# Patient Record
Sex: MALE | Race: WHITE | NOT HISPANIC OR LATINO | Employment: UNEMPLOYED | URBAN - METROPOLITAN AREA
[De-identification: names, ages, dates, MRNs, and addresses within clinical notes are randomized per-mention and may not be internally consistent; named-entity substitution may affect disease eponyms.]

---

## 2023-03-07 ENCOUNTER — DOCUMENTATION (OUTPATIENT)
Dept: PRIMARY CARE | Facility: CLINIC | Age: 53
End: 2023-03-07
Payer: COMMERCIAL

## 2023-03-09 ENCOUNTER — PATIENT OUTREACH (OUTPATIENT)
Dept: CARE COORDINATION | Facility: CLINIC | Age: 53
End: 2023-03-09
Payer: COMMERCIAL

## 2023-03-10 NOTE — PROGRESS NOTES
Discharge Facility:[facility]  Discharge Diagnosis:  Admission Date:  Discharge Date:  Engagement  Call Start Time: 1316 (3/9/2023 10:39 PM)    Medications  Medications reviewed with patient/caregiver?: Yes (3/9/2023 10:39 PM)  Is the patient having any side effects they believe may be caused by any medication additions or changes?: No (3/9/2023 10:39 PM)  Does the patient have all medications ordered at discharge?: No (3/9/2023 10:39 PM)  Nursing Interventions: Nurse provided patient education (3/9/2023 10:39 PM)  Prescription Comments: Encouraged to get prescription for albuterol to help ease breathing (3/9/2023 10:39 PM)  Is the patient taking all medications as directed (includes completed medication regime)?: No (3/9/2023 10:39 PM)  What is preventing the patient from taking all medications as directed?: Other (Comment) (3/9/2023 10:39 PM)  Nursing Interventions: Nurse provided patient education (3/9/2023 10:39 PM)    Appointments  Does the patient have a primary care provider?: Yes (3/9/2023 10:39 PM)  Nursing Interventions: Educated patient on importance of making appointment (3/9/2023 10:39 PM)  Has the patient kept scheduled appointments due by today?: Yes (3/9/2023 10:39 PM)    Patient Teaching  Does the patient have access to their discharge instructions?: Yes (3/9/2023 10:39 PM)  Nursing Interventions: Reviewed instructions with patient (3/9/2023 10:39 PM)  What is the patient's perception of their health status since discharge?: Improving (3/9/2023 10:39 PM)  Is the patient/caregiver able to teach back the hierarchy of who to call/visit for symptoms/problems? PCP, Specialist, Home Health nurse, Urgent Care, ED, 911: Yes (3/9/2023 10:39 PM)    Wrap Up  Call End Time: 1318 (3/9/2023 10:39 PM)

## 2023-03-21 ENCOUNTER — DOCUMENTATION (OUTPATIENT)
Dept: PRIMARY CARE | Facility: CLINIC | Age: 53
End: 2023-03-21
Payer: COMMERCIAL

## 2023-03-21 ENCOUNTER — PATIENT OUTREACH (OUTPATIENT)
Dept: CARE COORDINATION | Facility: CLINIC | Age: 53
End: 2023-03-21
Payer: COMMERCIAL

## 2023-03-21 RX ORDER — SACUBITRIL AND VALSARTAN 24; 26 MG/1; MG/1
1 TABLET, FILM COATED ORAL 2 TIMES DAILY
COMMUNITY
End: 2024-05-02 | Stop reason: SDUPTHER

## 2023-03-21 RX ORDER — CARVEDILOL 25 MG/1
25 TABLET ORAL
Status: ON HOLD | COMMUNITY

## 2023-03-21 NOTE — PROGRESS NOTES
[place] [place] Diagnosis:E87.70 Fluid Overload, unspecified  Admission Date:3/16/2023  Discharge Date:3/20/2023    PCP appt:3/27/2023    Nephrology- 3/23/2023  Cardiology- 4/18/2023      Patient to discontinue Labetalol, Losartan    New Meds: Entresto 24/26 one twice daily  Carvedilol 25 mg one twice daily    Kentrell states he is much improved.

## 2023-03-22 PROBLEM — Z95.1 S/P CABG X 3: Status: ACTIVE | Noted: 2023-03-22

## 2023-03-22 PROBLEM — I65.29 CAROTID STENOSIS: Status: ACTIVE | Noted: 2023-03-22

## 2023-03-22 PROBLEM — I25.10 CAD (CORONARY ARTERY DISEASE): Status: ACTIVE | Noted: 2023-03-22

## 2023-03-22 PROBLEM — N18.6 END-STAGE RENAL DISEASE ON HEMODIALYSIS (MULTI): Status: ACTIVE | Noted: 2023-03-22

## 2023-03-22 PROBLEM — J94.2 HEMOTHORAX: Status: ACTIVE | Noted: 2023-03-22

## 2023-03-22 PROBLEM — I10 HTN (HYPERTENSION): Status: ACTIVE | Noted: 2023-03-22

## 2023-03-22 PROBLEM — R07.9 CHEST PAIN: Status: ACTIVE | Noted: 2023-03-22

## 2023-03-22 PROBLEM — Z99.2 END-STAGE RENAL DISEASE ON HEMODIALYSIS (MULTI): Status: ACTIVE | Noted: 2023-03-22

## 2023-03-22 PROBLEM — E11.9 DIABETES MELLITUS (MULTI): Status: ACTIVE | Noted: 2023-03-22

## 2023-03-22 PROBLEM — M32.9 SLE (SYSTEMIC LUPUS ERYTHEMATOSUS) (MULTI): Status: ACTIVE | Noted: 2023-03-22

## 2023-03-22 PROBLEM — Z95.5 H/O HEART ARTERY STENT: Status: ACTIVE | Noted: 2023-03-22

## 2023-03-22 PROBLEM — J90 PLEURAL EFFUSION: Status: ACTIVE | Noted: 2023-03-22

## 2023-03-22 PROBLEM — R06.2 WHEEZING: Status: ACTIVE | Noted: 2023-03-22

## 2023-03-22 PROBLEM — S91.109A WOUND, OPEN, TOE: Status: ACTIVE | Noted: 2023-03-22

## 2023-03-22 RX ORDER — ATORVASTATIN CALCIUM 80 MG/1
1 TABLET, FILM COATED ORAL DAILY
COMMUNITY
Start: 2022-06-03 | End: 2024-02-08 | Stop reason: SDUPTHER

## 2023-03-22 RX ORDER — LANCETS
EACH MISCELLANEOUS
Status: ON HOLD | COMMUNITY
Start: 2022-03-25 | End: 2024-06-07 | Stop reason: ENTERED-IN-ERROR

## 2023-03-22 RX ORDER — ACETAMINOPHEN 325 MG/1
650 TABLET ORAL EVERY 6 HOURS PRN
Status: ON HOLD | COMMUNITY
Start: 2017-06-20

## 2023-03-22 RX ORDER — MULTIVITAMIN
1 TABLET ORAL DAILY
Status: ON HOLD | COMMUNITY
Start: 2022-06-16

## 2023-03-22 RX ORDER — CLOPIDOGREL BISULFATE 75 MG/1
75 TABLET ORAL DAILY
COMMUNITY
Start: 2022-06-03 | End: 2024-02-08 | Stop reason: WASHOUT

## 2023-03-22 RX ORDER — METOPROLOL TARTRATE 50 MG/1
1 TABLET ORAL 2 TIMES DAILY
COMMUNITY
Start: 2022-06-03 | End: 2023-11-27 | Stop reason: ALTCHOICE

## 2023-03-22 RX ORDER — INSULIN LISPRO 100 [IU]/ML
INJECTION, SOLUTION INTRAVENOUS; SUBCUTANEOUS
COMMUNITY
Start: 2017-05-18 | End: 2023-10-17

## 2023-03-22 RX ORDER — FAMOTIDINE 20 MG/1
1 TABLET, FILM COATED ORAL DAILY
Status: ON HOLD | COMMUNITY
Start: 2022-06-16 | End: 2024-03-28 | Stop reason: ALTCHOICE

## 2023-03-22 RX ORDER — AMLODIPINE BESYLATE 10 MG/1
10 TABLET ORAL DAILY
COMMUNITY
End: 2023-03-27 | Stop reason: SDUPTHER

## 2023-03-22 RX ORDER — ALBUTEROL SULFATE 90 UG/1
1-2 AEROSOL, METERED RESPIRATORY (INHALATION) EVERY 6 HOURS PRN
Status: ON HOLD | COMMUNITY
Start: 2022-06-09 | End: 2024-06-07 | Stop reason: ENTERED-IN-ERROR

## 2023-03-22 RX ORDER — BLOOD SUGAR DIAGNOSTIC
STRIP MISCELLANEOUS
Status: ON HOLD | COMMUNITY
Start: 2022-03-25 | End: 2024-06-07 | Stop reason: ENTERED-IN-ERROR

## 2023-03-22 RX ORDER — AMOXICILLIN 250 MG
1 CAPSULE ORAL DAILY PRN
COMMUNITY
End: 2023-11-27 | Stop reason: ALTCHOICE

## 2023-03-22 RX ORDER — INSULIN GLARGINE 100 [IU]/ML
10 INJECTION, SOLUTION SUBCUTANEOUS 2 TIMES DAILY
COMMUNITY
Start: 2017-05-18 | End: 2023-08-02 | Stop reason: SDUPTHER

## 2023-03-22 RX ORDER — SEVELAMER CARBONATE 800 MG/1
800 TABLET, FILM COATED ORAL
Status: ON HOLD | COMMUNITY
Start: 2022-03-25

## 2023-03-22 RX ORDER — ASPIRIN 81 MG/1
1 TABLET ORAL DAILY
Status: ON HOLD | COMMUNITY
Start: 2017-05-18

## 2023-03-27 ENCOUNTER — OFFICE VISIT (OUTPATIENT)
Dept: PRIMARY CARE | Facility: CLINIC | Age: 53
End: 2023-03-27
Payer: COMMERCIAL

## 2023-03-27 VITALS
DIASTOLIC BLOOD PRESSURE: 62 MMHG | BODY MASS INDEX: 19.11 KG/M2 | SYSTOLIC BLOOD PRESSURE: 152 MMHG | WEIGHT: 157 LBS | HEART RATE: 73 BPM

## 2023-03-27 DIAGNOSIS — N18.9 ANEMIA DUE TO CHRONIC KIDNEY DISEASE, UNSPECIFIED CKD STAGE: Primary | ICD-10-CM

## 2023-03-27 DIAGNOSIS — J90 PLEURAL EFFUSION: ICD-10-CM

## 2023-03-27 DIAGNOSIS — I10 PRIMARY HYPERTENSION: ICD-10-CM

## 2023-03-27 DIAGNOSIS — D63.1 ANEMIA DUE TO CHRONIC KIDNEY DISEASE, UNSPECIFIED CKD STAGE: Primary | ICD-10-CM

## 2023-03-27 DIAGNOSIS — N18.6 END-STAGE RENAL DISEASE ON HEMODIALYSIS (MULTI): ICD-10-CM

## 2023-03-27 DIAGNOSIS — Z99.2 END-STAGE RENAL DISEASE ON HEMODIALYSIS (MULTI): ICD-10-CM

## 2023-03-27 DIAGNOSIS — E11.59 TYPE 2 DIABETES MELLITUS WITH OTHER CIRCULATORY COMPLICATION, WITHOUT LONG-TERM CURRENT USE OF INSULIN (MULTI): ICD-10-CM

## 2023-03-27 PROBLEM — J94.2 HEMOTHORAX: Status: RESOLVED | Noted: 2023-03-22 | Resolved: 2023-03-27

## 2023-03-27 LAB
ERYTHROCYTE DISTRIBUTION WIDTH (RATIO) BY AUTOMATED COUNT: 13.5 % (ref 11.5–14.5)
ERYTHROCYTE MEAN CORPUSCULAR HEMOGLOBIN CONCENTRATION (G/DL) BY AUTOMATED: 31.4 G/DL (ref 32–36)
ERYTHROCYTE MEAN CORPUSCULAR VOLUME (FL) BY AUTOMATED COUNT: 100 FL (ref 80–100)
ERYTHROCYTES (10*6/UL) IN BLOOD BY AUTOMATED COUNT: 2.65 X10E12/L (ref 4.5–5.9)
ESTIMATED AVERAGE GLUCOSE FOR HBA1C: 235 MG/DL
HEMATOCRIT (%) IN BLOOD BY AUTOMATED COUNT: 26.4 % (ref 41–52)
HEMOGLOBIN (G/DL) IN BLOOD: 8.3 G/DL (ref 13.5–17.5)
HEMOGLOBIN A1C/HEMOGLOBIN TOTAL IN BLOOD: 9.8 %
LEUKOCYTES (10*3/UL) IN BLOOD BY AUTOMATED COUNT: 5.8 X10E9/L (ref 4.4–11.3)
NRBC (PER 100 WBCS) BY AUTOMATED COUNT: 0 /100 WBC (ref 0–0)
PLATELETS (10*3/UL) IN BLOOD AUTOMATED COUNT: 234 X10E9/L (ref 150–450)

## 2023-03-27 PROCEDURE — 99213 OFFICE O/P EST LOW 20 MIN: CPT | Performed by: INTERNAL MEDICINE

## 2023-03-27 PROCEDURE — 85027 COMPLETE CBC AUTOMATED: CPT

## 2023-03-27 PROCEDURE — 3066F NEPHROPATHY DOC TX: CPT | Performed by: INTERNAL MEDICINE

## 2023-03-27 PROCEDURE — 3078F DIAST BP <80 MM HG: CPT | Performed by: INTERNAL MEDICINE

## 2023-03-27 PROCEDURE — 83036 HEMOGLOBIN GLYCOSYLATED A1C: CPT

## 2023-03-27 PROCEDURE — 3077F SYST BP >= 140 MM HG: CPT | Performed by: INTERNAL MEDICINE

## 2023-03-27 PROCEDURE — 36415 COLL VENOUS BLD VENIPUNCTURE: CPT | Performed by: INTERNAL MEDICINE

## 2023-03-27 RX ORDER — AMLODIPINE BESYLATE 10 MG/1
10 TABLET ORAL DAILY
Qty: 90 TABLET | Refills: 1 | Status: SHIPPED | OUTPATIENT
Start: 2023-03-27 | End: 2023-09-17

## 2023-03-27 ASSESSMENT — PATIENT HEALTH QUESTIONNAIRE - PHQ9
2. FEELING DOWN, DEPRESSED OR HOPELESS: NOT AT ALL
SUM OF ALL RESPONSES TO PHQ9 QUESTIONS 1 AND 2: 0
1. LITTLE INTEREST OR PLEASURE IN DOING THINGS: NOT AT ALL

## 2023-03-27 NOTE — ASSESSMENT & PLAN NOTE
Likely due to missed hemodialysis session.  Advised compliance with his hemodialysis session and he has follow-up with nephrology.  There may have been a component of decreased oncotic pressure related to his anemia.  Patient received 1 unit of packed red blood cells with improved symptoms.  Continue getting IV Venofer from his nephrology  Check CBC

## 2023-03-27 NOTE — ASSESSMENT & PLAN NOTE
Blood pressure elevated today however he is a hemodialysis patient.  Will monitor clinically.  Advise follow-up with nephrology

## 2023-03-27 NOTE — PROGRESS NOTES
Subjective   Patient ID: Kentrell Carreon is a 52 y.o. male who presents for Follow-up (FOLLOW UP TO HOSPITAL STAY).    HPI   Patient is a 52-year-old male with past medical history of end-stage renal disease hypertension dyslipidemia GERD and diabetes mellitus type 2 presents as a hospital follow-up.  Patient went to the hospital due to shortness of breath on exertion.  He missed hemodialysis session.  The chest x-ray showed bilateral pulmonary edema.  Patient was also noted to be anemic.  He was given 1 unit blood transfusion and he states that his symptoms had improved substantially.  He was subsequently discharged with follow-up with his nephrologist.  He states that he is on IV iron with hemodialysis.    Patient receives hemodialysis at home it is unclear why he missed HD       Review of Systems  Constitutional: No fever or chills  Cardiovascular: no chest pain, no palpitations and no syncope.   Respiratory: no cough, no shortness of breath during exertion and no shortness of breath at rest.   Gastrointestinal: no abdominal pain, no nausea and no vomiting.  Neuro: No Headache, no dizziness    Objective   /62   Pulse 73   Wt 71.2 kg (157 lb)   BMI 19.11 kg/m²     Physical Exam  Constitutional: Alert and in no acute distress. Well developed, well nourished  Head and Face: Head and face: Normal.    Cardiovascular: Heart rate and rhythm were normal, normal S1 and S2. No peripheral edema.   Pulmonary: No respiratory distress. Clear bilateral breath sounds.  Musculoskeletal: Gait and station: Normal. Muscle strength/tone: Normal.   Skin: Normal skin color and pigmentation, normal skin turgor, and no rash.    Psychiatric: Judgment and insight: Intact. Mood and affect: Normal.        Lab Results   Component Value Date    WBC 6.3 03/20/2023    HGB 7.6 (L) 03/20/2023    HCT 24.4 (L) 03/20/2023     03/20/2023    CHOL 102 03/10/2022    TRIG 112 03/10/2022    HDL 33.4 (A) 03/10/2022    ALT 16 03/16/2023    AST  15 03/16/2023     03/20/2023    K 5.2 03/20/2023    CL 96 (L) 03/20/2023    CREATININE 7.47 (H) 03/20/2023    BUN 56 (H) 03/20/2023    CO2 27 03/20/2023    TSH 3.65 03/10/2022    INR 1.2 (H) 03/16/2023    HGBA1C 8.7 (A) 03/10/2022       Electrocardiogram 12 Lead  Normal sinus rhythm  Possible Left atrial enlargement  LVH with QRS widening and repolarization abnormality  Prolonged QT interval or tu fusion, consider myocardial disease, electrolyte imbalance, or drug effects  Abnormal ECG  When compared with ECG of 16-MAR-2023 10:16,  No significant change was found  Confirmed by Collin Bravo ([id number]) on 3/20/2023 3:01:16 PM            Assessment/Plan   Problem List Items Addressed This Visit          Respiratory    Pleural effusion     Likely due to missed hemodialysis session.  Advised compliance with his hemodialysis session and he has follow-up with nephrology.  There may have been a component of decreased oncotic pressure related to his anemia.  Patient received 1 unit of packed red blood cells with improved symptoms.  Continue getting IV Venofer from his nephrology  Check CBC            Circulatory    HTN (hypertension)     Blood pressure elevated today however he is a hemodialysis patient.  Will monitor clinically.  Advise follow-up with nephrology         Relevant Medications    amLODIPine (Norvasc) 10 mg tablet    Other Relevant Orders    Referral to Ophthalmology       Genitourinary    End-stage renal disease on hemodialysis (CMS/[facility])       Endocrine/Metabolic    Diabetes mellitus (CMS/[facility])     Check A1c  Continue insulin         Relevant Orders    Hemoglobin A1C     Other Visit Diagnoses       Anemia due to chronic kidney disease, unspecified CKD stage    -  Primary    Relevant Orders    CBC    Hemoglobin A1C

## 2023-04-19 ENCOUNTER — DOCUMENTATION (OUTPATIENT)
Dept: PRIMARY CARE | Facility: CLINIC | Age: 53
End: 2023-04-19
Payer: COMMERCIAL

## 2023-07-24 ENCOUNTER — PATIENT OUTREACH (OUTPATIENT)
Dept: PRIMARY CARE | Facility: CLINIC | Age: 53
End: 2023-07-24
Payer: COMMERCIAL

## 2023-07-24 DIAGNOSIS — L03.039 CELLULITIS OF TOE, UNSPECIFIED LATERALITY: ICD-10-CM

## 2023-07-24 RX ORDER — CEPHALEXIN 500 MG/1
500 CAPSULE ORAL 4 TIMES DAILY
COMMUNITY
End: 2023-11-27 | Stop reason: ALTCHOICE

## 2023-07-24 RX ORDER — DOXYCYCLINE HYCLATE 50 MG/1
50 CAPSULE ORAL 2 TIMES DAILY
COMMUNITY
End: 2023-11-27 | Stop reason: ALTCHOICE

## 2023-07-24 NOTE — PROGRESS NOTES
Discharge Facility:[place]  Discharge Diagnosis: Cellulitis of the great toe  Admission Date:7/21/23  Discharge Date:7/22/23     PCP Appointment Date:8/2/23  Specialist Appointment Date:   Hospital Encounter and Summary: Linked   See discharge assessment below for further details  Engagement  Call Start Time: 1020 (7/24/2023 10:46 AM)    Medications  Medications reviewed with patient/caregiver?: Yes (7/24/2023 10:46 AM)  Is the patient having any side effects they believe may be caused by any medication additions or changes?: No (7/24/2023 10:46 AM)  Does the patient have all medications ordered at discharge?: Yes (7/24/2023 10:46 AM)  Prescription Comments: see med list, patient reported having all his medications.  He started taking cephalexin 500 and doxycycline hyclate 50 (7/24/2023 10:46 AM)  Is the patient taking all medications as directed (includes completed medication regime)?: Yes (7/24/2023 10:46 AM)  Medication Comments: see med list (7/24/2023 10:46 AM)    Appointments  Does the patient have a primary care provider?: Yes (7/24/2023 10:46 AM)  Care Management Interventions: Verified appointment date/time/provider; Educated patient on importance of making appointment (7/24/2023 10:46 AM)  Has the patient kept scheduled appointments due by today?: Yes (7/24/2023 10:46 AM)    Patient Teaching  Does the patient have access to their discharge instructions?: Yes (7/24/2023 10:46 AM)  Care Management Interventions: Reviewed instructions with patient (7/24/2023 10:46 AM)  What is the patient's perception of their health status since discharge?: Improving (7/24/2023 10:46 AM)  Is the patient/caregiver able to teach back the hierarchy of who to call/visit for symptoms/problems? PCP, Specialist, Home Health nurse, Urgent Care, ED, 911: Yes (7/24/2023 10:46 AM)

## 2023-08-02 ENCOUNTER — OFFICE VISIT (OUTPATIENT)
Dept: PRIMARY CARE | Facility: CLINIC | Age: 53
End: 2023-08-02
Payer: COMMERCIAL

## 2023-08-02 VITALS
WEIGHT: 153 LBS | BODY MASS INDEX: 18.62 KG/M2 | DIASTOLIC BLOOD PRESSURE: 61 MMHG | HEART RATE: 76 BPM | SYSTOLIC BLOOD PRESSURE: 129 MMHG

## 2023-08-02 DIAGNOSIS — M79.671 RIGHT FOOT PAIN: Primary | ICD-10-CM

## 2023-08-02 DIAGNOSIS — E11.9 TYPE 2 DIABETES MELLITUS WITHOUT COMPLICATION, WITHOUT LONG-TERM CURRENT USE OF INSULIN (MULTI): ICD-10-CM

## 2023-08-02 PROCEDURE — 99214 OFFICE O/P EST MOD 30 MIN: CPT | Performed by: INTERNAL MEDICINE

## 2023-08-02 PROCEDURE — 3078F DIAST BP <80 MM HG: CPT | Performed by: INTERNAL MEDICINE

## 2023-08-02 PROCEDURE — 3074F SYST BP LT 130 MM HG: CPT | Performed by: INTERNAL MEDICINE

## 2023-08-02 PROCEDURE — 3066F NEPHROPATHY DOC TX: CPT | Performed by: INTERNAL MEDICINE

## 2023-08-02 PROCEDURE — 3052F HG A1C>EQUAL 8.0%<EQUAL 9.0%: CPT | Performed by: INTERNAL MEDICINE

## 2023-08-02 RX ORDER — INSULIN GLARGINE 100 [IU]/ML
20 INJECTION, SOLUTION SUBCUTANEOUS 2 TIMES DAILY
Qty: 10 ML | Refills: 2 | Status: SHIPPED | OUTPATIENT
Start: 2023-08-02 | End: 2023-10-17

## 2023-08-02 NOTE — ASSESSMENT & PLAN NOTE
Stressed the importance of better glycemic control  Increase Lantus to 20 units twice daily.  Stressed the importance of seeing an endocrinologist  Please your insulin sliding scale with meals  Advise low carbohydrate diet.  Check A1c next visit

## 2023-08-02 NOTE — ASSESSMENT & PLAN NOTE
Given elevated ESR and pain in the right great toe in the setting of hemodialysis uncontrolled diabetes will obtain stat MRI rule out osteoarthritis.  Referral to podiatry for evaluation and treatment  Needs better control of his diabetes  Go to the emergency room if the pain gets worse

## 2023-08-02 NOTE — PROGRESS NOTES
Subjective   Patient ID: Kentrell Carreon is a 53 y.o. male who presents for Follow-up (Follow up hospital stay).    HPI         Patient is a 52-year-old male with past medical history of end-stage renal disease hypertension dyslipidemia GERD and diabetes mellitus type 2 presents as a hospital follow-up.  Patient went to the hospital due to right great toe pain.  There was concern for osteo-.  Patient's ESR was significantly elevated.  His A1c was noted to be greater than 9.  He has numbness and tingling in the feet.  He states he is evaluated by either the orthopedist or podiatrist and stated that there was no osteo.  Patient was started on Keflex and doxycycline and subsequently discharged.  He states that the warmth and the pain in the toe has subsided.  He just completed his antibiotic course.  The area is still slightly warm but the pain has improved substantially.    Of note his A1c is uncontrolled.  He states that he is taking his Lantus 10 units twice daily.  He has not established with an endocrinologist as requested.  He states that he is inconsistent with his mealtime insulin dosing.      Review of Systems  Constitutional: No fever or chills  Cardiovascular: no chest pain, no palpitations and no syncope.   Respiratory: no cough, no shortness of breath during exertion and no shortness of breath at rest.   Gastrointestinal: no abdominal pain, no nausea and no vomiting.  Neuro: No Headache, no dizziness    Objective   /61   Pulse 76   Wt 69.4 kg (153 lb)   BMI 18.62 kg/m²     Physical Exam  Constitutional: Alert and in no acute distress. Well developed, well nourished  Head and Face: Head and face: Normal.    Cardiovascular: Heart rate and rhythm were normal, normal S1 and S2. No peripheral edema.   Pulmonary: No respiratory distress. Clear bilateral breath sounds.  Musculoskeletal: Tenderness and erythema of the right great toe, with overlying skin cracks  Skin: Normal skin color and pigmentation,  normal skin turgor, and no rash.    Psychiatric: Judgment and insight: Intact. Mood and affect: Normal.        Lab Results   Component Value Date    WBC 5.1 07/22/2023    HGB 11.9 (L) 07/22/2023    HCT 35.8 (L) 07/22/2023     07/22/2023    CHOL 102 03/10/2022    TRIG 112 03/10/2022    HDL 33.4 (A) 03/10/2022    ALT 8 (L) 07/21/2023    AST 14 07/21/2023     (L) 07/22/2023    K 5.7 (H) 07/22/2023    CL 91 (L) 07/22/2023    CREATININE 11.78 (H) 07/22/2023    BUN 67 (H) 07/22/2023    CO2 25 07/22/2023    TSH 3.65 03/10/2022    INR 1.0 07/21/2023    HGBA1C 9.0 (A) 07/21/2023       Electrocardiogram 12 Lead  Please see ED Provider Note for formal interpretation  Confirmed by Jose Angel Bustillo ([id number]) on 7/23/2023 3:02:51 AM            Assessment/Plan   Problem List Items Addressed This Visit          Endocrine/Metabolic    Diabetes mellitus (CMS/[facility])    Relevant Medications    insulin glargine (Lantus U-100 Insulin) 100 unit/mL injection    Other Relevant Orders    Referral to Endocrinology       Musculoskeletal and Injuries    Right foot pain - Primary     Given elevated ESR and pain in the right great toe in the setting of hemodialysis uncontrolled diabetes will obtain stat MRI rule out osteoarthritis.  Referral to podiatry for evaluation and treatment  Needs better control of his diabetes  Go to the emergency room if the pain gets worse         Relevant Orders    MR foot right wo IV contrast    CBC    C-reactive protein    Sedimentation Rate    Referral to Podiatry

## 2023-08-14 ENCOUNTER — PATIENT OUTREACH (OUTPATIENT)
Dept: PRIMARY CARE | Facility: CLINIC | Age: 53
End: 2023-08-14
Payer: COMMERCIAL

## 2023-08-14 DIAGNOSIS — M86.171 OTHER ACUTE OSTEOMYELITIS OF RIGHT FOOT (MULTI): ICD-10-CM

## 2023-08-14 RX ORDER — LEVOFLOXACIN 750 MG/1
TABLET ORAL
COMMUNITY
End: 2023-11-27 | Stop reason: ALTCHOICE

## 2023-08-14 NOTE — PROGRESS NOTES
Discharge Facility:[facility]  Discharge Diagnosis:Osteomyelitis of the great toe right foot   Admission Date:8/4/23  Discharge Date: 8/11/23    PCP Appointment Date:8/23/23  Specialist Appointment Date:   Hospital Encounter and Summary: Linked   See discharge assessment below for further details  Engagement  Call Start Time: 1603 (8/14/2023  4:03 PM)    Medications  Medications reviewed with patient/caregiver?: Yes (8/14/2023  4:03 PM)  Is the patient having any side effects they believe may be caused by any medication additions or changes?: No (8/14/2023  4:03 PM)  Does the patient have all medications ordered at discharge?: Yes (8/14/2023  4:03 PM)  Prescription Comments: see med list (8/14/2023  4:03 PM)  Is the patient taking all medications as directed (includes completed medication regime)?: Yes (8/14/2023  4:03 PM)  Medication Comments: see med list, patient reported taking Levofloxacin 750 (8/14/2023  4:03 PM)    Appointments  Does the patient have a primary care provider?: Yes (8/14/2023  4:03 PM)  Care Management Interventions: Verified appointment date/time/provider (8/14/2023  4:03 PM)  Has the patient kept scheduled appointments due by today?: Yes (8/14/2023  4:03 PM)  Care Management Interventions: Advised patient to keep appointment (8/14/2023  4:03 PM)    Patient Teaching  Does the patient have access to their discharge instructions?: Yes (8/14/2023  4:03 PM)  Care Management Interventions: Reviewed instructions with patient (8/14/2023  4:03 PM)  What is the patient's perception of their health status since discharge?: Improving (8/14/2023  4:03 PM)  Is the patient/caregiver able to teach back the hierarchy of who to call/visit for symptoms/problems? PCP, Specialist, Home Health nurse, Urgent Care, ED, 911: Yes (8/14/2023  4:03 PM)

## 2023-08-23 ENCOUNTER — OFFICE VISIT (OUTPATIENT)
Dept: PRIMARY CARE | Facility: CLINIC | Age: 53
End: 2023-08-23
Payer: COMMERCIAL

## 2023-08-23 VITALS — BODY MASS INDEX: 18.87 KG/M2 | WEIGHT: 155 LBS | DIASTOLIC BLOOD PRESSURE: 56 MMHG | SYSTOLIC BLOOD PRESSURE: 123 MMHG

## 2023-08-23 DIAGNOSIS — I65.29 STENOSIS OF CAROTID ARTERY, UNSPECIFIED LATERALITY: ICD-10-CM

## 2023-08-23 DIAGNOSIS — M79.671 RIGHT FOOT PAIN: ICD-10-CM

## 2023-08-23 DIAGNOSIS — E11.59 TYPE 2 DIABETES MELLITUS WITH OTHER CIRCULATORY COMPLICATION, WITH LONG-TERM CURRENT USE OF INSULIN (MULTI): ICD-10-CM

## 2023-08-23 DIAGNOSIS — I50.9 CONGESTIVE HEART FAILURE, UNSPECIFIED HF CHRONICITY, UNSPECIFIED HEART FAILURE TYPE (MULTI): ICD-10-CM

## 2023-08-23 DIAGNOSIS — I25.118 CORONARY ARTERY DISEASE WITH OTHER FORM OF ANGINA PECTORIS, UNSPECIFIED VESSEL OR LESION TYPE, UNSPECIFIED WHETHER NATIVE OR TRANSPLANTED HEART (CMS-HCC): Primary | ICD-10-CM

## 2023-08-23 DIAGNOSIS — Z99.2 END-STAGE RENAL DISEASE ON HEMODIALYSIS (MULTI): ICD-10-CM

## 2023-08-23 DIAGNOSIS — E11.9 TYPE 2 DIABETES MELLITUS WITHOUT COMPLICATION, UNSPECIFIED WHETHER LONG TERM INSULIN USE (MULTI): Primary | ICD-10-CM

## 2023-08-23 DIAGNOSIS — N18.6 END-STAGE RENAL DISEASE ON HEMODIALYSIS (MULTI): ICD-10-CM

## 2023-08-23 DIAGNOSIS — Z79.4 TYPE 2 DIABETES MELLITUS WITH OTHER CIRCULATORY COMPLICATION, WITH LONG-TERM CURRENT USE OF INSULIN (MULTI): ICD-10-CM

## 2023-08-23 PROCEDURE — 99214 OFFICE O/P EST MOD 30 MIN: CPT | Performed by: INTERNAL MEDICINE

## 2023-08-23 PROCEDURE — 3052F HG A1C>EQUAL 8.0%<EQUAL 9.0%: CPT | Performed by: INTERNAL MEDICINE

## 2023-08-23 PROCEDURE — 3078F DIAST BP <80 MM HG: CPT | Performed by: INTERNAL MEDICINE

## 2023-08-23 PROCEDURE — 3074F SYST BP LT 130 MM HG: CPT | Performed by: INTERNAL MEDICINE

## 2023-08-23 PROCEDURE — 3066F NEPHROPATHY DOC TX: CPT | Performed by: INTERNAL MEDICINE

## 2023-08-23 NOTE — ASSESSMENT & PLAN NOTE
Has an appointment to see endocrinology upcoming.  A1c not well controlled.  Advised to carbohydrate diet.  Please do not miss the appointment

## 2023-08-23 NOTE — ASSESSMENT & PLAN NOTE
Status post IV antibiotics followed by oral antibiotics.  Negative bone cultures.  We will repeat labs today.  Referral to podiatry.  Recommend Dr. Brizuela in [place].  Remove 3 sutures today.

## 2023-08-23 NOTE — PROGRESS NOTES
Subjective   Patient ID: Kentrell Carreon is a 53 y.o. male who presents for Follow-up.    HPI         Patient is a 52-year-old male with past medical history of end-stage renal disease hypertension dyslipidemia GERD and diabetes mellitus type 2 coronary artery disease presents as a hospital follow-up.  Patient went to the hospital due to right great toe pain.  There was concern for osteo-.  Patient's ESR was significantly elevated.  His A1c was noted to be greater than 9.  He has numbness and tingling in the feet.  He states he is evaluated by either the orthopedist or podiatrist and stated that there was no osteo.  Patient was started on Keflex and doxycycline and subsequently discharged.  He states that the warmth and the pain in the toe has subsided.  He presented to the office and imaging was concerning for osteo-.  He went to the hospital and started on Merrem.  Biopsy of the great hallux was done showing no organism growth.  He was discharged on 10-day course of Levaquin every other day.  He is due for suture removal.  He has 3 sutures in his foot.  He does not have podiatry follow-up.  He does have an appointment to see his endocrinologist next month      Review of Systems  Constitutional: No fever or chills  Cardiovascular: no chest pain, no palpitations and no syncope.   Respiratory: no cough, no shortness of breath during exertion and no shortness of breath at rest.   Gastrointestinal: no abdominal pain, no nausea and no vomiting.  Neuro: No Headache, no dizziness    Objective   /56   Wt 70.3 kg (155 lb)   BMI 18.87 kg/m²     Physical Exam  Constitutional: Alert and in no acute distress. Well developed, well nourished  Head and Face: Head and face: Normal.    Cardiovascular: Heart rate and rhythm were normal, normal S1 and S2. No peripheral edema.   Pulmonary: No respiratory distress. Clear bilateral breath sounds.  Musculoskeletal: Tenderness and erythema of the right great toe, with overlying skin  "cracks  Skin: Normal skin color and pigmentation, normal skin turgor, and no rash.    Psychiatric: Judgment and insight: Intact. Mood and affect: Normal.        Lab Results   Component Value Date    WBC 5.0 08/11/2023    HGB 9.2 (L) 08/11/2023    HCT 28.4 (L) 08/11/2023     08/11/2023    CHOL 102 03/10/2022    TRIG 112 03/10/2022    HDL 33.4 (A) 03/10/2022    ALT 7 (L) 08/04/2023    AST 14 08/04/2023     (L) 08/11/2023    K 4.5 08/11/2023    CL 94 (L) 08/11/2023    CREATININE 6.85 (H) 08/11/2023    BUN 37 (H) 08/11/2023    CO2 30 08/11/2023    TSH 3.65 03/10/2022    INR 1.0 07/21/2023    HGBA1C 9.0 (A) 07/21/2023       XR foot  Narrative: Interpreted By:  RADHA WOODARD MD  MRN: [id number]  Patient Name: LUNA FINCH     STUDY:  FOOT; COMPLETE, MIN 3 VIEWS;  8/8/2023 2:16 pm     INDICATION:  post op changes .     COMPARISON:  07/21/2023     ACCESSION NUMBER(S):  [id number]     ORDERING CLINICIAN:  BENY GOOD     FINDINGS:  Three views of the right foot labeled \"postop\"  obtained.     Bandage material overlying the medial forefoot and great toe limits  assessment of osseous detail. Ill-defined nondisplaced fracture  through the shaft of the 1st proximal phalanx. New ill-defined lytic  lesion in the medial base of the 1st distal phalanx. Probable small  subchondral lucencies in the distal aspect of the adjacent proximal  phalanx. Marked surrounding soft tissue swelling. Status post  amputation of the distal 2nd ray with smooth metatarsal amputation  margin. Mid and hindfoot joint spaces are preserved. Multifocal soft  tissue presumed vascular calcifications.     Impression: New lytic erosion of the base of the 1st distal phalanx consistent  with osteomyelitis. Ill-defined nondisplaced fracture of the 1st  proximal phalanx. Soft tissue swelling and bandage material in the  medial forefoot and great toe.            Assessment/Plan   Problem List Items Addressed This Visit          Cardiac and Vasculature "    CAD (coronary artery disease) - Primary    Carotid stenosis       Endocrine/Metabolic    Diabetes mellitus (CMS/[facility])     Has an appointment to see endocrinology upcoming.  A1c not well controlled.  Advised to carbohydrate diet.  Please do not miss the appointment            Genitourinary and Reproductive    End-stage renal disease on hemodialysis (CMS/[facility])     Continue following with nephrology            Musculoskeletal and Injuries    Right foot pain     Status post IV antibiotics followed by oral antibiotics.  Negative bone cultures.  We will repeat labs today.  Referral to podiatry.  Recommend Dr. Brizuela in [place].  Remove 3 sutures today.

## 2023-09-13 ENCOUNTER — PATIENT OUTREACH (OUTPATIENT)
Dept: PRIMARY CARE | Facility: CLINIC | Age: 53
End: 2023-09-13
Payer: COMMERCIAL

## 2023-09-13 NOTE — PROGRESS NOTES
Date of Service: 07/05/2023    OUTPATIENT PULMONARY FOLLOWUP    PRIMARY CARE PHYSICIAN:  Bill Key MD.    REASON FOR VISIT:    Shortness of breath.    HISTORY OF PRESENT ILLNESS:    The patient is a 74-year-old white male with history of amyloidosis, nephrotic syndrome, chronic kidney disease, malignant lymphoplasmacytic lymphoma, and known bilateral pleural effusion, status post previous right pleural catheter, who presents for further evaluation.    Since my last visit in November, due to worsening pleural effusion, as well as increasing pericardial effusion, he underwent pericardial window and decortication and drainage of a left pleural effusion.  He continues to have significant chest discomfort and tingling since his thoracoscopy.  The patient also notes then after an original improvement his sensation of shortness of breath is worsening.  The patient does endorse mild lower extremity edema and states he is going to be starting hemodialysis with the eventual goal for peritoneal dialysis.  The patient denies any cough, sputum production or hemoptysis.  The patient denied fevers, chills, night sweats, or weight changes.  The remainder of a complete review of systems performed in clinic is otherwise negative.    PAST MEDICAL HISTORY, PAST SURGICAL HISTORY, FAMILY HISTORY, SOCIAL HISTORY AND ALLERGIES:    Reviewed and unchanged.    MEDICATIONS:    Current medical regimen was reviewed per the Epic chart and includes Torsemide.    PHYSICAL EXAMINATION:    VITAL SIGNS:  Blood pressure 128/90, pulse 100, respiratory rate 16, resting room air oxygen saturation is 98%. Height 5 feet 6 inches. Weight 69.4 kilograms.   BMI 24.69.  GENERAL:  Pleasant male in no apparent distress.  HEENT:  Pupils equal.  There is no scleral icterus.  Oropharynx without thrush.  NECK:  There is no goiter.  Trachea is midline.  CARDIOVASCULAR:  There is no jugular venous distention.  Regular rate and rhythm, normal S1 and S2.  There  Call placed regarding one month post discharge follow up call.  At time of outreach call the patient feels as if their condition has improved since initial visit with PCP.  No questions or concerns at this time.   is no S3.  There is 1+ bilateral lower extremity edema.  LUNGS:  Reveal decreased breath sounds in left posterior chest about one-fifth the way up.  There is no tachypnea.  There is no accessory muscle use.  ABDOMEN:  Soft, nontender.  There is no guarding.  EXTREMITIES:  There is no evidence of arthritis or synovitis in bilateral upper extremities.  There is no clubbing.  SKIN:  No rash.  LYMPH NODE:  No cervical, posterior cervical or supraclavicular lymphadenopathy.    Chest x-ray dated 06/30/2023 was reviewed personally and compared to a series of chest x-rays from 03/24/2023, 03/15/2023, as well as 03/08/2023.  Upon my interpretation, there has been a significant reduction in his left loculated pleural effusion.    CT scan of the chest from 03/12/2023 was reviewed personally and compared to his previous chest CT from 11/11/2022.  Upon my interpretation, there is a right lower lobe infiltrate with a loculated left pleural effusion.    Surgical pathology from his pericardial window and left decortication were reviewed.  There is evidence of an amyloid deposition in the pericardial specimen. There is a chronic fibrosis in the left pleural specimen    Labs dated 06/28/2023 were reviewed.  Sodium 144, potassium 3.4, chloride 114, bicarbonate 19, BUN 71, creatinine 8.50, calcium 8.8, bilirubin 0.4, AST 24, ALT 19, alkaline phosphatase 72, protein 5.3, albumin 2.6.  White count 5.1, hemoglobin 11.2, hematocrit 33.7, platelet count of 185 with 5% eosinophils.    Cardiac echo report from 04/25/2023 was reviewed.  According to the attending cardiologist, the ejection fraction is normal at 58%.  There is no significant pericardial effusion.  Estimated PA systolic pressure was 25.  A grade 1/4 diastolic dysfunction is noted.    ASSESSMENT AND PLAN:    A 74-year-old white male with a history of amyloidosis complicated by chronic kidney disease, recurrent left pleural effusion, status post decortication, pericardial  effusion, status post pericardial window, and malignant lymphoplasmacytic lymphoma.  1.  Shortness of breath:  I discussed with Adithya that this is likely multifactorial.  His pleural effusion has very nicely responded to Dr. Agudelo' surgical intervention.  There is a trivial effusion left.  I doubt this represents consequences from his pericardial effusion as his exam is benign today and echo 04/25/2023 did not demonstrate any pericardial effusion.  Certainly, mild hypervolemia and physical deconditioning may be playing a role.  Adithya questioned whether or not he had pulmonary amyloidosis and I do not have any evidence of this.  2.  Loculated left pleural effusion:  He is status post decortication by Dr. Agudelo.  Radiographically, this has nicely responded.  3.  Stage III chronic kidney disease:  He states that Dr. Donald's plans to transition him to hemodialysis temporarily and then eventually to peritoneal dialysis.  4.  Amyloidosis:  He will continue to follow up with nephrology and cardiology.  There were amylase changes in his pericardial specimen.  5.  History of malignant lymphoplasmacytic lymphoma.  6.  Health maintenance:  The patient should receive appropriate vaccinations including Prevnar 20, and yearly influenza vaccinations.  The patient does not smoke.      Dictated By: Reji Skinner MD  Signing Provider: Reji Skinner MD    SLL/vls ([id number])   DD: 07/05/2023 12:24:13 PM TD: 07/05/2023 9:29:39 PM

## 2023-09-15 PROBLEM — R74.8 HIGH LEVEL OF CARDIAC MARKER: Status: ACTIVE | Noted: 2023-09-15

## 2023-09-15 PROBLEM — N28.9 RENAL DISORDER: Status: ACTIVE | Noted: 2023-09-15

## 2023-09-15 PROBLEM — E87.70 FLUID OVERLOAD: Status: ACTIVE | Noted: 2023-09-15

## 2023-09-15 PROBLEM — Z99.2 ESRD ON DIALYSIS (MULTI): Status: ACTIVE | Noted: 2023-09-15

## 2023-09-15 PROBLEM — I50.1 ACUTE PULMONARY EDEMA WITH HEART DISEASE (MULTI): Status: ACTIVE | Noted: 2023-09-15

## 2023-09-15 PROBLEM — M79.675 PAIN IN TOES OF BOTH FEET: Status: ACTIVE | Noted: 2018-07-17

## 2023-09-15 PROBLEM — M79.674 PAIN IN TOES OF BOTH FEET: Status: ACTIVE | Noted: 2018-07-17

## 2023-09-15 PROBLEM — N18.6 ESRD ON DIALYSIS (MULTI): Status: ACTIVE | Noted: 2023-09-15

## 2023-09-15 PROBLEM — S92.912A: Status: ACTIVE | Noted: 2018-07-17

## 2023-09-15 PROBLEM — B35.1 ONYCHOMYCOSIS: Status: ACTIVE | Noted: 2018-07-17

## 2023-09-15 PROBLEM — M86.8X7 OSTEOMYELITIS OF FOREFOOT (MULTI): Status: ACTIVE | Noted: 2023-09-15

## 2023-09-15 PROBLEM — I10 ACCELERATED HYPERTENSION: Status: ACTIVE | Noted: 2023-09-15

## 2023-09-15 PROBLEM — M86.179: Status: ACTIVE | Noted: 2023-09-15

## 2023-09-15 PROBLEM — E87.1 HYPONATREMIA: Status: ACTIVE | Noted: 2023-09-15

## 2023-09-15 RX ORDER — INSULIN GLARGINE 100 [IU]/ML
14 INJECTION, SOLUTION SUBCUTANEOUS NIGHTLY
COMMUNITY
Start: 2016-11-05 | End: 2023-10-17

## 2023-09-15 RX ORDER — HYDRALAZINE HYDROCHLORIDE 50 MG/1
50 TABLET, FILM COATED ORAL 3 TIMES DAILY
COMMUNITY
End: 2024-02-08 | Stop reason: WASHOUT

## 2023-09-15 RX ORDER — SULFAMETHOXAZOLE AND TRIMETHOPRIM 800; 160 MG/1; MG/1
1 TABLET ORAL 2 TIMES DAILY
COMMUNITY
Start: 2022-07-06 | End: 2023-11-27 | Stop reason: ALTCHOICE

## 2023-09-15 RX ORDER — AMOXICILLIN 250 MG
2 CAPSULE ORAL NIGHTLY PRN
COMMUNITY
End: 2023-11-27 | Stop reason: ALTCHOICE

## 2023-09-15 RX ORDER — NAPROXEN SODIUM 220 MG
1 TABLET ORAL 4 TIMES DAILY
Status: ON HOLD | COMMUNITY
Start: 2022-06-03 | End: 2024-06-07 | Stop reason: ENTERED-IN-ERROR

## 2023-09-15 RX ORDER — ATORVASTATIN CALCIUM 40 MG/1
40 TABLET, FILM COATED ORAL DAILY
COMMUNITY
Start: 2021-12-06 | End: 2023-11-27 | Stop reason: ALTCHOICE

## 2023-09-15 RX ORDER — TALC
3 POWDER (GRAM) TOPICAL NIGHTLY PRN
Status: ON HOLD | COMMUNITY
Start: 2023-08-11 | End: 2024-03-28 | Stop reason: ALTCHOICE

## 2023-09-15 RX ORDER — DOXYCYCLINE HYCLATE 50 MG/1
100 TABLET, FILM COATED ORAL EVERY 12 HOURS
COMMUNITY
Start: 2023-07-23 | End: 2023-11-27 | Stop reason: ALTCHOICE

## 2023-09-15 RX ORDER — IRON POLYSACCHARIDE COMPLEX 150 MG
150 CAPSULE ORAL DAILY
COMMUNITY
End: 2024-02-08 | Stop reason: WASHOUT

## 2023-09-15 RX ORDER — LABETALOL 200 MG/1
1 TABLET, FILM COATED ORAL 2 TIMES DAILY
COMMUNITY
Start: 2017-05-18 | End: 2024-02-08 | Stop reason: WASHOUT

## 2023-09-15 RX ORDER — CALCIUM CARBONATE 400(1000)
3000 TABLET,CHEWABLE ORAL
COMMUNITY
End: 2023-11-27 | Stop reason: ALTCHOICE

## 2023-09-15 RX ORDER — POLYETHYLENE GLYCOL 3350 17 G/17G
17 POWDER, FOR SOLUTION ORAL DAILY PRN
COMMUNITY
Start: 2022-03-24 | End: 2023-11-27 | Stop reason: ALTCHOICE

## 2023-09-15 RX ORDER — INSULIN LISPRO 100 [IU]/ML
INJECTION, SOLUTION INTRAVENOUS; SUBCUTANEOUS
COMMUNITY
Start: 2016-12-06 | End: 2023-10-17

## 2023-10-12 NOTE — PROGRESS NOTES
Patient is seen at the request of German Pfeiffer for my opinion regarding diabetes. My final recommendations will be communicated back to the requesting provider by way of shared medical record.    Subjective   Kentrell Carreon is a 53 y.o. male with a hx of diabetes mellitus, ESRD on HD, CHF, CAD s/p PCI (2017), s/p CABG (03/2022), HLD, osteomyeleitis, who presents for diabetes.    Dx: 22 yo   HbA1c: 8.3% (10/17/2023), 9.0% (07/202023), 9.8% (03/2023)  Current regimen: Lantus 10 units BID, Humalog ICR 1: 15g, ISF 1:50 (>180)  Past medications: oral agents at time of diagnosis  Complications: neuropathy, nephropathy, diabetic toe ulcer, amputation of 2nd right metatarsal    SMBG: did not bring meter today    Hypoglycemia:   Hypoglycemia awareness: yes, 50s, sweating, lightheadedness     Diet: 3-4 meals per day but tends to graze    Family history: brother and both parents with type 2 diabetes    ROS  General: no fever or chills  CV: no chest pain   Respiratory: no shortness of breath  MSK: no lower extremity edema  Neuro: no headache or dizziness  See HPI for Endocrine ROS    Objective    Physical Exam  There were no vitals taken for this visit.  General: not in acute distress  HEENT: JUNE GARCIA  Thyroid: no goiter  Neuro: alert and oriented x 3      Current Outpatient Medications:     Accu-Chek Guide test strips strip, Accu-Chek Guide In Vitro Strip  Quantity: 200  Refills: 0      Start : 25-Mar-2022  Active, Disp: , Rfl:     Accu-Chek Softclix Lancets misc, , Disp: , Rfl:     acetaminophen (Tylenol) 325 mg tablet, Take 2 tablets (650 mg) by mouth every 6 hours if needed., Disp: , Rfl:     amLODIPine (Norvasc) 10 mg tablet, Take 1 tablet (10 mg) by mouth once daily., Disp: 90 tablet, Rfl: 0    aspirin 81 mg EC tablet, Take 1 tablet (81 mg) by mouth once daily., Disp: , Rfl:     atorvastatin (Lipitor) 40 mg tablet, Take 1 tablet (40 mg) by mouth once daily., Disp: , Rfl:     atorvastatin (Lipitor) 80 mg tablet, Take 1  tablet (80 mg) by mouth once daily., Disp: , Rfl:     blood sugar diagnostic strip, 1 each. 3-4x daily, Disp: , Rfl:     calcium carbonate (Tums Ultra) 400 mg calcium (1,000 mg) chewable tablet, Chew 3,000 mg 3 times a day with meals., Disp: , Rfl:     carvedilol (Coreg) 25 mg tablet, Take 1 tablet (25 mg) by mouth in the morning and 1 tablet (25 mg) in the evening. Take with meals., Disp: , Rfl:     cephalexin (Keflex) 500 mg capsule, Take 1 capsule (500 mg) by mouth 4 times a day., Disp: , Rfl:     clopidogrel (Plavix) 75 mg tablet, Take 1 tablet (75 mg) by mouth once daily., Disp: , Rfl:     doxycycline (Vibra-Tabs) 50 mg tablet, Take 2 tablets (100 mg) by mouth every 12 hours., Disp: , Rfl:     doxycycline (Vibramycin) 50 mg capsule, Take 1 capsule (50 mg) by mouth 2 times a day. Take with at least 8 ounces (large glass) of water, do not lie down for 30 minutes after, Disp: , Rfl:     EPOETIN DEIRDRE INJ, Inject 8,000 Units under the skin. With every dialysis tx, Disp: , Rfl:     famotidine (Pepcid) 20 mg tablet, Take 1 tablet (20 mg) by mouth once daily., Disp: , Rfl:     hydrALAZINE (Apresoline) 50 mg tablet, Take 1 tablet (50 mg) by mouth 3 times a day. With food, Disp: , Rfl:     insulin glargine (Lantus U-100 Insulin) 100 unit/mL injection, Inject 20 Units under the skin 2 times a day. (Patient taking differently: Inject 20 Units under the skin 2 times a day. 10units in the morning and 10units nightly), Disp: 10 mL, Rfl: 2    insulin glargine (Lantus) 100 unit/mL (3 mL) pen, Inject 14 Units under the skin once daily at bedtime., Disp: , Rfl:     insulin lispro (HumaLOG U-100 Insulin) 100 unit/mL injection, Inject under the skin. Sliding scale with meals, Disp: , Rfl:     insulin lispro (HumaLOG) 100 unit/mL injection, Inject under the skin 3 times a day with meals. 1 unit(s) injectable , <150 = 0 unit, 151-200 = 2 units , 201-250 = 4 units , 251-300 = 6 units, 301-350 = 8 units, 251-400 = 10 units, Disp: ,  "Rfl:     insulin syringe-needle U-100 31G X 5/16\" 0.5 mL syringe, Inject 1 each under the skin 4 times a day. With meals and at bedtime, Disp: , Rfl:     iron polysaccharides (Nu-Iron,Niferex) 150 mg iron capsule, Take 1 capsule (150 mg) by mouth once daily. For 30 days following discharge, Disp: , Rfl:     labetalol (Normodyne) 200 mg tablet, Take 1 tablet (200 mg) by mouth twice a day., Disp: , Rfl:     levoFLOXacin (Levaquin) 750 mg tablet, Take by mouth., Disp: , Rfl:     melatonin 3 mg tablet, Take 1 tablet (3 mg) by mouth as needed at bedtime for sleep., Disp: , Rfl:     metoprolol tartrate (Lopressor) 50 mg tablet, Take 1 tablet (50 mg) by mouth in the morning and 1 tablet (50 mg) before bedtime., Disp: , Rfl:     multivitamin (Daily Multi-Vitamin) tablet, Take 1 tablet by mouth once daily., Disp: , Rfl:     polyethylene glycol (Glycolax, Miralax) 17 gram/dose powder, Take 17 g by mouth once daily as needed (constipation). May buy over the counter, Disp: , Rfl:     RANITIDINE HCL ORAL, Take 150 mg by mouth once daily., Disp: , Rfl:     sacubitriL-valsartan (Entresto) 24-26 mg tablet, Take 1 tablet by mouth in the morning and 1 tablet before bedtime., Disp: , Rfl:     sennosides-docusate sodium (Catarina-Colace) 8.6-50 mg tablet, Take 1 tablet by mouth once daily as needed., Disp: , Rfl:     sennosides-docusate sodium (Catarina-Colace) 8.6-50 mg tablet, Take 2 tablets by mouth as needed at bedtime for constipation., Disp: , Rfl:     sevelamer carbonate (Renvela) 800 mg tablet, Take 1 tablet (800 mg) by mouth in the morning and 1 tablet (800 mg) at noon and 1 tablet (800 mg) in the evening. Take with meals., Disp: , Rfl:     sulfamethoxazole-trimethoprim (Bactrim DS) 800-160 mg tablet, Take 1 tablet by mouth twice a day., Disp: , Rfl:     Ventolin HFA 90 mcg/actuation inhaler, Inhale 1-2 puffs every 6 hours if needed., Disp: , Rfl:     Assessment/Plan   Kentrell Carreon is a 53 y.o. male with a hx of diabetes who presents " for management of diabetes.    Diabetes mellitus  HbA1c: 8.3% (10/17/2023), 9.0% (07/20/2023), 9.8% (03/2023)  Current regimen: Lantus 10 units BID, Humalog ICR 1:15 g, ISF 1:50 (> 180)  Eye exam: LV 2022  Urine microalbumin: ESRD on HD (4 days per week at home)  Podiatry: needs referral  Lipids: HDL 33, LDL 46,  (03/2022) on atorvastatin 80mg    A1c: 8.3% today.  Type of diabetes is unclear at this time. Diagnosed at 23. Reports being on oral agents initially but soon transitioned to insulin. Body habitus is consistent with type 1. He reports that he has been evaluated before and was told type 2 initially but then type 1 later. Will check antibodies and c-peptide.    Recommend CGM for better understanding of his glycemic patterns. He has never used one but is interested.  He did not bring his meter with him today, so I am unable to make adjustments.  I will refer him to Dr. García for insulin adjustment in 2-3 weeks. Prescribed José Luis 3 today. Phone was not compatible with darius. Advised that he bring his reader with him to his appointment with Dr. García for downloading.     PLAN:  -Lantus 10 units BID (pens)  -Humalog ICR 1:15g, ISF 1:50 (> 180) (pens)  -check blood sugars before every meal and bedtime  -due for eye exam (scheduled 11/30)  -meter: accu-check jacqueline  -prescribed Freestyle 3   -referred to podiatry  -refer to Kiet García PharmD for insulin adjustment  -check antibodies, c-peptide, BMP, HbA1c    Follow up in 3 months  *patient aware I will be out of office*

## 2023-10-17 ENCOUNTER — OFFICE VISIT (OUTPATIENT)
Dept: ENDOCRINOLOGY | Facility: CLINIC | Age: 53
End: 2023-10-17
Payer: COMMERCIAL

## 2023-10-17 VITALS
WEIGHT: 157 LBS | HEART RATE: 78 BPM | SYSTOLIC BLOOD PRESSURE: 155 MMHG | BODY MASS INDEX: 19.12 KG/M2 | HEIGHT: 76 IN | DIASTOLIC BLOOD PRESSURE: 81 MMHG | TEMPERATURE: 98.1 F

## 2023-10-17 DIAGNOSIS — E11.59 TYPE 2 DIABETES MELLITUS WITH OTHER CIRCULATORY COMPLICATION, WITH LONG-TERM CURRENT USE OF INSULIN (MULTI): ICD-10-CM

## 2023-10-17 DIAGNOSIS — Z79.4 TYPE 2 DIABETES MELLITUS WITH OTHER CIRCULATORY COMPLICATION, WITH LONG-TERM CURRENT USE OF INSULIN (MULTI): ICD-10-CM

## 2023-10-17 LAB — POC HEMOGLOBIN A1C: 8.3 % (ref 4.2–6.5)

## 2023-10-17 PROCEDURE — 83036 HEMOGLOBIN GLYCOSYLATED A1C: CPT | Performed by: STUDENT IN AN ORGANIZED HEALTH CARE EDUCATION/TRAINING PROGRAM

## 2023-10-17 PROCEDURE — 3052F HG A1C>EQUAL 8.0%<EQUAL 9.0%: CPT | Performed by: STUDENT IN AN ORGANIZED HEALTH CARE EDUCATION/TRAINING PROGRAM

## 2023-10-17 PROCEDURE — 3066F NEPHROPATHY DOC TX: CPT | Performed by: STUDENT IN AN ORGANIZED HEALTH CARE EDUCATION/TRAINING PROGRAM

## 2023-10-17 PROCEDURE — 99205 OFFICE O/P NEW HI 60 MIN: CPT | Performed by: STUDENT IN AN ORGANIZED HEALTH CARE EDUCATION/TRAINING PROGRAM

## 2023-10-17 PROCEDURE — 3079F DIAST BP 80-89 MM HG: CPT | Performed by: STUDENT IN AN ORGANIZED HEALTH CARE EDUCATION/TRAINING PROGRAM

## 2023-10-17 PROCEDURE — 3077F SYST BP >= 140 MM HG: CPT | Performed by: STUDENT IN AN ORGANIZED HEALTH CARE EDUCATION/TRAINING PROGRAM

## 2023-10-17 RX ORDER — INSULIN LISPRO 100 [IU]/ML
INJECTION, SOLUTION INTRAVENOUS; SUBCUTANEOUS
Qty: 15 ML | Refills: 5 | Status: ON HOLD | OUTPATIENT
Start: 2023-10-17

## 2023-10-17 RX ORDER — BLOOD-GLUCOSE SENSOR
EACH MISCELLANEOUS
Qty: 2 EACH | Refills: 5 | Status: ON HOLD | OUTPATIENT
Start: 2023-10-17

## 2023-10-17 RX ORDER — PEN NEEDLE, DIABETIC 30 GX3/16"
NEEDLE, DISPOSABLE MISCELLANEOUS
Qty: 450 EACH | Refills: 1 | Status: ON HOLD | OUTPATIENT
Start: 2023-10-17

## 2023-10-17 RX ORDER — INSULIN LISPRO 100 [IU]/ML
INJECTION, SOLUTION INTRAVENOUS; SUBCUTANEOUS
Qty: 15 ML | Refills: 5 | Status: SHIPPED | OUTPATIENT
Start: 2023-10-17 | End: 2023-10-17 | Stop reason: ENTERED-IN-ERROR

## 2023-10-17 RX ORDER — INSULIN GLARGINE 100 [IU]/ML
INJECTION, SOLUTION SUBCUTANEOUS
Qty: 15 ML | Refills: 5 | Status: ON HOLD | OUTPATIENT
Start: 2023-10-17

## 2023-10-17 NOTE — PATIENT INSTRUCTIONS
Continue Lantus 10 units twice a day  Continue Humalog with your current carb ratio and correction scale  Please make sure you go to the 11/30 eye appointment  Please start using the Freestyle José Luis 3. Please bring your reader to your next appointment with the pharmacist, Dr. García.  Please have labs done

## 2023-11-03 ENCOUNTER — TELEPHONE (OUTPATIENT)
Dept: ENDOCRINOLOGY | Facility: CLINIC | Age: 53
End: 2023-11-03
Payer: COMMERCIAL

## 2023-11-03 DIAGNOSIS — Z79.4 TYPE 2 DIABETES MELLITUS WITH OTHER CIRCULATORY COMPLICATION, WITH LONG-TERM CURRENT USE OF INSULIN (MULTI): Primary | ICD-10-CM

## 2023-11-03 DIAGNOSIS — E11.59 TYPE 2 DIABETES MELLITUS WITH OTHER CIRCULATORY COMPLICATION, WITH LONG-TERM CURRENT USE OF INSULIN (MULTI): Primary | ICD-10-CM

## 2023-11-03 RX ORDER — FLASH GLUCOSE SENSOR
KIT MISCELLANEOUS
Qty: 6 EACH | Refills: 0 | Status: SHIPPED | OUTPATIENT
Start: 2023-11-03 | End: 2024-01-26 | Stop reason: SDUPTHER

## 2023-11-03 RX ORDER — FLASH GLUCOSE SCANNING READER
EACH MISCELLANEOUS
Qty: 1 EACH | Refills: 0 | Status: ON HOLD | OUTPATIENT
Start: 2023-11-03

## 2023-11-03 NOTE — TELEPHONE ENCOUNTER
[facility] pharmacist called to report that Kentrell does not have a smart phone and the pharmacy is currently not able to get the josé luis 3 reader, as it is only available to DMEs at this time. [facility] is asking that we resend the Rx for José Luis 2 - reader and sensors.

## 2023-11-07 ENCOUNTER — CLINICAL SUPPORT (OUTPATIENT)
Dept: ENDOCRINOLOGY | Facility: CLINIC | Age: 53
End: 2023-11-07
Payer: COMMERCIAL

## 2023-11-07 DIAGNOSIS — Z79.4 TYPE 2 DIABETES MELLITUS WITH OTHER CIRCULATORY COMPLICATION, WITH LONG-TERM CURRENT USE OF INSULIN (MULTI): Primary | ICD-10-CM

## 2023-11-07 DIAGNOSIS — E11.59 TYPE 2 DIABETES MELLITUS WITH OTHER CIRCULATORY COMPLICATION, WITH LONG-TERM CURRENT USE OF INSULIN (MULTI): Primary | ICD-10-CM

## 2023-11-07 PROCEDURE — 99211 OFF/OP EST MAY X REQ PHY/QHP: CPT | Performed by: PHARMACIST

## 2023-11-07 NOTE — PROGRESS NOTES
Clinical Pharmacy Team met with Kentrell Carreon regarding a consultation for diabetes management thanks to [provider]. Below is a summary of our conversation and recommendations:    Recommendations:  Decrease morning lantus to 7 units subcutaneous once daily  Continue lantus 10 units in the evning   Continue humalog ICR 15 and ISF 50  Patient should continue to use José Luis 3 CGM to monitor glucose  ________________________________________________________________________      Allergies   Allergen Reactions    Ampicillin-Sulbactam Other     Renal Failure. AIN (INSTERSTITIAL NEPHRITIS))    Siren Rash     Diabetes Pharmacotherapy:    Lantus 10 units subcutaneous twice daily  Humalog ICR 1:15g, ISF 1:50 >180    Lab Review  Lab Results   Component Value Date    BILITOT 0.4 08/04/2023    CALCIUM 9.0 08/11/2023    CO2 30 08/11/2023    CL 94 (L) 08/11/2023    CREATININE 6.85 (H) 08/11/2023    GLUCOSE 98 08/11/2023    ALKPHOS 68 08/04/2023    K 4.5 08/11/2023    PROT 8.6 (H) 08/04/2023     (L) 08/11/2023    AST 14 08/04/2023    ALT 7 (L) 08/04/2023    BUN 37 (H) 08/11/2023    ANIONGAP 12 08/11/2023    MG 2.49 (H) 07/22/2023    PHOS 5.8 (H) 08/11/2023    ALBUMIN 3.2 (L) 08/11/2023    LIPASE 132 (H) 02/04/2021    GFRMALE 9 (A) 08/11/2023     Lab Results   Component Value Date    TRIG 112 03/10/2022    CHOL 102 03/10/2022    HDL 33.4 (A) 03/10/2022     Lab Results   Component Value Date    HGBA1C 8.3 (A) 10/17/2023    HGBA1C 9.0 (A) 07/21/2023    HGBA1C 9.8 (A) 03/27/2023     The ASCVD Risk score (Hina DK, et al., 2019) failed to calculate for the following reasons:    The patient has a prior MI or stroke diagnosis      Monitoring     Started wearing José Luis 3 for the last 3-4 days  Very high: 17%  High:30%  Target range: 49%  Low: 4%  Very low: 0$  Average glucose 177  Variability: 40.1%    Assessment/Plan     The patient reports today for a diabetes consultation. Reviewed CGM report and discussed it with the patient. He  has recently started wearing his José Luis sensor and as a result CGM data is limited. However, CGM data suggests increased hypoglycemia which the patient has confirmed as well. We discussed using once daily lantus but the patient states he has noticed more stable glucose trends by giving it twice daily. Based on glycemic trends recommend the following:    Decrease morning lantus to 7 units subcutaneous once daily   Continue lantus 10 units in the evning   Continue humalog ICR 15 and ISF 50  Patient should continue to use CGM to monitor glucose    Patient understands and is agreeable to this.     PATIENT EDUCATION/GOALS    Goals  Fasting B - 130 mg/dL  Postprandial BG: less than 180 mg/dL  A1c: less than 7%    Type of encounter: [ in person ]    Provided counseling on lifestyle modifications, medications, and self-monitoring. Patient has no additional questions at this time. Pharmacy to follow up in 2-3 weeks. Please reach out with any questions. Thank you.       Kiet García, PharmD    Provider on site: Chiquis Washington CNP  Continue all meds under the continuation of care with the referring provider and clinical pharmacy team.

## 2023-11-16 ENCOUNTER — PATIENT OUTREACH (OUTPATIENT)
Dept: PRIMARY CARE | Facility: CLINIC | Age: 53
End: 2023-11-16
Payer: COMMERCIAL

## 2023-11-27 ENCOUNTER — LAB (OUTPATIENT)
Dept: LAB | Facility: LAB | Age: 53
End: 2023-11-27
Payer: COMMERCIAL

## 2023-11-27 ENCOUNTER — OFFICE VISIT (OUTPATIENT)
Dept: PRIMARY CARE | Facility: CLINIC | Age: 53
End: 2023-11-27
Payer: COMMERCIAL

## 2023-11-27 VITALS
DIASTOLIC BLOOD PRESSURE: 76 MMHG | SYSTOLIC BLOOD PRESSURE: 134 MMHG | HEART RATE: 85 BPM | WEIGHT: 164 LBS | OXYGEN SATURATION: 99 % | BODY MASS INDEX: 19.96 KG/M2

## 2023-11-27 DIAGNOSIS — I65.29 STENOSIS OF CAROTID ARTERY, UNSPECIFIED LATERALITY: ICD-10-CM

## 2023-11-27 DIAGNOSIS — Z79.4 TYPE 2 DIABETES MELLITUS WITH OTHER CIRCULATORY COMPLICATION, WITH LONG-TERM CURRENT USE OF INSULIN (MULTI): ICD-10-CM

## 2023-11-27 DIAGNOSIS — N18.6 ESRD ON DIALYSIS (MULTI): Primary | ICD-10-CM

## 2023-11-27 DIAGNOSIS — E11.59 TYPE 2 DIABETES MELLITUS WITH OTHER CIRCULATORY COMPLICATION, WITH LONG-TERM CURRENT USE OF INSULIN (MULTI): ICD-10-CM

## 2023-11-27 DIAGNOSIS — M79.671 RIGHT FOOT PAIN: ICD-10-CM

## 2023-11-27 DIAGNOSIS — I25.118 CORONARY ARTERY DISEASE WITH OTHER FORM OF ANGINA PECTORIS, UNSPECIFIED VESSEL OR LESION TYPE, UNSPECIFIED WHETHER NATIVE OR TRANSPLANTED HEART (CMS-HCC): ICD-10-CM

## 2023-11-27 DIAGNOSIS — I10 ACCELERATED HYPERTENSION: ICD-10-CM

## 2023-11-27 DIAGNOSIS — M32.19 SYSTEMIC LUPUS ERYTHEMATOSUS WITH OTHER ORGAN INVOLVEMENT, UNSPECIFIED SLE TYPE (MULTI): ICD-10-CM

## 2023-11-27 DIAGNOSIS — Z12.11 SCREEN FOR COLON CANCER: ICD-10-CM

## 2023-11-27 DIAGNOSIS — Z99.2 ESRD ON DIALYSIS (MULTI): Primary | ICD-10-CM

## 2023-11-27 PROBLEM — I50.1 ACUTE PULMONARY EDEMA WITH HEART DISEASE (MULTI): Status: RESOLVED | Noted: 2023-09-15 | Resolved: 2023-11-27

## 2023-11-27 LAB
ALBUMIN SERPL BCP-MCNC: 4.5 G/DL (ref 3.4–5)
ALP SERPL-CCNC: 91 U/L (ref 33–120)
ALT SERPL W P-5'-P-CCNC: 21 U/L (ref 10–52)
ANION GAP SERPL CALC-SCNC: 21 MMOL/L (ref 10–20)
AST SERPL W P-5'-P-CCNC: 18 U/L (ref 9–39)
BILIRUB SERPL-MCNC: 0.4 MG/DL (ref 0–1.2)
BUN SERPL-MCNC: 65 MG/DL (ref 6–23)
CALCIUM SERPL-MCNC: 10 MG/DL (ref 8.6–10.6)
CHLORIDE SERPL-SCNC: 95 MMOL/L (ref 98–107)
CO2 SERPL-SCNC: 27 MMOL/L (ref 21–32)
CREAT SERPL-MCNC: 9.48 MG/DL (ref 0.5–1.3)
CRP SERPL-MCNC: <0.1 MG/DL
ERYTHROCYTE [DISTWIDTH] IN BLOOD BY AUTOMATED COUNT: 13.9 % (ref 11.5–14.5)
ERYTHROCYTE [SEDIMENTATION RATE] IN BLOOD BY WESTERGREN METHOD: 10 MM/H (ref 0–20)
GFR SERPL CREATININE-BSD FRML MDRD: 6 ML/MIN/1.73M*2
GLUCOSE SERPL-MCNC: 173 MG/DL (ref 74–99)
HCT VFR BLD AUTO: 30.2 % (ref 41–52)
HGB BLD-MCNC: 9.7 G/DL (ref 13.5–17.5)
MCH RBC QN AUTO: 32 PG (ref 26–34)
MCHC RBC AUTO-ENTMCNC: 32.1 G/DL (ref 32–36)
MCV RBC AUTO: 100 FL (ref 80–100)
NRBC BLD-RTO: 0 /100 WBCS (ref 0–0)
PLATELET # BLD AUTO: 232 X10*3/UL (ref 150–450)
POTASSIUM SERPL-SCNC: 5 MMOL/L (ref 3.5–5.3)
PROT SERPL-MCNC: 8.2 G/DL (ref 6.4–8.2)
RBC # BLD AUTO: 3.03 X10*6/UL (ref 4.5–5.9)
SODIUM SERPL-SCNC: 138 MMOL/L (ref 136–145)
WBC # BLD AUTO: 4.8 X10*3/UL (ref 4.4–11.3)

## 2023-11-27 PROCEDURE — 80053 COMPREHEN METABOLIC PANEL: CPT

## 2023-11-27 PROCEDURE — 83519 RIA NONANTIBODY: CPT

## 2023-11-27 PROCEDURE — 3052F HG A1C>EQUAL 8.0%<EQUAL 9.0%: CPT | Performed by: INTERNAL MEDICINE

## 2023-11-27 PROCEDURE — 99214 OFFICE O/P EST MOD 30 MIN: CPT | Performed by: INTERNAL MEDICINE

## 2023-11-27 PROCEDURE — 86341 ISLET CELL ANTIBODY: CPT

## 2023-11-27 PROCEDURE — 84681 ASSAY OF C-PEPTIDE: CPT

## 2023-11-27 PROCEDURE — 86140 C-REACTIVE PROTEIN: CPT

## 2023-11-27 PROCEDURE — 85652 RBC SED RATE AUTOMATED: CPT

## 2023-11-27 PROCEDURE — 3078F DIAST BP <80 MM HG: CPT | Performed by: INTERNAL MEDICINE

## 2023-11-27 PROCEDURE — 36415 COLL VENOUS BLD VENIPUNCTURE: CPT

## 2023-11-27 PROCEDURE — 3066F NEPHROPATHY DOC TX: CPT | Performed by: INTERNAL MEDICINE

## 2023-11-27 PROCEDURE — 85027 COMPLETE CBC AUTOMATED: CPT

## 2023-11-27 PROCEDURE — 3075F SYST BP GE 130 - 139MM HG: CPT | Performed by: INTERNAL MEDICINE

## 2023-11-27 NOTE — ASSESSMENT & PLAN NOTE
Better compliant with his insulin regimen.  Advised to complete the laboratory tests ordered by the endocrinologist.  Continue with the continuous glucose monitor and advise low carbohydrate diet.  Recheck A1c per Endo.

## 2023-11-27 NOTE — PROGRESS NOTES
Subjective   Patient ID: Kentrell Carreon is a 53 y.o. male who presents for Follow-up (3 month follow up).    HPI         Patient is a 53-year-old male with past medical history of end-stage renal disease hypertension dyslipidemia GERD and diabetes mellitus type 2 coronary artery disease presents as a hospital follow-up.     Patient had a dose of concern for recent osteo.  He completed a course of antibiotics and his foot is healing nicely.  He continues to follow with podiatry.  He is no longer on antibiotic therapy.     Patient has type 1 diabetes and has established with endocrinology.  His sugars has improved to less than 9.  He is taking his insulin as prescribed.  He has laboratory tests due but has not completed them.  He states he enjoys his continuous glucose monitor and has been watching it quite diligently.       Patient has a history of coronary artery disease with low normal ejection fraction.  He has not seen a cardiology in over a year.  His blood pressure is well-controlled.  He has evidence of moderate aortic stenosis on echocardiogram in March 2023.  No shortness of breath on exertion and no recent weight gain.     End-stage renal disease on dialysis.  Follows with nephrology quite regularly.  Goes to the [facility] 3 times a week.  He takes his end-stage renal disease vitamins as prescribed.       Review of Systems  Constitutional: No fever or chills  Cardiovascular: no chest pain, no palpitations and no syncope.   Respiratory: no cough, no shortness of breath during exertion and no shortness of breath at rest.   Gastrointestinal: no abdominal pain, no nausea and no vomiting.  Neuro: No Headache, no dizziness    Objective   /76   Pulse 85   Wt 74.4 kg (164 lb)   SpO2 99%   BMI 19.96 kg/m²     Physical Exam  Constitutional: Alert and in no acute distress. Well developed, well nourished  Head and Face: Head and face: Normal.    Cardiovascular: Heart rate and rhythm were normal, normal S1 and S2. No  "peripheral edema.   Pulmonary: No respiratory distress. Clear bilateral breath sounds.  Musculoskeletal: Tenderness and erythema of the right great toe, with overlying skin cracks  Skin: Normal skin color and pigmentation, normal skin turgor, and no rash.    Psychiatric: Judgment and insight: Intact. Mood and affect: Normal.        Lab Results   Component Value Date    WBC 5.0 08/11/2023    HGB 9.2 (L) 08/11/2023    HCT 28.4 (L) 08/11/2023     08/11/2023    CHOL 102 03/10/2022    TRIG 112 03/10/2022    HDL 33.4 (A) 03/10/2022    ALT 7 (L) 08/04/2023    AST 14 08/04/2023     (L) 08/11/2023    K 4.5 08/11/2023    CL 94 (L) 08/11/2023    CREATININE 6.85 (H) 08/11/2023    BUN 37 (H) 08/11/2023    CO2 30 08/11/2023    TSH 3.65 03/10/2022    INR 1.0 07/21/2023    HGBA1C 8.3 (A) 10/17/2023       XR foot  Narrative: Interpreted By:  RADHA WOODARD MD  MRN: [id number]  Patient Name: LUNA FINCH     STUDY:  FOOT; COMPLETE, MIN 3 VIEWS;  8/8/2023 2:16 pm     INDICATION:  post op changes .     COMPARISON:  07/21/2023     ACCESSION NUMBER(S):  [id number]     ORDERING CLINICIAN:  BENY GOOD     FINDINGS:  Three views of the right foot labeled \"postop\"  obtained.     Bandage material overlying the medial forefoot and great toe limits  assessment of osseous detail. Ill-defined nondisplaced fracture  through the shaft of the 1st proximal phalanx. New ill-defined lytic  lesion in the medial base of the 1st distal phalanx. Probable small  subchondral lucencies in the distal aspect of the adjacent proximal  phalanx. Marked surrounding soft tissue swelling. Status post  amputation of the distal 2nd ray with smooth metatarsal amputation  margin. Mid and hindfoot joint spaces are preserved. Multifocal soft  tissue presumed vascular calcifications.     Impression: New lytic erosion of the base of the 1st distal phalanx consistent  with osteomyelitis. Ill-defined nondisplaced fracture of the 1st  proximal phalanx. Soft tissue " swelling and bandage material in the  medial forefoot and great toe.            Assessment/Plan   Problem List Items Addressed This Visit       CAD (coronary artery disease)     Continue statin Entresto Plavix  Advise follow-up with cardiology.  May need repeat echocardiogram in March to evaluate the aortic valve         Carotid stenosis    Diabetes mellitus (CMS/HCC)     Better compliant with his insulin regimen.  Advised to complete the laboratory tests ordered by the endocrinologist.  Continue with the continuous glucose monitor and advise low carbohydrate diet.  Recheck A1c per Endo.         SLE (systemic lupus erythematosus) (CMS/HCC)    Accelerated hypertension     Blood pressure well-controlled at this time.  No medication adjustments.  Continue with fluid removal with dialysis.  Advised low-salt diet.         ESRD on dialysis (CMS/HCC) - Primary    Relevant Orders    CBC    Follow Up In [facility] - PCP - Established     Other Visit Diagnoses       Screen for colon cancer        Relevant Orders    Cologuard® colon cancer screening

## 2023-11-27 NOTE — ASSESSMENT & PLAN NOTE
Continue statin Entresto Plavix  Advise follow-up with cardiology.  May need repeat echocardiogram in March to evaluate the aortic valve

## 2023-11-27 NOTE — ASSESSMENT & PLAN NOTE
Blood pressure well-controlled at this time.  No medication adjustments.  Continue with fluid removal with dialysis.  Advised low-salt diet.

## 2023-11-28 ENCOUNTER — CLINICAL SUPPORT (OUTPATIENT)
Dept: ENDOCRINOLOGY | Facility: CLINIC | Age: 53
End: 2023-11-28
Payer: COMMERCIAL

## 2023-11-28 DIAGNOSIS — Z79.4 TYPE 2 DIABETES MELLITUS WITH OTHER CIRCULATORY COMPLICATION, WITH LONG-TERM CURRENT USE OF INSULIN (MULTI): Primary | ICD-10-CM

## 2023-11-28 DIAGNOSIS — E11.59 TYPE 2 DIABETES MELLITUS WITH OTHER CIRCULATORY COMPLICATION, WITH LONG-TERM CURRENT USE OF INSULIN (MULTI): Primary | ICD-10-CM

## 2023-11-28 LAB — C PEPTIDE SERPL-MCNC: 11.7 NG/ML (ref 0.7–3.9)

## 2023-11-28 PROCEDURE — 99211 OFF/OP EST MAY X REQ PHY/QHP: CPT | Performed by: PHARMACIST

## 2023-11-28 PROCEDURE — 95251 CONT GLUC MNTR ANALYSIS I&R: CPT | Performed by: PHARMACIST

## 2023-11-28 NOTE — PROGRESS NOTES
Clinical Pharmacy Team met with Kentrell Carreon regarding a consultation for diabetes management thanks to a referral from Dr. Tala Bird MD Below is a summary of our conversation and recommendations:    Recommendations:  Decrease lantus to 5 units in the morning and 7 units in the evening  Continue humalog ICR 15 and ISF 50 for breakfast and lunch. Change ICR to 13 with dinner   Patient should continue to use José Luis 3 CGM to monitor glucose  ________________________________________________________________________      Allergies   Allergen Reactions    Ampicillin-Sulbactam Other     Renal Failure. AIN (INSTERSTITIAL NEPHRITIS))    Dennis Rash     Diabetes Pharmacotherapy:    Lantus 7 units in the morning and 10 units subcutaneous in the evening   Humalog ICR 1:15g, ISF 1:50 >180    Lab Review  Lab Results   Component Value Date    BILITOT 0.4 11/27/2023    CALCIUM 10.0 11/27/2023    CO2 27 11/27/2023    CL 95 (L) 11/27/2023    CREATININE 9.48 (H) 11/27/2023    GLUCOSE 173 (H) 11/27/2023    ALKPHOS 91 11/27/2023    K 5.0 11/27/2023    PROT 8.2 11/27/2023     11/27/2023    AST 18 11/27/2023    ALT 21 11/27/2023    BUN 65 (H) 11/27/2023    ANIONGAP 21 (H) 11/27/2023    MG 2.49 (H) 07/22/2023    PHOS 5.8 (H) 08/11/2023    ALBUMIN 4.5 11/27/2023    LIPASE 132 (H) 02/04/2021    GFRMALE 9 (A) 08/11/2023     Lab Results   Component Value Date    TRIG 112 03/10/2022    CHOL 102 03/10/2022    HDL 33.4 (A) 03/10/2022     Lab Results   Component Value Date    HGBA1C 8.3 (A) 10/17/2023    HGBA1C 9.0 (A) 07/21/2023    HGBA1C 9.8 (A) 03/27/2023     The ASCVD Risk score (Hina CASTILLO, et al., 2019) failed to calculate for the following reasons:    The patient has a prior MI or stroke diagnosis      Monitoring     José Luis download past 14 days   Very high: 8%  High: 24%  Target range: 66%  Low: 2%  Very low: 0%  Average glucose 160    GMI: 7.1%    Phone is not compatible with [employer]. He is currently using a .      Assessment/Plan     The patient reports today for a diabetes consultation. Reviewed CGM report and discussed it with the patient.  TIR is not at goal but close to it. CGM data suggests patterns of low and high blood sugar. 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 Based on trends recommend decreasing lantus to 5 units subcutaneous in the morning as well as 7 units in the evening to minimize hypoglycemia risk.    Hyperglycemia mainly occurs post dinner time which is his largest meals. There are days where it can become elevated up to 300 mg/dL or higher. Based on this trend recommend adjusting ICR to 13 with dinner and continue 15 with breakfast and lunch (patient feels comfortable doing this calculation).     Patient understands and is agreeable to these recommendations.     A minimum of 72 hours of CGM data was reviewed and used to make therapy decisions.     PATIENT EDUCATION/GOALS    Goals  Fasting B - 130 mg/dL  Postprandial BG: less than 180 mg/dL  A1c: less than 7%    Type of encounter: [ in person ]    Provided counseling on lifestyle modifications, medications, and self-monitoring. Patient has no additional questions at this time. Pharmacy to follow up in 2-3 weeks. Please reach out with any questions. Thank you.       Kiet García, PharmD    Provider on site: Tala Bird MD  Continue all meds under the continuation of care with the referring provider and clinical pharmacy team.

## 2023-11-28 NOTE — PROGRESS NOTES
----- Message from Osmar Don DO sent at 9/1/2022  5:57 PM CDT -----  Negative urine culture for UTI.    CGM reviewed. Agree with assessment and plan.    Tala Bird MD

## 2023-11-29 LAB
GAD65 AB SER IA-ACNC: <5 IU/ML (ref 0–5)
ISLET CELL512 AB SER IA-ACNC: <5.4 U/ML (ref 0–7.4)

## 2023-11-30 ENCOUNTER — OFFICE VISIT (OUTPATIENT)
Dept: OPHTHALMOLOGY | Facility: CLINIC | Age: 53
End: 2023-11-30
Payer: COMMERCIAL

## 2023-11-30 DIAGNOSIS — E11.9 TYPE 2 DIABETES MELLITUS WITHOUT COMPLICATION, WITH LONG-TERM CURRENT USE OF INSULIN (MULTI): Primary | ICD-10-CM

## 2023-11-30 DIAGNOSIS — H52.4 PRESBYOPIA: ICD-10-CM

## 2023-11-30 DIAGNOSIS — H31.093: ICD-10-CM

## 2023-11-30 DIAGNOSIS — H52.13 MYOPIA, BILATERAL: ICD-10-CM

## 2023-11-30 DIAGNOSIS — H25.813 COMBINED FORMS OF AGE-RELATED CATARACT OF BOTH EYES: ICD-10-CM

## 2023-11-30 DIAGNOSIS — Z79.4 TYPE 2 DIABETES MELLITUS WITHOUT COMPLICATION, WITH LONG-TERM CURRENT USE OF INSULIN (MULTI): Primary | ICD-10-CM

## 2023-11-30 DIAGNOSIS — H52.223 REGULAR ASTIGMATISM OF BOTH EYES: ICD-10-CM

## 2023-11-30 LAB — ZNT8 AB SERPL IA-ACNC: <10 U/ML (ref 0–15)

## 2023-11-30 PROCEDURE — 92004 COMPRE OPH EXAM NEW PT 1/>: CPT | Performed by: OPHTHALMOLOGY

## 2023-11-30 PROCEDURE — 92015 DETERMINE REFRACTIVE STATE: CPT | Performed by: OPHTHALMOLOGY

## 2023-11-30 ASSESSMENT — SLIT LAMP EXAM - LIDS
COMMENTS: NORMAL
COMMENTS: NORMAL

## 2023-11-30 ASSESSMENT — REFRACTION_MANIFEST
OS_AXIS: 095
OS_CYLINDER: -0.50
OD_ADD: +2.25
OS_SPHERE: -2.75
OS_ADD: +2.25
OD_AXIS: 080
OD_SPHERE: -1.75
OD_CYLINDER: -1.75

## 2023-11-30 ASSESSMENT — REFRACTION_WEARINGRX
OD_AXIS: 100
OS_SPHERE: -1.75
OS_CYLINDER: -1.00
OS_AXIS: 085
OD_CYLINDER: -1.75
OD_SPHERE: -1.25

## 2023-11-30 ASSESSMENT — TONOMETRY
OS_IOP_MMHG: 20
IOP_METHOD: GOLDMANN APPLANATION
OD_IOP_MMHG: 18

## 2023-11-30 ASSESSMENT — VISUAL ACUITY
OS_CC: 20/25
METHOD: SNELLEN - LINEAR
OD_CC: 20/25

## 2023-11-30 ASSESSMENT — EXTERNAL EXAM - LEFT EYE: OS_EXAM: NORMAL

## 2023-11-30 ASSESSMENT — EXTERNAL EXAM - RIGHT EYE: OD_EXAM: NORMAL

## 2023-11-30 ASSESSMENT — CUP TO DISC RATIO
OD_RATIO: 0.2
OS_RATIO: 0.2

## 2023-11-30 NOTE — PROGRESS NOTES
Assessment/Plan   Diagnoses and all orders for this visit:  Type 2 diabetes mellitus without complication, with long-term current use of insulin (CMS/[facility])  -no evidence of diabetic retinopathy at the present time  -pt was advised of the importance of good diabetes control and the importance of a yearly dilated diabetic exam    Combined forms of age-related cataract of both eyes  Not visually significant at the present time  continue to monitor    Peripheral retinal scars, bilateral  -status post (s/p) laser treatments  both eyes for retinal holes about 10 years ago  -pt was advised to call stat if experiencing increased floaters, flashes, curtains in front of eyes, decreased vision, blurry vision    Myopia, bilateral  Regular astigmatism of both eyes  Presbyopia    Refractive error  -give Rx for new glasses    Return for a dilated exam in  12  months or sooner if having any problems

## 2024-01-26 ENCOUNTER — TELEPHONE (OUTPATIENT)
Dept: ENDOCRINOLOGY | Facility: CLINIC | Age: 54
End: 2024-01-26
Payer: COMMERCIAL

## 2024-01-26 DIAGNOSIS — E11.59 TYPE 2 DIABETES MELLITUS WITH OTHER CIRCULATORY COMPLICATION, WITH LONG-TERM CURRENT USE OF INSULIN (MULTI): ICD-10-CM

## 2024-01-26 DIAGNOSIS — Z79.4 TYPE 2 DIABETES MELLITUS WITH OTHER CIRCULATORY COMPLICATION, WITH LONG-TERM CURRENT USE OF INSULIN (MULTI): ICD-10-CM

## 2024-01-26 RX ORDER — FLASH GLUCOSE SENSOR
KIT MISCELLANEOUS
Qty: 6 EACH | Refills: 3 | Status: SHIPPED | OUTPATIENT
Start: 2024-01-26

## 2024-01-26 NOTE — TELEPHONE ENCOUNTER
[facility] called asking for a renewal of savannah 2 sensors Rx    Last office visit:  10/17/23  Next office visit:  02/24/24

## 2024-01-29 ENCOUNTER — OFFICE VISIT (OUTPATIENT)
Dept: PODIATRY | Facility: CLINIC | Age: 54
End: 2024-01-29
Payer: COMMERCIAL

## 2024-01-29 DIAGNOSIS — E11.9 COMPREHENSIVE DIABETIC FOOT EXAMINATION, TYPE 2 DM, ENCOUNTER FOR (MULTI): ICD-10-CM

## 2024-01-29 DIAGNOSIS — E11.59 TYPE 2 DIABETES MELLITUS WITH OTHER CIRCULATORY COMPLICATION, WITH LONG-TERM CURRENT USE OF INSULIN (MULTI): Primary | ICD-10-CM

## 2024-01-29 DIAGNOSIS — Z89.421 STATUS POST AMPUTATION OF LESSER TOE OF RIGHT FOOT (MULTI): ICD-10-CM

## 2024-01-29 DIAGNOSIS — Z79.4 TYPE 2 DIABETES MELLITUS WITH OTHER CIRCULATORY COMPLICATION, WITH LONG-TERM CURRENT USE OF INSULIN (MULTI): Primary | ICD-10-CM

## 2024-01-29 PROCEDURE — 99203 OFFICE O/P NEW LOW 30 MIN: CPT | Performed by: PODIATRIST

## 2024-02-06 ENCOUNTER — APPOINTMENT (OUTPATIENT)
Dept: RADIOLOGY | Facility: HOSPITAL | Age: 54
End: 2024-02-06
Payer: COMMERCIAL

## 2024-02-06 ENCOUNTER — CLINICAL SUPPORT (OUTPATIENT)
Dept: EMERGENCY MEDICINE | Facility: HOSPITAL | Age: 54
End: 2024-02-06
Payer: COMMERCIAL

## 2024-02-06 ENCOUNTER — HOSPITAL ENCOUNTER (EMERGENCY)
Facility: HOSPITAL | Age: 54
Discharge: HOME | End: 2024-02-06
Attending: EMERGENCY MEDICINE
Payer: COMMERCIAL

## 2024-02-06 VITALS
RESPIRATION RATE: 18 BRPM | HEART RATE: 82 BPM | DIASTOLIC BLOOD PRESSURE: 74 MMHG | TEMPERATURE: 96.7 F | BODY MASS INDEX: 19.6 KG/M2 | OXYGEN SATURATION: 100 % | HEIGHT: 76 IN | WEIGHT: 160.94 LBS | SYSTOLIC BLOOD PRESSURE: 145 MMHG

## 2024-02-06 DIAGNOSIS — E87.5 HYPERKALEMIA: Primary | ICD-10-CM

## 2024-02-06 LAB
ALBUMIN SERPL BCP-MCNC: 4.8 G/DL (ref 3.4–5)
ALP SERPL-CCNC: 79 U/L (ref 33–120)
ALT SERPL W P-5'-P-CCNC: 14 U/L (ref 10–52)
ANION GAP SERPL CALC-SCNC: 18 MMOL/L (ref 10–20)
AST SERPL W P-5'-P-CCNC: 17 U/L (ref 9–39)
BASOPHILS # BLD AUTO: 0.09 X10*3/UL (ref 0–0.1)
BASOPHILS NFR BLD AUTO: 1.8 %
BILIRUB SERPL-MCNC: 0.4 MG/DL (ref 0–1.2)
BUN SERPL-MCNC: 39 MG/DL (ref 6–23)
CALCIUM SERPL-MCNC: 10.3 MG/DL (ref 8.6–10.6)
CHLORIDE SERPL-SCNC: 94 MMOL/L (ref 98–107)
CO2 SERPL-SCNC: 30 MMOL/L (ref 21–32)
CREAT SERPL-MCNC: 8.12 MG/DL (ref 0.5–1.3)
EGFRCR SERPLBLD CKD-EPI 2021: 7 ML/MIN/1.73M*2
EOSINOPHIL # BLD AUTO: 0.18 X10*3/UL (ref 0–0.7)
EOSINOPHIL NFR BLD AUTO: 3.6 %
ERYTHROCYTE [DISTWIDTH] IN BLOOD BY AUTOMATED COUNT: 14.2 % (ref 11.5–14.5)
GLUCOSE SERPL-MCNC: 213 MG/DL (ref 74–99)
HCT VFR BLD AUTO: 24 % (ref 41–52)
HGB BLD-MCNC: 8.3 G/DL (ref 13.5–17.5)
IMM GRANULOCYTES # BLD AUTO: 0.02 X10*3/UL (ref 0–0.7)
IMM GRANULOCYTES NFR BLD AUTO: 0.4 % (ref 0–0.9)
LYMPHOCYTES # BLD AUTO: 1.15 X10*3/UL (ref 1.2–4.8)
LYMPHOCYTES NFR BLD AUTO: 22.9 %
MAGNESIUM SERPL-MCNC: 2.46 MG/DL (ref 1.6–2.4)
MCH RBC QN AUTO: 33.9 PG (ref 26–34)
MCHC RBC AUTO-ENTMCNC: 34.6 G/DL (ref 32–36)
MCV RBC AUTO: 98 FL (ref 80–100)
MONOCYTES # BLD AUTO: 0.68 X10*3/UL (ref 0.1–1)
MONOCYTES NFR BLD AUTO: 13.5 %
NEUTROPHILS # BLD AUTO: 2.9 X10*3/UL (ref 1.2–7.7)
NEUTROPHILS NFR BLD AUTO: 57.8 %
NRBC BLD-RTO: 0 /100 WBCS (ref 0–0)
PLATELET # BLD AUTO: 197 X10*3/UL (ref 150–450)
POTASSIUM SERPL-SCNC: 4.4 MMOL/L (ref 3.5–5.3)
PROT SERPL-MCNC: 7.8 G/DL (ref 6.4–8.2)
RBC # BLD AUTO: 2.45 X10*6/UL (ref 4.5–5.9)
SODIUM SERPL-SCNC: 138 MMOL/L (ref 136–145)
WBC # BLD AUTO: 5 X10*3/UL (ref 4.4–11.3)

## 2024-02-06 PROCEDURE — 99284 EMERGENCY DEPT VISIT MOD MDM: CPT | Performed by: EMERGENCY MEDICINE

## 2024-02-06 PROCEDURE — 85025 COMPLETE CBC W/AUTO DIFF WBC: CPT | Performed by: EMERGENCY MEDICINE

## 2024-02-06 PROCEDURE — 80053 COMPREHEN METABOLIC PANEL: CPT | Performed by: EMERGENCY MEDICINE

## 2024-02-06 PROCEDURE — 83735 ASSAY OF MAGNESIUM: CPT | Performed by: EMERGENCY MEDICINE

## 2024-02-06 PROCEDURE — 71045 X-RAY EXAM CHEST 1 VIEW: CPT | Performed by: RADIOLOGY

## 2024-02-06 PROCEDURE — 36415 COLL VENOUS BLD VENIPUNCTURE: CPT | Performed by: EMERGENCY MEDICINE

## 2024-02-06 PROCEDURE — 99283 EMERGENCY DEPT VISIT LOW MDM: CPT | Mod: 25

## 2024-02-06 PROCEDURE — 71045 X-RAY EXAM CHEST 1 VIEW: CPT

## 2024-02-06 PROCEDURE — 93005 ELECTROCARDIOGRAM TRACING: CPT

## 2024-02-06 ASSESSMENT — COLUMBIA-SUICIDE SEVERITY RATING SCALE - C-SSRS
6. HAVE YOU EVER DONE ANYTHING, STARTED TO DO ANYTHING, OR PREPARED TO DO ANYTHING TO END YOUR LIFE?: NO
1. IN THE PAST MONTH, HAVE YOU WISHED YOU WERE DEAD OR WISHED YOU COULD GO TO SLEEP AND NOT WAKE UP?: NO
2. HAVE YOU ACTUALLY HAD ANY THOUGHTS OF KILLING YOURSELF?: NO

## 2024-02-06 ASSESSMENT — PAIN SCALES - GENERAL: PAINLEVEL_OUTOF10: 0 - NO PAIN

## 2024-02-06 ASSESSMENT — PAIN - FUNCTIONAL ASSESSMENT: PAIN_FUNCTIONAL_ASSESSMENT: 0-10

## 2024-02-06 NOTE — ED TRIAGE NOTES
Pt hx of ESRD with high potassium at outpt dialysis at home and his MD called saying his K was high (in the 7s possibly he does not remember).

## 2024-02-07 LAB
ATRIAL RATE: 86 BPM
P AXIS: 43 DEGREES
P OFFSET: 175 MS
P ONSET: 106 MS
PR INTERVAL: 218 MS
Q ONSET: 215 MS
QRS COUNT: 14 BEATS
QRS DURATION: 108 MS
QT INTERVAL: 392 MS
QTC CALCULATION(BAZETT): 469 MS
QTC FREDERICIA: 442 MS
R AXIS: -22 DEGREES
T AXIS: 151 DEGREES
T OFFSET: 411 MS
VENTRICULAR RATE: 86 BPM

## 2024-02-07 NOTE — ED PROVIDER NOTES
HPI   Chief Complaint   Patient presents with    Elevated Potassium       This is a 53-year-old gentleman with history as noted, on home dialysis 4 days a week who presents to the emergency department at request of his nephrologist.  Patient had lab drawn done 4 days ago which showed his potassium to be over 7 and was referred here.  He did dialysis yesterday and today at home without any issue.  He states for the last several months he has been feeling a bit more of malaise than normal.  He and his nephrologist are working on adjusting his dialysis to help with that.  He frequently has blood draws with elevated potassiums.  He has had dialysis twice since his elevated potassium.  His doctors also noted that he had an increasing murmur.  He does have an appointment in 2 days with the cardiologist.  Patient overall has no new symptoms.  He has no shortness of breath.  He is mostly an uric at baseline.  He is otherwise feeling well.  He did have to stop once on the walk from the parking lot but states that this is not unusual for him over the last several months.                          Manjeet Coma Scale Score: 15                     Patient History   Past Medical History:   Diagnosis Date    Personal history of other diseases of the circulatory system 02/01/2018    History of essential hypertension    Systemic lupus erythematosus, unspecified (CMS/[facility]) 02/01/2018    History of systemic lupus erythematosus (SLE)    Type 1 diabetes mellitus with other diabetic kidney complication (CMS/[facility]) 02/01/2018    Type 1 diabetes mellitus with other kidney complication     Past Surgical History:   Procedure Laterality Date    CORONARY ANGIOPLASTY WITH STENT PLACEMENT  07/13/2017    Cath Stent Placement    OTHER SURGICAL HISTORY  05/18/2017    Arteriovenous Surgery Creation Of A-V Fistula    OTHER SURGICAL HISTORY  02/01/2018    Surgery Right Foot Amputation DIP Third Toe    OTHER SURGICAL HISTORY  06/10/2022    Bronchoscopy     OTHER SURGICAL HISTORY  06/10/2022    Pulmonary decortication    OTHER SURGICAL HISTORY  06/10/2022    Thoracotomy    OTHER SURGICAL HISTORY  06/10/2022    Thoracentesis     Family History   Problem Relation Name Age of Onset    Heart failure Mother      Heart attack Father       Social History     Tobacco Use    Smoking status: Some Days     Types: Cigarettes     Passive exposure: Current    Smokeless tobacco: Never   Substance Use Topics    Alcohol use: Yes    Drug use: Never       Physical Exam   ED Triage Vitals [02/06/24 1557]   Temperature Heart Rate Respirations BP   35.9 °C (96.7 °F) 87 17 132/71      Pulse Ox Temp Source Heart Rate Source Patient Position   99 % Temporal Monitor Sitting      BP Location FiO2 (%)     Right arm --       Physical Exam  Vitals and nursing note reviewed.   Constitutional:       Appearance: Normal appearance. He is normal weight.   HENT:      Head: Normocephalic.      Mouth/Throat:      Mouth: Mucous membranes are moist.   Eyes:      Extraocular Movements: Extraocular movements intact.      Conjunctiva/sclera: Conjunctivae normal.   Cardiovascular:      Rate and Rhythm: Normal rate and regular rhythm.      Heart sounds: Murmur heard.   Pulmonary:      Effort: Pulmonary effort is normal.      Breath sounds: Normal breath sounds.   Abdominal:      General: Abdomen is flat.   Musculoskeletal:         General: Normal range of motion.      Cervical back: Normal range of motion and neck supple.      Right lower leg: No edema.      Left lower leg: No edema.   Skin:     General: Skin is warm and dry.   Neurological:      General: No focal deficit present.      Mental Status: He is alert and oriented to person, place, and time.   Psychiatric:         Mood and Affect: Mood normal.         Behavior: Behavior normal.         Thought Content: Thought content normal.         Judgment: Judgment normal.         ED Course & MDM   ED Course as of 02/06/24 1943 Tu Feb 06, 2024 1942  MAGNESIUM(!): 2.46 [YT]   1942 Comprehensive metabolic panel(!) [YT]   1942 CBC and Auto Differential(!) [YT]      ED Course User Index  [YT] Corie Delong MD         Diagnoses as of 02/06/24 1943   Hyperkalemia       Medical Decision Making  Patient is very nontoxic-appearing.  Bicarb and potassium here within normal limits.  No sign of acidosis.  Whether prior sample was hemolyzed or just prior to dialysis is unknown but is not an acute issue at this time.  Patient has excellent follow-up and understands the things for which she will need to return to the emergency department.        Procedure  Procedures     Corie Delong MD  02/06/24 1943

## 2024-02-08 ENCOUNTER — OFFICE VISIT (OUTPATIENT)
Dept: CARDIOLOGY | Facility: HOSPITAL | Age: 54
End: 2024-02-08
Payer: COMMERCIAL

## 2024-02-08 VITALS
WEIGHT: 174 LBS | HEART RATE: 76 BPM | DIASTOLIC BLOOD PRESSURE: 70 MMHG | SYSTOLIC BLOOD PRESSURE: 155 MMHG | HEIGHT: 76 IN | BODY MASS INDEX: 21.19 KG/M2 | OXYGEN SATURATION: 93 %

## 2024-02-08 DIAGNOSIS — I50.20 ACC/AHA STAGE C SYSTOLIC HEART FAILURE (MULTI): Primary | ICD-10-CM

## 2024-02-08 DIAGNOSIS — I35.0 NONRHEUMATIC AORTIC VALVE STENOSIS: ICD-10-CM

## 2024-02-08 DIAGNOSIS — I25.10 ASCVD (ARTERIOSCLEROTIC CARDIOVASCULAR DISEASE): ICD-10-CM

## 2024-02-08 DIAGNOSIS — I25.5 ISCHEMIC CARDIOMYOPATHY: ICD-10-CM

## 2024-02-08 DIAGNOSIS — Z95.1 S/P CABG X 3: ICD-10-CM

## 2024-02-08 PROCEDURE — 99214 OFFICE O/P EST MOD 30 MIN: CPT | Performed by: INTERNAL MEDICINE

## 2024-02-08 PROCEDURE — 3078F DIAST BP <80 MM HG: CPT | Performed by: INTERNAL MEDICINE

## 2024-02-08 PROCEDURE — 3077F SYST BP >= 140 MM HG: CPT | Performed by: INTERNAL MEDICINE

## 2024-02-08 RX ORDER — ATORVASTATIN CALCIUM 80 MG/1
80 TABLET, FILM COATED ORAL DAILY
Qty: 90 TABLET | Refills: 3 | Status: SHIPPED | OUTPATIENT
Start: 2024-02-08

## 2024-02-08 ASSESSMENT — PAIN SCALES - GENERAL: PAINLEVEL: 0-NO PAIN

## 2024-02-08 NOTE — PROGRESS NOTES
[facility]  Primary Care Physician: German Pfeiffer DO  Referring Provider/Cardiologist: Larry     Date of Visit: 02/08/2024  1:00 PM EST  Location of visit: [facility]     HPI:   Mr. 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       Interval Hx:   Currently denies chest pain, palpitations, orthopnea, PND. No edema noted in BLE. Patient denies headaches, dizziness or recent falls.   Patient is a home HD patient (4 days/ week) and states he has been hyperkalemic. Pt states GILBERT, and fatigue especially with activity.     Admitted March 16-20, 2023 for FVO  Admitted July 21-22, 2023 for cellulitis of R great toe  Admitted August 4-11, 2023 for osteomyelitis   ED visit 2/6/2024 for hyperkalemia         Social History     Tobacco Use    Smoking status: Some Days     Types: Cigarettes     Passive exposure: Current    Smokeless tobacco: Never   Substance Use Topics    Alcohol use: Yes    Drug use: Never       Family History   Problem Relation Name Age of Onset    Heart failure Mother      Heart attack Father           Current Outpatient Medications   Medication Sig Dispense Refill    Accu-Chek Guide test strips strip Accu-Chek Guide In Vitro Strip   Quantity: 200  Refills: 0        Start : 25-Mar-2022   Active      Accu-Chek Softclix Lancets misc       acetaminophen (Tylenol) 325 mg tablet Take 2 tablets (650 mg) by mouth every 6 hours if needed.      amLODIPine (Norvasc) 10 mg tablet Take 1 tablet (10 mg) by mouth once daily. (Patient taking differently: Take 0.5 tablets (5 mg) by mouth once daily.) 90 tablet 0    aspirin 81 mg EC tablet Take 1 tablet (81 mg) by mouth once daily.      blood sugar  "diagnostic strip 1 each. 3-4x daily      blood-glucose sensor (FreeStyle José Luis 3 Sensor) device Change sensor every 14 days 2 each 5    carvedilol (Coreg) 25 mg tablet Take 1 tablet (25 mg) by mouth 2 times a day with meals.      EPOETIN DEIRDRE INJ Inject 8,000 Units under the skin. With every dialysis tx      famotidine (Pepcid) 20 mg tablet Take 1 tablet (20 mg) by mouth once daily.      FreeStyle José Luis reader (FreeStyle José Luis 2 [place]) misc Use to check blood sugars at least every 8 hours 1 each 0    FreeStyle José Luis sensor system (FreeStyle José Luis 2 Sensor) kit Change sensor every 14 days. Check blood sugar at least every 8 hours 6 each 3    insulin glargine (Lantus) 100 unit/mL (3 mL) pen Inject 10 units twice a day 15 mL 5    insulin lispro (HumaLOG KwikPen Insulin) 100 unit/mL injection Inject before every meal using carb ratio and correction scale. MDD 50 units. 15 mL 5    insulin syringe-needle U-100 31G X 5/16\" 0.5 mL syringe Inject 1 each under the skin 4 times a day. With meals and at bedtime      iron polysaccharides (Nu-Iron,Niferex) 150 mg iron capsule Take 1 capsule (150 mg) by mouth once daily. For 30 days following discharge      melatonin 3 mg tablet Take 1 tablet (3 mg) by mouth as needed at bedtime for sleep.      multivitamin (Daily Multi-Vitamin) tablet Take 1 tablet by mouth once daily.      pen needle, diabetic (BD Ultra-Fine Agueda Pen Needle) 32 gauge x 5/32\" needle Use to inject insulin 5 times per day 450 each 1    sacubitriL-valsartan (Entresto) 24-26 mg tablet Take 1 tablet by mouth 2 times a day.      sevelamer carbonate (Renvela) 800 mg tablet Take 1 tablet (800 mg) by mouth 3 times a day with meals.      Ventolin HFA 90 mcg/actuation inhaler Inhale 1-2 puffs every 6 hours if needed.      atorvastatin (Lipitor) 80 mg tablet Take 1 tablet (80 mg) by mouth once daily.      clopidogrel (Plavix) 75 mg tablet Take 1 tablet (75 mg) by mouth once daily.      hydrALAZINE (Apresoline) 50 mg tablet " "Take 1 tablet (50 mg) by mouth 3 times a day. With food      labetalol (Normodyne) 200 mg tablet Take 1 tablet (200 mg) by mouth twice a day.      RANITIDINE HCL ORAL Take 150 mg by mouth once daily.       No current facility-administered medications for this visit.       Allergies   Allergen Reactions    Ampicillin-Sulbactam Other     Renal Failure. AIN (INSTERSTITIAL NEPHRITIS))    Strawberry Rash         Visit Vitals  /70 (BP Location: Left arm, Patient Position: Sitting)   Pulse 76   Ht 1.93 m (6' 4\")   Wt 78.9 kg (174 lb)   SpO2 93%   BMI 21.18 kg/m²   Smoking Status Some Days   BSA 2.06 m²        Physical Exam:  On exam Mr. Carreon appears his stated age, is alert and oriented x3, and in no acute distress. His sclera are anicteric and his oropharynx has moist mucous membranes. His neck is supple and without thyromegaly. The JVP is ~6 cm of water above the right atrium. His cardiac exam has regular rhythm, normal S1, S2. No S3/4. There is a III/VI ALFA at the base His lungs are clear to auscultation bilaterally and there is no dullness to percussion. His abdomen is soft, nontender with normoactive bowel sounds. There is no HJR. The extremities are warm and without edema. The skin is dry. There is no rash present. The distal pulses are 2+ in all four extremities. His mood and affect are appropriate for todays encounter.       Cardiac Labs/Diagnostics:    Lab Results   Component Value Date    CREATININE 8.12 (H) 02/06/2024    BUN 39 (H) 02/06/2024     02/06/2024    K 4.4 02/06/2024    CL 94 (L) 02/06/2024    CO2 30 02/06/2024        Recent Labs     03/10/22  0702   CHOL 102   LDLF 46   HDL 33.4*   TRIG 112       Recent Labs     03/16/23  0153 03/05/23  0652 03/07/22  0702 02/02/20  1859 07/18/18  1751   BNP 2,657* 2,168* 2,324* 1,698* 1,058*     Echo (3/6/23):  1. Left ventricular systolic function is mildly decreased with a 40-45% estimated ejection fraction.  2. Abnormal septal motion consistent with " post-operative status.  3. Spectral Doppler shows a pseudonormal pattern of left ventricular diastolic filling.  4. The left atrium is severely dilated.  5. The right atrium is severely dilated.  6. Mild to moderate mitral valve regurgitation.  7. Mild to moderate tricuspid regurgitation visualized.  8. Moderate aortic valve stenosis.  9. Moderately elevated pulmonary artery pressure, estimated RVSP 52mmHg.    Cardiac cath (3/10/22):  1. Significant left main coronary artery disease and double vessel coronary artery disease with proximal left anterior descending involvement.  2. Left Ventricular end-diastolic pressure = 28.    Echo (3/8/22):  1. The left ventricular systolic function is moderately decreased with a 35-40% estimated ejection fraction.  2. There is moderate to severe eccentric left ventricular hypertrophy.  3. Mild to moderate aortic valve stenosis.  4. There is moderate aortic valve cusp calcification.  5. The left ventricular cavity size is moderate to severely dilated.  6. There is global hypokinesis of the left ventricle with minor regional variations.    Nuclear stress (2/8/21):  1. Normal myocardial perfusion study without evidence of ischemia or prior infarction.  2. The left ventricle is severely dilated in size.  3. Globally reduced LV wall motion with an LV EF estimated at 40-46%.  4. Since 1/3/2019, ejection fraction appears improved and the LV is less dilated (205-206 ml on today's exam, vs 261 ml on 1/3/2019). There still remains no definitive signs of ischemia or prior infarct.    Echo (2/4/21):  1. The left ventricular systolic function is normal with a 55-60% estimated ejection fraction.  2. Spectral Doppler shows an impaired relaxation pattern of left ventricular diastolic filling.  3. There is moderate concentric left ventricular hypertrophy.  4. Mild aortic valve stenosis.    Echo (2/3/20):  1. The left ventricular systolic function is low normal with a 50-55% estimated ejection  fraction.  2. Poorly visualized anatomical structures due to suboptimal image quality.  3. Limited and incomplete echo by cardiology fellow.  4. Recommend complete study by echo lab.  5. LVH appears slightly worse on this exam, however maybe secondary to off axis window.    Nuclear stress (1/3/19):  1. Fixed decrease in counts involving the apex, thought to relate to myocardial thinning in the setting of left ventricular dilatation. The remainder of the left ventricle demonstrates normal perfusion without evidence of stress-induced ischemia or prior infarction.  2. Decreased left ventricular function with an ejection fraction of 34%.    ECG (12/13/18):  Sinus rhythm (HR 86), LVH with repol    Coronary angiography (6/20/17):  The coronary circulation is co-dominant.  LM: No CAD. The left main coronary artery showed very short, almost dual ostia.  LAD: Proximal 90%, mid 60% with small poststenotic aneurysmal segment inbetween, LADnwraps far around apex with only mild irregularities.  LCx: Codominant, mild irregularities.  RCA: The RCA showed codominant vessel but smaller caliber than the LCX. Mild-moderate diffuse disease with normal rPDA.  PCI:  1. EBU 3.75 was selective in the LCX, EBU 3.5 was better suited. LAD wired with some difficulty from the aorta, and a second wire placed into D1. The LAD was predilated with 3.0NC and stented with a 3.0x38mm RESOLUTE ALEXEY, postdil 3.0NC distally, 3.5NC proximally, and a 3.75NC was used prox-mid to appose the stent in the poststenotic segment.    ECG (5/18/17):  Sinus rhythm (HR 77), LVH, possible septal infarct    Echo (11/1/16 at [facility])  Normal LV function (LVEF 65%), normal RV size/function. No hemodynamically significant valvular disease.       Impression/Plan:  Mr. 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 At the current time he has functional class II-III symptoms and appears euvolemic on exam.     1) CAD s/p PCI to LAD (6/2017) s/p 3V CABG (LIMA to LAD, SVG to OM, SVG to PDA; 3/16/22)   Denies angina. Currently not on his statin (ran out).    -c/w ASA 81 mg daily, carvedilol  -resume atorvastatin 80 mg daily  -repeat lipids    2) Stage C systolic HF/ICM/HFmrEF with mild LV dysfunction (LVEF 40-45%; 3/2023)  Nephrology working on adjusting his meds.   -agree with stopping amlodipine and trying to optimize entresto; ideally needs a set HD schedule as AM doses of ARNi may need to be held on HD days to allow tolerance  -c/w carvedilol 25 mg bid, entresto 24/26 mg bid    3) HTN  -as per #2    4) Aortic stenosis  Moderate on the most recent echo.   -monitor    5) PAD (carotid stenosis)  50-69% stenosis bilaterally (3/10/22)  -c/w ASA/statin/BB      F/U: 6 months at /[facility]      ____________________________________________________________  Ten Lance DO  Section of Advanced Heart Failure and Cardiac Transplantation  Division of Cardiovascular Medicine  [facility] [place]  [facility]

## 2024-02-08 NOTE — PATIENT INSTRUCTIONS
It was a pleasure seeing you today. Please contact myself or my team with any questions.     To reach Dr. Lance' office please call [phone] (Anna).   Fax: [phone]   To schedule an appointment call [phone]     If you have any questions or need cardiac medication refills, please call the Heart Failure office at [phone], option 6. You may also contact the  Heart Failure Nursing team via email at [email or web address] (Please include your name and date of birth).       1) Stop amlodipine  2) Resume atorvastatin 80 mg once a day  3) Follow up in 6 months at /[facility]    Pharmacy:  [facility]

## 2024-02-12 NOTE — PROGRESS NOTES
History of Present Illness:   Patient states they are here for Dm exam  Admits NTB to feet  R 2nd toe amp over summer  No new pedal complaints  Here for foot exam     Past Medical History  Past Medical History:   Diagnosis Date    Personal history of other diseases of the circulatory system 02/01/2018    History of essential hypertension    Systemic lupus erythematosus, unspecified (CMS/[facility]) 02/01/2018    History of systemic lupus erythematosus (SLE)    Type 1 diabetes mellitus with other diabetic kidney complication (CMS/HCC) 02/01/2018    Type 1 diabetes mellitus with other kidney complication       Medications and Allergies have been reviewed.    Review Of Systems:  GENERAL: No weight loss, malaise or fevers.  HEENT: Negative for frequent or significant headaches,   RESPIRATORY: Negative for cough, wheezing or shortness of breath.  CARDIOVASCULAR: Negative for chest pain, leg swelling or palpitations.    Examination of Both Lower Extremities:   Objective:   Vasc: DP and PT pulses are palpable bilateral.  CFT is less than 3 seconds bilateral.  Skin temperature is warm to cool proximal to distal bilateral.      Neuro: Vibratory, light touch and proprioception are  absent.     Derm: Nails bilateral are intact.      Ortho: Muscle strength is 5/5 for all pedal groups tested.  R 2nd toe amp noted. No open wounds noted. Fissure noted to post heel. No SOI noted.     1. Type 2 diabetes mellitus with other circulatory complication, with long-term current use of insulin (CMS/HCC)        2. Comprehensive diabetic foot examination, type 2 DM, encounter for (CMS/HCC)        3. 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  Nails  b/l were debrided in thickness and length with nail cutting forceps.  Keep sugars in good control  Fu every 6 mos  Sooner if any n ew prob arise.   Patient was in agreement to this plan. All questions answered.      Laureen  STELLA Perry  [phone]  Option 2  Fax: [phone]

## 2024-02-17 NOTE — PROGRESS NOTES
FUV for diabetes. LV with 10/2023.    Subjective   Kentrell Carreon is a 53 y.o. male with a hx of type 2 diabetes mellitus, ESRD on HD, CHF, CAD s/p PCI (2017), s/p CABG (03/2022), HLD, osteomyeleitis, who presents for diabetes.    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    SMBG: José Luis 2    Hypoglycemia: no  Hypoglycemia awareness: yes, 50s, sweating, lightheadedness     Diet: 3-4 meals per day but tends to graze    Family history: brother and both parents with type 2 diabetes  Social history: technician for [employer]    Today:  -was on steroids for a few days last week (last dose around 2/15)    ROS  General: no fever or chills  CV: no chest pain   Respiratory: no shortness of breath  MSK: no lower extremity edema  Neuro: no headache or dizziness  See HPI for Endocrine ROS    Objective    Physical Exam  Blood pressure 122/66, pulse 77, temperature 36.4 °C (97.6 °F), resp. rate 18, weight 75 kg (165 lb 4.8 oz).  General: not in acute distress  HEENT: RADHA, EOMI  Thyroid: no goiter  Neuro: alert and oriented x 3      Current Outpatient Medications:     acetaminophen (Tylenol) 325 mg tablet, Take 2 tablets (650 mg) by mouth every 6 hours if needed., Disp: , Rfl:     aspirin 81 mg EC tablet, Take 1 tablet (81 mg) by mouth once daily., Disp: , Rfl:     atorvastatin (Lipitor) 80 mg tablet, Take 1 tablet (80 mg) by mouth once daily., Disp: 90 tablet, Rfl: 3    carvedilol (Coreg) 25 mg tablet, Take 1 tablet (25 mg) by mouth 2 times a day with meals., Disp: , Rfl:     EPOETIN DEIRDRE INJ, Inject 8,000 Units under the skin. With every dialysis tx, Disp: , Rfl:     famotidine (Pepcid) 20 mg tablet, Take 1 tablet (20 mg) by mouth once daily., Disp: ,  "Rfl:     FreeStyle José Luis reader (FreeStyle José Luis 2 [place]) misc, Use to check blood sugars at least every 8 hours, Disp: 1 each, Rfl: 0    FreeStyle José Luis sensor system (FreeStyle José Luis 2 Sensor) kit, Change sensor every 14 days. Check blood sugar at least every 8 hours, Disp: 6 each, Rfl: 3    insulin glargine (Lantus) 100 unit/mL (3 mL) pen, Inject 10 units twice a day, Disp: 15 mL, Rfl: 5    insulin lispro (HumaLOG KwikPen Insulin) 100 unit/mL injection, Inject before every meal using carb ratio and correction scale. MDD 50 units., Disp: 15 mL, Rfl: 5    multivitamin (Daily Multi-Vitamin) tablet, Take 1 tablet by mouth once daily., Disp: , Rfl:     pen needle, diabetic (BD Ultra-Fine Agueda Pen Needle) 32 gauge x 5/32\" needle, Use to inject insulin 5 times per day, Disp: 450 each, Rfl: 1    sacubitriL-valsartan (Entresto) 24-26 mg tablet, Take 1 tablet by mouth 2 times a day., Disp: , Rfl:     sevelamer carbonate (Renvela) 800 mg tablet, Take 1 tablet (800 mg) by mouth 3 times a day with meals., Disp: , Rfl:     Accu-Chek Guide test strips strip, Accu-Chek Guide In Vitro Strip  Quantity: 200  Refills: 0      Start : 25-Mar-2022  Active, Disp: , Rfl:     Accu-Chek Softclix Lancets misc, , Disp: , Rfl:     blood sugar diagnostic strip, 1 each. 3-4x daily, Disp: , Rfl:     blood-glucose sensor (FreeStyle José Luis 3 Sensor) device, Change sensor every 14 days, Disp: 2 each, Rfl: 5    insulin syringe-needle U-100 31G X 5/16\" 0.5 mL syringe, Inject 1 each under the skin 4 times a day. With meals and at bedtime, Disp: , Rfl:     melatonin 3 mg tablet, Take 1 tablet (3 mg) by mouth as needed at bedtime for sleep., Disp: , Rfl:     Ventolin HFA 90 mcg/actuation inhaler, Inhale 1-2 puffs every 6 hours if needed., Disp: , Rfl:       *Was on steroids for a few days during this time period    Assessment/Plan   Kentrell Carreon is a 53 y.o. male with a hx of type 2 diabetes mellitus, ESRD on HD, CHF, CAD s/p PCI (2017), s/p CABG " (03/2022), HLD, osteomyeleitis, who presents for diabetes.    Type 2 diabetes mellitus  HbA1c: 8.3% (10/17/2023), 9.0% (07/20/2023), 9.8% (03/2023)  AUDIE, islet cell, Zn-T8 Ab negative, c-peptide 11.7,  (11/2023)  Current regimen: Lantus 5-10 units BID, Humalog ICR 1: 15g, ISF 1:50 (>180)  Eye exam: no DR (LV 11/2023)  Urine microalbumin: ESRD on HD (4 days per week at home)  Podiatry: LV 01/2024  Lipids: HDL 33, LDL 46,  (03/2022) on atorvastatin 80mg    A1c: 7.0% today.  José Luis download reviewed.  Post-prandial hyperglycemia mostly after dinner. No hypoglycemia recently.  He does still take Humalog after meals.  Encouraged patient to take the Humalog before meals as much as possible.    Discussed changing Lantus to once a day. He would like to do this and reports it would be much easier to take once at bedtime.    Discussed adding Januvia for extra help with post-prandial hyperglycemia.  He is interested in trying this.    Advised him to notify me if he develops hypoglycemia after starting Januvia (may need to reduce insulin doses).    PLAN:  -start Januvia 25 mg, once a day  -change Lantus to 15 units at bedtime  -change Humalog ICR 1: 15g (breakfast and lunch), 1:13 g (dinner) ISF 1:50 (>180)  -check blood sugars before every meal and bedtime  -José Luis 2 with reader  -meter: accu-check jacqueline  -follow up with Kiet García PharmD (05/28)    Follow up in 4 months

## 2024-02-22 ENCOUNTER — OFFICE VISIT (OUTPATIENT)
Dept: ENDOCRINOLOGY | Facility: CLINIC | Age: 54
End: 2024-02-22
Payer: COMMERCIAL

## 2024-02-22 VITALS
DIASTOLIC BLOOD PRESSURE: 66 MMHG | RESPIRATION RATE: 18 BRPM | HEART RATE: 77 BPM | WEIGHT: 165.3 LBS | BODY MASS INDEX: 20.12 KG/M2 | TEMPERATURE: 97.6 F | SYSTOLIC BLOOD PRESSURE: 122 MMHG

## 2024-02-22 DIAGNOSIS — Z79.4 TYPE 2 DIABETES MELLITUS WITH OTHER CIRCULATORY COMPLICATION, WITH LONG-TERM CURRENT USE OF INSULIN (MULTI): Primary | ICD-10-CM

## 2024-02-22 DIAGNOSIS — E11.59 TYPE 2 DIABETES MELLITUS WITH OTHER CIRCULATORY COMPLICATION, WITH LONG-TERM CURRENT USE OF INSULIN (MULTI): Primary | ICD-10-CM

## 2024-02-22 PROCEDURE — 83036 HEMOGLOBIN GLYCOSYLATED A1C: CPT | Performed by: STUDENT IN AN ORGANIZED HEALTH CARE EDUCATION/TRAINING PROGRAM

## 2024-02-22 PROCEDURE — 99215 OFFICE O/P EST HI 40 MIN: CPT | Performed by: STUDENT IN AN ORGANIZED HEALTH CARE EDUCATION/TRAINING PROGRAM

## 2024-02-22 PROCEDURE — 3074F SYST BP LT 130 MM HG: CPT | Performed by: STUDENT IN AN ORGANIZED HEALTH CARE EDUCATION/TRAINING PROGRAM

## 2024-02-22 PROCEDURE — 95251 CONT GLUC MNTR ANALYSIS I&R: CPT | Performed by: STUDENT IN AN ORGANIZED HEALTH CARE EDUCATION/TRAINING PROGRAM

## 2024-02-22 PROCEDURE — 3078F DIAST BP <80 MM HG: CPT | Performed by: STUDENT IN AN ORGANIZED HEALTH CARE EDUCATION/TRAINING PROGRAM

## 2024-02-22 ASSESSMENT — PAIN SCALES - GENERAL: PAINLEVEL: 0-NO PAIN

## 2024-02-22 NOTE — PATIENT INSTRUCTIONS
Start Januvia 25 mg, 1 tab, once a day  Change Lantus 15 units at bedtime  Humalog 1:15 g of carbs for breakfast and lunch, 1:13 g of carbs for dinner  Please do your best to take the Humalog BEFORE meals

## 2024-02-27 ENCOUNTER — LAB (OUTPATIENT)
Dept: LAB | Facility: LAB | Age: 54
End: 2024-02-27
Payer: COMMERCIAL

## 2024-02-27 ENCOUNTER — OFFICE VISIT (OUTPATIENT)
Dept: PRIMARY CARE | Facility: CLINIC | Age: 54
End: 2024-02-27
Payer: COMMERCIAL

## 2024-02-27 VITALS
WEIGHT: 167 LBS | SYSTOLIC BLOOD PRESSURE: 126 MMHG | BODY MASS INDEX: 20.33 KG/M2 | HEART RATE: 76 BPM | OXYGEN SATURATION: 100 % | DIASTOLIC BLOOD PRESSURE: 62 MMHG

## 2024-02-27 DIAGNOSIS — M86.9 OSTEOMYELITIS, UNSPECIFIED SITE, UNSPECIFIED TYPE (MULTI): ICD-10-CM

## 2024-02-27 DIAGNOSIS — N18.6 END-STAGE RENAL DISEASE ON HEMODIALYSIS (MULTI): ICD-10-CM

## 2024-02-27 DIAGNOSIS — E44.0 MALNUTRITION OF MODERATE DEGREE (MULTI): ICD-10-CM

## 2024-02-27 DIAGNOSIS — I50.20 SYSTOLIC CONGESTIVE HEART FAILURE, UNSPECIFIED HF CHRONICITY (MULTI): ICD-10-CM

## 2024-02-27 DIAGNOSIS — M32.19 SYSTEMIC LUPUS ERYTHEMATOSUS WITH OTHER ORGAN INVOLVEMENT, UNSPECIFIED SLE TYPE (MULTI): ICD-10-CM

## 2024-02-27 DIAGNOSIS — K22.70 BARRETT'S ESOPHAGUS WITHOUT DYSPLASIA: ICD-10-CM

## 2024-02-27 DIAGNOSIS — I65.29 STENOSIS OF CAROTID ARTERY, UNSPECIFIED LATERALITY: ICD-10-CM

## 2024-02-27 DIAGNOSIS — M79.671 RIGHT FOOT PAIN: ICD-10-CM

## 2024-02-27 DIAGNOSIS — Z99.2 ESRD ON DIALYSIS (MULTI): ICD-10-CM

## 2024-02-27 DIAGNOSIS — E11.59 TYPE 2 DIABETES MELLITUS WITH OTHER CIRCULATORY COMPLICATION, WITH LONG-TERM CURRENT USE OF INSULIN (MULTI): ICD-10-CM

## 2024-02-27 DIAGNOSIS — Z79.4 TYPE 2 DIABETES MELLITUS WITH OTHER CIRCULATORY COMPLICATION, WITH LONG-TERM CURRENT USE OF INSULIN (MULTI): ICD-10-CM

## 2024-02-27 DIAGNOSIS — L97.519 NON-PRESSURE CHRONIC ULCER OF OTHER PART OF RIGHT FOOT WITH UNSPECIFIED SEVERITY (MULTI): ICD-10-CM

## 2024-02-27 DIAGNOSIS — N18.6 ESRD ON DIALYSIS (MULTI): ICD-10-CM

## 2024-02-27 DIAGNOSIS — I25.118 CORONARY ARTERY DISEASE WITH OTHER FORM OF ANGINA PECTORIS, UNSPECIFIED VESSEL OR LESION TYPE, UNSPECIFIED WHETHER NATIVE OR TRANSPLANTED HEART (CMS-HCC): ICD-10-CM

## 2024-02-27 DIAGNOSIS — I50.43 CHF (CONGESTIVE HEART FAILURE), NYHA CLASS I, ACUTE ON CHRONIC, COMBINED (MULTI): ICD-10-CM

## 2024-02-27 DIAGNOSIS — Z95.1 S/P CABG X 3: ICD-10-CM

## 2024-02-27 DIAGNOSIS — E87.1 HYPONATREMIA: ICD-10-CM

## 2024-02-27 DIAGNOSIS — Z99.2 END-STAGE RENAL DISEASE ON HEMODIALYSIS (MULTI): ICD-10-CM

## 2024-02-27 DIAGNOSIS — Z95.1 S/P CABG X 3: Primary | ICD-10-CM

## 2024-02-27 DIAGNOSIS — J96.01 ACUTE RESPIRATORY FAILURE WITH HYPOXIA (MULTI): ICD-10-CM

## 2024-02-27 LAB
ALBUMIN SERPL BCP-MCNC: 4.7 G/DL (ref 3.4–5)
ANION GAP SERPL CALC-SCNC: 23 MMOL/L (ref 10–20)
BUN SERPL-MCNC: 58 MG/DL (ref 6–23)
CALCIUM SERPL-MCNC: 10.2 MG/DL (ref 8.6–10.6)
CHLORIDE SERPL-SCNC: 95 MMOL/L (ref 98–107)
CO2 SERPL-SCNC: 27 MMOL/L (ref 21–32)
CREAT SERPL-MCNC: 11.86 MG/DL (ref 0.5–1.3)
EGFRCR SERPLBLD CKD-EPI 2021: 5 ML/MIN/1.73M*2
ERYTHROCYTE [DISTWIDTH] IN BLOOD BY AUTOMATED COUNT: 13.8 % (ref 11.5–14.5)
GLUCOSE SERPL-MCNC: 144 MG/DL (ref 74–99)
HCT VFR BLD AUTO: 28.6 % (ref 41–52)
HGB BLD-MCNC: 9.2 G/DL (ref 13.5–17.5)
IRON SATN MFR SERPL: 37 % (ref 25–45)
IRON SERPL-MCNC: 111 UG/DL (ref 35–150)
MCH RBC QN AUTO: 34.2 PG (ref 26–34)
MCHC RBC AUTO-ENTMCNC: 32.2 G/DL (ref 32–36)
MCV RBC AUTO: 106 FL (ref 80–100)
NRBC BLD-RTO: 0 /100 WBCS (ref 0–0)
PHOSPHATE SERPL-MCNC: 6.1 MG/DL (ref 2.5–4.9)
PLATELET # BLD AUTO: 242 X10*3/UL (ref 150–450)
POC HEMOGLOBIN A1C: 7 % (ref 4.2–6.5)
POTASSIUM SERPL-SCNC: 6.5 MMOL/L (ref 3.5–5.3)
RBC # BLD AUTO: 2.69 X10*6/UL (ref 4.5–5.9)
SODIUM SERPL-SCNC: 138 MMOL/L (ref 136–145)
TIBC SERPL-MCNC: 301 UG/DL (ref 240–445)
TSH SERPL-ACNC: 3.6 MIU/L (ref 0.44–3.98)
UIBC SERPL-MCNC: 190 UG/DL (ref 110–370)
VIT B12 SERPL-MCNC: 818 PG/ML (ref 211–911)
WBC # BLD AUTO: 6 X10*3/UL (ref 4.4–11.3)

## 2024-02-27 PROCEDURE — 82607 VITAMIN B-12: CPT

## 2024-02-27 PROCEDURE — 84443 ASSAY THYROID STIM HORMONE: CPT

## 2024-02-27 PROCEDURE — 84402 ASSAY OF FREE TESTOSTERONE: CPT

## 2024-02-27 PROCEDURE — 3074F SYST BP LT 130 MM HG: CPT | Performed by: INTERNAL MEDICINE

## 2024-02-27 PROCEDURE — 83540 ASSAY OF IRON: CPT

## 2024-02-27 PROCEDURE — 3078F DIAST BP <80 MM HG: CPT | Performed by: INTERNAL MEDICINE

## 2024-02-27 PROCEDURE — 99214 OFFICE O/P EST MOD 30 MIN: CPT | Performed by: INTERNAL MEDICINE

## 2024-02-27 PROCEDURE — 36415 COLL VENOUS BLD VENIPUNCTURE: CPT

## 2024-02-27 PROCEDURE — 85027 COMPLETE CBC AUTOMATED: CPT

## 2024-02-27 PROCEDURE — 80069 RENAL FUNCTION PANEL: CPT

## 2024-02-27 PROCEDURE — 83550 IRON BINDING TEST: CPT

## 2024-02-27 NOTE — PROGRESS NOTES
Subjective   Patient ID: Kentrell Carreon is a 53 y.o. male who presents for Follow-up.    HPI         Patient is a 53-year-old male with past medical history of end-stage renal disease hypertension dyslipidemia GERD and diabetes mellitus type 2 coronary artery disease presents for follow-up      Patient has type 1 diabetes and has established with endocrinology.  His sugars has improved to less than 8.  He is taking his insulin as prescribed.  Labs are consistent with type 2 diabetes.  He is under the care of endocrinology now and takes his medications as prescribed      Patient has a history of coronary artery disease with low ejection fraction.  He is established with cardiology.  He has end-stage renal disease as well as valvular abnormalities with ongoing fatigue but no shortness of breath.  His blood pressure is well-controlled.  He has evidence of moderate aortic stenosis on echocardiogram in March 2023.  No shortness of breath on exertion and no recent weight gain.     End-stage renal disease on dialysis.  Follows with nephrology quite regularly.  Goes to the [facility] 3 times a week.  He takes his end-stage renal disease vitamins as prescribed.       Review of Systems  Constitutional: No fever or chills  Cardiovascular: no chest pain, no palpitations and no syncope.   Respiratory: no cough, no shortness of breath during exertion and no shortness of breath at rest.   Gastrointestinal: no abdominal pain, no nausea and no vomiting.  Neuro: No Headache, no dizziness    Objective   /62   Pulse 76   Wt 75.8 kg (167 lb)   SpO2 100%   BMI 20.33 kg/m²     Physical Exam  Constitutional: Alert and in no acute distress. Well developed, well nourished  Head and Face: Head and face: Normal.    Cardiovascular: Heart rate and rhythm were normal, normal S1 and S2. No peripheral edema.   Pulmonary: No respiratory distress. Clear bilateral breath sounds.  Musculoskeletal: Tenderness and erythema of the right great toe, with  overlying skin cracks  Skin: Normal skin color and pigmentation, normal skin turgor, and no rash.    Psychiatric: Judgment and insight: Intact. Mood and affect: Normal.        Lab Results   Component Value Date    WBC 5.0 02/06/2024    HGB 8.3 (L) 02/06/2024    HCT 24.0 (L) 02/06/2024     02/06/2024    CHOL 102 03/10/2022    TRIG 112 03/10/2022    HDL 33.4 (A) 03/10/2022    ALT 14 02/06/2024    AST 17 02/06/2024     02/06/2024    K 4.4 02/06/2024    CL 94 (L) 02/06/2024    CREATININE 8.12 (H) 02/06/2024    BUN 39 (H) 02/06/2024    CO2 30 02/06/2024    TSH 3.65 03/10/2022    INR 1.0 07/21/2023    HGBA1C 8.3 (A) 10/17/2023       ECG 12 lead  Sinus rhythm with 1st degree AV block  Possible Left atrial enlargement  Incomplete left bundle branch block  Left ventricular hypertrophy with repolarization abnormality ( R in aVL , Sokolow-Márquez , Grover product , Romhilt-Romero )  Abnormal ECG  When compared with ECG of 04-AUG-2023 19:59,  Previous ECG has undetermined rhythm, needs review  See ED provider note for full interpretation and clinical correlation  Confirmed by Tab Ramsey ([id number]) on 2/7/2024 4:22:39 AM            Assessment/Plan   Problem List Items Addressed This Visit       Coronary artery disease with other form of angina pectoris, unspecified vessel or lesion type, unspecified whether native or transplanted heart (CMS/[facility])    S/P CABG x 3 - Primary    Relevant Orders    Testosterone, total and free    TSH with reflex to Free T4 if abnormal    Vitamin B12    Iron and TIBC    SLE (systemic lupus erythematosus) (CMS/[facility])    Chew's esophagus without dysplasia    Relevant Orders    Testosterone, total and free    TSH with reflex to Free T4 if abnormal    Vitamin B12    Iron and TIBC    ESRD on dialysis (CMS/[facility])    Relevant Orders    Testosterone, total and free    TSH with reflex to Free T4 if abnormal    Vitamin B12    Iron and TIBC    Hyponatremia    Relevant Orders    Testosterone, total and  free    TSH with reflex to Free T4 if abnormal    Vitamin B12    Iron and TIBC    Malnutrition of moderate degree (CMS/HCC)    Acute respiratory failure with hypoxia (CMS/HCC)     Other Visit Diagnoses       Systolic congestive heart failure, unspecified HF chronicity (CMS/HCC)        Relevant Orders    Transthoracic Echo (TTE) Complete    CHF (congestive heart failure), NYHA class I, acute on chronic, combined (CMS/HCC)        Relevant Orders    Transthoracic Echo (TTE) Complete    Non-pressure chronic ulcer of other part of right foot with unspecified severity (CMS/HCC)        Osteomyelitis, unspecified site, unspecified type (CMS/HCC)              Considering his overall fatigue being his major complaint at this time it is difficult to say what might be contributing.  Certainly the fact that he is on dialysis could be contributing to the fatigue.  Will check TSH as well as testosterone.  The drop in glycemia to a more stable level could also be contributing.  Will also check echocardiogram to rule out worsening CHF and valvular abnormalities.  Continue with dialysis and fluid management  Will call with results

## 2024-03-02 LAB
TESTOSTERONE FREE (CHAN): 63 PG/ML (ref 35–155)
TESTOSTERONE,TOTAL,LC-MS/MS: 257 NG/DL (ref 250–1100)

## 2024-03-06 ENCOUNTER — APPOINTMENT (OUTPATIENT)
Dept: CARDIOLOGY | Facility: CLINIC | Age: 54
End: 2024-03-06
Payer: COMMERCIAL

## 2024-03-06 ENCOUNTER — HOSPITAL ENCOUNTER (OUTPATIENT)
Dept: CARDIOLOGY | Facility: CLINIC | Age: 54
Discharge: HOME | End: 2024-03-06
Payer: COMMERCIAL

## 2024-03-06 DIAGNOSIS — I50.20 SYSTOLIC CONGESTIVE HEART FAILURE, UNSPECIFIED HF CHRONICITY (MULTI): ICD-10-CM

## 2024-03-06 DIAGNOSIS — I50.43 CHF (CONGESTIVE HEART FAILURE), NYHA CLASS I, ACUTE ON CHRONIC, COMBINED (MULTI): ICD-10-CM

## 2024-03-06 DIAGNOSIS — I50.22 CHRONIC SYSTOLIC (CONGESTIVE) HEART FAILURE (MULTI): ICD-10-CM

## 2024-03-06 LAB
AORTIC VALVE MEAN GRADIENT: 13.9 MMHG
AORTIC VALVE PEAK VELOCITY: 2.51 M/S
AV PEAK GRADIENT: 25.2 MMHG
AVA (PEAK VEL): 1.31 CM2
AVA (VTI): 1.27 CM2
EJECTION FRACTION APICAL 4 CHAMBER: 40.9
EJECTION FRACTION: 44 %
LEFT ATRIUM VOLUME AREA LENGTH INDEX BSA: 61.2 ML/M2
LEFT VENTRICLE INTERNAL DIMENSION DIASTOLE: 5.5 CM (ref 3.5–6)
LEFT VENTRICULAR OUTFLOW TRACT DIAMETER: 2.26 CM
MITRAL VALVE E/A RATIO: 1.78
MITRAL VALVE E/E' RATIO: 14.59
RIGHT VENTRICLE FREE WALL PEAK S': 9 CM/S
RIGHT VENTRICLE PEAK SYSTOLIC PRESSURE: 24.8 MMHG
TRICUSPID ANNULAR PLANE SYSTOLIC EXCURSION: 1.9 CM

## 2024-03-06 PROCEDURE — 93306 TTE W/DOPPLER COMPLETE: CPT | Performed by: INTERNAL MEDICINE

## 2024-03-06 PROCEDURE — 93306 TTE W/DOPPLER COMPLETE: CPT

## 2024-03-07 ENCOUNTER — APPOINTMENT (OUTPATIENT)
Dept: CARDIOLOGY | Facility: HOSPITAL | Age: 54
End: 2024-03-07
Payer: COMMERCIAL

## 2024-03-13 ENCOUNTER — APPOINTMENT (OUTPATIENT)
Dept: CARDIOLOGY | Facility: CLINIC | Age: 54
End: 2024-03-13
Payer: COMMERCIAL

## 2024-03-25 ENCOUNTER — APPOINTMENT (OUTPATIENT)
Dept: RADIOLOGY | Facility: HOSPITAL | Age: 54
DRG: 291 | End: 2024-03-25
Payer: COMMERCIAL

## 2024-03-25 ENCOUNTER — APPOINTMENT (OUTPATIENT)
Dept: CARDIOLOGY | Facility: HOSPITAL | Age: 54
DRG: 291 | End: 2024-03-25
Payer: COMMERCIAL

## 2024-03-25 ENCOUNTER — HOSPITAL ENCOUNTER (INPATIENT)
Facility: HOSPITAL | Age: 54
LOS: 2 days | Discharge: HOME | DRG: 291 | End: 2024-03-28
Attending: EMERGENCY MEDICINE | Admitting: INTERNAL MEDICINE
Payer: COMMERCIAL

## 2024-03-25 DIAGNOSIS — R07.9 CHEST PAIN, UNSPECIFIED TYPE: ICD-10-CM

## 2024-03-25 DIAGNOSIS — J96.01 ACUTE RESPIRATORY FAILURE WITH HYPOXIA (MULTI): ICD-10-CM

## 2024-03-25 DIAGNOSIS — R09.02 HYPOXIA: Primary | ICD-10-CM

## 2024-03-25 LAB
ALBUMIN SERPL BCP-MCNC: 4.5 G/DL (ref 3.4–5)
ALP SERPL-CCNC: 83 U/L (ref 33–120)
ALT SERPL W P-5'-P-CCNC: 18 U/L (ref 10–52)
ANION GAP SERPL CALC-SCNC: 25 MMOL/L (ref 10–20)
APTT PPP: 35 SECONDS (ref 27–38)
AST SERPL W P-5'-P-CCNC: 20 U/L (ref 9–39)
BASOPHILS # BLD AUTO: 0.08 X10*3/UL (ref 0–0.1)
BASOPHILS NFR BLD AUTO: 0.9 %
BILIRUB SERPL-MCNC: 0.4 MG/DL (ref 0–1.2)
BNP SERPL-MCNC: >4700 PG/ML (ref 0–99)
BUN SERPL-MCNC: 83 MG/DL (ref 6–23)
CALCIUM SERPL-MCNC: 9.2 MG/DL (ref 8.6–10.3)
CARDIAC TROPONIN I PNL SERPL HS: 391 NG/L (ref 0–20)
CARDIAC TROPONIN I PNL SERPL HS: 448 NG/L (ref 0–20)
CHLORIDE SERPL-SCNC: 96 MMOL/L (ref 98–107)
CO2 SERPL-SCNC: 24 MMOL/L (ref 21–32)
CREAT SERPL-MCNC: 12.14 MG/DL (ref 0.5–1.3)
EGFRCR SERPLBLD CKD-EPI 2021: 5 ML/MIN/1.73M*2
EOSINOPHIL # BLD AUTO: 0.08 X10*3/UL (ref 0–0.7)
EOSINOPHIL NFR BLD AUTO: 0.9 %
ERYTHROCYTE [DISTWIDTH] IN BLOOD BY AUTOMATED COUNT: 13.2 % (ref 11.5–14.5)
FLUAV RNA RESP QL NAA+PROBE: NOT DETECTED
FLUBV RNA RESP QL NAA+PROBE: NOT DETECTED
GLUCOSE SERPL-MCNC: 157 MG/DL (ref 74–99)
HCT VFR BLD AUTO: 27.2 % (ref 41–52)
HGB BLD-MCNC: 8.9 G/DL (ref 13.5–17.5)
IMM GRANULOCYTES # BLD AUTO: 0.04 X10*3/UL (ref 0–0.7)
IMM GRANULOCYTES NFR BLD AUTO: 0.4 % (ref 0–0.9)
INR PPP: 1.1 (ref 0.9–1.1)
LYMPHOCYTES # BLD AUTO: 0.7 X10*3/UL (ref 1.2–4.8)
LYMPHOCYTES NFR BLD AUTO: 7.7 %
MAGNESIUM SERPL-MCNC: 2.2 MG/DL (ref 1.6–2.4)
MCH RBC QN AUTO: 34.2 PG (ref 26–34)
MCHC RBC AUTO-ENTMCNC: 32.7 G/DL (ref 32–36)
MCV RBC AUTO: 105 FL (ref 80–100)
MONOCYTES # BLD AUTO: 1.05 X10*3/UL (ref 0.1–1)
MONOCYTES NFR BLD AUTO: 11.5 %
NEUTROPHILS # BLD AUTO: 7.16 X10*3/UL (ref 1.2–7.7)
NEUTROPHILS NFR BLD AUTO: 78.6 %
NRBC BLD-RTO: 0 /100 WBCS (ref 0–0)
PLATELET # BLD AUTO: 194 X10*3/UL (ref 150–450)
POTASSIUM SERPL-SCNC: 6 MMOL/L (ref 3.5–5.3)
PROT SERPL-MCNC: 7.8 G/DL (ref 6.4–8.2)
PROTHROMBIN TIME: 12.9 SECONDS (ref 9.8–12.8)
RBC # BLD AUTO: 2.6 X10*6/UL (ref 4.5–5.9)
SARS-COV-2 RNA RESP QL NAA+PROBE: NOT DETECTED
SODIUM SERPL-SCNC: 139 MMOL/L (ref 136–145)
WBC # BLD AUTO: 9.1 X10*3/UL (ref 4.4–11.3)

## 2024-03-25 PROCEDURE — 71045 X-RAY EXAM CHEST 1 VIEW: CPT | Mod: FOREIGN READ | Performed by: RADIOLOGY

## 2024-03-25 PROCEDURE — 74176 CT ABD & PELVIS W/O CONTRAST: CPT | Mod: FOREIGN READ | Performed by: RADIOLOGY

## 2024-03-25 PROCEDURE — 85610 PROTHROMBIN TIME: CPT | Performed by: EMERGENCY MEDICINE

## 2024-03-25 PROCEDURE — 84484 ASSAY OF TROPONIN QUANT: CPT | Performed by: EMERGENCY MEDICINE

## 2024-03-25 PROCEDURE — 84132 ASSAY OF SERUM POTASSIUM: CPT | Performed by: EMERGENCY MEDICINE

## 2024-03-25 PROCEDURE — 36415 COLL VENOUS BLD VENIPUNCTURE: CPT | Performed by: EMERGENCY MEDICINE

## 2024-03-25 PROCEDURE — 93005 ELECTROCARDIOGRAM TRACING: CPT

## 2024-03-25 PROCEDURE — 83880 ASSAY OF NATRIURETIC PEPTIDE: CPT | Performed by: EMERGENCY MEDICINE

## 2024-03-25 PROCEDURE — 87636 SARSCOV2 & INF A&B AMP PRB: CPT | Performed by: EMERGENCY MEDICINE

## 2024-03-25 PROCEDURE — 99291 CRITICAL CARE FIRST HOUR: CPT | Performed by: EMERGENCY MEDICINE

## 2024-03-25 PROCEDURE — 71045 X-RAY EXAM CHEST 1 VIEW: CPT

## 2024-03-25 PROCEDURE — 85025 COMPLETE CBC W/AUTO DIFF WBC: CPT | Performed by: EMERGENCY MEDICINE

## 2024-03-25 PROCEDURE — 74176 CT ABD & PELVIS W/O CONTRAST: CPT

## 2024-03-25 PROCEDURE — 71250 CT THORAX DX C-: CPT | Mod: FOREIGN READ | Performed by: RADIOLOGY

## 2024-03-25 PROCEDURE — 85730 THROMBOPLASTIN TIME PARTIAL: CPT | Performed by: EMERGENCY MEDICINE

## 2024-03-25 PROCEDURE — 99285 EMERGENCY DEPT VISIT HI MDM: CPT | Mod: 25

## 2024-03-25 PROCEDURE — 83735 ASSAY OF MAGNESIUM: CPT | Performed by: EMERGENCY MEDICINE

## 2024-03-25 PROCEDURE — 96374 THER/PROPH/DIAG INJ IV PUSH: CPT

## 2024-03-25 ASSESSMENT — COLUMBIA-SUICIDE SEVERITY RATING SCALE - C-SSRS
1. IN THE PAST MONTH, HAVE YOU WISHED YOU WERE DEAD OR WISHED YOU COULD GO TO SLEEP AND NOT WAKE UP?: NO
6. HAVE YOU EVER DONE ANYTHING, STARTED TO DO ANYTHING, OR PREPARED TO DO ANYTHING TO END YOUR LIFE?: NO
2. HAVE YOU ACTUALLY HAD ANY THOUGHTS OF KILLING YOURSELF?: NO

## 2024-03-25 ASSESSMENT — PAIN DESCRIPTION - LOCATION: LOCATION: CHEST

## 2024-03-25 ASSESSMENT — PAIN DESCRIPTION - PAIN TYPE: TYPE: ACUTE PAIN

## 2024-03-25 ASSESSMENT — PAIN SCALES - GENERAL: PAINLEVEL_OUTOF10: 3

## 2024-03-25 ASSESSMENT — PAIN - FUNCTIONAL ASSESSMENT: PAIN_FUNCTIONAL_ASSESSMENT: 0-10

## 2024-03-26 ENCOUNTER — APPOINTMENT (OUTPATIENT)
Dept: DIALYSIS | Facility: HOSPITAL | Age: 54
End: 2024-03-26
Payer: COMMERCIAL

## 2024-03-26 ENCOUNTER — APPOINTMENT (OUTPATIENT)
Dept: CARDIOLOGY | Facility: HOSPITAL | Age: 54
DRG: 291 | End: 2024-03-26
Payer: COMMERCIAL

## 2024-03-26 PROBLEM — R09.02 HYPOXIA: Status: ACTIVE | Noted: 2024-03-26

## 2024-03-26 LAB
ANION GAP BLDV CALCULATED.4IONS-SCNC: 19 MMOL/L (ref 10–25)
ANION GAP SERPL CALC-SCNC: 27 MMOL/L (ref 10–20)
ATRIAL RATE: 86 BPM
ATRIAL RATE: 89 BPM
ATRIAL RATE: 95 BPM
BASE EXCESS BLDV CALC-SCNC: -0.7 MMOL/L (ref -2–3)
BODY TEMPERATURE: 37 DEGREES CELSIUS
BUN SERPL-MCNC: 89 MG/DL (ref 6–23)
CA-I BLDV-SCNC: 1.08 MMOL/L (ref 1.1–1.33)
CALCIUM SERPL-MCNC: 9.6 MG/DL (ref 8.6–10.3)
CARDIAC TROPONIN I PNL SERPL HS: 608 NG/L (ref 0–20)
CHLORIDE BLDV-SCNC: 100 MMOL/L (ref 98–107)
CHLORIDE SERPL-SCNC: 96 MMOL/L (ref 98–107)
CO2 SERPL-SCNC: 21 MMOL/L (ref 21–32)
CREAT SERPL-MCNC: 13.03 MG/DL (ref 0.5–1.3)
EGFRCR SERPLBLD CKD-EPI 2021: 4 ML/MIN/1.73M*2
ERYTHROCYTE [DISTWIDTH] IN BLOOD BY AUTOMATED COUNT: 13.2 % (ref 11.5–14.5)
ERYTHROCYTE [DISTWIDTH] IN BLOOD BY AUTOMATED COUNT: 13.3 % (ref 11.5–14.5)
GLUCOSE BLD MANUAL STRIP-MCNC: 112 MG/DL (ref 74–99)
GLUCOSE BLD MANUAL STRIP-MCNC: 178 MG/DL (ref 74–99)
GLUCOSE BLD MANUAL STRIP-MCNC: 249 MG/DL (ref 74–99)
GLUCOSE BLDV-MCNC: 208 MG/DL (ref 74–99)
GLUCOSE SERPL-MCNC: 164 MG/DL (ref 74–99)
HCO3 BLDV-SCNC: 24.2 MMOL/L (ref 22–26)
HCT VFR BLD AUTO: 27.7 % (ref 41–52)
HCT VFR BLD AUTO: 28 % (ref 41–52)
HCT VFR BLD EST: 26 % (ref 41–52)
HGB BLD-MCNC: 8.8 G/DL (ref 13.5–17.5)
HGB BLD-MCNC: 8.9 G/DL (ref 13.5–17.5)
HGB BLDV-MCNC: 8.5 G/DL (ref 13.5–17.5)
INHALED O2 CONCENTRATION: 21 %
LACTATE BLDV-SCNC: 1.2 MMOL/L (ref 0.4–2)
MCH RBC QN AUTO: 32.8 PG (ref 26–34)
MCH RBC QN AUTO: 33.3 PG (ref 26–34)
MCHC RBC AUTO-ENTMCNC: 31.8 G/DL (ref 32–36)
MCHC RBC AUTO-ENTMCNC: 31.8 G/DL (ref 32–36)
MCV RBC AUTO: 103 FL (ref 80–100)
MCV RBC AUTO: 105 FL (ref 80–100)
NRBC BLD-RTO: 0 /100 WBCS (ref 0–0)
NRBC BLD-RTO: 0 /100 WBCS (ref 0–0)
OXYHGB MFR BLDV: 64.9 % (ref 45–75)
P AXIS: 54 DEGREES
P AXIS: 60 DEGREES
P AXIS: 66 DEGREES
P OFFSET: 174 MS
P OFFSET: 174 MS
P OFFSET: 175 MS
P ONSET: 102 MS
P ONSET: 107 MS
P ONSET: 114 MS
PCO2 BLDV: 40 MM HG (ref 41–51)
PH BLDV: 7.39 PH (ref 7.33–7.43)
PHOSPHATE SERPL-MCNC: 6.9 MG/DL (ref 2.5–4.9)
PLATELET # BLD AUTO: 179 X10*3/UL (ref 150–450)
PLATELET # BLD AUTO: 187 X10*3/UL (ref 150–450)
PO2 BLDV: 41 MM HG (ref 35–45)
POTASSIUM BLDV-SCNC: 5.8 MMOL/L (ref 3.5–5.3)
POTASSIUM SERPL-SCNC: 5.9 MMOL/L (ref 3.5–5.3)
PR INTERVAL: 196 MS
PR INTERVAL: 206 MS
PR INTERVAL: 212 MS
Q ONSET: 208 MS
Q ONSET: 210 MS
Q ONSET: 212 MS
QRS COUNT: 14 BEATS
QRS COUNT: 15 BEATS
QRS COUNT: 15 BEATS
QRS DURATION: 122 MS
QRS DURATION: 130 MS
QRS DURATION: 136 MS
QT INTERVAL: 396 MS
QT INTERVAL: 404 MS
QT INTERVAL: 426 MS
QTC CALCULATION(BAZETT): 491 MS
QTC CALCULATION(BAZETT): 497 MS
QTC CALCULATION(BAZETT): 509 MS
QTC FREDERICIA: 460 MS
QTC FREDERICIA: 461 MS
QTC FREDERICIA: 480 MS
R AXIS: -20 DEGREES
R AXIS: -22 DEGREES
R AXIS: 48 DEGREES
RBC # BLD AUTO: 2.64 X10*6/UL (ref 4.5–5.9)
RBC # BLD AUTO: 2.71 X10*6/UL (ref 4.5–5.9)
SAO2 % BLDV: 66 % (ref 45–75)
SODIUM BLDV-SCNC: 137 MMOL/L (ref 136–145)
SODIUM SERPL-SCNC: 138 MMOL/L (ref 136–145)
T AXIS: 121 DEGREES
T AXIS: 126 DEGREES
T AXIS: 129 DEGREES
T OFFSET: 410 MS
T OFFSET: 412 MS
T OFFSET: 421 MS
UFH PPP CHRO-ACNC: 0.1 IU/ML
UFH PPP CHRO-ACNC: 0.3 IU/ML
UFH PPP CHRO-ACNC: <0.1 IU/ML
VENTRICULAR RATE: 86 BPM
VENTRICULAR RATE: 89 BPM
VENTRICULAR RATE: 95 BPM
WBC # BLD AUTO: 7.1 X10*3/UL (ref 4.4–11.3)
WBC # BLD AUTO: 8.3 X10*3/UL (ref 4.4–11.3)

## 2024-03-26 PROCEDURE — 94660 CPAP INITIATION&MGMT: CPT

## 2024-03-26 PROCEDURE — 87040 BLOOD CULTURE FOR BACTERIA: CPT | Mod: AHULAB | Performed by: INTERNAL MEDICINE

## 2024-03-26 PROCEDURE — 2500000002 HC RX 250 W HCPCS SELF ADMINISTERED DRUGS (ALT 637 FOR MEDICARE OP, ALT 636 FOR OP/ED): Performed by: INTERNAL MEDICINE

## 2024-03-26 PROCEDURE — 84484 ASSAY OF TROPONIN QUANT: CPT | Performed by: EMERGENCY MEDICINE

## 2024-03-26 PROCEDURE — 9420000001 HC RT PATIENT EDUCATION 5 MIN

## 2024-03-26 PROCEDURE — 84132 ASSAY OF SERUM POTASSIUM: CPT | Performed by: EMERGENCY MEDICINE

## 2024-03-26 PROCEDURE — 5A09357 ASSISTANCE WITH RESPIRATORY VENTILATION, LESS THAN 24 CONSECUTIVE HOURS, CONTINUOUS POSITIVE AIRWAY PRESSURE: ICD-10-PCS | Performed by: INTERNAL MEDICINE

## 2024-03-26 PROCEDURE — 94799 UNLISTED PULMONARY SVC/PX: CPT

## 2024-03-26 PROCEDURE — 85027 COMPLETE CBC AUTOMATED: CPT | Performed by: EMERGENCY MEDICINE

## 2024-03-26 PROCEDURE — 84132 ASSAY OF SERUM POTASSIUM: CPT | Performed by: INTERNAL MEDICINE

## 2024-03-26 PROCEDURE — 5A1D70Z PERFORMANCE OF URINARY FILTRATION, INTERMITTENT, LESS THAN 6 HOURS PER DAY: ICD-10-PCS | Performed by: INTERNAL MEDICINE

## 2024-03-26 PROCEDURE — 2500000004 HC RX 250 GENERAL PHARMACY W/ HCPCS (ALT 636 FOR OP/ED): Performed by: EMERGENCY MEDICINE

## 2024-03-26 PROCEDURE — 94640 AIRWAY INHALATION TREATMENT: CPT | Mod: MUE

## 2024-03-26 PROCEDURE — 94667 MNPJ CHEST WALL 1ST: CPT

## 2024-03-26 PROCEDURE — 8010000001 HC DIALYSIS - HEMODIALYSIS PER DAY

## 2024-03-26 PROCEDURE — 36415 COLL VENOUS BLD VENIPUNCTURE: CPT | Performed by: EMERGENCY MEDICINE

## 2024-03-26 PROCEDURE — 82947 ASSAY GLUCOSE BLOOD QUANT: CPT

## 2024-03-26 PROCEDURE — 2060000001 HC INTERMEDIATE ICU ROOM DAILY

## 2024-03-26 PROCEDURE — 2500000001 HC RX 250 WO HCPCS SELF ADMINISTERED DRUGS (ALT 637 FOR MEDICARE OP): Performed by: INTERNAL MEDICINE

## 2024-03-26 PROCEDURE — 2500000002 HC RX 250 W HCPCS SELF ADMINISTERED DRUGS (ALT 637 FOR MEDICARE OP, ALT 636 FOR OP/ED)

## 2024-03-26 PROCEDURE — 85520 HEPARIN ASSAY: CPT | Performed by: EMERGENCY MEDICINE

## 2024-03-26 PROCEDURE — 2500000004 HC RX 250 GENERAL PHARMACY W/ HCPCS (ALT 636 FOR OP/ED): Performed by: INTERNAL MEDICINE

## 2024-03-26 PROCEDURE — 2500000002 HC RX 250 W HCPCS SELF ADMINISTERED DRUGS (ALT 637 FOR MEDICARE OP, ALT 636 FOR OP/ED): Mod: MUE | Performed by: INTERNAL MEDICINE

## 2024-03-26 PROCEDURE — 85027 COMPLETE CBC AUTOMATED: CPT | Performed by: INTERNAL MEDICINE

## 2024-03-26 PROCEDURE — 84100 ASSAY OF PHOSPHORUS: CPT | Performed by: INTERNAL MEDICINE

## 2024-03-26 PROCEDURE — 93005 ELECTROCARDIOGRAM TRACING: CPT

## 2024-03-26 PROCEDURE — 94660 CPAP INITIATION&MGMT: CPT | Mod: MUE

## 2024-03-26 PROCEDURE — 2500000005 HC RX 250 GENERAL PHARMACY W/O HCPCS: Performed by: INTERNAL MEDICINE

## 2024-03-26 PROCEDURE — 99222 1ST HOSP IP/OBS MODERATE 55: CPT | Performed by: INTERNAL MEDICINE

## 2024-03-26 RX ORDER — ACETAMINOPHEN 325 MG/1
650 TABLET ORAL EVERY 4 HOURS PRN
Status: DISCONTINUED | OUTPATIENT
Start: 2024-03-26 | End: 2024-03-28 | Stop reason: HOSPADM

## 2024-03-26 RX ORDER — TALC
3 POWDER (GRAM) TOPICAL NIGHTLY PRN
Status: DISCONTINUED | OUTPATIENT
Start: 2024-03-26 | End: 2024-03-28 | Stop reason: HOSPADM

## 2024-03-26 RX ORDER — CARVEDILOL 25 MG/1
25 TABLET ORAL
Status: DISCONTINUED | OUTPATIENT
Start: 2024-03-26 | End: 2024-03-28 | Stop reason: HOSPADM

## 2024-03-26 RX ORDER — HEPARIN SODIUM 5000 [USP'U]/ML
5000 INJECTION, SOLUTION INTRAVENOUS; SUBCUTANEOUS EVERY 8 HOURS SCHEDULED
Status: DISCONTINUED | OUTPATIENT
Start: 2024-03-26 | End: 2024-03-28 | Stop reason: HOSPADM

## 2024-03-26 RX ORDER — DEXTROSE 50 % IN WATER (D50W) INTRAVENOUS SYRINGE
12.5
Status: DISCONTINUED | OUTPATIENT
Start: 2024-03-26 | End: 2024-03-28 | Stop reason: HOSPADM

## 2024-03-26 RX ORDER — ACETAMINOPHEN 325 MG/1
650 TABLET ORAL EVERY 6 HOURS PRN
Status: DISCONTINUED | OUTPATIENT
Start: 2024-03-26 | End: 2024-03-26

## 2024-03-26 RX ORDER — DEXTROSE 50 % IN WATER (D50W) INTRAVENOUS SYRINGE
25
Status: DISCONTINUED | OUTPATIENT
Start: 2024-03-26 | End: 2024-03-28 | Stop reason: HOSPADM

## 2024-03-26 RX ORDER — ALBUTEROL SULFATE 0.83 MG/ML
2.5 SOLUTION RESPIRATORY (INHALATION)
Status: DISCONTINUED | OUTPATIENT
Start: 2024-03-26 | End: 2024-03-28 | Stop reason: HOSPADM

## 2024-03-26 RX ORDER — ONDANSETRON 4 MG/1
4 TABLET, FILM COATED ORAL EVERY 8 HOURS PRN
Status: DISCONTINUED | OUTPATIENT
Start: 2024-03-26 | End: 2024-03-28 | Stop reason: HOSPADM

## 2024-03-26 RX ORDER — HEPARIN SODIUM 10000 [USP'U]/100ML
0-4000 INJECTION, SOLUTION INTRAVENOUS CONTINUOUS
Status: DISCONTINUED | OUTPATIENT
Start: 2024-03-26 | End: 2024-03-26

## 2024-03-26 RX ORDER — FAMOTIDINE 20 MG/1
20 TABLET, FILM COATED ORAL DAILY
Status: DISCONTINUED | OUTPATIENT
Start: 2024-03-26 | End: 2024-03-28 | Stop reason: HOSPADM

## 2024-03-26 RX ORDER — ACETAMINOPHEN 160 MG/5ML
650 SOLUTION ORAL EVERY 4 HOURS PRN
Status: DISCONTINUED | OUTPATIENT
Start: 2024-03-26 | End: 2024-03-28 | Stop reason: HOSPADM

## 2024-03-26 RX ORDER — ATORVASTATIN CALCIUM 80 MG/1
80 TABLET, FILM COATED ORAL DAILY
Status: DISCONTINUED | OUTPATIENT
Start: 2024-03-26 | End: 2024-03-28 | Stop reason: HOSPADM

## 2024-03-26 RX ORDER — ACETAMINOPHEN 650 MG/1
650 SUPPOSITORY RECTAL EVERY 4 HOURS PRN
Status: DISCONTINUED | OUTPATIENT
Start: 2024-03-26 | End: 2024-03-28 | Stop reason: HOSPADM

## 2024-03-26 RX ORDER — ALBUTEROL SULFATE 90 UG/1
1-2 AEROSOL, METERED RESPIRATORY (INHALATION) EVERY 6 HOURS PRN
Status: DISCONTINUED | OUTPATIENT
Start: 2024-03-26 | End: 2024-03-26

## 2024-03-26 RX ORDER — HEPARIN SODIUM 5000 [USP'U]/ML
2000-4000 INJECTION, SOLUTION INTRAVENOUS; SUBCUTANEOUS EVERY 4 HOURS PRN
Status: DISCONTINUED | OUTPATIENT
Start: 2024-03-26 | End: 2024-03-26

## 2024-03-26 RX ORDER — HEPARIN SODIUM 5000 [USP'U]/ML
4000 INJECTION, SOLUTION INTRAVENOUS; SUBCUTANEOUS ONCE
Status: COMPLETED | OUTPATIENT
Start: 2024-03-26 | End: 2024-03-26

## 2024-03-26 RX ORDER — ONDANSETRON HYDROCHLORIDE 2 MG/ML
4 INJECTION, SOLUTION INTRAVENOUS EVERY 8 HOURS PRN
Status: DISCONTINUED | OUTPATIENT
Start: 2024-03-26 | End: 2024-03-28 | Stop reason: HOSPADM

## 2024-03-26 RX ORDER — TALC
3 POWDER (GRAM) TOPICAL DAILY
Status: DISCONTINUED | OUTPATIENT
Start: 2024-03-26 | End: 2024-03-28 | Stop reason: HOSPADM

## 2024-03-26 RX ORDER — ALBUTEROL SULFATE 0.83 MG/ML
2.5 SOLUTION RESPIRATORY (INHALATION) EVERY 6 HOURS PRN
Status: DISCONTINUED | OUTPATIENT
Start: 2024-03-26 | End: 2024-03-28 | Stop reason: HOSPADM

## 2024-03-26 RX ORDER — SEVELAMER CARBONATE 800 MG/1
800 TABLET, FILM COATED ORAL
Status: DISCONTINUED | OUTPATIENT
Start: 2024-03-26 | End: 2024-03-28 | Stop reason: HOSPADM

## 2024-03-26 RX ORDER — INSULIN GLARGINE 100 [IU]/ML
15 INJECTION, SOLUTION SUBCUTANEOUS NIGHTLY
Status: DISCONTINUED | OUTPATIENT
Start: 2024-03-26 | End: 2024-03-28 | Stop reason: HOSPADM

## 2024-03-26 RX ORDER — INSULIN LISPRO 100 [IU]/ML
0-5 INJECTION, SOLUTION INTRAVENOUS; SUBCUTANEOUS
Status: DISCONTINUED | OUTPATIENT
Start: 2024-03-26 | End: 2024-03-28 | Stop reason: HOSPADM

## 2024-03-26 RX ORDER — ASPIRIN 81 MG/1
81 TABLET ORAL DAILY
Status: DISCONTINUED | OUTPATIENT
Start: 2024-03-26 | End: 2024-03-28 | Stop reason: HOSPADM

## 2024-03-26 RX ADMIN — Medication: at 20:00

## 2024-03-26 RX ADMIN — INSULIN LISPRO 1 UNITS: 100 INJECTION, SOLUTION INTRAVENOUS; SUBCUTANEOUS at 12:09

## 2024-03-26 RX ADMIN — ASPIRIN 81 MG: 81 TABLET, COATED ORAL at 09:05

## 2024-03-26 RX ADMIN — HEPARIN SODIUM 900 UNITS/HR: 10000 INJECTION, SOLUTION INTRAVENOUS at 01:57

## 2024-03-26 RX ADMIN — HEPARIN SODIUM 4000 UNITS: 5000 INJECTION INTRAVENOUS; SUBCUTANEOUS at 01:57

## 2024-03-26 RX ADMIN — SITAGLIPTIN 25 MG: 25 TABLET, FILM COATED ORAL at 09:09

## 2024-03-26 RX ADMIN — INSULIN GLARGINE 15 UNITS: 100 INJECTION, SOLUTION SUBCUTANEOUS at 21:47

## 2024-03-26 RX ADMIN — HEPARIN SODIUM 5000 UNITS: 5000 INJECTION INTRAVENOUS; SUBCUTANEOUS at 21:03

## 2024-03-26 RX ADMIN — ALBUTEROL SULFATE 2.5 MG: 2.5 SOLUTION RESPIRATORY (INHALATION) at 07:58

## 2024-03-26 RX ADMIN — SEVELAMER CARBONATE 800 MG: 800 TABLET, FILM COATED ORAL at 09:04

## 2024-03-26 RX ADMIN — ALBUTEROL SULFATE 2.5 MG: 2.5 SOLUTION RESPIRATORY (INHALATION) at 13:44

## 2024-03-26 RX ADMIN — CEFTRIAXONE 2 G: 2 INJECTION, POWDER, FOR SOLUTION INTRAMUSCULAR; INTRAVENOUS at 21:04

## 2024-03-26 RX ADMIN — ATORVASTATIN CALCIUM 80 MG: 80 TABLET, FILM COATED ORAL at 09:05

## 2024-03-26 RX ADMIN — AZITHROMYCIN MONOHYDRATE 500 MG: 500 INJECTION, POWDER, LYOPHILIZED, FOR SOLUTION INTRAVENOUS at 21:47

## 2024-03-26 RX ADMIN — SACUBITRIL AND VALSARTAN 1 TABLET: 24; 26 TABLET, FILM COATED ORAL at 09:04

## 2024-03-26 RX ADMIN — CARVEDILOL 25 MG: 25 TABLET, FILM COATED ORAL at 16:47

## 2024-03-26 RX ADMIN — CARVEDILOL 25 MG: 25 TABLET, FILM COATED ORAL at 09:04

## 2024-03-26 RX ADMIN — FAMOTIDINE 20 MG: 20 TABLET, FILM COATED ORAL at 09:05

## 2024-03-26 RX ADMIN — ALBUTEROL SULFATE 2.5 MG: 2.5 SOLUTION RESPIRATORY (INHALATION) at 19:18

## 2024-03-26 RX ADMIN — SACUBITRIL AND VALSARTAN 1 TABLET: 24; 26 TABLET, FILM COATED ORAL at 21:03

## 2024-03-26 RX ADMIN — ACETAMINOPHEN 650 MG: 325 TABLET ORAL at 16:46

## 2024-03-26 SDOH — SOCIAL STABILITY: SOCIAL INSECURITY: HAS ANYONE EVER THREATENED TO HURT YOUR FAMILY OR YOUR PETS?: NO

## 2024-03-26 SDOH — SOCIAL STABILITY: SOCIAL INSECURITY: DOES ANYONE TRY TO KEEP YOU FROM HAVING/CONTACTING OTHER FRIENDS OR DOING THINGS OUTSIDE YOUR HOME?: NO

## 2024-03-26 SDOH — SOCIAL STABILITY: SOCIAL INSECURITY: DO YOU FEEL ANYONE HAS EXPLOITED OR TAKEN ADVANTAGE OF YOU FINANCIALLY OR OF YOUR PERSONAL PROPERTY?: NO

## 2024-03-26 SDOH — SOCIAL STABILITY: SOCIAL INSECURITY: ARE YOU OR HAVE YOU BEEN THREATENED OR ABUSED PHYSICALLY, EMOTIONALLY, OR SEXUALLY BY ANYONE?: NO

## 2024-03-26 SDOH — SOCIAL STABILITY: SOCIAL INSECURITY: ABUSE: ADULT

## 2024-03-26 SDOH — SOCIAL STABILITY: SOCIAL INSECURITY: ARE THERE ANY APPARENT SIGNS OF INJURIES/BEHAVIORS THAT COULD BE RELATED TO ABUSE/NEGLECT?: NO

## 2024-03-26 SDOH — SOCIAL STABILITY: SOCIAL INSECURITY: HAVE YOU HAD THOUGHTS OF HARMING ANYONE ELSE?: NO

## 2024-03-26 SDOH — SOCIAL STABILITY: SOCIAL INSECURITY: DO YOU FEEL UNSAFE GOING BACK TO THE PLACE WHERE YOU ARE LIVING?: NO

## 2024-03-26 ASSESSMENT — ENCOUNTER SYMPTOMS
AGITATION: 0
ACTIVITY CHANGE: 1
BACK PAIN: 0
ABDOMINAL DISTENTION: 0
DIFFICULTY URINATING: 1
ADENOPATHY: 0
HEADACHES: 0
CHILLS: 1
FEVER: 1
CHEST TIGHTNESS: 1
SHORTNESS OF BREATH: 1
ABDOMINAL PAIN: 0
ARTHRALGIAS: 0
EYE ITCHING: 0
EYE DISCHARGE: 0
FACIAL ASYMMETRY: 0
DYSURIA: 0
WHEEZING: 1

## 2024-03-26 ASSESSMENT — COGNITIVE AND FUNCTIONAL STATUS - GENERAL
MOBILITY SCORE: 24
DAILY ACTIVITIY SCORE: 24
PATIENT BASELINE BEDBOUND: NO
DAILY ACTIVITIY SCORE: 24
MOBILITY SCORE: 24

## 2024-03-26 ASSESSMENT — LIFESTYLE VARIABLES
HOW MANY STANDARD DRINKS CONTAINING ALCOHOL DO YOU HAVE ON A TYPICAL DAY: 1 OR 2
PRESCIPTION_ABUSE_PAST_12_MONTHS: NO
SKIP TO QUESTIONS 9-10: 1
AUDIT-C TOTAL SCORE: 1
AUDIT-C TOTAL SCORE: 1
SUBSTANCE_ABUSE_PAST_12_MONTHS: NO
HOW OFTEN DO YOU HAVE A DRINK CONTAINING ALCOHOL: MONTHLY OR LESS
HOW OFTEN DO YOU HAVE 6 OR MORE DRINKS ON ONE OCCASION: NEVER

## 2024-03-26 ASSESSMENT — ACTIVITIES OF DAILY LIVING (ADL)
JUDGMENT_ADEQUATE_SAFELY_COMPLETE_DAILY_ACTIVITIES: YES
LACK_OF_TRANSPORTATION: NO
ASSISTIVE_DEVICE: DENTURES UPPER;DENTURES LOWER
GROOMING: INDEPENDENT
DRESSING YOURSELF: INDEPENDENT
FEEDING YOURSELF: INDEPENDENT
BATHING: INDEPENDENT
ADEQUATE_TO_COMPLETE_ADL: YES
WALKS IN HOME: INDEPENDENT
HEARING - RIGHT EAR: FUNCTIONAL
LACK_OF_TRANSPORTATION: NO
HEARING - LEFT EAR: FUNCTIONAL
TOILETING: INDEPENDENT
LACK_OF_TRANSPORTATION: NO
PATIENT'S MEMORY ADEQUATE TO SAFELY COMPLETE DAILY ACTIVITIES?: YES

## 2024-03-26 ASSESSMENT — PAIN - FUNCTIONAL ASSESSMENT
PAIN_FUNCTIONAL_ASSESSMENT: NO/DENIES PAIN
PAIN_FUNCTIONAL_ASSESSMENT: 0-10
PAIN_FUNCTIONAL_ASSESSMENT: NO/DENIES PAIN
PAIN_FUNCTIONAL_ASSESSMENT: 0-10

## 2024-03-26 ASSESSMENT — PAIN SCALES - GENERAL
PAINLEVEL_OUTOF10: 3
PAINLEVEL_OUTOF10: 0 - NO PAIN

## 2024-03-26 ASSESSMENT — PATIENT HEALTH QUESTIONNAIRE - PHQ9
2. FEELING DOWN, DEPRESSED OR HOPELESS: NOT AT ALL
SUM OF ALL RESPONSES TO PHQ9 QUESTIONS 1 & 2: 0
1. LITTLE INTEREST OR PLEASURE IN DOING THINGS: NOT AT ALL

## 2024-03-26 ASSESSMENT — PAIN DESCRIPTION - DESCRIPTORS: DESCRIPTORS: ACHING

## 2024-03-26 NOTE — PROGRESS NOTES
Report from Sending RN:    Report From: Nicole  Recent Surgery of Procedure: No  Baseline Level of Consciousness (LOC): AO with confusion per baseline at this time  Oxygen Use: Yes  Type: Bi-PAP/C-PAP  Diabetic: Yes  Last BP Med Given Day of Dialysis: See EMAR  Last Pain Med Given: See EMAR  Lab Tests to be Obtained with Dialysis: No  Blood Transfusion to be Given During Dialysis: No  Available IV Access: Yes  Medications to be Administered During Dialysis: No  Continuous IV Infusion Running: Yes  Restraints on Currently or in the Last 24 Hours: No  Hand-Off Communication: Pt stable, having dialysis done at bedside

## 2024-03-26 NOTE — PROGRESS NOTES
Report to Receiving RN:    Report To: Nicole  Time Report Called: 1500  Hand-Off Communication: pt stable tolerated tx well, took off 2 L of fluid, post /71 HR 91  Complications During Treatment: No  Ultrafiltration Treatment: No  Medications Administered During Dialysis: No  Blood Products Administered During Dialysis: No  Labs Sent During Dialysis: No  Heparin Drip Rate Changes: No    Electronic Signatures:  Vianey Nguyen RN (Signed )   Authored:    (Signed )   Authored:     Last Updated: 3:06 PM by VIANEY NGUYEN

## 2024-03-26 NOTE — CARE PLAN
Problem: Pain  Goal: My pain/discomfort is manageable  Outcome: Progressing     Problem: Safety  Goal: Patient will be injury free during hospitalization  Outcome: Progressing  Goal: I will remain free of falls  Outcome: Progressing     Problem: Daily Care  Goal: Daily care needs are met  Outcome: Progressing     Problem: Psychosocial Needs  Goal: Demonstrates ability to cope with hospitalization/illness  Outcome: Progressing  Goal: Collaborate with me, my family, and caregiver to identify my specific goals  Outcome: Progressing  Flowsheets (Taken 3/26/2024 8904)  Cultural Requests During Hospitalization: none  Spiritual Requests During Hospitalization: none   The patient's goals for the shift include      The clinical goals for the shift include remain HDS and free from harm

## 2024-03-26 NOTE — CONSULTS
"Inpatient consult to cardiology  Consult performed by: Fabienne Kendall, APRN-CNP  Consult ordered by: Raegan Sibley MD  Reason for consult: Elevated trop      HF: Casi 2/8/2024  History Of Present Illness:    Mr. Carreon is a 53M with a PMHx sig for CAD s/p PCI (LAD; 6/2017), stage C systolic HF/ICM/HFmrEF, aortic stenosis, HTN, tachycardia s/p ablation, DM and discoid lupus/SLE with ESRD on home HD (M-T-TH-F via RUE AVF since 12/2016) presents to [facility] ED with shortness of breath over the last day.  Cardiology was consulted for \"positive troponin\"    According to the patient he started becoming shortness of breath on Monday along with \"chest tightness.\"  Patient claims that shortness of breath was not getting any better so he decided to come to the emergency room.  He relates the chest tightness mostly with his increased shortness of breath. Afebrile, heart rate 94, pressure 149/72, 94% currently on BiPAP, notable labs on admission BUNs/CR 89/13.03, potassium was 5.9, high sensitive troponin 448/608(previous troponins 1.77, 1.82, and 0.93),H&H 8.9/28.0, BNP> 4700 (BNP trend 2657, 2168, 2324, 1698, 1058), chest x-ray showed there are alveolar infiltrates in the right upper lung laterally and in the left mid and lower lung.  The appearance is more consistent with pneumonia than edema but correlation with clinical findings is recommended.  There may also be a tiny right pleural effusion. Chest CT . Chest CT demonstrates groundglass infiltrates throughout both lungs, though these have improved since the previous examination. There are multiple nodular densities in both upper lobes which I  suspect are inflammatory. Small bilateral pleural effusions.  No acute pathology in the abdomen or pelvis. Patient was started on his home medications as well and has heparin drip.  EKG shows sinus rhythm with LVH with no acute signs of ischemia.      Home cardiac medications include aspirin 81 daily, atorvastatin 80 " Diagnosis: Sciatica of left side (M54.32)     Next MD visit: none scheduled  Fall Risk: standard         Precautions: n/a          Medication Changes since last visit?: No    Subjective: Patient reports no leg or back pain recently. She reports only some back stiffness.      Objective:       Date: 9/13/2022  Visit #: 9/12 [facility]   (exp. 11/2022) Date: 9/6/2022  Visit #: 8/12 [facility]   (exp. 11/2022) Date: 9/6/2022  Visit #: 7/12 [facility]   (exp. 11/2022)   HEP        Therapeutic Exercise   - seated c-extension with towel 15x  - shuttle machine B knee ext/flx 6 bands 3 x 10  - shuttle machine R/L knee ext/flx 4 bands 2 x 10 ea  - standing lumbar extension over mat table 2 x 10   - standing B gastroc stretch on slant board level 4-5 2 x 30 sec holds  - standing R/L forward step up to 6 inch step with opposite LE march 2 x 10 ea  - standing squats 2 x 10  - sidelying R/L clams with Blue Tband above knees 2 x 10 ea  - supine R/L SL bridge 1 x 5 ea - PPU 1 x 5 reps   - prone R/L hip extension 3 x 10 ea  - supine bridges 3 x 10  - supine B OH reach with 7# DB with legs in table top position 1 x 10  - shuttle machine B knee ext/flx 6 bands 2 x 10  - shuttle machine R/L knee ext/flx 4 bands 1 x 10 ea  - standing lumbar extension over mat table 2 x 10  - standing B gastroc stretch on slant board level 5 2 x 30 sec holds  - standing R/L hip abduction with GTB at shins 3 x 10 ea  - standing R/L hip extension with GTB at shins 3 x 10 ea - YENNY 3 minutes  - sidelying R/L hip abduction 1.5# 2 x 10 ea  - supine bridges 3 x 10  - supine B OH reach with 7# DB with legs in table top position 1 x 10  - supine c-retraction 10x  - dead bug 2 x 10  - shuttle machine B knee ext/flx 6 bands 2 x 10  - shuttle machine R/L knee ext/flx 4 bands 1 x 10 ea  - standing lumbar extension 1 x 10  - standing B gastroc stretch on slant board level 5 2 x 30 sec holds  - standing lumbar extension SAG at wall 3 x 10  - standing R/L hip abduction mg daily, Coreg 25 mg twice daily, Entresto 24/26 twice daily.        Admitted March 16-20, 2023 for FVO  Admitted July 21-22, 2023 for cellulitis of R great toe  Admitted August 4-11, 2023 for osteomyelitis   ED visit 2/6/2024 for hyperkalemia       Past Cardiology Tests (Last 3 Years):  Echo (3/6/23):  1. Left ventricular systolic function is mildly decreased with a 40-45% estimated ejection fraction.  2. Abnormal septal motion consistent with post-operative status.  3. Spectral Doppler shows a pseudonormal pattern of left ventricular diastolic filling.  4. The left atrium is severely dilated.  5. The right atrium is severely dilated.  6. Mild to moderate mitral valve regurgitation.  7. Mild to moderate tricuspid regurgitation visualized.  8. Moderate aortic valve stenosis.  9. Moderately elevated pulmonary artery pressure, estimated RVSP 52mmHg.     Cardiac cath (3/10/22):  1. Significant left main coronary artery disease and double vessel coronary artery disease with proximal left anterior descending involvement.  2. Left Ventricular end-diastolic pressure = 28.     Echo (3/8/22):  1. The left ventricular systolic function is moderately decreased with a 35-40% estimated ejection fraction.  2. There is moderate to severe eccentric left ventricular hypertrophy.  3. Mild to moderate aortic valve stenosis.  4. There is moderate aortic valve cusp calcification.  5. The left ventricular cavity size is moderate to severely dilated.  6. There is global hypokinesis of the left ventricle with minor regional variations.     Nuclear stress (2/8/21):  1. Normal myocardial perfusion study without evidence of ischemia or prior infarction.  2. The left ventricle is severely dilated in size.  3. Globally reduced LV wall motion with an LV EF estimated at 40-46%.  4. Since 1/3/2019, ejection fraction appears improved and the LV is less dilated (205-206 ml on today's exam, vs 261 ml on 1/3/2019). There still remains no definitive  with GTB at shins 2 x 10 ea  - standing R/L hip extension with GTB at shins 2 x 10 ea   Manual Therapy        Therapeutic Activity        Modalities                  Assessment: Initiated forward step ups and single leg bridging to further progress core and hip strength.     Plan: continue PT    Charges: Ex 3   Total Timed Treatment: 43 min  Total Treatment Time: 43 min signs of ischemia or prior infarct.     Echo (2/4/21):  1. The left ventricular systolic function is normal with a 55-60% estimated ejection fraction.  2. Spectral Doppler shows an impaired relaxation pattern of left ventricular diastolic filling.  3. There is moderate concentric left ventricular hypertrophy.  4. Mild aortic valve stenosis.     Echo (2/3/20):  1. The left ventricular systolic function is low normal with a 50-55% estimated ejection fraction.  2. Poorly visualized anatomical structures due to suboptimal image quality.  3. Limited and incomplete echo by cardiology fellow.  4. Recommend complete study by echo lab.  5. LVH appears slightly worse on this exam, however maybe secondary to off axis window.     Nuclear stress (1/3/19):  1. Fixed decrease in counts involving the apex, thought to relate to myocardial thinning in the setting of left ventricular dilatation. The remainder of the left ventricle demonstrates normal perfusion without evidence of stress-induced ischemia or prior infarction.  2. Decreased left ventricular function with an ejection fraction of 34%.     ECG (12/13/18):  Sinus rhythm (HR 86), LVH with repol     Coronary angiography (6/20/17):  The coronary circulation is co-dominant.  LM: No CAD. The left main coronary artery showed very short, almost dual ostia.  LAD: Proximal 90%, mid 60% with small poststenotic aneurysmal segment inbetween, LADnwraps far around apex with only mild irregularities.  LCx: Codominant, mild irregularities.  RCA: The RCA showed codominant vessel but smaller caliber than the LCX. Mild-moderate diffuse disease with normal rPDA.  PCI:  1. EBU 3.75 was selective in the LCX, EBU 3.5 was better suited. LAD wired with some difficulty from the aorta, and a second wire placed into D1. The LAD was predilated with 3.0NC and stented with a 3.0x38mm RESOLUTE ALEXEY, postdil 3.0NC distally, 3.5NC proximally, and a 3.75NC was used prox-mid to appose the stent in the  "poststenotic segment.     ECG (5/18/17):  Sinus rhythm (HR 77), LVH, possible septal infarct     Echo (11/1/16 at [facility])  Normal LV function (LVEF 65%), normal RV size/function. No hemodynamically significant valvular disease.     Past Medical History:  He has a past medical history of Personal history of other diseases of the circulatory system (02/01/2018), Systemic lupus erythematosus, unspecified (CMS/[facility]) (02/01/2018), and Type 1 diabetes mellitus with other diabetic kidney complication (CMS/[facility]) (02/01/2018).    Past Surgical History:  He has a past surgical history that includes Other surgical history (05/18/2017); Other surgical history (02/01/2018); Other surgical history (06/10/2022); Other surgical history (06/10/2022); Other surgical history (06/10/2022); Other surgical history (06/10/2022); and Coronary angioplasty with stent (07/13/2017).      Social History:  He reports that he has been smoking cigarettes. He has been exposed to tobacco smoke. He has never used smokeless tobacco. He reports current alcohol use. He reports that he does not use drugs.    Family History:  Family History   Problem Relation Name Age of Onset   • Heart failure Mother     • Heart attack Father          Allergies:  Iodinated contrast media, Ampicillin-sulbactam, and Strawberry    ROS:  10 point review of systems including (Constitutional, Eyes, ENMT, Respiratory, Cardiac, Gastrointestinal, Neurological, Psychiatric, and Hematologic) was performed and is otherwise negative.    Objective Data:  Last Recorded Vitals:  Vitals:    03/26/24 0429 03/26/24 0737 03/26/24 0752 03/26/24 0758   BP: (!) 184/89  177/84    BP Location:   Left arm    Patient Position:   Sitting    Pulse: 92  89    Resp:       Temp: 37.1 °C (98.8 °F)  36.8 °C (98.2 °F)    TempSrc: Temporal  Oral    SpO2: 92% 94% 90% 95%   Weight: 78.3 kg (172 lb 9.6 oz)      Height: 1.93 m (6' 4\")        Medical Gas Therapy: Supplemental oxygen  O2 Delivery Method: Nasal " "cannula  Weight  Av.6 kg (168 lb 12.8 oz)  Min: 74.8 kg (165 lb)  Max: 78.3 kg (172 lb 9.6 oz)    LABS:  CMP:  Results from last 7 days   Lab Units 24   SODIUM mmol/L 139   POTASSIUM mmol/L 6.0*   CHLORIDE mmol/L 96*   CO2 mmol/L 24   ANION GAP mmol/L 25*   BUN mg/dL 83*   CREATININE mg/dL 12.14*   EGFR mL/min/1.73m*2 5*   MAGNESIUM mg/dL 2.20   ALBUMIN g/dL 4.5   ALT U/L 18   AST U/L 20   BILIRUBIN TOTAL mg/dL 0.4     CBC:  Results from last 7 days   Lab Units 24  0758 24  0114 24   WBC AUTO x10*3/uL 8.3 7.1 9.1   HEMOGLOBIN g/dL 8.9* 8.8* 8.9*   HEMATOCRIT % 28.0* 27.7* 27.2*   PLATELETS AUTO x10*3/uL 187 179 194   MCV fL 103* 105* 105*     COAG:   Results from last 7 days   Lab Units 24  210   INR  1.1     ABO: No results found for: \"ABO\"  HEME/ENDO:     CARDIAC:   Results from last 7 days   Lab Units 24  0314 24  2252 24   TROPHS ng/L 608* 448* 391*   BNP pg/mL  --   --  >4,700*             Last I/O:  No intake or output data in the 24 hours ending 24  Net IO Since Admission: No IO data has been entered for this period [24 08]      Imaging Results:  CT chest abdomen pelvis wo IV contrast    Result Date: 3/25/2024  STUDY: CT Chest, Abdomen, and Pelvis without IV Contrast; 3/25/2024 at 10:06 PM INDICATION: Chest pain. COMPARISON: CTA chest 3/16/2023. ACCESSION NUMBER(S): [id number] ORDERING CLINICIAN: ESTRELLITA PUENTE TECHNIQUE: CT of the chest, abdomen, and pelvis was performed.  Contiguous axial images were obtained at 3 mm slice thickness through the chest, abdomen, and pelvis.  Coronal and sagittal reconstructions at 3 mm slice thickness were performed.  No intravenous contrast was administered.  FINDINGS: Please note that the evaluation of vessels, lymph nodes and organs is limited without intravenous contrast. CHEST: MEDIASTINUM: The heart is normal in size without pericardial effusion.  Coronary artery " calcifications are identified.  LUNGS/PLEURA: Again seen are groundglass infiltrates throughout both lungs, though these have improved since the previous examination. There are multiple nodular densities in both upper lobes which I suspect are inflammatory. There are small bilateral pleural effusions. LYMPH NODES: Thoracic lymph nodes are not enlarged. ABDOMEN:  LIVER: No hepatomegaly.  Smooth surface contour.  Normal attenuation.  BILE DUCTS: No intrahepatic or extrahepatic biliary ductal dilatation.  GALLBLADDER: The gallbladder is unremarkable. STOMACH: No abnormalities identified.  PANCREAS: No masses or ductal dilatation.  SPLEEN: No splenomegaly or focal splenic lesion.  ADRENAL GLANDS: No thickening or nodules.  KIDNEYS AND URETERS: The kidneys are atrophic. No renal or ureteral calculi.  PELVIS:  BLADDER: No abnormalities identified.  REPRODUCTIVE ORGANS: No abnormalities identified.  BOWEL: No abnormalities identified.  VESSELS: No abnormalities identified.  Abdominal aorta is normal in caliber.  PERITONEUM/RETROPERITONEUM/LYMPH NODES: No free fluid.  No pneumoperitoneum. No lymphadenopathy.  ABDOMINAL WALL: No abnormalities identified. SOFT TISSUES: No abnormalities identified.  BONES: No acute fracture or aggressive osseous lesion.    1. Chest CT demonstrates groundglass infiltrates throughout both lungs, though these have improved since the previous examination. There are multiple nodular densities in both upper lobes which I suspect are inflammatory. 2. Small bilateral pleural effusions. 3. No acute pathology in the abdomen or pelvis. Signed by Christian River MD    XR chest 1 view    Result Date: 3/25/2024  STUDY: Chest Radiograph;  3/25/2024 at 9:19 PM INDICATION: Chest pain. COMPARISON: XR chest 2/6/2024, XR chest 3/18/2023, XR chest 3/16/2023, XR chest 6/3/2022. ACCESSION NUMBER(S): [id number] ORDERING CLINICIAN: ESTRELLITA PUENTE TECHNIQUE:  Frontal chest was obtained at 2119 hours. FINDINGS:  CARDIOMEDIASTINAL SILHOUETTE: The heart remains enlarged and again seen are sternal wire sutures LUNGS: .  On today's exam there are alveolar infiltrates in the right upper lobe laterally at the left mid and lower lung.  This is a change from the prior exam of February 6.  The pattern is more consistent with pneumonia than edema.  No definite pleural effusion is seen on the left.  There is a questionable very tiny right pleural effusion. There is no evidence of pneumothorax.  Again seen is a vascular stent in the right axillary area..  ABDOMEN: No remarkable upper abdominal findings.  BONES: No acute osseous changes.    Again seen is cardiomegaly.  There are alveolar infiltrates in the right upper lung laterally and in the left mid and lower lung.  The appearance is more consistent with pneumonia than edema but correlation with clinical findings is recommended.  There may also be a tiny right pleural effusion.. Signed by Albert Huertas MD      Inpatient Medications:  Scheduled medications   Medication Dose Route Frequency   • albuterol  2.5 mg nebulization TID   • aspirin  81 mg oral Daily   • atorvastatin  80 mg oral Daily   • carvedilol  25 mg oral BID with meals   • famotidine  20 mg oral Daily   • [Held by provider] heparin (porcine)  5,000 Units subcutaneous q8h SHANA   • melatonin  3 mg oral Daily   • oxygen   inhalation Continuous - Inhalation   • sacubitriL-valsartan  1 tablet oral BID   • sevelamer carbonate  800 mg oral TID with meals   • SITagliptin phosphate  25 mg oral Daily     PRN medications   Medication   • acetaminophen    Or   • acetaminophen    Or   • acetaminophen   • acetaminophen    Or   • acetaminophen    Or   • acetaminophen   • albuterol   • heparin   • melatonin   • ondansetron    Or   • ondansetron     Continuous Medications   Medication Dose Last Rate   • heparin  0-4,000 Units/hr 900 Units/hr (03/26/24 0157)       Outpatient Medications:  Current Outpatient Medications   Medication  "Instructions   • Accu-Chek Guide test strips strip Accu-Chek Guide In Vitro Strip   Quantity: 200  Refills: 0        Start : 25-Mar-2022   Active   • Accu-Chek Softclix Lancets misc    • acetaminophen (TYLENOL) 650 mg, oral, Every 6 hours PRN   • aspirin 81 mg EC tablet 1 tablet, oral, Daily   • atorvastatin (LIPITOR) 80 mg, oral, Daily   • blood sugar diagnostic strip 1 each, miscellaneous, 3-4x daily   • blood-glucose sensor (FreeStyle José Luis 3 Sensor) device Change sensor every 14 days   • carvedilol (COREG) 25 mg, oral, 2 times daily with meals   • EPOETIN DEIRDRE INJ 8,000 Units, subcutaneous, With every dialysis tx   • famotidine (Pepcid) 20 mg tablet 1 tablet, oral, Daily   • FreeStyle José Luis reader (FreeStyle José Luis 2 [place]) misc Use to check blood sugars at least every 8 hours   • FreeStyle José Luis sensor system (FreeStyle José Luis 2 Sensor) kit Change sensor every 14 days. Check blood sugar at least every 8 hours   • insulin glargine (Lantus) 100 unit/mL (3 mL) pen Inject 10 units twice a day   • insulin lispro (HumaLOG KwikPen Insulin) 100 unit/mL injection Inject before every meal using carb ratio and correction scale. MDD 50 units.   • insulin syringe-needle U-100 31G X 5/16\" 0.5 mL syringe 1 Syringe, subcutaneous, 4 times daily, With meals and at bedtime   • melatonin 3 mg, oral, Nightly PRN   • multivitamin (Daily Multi-Vitamin) tablet 1 tablet, oral, Daily   • pen needle, diabetic (BD Ultra-Fine Agueda Pen Needle) 32 gauge x 5/32\" needle Use to inject insulin 5 times per day   • sacubitriL-valsartan (Entresto) 24-26 mg tablet 1 tablet, oral, 2 times daily   • sevelamer carbonate (RENVELA) 800 mg, oral, 3 times daily with meals   • SITagliptin phosphate (JANUVIA) 25 mg, oral, Daily   • Ventolin HFA 90 mcg/actuation inhaler 1-2 puffs, inhalation, Every 6 hours PRN       Physical Exam:  General:  Patient is awake, alert, and oriented.  Patient is in no acute distress.  HEENT:  Pupils equal and reactive.  " "Normocephalic.  Moist mucosa.    Neck:  No thyromegaly.  Normal Jugular Venous Pressure.  Cardiovascular:  Regular rate and rhythm.  Normal S1 and S2.  Pulmonary:  Clear to auscultation bilaterally.  Abdomen:  Soft. Non-tender.   Non-distended.  Positive bowel sounds.  Lower Extremities:  2+ pedal pulses. No LE edema.  Neurologic:  Cranial nerves intact.  No focal deficit.   Skin: Skin warm and dry, normal skin turgor.   Psychiatric: Normal affect.     Assessment/Plan   HF: Casi 2/8/2024  Mr. Carreon is a 53M with a PMHx sig for CAD s/p PCI (LAD; 6/2017), stage C systolic HF/ICM/HFmrEF, aortic stenosis, HTN, tachycardia s/p ablation, DM and discoid lupus/SLE with ESRD on home HD (M-T-TH-F via RUE AVF since 12/2016) presents to [facility] ED with shortness of breath over the last day.  Cardiology was consulted for \"positive troponin\"      #Acute on chronic ICM/HFmrEF with mild LV dysfunction (LVEF 40-45%; 3/2023)   #CAD s/p PCI to LAD (6/2017) s/p 3V CABG (LIMA to LAD, SVG to OM, SVG to PDA; 3/16/22)   #HTN  #Aortic Stenosis ->Moderate on the most recent echo  #Elevated troponin 2/2 ESRD and volume overload and CAP (in line with previous trops)  #Acute hypoxia 2/2 volume overload versus CAP  -Echo (3/6/23) Left ventricular systolic function is mildly decreased with a 40-45% estimated ejection fraction.  -Admit BNP> 4700 (BNP trend 2657, 2168, 2324, 1698, 1058)  -Chest CT  more consistent with pneumonia/inflammation   -c/w home cardiac medications include aspirin 81 daily, atorvastatin 80 mg daily, Coreg 25 mg twice daily, Entresto 24/26 twice daily.   -HD for volume removal  -stop Heparin gtt    RECS:  -Elevated troponin likely in the setting of being volume up/ESRD/and rule out CAP  -Continue with home cardiology meds as tolerated  -HD for volume removal  -Stop heparin drip  -Can follow-up with outpatient cardiologist Casi after discharge  -No other further cardiology recs      Code Status:  Full Code    I spent " 45 minutes in the professional and overall care of this patient.        Fabienne Kendall, APRN-CNP

## 2024-03-26 NOTE — PROGRESS NOTES
Pharmacy Medication History Review    Kentrell Carreon is a 53 y.o. male admitted for Hypoxia. Pharmacy reviewed the patient's [id number] medications and allergies for accuracy.    The list below reflectives the updated PTA list. Please review each medication in order reconciliation for additional clarification and justification.  Prior to Admission Medications   Prescriptions Last Dose Informant     Accu-Chek Guide test strips strip 3/24/2024      Sig: Accu-Chek Guide In Vitro Strip   Quantity: 200  Refills: 0        Start : 25-Mar-2022   Active   Accu-Chek Softclix Lancets misc 3/24/2024      EPOETIN DEIRDRE INJ       Sig: Inject 8,000 Units under the skin. With every dialysis tx   FreeStyle José Luis reader (FreeStyle José Luis 2 [place]) misc 3/24/2024      Sig: Use to check blood sugars at least every 8 hours   FreeStyle José Luis sensor system (FreeStyle José Luis 2 Sensor) kit 3/24/2024      Sig: Change sensor every 14 days. Check blood sugar at least every 8 hours   SITagliptin phosphate (Januvia) 25 mg tablet 3/24/2024      Sig: Take 1 tablet (25 mg) by mouth once daily.   Ventolin HFA 90 mcg/actuation inhaler Past Month      Sig: Inhale 1-2 puffs every 6 hours if needed.   acetaminophen (Tylenol) 325 mg tablet Past Month      Sig: Take 2 tablets (650 mg) by mouth every 6 hours if needed.   aspirin 81 mg EC tablet 3/24/2024      Sig: Take 1 tablet (81 mg) by mouth once daily.   atorvastatin (Lipitor) 80 mg tablet 3/24/2024      Sig: Take 1 tablet (80 mg) by mouth once daily.   blood sugar diagnostic strip 3/24/2024      Si each. 3-4x daily   blood-glucose sensor (FreeStyle José Luis 3 Sensor) device 3/24/2024      Sig: Change sensor every 14 days   carvedilol (Coreg) 25 mg tablet 3/24/2024      Sig: Take 1 tablet (25 mg) by mouth 2 times a day with meals.             insulin glargine (Lantus) 100 unit/mL (3 mL) pen 3/24/2024      Sig: Inject 10 units twice a day   insulin lispro (HumaLOG KwikPen Insulin) 100 unit/mL  "injection 3/24/2024      Sig: Inject before every meal using carb ratio and correction scale. MDD 50 units.   insulin syringe-needle U-100 31G X 5/16\" 0.5 mL syringe 3/24/2024      Sig: Inject 1 each under the skin 4 times a day. With meals and at bedtime   melatonin 3 mg tablet       Sig: Take 1 table mg) by mouth as needed at bedtime for sleep.   multivitamin (Daily Multi-Vitamin) tablet 3/24/2024      Sig: Take 1 tablet by mouth once daily.   pen needle, diabetic (BD Ultra-Fine Agueda Pen Needle) 32 gauge x 5/32\" needle 3/24/2024      Sig: Use to inject insulin 5 times per day   sacubitriL-valsartan (Entresto) 24-26 mg tablet 3/24/2024      Sig: Take 1 tablet by mouth 2 times a day.   sevelamer carbonate (Renvela) 800 mg tablet 3/24/2024  Yes No   Sig: Take 1 tablet (800 mg) by mouth 3 times a day with meals.      Facility-Administered Medications: None      Allergies  Reviewed by Wayne Pantoja RN on 3/25/2024        Severity Reactions Comments    Iodinated Contrast Media High Anaphylaxis     Ampicillin-sulbactam Not Specified Other Renal Failure. AIN (INSTERSTITIAL NEPHRITIS))    [place] Low Rash             Below are additional concerns with the patient's PTA list.  Patient verified all medications and doses.    Tamie White    "

## 2024-03-26 NOTE — ED TRIAGE NOTES
PT PRESENTS TO THE ED FOR SHORTNESS OF BREATH. PT ENDORSES THAT HE IS A DIALYSIS PATIENT AND GOT DIALYSIS LAST NIGHT. PT STATES THAT WHEN HE WOKE UP HE NOTICED MORE INCREASED SHORTNESS OF BREATH. PT DENIES BEING AROUND SOMEONE SICK. PT ENDORSES CHEST PAIN. PT HAS HX OF A TRIPLE BYPASS AND STATES THAT HE HAS CHF. PT HAS DIABETES AND BS HAVE BEEN FINE.

## 2024-03-26 NOTE — ED PROVIDER NOTES
HPI   Chief Complaint   Patient presents with    Shortness of Breath       HPI  The patient has history of ESRD, Coronary disease, CABG, history of fluid overload who presents with shortness of breath for 1 day and hypoxia in triage.  Patient states he does have some chest heaviness noted this evening as well.  States it is minimal discomfort this time denies any radiation.  Denies any productivity of his cough.  Denies any fever.  He was nauseous without any vomiting.  Denies any diarrhea constipation.  He produces very little of any urine.  He was traveling this weekend and had to use some sort of travel bags for home hemodialysis.                  Humble Coma Scale Score: 15                     Patient History   Past Medical History:   Diagnosis Date    Personal history of other diseases of the circulatory system 02/01/2018    History of essential hypertension    Systemic lupus erythematosus, unspecified (CMS/[facility]) 02/01/2018    History of systemic lupus erythematosus (SLE)    Type 1 diabetes mellitus with other diabetic kidney complication (CMS/[facility]) 02/01/2018    Type 1 diabetes mellitus with other kidney complication     Past Surgical History:   Procedure Laterality Date    CORONARY ANGIOPLASTY WITH STENT PLACEMENT  07/13/2017    Cath Stent Placement    OTHER SURGICAL HISTORY  05/18/2017    Arteriovenous Surgery Creation Of A-V Fistula    OTHER SURGICAL HISTORY  02/01/2018    Surgery Right Foot Amputation DIP Third Toe    OTHER SURGICAL HISTORY  06/10/2022    Bronchoscopy    OTHER SURGICAL HISTORY  06/10/2022    Pulmonary decortication    OTHER SURGICAL HISTORY  06/10/2022    Thoracotomy    OTHER SURGICAL HISTORY  06/10/2022    Thoracentesis     Family History   Problem Relation Name Age of Onset    Heart failure Mother      Heart attack Father       Social History     Tobacco Use    Smoking status: Some Days     Types: Cigarettes     Passive exposure: Current    Smokeless tobacco: Never   Substance Use Topics     Alcohol use: Yes    Drug use: Never       Physical Exam   ED Triage Vitals   Temperature Heart Rate Respirations BP   03/25/24 2031 03/25/24 2036 03/25/24 2036 03/25/24 2036   37.6 °C (99.7 °F) 94 16 149/72      Pulse Ox Temp src Heart Rate Source Patient Position   03/25/24 2036 -- -- --   (!) 87 %         BP Location FiO2 (%)     -- --             Physical Exam  Vitals and nursing note reviewed.   Constitutional:       General: He is not in acute distress.     Appearance: He is well-developed.   HENT:      Head: Normocephalic and atraumatic.   Eyes:      Conjunctiva/sclera: Conjunctivae normal.   Cardiovascular:      Rate and Rhythm: Normal rate and regular rhythm.      Heart sounds: No murmur heard.  Pulmonary:      Effort: Pulmonary effort is normal. No respiratory distress.      Breath sounds: Normal breath sounds.   Abdominal:      Palpations: Abdomen is soft.      Tenderness: There is no abdominal tenderness.   Musculoskeletal:         General: No swelling.      Cervical back: Neck supple.   Skin:     General: Skin is warm and dry.      Capillary Refill: Capillary refill takes less than 2 seconds.   Neurological:      Mental Status: He is alert.   Psychiatric:         Mood and Affect: Mood normal.         ED Course & MDM   Diagnoses as of 03/25/24 2050   Hypoxia   Chest pain, unspecified type       Medical Decision Making  Patient personally had triage oxygen saturation is 87%.  Placed on 3 L here.  Will likely admit for possible fluid overload.  The patient is history of both CHF and ESRD.  Also recent travel will rule out COVID.  Has equal breath sounds currently.  Doubt that there is any pneumothorax today.  The patient is increasing troponin.  Will give heparin and admit.    EKG interpreted by myself.  Normal sinus rhythm at a rate of 95 bpm.  Left bundle branch block with signs of LVH, lateral ST depressions in V5 V 6 which are unchanged from previous EKG 1 month ago.  Normal axis.  No signs of acute  ischemia.      Procedure  Critical Care    Performed by: Leander Mckeon MD  Authorized by: Leander Mckeon MD    Critical care provider statement:     Critical care time (minutes):  65    Critical care time was exclusive of:  Separately billable procedures and treating other patients    Critical care was necessary to treat or prevent imminent or life-threatening deterioration of the following conditions:  Cardiac failure    Critical care was time spent personally by me on the following activities:  Ordering and performing treatments and interventions, ordering and review of laboratory studies, ordering and review of radiographic studies, pulse oximetry, blood draw for specimens, development of treatment plan with patient or surrogate and evaluation of patient's response to treatment    Care discussed with: admitting provider         Leander Mckeon MD  03/28/24 0629       Leander Mckeon MD  04/22/24 0246

## 2024-03-26 NOTE — H&P
History Of Present Illness  Kentrell Carreon is a 53 y.o. male presenting with increasing shortness of breath,.  He is known to have a history of end-stage renal disease, he is a hemodialysis patient, he does home dialysis 4 days a week, also noted to have history of coronary artery disease, multivessel, history of CABG, history of aortic stenosis, history of tachycardia s/p ablation, history of insulin-dependent diabetes, hypertension, hyperlipidemia, he came to the emergency department he was out of town, though he was out of town he was doing his dialysis, but when he came back to town he was getting more short of breath, he came to the emergency department, he was on BiPAP, later on he was on 6 L of oxygen, he was still feeling short of breath, he was diagnosed with fluid overload, he was to be dialyzed in the morning, also he was seeing the nephrologist, also has a troponin when higher as a result with this chest pain he was also seeing the cardiologist.  .     Past Medical History  He has a past medical history of Personal history of other diseases of the circulatory system (02/01/2018), Systemic lupus erythematosus, unspecified (CMS/[facility]) (02/01/2018), and Type 1 diabetes mellitus with other diabetic kidney complication (CMS/[facility]) (02/01/2018).    Surgical History  He has a past surgical history that includes Other surgical history (05/18/2017); Other surgical history (02/01/2018); Other surgical history (06/10/2022); Other surgical history (06/10/2022); Other surgical history (06/10/2022); Other surgical history (06/10/2022); and Coronary angioplasty with stent (07/13/2017).     Social History  He reports that he has been smoking cigarettes. He has been exposed to tobacco smoke. He has never used smokeless tobacco. He reports current alcohol use. He reports that he does not use drugs.    Family History  Family History   Problem Relation Name Age of Onset   • Heart failure Mother     • Heart attack Father         "  Allergies  Iodinated contrast media, Ampicillin-sulbactam, and [place]    Review of Systems   Constitutional:  Positive for activity change, chills and fever.   HENT:  Negative for congestion and dental problem.    Eyes:  Negative for discharge and itching.   Respiratory:  Positive for chest tightness, shortness of breath and wheezing.    Cardiovascular:  Positive for chest pain and leg swelling.   Gastrointestinal:  Negative for abdominal distention and abdominal pain.   Genitourinary:  Positive for difficulty urinating. Negative for dysuria.   Musculoskeletal:  Negative for arthralgias and back pain.   Neurological:  Negative for facial asymmetry and headaches.   Hematological:  Negative for adenopathy.   Psychiatric/Behavioral:  Negative for agitation and behavioral problems.         Physical Exam  Constitutional:       Appearance: Normal appearance.   HENT:      Head: Normocephalic and atraumatic.      Nose: Nose normal.   Cardiovascular:      Rate and Rhythm: Normal rate and regular rhythm.      Heart sounds: No murmur heard.  Pulmonary:      Effort: Respiratory distress present.      Breath sounds: Wheezing present.   Abdominal:      General: Abdomen is flat. There is no distension.      Palpations: Abdomen is soft.   Musculoskeletal:         General: Swelling present.      Cervical back: Normal range of motion and neck supple.   Skin:     Coloration: Skin is not jaundiced.   Neurological:      General: No focal deficit present.      Mental Status: He is alert and oriented to person, place, and time.        Last Recorded Vitals  Blood pressure 150/74, pulse 96, temperature 36.7 °C (98 °F), temperature source Temporal, resp. rate 20, height 1.93 m (6' 4\"), weight 78.3 kg (172 lb 9.6 oz), SpO2 96 %.    Relevant Results  Results for orders placed or performed during the hospital encounter of 03/25/24 (from the past 96 hour(s))   ECG 12 lead   Result Value Ref Range    Ventricular Rate 95 BPM    Atrial Rate " 95 BPM    UT Interval 196 ms    QRS Duration 136 ms    QT Interval 396 ms    QTC Calculation(Bazett) 497 ms    P Axis 66 degrees    R Axis 48 degrees    T Axis 121 degrees    QRS Count 15 beats    Q Onset 212 ms    P Onset 114 ms    P Offset 174 ms    T Offset 410 ms    QTC Fredericia 461 ms   Sars-CoV-2 and Influenza A/B PCR   Result Value Ref Range    Flu A Result Not Detected Not Detected    Flu B Result Not Detected Not Detected    Coronavirus 2019, PCR Not Detected Not Detected   CBC and Auto Differential   Result Value Ref Range    WBC 9.1 4.4 - 11.3 x10*3/uL    nRBC 0.0 0.0 - 0.0 /100 WBCs    RBC 2.60 (L) 4.50 - 5.90 x10*6/uL    Hemoglobin 8.9 (L) 13.5 - 17.5 g/dL    Hematocrit 27.2 (L) 41.0 - 52.0 %     (H) 80 - 100 fL    MCH 34.2 (H) 26.0 - 34.0 pg    MCHC 32.7 32.0 - 36.0 g/dL    RDW 13.2 11.5 - 14.5 %    Platelets 194 150 - 450 x10*3/uL    Neutrophils % 78.6 40.0 - 80.0 %    Immature Granulocytes %, Automated 0.4 0.0 - 0.9 %    Lymphocytes % 7.7 13.0 - 44.0 %    Monocytes % 11.5 2.0 - 10.0 %    Eosinophils % 0.9 0.0 - 6.0 %    Basophils % 0.9 0.0 - 2.0 %    Neutrophils Absolute 7.16 1.20 - 7.70 x10*3/uL    Immature Granulocytes Absolute, Automated 0.04 0.00 - 0.70 x10*3/uL    Lymphocytes Absolute 0.70 (L) 1.20 - 4.80 x10*3/uL    Monocytes Absolute 1.05 (H) 0.10 - 1.00 x10*3/uL    Eosinophils Absolute 0.08 0.00 - 0.70 x10*3/uL    Basophils Absolute 0.08 0.00 - 0.10 x10*3/uL   Comprehensive Metabolic Panel   Result Value Ref Range    Glucose 157 (H) 74 - 99 mg/dL    Sodium 139 136 - 145 mmol/L    Potassium 6.0 (H) 3.5 - 5.3 mmol/L    Chloride 96 (L) 98 - 107 mmol/L    Bicarbonate 24 21 - 32 mmol/L    Anion Gap 25 (H) 10 - 20 mmol/L    Urea Nitrogen 83 (H) 6 - 23 mg/dL    Creatinine 12.14 (H) 0.50 - 1.30 mg/dL    eGFR 5 (L) >60 mL/min/1.73m*2    Calcium 9.2 8.6 - 10.3 mg/dL    Albumin 4.5 3.4 - 5.0 g/dL    Alkaline Phosphatase 83 33 - 120 U/L    Total Protein 7.8 6.4 - 8.2 g/dL    AST 20 9 - 39 U/L     Bilirubin, Total 0.4 0.0 - 1.2 mg/dL    ALT 18 10 - 52 U/L   Magnesium   Result Value Ref Range    Magnesium 2.20 1.60 - 2.40 mg/dL   aPTT   Result Value Ref Range    aPTT 35 27 - 38 seconds   Protime-INR   Result Value Ref Range    Protime 12.9 (H) 9.8 - 12.8 seconds    INR 1.1 0.9 - 1.1   B-Type Natriuretic Peptide   Result Value Ref Range    BNP >4,700 (H) 0 - 99 pg/mL   Troponin I, High Sensitivity, Initial   Result Value Ref Range    Troponin I, High Sensitivity 391 (HH) 0 - 20 ng/L   ECG 12 lead   Result Value Ref Range    Ventricular Rate 86 BPM    Atrial Rate 86 BPM    CT Interval 212 ms    QRS Duration 130 ms    QT Interval 426 ms    QTC Calculation(Bazett) 509 ms    P Axis 60 degrees    R Axis -20 degrees    T Axis 129 degrees    QRS Count 14 beats    Q Onset 208 ms    P Onset 102 ms    P Offset 175 ms    T Offset 421 ms    QTC Fredericia 480 ms   Troponin, High Sensitivity, 1 Hour   Result Value Ref Range    Troponin I, High Sensitivity 448 (HH) 0 - 20 ng/L   CBC   Result Value Ref Range    WBC 7.1 4.4 - 11.3 x10*3/uL    nRBC 0.0 0.0 - 0.0 /100 WBCs    RBC 2.64 (L) 4.50 - 5.90 x10*6/uL    Hemoglobin 8.8 (L) 13.5 - 17.5 g/dL    Hematocrit 27.7 (L) 41.0 - 52.0 %     (H) 80 - 100 fL    MCH 33.3 26.0 - 34.0 pg    MCHC 31.8 (L) 32.0 - 36.0 g/dL    RDW 13.2 11.5 - 14.5 %    Platelets 179 150 - 450 x10*3/uL   Heparin Assay, UFH   Result Value Ref Range    Heparin Unfractionated <0.1 See Comment Below for Therapeutic Ranges IU/mL   BLOOD GAS VENOUS FULL PANEL   Result Value Ref Range    POCT pH, Venous 7.39 7.33 - 7.43 pH    POCT pCO2, Venous 40 (L) 41 - 51 mm Hg    POCT pO2, Venous 41 35 - 45 mm Hg    POCT SO2, Venous 66 45 - 75 %    POCT Oxy Hemoglobin, Venous 64.9 45.0 - 75.0 %    POCT Hematocrit Calculated, Venous 26.0 (L) 41.0 - 52.0 %    POCT Sodium, Venous 137 136 - 145 mmol/L    POCT Potassium, Venous 5.8 (H) 3.5 - 5.3 mmol/L    POCT Chloride, Venous 100 98 - 107 mmol/L    POCT Ionized Calicum,  Venous 1.08 (L) 1.10 - 1.33 mmol/L    POCT Glucose, Venous 208 (H) 74 - 99 mg/dL    POCT Lactate, Venous 1.2 0.4 - 2.0 mmol/L    POCT Base Excess, Venous -0.7 -2.0 - 3.0 mmol/L    POCT HCO3 Calculated, Venous 24.2 22.0 - 26.0 mmol/L    POCT Hemoglobin, Venous 8.5 (L) 13.5 - 17.5 g/dL    POCT Anion Gap, Venous 19.0 10.0 - 25.0 mmol/L    Patient Temperature 37.0 degrees Celsius    FiO2 21 %   ECG 12 lead   Result Value Ref Range    Ventricular Rate 89 BPM    Atrial Rate 89 BPM    DE Interval 206 ms    QRS Duration 122 ms    QT Interval 404 ms    QTC Calculation(Bazett) 491 ms    P Axis 54 degrees    R Axis -22 degrees    T Axis 126 degrees    QRS Count 15 beats    Q Onset 210 ms    P Onset 107 ms    P Offset 174 ms    T Offset 412 ms    QTC Fredericia 460 ms   Troponin I, High Sensitivity   Result Value Ref Range    Troponin I, High Sensitivity 608 (HH) 0 - 20 ng/L   Heparin Assay, UFH   Result Value Ref Range    Heparin Unfractionated 0.3 See Comment Below for Therapeutic Ranges IU/mL   CBC   Result Value Ref Range    WBC 8.3 4.4 - 11.3 x10*3/uL    nRBC 0.0 0.0 - 0.0 /100 WBCs    RBC 2.71 (L) 4.50 - 5.90 x10*6/uL    Hemoglobin 8.9 (L) 13.5 - 17.5 g/dL    Hematocrit 28.0 (L) 41.0 - 52.0 %     (H) 80 - 100 fL    MCH 32.8 26.0 - 34.0 pg    MCHC 31.8 (L) 32.0 - 36.0 g/dL    RDW 13.3 11.5 - 14.5 %    Platelets 187 150 - 450 x10*3/uL   Basic Metabolic Panel   Result Value Ref Range    Glucose 164 (H) 74 - 99 mg/dL    Sodium 138 136 - 145 mmol/L    Potassium 5.9 (H) 3.5 - 5.3 mmol/L    Chloride 96 (L) 98 - 107 mmol/L    Bicarbonate 21 21 - 32 mmol/L    Anion Gap 27 (H) 10 - 20 mmol/L    Urea Nitrogen 89 (H) 6 - 23 mg/dL    Creatinine 13.03 (H) 0.50 - 1.30 mg/dL    eGFR 4 (L) >60 mL/min/1.73m*2    Calcium 9.6 8.6 - 10.3 mg/dL   POCT GLUCOSE   Result Value Ref Range    POCT Glucose 178 (H) 74 - 99 mg/dL   Phosphorus   Result Value Ref Range    Phosphorus 6.9 (H) 2.5 - 4.9 mg/dL   Heparin Assay, UFH   Result Value Ref  Range    Heparin Unfractionated 0.1 See Comment Below for Therapeutic Ranges IU/mL   POCT GLUCOSE   Result Value Ref Range    POCT Glucose 112 (H) 74 - 99 mg/dL      ECG 12 lead    Result Date: 3/26/2024  Normal sinus rhythm Possible Left atrial enlargement Left ventricular hypertrophy with QRS widening and repolarization abnormality ( Franklyn product ) Abnormal ECG When compared with ECG of 25-MAR-2024 22:43, (unconfirmed) No significant change was found See ED provider note for full interpretation and clinical correlation Confirmed by Amebr Mendiola ([id number]) on 3/26/2024 12:54:33 PM    ECG 12 lead    Result Date: 3/26/2024  Sinus rhythm with 1st degree AV block Possible Left atrial enlargement Left ventricular hypertrophy with QRS widening and repolarization abnormality ( Struthers product ) Abnormal ECG When compared with ECG of 25-MAR-2024 20:34, (unconfirmed) Questionable change in QRS axis See ED provider note for full interpretation and clinical correlation Confirmed by Amber Mendiola ([id number]) on 3/26/2024 11:29:53 AM    ECG 12 lead    Result Date: 3/26/2024  Normal sinus rhythm Left ventricular hypertrophy with QRS widening and repolarization abnormality ( Franklyn product ) Abnormal ECG When compared with ECG of 06-FEB-2024 16:05, Incomplete left bundle branch block is no longer Present See ED provider note for full interpretation and clinical correlation Confirmed by Amber Mendiola ([id number]) on 3/26/2024 10:57:52 AM    CT chest abdomen pelvis wo IV contrast    Result Date: 3/25/2024  STUDY: CT Chest, Abdomen, and Pelvis without IV Contrast; 3/25/2024 at 10:06 PM INDICATION: Chest pain. COMPARISON: CTA chest 3/16/2023. ACCESSION NUMBER(S): [id number] ORDERING CLINICIAN: ESTRELLITA PUENTE TECHNIQUE: CT of the chest, abdomen, and pelvis was performed.  Contiguous axial images were obtained at 3 mm slice thickness through the chest, abdomen, and pelvis.  Coronal and sagittal reconstructions at 3 mm slice thickness were  performed.  No intravenous contrast was administered.  FINDINGS: Please note that the evaluation of vessels, lymph nodes and organs is limited without intravenous contrast. CHEST: MEDIASTINUM: The heart is normal in size without pericardial effusion.  Coronary artery calcifications are identified.  LUNGS/PLEURA: Again seen are groundglass infiltrates throughout both lungs, though these have improved since the previous examination. There are multiple nodular densities in both upper lobes which I suspect are inflammatory. There are small bilateral pleural effusions. LYMPH NODES: Thoracic lymph nodes are not enlarged. ABDOMEN:  LIVER: No hepatomegaly.  Smooth surface contour.  Normal attenuation.  BILE DUCTS: No intrahepatic or extrahepatic biliary ductal dilatation.  GALLBLADDER: The gallbladder is unremarkable. STOMACH: No abnormalities identified.  PANCREAS: No masses or ductal dilatation.  SPLEEN: No splenomegaly or focal splenic lesion.  ADRENAL GLANDS: No thickening or nodules.  KIDNEYS AND URETERS: The kidneys are atrophic. No renal or ureteral calculi.  PELVIS:  BLADDER: No abnormalities identified.  REPRODUCTIVE ORGANS: No abnormalities identified.  BOWEL: No abnormalities identified.  VESSELS: No abnormalities identified.  Abdominal aorta is normal in caliber.  PERITONEUM/RETROPERITONEUM/LYMPH NODES: No free fluid.  No pneumoperitoneum. No lymphadenopathy.  ABDOMINAL WALL: No abnormalities identified. SOFT TISSUES: No abnormalities identified.  BONES: No acute fracture or aggressive osseous lesion.    1. Chest CT demonstrates groundglass infiltrates throughout both lungs, though these have improved since the previous examination. There are multiple nodular densities in both upper lobes which I suspect are inflammatory. 2. Small bilateral pleural effusions. 3. No acute pathology in the abdomen or pelvis. Signed by Christian River MD    XR chest 1 view    Result Date: 3/25/2024  STUDY: Chest Radiograph;   3/25/2024 at 9:19 PM INDICATION: Chest pain. COMPARISON: XR chest 2/6/2024, XR chest 3/18/2023, XR chest 3/16/2023, XR chest 6/3/2022. ACCESSION NUMBER(S): [id number] ORDERING CLINICIAN: ESTRELLITA PUENTE TECHNIQUE:  Frontal chest was obtained at 2119 hours. FINDINGS: CARDIOMEDIASTINAL SILHOUETTE: The heart remains enlarged and again seen are sternal wire sutures LUNGS: .  On today's exam there are alveolar infiltrates in the right upper lobe laterally at the left mid and lower lung.  This is a change from the prior exam of February 6.  The pattern is more consistent with pneumonia than edema.  No definite pleural effusion is seen on the left.  There is a questionable very tiny right pleural effusion. There is no evidence of pneumothorax.  Again seen is a vascular stent in the right axillary area..  ABDOMEN: No remarkable upper abdominal findings.  BONES: No acute osseous changes.    Again seen is cardiomegaly.  There are alveolar infiltrates in the right upper lung laterally and in the left mid and lower lung.  The appearance is more consistent with pneumonia than edema but correlation with clinical findings is recommended.  There may also be a tiny right pleural effusion.. Signed by Albert Huertas MD      Medications  albuterol, 2.5 mg, nebulization, TID  aspirin, 81 mg, oral, Daily  atorvastatin, 80 mg, oral, Daily  azithromycin, 500 mg, intravenous, q24h  carvedilol, 25 mg, oral, BID with meals  cefTRIAXone, 2 g, intravenous, q24h  [START ON 3/27/2024] epoetin annita or biosimilar, 150 Units/kg, subcutaneous, Every Mon/Wed/Fri  famotidine, 20 mg, oral, Daily  heparin (porcine), 5,000 Units, subcutaneous, q8h SHANA  insulin lispro, 0-5 Units, subcutaneous, TID with meals  melatonin, 3 mg, oral, Daily  oxygen, , inhalation, Continuous - Inhalation  sacubitriL-valsartan, 1 tablet, oral, BID  sevelamer carbonate, 800 mg, oral, TID with meals  SITagliptin phosphate, 25 mg, oral, Daily       PRN medications:  acetaminophen **OR** acetaminophen **OR** acetaminophen, acetaminophen **OR** acetaminophen **OR** acetaminophen, albuterol, dextrose, dextrose, glucagon, glucagon, melatonin, ondansetron **OR** ondansetron     Assessment/Plan   53-year-old  male, who is known to have end-stage renal disease, he states due to his diabetes, on renal replacement therapy 4 days a week he home dialysis hemodialysis, came to the emergency department with increasing shortness of breath.  He had a workup done which included CAT scan of the chest abdomen pelvis and he was diagnosed as fluid overload and admitted here for further management.  He was markedly hypoxic he was on BiPAP and in the morning he was changed to 6 L of oxygen.  Acute hypoxia secondary to most likely fluid overload rule out pneumonia.  He is seeing the nephrologist for urgent dialysis will wean the oxygen down.  CAT scan of the chest cannot rule out pneumonia as a result started on antibiotics.  Diabetes continue with his home insulin sliding scale.  High troponin was trending up he was on heparin drip he was seen by the cardiologist no need for heparin drip, core measures 1 apply.  Hyperlipidemia continue with the same.  DVT prophylaxis as heparin.           I spent 75 minutes in the professional and overall care of this patient.    Raegan Sibley MD

## 2024-03-26 NOTE — CONSULTS
Inpatient consult to Renal Care  Consult performed by: Fletcher Whitley DO  Consult ordered by: Raegan Sibley MD      Reason For Consult  ESRD on HD     History Of Present Illness  Kentrell Carreon is a 53 y.o. male with a past medical history of end-stage renal disease on home hemodialysis Monday Tuesday Thursday Friday via [facility] under Dr. Epperson, coronary artery disease status post PCI, three-vessel CABG in March 2022, HFmrEF, aortic stenosis, tachycardia s/p ablation, insulin-dependent diabetes, hypertension, hyperlipidemia, history of tension pneumothorax in the setting of thoracentesis, right second toe transmetatarsal amputation, right foot osteomyelitis who presents with shortness of breath.  He became short of breath 1 day prior to presentation after returning home from [place].  He reports the associated symptoms of a nonproductive cough and mild chest discomfort.  In the ED, his potassium was 5.9, there was high-sensitivity troponin elevation, BNP greater than 4700.  Chest plain film showed alveolar infiltrates in the right upper lung and in the left mid and lower lung concerning for pneumonia.  There was groundglass infiltrates throughout the lungs on CT with multiple nodular densities in both upper lobes concerning for inflammation.  He was placed on heparin drip.  BiPAP.  Cardiology was consulted.  Nephrology is consulted for renal care.     Past Medical History:   Diagnosis Date    Personal history of other diseases of the circulatory system 02/01/2018    History of essential hypertension    Systemic lupus erythematosus, unspecified (CMS/HCC) 02/01/2018    History of systemic lupus erythematosus (SLE)    Type 1 diabetes mellitus with other diabetic kidney complication (CMS/[facility]) 02/01/2018    Type 1 diabetes mellitus with other kidney complication       Past Surgical History:   Procedure Laterality Date    CORONARY ANGIOPLASTY WITH STENT PLACEMENT  07/13/2017    Cath Stent Placement     "OTHER SURGICAL HISTORY  05/18/2017    Arteriovenous Surgery Creation Of A-V Fistula    OTHER SURGICAL HISTORY  02/01/2018    Surgery Right Foot Amputation DIP Third Toe    OTHER SURGICAL HISTORY  06/10/2022    Bronchoscopy    OTHER SURGICAL HISTORY  06/10/2022    Pulmonary decortication    OTHER SURGICAL HISTORY  06/10/2022    Thoracotomy    OTHER SURGICAL HISTORY  06/10/2022    Thoracentesis       Social History     Tobacco Use    Smoking status: Some Days     Types: Cigarettes     Passive exposure: Current    Smokeless tobacco: Never   Substance Use Topics    Alcohol use: Yes    Drug use: Never        Family History  Family History   Problem Relation Name Age of Onset    Heart failure Mother      Heart attack Father          Allergies  Iodinated contrast media, Ampicillin-sulbactam, and Elkins    Review of Systems   10 point review of systems obtained and negative unless stated in HPI  Physical Exam   Constitutional: Well developed, awake/alert/oriented  x3, no distress, alert and cooperative, on Bipap    Eyes: PERRL, EOMI, clear sclera   ENMT: mucous membranes moist   Head/Neck: Neck supple, no JVD, trachea midline   Respiratory/Thorax: Patent airways, CTAB, normal  breath sounds with good chest expansion, thorax symmetric   Cardiovascular: Regular, rate and rhythm, systolic ejection murmur, normal S 1 and S 2  Right arm aVF within normal limits   Gastrointestinal: Nondistended, soft, non-tender, no rebound tenderness or guarding, no masses palpable, no organomegaly, +BS, no bruits   Musculoskeletal: ROM intact, no joint swelling, normal  strength   Extremities: No leg edema   Neurological: alert and oriented x3  Nonfocal   no asterixis   Skin: Warm and dry, right foot dressed          I&O 24HR  No intake or output data in the 24 hours ending 03/26/24 1028    Vitals 24HR  Heart Rate:  [83-94]   Temp:  [36.8 °C (98.2 °F)-37.6 °C (99.7 °F)]   Resp:  [16-29]   BP: (149-184)/(72-89)   Height:  [193 cm (6' 4\")] "   Weight:  [74.8 kg (165 lb)-78.3 kg (172 lb 9.6 oz)]   SpO2:  [87 %-98 %]     Scheduled Medications  albuterol, 2.5 mg, nebulization, TID  aspirin, 81 mg, oral, Daily  atorvastatin, 80 mg, oral, Daily  carvedilol, 25 mg, oral, BID with meals  famotidine, 20 mg, oral, Daily  [Held by provider] heparin (porcine), 5,000 Units, subcutaneous, q8h SHANA  melatonin, 3 mg, oral, Daily  oxygen, , inhalation, Continuous - Inhalation  sacubitriL-valsartan, 1 tablet, oral, BID  sevelamer carbonate, 800 mg, oral, TID with meals  SITagliptin phosphate, 25 mg, oral, Daily      Continuous medications  heparin, 0-4,000 Units/hr, Last Rate: 900 Units/hr (03/26/24 0157)        PRN medications: acetaminophen **OR** acetaminophen **OR** acetaminophen, acetaminophen **OR** acetaminophen **OR** acetaminophen, albuterol, heparin, melatonin, ondansetron **OR** ondansetron     Relevant Results  Results from last 7 days   Lab Units 03/26/24  0758 03/26/24  0114 03/25/24  2108   WBC AUTO x10*3/uL 8.3 7.1 9.1   HEMOGLOBIN g/dL 8.9* 8.8* 8.9*   HEMATOCRIT % 28.0* 27.7* 27.2*   PLATELETS AUTO x10*3/uL 187 179 194   NEUTROS PCT AUTO %  --   --  78.6   LYMPHS PCT AUTO %  --   --  7.7   MONOS PCT AUTO %  --   --  11.5   EOS PCT AUTO %  --   --  0.9      Results from last 7 days   Lab Units 03/26/24  0758 03/25/24  2108   SODIUM mmol/L 138 139   POTASSIUM mmol/L 5.9* 6.0*   CHLORIDE mmol/L 96* 96*   CO2 mmol/L 21 24   BUN mg/dL 89* 83*   CREATININE mg/dL 13.03* 12.14*   CALCIUM mg/dL 9.6 9.2   PROTEIN TOTAL g/dL  --  7.8   BILIRUBIN TOTAL mg/dL  --  0.4   ALK PHOS U/L  --  83   ALT U/L  --  18   AST U/L  --  20   GLUCOSE mg/dL 164* 157*      CT chest abdomen pelvis wo IV contrast   Final Result   1. Chest CT demonstrates groundglass infiltrates throughout both   lungs, though these have improved since the previous examination.   There are multiple nodular densities in both upper lobes which I   suspect are inflammatory.   2. Small bilateral pleural  effusions.   3. No acute pathology in the abdomen or pelvis.   Signed by Christian River MD      XR chest 1 view   Final Result   Again seen is cardiomegaly.  There are alveolar infiltrates in the   right upper lung laterally and in the left mid and lower lung.  The   appearance is more consistent with pneumonia than edema but   correlation with clinical findings is recommended.  There may also be   a tiny right pleural effusion..   Signed by Albert Huertas MD            Assessment/Plan   Kentrell Carreon is a 53 y.o. male with a past medical history of end-stage renal disease on home hemodialysis Monday Tuesday Thursday Friday via [facility] under Dr. Epperson, coronary artery disease status post PCI, three-vessel CABG in March 2022, HFmrEF, aortic stenosis, tachycardia s/p ablation, insulin-dependent diabetes, hypertension, hyperlipidemia, history of tension pneumothorax in the setting of thoracentesis, right second toe transmetatarsal amputation, right foot osteomyelitis who presents with shortness of breath.  He became short of breath 1 day prior to presentation after returning home from [place].  He reports the associated symptoms of a nonproductive cough and mild chest discomfort.  In the ED, his potassium was 5.9, there was high-sensitivity troponin elevation, BNP greater than 4700.  Chest plain film showed alveolar infiltrates in the right upper lung and in the left mid and lower lung concerning for pneumonia.  There was groundglass infiltrates throughout the lungs on CT with multiple nodular densities in both upper lobes concerning for inflammation.  He was placed on heparin drip.  BiPAP.  Cardiology was consulted.  Nephrology is consulted for renal care.  I will place orders for dialysis today with a target UF of 2 kg as tolerated on a 2K bath.  His hemoglobin is 8.9 therefore I will order erythropoietin and I will check a phosphorus level.  Nephrology will follow along with cardiology recs and his  care.    Principal Problem:    Hypoxia      I spent 50 minutes in the professional and overall care of this patient.      Fletcher Whitley, DO

## 2024-03-26 NOTE — PROGRESS NOTES
03/26/24 0910   Discharge Planning   Living Arrangements Alone   Support Systems Family members   Assistance Needed Independent, drives   Type of Residence Private residence   Number of Stairs to Enter Residence 20  (duplex, lives on 2nd floor)   Number of Stairs Within Residence 0   Do you have animals or pets at home? No   Who is requesting discharge planning? Provider   Home or Post Acute Services None   Patient expects to be discharged to: [facility]   Does the patient need discharge transport arranged? Yes   RoundTrip coordination needed? Yes   Has discharge transport been arranged? No   Financial Resource Strain   How hard is it for you to pay for the very basics like food, housing, medical care, and heating? Not hard   Housing Stability   In the last 12 months, was there a time when you were not able to pay the mortgage or rent on time? N   In the last 12 months, how many places have you lived? 1   In the last 12 months, was there a time when you did not have a steady place to sleep or slept in a shelter (including now)? N   Transportation Needs   In the past 12 months, has lack of transportation kept you from medical appointments or from getting medications? no   In the past 12 months, has lack of transportation kept you from meetings, work, or from getting things needed for daily living? No   Patient Choice   Provider Choice list and CMS website (https://medicare.gov/care-compare#search) for post-acute Quality and Resource Measure Data were provided and reviewed with: Patient   Patient / Family choosing to utilize agency / facility established prior to hospitalization No          Met with patient at bedside and explained my role as care coordinator. Patient lives in a duplex house, on second floor. He is independent with his care at home. He drives. Patient denies use of any ambulatory devices. He is dialysis patient and he does dialysis at home 4 times per week. No oxygen in use at home. His PCP is Dr. Porter  Kentrell and he seen him one month ago. Pharmacy he uses is [facility] in [place]. He is able to afford medications and to get to his doctors appointments. Patient came in with chest pain and he is on Heparin drip. Patient also came with hypoxia and he is on continuous bi-pap. Patient denies any needs going home.

## 2024-03-27 LAB
ALBUMIN SERPL BCP-MCNC: 3.7 G/DL (ref 3.4–5)
ANION GAP SERPL CALC-SCNC: 17 MMOL/L (ref 10–20)
BUN SERPL-MCNC: 51 MG/DL (ref 6–23)
CALCIUM SERPL-MCNC: 9 MG/DL (ref 8.6–10.3)
CHLORIDE SERPL-SCNC: 95 MMOL/L (ref 98–107)
CO2 SERPL-SCNC: 30 MMOL/L (ref 21–32)
CREAT SERPL-MCNC: 9.13 MG/DL (ref 0.5–1.3)
EGFRCR SERPLBLD CKD-EPI 2021: 6 ML/MIN/1.73M*2
GLUCOSE BLD MANUAL STRIP-MCNC: 142 MG/DL (ref 74–99)
GLUCOSE BLD MANUAL STRIP-MCNC: 161 MG/DL (ref 74–99)
GLUCOSE BLD MANUAL STRIP-MCNC: 206 MG/DL (ref 74–99)
GLUCOSE BLD MANUAL STRIP-MCNC: 93 MG/DL (ref 74–99)
GLUCOSE SERPL-MCNC: 124 MG/DL (ref 74–99)
PHOSPHATE SERPL-MCNC: 6.2 MG/DL (ref 2.5–4.9)
POTASSIUM SERPL-SCNC: 4.7 MMOL/L (ref 3.5–5.3)
SODIUM SERPL-SCNC: 137 MMOL/L (ref 136–145)

## 2024-03-27 PROCEDURE — 94640 AIRWAY INHALATION TREATMENT: CPT | Mod: MUE

## 2024-03-27 PROCEDURE — 2500000001 HC RX 250 WO HCPCS SELF ADMINISTERED DRUGS (ALT 637 FOR MEDICARE OP): Performed by: INTERNAL MEDICINE

## 2024-03-27 PROCEDURE — 94660 CPAP INITIATION&MGMT: CPT

## 2024-03-27 PROCEDURE — 6350000001 HC RX 635 EPOETIN >10,000 UNITS: Mod: JZ | Performed by: INTERNAL MEDICINE

## 2024-03-27 PROCEDURE — 2500000005 HC RX 250 GENERAL PHARMACY W/O HCPCS: Performed by: INTERNAL MEDICINE

## 2024-03-27 PROCEDURE — 2500000001 HC RX 250 WO HCPCS SELF ADMINISTERED DRUGS (ALT 637 FOR MEDICARE OP): Performed by: PHARMACIST

## 2024-03-27 PROCEDURE — 2500000004 HC RX 250 GENERAL PHARMACY W/ HCPCS (ALT 636 FOR OP/ED): Performed by: INTERNAL MEDICINE

## 2024-03-27 PROCEDURE — 2500000002 HC RX 250 W HCPCS SELF ADMINISTERED DRUGS (ALT 637 FOR MEDICARE OP, ALT 636 FOR OP/ED)

## 2024-03-27 PROCEDURE — 36415 COLL VENOUS BLD VENIPUNCTURE: CPT

## 2024-03-27 PROCEDURE — 2500000002 HC RX 250 W HCPCS SELF ADMINISTERED DRUGS (ALT 637 FOR MEDICARE OP, ALT 636 FOR OP/ED): Mod: MUE | Performed by: INTERNAL MEDICINE

## 2024-03-27 PROCEDURE — 82947 ASSAY GLUCOSE BLOOD QUANT: CPT

## 2024-03-27 PROCEDURE — 2060000001 HC INTERMEDIATE ICU ROOM DAILY

## 2024-03-27 PROCEDURE — 94668 MNPJ CHEST WALL SBSQ: CPT

## 2024-03-27 PROCEDURE — 2500000002 HC RX 250 W HCPCS SELF ADMINISTERED DRUGS (ALT 637 FOR MEDICARE OP, ALT 636 FOR OP/ED): Performed by: INTERNAL MEDICINE

## 2024-03-27 PROCEDURE — 84100 ASSAY OF PHOSPHORUS: CPT

## 2024-03-27 RX ORDER — AZITHROMYCIN 500 MG/1
500 TABLET, FILM COATED ORAL EVERY 24 HOURS
Status: DISCONTINUED | OUTPATIENT
Start: 2024-03-27 | End: 2024-03-28 | Stop reason: HOSPADM

## 2024-03-27 RX ADMIN — CEFTRIAXONE 2 G: 2 INJECTION, POWDER, FOR SOLUTION INTRAMUSCULAR; INTRAVENOUS at 20:56

## 2024-03-27 RX ADMIN — EPOETIN ALFA-EPBX 10000 UNITS: 10000 INJECTION, SOLUTION INTRAVENOUS; SUBCUTANEOUS at 16:45

## 2024-03-27 RX ADMIN — HEPARIN SODIUM 5000 UNITS: 5000 INJECTION INTRAVENOUS; SUBCUTANEOUS at 21:03

## 2024-03-27 RX ADMIN — AZITHROMYCIN DIHYDRATE 500 MG: 500 TABLET ORAL at 20:56

## 2024-03-27 RX ADMIN — ALBUTEROL SULFATE 2.5 MG: 2.5 SOLUTION RESPIRATORY (INHALATION) at 13:25

## 2024-03-27 RX ADMIN — SEVELAMER CARBONATE 800 MG: 800 TABLET, FILM COATED ORAL at 12:38

## 2024-03-27 RX ADMIN — INSULIN LISPRO 2 UNITS: 100 INJECTION, SOLUTION INTRAVENOUS; SUBCUTANEOUS at 17:00

## 2024-03-27 RX ADMIN — HEPARIN SODIUM 5000 UNITS: 5000 INJECTION INTRAVENOUS; SUBCUTANEOUS at 05:51

## 2024-03-27 RX ADMIN — SACUBITRIL AND VALSARTAN 1 TABLET: 24; 26 TABLET, FILM COATED ORAL at 08:45

## 2024-03-27 RX ADMIN — SITAGLIPTIN 25 MG: 25 TABLET, FILM COATED ORAL at 08:55

## 2024-03-27 RX ADMIN — ALBUTEROL SULFATE 2.5 MG: 2.5 SOLUTION RESPIRATORY (INHALATION) at 07:31

## 2024-03-27 RX ADMIN — ASPIRIN 81 MG: 81 TABLET, COATED ORAL at 08:45

## 2024-03-27 RX ADMIN — ATORVASTATIN CALCIUM 80 MG: 80 TABLET, FILM COATED ORAL at 08:45

## 2024-03-27 RX ADMIN — Medication: at 20:00

## 2024-03-27 RX ADMIN — INSULIN GLARGINE 15 UNITS: 100 INJECTION, SOLUTION SUBCUTANEOUS at 20:56

## 2024-03-27 RX ADMIN — SEVELAMER CARBONATE 800 MG: 800 TABLET, FILM COATED ORAL at 16:45

## 2024-03-27 RX ADMIN — FAMOTIDINE 20 MG: 20 TABLET, FILM COATED ORAL at 08:45

## 2024-03-27 RX ADMIN — CARVEDILOL 25 MG: 25 TABLET, FILM COATED ORAL at 16:45

## 2024-03-27 RX ADMIN — Medication 6 L/MIN: at 07:31

## 2024-03-27 RX ADMIN — SACUBITRIL AND VALSARTAN 1 TABLET: 24; 26 TABLET, FILM COATED ORAL at 20:56

## 2024-03-27 RX ADMIN — ALBUTEROL SULFATE 2.5 MG: 2.5 SOLUTION RESPIRATORY (INHALATION) at 19:25

## 2024-03-27 RX ADMIN — CARVEDILOL 25 MG: 25 TABLET, FILM COATED ORAL at 08:45

## 2024-03-27 RX ADMIN — SEVELAMER CARBONATE 800 MG: 800 TABLET, FILM COATED ORAL at 08:45

## 2024-03-27 ASSESSMENT — PAIN SCALES - GENERAL
PAINLEVEL_OUTOF10: 0 - NO PAIN

## 2024-03-27 ASSESSMENT — PAIN - FUNCTIONAL ASSESSMENT
PAIN_FUNCTIONAL_ASSESSMENT: 0-10

## 2024-03-27 ASSESSMENT — COGNITIVE AND FUNCTIONAL STATUS - GENERAL
MOBILITY SCORE: 24
DAILY ACTIVITIY SCORE: 24

## 2024-03-27 NOTE — PROGRESS NOTES
03/27/24 1441   Discharge Planning   Patient expects to be discharged to: [facility]         Patient was dialyzed yesterday. Resp is weaning his oxygen off. When patient is cleared he will go home with no needs.

## 2024-03-27 NOTE — PROGRESS NOTES
Kentrell Carreon is a 53 y.o. male on day 1 of admission presenting with Hypoxia.      Subjective   Kentrell Carreon is a 53 y.o. male with a past medical history of end-stage renal disease on home hemodialysis Monday Tuesday Thursday Friday via [facility] under Dr. Epperson, coronary artery disease status post PCI, three-vessel CABG in March 2022, HFmrEF, aortic stenosis, tachycardia s/p ablation, insulin-dependent diabetes, hypertension, hyperlipidemia, history of tension pneumothorax in the setting of thoracentesis, right second toe transmetatarsal amputation, right foot osteomyelitis who presents with shortness of breath.  He became short of breath 1 day prior to presentation after returning home from [place].  He reports the associated symptoms of a nonproductive cough and mild chest discomfort.  In the ED, his potassium was 5.9, there was high-sensitivity troponin elevation, BNP greater than 4700.  Chest plain film showed alveolar infiltrates in the right upper lung and in the left mid and lower lung concerning for pneumonia.  There was groundglass infiltrates throughout the lungs on CT with multiple nodular densities in both upper lobes concerning for inflammation.  He was placed on heparin drip.  BiPAP.  Cardiology was consulted.  Nephrology is consulted for renal care.        Objective          Vitals 24HR  Heart Rate:  [76-96]   Temp:  [36.1 °C (97 °F)-36.7 °C (98 °F)]   Resp:  [16-30]   BP: ()/(53-74)   SpO2:  [88 %-100 %]     Intake/Output last 3 Shifts:    Intake/Output Summary (Last 24 hours) at 3/27/2024 1039  Last data filed at 3/27/2024 0855  Gross per 24 hour   Intake 1040 ml   Output --   Net 1040 ml       Physical Exam  Constitutional: Well developed, awake/alert/oriented  x3, no distress, alert and cooperative   Eyes: PERRL, EOMI, clear sclera   ENMT: mucous membranes moist   Head/Neck: Neck supple, no JVD, trachea midline   Respiratory/Thorax: Patent airways, CTAB, normal  breath sounds with  good chest expansion, thorax symmetric   Cardiovascular: Regular, rate and rhythm, systolic ejection murmur, normal S 1 and S 2  Right arm aVF within normal limits   Gastrointestinal: Nondistended, soft, non-tender, no rebound tenderness or guarding, no masses palpable, no organomegaly, +BS, no bruits   Musculoskeletal: ROM intact, no joint swelling, normal  strength   Extremities: No leg edema   Neurological: alert and oriented x3  Nonfocal   no asterixis   Skin: Warm and dry     Scheduled Medications  albuterol, 2.5 mg, nebulization, TID  aspirin, 81 mg, oral, Daily  atorvastatin, 80 mg, oral, Daily  azithromycin, 500 mg, oral, q24h  carvedilol, 25 mg, oral, BID with meals  cefTRIAXone, 2 g, intravenous, q24h  epoetin annita or biosimilar, 150 Units/kg, subcutaneous, Every Mon/Wed/Fri  famotidine, 20 mg, oral, Daily  heparin (porcine), 5,000 Units, subcutaneous, q8h SHANA  insulin glargine, 15 Units, subcutaneous, Nightly  insulin lispro, 0-5 Units, subcutaneous, TID with meals  melatonin, 3 mg, oral, Daily  oxygen, , inhalation, Continuous - Inhalation  sacubitriL-valsartan, 1 tablet, oral, BID  sevelamer carbonate, 800 mg, oral, TID with meals  SITagliptin phosphate, 25 mg, oral, Daily      Continuous medications       PRN medications: acetaminophen **OR** acetaminophen **OR** acetaminophen, acetaminophen **OR** acetaminophen **OR** acetaminophen, albuterol, dextrose, dextrose, dextrose, dextrose, glucagon, glucagon, glucagon, glucagon, melatonin, ondansetron **OR** ondansetron     Relevant Results  Results from last 7 days   Lab Units 03/26/24  0758 03/26/24  0114 03/25/24  2108   WBC AUTO x10*3/uL 8.3 7.1 9.1   HEMOGLOBIN g/dL 8.9* 8.8* 8.9*   HEMATOCRIT % 28.0* 27.7* 27.2*   PLATELETS AUTO x10*3/uL 187 179 194   NEUTROS PCT AUTO %  --   --  78.6   LYMPHS PCT AUTO %  --   --  7.7   MONOS PCT AUTO %  --   --  11.5   EOS PCT AUTO %  --   --  0.9     Results from last 7 days   Lab Units 03/27/24  0456 03/26/24  2053  03/25/24  2108   SODIUM mmol/L 137 138 139   POTASSIUM mmol/L 4.7 5.9* 6.0*   CHLORIDE mmol/L 95* 96* 96*   CO2 mmol/L 30 21 24   BUN mg/dL 51* 89* 83*   CREATININE mg/dL 9.13* 13.03* 12.14*   GLUCOSE mg/dL 124* 164* 157*   CALCIUM mg/dL 9.0 9.6 9.2       CT chest abdomen pelvis wo IV contrast   Final Result   1. Chest CT demonstrates groundglass infiltrates throughout both   lungs, though these have improved since the previous examination.   There are multiple nodular densities in both upper lobes which I   suspect are inflammatory.   2. Small bilateral pleural effusions.   3. No acute pathology in the abdomen or pelvis.   Signed by Christian River MD      XR chest 1 view   Final Result   Again seen is cardiomegaly.  There are alveolar infiltrates in the   right upper lung laterally and in the left mid and lower lung.  The   appearance is more consistent with pneumonia than edema but   correlation with clinical findings is recommended.  There may also be   a tiny right pleural effusion..   Signed by Albert Huertas MD               Assessment/Plan   This patient currently has cardiac telemetry ordered; if you would like to modify or discontinue the telemetry order, click here to go to the orders activity to modify/discontinue the order.    Kentrell Carreon is a 53 y.o. male with a past medical history of end-stage renal disease on home hemodialysis Monday Tuesday Thursday Friday via [facility] under Dr. Epperson, coronary artery disease status post PCI, three-vessel CABG in March 2022, HFmrEF, aortic stenosis, tachycardia s/p ablation, insulin-dependent diabetes, hypertension, hyperlipidemia, history of tension pneumothorax in the setting of thoracentesis, right second toe transmetatarsal amputation, right foot osteomyelitis who presents with shortness of breath.  He became short of breath 1 day prior to presentation after returning home from [place].  He reports the associated symptoms of a nonproductive cough and  mild chest discomfort.  In the ED, his potassium was 5.9, there was high-sensitivity troponin elevation, BNP greater than 4700.  Chest plain film showed alveolar infiltrates in the right upper lung and in the left mid and lower lung concerning for pneumonia.  There was groundglass infiltrates throughout the lungs on CT with multiple nodular densities in both upper lobes concerning for inflammation.  He was placed on heparin drip.  BiPAP.  Cardiology was consulted.  Nephrology is consulted for renal care.      Mr. Carreon was seen by cardiology.  Heparin drip was stopped as he was felt to have a non-MI troponin elevation.  He is without chest pain.  We dialyzed him yesterday removing 2 L.  He is receiving pneumonia antimicrobial coverage.  He is weaned from supplemental oxygen and feels well.  He is anxious for discharge.  There are no renal barriers.  We will however plan for dialysis either in-house or at his chronic unit tomorrow.      Principal Problem:    Hypoxia           I spent 35 minutes in the professional and overall care of this patient.      Fletcher Whitley, DO

## 2024-03-27 NOTE — CARE PLAN
Problem: Pain  Goal: My pain/discomfort is manageable  Outcome: Progressing     Problem: Safety  Goal: Patient will be injury free during hospitalization  Outcome: Progressing  Goal: I will remain free of falls  Outcome: Progressing     Problem: Daily Care  Goal: Daily care needs are met  Outcome: Progressing     Problem: Psychosocial Needs  Goal: Demonstrates ability to cope with hospitalization/illness  Outcome: Progressing  Goal: Collaborate with me, my family, and caregiver to identify my specific goals  Outcome: Progressing   The patient's goals for the shift include      The clinical goals for the shift include Pt will remain free of falls through end of shift    Over the shift, the patient did not make progress toward the following goals. Barriers to progression include . Recommendations to address these barriers include .

## 2024-03-27 NOTE — PROGRESS NOTES
"Kentrell Carreon is a 53 y.o. male on day 1 of admission presenting with Hypoxia.    Subjective    And examined, he is on 4 L of oxygen in the morning, he was waiting for dialysis, he feels much better,    Objective     Physical Exam  Alert oriented 3.  HEENT unremarkable.  Neck no JVD.  Heart S1-S2.  Lungs reduced air entry.  Abdomen is soft nontender.  Legs no edema.    Last Recorded Vitals  Blood pressure 117/62, pulse 74, temperature 36.3 °C (97.3 °F), temperature source Temporal, resp. rate 18, height 1.93 m (6' 4\"), weight 78.3 kg (172 lb 9.6 oz), SpO2 98 %.  Intake/Output last 3 Shifts:  I/O last 3 completed shifts:  In: 800 (10.2 mL/kg) [I.V.:800 (10.2 mL/kg)]  Out: - (0 mL/kg)   Weight: 78.3 kg     Relevant Results  Results for orders placed or performed during the hospital encounter of 03/25/24 (from the past 96 hour(s))   ECG 12 lead   Result Value Ref Range    Ventricular Rate 95 BPM    Atrial Rate 95 BPM    MO Interval 196 ms    QRS Duration 136 ms    QT Interval 396 ms    QTC Calculation(Bazett) 497 ms    P Axis 66 degrees    R Axis 48 degrees    T Axis 121 degrees    QRS Count 15 beats    Q Onset 212 ms    P Onset 114 ms    P Offset 174 ms    T Offset 410 ms    QTC Fredericia 461 ms   Sars-CoV-2 and Influenza A/B PCR   Result Value Ref Range    Flu A Result Not Detected Not Detected    Flu B Result Not Detected Not Detected    Coronavirus 2019, PCR Not Detected Not Detected   CBC and Auto Differential   Result Value Ref Range    WBC 9.1 4.4 - 11.3 x10*3/uL    nRBC 0.0 0.0 - 0.0 /100 WBCs    RBC 2.60 (L) 4.50 - 5.90 x10*6/uL    Hemoglobin 8.9 (L) 13.5 - 17.5 g/dL    Hematocrit 27.2 (L) 41.0 - 52.0 %     (H) 80 - 100 fL    MCH 34.2 (H) 26.0 - 34.0 pg    MCHC 32.7 32.0 - 36.0 g/dL    RDW 13.2 11.5 - 14.5 %    Platelets 194 150 - 450 x10*3/uL    Neutrophils % 78.6 40.0 - 80.0 %    Immature Granulocytes %, Automated 0.4 0.0 - 0.9 %    Lymphocytes % 7.7 13.0 - 44.0 %    Monocytes % 11.5 2.0 - 10.0 %    " Eosinophils % 0.9 0.0 - 6.0 %    Basophils % 0.9 0.0 - 2.0 %    Neutrophils Absolute 7.16 1.20 - 7.70 x10*3/uL    Immature Granulocytes Absolute, Automated 0.04 0.00 - 0.70 x10*3/uL    Lymphocytes Absolute 0.70 (L) 1.20 - 4.80 x10*3/uL    Monocytes Absolute 1.05 (H) 0.10 - 1.00 x10*3/uL    Eosinophils Absolute 0.08 0.00 - 0.70 x10*3/uL    Basophils Absolute 0.08 0.00 - 0.10 x10*3/uL   Comprehensive Metabolic Panel   Result Value Ref Range    Glucose 157 (H) 74 - 99 mg/dL    Sodium 139 136 - 145 mmol/L    Potassium 6.0 (H) 3.5 - 5.3 mmol/L    Chloride 96 (L) 98 - 107 mmol/L    Bicarbonate 24 21 - 32 mmol/L    Anion Gap 25 (H) 10 - 20 mmol/L    Urea Nitrogen 83 (H) 6 - 23 mg/dL    Creatinine 12.14 (H) 0.50 - 1.30 mg/dL    eGFR 5 (L) >60 mL/min/1.73m*2    Calcium 9.2 8.6 - 10.3 mg/dL    Albumin 4.5 3.4 - 5.0 g/dL    Alkaline Phosphatase 83 33 - 120 U/L    Total Protein 7.8 6.4 - 8.2 g/dL    AST 20 9 - 39 U/L    Bilirubin, Total 0.4 0.0 - 1.2 mg/dL    ALT 18 10 - 52 U/L   Magnesium   Result Value Ref Range    Magnesium 2.20 1.60 - 2.40 mg/dL   aPTT   Result Value Ref Range    aPTT 35 27 - 38 seconds   Protime-INR   Result Value Ref Range    Protime 12.9 (H) 9.8 - 12.8 seconds    INR 1.1 0.9 - 1.1   B-Type Natriuretic Peptide   Result Value Ref Range    BNP >4,700 (H) 0 - 99 pg/mL   Troponin I, High Sensitivity, Initial   Result Value Ref Range    Troponin I, High Sensitivity 391 (HH) 0 - 20 ng/L   ECG 12 lead   Result Value Ref Range    Ventricular Rate 86 BPM    Atrial Rate 86 BPM    SC Interval 212 ms    QRS Duration 130 ms    QT Interval 426 ms    QTC Calculation(Bazett) 509 ms    P Axis 60 degrees    R Axis -20 degrees    T Axis 129 degrees    QRS Count 14 beats    Q Onset 208 ms    P Onset 102 ms    P Offset 175 ms    T Offset 421 ms    QTC Fredericia 480 ms   Troponin, High Sensitivity, 1 Hour   Result Value Ref Range    Troponin I, High Sensitivity 448 (HH) 0 - 20 ng/L   CBC   Result Value Ref Range    WBC 7.1  4.4 - 11.3 x10*3/uL    nRBC 0.0 0.0 - 0.0 /100 WBCs    RBC 2.64 (L) 4.50 - 5.90 x10*6/uL    Hemoglobin 8.8 (L) 13.5 - 17.5 g/dL    Hematocrit 27.7 (L) 41.0 - 52.0 %     (H) 80 - 100 fL    MCH 33.3 26.0 - 34.0 pg    MCHC 31.8 (L) 32.0 - 36.0 g/dL    RDW 13.2 11.5 - 14.5 %    Platelets 179 150 - 450 x10*3/uL   Heparin Assay, UFH   Result Value Ref Range    Heparin Unfractionated <0.1 See Comment Below for Therapeutic Ranges IU/mL   BLOOD GAS VENOUS FULL PANEL   Result Value Ref Range    POCT pH, Venous 7.39 7.33 - 7.43 pH    POCT pCO2, Venous 40 (L) 41 - 51 mm Hg    POCT pO2, Venous 41 35 - 45 mm Hg    POCT SO2, Venous 66 45 - 75 %    POCT Oxy Hemoglobin, Venous 64.9 45.0 - 75.0 %    POCT Hematocrit Calculated, Venous 26.0 (L) 41.0 - 52.0 %    POCT Sodium, Venous 137 136 - 145 mmol/L    POCT Potassium, Venous 5.8 (H) 3.5 - 5.3 mmol/L    POCT Chloride, Venous 100 98 - 107 mmol/L    POCT Ionized Calicum, Venous 1.08 (L) 1.10 - 1.33 mmol/L    POCT Glucose, Venous 208 (H) 74 - 99 mg/dL    POCT Lactate, Venous 1.2 0.4 - 2.0 mmol/L    POCT Base Excess, Venous -0.7 -2.0 - 3.0 mmol/L    POCT HCO3 Calculated, Venous 24.2 22.0 - 26.0 mmol/L    POCT Hemoglobin, Venous 8.5 (L) 13.5 - 17.5 g/dL    POCT Anion Gap, Venous 19.0 10.0 - 25.0 mmol/L    Patient Temperature 37.0 degrees Celsius    FiO2 21 %   ECG 12 lead   Result Value Ref Range    Ventricular Rate 89 BPM    Atrial Rate 89 BPM    IA Interval 206 ms    QRS Duration 122 ms    QT Interval 404 ms    QTC Calculation(Bazett) 491 ms    P Axis 54 degrees    R Axis -22 degrees    T Axis 126 degrees    QRS Count 15 beats    Q Onset 210 ms    P Onset 107 ms    P Offset 174 ms    T Offset 412 ms    QTC Fredericia 460 ms   Troponin I, High Sensitivity   Result Value Ref Range    Troponin I, High Sensitivity 608 (HH) 0 - 20 ng/L   Heparin Assay, UFH   Result Value Ref Range    Heparin Unfractionated 0.3 See Comment Below for Therapeutic Ranges IU/mL   CBC   Result Value Ref  Range    WBC 8.3 4.4 - 11.3 x10*3/uL    nRBC 0.0 0.0 - 0.0 /100 WBCs    RBC 2.71 (L) 4.50 - 5.90 x10*6/uL    Hemoglobin 8.9 (L) 13.5 - 17.5 g/dL    Hematocrit 28.0 (L) 41.0 - 52.0 %     (H) 80 - 100 fL    MCH 32.8 26.0 - 34.0 pg    MCHC 31.8 (L) 32.0 - 36.0 g/dL    RDW 13.3 11.5 - 14.5 %    Platelets 187 150 - 450 x10*3/uL   Basic Metabolic Panel   Result Value Ref Range    Glucose 164 (H) 74 - 99 mg/dL    Sodium 138 136 - 145 mmol/L    Potassium 5.9 (H) 3.5 - 5.3 mmol/L    Chloride 96 (L) 98 - 107 mmol/L    Bicarbonate 21 21 - 32 mmol/L    Anion Gap 27 (H) 10 - 20 mmol/L    Urea Nitrogen 89 (H) 6 - 23 mg/dL    Creatinine 13.03 (H) 0.50 - 1.30 mg/dL    eGFR 4 (L) >60 mL/min/1.73m*2    Calcium 9.6 8.6 - 10.3 mg/dL   POCT GLUCOSE   Result Value Ref Range    POCT Glucose 178 (H) 74 - 99 mg/dL   Phosphorus   Result Value Ref Range    Phosphorus 6.9 (H) 2.5 - 4.9 mg/dL   Heparin Assay, UFH   Result Value Ref Range    Heparin Unfractionated 0.1 See Comment Below for Therapeutic Ranges IU/mL   POCT GLUCOSE   Result Value Ref Range    POCT Glucose 112 (H) 74 - 99 mg/dL   Blood Culture    Specimen: Peripheral Venipuncture; Blood culture   Result Value Ref Range    Blood Culture Loaded on Instrument - Culture in progress    POCT GLUCOSE   Result Value Ref Range    POCT Glucose 249 (H) 74 - 99 mg/dL   Renal function panel   Result Value Ref Range    Glucose 124 (H) 74 - 99 mg/dL    Sodium 137 136 - 145 mmol/L    Potassium 4.7 3.5 - 5.3 mmol/L    Chloride 95 (L) 98 - 107 mmol/L    Bicarbonate 30 21 - 32 mmol/L    Anion Gap 17 10 - 20 mmol/L    Urea Nitrogen 51 (H) 6 - 23 mg/dL    Creatinine 9.13 (H) 0.50 - 1.30 mg/dL    eGFR 6 (L) >60 mL/min/1.73m*2    Calcium 9.0 8.6 - 10.3 mg/dL    Phosphorus 6.2 (H) 2.5 - 4.9 mg/dL    Albumin 3.7 3.4 - 5.0 g/dL   POCT GLUCOSE   Result Value Ref Range    POCT Glucose 93 74 - 99 mg/dL   POCT GLUCOSE   Result Value Ref Range    POCT Glucose 142 (H) 74 - 99 mg/dL   POCT GLUCOSE   Result  Value Ref Range    POCT Glucose 161 (H) 74 - 99 mg/dL        Medications:  albuterol, 2.5 mg, nebulization, TID  aspirin, 81 mg, oral, Daily  atorvastatin, 80 mg, oral, Daily  azithromycin, 500 mg, oral, q24h  carvedilol, 25 mg, oral, BID with meals  cefTRIAXone, 2 g, intravenous, q24h  epoetin annita or biosimilar, 150 Units/kg, subcutaneous, Every Mon/Wed/Fri  famotidine, 20 mg, oral, Daily  heparin (porcine), 5,000 Units, subcutaneous, q8h SHANA  insulin glargine, 15 Units, subcutaneous, Nightly  insulin lispro, 0-5 Units, subcutaneous, TID with meals  melatonin, 3 mg, oral, Daily  oxygen, , inhalation, Continuous - Inhalation  sacubitriL-valsartan, 1 tablet, oral, BID  sevelamer carbonate, 800 mg, oral, TID with meals  SITagliptin phosphate, 25 mg, oral, Daily      PRN medications: acetaminophen **OR** acetaminophen **OR** acetaminophen, acetaminophen **OR** acetaminophen **OR** acetaminophen, albuterol, dextrose, dextrose, dextrose, dextrose, glucagon, glucagon, glucagon, glucagon, melatonin, ondansetron **OR** ondansetron    ECG 12 lead    Result Date: 3/26/2024  Normal sinus rhythm Possible Left atrial enlargement Left ventricular hypertrophy with QRS widening and repolarization abnormality ( [place] product ) Abnormal ECG When compared with ECG of 25-MAR-2024 22:43, (unconfirmed) No significant change was found See ED provider note for full interpretation and clinical correlation Confirmed by Amber Mendiola ([id number]) on 3/26/2024 12:54:33 PM    ECG 12 lead    Result Date: 3/26/2024  Sinus rhythm with 1st degree AV block Possible Left atrial enlargement Left ventricular hypertrophy with QRS widening and repolarization abnormality ( [place] product ) Abnormal ECG When compared with ECG of 25-MAR-2024 20:34, (unconfirmed) Questionable change in QRS axis See ED provider note for full interpretation and clinical correlation Confirmed by Amber Mendiola ([id number]) on 3/26/2024 11:29:53 AM    ECG 12 lead    Result Date:  3/26/2024  Normal sinus rhythm Left ventricular hypertrophy with QRS widening and repolarization abnormality ( [place] product ) Abnormal ECG When compared with ECG of 06-FEB-2024 16:05, Incomplete left bundle branch block is no longer Present See ED provider note for full interpretation and clinical correlation Confirmed by Amber Mendiola ([id number]) on 3/26/2024 10:57:52 AM    CT chest abdomen pelvis wo IV contrast    Result Date: 3/25/2024  STUDY: CT Chest, Abdomen, and Pelvis without IV Contrast; 3/25/2024 at 10:06 PM INDICATION: Chest pain. COMPARISON: CTA chest 3/16/2023. ACCESSION NUMBER(S): [id number] ORDERING CLINICIAN: ESTRELLITA PUENTE TECHNIQUE: CT of the chest, abdomen, and pelvis was performed.  Contiguous axial images were obtained at 3 mm slice thickness through the chest, abdomen, and pelvis.  Coronal and sagittal reconstructions at 3 mm slice thickness were performed.  No intravenous contrast was administered.  FINDINGS: Please note that the evaluation of vessels, lymph nodes and organs is limited without intravenous contrast. CHEST: MEDIASTINUM: The heart is normal in size without pericardial effusion.  Coronary artery calcifications are identified.  LUNGS/PLEURA: Again seen are groundglass infiltrates throughout both lungs, though these have improved since the previous examination. There are multiple nodular densities in both upper lobes which I suspect are inflammatory. There are small bilateral pleural effusions. LYMPH NODES: Thoracic lymph nodes are not enlarged. ABDOMEN:  LIVER: No hepatomegaly.  Smooth surface contour.  Normal attenuation.  BILE DUCTS: No intrahepatic or extrahepatic biliary ductal dilatation.  GALLBLADDER: The gallbladder is unremarkable. STOMACH: No abnormalities identified.  PANCREAS: No masses or ductal dilatation.  SPLEEN: No splenomegaly or focal splenic lesion.  ADRENAL GLANDS: No thickening or nodules.  KIDNEYS AND URETERS: The kidneys are atrophic. No renal or ureteral  calculi.  PELVIS:  BLADDER: No abnormalities identified.  REPRODUCTIVE ORGANS: No abnormalities identified.  BOWEL: No abnormalities identified.  VESSELS: No abnormalities identified.  Abdominal aorta is normal in caliber.  PERITONEUM/RETROPERITONEUM/LYMPH NODES: No free fluid.  No pneumoperitoneum. No lymphadenopathy.  ABDOMINAL WALL: No abnormalities identified. SOFT TISSUES: No abnormalities identified.  BONES: No acute fracture or aggressive osseous lesion.    1. Chest CT demonstrates groundglass infiltrates throughout both lungs, though these have improved since the previous examination. There are multiple nodular densities in both upper lobes which I suspect are inflammatory. 2. Small bilateral pleural effusions. 3. No acute pathology in the abdomen or pelvis. Signed by Christian River MD    XR chest 1 view    Result Date: 3/25/2024  STUDY: Chest Radiograph;  3/25/2024 at 9:19 PM INDICATION: Chest pain. COMPARISON: XR chest 2/6/2024, XR chest 3/18/2023, XR chest 3/16/2023, XR chest 6/3/2022. ACCESSION NUMBER(S): [id number] ORDERING CLINICIAN: ESTRELLITA PUENTE TECHNIQUE:  Frontal chest was obtained at 2119 hours. FINDINGS: CARDIOMEDIASTINAL SILHOUETTE: The heart remains enlarged and again seen are sternal wire sutures LUNGS: .  On today's exam there are alveolar infiltrates in the right upper lobe laterally at the left mid and lower lung.  This is a change from the prior exam of February 6.  The pattern is more consistent with pneumonia than edema.  No definite pleural effusion is seen on the left.  There is a questionable very tiny right pleural effusion. There is no evidence of pneumothorax.  Again seen is a vascular stent in the right axillary area..  ABDOMEN: No remarkable upper abdominal findings.  BONES: No acute osseous changes.    Again seen is cardiomegaly.  There are alveolar infiltrates in the right upper lung laterally and in the left mid and lower lung.  The appearance is more consistent with  pneumonia than edema but correlation with clinical findings is recommended.  There may also be a tiny right pleural effusion.. Signed by Albert Huertas MD      Assessment/Plan    53-year-old  male, who is known to have end-stage renal disease, he states due to his diabetes, on renal replacement therapy 4 days a week he home dialysis hemodialysis, came to the emergency department with increasing shortness of breath.  He had a workup done which included CAT scan of the chest abdomen pelvis and he was diagnosed as fluid overload and admitted here for further management.  He was markedly hypoxic he was on BiPAP and in the morning he was changed to 6 L of oxygen.  He was on 4 L  Acute hypoxia secondary to most likely fluid overload rule out pneumonia.  He is seeing the nephrologist for urgent dialysis will wean the oxygen down.  CAT scan of the chest cannot rule out pneumonia as a result started on antibiotics.  Diabetes continue with his home insulin sliding scale.  High troponin was trending up he was on heparin drip he was seen by the cardiologist no need for heparin drip, core measures wont apply .  Hyperlipidemia continue with the same.  DVT prophylaxis as heparin.     Most likely discharge plans tomorrow, when able to wean his oxygen,    I spent 35 minutes in the professional and overall care of this patient.    Raegan Sibley MD

## 2024-03-28 ENCOUNTER — APPOINTMENT (OUTPATIENT)
Dept: DIALYSIS | Facility: HOSPITAL | Age: 54
End: 2024-03-28
Payer: COMMERCIAL

## 2024-03-28 VITALS
HEART RATE: 79 BPM | WEIGHT: 168.43 LBS | OXYGEN SATURATION: 92 % | TEMPERATURE: 98.5 F | DIASTOLIC BLOOD PRESSURE: 62 MMHG | RESPIRATION RATE: 20 BRPM | SYSTOLIC BLOOD PRESSURE: 122 MMHG | HEIGHT: 76 IN | BODY MASS INDEX: 20.51 KG/M2

## 2024-03-28 LAB
ALBUMIN SERPL BCP-MCNC: 3.6 G/DL (ref 3.4–5)
ANION GAP SERPL CALC-SCNC: 21 MMOL/L (ref 10–20)
BUN SERPL-MCNC: 69 MG/DL (ref 6–23)
CALCIUM SERPL-MCNC: 8.8 MG/DL (ref 8.6–10.3)
CHLORIDE SERPL-SCNC: 95 MMOL/L (ref 98–107)
CO2 SERPL-SCNC: 26 MMOL/L (ref 21–32)
CREAT SERPL-MCNC: 11.15 MG/DL (ref 0.5–1.3)
EGFRCR SERPLBLD CKD-EPI 2021: 5 ML/MIN/1.73M*2
ERYTHROCYTE [DISTWIDTH] IN BLOOD BY AUTOMATED COUNT: 13.2 % (ref 11.5–14.5)
GLUCOSE BLD MANUAL STRIP-MCNC: 120 MG/DL (ref 74–99)
GLUCOSE BLD MANUAL STRIP-MCNC: 169 MG/DL (ref 74–99)
GLUCOSE SERPL-MCNC: 79 MG/DL (ref 74–99)
HCT VFR BLD AUTO: 27.3 % (ref 41–52)
HGB BLD-MCNC: 8.8 G/DL (ref 13.5–17.5)
MCH RBC QN AUTO: 33.1 PG (ref 26–34)
MCHC RBC AUTO-ENTMCNC: 32.2 G/DL (ref 32–36)
MCV RBC AUTO: 103 FL (ref 80–100)
NRBC BLD-RTO: 0 /100 WBCS (ref 0–0)
PHOSPHATE SERPL-MCNC: 7.4 MG/DL (ref 2.5–4.9)
PLATELET # BLD AUTO: 174 X10*3/UL (ref 150–450)
POTASSIUM SERPL-SCNC: 4.7 MMOL/L (ref 3.5–5.3)
RBC # BLD AUTO: 2.66 X10*6/UL (ref 4.5–5.9)
SODIUM SERPL-SCNC: 137 MMOL/L (ref 136–145)
WBC # BLD AUTO: 4.7 X10*3/UL (ref 4.4–11.3)

## 2024-03-28 PROCEDURE — 80069 RENAL FUNCTION PANEL: CPT

## 2024-03-28 PROCEDURE — 94640 AIRWAY INHALATION TREATMENT: CPT | Mod: MUE

## 2024-03-28 PROCEDURE — 8010000001 HC DIALYSIS - HEMODIALYSIS PER DAY

## 2024-03-28 PROCEDURE — 2500000004 HC RX 250 GENERAL PHARMACY W/ HCPCS (ALT 636 FOR OP/ED): Performed by: INTERNAL MEDICINE

## 2024-03-28 PROCEDURE — 94640 AIRWAY INHALATION TREATMENT: CPT

## 2024-03-28 PROCEDURE — 36415 COLL VENOUS BLD VENIPUNCTURE: CPT

## 2024-03-28 PROCEDURE — 99232 SBSQ HOSP IP/OBS MODERATE 35: CPT | Performed by: INTERNAL MEDICINE

## 2024-03-28 PROCEDURE — 2500000002 HC RX 250 W HCPCS SELF ADMINISTERED DRUGS (ALT 637 FOR MEDICARE OP, ALT 636 FOR OP/ED): Performed by: INTERNAL MEDICINE

## 2024-03-28 PROCEDURE — 94761 N-INVAS EAR/PLS OXIMETRY MLT: CPT

## 2024-03-28 PROCEDURE — 2500000002 HC RX 250 W HCPCS SELF ADMINISTERED DRUGS (ALT 637 FOR MEDICARE OP, ALT 636 FOR OP/ED): Mod: MUE | Performed by: INTERNAL MEDICINE

## 2024-03-28 PROCEDURE — 85027 COMPLETE CBC AUTOMATED: CPT

## 2024-03-28 PROCEDURE — 99233 SBSQ HOSP IP/OBS HIGH 50: CPT | Performed by: NURSE PRACTITIONER

## 2024-03-28 PROCEDURE — 82947 ASSAY GLUCOSE BLOOD QUANT: CPT

## 2024-03-28 PROCEDURE — 2500000002 HC RX 250 W HCPCS SELF ADMINISTERED DRUGS (ALT 637 FOR MEDICARE OP, ALT 636 FOR OP/ED)

## 2024-03-28 PROCEDURE — 94660 CPAP INITIATION&MGMT: CPT

## 2024-03-28 RX ORDER — LEVOFLOXACIN 500 MG/1
500 TABLET, FILM COATED ORAL EVERY OTHER DAY
Qty: 2 TABLET | Refills: 0 | Status: SHIPPED | OUTPATIENT
Start: 2024-03-28 | End: 2024-03-31

## 2024-03-28 RX ADMIN — SEVELAMER CARBONATE 800 MG: 800 TABLET, FILM COATED ORAL at 13:28

## 2024-03-28 RX ADMIN — ALBUTEROL SULFATE 2.5 MG: 2.5 SOLUTION RESPIRATORY (INHALATION) at 13:44

## 2024-03-28 RX ADMIN — HEPARIN SODIUM 5000 UNITS: 5000 INJECTION INTRAVENOUS; SUBCUTANEOUS at 05:30

## 2024-03-28 RX ADMIN — INSULIN LISPRO 1 UNITS: 100 INJECTION, SOLUTION INTRAVENOUS; SUBCUTANEOUS at 13:28

## 2024-03-28 RX ADMIN — ALBUTEROL SULFATE 2.5 MG: 2.5 SOLUTION RESPIRATORY (INHALATION) at 07:56

## 2024-03-28 RX ADMIN — HEPARIN SODIUM 5000 UNITS: 5000 INJECTION INTRAVENOUS; SUBCUTANEOUS at 13:28

## 2024-03-28 ASSESSMENT — COGNITIVE AND FUNCTIONAL STATUS - GENERAL
MOBILITY SCORE: 24
DAILY ACTIVITIY SCORE: 24

## 2024-03-28 ASSESSMENT — PAIN SCALES - GENERAL: PAINLEVEL_OUTOF10: 0 - NO PAIN

## 2024-03-28 NOTE — PROGRESS NOTES
"Kentrell Carreon is a 53 y.o. male on day 2 of admission presenting with Hypoxia.    Subjective   Pt seen and examined at bedside. No apparent distress. Getting dialysis at this time. On room air. No concerns. Ready to go home.     Objective     Physical Exam  Alert oriented 3.  HEENT unremarkable.  Neck no JVD.  Lungs reduced air entry. On room air.   Abdomen is soft nontender.  Legs no edema.    Last Recorded Vitals  Blood pressure 122/62, pulse 79, temperature 36.9 °C (98.5 °F), temperature source Tympanic, resp. rate 20, height 1.93 m (6' 4\"), weight 76.4 kg (168 lb 6.9 oz), SpO2 92 %.  Intake/Output last 3 Shifts:  I/O last 3 completed shifts:  In: 240 (3.1 mL/kg) [P.O.:240]  Out: - (0 mL/kg)   Weight: 76.4 kg     Relevant Results  Results for orders placed or performed during the hospital encounter of 03/25/24 (from the past 96 hour(s))   ECG 12 lead   Result Value Ref Range    Ventricular Rate 95 BPM    Atrial Rate 95 BPM    VA Interval 196 ms    QRS Duration 136 ms    QT Interval 396 ms    QTC Calculation(Bazett) 497 ms    P Axis 66 degrees    R Axis 48 degrees    T Axis 121 degrees    QRS Count 15 beats    Q Onset 212 ms    P Onset 114 ms    P Offset 174 ms    T Offset 410 ms    QTC Fredericia 461 ms   Sars-CoV-2 and Influenza A/B PCR   Result Value Ref Range    Flu A Result Not Detected Not Detected    Flu B Result Not Detected Not Detected    Coronavirus 2019, PCR Not Detected Not Detected   CBC and Auto Differential   Result Value Ref Range    WBC 9.1 4.4 - 11.3 x10*3/uL    nRBC 0.0 0.0 - 0.0 /100 WBCs    RBC 2.60 (L) 4.50 - 5.90 x10*6/uL    Hemoglobin 8.9 (L) 13.5 - 17.5 g/dL    Hematocrit 27.2 (L) 41.0 - 52.0 %     (H) 80 - 100 fL    MCH 34.2 (H) 26.0 - 34.0 pg    MCHC 32.7 32.0 - 36.0 g/dL    RDW 13.2 11.5 - 14.5 %    Platelets 194 150 - 450 x10*3/uL    Neutrophils % 78.6 40.0 - 80.0 %    Immature Granulocytes %, Automated 0.4 0.0 - 0.9 %    Lymphocytes % 7.7 13.0 - 44.0 %    Monocytes % 11.5 2.0 - " 10.0 %    Eosinophils % 0.9 0.0 - 6.0 %    Basophils % 0.9 0.0 - 2.0 %    Neutrophils Absolute 7.16 1.20 - 7.70 x10*3/uL    Immature Granulocytes Absolute, Automated 0.04 0.00 - 0.70 x10*3/uL    Lymphocytes Absolute 0.70 (L) 1.20 - 4.80 x10*3/uL    Monocytes Absolute 1.05 (H) 0.10 - 1.00 x10*3/uL    Eosinophils Absolute 0.08 0.00 - 0.70 x10*3/uL    Basophils Absolute 0.08 0.00 - 0.10 x10*3/uL   Comprehensive Metabolic Panel   Result Value Ref Range    Glucose 157 (H) 74 - 99 mg/dL    Sodium 139 136 - 145 mmol/L    Potassium 6.0 (H) 3.5 - 5.3 mmol/L    Chloride 96 (L) 98 - 107 mmol/L    Bicarbonate 24 21 - 32 mmol/L    Anion Gap 25 (H) 10 - 20 mmol/L    Urea Nitrogen 83 (H) 6 - 23 mg/dL    Creatinine 12.14 (H) 0.50 - 1.30 mg/dL    eGFR 5 (L) >60 mL/min/1.73m*2    Calcium 9.2 8.6 - 10.3 mg/dL    Albumin 4.5 3.4 - 5.0 g/dL    Alkaline Phosphatase 83 33 - 120 U/L    Total Protein 7.8 6.4 - 8.2 g/dL    AST 20 9 - 39 U/L    Bilirubin, Total 0.4 0.0 - 1.2 mg/dL    ALT 18 10 - 52 U/L   Magnesium   Result Value Ref Range    Magnesium 2.20 1.60 - 2.40 mg/dL   aPTT   Result Value Ref Range    aPTT 35 27 - 38 seconds   Protime-INR   Result Value Ref Range    Protime 12.9 (H) 9.8 - 12.8 seconds    INR 1.1 0.9 - 1.1   B-Type Natriuretic Peptide   Result Value Ref Range    BNP >4,700 (H) 0 - 99 pg/mL   Troponin I, High Sensitivity, Initial   Result Value Ref Range    Troponin I, High Sensitivity 391 (HH) 0 - 20 ng/L   ECG 12 lead   Result Value Ref Range    Ventricular Rate 86 BPM    Atrial Rate 86 BPM    ID Interval 212 ms    QRS Duration 130 ms    QT Interval 426 ms    QTC Calculation(Bazett) 509 ms    P Axis 60 degrees    R Axis -20 degrees    T Axis 129 degrees    QRS Count 14 beats    Q Onset 208 ms    P Onset 102 ms    P Offset 175 ms    T Offset 421 ms    QTC Fredericia 480 ms   Troponin, High Sensitivity, 1 Hour   Result Value Ref Range    Troponin I, High Sensitivity 448 (HH) 0 - 20 ng/L   CBC   Result Value Ref Range     WBC 7.1 4.4 - 11.3 x10*3/uL    nRBC 0.0 0.0 - 0.0 /100 WBCs    RBC 2.64 (L) 4.50 - 5.90 x10*6/uL    Hemoglobin 8.8 (L) 13.5 - 17.5 g/dL    Hematocrit 27.7 (L) 41.0 - 52.0 %     (H) 80 - 100 fL    MCH 33.3 26.0 - 34.0 pg    MCHC 31.8 (L) 32.0 - 36.0 g/dL    RDW 13.2 11.5 - 14.5 %    Platelets 179 150 - 450 x10*3/uL   Heparin Assay, UFH   Result Value Ref Range    Heparin Unfractionated <0.1 See Comment Below for Therapeutic Ranges IU/mL   BLOOD GAS VENOUS FULL PANEL   Result Value Ref Range    POCT pH, Venous 7.39 7.33 - 7.43 pH    POCT pCO2, Venous 40 (L) 41 - 51 mm Hg    POCT pO2, Venous 41 35 - 45 mm Hg    POCT SO2, Venous 66 45 - 75 %    POCT Oxy Hemoglobin, Venous 64.9 45.0 - 75.0 %    POCT Hematocrit Calculated, Venous 26.0 (L) 41.0 - 52.0 %    POCT Sodium, Venous 137 136 - 145 mmol/L    POCT Potassium, Venous 5.8 (H) 3.5 - 5.3 mmol/L    POCT Chloride, Venous 100 98 - 107 mmol/L    POCT Ionized Calicum, Venous 1.08 (L) 1.10 - 1.33 mmol/L    POCT Glucose, Venous 208 (H) 74 - 99 mg/dL    POCT Lactate, Venous 1.2 0.4 - 2.0 mmol/L    POCT Base Excess, Venous -0.7 -2.0 - 3.0 mmol/L    POCT HCO3 Calculated, Venous 24.2 22.0 - 26.0 mmol/L    POCT Hemoglobin, Venous 8.5 (L) 13.5 - 17.5 g/dL    POCT Anion Gap, Venous 19.0 10.0 - 25.0 mmol/L    Patient Temperature 37.0 degrees Celsius    FiO2 21 %   ECG 12 lead   Result Value Ref Range    Ventricular Rate 89 BPM    Atrial Rate 89 BPM    NC Interval 206 ms    QRS Duration 122 ms    QT Interval 404 ms    QTC Calculation(Bazett) 491 ms    P Axis 54 degrees    R Axis -22 degrees    T Axis 126 degrees    QRS Count 15 beats    Q Onset 210 ms    P Onset 107 ms    P Offset 174 ms    T Offset 412 ms    QTC Fredericia 460 ms   Troponin I, High Sensitivity   Result Value Ref Range    Troponin I, High Sensitivity 608 (HH) 0 - 20 ng/L   Heparin Assay, UFH   Result Value Ref Range    Heparin Unfractionated 0.3 See Comment Below for Therapeutic Ranges IU/mL   CBC   Result Value  Ref Range    WBC 8.3 4.4 - 11.3 x10*3/uL    nRBC 0.0 0.0 - 0.0 /100 WBCs    RBC 2.71 (L) 4.50 - 5.90 x10*6/uL    Hemoglobin 8.9 (L) 13.5 - 17.5 g/dL    Hematocrit 28.0 (L) 41.0 - 52.0 %     (H) 80 - 100 fL    MCH 32.8 26.0 - 34.0 pg    MCHC 31.8 (L) 32.0 - 36.0 g/dL    RDW 13.3 11.5 - 14.5 %    Platelets 187 150 - 450 x10*3/uL   Basic Metabolic Panel   Result Value Ref Range    Glucose 164 (H) 74 - 99 mg/dL    Sodium 138 136 - 145 mmol/L    Potassium 5.9 (H) 3.5 - 5.3 mmol/L    Chloride 96 (L) 98 - 107 mmol/L    Bicarbonate 21 21 - 32 mmol/L    Anion Gap 27 (H) 10 - 20 mmol/L    Urea Nitrogen 89 (H) 6 - 23 mg/dL    Creatinine 13.03 (H) 0.50 - 1.30 mg/dL    eGFR 4 (L) >60 mL/min/1.73m*2    Calcium 9.6 8.6 - 10.3 mg/dL   POCT GLUCOSE   Result Value Ref Range    POCT Glucose 178 (H) 74 - 99 mg/dL   Phosphorus   Result Value Ref Range    Phosphorus 6.9 (H) 2.5 - 4.9 mg/dL   Heparin Assay, UFH   Result Value Ref Range    Heparin Unfractionated 0.1 See Comment Below for Therapeutic Ranges IU/mL   POCT GLUCOSE   Result Value Ref Range    POCT Glucose 112 (H) 74 - 99 mg/dL   Blood Culture    Specimen: Peripheral Venipuncture; Blood culture   Result Value Ref Range    Blood Culture No growth at 1 day    POCT GLUCOSE   Result Value Ref Range    POCT Glucose 249 (H) 74 - 99 mg/dL   Renal function panel   Result Value Ref Range    Glucose 124 (H) 74 - 99 mg/dL    Sodium 137 136 - 145 mmol/L    Potassium 4.7 3.5 - 5.3 mmol/L    Chloride 95 (L) 98 - 107 mmol/L    Bicarbonate 30 21 - 32 mmol/L    Anion Gap 17 10 - 20 mmol/L    Urea Nitrogen 51 (H) 6 - 23 mg/dL    Creatinine 9.13 (H) 0.50 - 1.30 mg/dL    eGFR 6 (L) >60 mL/min/1.73m*2    Calcium 9.0 8.6 - 10.3 mg/dL    Phosphorus 6.2 (H) 2.5 - 4.9 mg/dL    Albumin 3.7 3.4 - 5.0 g/dL   POCT GLUCOSE   Result Value Ref Range    POCT Glucose 93 74 - 99 mg/dL   POCT GLUCOSE   Result Value Ref Range    POCT Glucose 142 (H) 74 - 99 mg/dL   POCT GLUCOSE   Result Value Ref Range     POCT Glucose 161 (H) 74 - 99 mg/dL   POCT GLUCOSE   Result Value Ref Range    POCT Glucose 206 (H) 74 - 99 mg/dL   CBC   Result Value Ref Range    WBC 4.7 4.4 - 11.3 x10*3/uL    nRBC 0.0 0.0 - 0.0 /100 WBCs    RBC 2.66 (L) 4.50 - 5.90 x10*6/uL    Hemoglobin 8.8 (L) 13.5 - 17.5 g/dL    Hematocrit 27.3 (L) 41.0 - 52.0 %     (H) 80 - 100 fL    MCH 33.1 26.0 - 34.0 pg    MCHC 32.2 32.0 - 36.0 g/dL    RDW 13.2 11.5 - 14.5 %    Platelets 174 150 - 450 x10*3/uL   Renal function panel   Result Value Ref Range    Glucose 79 74 - 99 mg/dL    Sodium 137 136 - 145 mmol/L    Potassium 4.7 3.5 - 5.3 mmol/L    Chloride 95 (L) 98 - 107 mmol/L    Bicarbonate 26 21 - 32 mmol/L    Anion Gap 21 (H) 10 - 20 mmol/L    Urea Nitrogen 69 (H) 6 - 23 mg/dL    Creatinine 11.15 (H) 0.50 - 1.30 mg/dL    eGFR 5 (L) >60 mL/min/1.73m*2    Calcium 8.8 8.6 - 10.3 mg/dL    Phosphorus 7.4 (H) 2.5 - 4.9 mg/dL    Albumin 3.6 3.4 - 5.0 g/dL   POCT GLUCOSE   Result Value Ref Range    POCT Glucose 120 (H) 74 - 99 mg/dL   POCT GLUCOSE   Result Value Ref Range    POCT Glucose 169 (H) 74 - 99 mg/dL        Medications:  albuterol, 2.5 mg, nebulization, TID  aspirin, 81 mg, oral, Daily  atorvastatin, 80 mg, oral, Daily  azithromycin, 500 mg, oral, q24h  carvedilol, 25 mg, oral, BID with meals  cefTRIAXone, 2 g, intravenous, q24h  epoetin annita or biosimilar, 150 Units/kg, subcutaneous, Every Mon/Wed/Fri  famotidine, 20 mg, oral, Daily  heparin (porcine), 5,000 Units, subcutaneous, q8h SHANA  insulin glargine, 15 Units, subcutaneous, Nightly  insulin lispro, 0-5 Units, subcutaneous, TID with meals  melatonin, 3 mg, oral, Daily  oxygen, , inhalation, Continuous - Inhalation  sacubitriL-valsartan, 1 tablet, oral, BID  sevelamer carbonate, 800 mg, oral, TID with meals  SITagliptin phosphate, 25 mg, oral, Daily      PRN medications: acetaminophen **OR** acetaminophen **OR** acetaminophen, acetaminophen **OR** acetaminophen **OR** acetaminophen, albuterol,  dextrose, dextrose, dextrose, dextrose, glucagon, glucagon, glucagon, glucagon, melatonin, ondansetron **OR** ondansetron    ECG 12 lead    Result Date: 3/26/2024  Normal sinus rhythm Possible Left atrial enlargement Left ventricular hypertrophy with QRS widening and repolarization abnormality ( [place] product ) Abnormal ECG When compared with ECG of 25-MAR-2024 22:43, (unconfirmed) No significant change was found See ED provider note for full interpretation and clinical correlation Confirmed by Amber Mendiola ([id number]) on 3/26/2024 12:54:33 PM    ECG 12 lead    Result Date: 3/26/2024  Sinus rhythm with 1st degree AV block Possible Left atrial enlargement Left ventricular hypertrophy with QRS widening and repolarization abnormality ( [place] product ) Abnormal ECG When compared with ECG of 25-MAR-2024 20:34, (unconfirmed) Questionable change in QRS axis See ED provider note for full interpretation and clinical correlation Confirmed by Amber Mendiola ([id number]) on 3/26/2024 11:29:53 AM    ECG 12 lead    Result Date: 3/26/2024  Normal sinus rhythm Left ventricular hypertrophy with QRS widening and repolarization abnormality ( [place] product ) Abnormal ECG When compared with ECG of 06-FEB-2024 16:05, Incomplete left bundle branch block is no longer Present See ED provider note for full interpretation and clinical correlation Confirmed by Amber Mendiola ([id number]) on 3/26/2024 10:57:52 AM    CT chest abdomen pelvis wo IV contrast    Result Date: 3/25/2024  STUDY: CT Chest, Abdomen, and Pelvis without IV Contrast; 3/25/2024 at 10:06 PM INDICATION: Chest pain. COMPARISON: CTA chest 3/16/2023. ACCESSION NUMBER(S): [id number] ORDERING CLINICIAN: ESTRELLITA PUENTE TECHNIQUE: CT of the chest, abdomen, and pelvis was performed.  Contiguous axial images were obtained at 3 mm slice thickness through the chest, abdomen, and pelvis.  Coronal and sagittal reconstructions at 3 mm slice thickness were performed.  No intravenous contrast was  administered.  FINDINGS: Please note that the evaluation of vessels, lymph nodes and organs is limited without intravenous contrast. CHEST: MEDIASTINUM: The heart is normal in size without pericardial effusion.  Coronary artery calcifications are identified.  LUNGS/PLEURA: Again seen are groundglass infiltrates throughout both lungs, though these have improved since the previous examination. There are multiple nodular densities in both upper lobes which I suspect are inflammatory. There are small bilateral pleural effusions. LYMPH NODES: Thoracic lymph nodes are not enlarged. ABDOMEN:  LIVER: No hepatomegaly.  Smooth surface contour.  Normal attenuation.  BILE DUCTS: No intrahepatic or extrahepatic biliary ductal dilatation.  GALLBLADDER: The gallbladder is unremarkable. STOMACH: No abnormalities identified.  PANCREAS: No masses or ductal dilatation.  SPLEEN: No splenomegaly or focal splenic lesion.  ADRENAL GLANDS: No thickening or nodules.  KIDNEYS AND URETERS: The kidneys are atrophic. No renal or ureteral calculi.  PELVIS:  BLADDER: No abnormalities identified.  REPRODUCTIVE ORGANS: No abnormalities identified.  BOWEL: No abnormalities identified.  VESSELS: No abnormalities identified.  Abdominal aorta is normal in caliber.  PERITONEUM/RETROPERITONEUM/LYMPH NODES: No free fluid.  No pneumoperitoneum. No lymphadenopathy.  ABDOMINAL WALL: No abnormalities identified. SOFT TISSUES: No abnormalities identified.  BONES: No acute fracture or aggressive osseous lesion.    1. Chest CT demonstrates groundglass infiltrates throughout both lungs, though these have improved since the previous examination. There are multiple nodular densities in both upper lobes which I suspect are inflammatory. 2. Small bilateral pleural effusions. 3. No acute pathology in the abdomen or pelvis. Signed by Christian River MD    XR chest 1 view    Result Date: 3/25/2024  STUDY: Chest Radiograph;  3/25/2024 at 9:19 PM INDICATION: Chest pain.  COMPARISON: XR chest 2/6/2024, XR chest 3/18/2023, XR chest 3/16/2023, XR chest 6/3/2022. ACCESSION NUMBER(S): [id number] ORDERING CLINICIAN: ESTRELLITA PUENTE TECHNIQUE:  Frontal chest was obtained at 2119 hours. FINDINGS: CARDIOMEDIASTINAL SILHOUETTE: The heart remains enlarged and again seen are sternal wire sutures LUNGS: .  On today's exam there are alveolar infiltrates in the right upper lobe laterally at the left mid and lower lung.  This is a change from the prior exam of February 6.  The pattern is more consistent with pneumonia than edema.  No definite pleural effusion is seen on the left.  There is a questionable very tiny right pleural effusion. There is no evidence of pneumothorax.  Again seen is a vascular stent in the right axillary area..  ABDOMEN: No remarkable upper abdominal findings.  BONES: No acute osseous changes.    Again seen is cardiomegaly.  There are alveolar infiltrates in the right upper lung laterally and in the left mid and lower lung.  The appearance is more consistent with pneumonia than edema but correlation with clinical findings is recommended.  There may also be a tiny right pleural effusion.. Signed by Albert Huertas MD      Assessment/Plan    53-year-old  male, who is known to have end-stage renal disease, he states due to his diabetes, on renal replacement therapy 4 days a week he home dialysis hemodialysis, came to the emergency department with increasing shortness of breath.  He had a workup done which included CAT scan of the chest abdomen pelvis and he was diagnosed as fluid overload and admitted here for further management.    He was markedly hypoxic he was on BiPAP and in the morning he was changed to 6 L of oxygen.  He was on 4 L  Acute hypoxia secondary to most likely fluid overload rule out pneumonia.  He is seeing the nephrologist for urgent dialysis will wean the oxygen down.  CAT scan of the chest cannot rule out pneumonia as a result started on  antibiotics - dc with oral levoflox 500mg q48hr   Diabetes continue with his home insulin sliding scale.  High troponin was trending up he was on heparin drip he was seen by the cardiologist no need for heparin drip, core measures wont apply .  Hyperlipidemia continue with the same.  DVT prophylaxis as heparin.    I spent 45minutes in the professional and overall care of this patient.  Discussed plan of care, labs, results with dr prieto and dr emilia welch.  ---  on room air now. Plan to dc after dialysis and walking o2 test  Amber Burns, APRN-CNP

## 2024-03-28 NOTE — CARE PLAN
The patient's goals for the shift include      The clinical goals for the shift include pt will remain hemodynamicaly stable throughout the shift    Over the shift, the patient did not make progress toward the following goals. Barriers to progression include   Problem: Safety  Goal: Patient will be injury free during hospitalization  Outcome: Progressing  Goal: I will remain free of falls  Outcome: Progressing     Problem: Pain  Goal: My pain/discomfort is manageable  Outcome: Progressing   . Recommendations to address these barriers include .

## 2024-03-28 NOTE — PRE-PROCEDURE NOTE
Report from Sending RN:    Report From: Andreia Silverman  Recent Surgery of Procedure: No  Baseline Level of Consciousness (LOC): Alert and orient times 4  Oxygen Use: Yes  Type: Nasal Cannula  Diabetic: No  Last BP Med Given Day of Dialysis:   Last Pain Med Given:   Lab Tests to be Obtained with Dialysis: No  Blood Transfusion to be Given During Dialysis: No  Available IV Access: Yes  Medications to be Administered During Dialysis: No  Continuous IV Infusion Running: Yes  Restraints on Currently or in the Last 24 Hours: No  Hand-Off Communication: Verbal

## 2024-03-28 NOTE — POST-PROCEDURE NOTE
Report to Receiving RN:    Report To: Andreia Silverman  Time Report Called: [id number]  Hand-Off Communication: Verbal  Complications During Treatment: No  Ultrafiltration Treatment: No  Medications Administered During Dialysis: No  Blood Products Administered During Dialysis: No  Labs Sent During Dialysis: No  Heparin Drip Rate Changes: No    Electronic Signatures:   Saba Martinez   Authored:    (Signed    Authored:     Last Updated: 1:29 PM by SABA MARTINEZ          R elbow/forearm pain

## 2024-03-28 NOTE — CARE PLAN
Problem: Pain  Goal: My pain/discomfort is manageable  Outcome: Progressing     Problem: Safety  Goal: Patient will be injury free during hospitalization  Outcome: Progressing  Goal: I will remain free of falls  Outcome: Progressing     Problem: Daily Care  Goal: Daily care needs are met  Outcome: Progressing     Problem: Psychosocial Needs  Goal: Demonstrates ability to cope with hospitalization/illness  Outcome: Progressing  Goal: Collaborate with me, my family, and caregiver to identify my specific goals  Outcome: Progressing     Problem: Discharge Barriers  Goal: My discharge needs are met  Outcome: Progressing     Problem: Respiratory  Goal: Clear secretions with interventions this shift  Outcome: Progressing  Goal: Minimize anxiety/maximize coping throughout shift  Outcome: Progressing  Goal: Minimal/no exertional discomfort or dyspnea this shift  Outcome: Progressing  Goal: No signs of respiratory distress (eg. Use of accessory muscles. Peds grunting)  Outcome: Progressing  Goal: Patent airway maintained this shift  Outcome: Progressing  Goal: Verbalize decreased shortness of breath this shift  Outcome: Progressing  Goal: Wean oxygen to maintain O2 saturation per order/standard this shift  Outcome: Progressing  Goal: Increase self care and/or family involvement in next 24 hours  Outcome: Progressing   The patient's goals for the shift include      The clinical goals for the shift include pt satnam remain free of falls through end of shift    Over the shift, the patient did not make progress toward the following goals. Barriers to progression include . Recommendations to address these barriers include .

## 2024-03-28 NOTE — DISCHARGE SUMMARY
ADMISSION DATE: 3/25/2024     DISCHARGE DATE:  03/28/24     Discharge Diagnosis  Acute on chronic systolic and diastolic heart failure due to fluid overload  Hypoxia  End-stage renal disease on home dialysis  Left lower lobe infiltrate.    Issues Requiring Follow-Up  Follow-up with PCP in 1 week and cardiology in 1 week.        Discharge Meds     Your medication list        START taking these medications        Instructions Last Dose Given Next Dose Due   levoFLOXacin 500 mg tablet  Commonly known as: Levaquin      Take 1 tablet (500 mg) by mouth every other day for 2 doses.              CONTINUE taking these medications        Instructions Last Dose Given Next Dose Due   blood sugar diagnostic strip           Accu-Chek Guide test strips strip  Generic drug: blood sugar diagnostic           Accu-Chek Softclix Lancets misc  Generic drug: lancets           acetaminophen 325 mg tablet  Commonly known as: Tylenol           aspirin 81 mg EC tablet           atorvastatin 80 mg tablet  Commonly known as: Lipitor      Take 1 tablet (80 mg) by mouth once daily.       carvedilol 25 mg tablet  Commonly known as: Coreg           Daily Multi-Vitamin tablet  Generic drug: multivitamin           Entresto 24-26 mg tablet  Generic drug: sacubitriL-valsartan           EPOETIN DEIRDRE INJ           FreeStyle José Luis 2 [facility] [place]  Generic drug: flash glucose scanning reader      Use to check blood sugars at least every 8 hours       FreeStyle José Luis 2 Sensor kit  Generic drug: flash glucose sensor kit      Change sensor every 14 days. Check blood sugar at least every 8 hours       FreeStyle José Luis 3 Sensor device  Generic drug: blood-glucose sensor      Change sensor every 14 days       insulin glargine 100 unit/mL (3 mL) pen  Commonly known as: Lantus      Inject 10 units twice a day       insulin lispro 100 unit/mL injection  Commonly known as: HumaLOG KwikPen Insulin      Inject before every meal using carb ratio and correction scale. MDD  "50 units.       insulin syringe-needle U-100 31G X 5/16\" 0.5 mL syringe           pen needle, diabetic 32 gauge x 5/32\" needle  Commonly known as: BD Ultra-Fine Agueda Pen Needle      Use to inject insulin 5 times per day       sevelamer carbonate 800 mg tablet  Commonly known as: Renvela           SITagliptin phosphate 25 mg tablet  Commonly known as: Januvia      Take 1 tablet (25 mg) by mouth once daily.       Ventolin HFA 90 mcg/actuation inhaler  Generic drug: albuterol                     Where to Get Your Medications        These medications were sent to [id number] - [place], [place] - [address]  [address], [place] [address]      Phone: [phone]   levoFLOXacin 500 mg tablet             Results for orders placed or performed during the hospital encounter of 03/25/24 (from the past 24 hour(s))   POCT GLUCOSE   Result Value Ref Range    POCT Glucose 206 (H) 74 - 99 mg/dL   CBC   Result Value Ref Range    WBC 4.7 4.4 - 11.3 x10*3/uL    nRBC 0.0 0.0 - 0.0 /100 WBCs    RBC 2.66 (L) 4.50 - 5.90 x10*6/uL    Hemoglobin 8.8 (L) 13.5 - 17.5 g/dL    Hematocrit 27.3 (L) 41.0 - 52.0 %     (H) 80 - 100 fL    MCH 33.1 26.0 - 34.0 pg    MCHC 32.2 32.0 - 36.0 g/dL    RDW 13.2 11.5 - 14.5 %    Platelets 174 150 - 450 x10*3/uL   Renal function panel   Result Value Ref Range    Glucose 79 74 - 99 mg/dL    Sodium 137 136 - 145 mmol/L    Potassium 4.7 3.5 - 5.3 mmol/L    Chloride 95 (L) 98 - 107 mmol/L    Bicarbonate 26 21 - 32 mmol/L    Anion Gap 21 (H) 10 - 20 mmol/L    Urea Nitrogen 69 (H) 6 - 23 mg/dL    Creatinine 11.15 (H) 0.50 - 1.30 mg/dL    eGFR 5 (L) >60 mL/min/1.73m*2    Calcium 8.8 8.6 - 10.3 mg/dL    Phosphorus 7.4 (H) 2.5 - 4.9 mg/dL    Albumin 3.6 3.4 - 5.0 g/dL   POCT GLUCOSE   Result Value Ref Range    POCT Glucose 120 (H) 74 - 99 mg/dL   POCT GLUCOSE   Result Value Ref Range    POCT Glucose 169 (H) 74 - 99 mg/dL            Hospital Course   "   ASSESSMENT:  Acute on chronic systolic and diastolic heart failure secondary to fluid overload.  Most recent echocardiogram revealed LV ejection fraction of 40% with global hypokinesia and diastolic dysfunction 2.  End-stage renal disease on hemodialysis  Hypertensive heart disease  Previous coronary artery bypass surgery.  Diabetes mellitus type 2, insulin-dependent  History of systemic lupus erythematosus      PLAN  Patient seen and examined, significant improvement since admission.  Patient currently receiving dialysis and normally he does dialysis 4 days a week at home.  His blood pressures are stable, blood sugars are improving and breathing is improved significantly now and has no chest pain.  He will possibly go home later today or tomorrow, has been seen by cardiology, and nephrology.  CT of the chest was also suggestive of possible left lower lobe infiltrate therefore patient was given IV antibiotics which will be changed to p.o. Levaquin at the time of discharge.  Reviewed all labs and imaging studies and medications and discussed the plan of treatment with patient in detail.     Total time spent in examination counseling and coordinating care for this patient was greater than 35 minutes.        SUBJECTIVE  CHIEF COMPLAINT:         Chief Complaint   Patient presents with    Shortness of Breath         INTERVAL HISTORY OF PRESENT ILLNESS: 53-year-old gentleman with a strong family history of coronary artery disease, patient has a history of coronary artery bypass surgery, hypertension, type 2 diabetes mellitus and end-stage renal disease is admitted with acute shortness of breath secondary to fluid overload and chest x-ray is consistent with bilateral pulmonary alveolar infiltrate with pulmonary edema and possible left lower lobe infiltrate.  Patient has been dialyzed for last few days with which is improved and his breathing has gotten back to his baseline.  He does require oxygen about 4 L via nasal  "cannula.  He has no peripheral edema and overall general condition is improved and he is ready for discharge home later today after his dialysis is completed.            Pertinent Physical Exam At Time of Discharge  OBJECTIVE  Visit Vitals  /62   Pulse 74   Temp 37.1 °C (98.7 °F) (Tympanic)   Resp 20   Ht 1.93 m (6' 4\")   Wt 76.4 kg (168 lb 6.9 oz)   SpO2 99%   BMI 20.50 kg/m²   Smoking Status Some Days   BSA 2.02 m²      PHYSICAL EXAM:   GENERAL:  Alert, no distress, cooperative, pleasant, not orthopneic  SKIN:  Skin color, texture, turgor normal.  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  OROPHARYNX:  Lips, mucosa, and tongue are normal.Teeth and gums, normal. Oropharynx normal.  NECK:  No jugulovenous distention, No carotid bruits, Carotid pulse normal contour,  LUNGS: Few bibasilar crackles, overall good air exchange, no pleural rub.  Good diaphragmatic excursion.  CARDIAC: Rate and rhythm is regular, normal S1 and S2; no rubs, or gallops, 2/6 systolic ejection murmur left sternal border, well-healed midline sternotomy surgical scar from previous coronary artery bypass surgery.  ABDOMEN:  Abdomen soft, non-tender, BS normal, No masses or organomegaly.  EXTREMETIES:  Extremities normal, no deformities, edema, clubbing or skin discoloration. Good capillary refill., No ulcers, dialysis access upper arm.  NEURO:  Alert, oriented X 3, Gait not examined.  Non-focal. Reflexes normal and symmetric. Sensation grossly intact., Cranial nerves II-XII intact  PULSES:  2+ radial, 2+ carotid        DATA:   Diagnostic tests reviewed for today's visit:          Results for orders placed or performed during the hospital encounter of 03/25/24 (from the past 96 hour(s))   ECG 12 lead   Result Value Ref Range     Ventricular Rate 95 BPM     Atrial Rate 95 BPM     HI Interval 196 ms     QRS Duration 136 ms     QT Interval 396 ms     QTC Calculation(Bazett) 497 ms     P Axis 66 degrees     R Axis 48 degrees     T Axis " 121 degrees     QRS Count 15 beats     Q Onset 212 ms     P Onset 114 ms     P Offset 174 ms     T Offset 410 ms     QTC Fredericia 461 ms   Sars-CoV-2 and Influenza A/B PCR   Result Value Ref Range     Flu A Result Not Detected Not Detected     Flu B Result Not Detected Not Detected     Coronavirus 2019, PCR Not Detected Not Detected   CBC and Auto Differential   Result Value Ref Range     WBC 9.1 4.4 - 11.3 x10*3/uL     nRBC 0.0 0.0 - 0.0 /100 WBCs     RBC 2.60 (L) 4.50 - 5.90 x10*6/uL     Hemoglobin 8.9 (L) 13.5 - 17.5 g/dL     Hematocrit 27.2 (L) 41.0 - 52.0 %      (H) 80 - 100 fL     MCH 34.2 (H) 26.0 - 34.0 pg     MCHC 32.7 32.0 - 36.0 g/dL     RDW 13.2 11.5 - 14.5 %     Platelets 194 150 - 450 x10*3/uL     Neutrophils % 78.6 40.0 - 80.0 %     Immature Granulocytes %, Automated 0.4 0.0 - 0.9 %     Lymphocytes % 7.7 13.0 - 44.0 %     Monocytes % 11.5 2.0 - 10.0 %     Eosinophils % 0.9 0.0 - 6.0 %     Basophils % 0.9 0.0 - 2.0 %     Neutrophils Absolute 7.16 1.20 - 7.70 x10*3/uL     Immature Granulocytes Absolute, Automated 0.04 0.00 - 0.70 x10*3/uL     Lymphocytes Absolute 0.70 (L) 1.20 - 4.80 x10*3/uL     Monocytes Absolute 1.05 (H) 0.10 - 1.00 x10*3/uL     Eosinophils Absolute 0.08 0.00 - 0.70 x10*3/uL     Basophils Absolute 0.08 0.00 - 0.10 x10*3/uL   Comprehensive Metabolic Panel   Result Value Ref Range     Glucose 157 (H) 74 - 99 mg/dL     Sodium 139 136 - 145 mmol/L     Potassium 6.0 (H) 3.5 - 5.3 mmol/L     Chloride 96 (L) 98 - 107 mmol/L     Bicarbonate 24 21 - 32 mmol/L     Anion Gap 25 (H) 10 - 20 mmol/L     Urea Nitrogen 83 (H) 6 - 23 mg/dL     Creatinine 12.14 (H) 0.50 - 1.30 mg/dL     eGFR 5 (L) >60 mL/min/1.73m*2     Calcium 9.2 8.6 - 10.3 mg/dL     Albumin 4.5 3.4 - 5.0 g/dL     Alkaline Phosphatase 83 33 - 120 U/L     Total Protein 7.8 6.4 - 8.2 g/dL     AST 20 9 - 39 U/L     Bilirubin, Total 0.4 0.0 - 1.2 mg/dL     ALT 18 10 - 52 U/L   Magnesium   Result Value Ref Range     Magnesium  2.20 1.60 - 2.40 mg/dL   aPTT   Result Value Ref Range     aPTT 35 27 - 38 seconds   Protime-INR   Result Value Ref Range     Protime 12.9 (H) 9.8 - 12.8 seconds     INR 1.1 0.9 - 1.1   B-Type Natriuretic Peptide   Result Value Ref Range     BNP >4,700 (H) 0 - 99 pg/mL   Troponin I, High Sensitivity, Initial   Result Value Ref Range     Troponin I, High Sensitivity 391 (HH) 0 - 20 ng/L   ECG 12 lead   Result Value Ref Range     Ventricular Rate 86 BPM     Atrial Rate 86 BPM     DE Interval 212 ms     QRS Duration 130 ms     QT Interval 426 ms     QTC Calculation(Bazett) 509 ms     P Axis 60 degrees     R Axis -20 degrees     T Axis 129 degrees     QRS Count 14 beats     Q Onset 208 ms     P Onset 102 ms     P Offset 175 ms     T Offset 421 ms     QTC Fredericia 480 ms   Troponin, High Sensitivity, 1 Hour   Result Value Ref Range     Troponin I, High Sensitivity 448 (HH) 0 - 20 ng/L   CBC   Result Value Ref Range     WBC 7.1 4.4 - 11.3 x10*3/uL     nRBC 0.0 0.0 - 0.0 /100 WBCs     RBC 2.64 (L) 4.50 - 5.90 x10*6/uL     Hemoglobin 8.8 (L) 13.5 - 17.5 g/dL     Hematocrit 27.7 (L) 41.0 - 52.0 %      (H) 80 - 100 fL     MCH 33.3 26.0 - 34.0 pg     MCHC 31.8 (L) 32.0 - 36.0 g/dL     RDW 13.2 11.5 - 14.5 %     Platelets 179 150 - 450 x10*3/uL   Heparin Assay, UFH   Result Value Ref Range     Heparin Unfractionated <0.1 See Comment Below for Therapeutic Ranges IU/mL   BLOOD GAS VENOUS FULL PANEL   Result Value Ref Range     POCT pH, Venous 7.39 7.33 - 7.43 pH     POCT pCO2, Venous 40 (L) 41 - 51 mm Hg     POCT pO2, Venous 41 35 - 45 mm Hg     POCT SO2, Venous 66 45 - 75 %     POCT Oxy Hemoglobin, Venous 64.9 45.0 - 75.0 %     POCT Hematocrit Calculated, Venous 26.0 (L) 41.0 - 52.0 %     POCT Sodium, Venous 137 136 - 145 mmol/L     POCT Potassium, Venous 5.8 (H) 3.5 - 5.3 mmol/L     POCT Chloride, Venous 100 98 - 107 mmol/L     POCT Ionized Calicum, Venous 1.08 (L) 1.10 - 1.33 mmol/L     POCT Glucose, Venous 208 (H) 74  - 99 mg/dL     POCT Lactate, Venous 1.2 0.4 - 2.0 mmol/L     POCT Base Excess, Venous -0.7 -2.0 - 3.0 mmol/L     POCT HCO3 Calculated, Venous 24.2 22.0 - 26.0 mmol/L     POCT Hemoglobin, Venous 8.5 (L) 13.5 - 17.5 g/dL     POCT Anion Gap, Venous 19.0 10.0 - 25.0 mmol/L     Patient Temperature 37.0 degrees Celsius     FiO2 21 %   ECG 12 lead   Result Value Ref Range     Ventricular Rate 89 BPM     Atrial Rate 89 BPM     OR Interval 206 ms     QRS Duration 122 ms     QT Interval 404 ms     QTC Calculation(Bazett) 491 ms     P Axis 54 degrees     R Axis -22 degrees     T Axis 126 degrees     QRS Count 15 beats     Q Onset 210 ms     P Onset 107 ms     P Offset 174 ms     T Offset 412 ms     QTC Fredericia 460 ms   Troponin I, High Sensitivity   Result Value Ref Range     Troponin I, High Sensitivity 608 (HH) 0 - 20 ng/L   Heparin Assay, UFH   Result Value Ref Range     Heparin Unfractionated 0.3 See Comment Below for Therapeutic Ranges IU/mL   CBC   Result Value Ref Range     WBC 8.3 4.4 - 11.3 x10*3/uL     nRBC 0.0 0.0 - 0.0 /100 WBCs     RBC 2.71 (L) 4.50 - 5.90 x10*6/uL     Hemoglobin 8.9 (L) 13.5 - 17.5 g/dL     Hematocrit 28.0 (L) 41.0 - 52.0 %      (H) 80 - 100 fL     MCH 32.8 26.0 - 34.0 pg     MCHC 31.8 (L) 32.0 - 36.0 g/dL     RDW 13.3 11.5 - 14.5 %     Platelets 187 150 - 450 x10*3/uL   Basic Metabolic Panel   Result Value Ref Range     Glucose 164 (H) 74 - 99 mg/dL     Sodium 138 136 - 145 mmol/L     Potassium 5.9 (H) 3.5 - 5.3 mmol/L     Chloride 96 (L) 98 - 107 mmol/L     Bicarbonate 21 21 - 32 mmol/L     Anion Gap 27 (H) 10 - 20 mmol/L     Urea Nitrogen 89 (H) 6 - 23 mg/dL     Creatinine 13.03 (H) 0.50 - 1.30 mg/dL     eGFR 4 (L) >60 mL/min/1.73m*2     Calcium 9.6 8.6 - 10.3 mg/dL   POCT GLUCOSE   Result Value Ref Range     POCT Glucose 178 (H) 74 - 99 mg/dL   Phosphorus   Result Value Ref Range     Phosphorus 6.9 (H) 2.5 - 4.9 mg/dL   Heparin Assay, UFH   Result Value Ref Range     Heparin  Unfractionated 0.1 See Comment Below for Therapeutic Ranges IU/mL   POCT GLUCOSE   Result Value Ref Range     POCT Glucose 112 (H) 74 - 99 mg/dL   Blood Culture     Specimen: Peripheral Venipuncture; Blood culture   Result Value Ref Range     Blood Culture No growth at 1 day     POCT GLUCOSE   Result Value Ref Range     POCT Glucose 249 (H) 74 - 99 mg/dL   Renal function panel   Result Value Ref Range     Glucose 124 (H) 74 - 99 mg/dL     Sodium 137 136 - 145 mmol/L     Potassium 4.7 3.5 - 5.3 mmol/L     Chloride 95 (L) 98 - 107 mmol/L     Bicarbonate 30 21 - 32 mmol/L     Anion Gap 17 10 - 20 mmol/L     Urea Nitrogen 51 (H) 6 - 23 mg/dL     Creatinine 9.13 (H) 0.50 - 1.30 mg/dL     eGFR 6 (L) >60 mL/min/1.73m*2     Calcium 9.0 8.6 - 10.3 mg/dL     Phosphorus 6.2 (H) 2.5 - 4.9 mg/dL     Albumin 3.7 3.4 - 5.0 g/dL   POCT GLUCOSE   Result Value Ref Range     POCT Glucose 93 74 - 99 mg/dL   POCT GLUCOSE   Result Value Ref Range     POCT Glucose 142 (H) 74 - 99 mg/dL   POCT GLUCOSE   Result Value Ref Range     POCT Glucose 161 (H) 74 - 99 mg/dL   POCT GLUCOSE   Result Value Ref Range     POCT Glucose 206 (H) 74 - 99 mg/dL   CBC   Result Value Ref Range     WBC 4.7 4.4 - 11.3 x10*3/uL     nRBC 0.0 0.0 - 0.0 /100 WBCs     RBC 2.66 (L) 4.50 - 5.90 x10*6/uL     Hemoglobin 8.8 (L) 13.5 - 17.5 g/dL     Hematocrit 27.3 (L) 41.0 - 52.0 %      (H) 80 - 100 fL     MCH 33.1 26.0 - 34.0 pg     MCHC 32.2 32.0 - 36.0 g/dL     RDW 13.2 11.5 - 14.5 %     Platelets 174 150 - 450 x10*3/uL   Renal function panel   Result Value Ref Range     Glucose 79 74 - 99 mg/dL     Sodium 137 136 - 145 mmol/L     Potassium 4.7 3.5 - 5.3 mmol/L     Chloride 95 (L) 98 - 107 mmol/L     Bicarbonate 26 21 - 32 mmol/L     Anion Gap 21 (H) 10 - 20 mmol/L     Urea Nitrogen 69 (H) 6 - 23 mg/dL     Creatinine 11.15 (H) 0.50 - 1.30 mg/dL     eGFR 5 (L) >60 mL/min/1.73m*2     Calcium 8.8 8.6 - 10.3 mg/dL     Phosphorus 7.4 (H) 2.5 - 4.9 mg/dL     Albumin  3.6 3.4 - 5.0 g/dL   POCT GLUCOSE   Result Value Ref Range     POCT Glucose 120 (H) 74 - 99 mg/dL      ECG 12 lead     Result Date: 3/26/2024  Normal sinus rhythm Possible Left atrial enlargement Left ventricular hypertrophy with QRS widening and repolarization abnormality ( [place] product ) Abnormal ECG When compared with ECG of 25-MAR-2024 22:43, (unconfirmed) No significant change was found See ED provider note for full interpretation and clinical correlation Confirmed by Amber Mendiola ([id number]) on 3/26/2024 12:54:33 PM     ECG 12 lead     Result Date: 3/26/2024  Sinus rhythm with 1st degree AV block Possible Left atrial enlargement Left ventricular hypertrophy with QRS widening and repolarization abnormality ( [place] product ) Abnormal ECG When compared with ECG of 25-MAR-2024 20:34, (unconfirmed) Questionable change in QRS axis See ED provider note for full interpretation and clinical correlation Confirmed by Amber Mendiola ([id number]) on 3/26/2024 11:29:53 AM     ECG 12 lead     Result Date: 3/26/2024  Normal sinus rhythm Left ventricular hypertrophy with QRS widening and repolarization abnormality ( [place] product ) Abnormal ECG When compared with ECG of 06-FEB-2024 16:05, Incomplete left bundle branch block is no longer Present See ED provider note for full interpretation and clinical correlation Confirmed by Amber Mendiola ([id number]) on 3/26/2024 10:57:52 AM     CT chest abdomen pelvis wo IV contrast     Result Date: 3/25/2024  STUDY: CT Chest, Abdomen, and Pelvis without IV Contrast; 3/25/2024 at 10:06 PM INDICATION: Chest pain. COMPARISON: CTA chest 3/16/2023. ACCESSION NUMBER(S): [id number] ORDERING CLINICIAN: ESTRELLITA PUENTE TECHNIQUE: CT of the chest, abdomen, and pelvis was performed.  Contiguous axial images were obtained at 3 mm slice thickness through the chest, abdomen, and pelvis.  Coronal and sagittal reconstructions at 3 mm slice thickness were performed.  No intravenous contrast was administered.   FINDINGS: Please note that the evaluation of vessels, lymph nodes and organs is limited without intravenous contrast. CHEST: MEDIASTINUM: The heart is normal in size without pericardial effusion.  Coronary artery calcifications are identified.  LUNGS/PLEURA: Again seen are groundglass infiltrates throughout both lungs, though these have improved since the previous examination. There are multiple nodular densities in both upper lobes which I suspect are inflammatory. There are small bilateral pleural effusions. LYMPH NODES: Thoracic lymph nodes are not enlarged. ABDOMEN:  LIVER: No hepatomegaly.  Smooth surface contour.  Normal attenuation.  BILE DUCTS: No intrahepatic or extrahepatic biliary ductal dilatation.  GALLBLADDER: The gallbladder is unremarkable. STOMACH: No abnormalities identified.  PANCREAS: No masses or ductal dilatation.  SPLEEN: No splenomegaly or focal splenic lesion.  ADRENAL GLANDS: No thickening or nodules.  KIDNEYS AND URETERS: The kidneys are atrophic. No renal or ureteral calculi.  PELVIS:  BLADDER: No abnormalities identified.  REPRODUCTIVE ORGANS: No abnormalities identified.  BOWEL: No abnormalities identified.  VESSELS: No abnormalities identified.  Abdominal aorta is normal in caliber.  PERITONEUM/RETROPERITONEUM/LYMPH NODES: No free fluid.  No pneumoperitoneum. No lymphadenopathy.  ABDOMINAL WALL: No abnormalities identified. SOFT TISSUES: No abnormalities identified.  BONES: No acute fracture or aggressive osseous lesion.     1. Chest CT demonstrates groundglass infiltrates throughout both lungs, though these have improved since the previous examination. There are multiple nodular densities in both upper lobes which I suspect are inflammatory. 2. Small bilateral pleural effusions. 3. No acute pathology in the abdomen or pelvis. Signed by Christian River MD     XR chest 1 view     Result Date: 3/25/2024  STUDY: Chest Radiograph;  3/25/2024 at 9:19 PM INDICATION: Chest pain. COMPARISON:  XR chest 2/6/2024, XR chest 3/18/2023, XR chest 3/16/2023, XR chest 6/3/2022. ACCESSION NUMBER(S): [id number] ORDERING CLINICIAN: ESTRELLITA PUENTE TECHNIQUE:  Frontal chest was obtained at 2119 hours. FINDINGS: CARDIOMEDIASTINAL SILHOUETTE: The heart remains enlarged and again seen are sternal wire sutures LUNGS: .  On today's exam there are alveolar infiltrates in the right upper lobe laterally at the left mid and lower lung.  This is a change from the prior exam of February 6.  The pattern is more consistent with pneumonia than edema.  No definite pleural effusion is seen on the left.  There is a questionable very tiny right pleural effusion. There is no evidence of pneumothorax.  Again seen is a vascular stent in the right axillary area..  ABDOMEN: No remarkable upper abdominal findings.  BONES: No acute osseous changes.     Again seen is cardiomegaly.  There are alveolar infiltrates in the right upper lung laterally and in the left mid and lower lung.  The appearance is more consistent with pneumonia than edema but correlation with clinical findings is recommended.  There may also be a tiny right pleural effusion.. Signed by Albert Huertas MD     Transthoracic Echo (TTE) Complete     Result Date: 3/6/2024        [facility], [address], [place] [address], Tel                                  [phone] TRANSTHORACIC ECHOCARDIOGRAM REPORT  Patient Name:      LUNA Lowe Physician:    [id number] Pk Foster DO Study Date:        3/6/2024             Ordering Provider:    [id number] MARY CARRASCO MRN/PID:           [id number]             Fellow: Accession#:        [id number]         Nurse: Date of Birth/Age: 1970 / 53 years Sonographer:          Otilia Stahl RDCS Gender:            M                    Additional Staff: Height:            193.00 cm             Admit Date:           3/6/2024 Weight:            75.80 kg             Admission Status:     Outpatient BSA / BMI:         2.05 m2 / 20.35      Encounter#:           [id number]                    kg/m2                                         Department Location:  [facility] Echo Lab Blood Pressure: 155 /70 mmHg Study Type:    TRANSTHORACIC ECHO (TTE) COMPLETE Diagnosis/ICD: Unspecified systolic (congestive) heart failure (CHF)-I50.20;                Chronic systolic (congestive) heart failure (CHF)-I50.22 Indication:    systolic CHF CPT Code:      Echo Complete w Full Doppler-81613 Patient History: Valve Disorders:   Aortic Stenosis. Pertinent History: HTN, CAD and Hyperlipidemia. CABG;ESRD;cardiac stent. Study Detail: The following Echo studies were performed: 2D, M-Mode, Doppler,               color flow, 3D and Strain. Technically challenging study due to               body habitus.  PHYSICIAN INTERPRETATION: Left Ventricle: The left ventricular systolic function is moderately decreased, with an estimated ejection fraction of 40%. There are no regional wall motion abnormalities. The left ventricular cavity size is normal. Spectral Doppler shows a pseudonormal pattern of left ventricular diastolic filling. Strain values are abnormal and are consistent with impaired LV function. Increased left ventricular trabeculation. Left Atrium: The left atrium is severely dilated. Right Ventricle: The right ventricle is normal in size. There is normal right ventricular global systolic function. Right Atrium: The right atrium is mildly dilated. The right atrium has a prominent Eustachean valve. Aortic Valve: The aortic valve is probably trileaflet. There is mild to moderate aortic valve cusp calcification. There is evidence of mild aortic valve stenosis. There is no evidence of aortic valve regurgitation. The peak instantaneous gradient of the aortic valve is 25.2 mmHg. The mean gradient of the aortic valve is 13.9 mmHg.  Mitral Valve: The mitral valve is mildly thickened. There is trace mitral valve regurgitation. Tricuspid Valve: The tricuspid valve is structurally normal. No evidence of tricuspid regurgitation. The Doppler estimated RVSP is within normal limits at 24.8 mmHg. Pulmonic Valve: The pulmonic valve is structurally normal. There is trace pulmonic valve regurgitation. Pericardium: There is a trivial to small pericardial effusion posterior to the left ventricle. Aorta: The aortic root is normal. Systemic Veins: The inferior vena cava appears to be of normal size. There is IVC inspiratory collapse greater than 50%. In comparison to the previous echocardiogram(s): Compared with study from 3/6/2023, pulmonary HTN no longer present. Aortic valve disease is more consistent with mild.  CONCLUSIONS:  1. Left ventricular systolic function is moderately decreased with a 40% estimated ejection fraction.  2. Increased left ventricular trabeculation.  3. Spectral Doppler shows a pseudonormal pattern of left ventricular diastolic filling.  4. The left atrium is severely dilated.  5. RVSP within normal limits.  6. Mild aortic valve stenosis. QUANTITATIVE DATA SUMMARY: 2D MEASUREMENTS:                           Normal Ranges: LAs:           5.47 cm    (2.7-4.0cm) IVSd:          1.01 cm    (0.6-1.1cm) LVPWd:         1.12 cm    (0.6-1.1cm) LVIDd:         5.50 cm    (3.9-5.9cm) LVIDs:         4.41 cm LV Mass Index: 113.0 g/m2 LV % FS        19.8 % LA VOLUME:                               Normal Ranges: LA Vol A4C:        126.2 ml   (22+/-6mL/m2) LA Vol A2C:        110.0 ml LA Vol BP:         125.7 ml LA Vol Index A4C:  61.5ml/m2 LA Vol Index A2C:  53.6 ml/m2 LA Vol Index BP:   61.2 ml/m2 LA Area A4C:       31.3 cm2 LA Area A2C:       27.4 cm2 LA Major Axis A4C: 6.6 cm LA Major Axis A2C: 5.8 cm M-MODE MEASUREMENTS:              Normal Ranges: LAs: 5.35 cm (2.7-4.0cm) AORTA MEASUREMENTS:                      Normal Ranges: Ao Sinus, d: 2.90  cm (2.1-3.5cm) Ao STJ, d:   2.40 cm (1.7-3.4cm) Asc Ao, d:   3.40 cm (2.1-3.4cm) LV SYSTOLIC FUNCTION BY 2D PLANIMETRY (MOD):                     Normal Ranges: EF-A4C View: 40.9 % (>=55%) EF-A2C View: 52.7 % EF-Biplane:  44.4 % LV DIASTOLIC FUNCTION:                              Normal Ranges: MV Peak E:        1.17 m/s   (0.7-1.2 m/s) MV Peak A:        0.65 m/s   (0.42-0.7 m/s) E/A Ratio:        1.78       (1.0-2.2) MV e'             0.08 m/s   (>8.0) MV lateral e'     0.11 m/s MV medial e'      0.08 m/s MV A Dur:         93.24 msec E/e' Ratio:       14.59      (<8.0) PulmV Sys Shayne:    45.79 cm/s PulmV Cohen Shayne:   79.83 cm/s PulmV S/D Shayne:    0.57 PulmV A Revs Shayne: 29.94 cm/s PulmV A Revs Dur: 89.44 msec MITRAL VALVE:                 Normal Ranges: MV DT: 152 msec (150-240msec) AORTIC VALVE:                                    Normal Ranges: AoV Vmax:                2.51 m/s  (<=1.7m/s) AoV Peak P.2 mmHg (<20mmHg) AoV Mean P.9 mmHg (1.7-11.5mmHg) LVOT Max Shayne:            0.82 m/s  (<=1.1m/s) AoV VTI:                 60.30 cm  (18-25cm) LVOT VTI:                19.00 cm LVOT Diameter:           2.26 cm   (1.8-2.4cm) AoV Area, VTI:           1.27 cm2  (2.5-5.5cm2) AoV Area,Vmax:           1.31 cm2  (2.5-4.5cm2) AoV Dimensionless Index: 0.32  RIGHT VENTRICLE: RV Basal 4.50 cm RV Mid   3.40 cm RV Major 10.4 cm TAPSE:   19.0 mm RV s'    0.09 m/s TRICUSPID VALVE/RVSP:                             Normal Ranges: Peak TR Velocity: 2.33 m/s RV Syst Pressure: 24.8 mmHg (< 30mmHg) IVC Diam:         1.87 cm PULMONIC VALVE:                         Normal Ranges: PV Accel Time: 143 msec (>120ms) PV Max Shayne:    1.1 m/s  (0.6-0.9m/s) PV Max P.9 mmHg Pulmonary Veins: PulmV A Revs Dur: 89.44 msec PulmV A Revs Shayne: 29.94 cm/s PulmV Cohen Shayne:   79.83 cm/s PulmV S/D Shayne:    0.57 PulmV Sys Shayne:    45.79 cm/s AORTA: Asc Ao Diam 3.43 cm  [id number] Pk Foster DO Electronically signed on 3/6/2024 at  3:59:05 PM  ** Final **                 Outpatient Follow-Up  Future Appointments   Date Time Provider Department Center   5/28/2024  9:00 AM Kiet García PharmD [id number] [facility]   7/23/2024  2:45 PM Tala Bird MD [id number] [facility]   7/29/2024  9:15 AM Laureen Perry DPM [id number] [facility]   8/8/2024  2:40 PM Ten Lance DO [id number] [facility]   12/5/2024  9:30 AM Lore Hull MD [id number] [facility]         Osmar Callaway MD

## 2024-03-28 NOTE — PROGRESS NOTES
03/28/24 0948   Discharge Planning   Patient expects to be discharged to: [facility]        Patient is currently on dialysis. He is still on 3L NC. Possible discharge home today with no needs.

## 2024-03-28 NOTE — DISCHARGE INSTRUCTIONS
You are being sent home with antibiotics for pneumonia.   You will have 2 tablets. Start tonight. Take one tablet by mouth every 48hrs  Please follow up with your primary care provider   Please follow up with your cardiologist

## 2024-03-28 NOTE — PROGRESS NOTES
PROGRESS NOTE - INTERNAL MEDICINE     PATIENT NAME:  Kentrell Carreon    MRN:  [id number]  SERVICE DATE:  3/28/2024   SERVICE TIME:  1:19 PM     ADMITTING PHYSICIAN:  Raegan Sibley MD    ASSESSMENT:  Acute on chronic systolic and diastolic heart failure secondary to fluid overload.  Most recent echocardiogram revealed LV ejection fraction of 40% with global hypokinesia and diastolic dysfunction 2.  End-stage renal disease on hemodialysis  Hypertensive heart disease  Previous coronary artery bypass surgery.  Diabetes mellitus type 2, insulin-dependent  History of systemic lupus erythematosus     PLAN  Patient seen and examined, significant improvement since admission.  Patient currently receiving dialysis and normally he does dialysis 4 days a week at home.  His blood pressures are stable, blood sugars are improving and breathing is improved significantly now and has no chest pain.  He will possibly go home later today or tomorrow, has been seen by cardiology, and nephrology.  CT of the chest was also suggestive of possible left lower lobe infiltrate therefore patient was given IV antibiotics which will be changed to p.o. Levaquin at the time of discharge.  Reviewed all labs and imaging studies and medications and discussed the plan of treatment with patient in detail.    Total time spent in examination counseling and coordinating care for this patient was greater than 35 minutes.      SUBJECTIVE  CHIEF COMPLAINT:     Chief Complaint   Patient presents with    Shortness of Breath        INTERVAL HISTORY OF PRESENT ILLNESS: 53-year-old gentleman with a strong family history of coronary artery disease, patient has a history of coronary artery bypass surgery, hypertension, type 2 diabetes mellitus and end-stage renal disease is admitted with acute shortness of breath secondary to fluid overload and chest x-ray is consistent with bilateral pulmonary alveolar infiltrate with pulmonary edema and possible left lower  lobe infiltrate.  Patient has been dialyzed for last few days with which is improved and his breathing has gotten back to his baseline.  He does require oxygen about 4 L via nasal cannula.  He has no peripheral edema and overall general condition is improved and he is ready for discharge home later today after his dialysis is completed.        MEDICATIONS:   Current Facility-Administered Medications:     acetaminophen (Tylenol) tablet 650 mg, 650 mg, oral, q4h PRN, 650 mg at 03/26/24 1646 **OR** acetaminophen (Tylenol) oral liquid 650 mg, 650 mg, nasogastric tube, q4h PRN **OR** acetaminophen (Tylenol) suppository 650 mg, 650 mg, rectal, q4h PRN, Raegan Sibley MD    acetaminophen (Tylenol) tablet 650 mg, 650 mg, oral, q4h PRN **OR** acetaminophen (Tylenol) oral liquid 650 mg, 650 mg, oral, q4h PRN **OR** acetaminophen (Tylenol) suppository 650 mg, 650 mg, rectal, q4h PRN, Raegan Sibley MD    albuterol 2.5 mg /3 mL (0.083 %) nebulizer solution 2.5 mg, 2.5 mg, nebulization, q6h PRN, Raegan Sibley MD    albuterol 2.5 mg /3 mL (0.083 %) nebulizer solution 2.5 mg, 2.5 mg, nebulization, TID, Raegan Sibley MD, 2.5 mg at 03/28/24 0756    aspirin EC tablet 81 mg, 81 mg, oral, Daily, Raegan Sibley MD, 81 mg at 03/27/24 0845    atorvastatin (Lipitor) tablet 80 mg, 80 mg, oral, Daily, Raegan Sibley MD, 80 mg at 03/27/24 0845    azithromycin (Zithromax) tablet 500 mg, 500 mg, oral, q24h, Shell Powell, PharmD, 500 mg at 03/27/24 2056    carvedilol (Coreg) tablet 25 mg, 25 mg, oral, BID with meals, Raegan Sibley MD, 25 mg at 03/27/24 1645    cefTRIAXone (Rocephin) in dextrose 5 % water (D5W) 50 mL IV 2 g, 2 g, intravenous, q24h, Raegan Sibley MD, Stopped at 03/27/24 2126    dextrose 50 % injection 12.5 g, 12.5 g, intravenous, q15 min PRN, Beverley Mendez APRN-CNP    dextrose 50 % injection 12.5 g, 12.5 g, intravenous, q15 min PRN,  Raegan Sibley MD    dextrose 50 % injection 25 g, 25 g, intravenous, q15 min PRN, KATHARINE Meade    dextrose 50 % injection 25 g, 25 g, intravenous, q15 min PRN, Raegan Sibley MD    epoetin annita-epbx (Retacrit) injection 10,000 Units, 150 Units/kg, subcutaneous, Every Mon/Wed/Fri, Fletcher Whitley DO, 10,000 Units at 03/27/24 1645    famotidine (Pepcid) tablet 20 mg, 20 mg, oral, Daily, Raegan Sibley MD, 20 mg at 03/27/24 0845    glucagon (Glucagen) injection 1 mg, 1 mg, intramuscular, q15 min PRN, KATHARINE Meade    glucagon (Glucagen) injection 1 mg, 1 mg, intramuscular, q15 min PRN, KATHARINE Meade    glucagon (Glucagen) injection 1 mg, 1 mg, intramuscular, q15 min PRN, Raegan Sibley MD    glucagon (Glucagen) injection 1 mg, 1 mg, intramuscular, q15 min PRN, Raegan Sibley MD    heparin (porcine) injection 5,000 Units, 5,000 Units, subcutaneous, q8h SHANA, Raegan Sibley MD, 5,000 Units at 03/28/24 0530    insulin glargine (Lantus) injection 15 Units, 15 Units, subcutaneous, Nightly, Raegan Sibley MD, 15 Units at 03/27/24 2056    insulin lispro (HumaLOG) injection 0-5 Units, 0-5 Units, subcutaneous, TID with meals, KATHARINE Meade, 2 Units at 03/27/24 1700    melatonin tablet 3 mg, 3 mg, oral, Nightly PRN, Raegan Sibley MD    melatonin tablet 3 mg, 3 mg, oral, Daily, Raegan Sibley MD    ondansetron (Zofran) tablet 4 mg, 4 mg, oral, q8h PRN **OR** ondansetron (Zofran) injection 4 mg, 4 mg, intravenous, q8h PRN, Raegan Sibley MD    oxygen (O2) therapy, , inhalation, Continuous - Inhalation, Raegan Sibley MD, 2 L/min at 03/28/24 0756    sacubitriL-valsartan (Entresto) 24-26 mg per tablet 1 tablet, 1 tablet, oral, BID, Raegan Sibley MD, 1 tablet at 03/27/24 2056    sevelamer carbonate (Renvela) tablet 800 mg, 800 mg, oral, TID with meals, Raegan  "MD Maryjo, 800 mg at 03/27/24 1645    SITagliptin phosphate (Januvia) tablet 25 mg, 25 mg, oral, Daily, Raegan Sibley MD, 25 mg at 03/27/24 0855          OBJECTIVE  Visit Vitals  /62   Pulse 74   Temp 37.1 °C (98.7 °F) (Tympanic)   Resp 20   Ht 1.93 m (6' 4\")   Wt 76.4 kg (168 lb 6.9 oz)   SpO2 99%   BMI 20.50 kg/m²   Smoking Status Some Days   BSA 2.02 m²      PHYSICAL EXAM:   GENERAL:  Alert, no distress, cooperative, pleasant, not orthopneic  SKIN:  Skin color, texture, turgor normal.  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  OROPHARYNX:  Lips, mucosa, and tongue are normal.Teeth and gums, normal. Oropharynx normal.  NECK:  No jugulovenous distention, No carotid bruits, Carotid pulse normal contour,  LUNGS: Few bibasilar crackles, overall good air exchange, no pleural rub.  Good diaphragmatic excursion.  CARDIAC: Rate and rhythm is regular, normal S1 and S2; no rubs, or gallops, 2/6 systolic ejection murmur left sternal border, well-healed midline sternotomy surgical scar from previous coronary artery bypass surgery.  ABDOMEN:  Abdomen soft, non-tender, BS normal, No masses or organomegaly.  EXTREMETIES:  Extremities normal, no deformities, edema, clubbing or skin discoloration. Good capillary refill., No ulcers, dialysis access upper arm.  NEURO:  Alert, oriented X 3, Gait not examined.  Non-focal. Reflexes normal and symmetric. Sensation grossly intact., Cranial nerves II-XII intact  PULSES:  2+ radial, 2+ carotid      DATA:   Diagnostic tests reviewed for today's visit:    Results for orders placed or performed during the hospital encounter of 03/25/24 (from the past 96 hour(s))   ECG 12 lead   Result Value Ref Range    Ventricular Rate 95 BPM    Atrial Rate 95 BPM    DC Interval 196 ms    QRS Duration 136 ms    QT Interval 396 ms    QTC Calculation(Bazett) 497 ms    P Axis 66 degrees    R Axis 48 degrees    T Axis 121 degrees    QRS Count 15 beats    Q Onset 212 ms    P Onset 114 " ms    P Offset 174 ms    T Offset 410 ms    QTC Fredericia 461 ms   Sars-CoV-2 and Influenza A/B PCR   Result Value Ref Range    Flu A Result Not Detected Not Detected    Flu B Result Not Detected Not Detected    Coronavirus 2019, PCR Not Detected Not Detected   CBC and Auto Differential   Result Value Ref Range    WBC 9.1 4.4 - 11.3 x10*3/uL    nRBC 0.0 0.0 - 0.0 /100 WBCs    RBC 2.60 (L) 4.50 - 5.90 x10*6/uL    Hemoglobin 8.9 (L) 13.5 - 17.5 g/dL    Hematocrit 27.2 (L) 41.0 - 52.0 %     (H) 80 - 100 fL    MCH 34.2 (H) 26.0 - 34.0 pg    MCHC 32.7 32.0 - 36.0 g/dL    RDW 13.2 11.5 - 14.5 %    Platelets 194 150 - 450 x10*3/uL    Neutrophils % 78.6 40.0 - 80.0 %    Immature Granulocytes %, Automated 0.4 0.0 - 0.9 %    Lymphocytes % 7.7 13.0 - 44.0 %    Monocytes % 11.5 2.0 - 10.0 %    Eosinophils % 0.9 0.0 - 6.0 %    Basophils % 0.9 0.0 - 2.0 %    Neutrophils Absolute 7.16 1.20 - 7.70 x10*3/uL    Immature Granulocytes Absolute, Automated 0.04 0.00 - 0.70 x10*3/uL    Lymphocytes Absolute 0.70 (L) 1.20 - 4.80 x10*3/uL    Monocytes Absolute 1.05 (H) 0.10 - 1.00 x10*3/uL    Eosinophils Absolute 0.08 0.00 - 0.70 x10*3/uL    Basophils Absolute 0.08 0.00 - 0.10 x10*3/uL   Comprehensive Metabolic Panel   Result Value Ref Range    Glucose 157 (H) 74 - 99 mg/dL    Sodium 139 136 - 145 mmol/L    Potassium 6.0 (H) 3.5 - 5.3 mmol/L    Chloride 96 (L) 98 - 107 mmol/L    Bicarbonate 24 21 - 32 mmol/L    Anion Gap 25 (H) 10 - 20 mmol/L    Urea Nitrogen 83 (H) 6 - 23 mg/dL    Creatinine 12.14 (H) 0.50 - 1.30 mg/dL    eGFR 5 (L) >60 mL/min/1.73m*2    Calcium 9.2 8.6 - 10.3 mg/dL    Albumin 4.5 3.4 - 5.0 g/dL    Alkaline Phosphatase 83 33 - 120 U/L    Total Protein 7.8 6.4 - 8.2 g/dL    AST 20 9 - 39 U/L    Bilirubin, Total 0.4 0.0 - 1.2 mg/dL    ALT 18 10 - 52 U/L   Magnesium   Result Value Ref Range    Magnesium 2.20 1.60 - 2.40 mg/dL   aPTT   Result Value Ref Range    aPTT 35 27 - 38 seconds   Protime-INR   Result Value Ref  Range    Protime 12.9 (H) 9.8 - 12.8 seconds    INR 1.1 0.9 - 1.1   B-Type Natriuretic Peptide   Result Value Ref Range    BNP >4,700 (H) 0 - 99 pg/mL   Troponin I, High Sensitivity, Initial   Result Value Ref Range    Troponin I, High Sensitivity 391 (HH) 0 - 20 ng/L   ECG 12 lead   Result Value Ref Range    Ventricular Rate 86 BPM    Atrial Rate 86 BPM    RI Interval 212 ms    QRS Duration 130 ms    QT Interval 426 ms    QTC Calculation(Bazett) 509 ms    P Axis 60 degrees    R Axis -20 degrees    T Axis 129 degrees    QRS Count 14 beats    Q Onset 208 ms    P Onset 102 ms    P Offset 175 ms    T Offset 421 ms    QTC Fredericia 480 ms   Troponin, High Sensitivity, 1 Hour   Result Value Ref Range    Troponin I, High Sensitivity 448 (HH) 0 - 20 ng/L   CBC   Result Value Ref Range    WBC 7.1 4.4 - 11.3 x10*3/uL    nRBC 0.0 0.0 - 0.0 /100 WBCs    RBC 2.64 (L) 4.50 - 5.90 x10*6/uL    Hemoglobin 8.8 (L) 13.5 - 17.5 g/dL    Hematocrit 27.7 (L) 41.0 - 52.0 %     (H) 80 - 100 fL    MCH 33.3 26.0 - 34.0 pg    MCHC 31.8 (L) 32.0 - 36.0 g/dL    RDW 13.2 11.5 - 14.5 %    Platelets 179 150 - 450 x10*3/uL   Heparin Assay, UFH   Result Value Ref Range    Heparin Unfractionated <0.1 See Comment Below for Therapeutic Ranges IU/mL   BLOOD GAS VENOUS FULL PANEL   Result Value Ref Range    POCT pH, Venous 7.39 7.33 - 7.43 pH    POCT pCO2, Venous 40 (L) 41 - 51 mm Hg    POCT pO2, Venous 41 35 - 45 mm Hg    POCT SO2, Venous 66 45 - 75 %    POCT Oxy Hemoglobin, Venous 64.9 45.0 - 75.0 %    POCT Hematocrit Calculated, Venous 26.0 (L) 41.0 - 52.0 %    POCT Sodium, Venous 137 136 - 145 mmol/L    POCT Potassium, Venous 5.8 (H) 3.5 - 5.3 mmol/L    POCT Chloride, Venous 100 98 - 107 mmol/L    POCT Ionized Calicum, Venous 1.08 (L) 1.10 - 1.33 mmol/L    POCT Glucose, Venous 208 (H) 74 - 99 mg/dL    POCT Lactate, Venous 1.2 0.4 - 2.0 mmol/L    POCT Base Excess, Venous -0.7 -2.0 - 3.0 mmol/L    POCT HCO3 Calculated, Venous 24.2 22.0 - 26.0  mmol/L    POCT Hemoglobin, Venous 8.5 (L) 13.5 - 17.5 g/dL    POCT Anion Gap, Venous 19.0 10.0 - 25.0 mmol/L    Patient Temperature 37.0 degrees Celsius    FiO2 21 %   ECG 12 lead   Result Value Ref Range    Ventricular Rate 89 BPM    Atrial Rate 89 BPM    SC Interval 206 ms    QRS Duration 122 ms    QT Interval 404 ms    QTC Calculation(Bazett) 491 ms    P Axis 54 degrees    R Axis -22 degrees    T Axis 126 degrees    QRS Count 15 beats    Q Onset 210 ms    P Onset 107 ms    P Offset 174 ms    T Offset 412 ms    QTC Fredericia 460 ms   Troponin I, High Sensitivity   Result Value Ref Range    Troponin I, High Sensitivity 608 (HH) 0 - 20 ng/L   Heparin Assay, UFH   Result Value Ref Range    Heparin Unfractionated 0.3 See Comment Below for Therapeutic Ranges IU/mL   CBC   Result Value Ref Range    WBC 8.3 4.4 - 11.3 x10*3/uL    nRBC 0.0 0.0 - 0.0 /100 WBCs    RBC 2.71 (L) 4.50 - 5.90 x10*6/uL    Hemoglobin 8.9 (L) 13.5 - 17.5 g/dL    Hematocrit 28.0 (L) 41.0 - 52.0 %     (H) 80 - 100 fL    MCH 32.8 26.0 - 34.0 pg    MCHC 31.8 (L) 32.0 - 36.0 g/dL    RDW 13.3 11.5 - 14.5 %    Platelets 187 150 - 450 x10*3/uL   Basic Metabolic Panel   Result Value Ref Range    Glucose 164 (H) 74 - 99 mg/dL    Sodium 138 136 - 145 mmol/L    Potassium 5.9 (H) 3.5 - 5.3 mmol/L    Chloride 96 (L) 98 - 107 mmol/L    Bicarbonate 21 21 - 32 mmol/L    Anion Gap 27 (H) 10 - 20 mmol/L    Urea Nitrogen 89 (H) 6 - 23 mg/dL    Creatinine 13.03 (H) 0.50 - 1.30 mg/dL    eGFR 4 (L) >60 mL/min/1.73m*2    Calcium 9.6 8.6 - 10.3 mg/dL   POCT GLUCOSE   Result Value Ref Range    POCT Glucose 178 (H) 74 - 99 mg/dL   Phosphorus   Result Value Ref Range    Phosphorus 6.9 (H) 2.5 - 4.9 mg/dL   Heparin Assay, UFH   Result Value Ref Range    Heparin Unfractionated 0.1 See Comment Below for Therapeutic Ranges IU/mL   POCT GLUCOSE   Result Value Ref Range    POCT Glucose 112 (H) 74 - 99 mg/dL   Blood Culture    Specimen: Peripheral Venipuncture; Blood  culture   Result Value Ref Range    Blood Culture No growth at 1 day    POCT GLUCOSE   Result Value Ref Range    POCT Glucose 249 (H) 74 - 99 mg/dL   Renal function panel   Result Value Ref Range    Glucose 124 (H) 74 - 99 mg/dL    Sodium 137 136 - 145 mmol/L    Potassium 4.7 3.5 - 5.3 mmol/L    Chloride 95 (L) 98 - 107 mmol/L    Bicarbonate 30 21 - 32 mmol/L    Anion Gap 17 10 - 20 mmol/L    Urea Nitrogen 51 (H) 6 - 23 mg/dL    Creatinine 9.13 (H) 0.50 - 1.30 mg/dL    eGFR 6 (L) >60 mL/min/1.73m*2    Calcium 9.0 8.6 - 10.3 mg/dL    Phosphorus 6.2 (H) 2.5 - 4.9 mg/dL    Albumin 3.7 3.4 - 5.0 g/dL   POCT GLUCOSE   Result Value Ref Range    POCT Glucose 93 74 - 99 mg/dL   POCT GLUCOSE   Result Value Ref Range    POCT Glucose 142 (H) 74 - 99 mg/dL   POCT GLUCOSE   Result Value Ref Range    POCT Glucose 161 (H) 74 - 99 mg/dL   POCT GLUCOSE   Result Value Ref Range    POCT Glucose 206 (H) 74 - 99 mg/dL   CBC   Result Value Ref Range    WBC 4.7 4.4 - 11.3 x10*3/uL    nRBC 0.0 0.0 - 0.0 /100 WBCs    RBC 2.66 (L) 4.50 - 5.90 x10*6/uL    Hemoglobin 8.8 (L) 13.5 - 17.5 g/dL    Hematocrit 27.3 (L) 41.0 - 52.0 %     (H) 80 - 100 fL    MCH 33.1 26.0 - 34.0 pg    MCHC 32.2 32.0 - 36.0 g/dL    RDW 13.2 11.5 - 14.5 %    Platelets 174 150 - 450 x10*3/uL   Renal function panel   Result Value Ref Range    Glucose 79 74 - 99 mg/dL    Sodium 137 136 - 145 mmol/L    Potassium 4.7 3.5 - 5.3 mmol/L    Chloride 95 (L) 98 - 107 mmol/L    Bicarbonate 26 21 - 32 mmol/L    Anion Gap 21 (H) 10 - 20 mmol/L    Urea Nitrogen 69 (H) 6 - 23 mg/dL    Creatinine 11.15 (H) 0.50 - 1.30 mg/dL    eGFR 5 (L) >60 mL/min/1.73m*2    Calcium 8.8 8.6 - 10.3 mg/dL    Phosphorus 7.4 (H) 2.5 - 4.9 mg/dL    Albumin 3.6 3.4 - 5.0 g/dL   POCT GLUCOSE   Result Value Ref Range    POCT Glucose 120 (H) 74 - 99 mg/dL      ECG 12 lead    Result Date: 3/26/2024  Normal sinus rhythm Possible Left atrial enlargement Left ventricular hypertrophy with QRS widening and  repolarization abnormality ( [place] product ) Abnormal ECG When compared with ECG of 25-MAR-2024 22:43, (unconfirmed) No significant change was found See ED provider note for full interpretation and clinical correlation Confirmed by Amber Mendiola ([id number]) on 3/26/2024 12:54:33 PM    ECG 12 lead    Result Date: 3/26/2024  Sinus rhythm with 1st degree AV block Possible Left atrial enlargement Left ventricular hypertrophy with QRS widening and repolarization abnormality ( [place] product ) Abnormal ECG When compared with ECG of 25-MAR-2024 20:34, (unconfirmed) Questionable change in QRS axis See ED provider note for full interpretation and clinical correlation Confirmed by Amber Mendiola ([id number]) on 3/26/2024 11:29:53 AM    ECG 12 lead    Result Date: 3/26/2024  Normal sinus rhythm Left ventricular hypertrophy with QRS widening and repolarization abnormality ( Franklyn product ) Abnormal ECG When compared with ECG of 06-FEB-2024 16:05, Incomplete left bundle branch block is no longer Present See ED provider note for full interpretation and clinical correlation Confirmed by Amber Mendiola ([id number]) on 3/26/2024 10:57:52 AM    CT chest abdomen pelvis wo IV contrast    Result Date: 3/25/2024  STUDY: CT Chest, Abdomen, and Pelvis without IV Contrast; 3/25/2024 at 10:06 PM INDICATION: Chest pain. COMPARISON: CTA chest 3/16/2023. ACCESSION NUMBER(S): [id number] ORDERING CLINICIAN: ESTRELLITA PUENTE TECHNIQUE: CT of the chest, abdomen, and pelvis was performed.  Contiguous axial images were obtained at 3 mm slice thickness through the chest, abdomen, and pelvis.  Coronal and sagittal reconstructions at 3 mm slice thickness were performed.  No intravenous contrast was administered.  FINDINGS: Please note that the evaluation of vessels, lymph nodes and organs is limited without intravenous contrast. CHEST: MEDIASTINUM: The heart is normal in size without pericardial effusion.  Coronary artery calcifications are identified.   LUNGS/PLEURA: Again seen are groundglass infiltrates throughout both lungs, though these have improved since the previous examination. There are multiple nodular densities in both upper lobes which I suspect are inflammatory. There are small bilateral pleural effusions. LYMPH NODES: Thoracic lymph nodes are not enlarged. ABDOMEN:  LIVER: No hepatomegaly.  Smooth surface contour.  Normal attenuation.  BILE DUCTS: No intrahepatic or extrahepatic biliary ductal dilatation.  GALLBLADDER: The gallbladder is unremarkable. STOMACH: No abnormalities identified.  PANCREAS: No masses or ductal dilatation.  SPLEEN: No splenomegaly or focal splenic lesion.  ADRENAL GLANDS: No thickening or nodules.  KIDNEYS AND URETERS: The kidneys are atrophic. No renal or ureteral calculi.  PELVIS:  BLADDER: No abnormalities identified.  REPRODUCTIVE ORGANS: No abnormalities identified.  BOWEL: No abnormalities identified.  VESSELS: No abnormalities identified.  Abdominal aorta is normal in caliber.  PERITONEUM/RETROPERITONEUM/LYMPH NODES: No free fluid.  No pneumoperitoneum. No lymphadenopathy.  ABDOMINAL WALL: No abnormalities identified. SOFT TISSUES: No abnormalities identified.  BONES: No acute fracture or aggressive osseous lesion.    1. Chest CT demonstrates groundglass infiltrates throughout both lungs, though these have improved since the previous examination. There are multiple nodular densities in both upper lobes which I suspect are inflammatory. 2. Small bilateral pleural effusions. 3. No acute pathology in the abdomen or pelvis. Signed by Christian River MD    XR chest 1 view    Result Date: 3/25/2024  STUDY: Chest Radiograph;  3/25/2024 at 9:19 PM INDICATION: Chest pain. COMPARISON: XR chest 2/6/2024, XR chest 3/18/2023, XR chest 3/16/2023, XR chest 6/3/2022. ACCESSION NUMBER(S): [id number] ORDERING CLINICIAN: ESTRELLITA PUENET TECHNIQUE:  Frontal chest was obtained at 2119 hours. FINDINGS: CARDIOMEDIASTINAL SILHOUETTE: The  heart remains enlarged and again seen are sternal wire sutures LUNGS: .  On today's exam there are alveolar infiltrates in the right upper lobe laterally at the left mid and lower lung.  This is a change from the prior exam of February 6.  The pattern is more consistent with pneumonia than edema.  No definite pleural effusion is seen on the left.  There is a questionable very tiny right pleural effusion. There is no evidence of pneumothorax.  Again seen is a vascular stent in the right axillary area..  ABDOMEN: No remarkable upper abdominal findings.  BONES: No acute osseous changes.    Again seen is cardiomegaly.  There are alveolar infiltrates in the right upper lung laterally and in the left mid and lower lung.  The appearance is more consistent with pneumonia than edema but correlation with clinical findings is recommended.  There may also be a tiny right pleural effusion.. Signed by Albert Huertas MD    Transthoracic Echo (TTE) Complete    Result Date: 3/6/2024        [facility], [facility] , [place] [address], Tel                                  [phone] TRANSTHORACIC ECHOCARDIOGRAM REPORT  Patient Name:      LUNA Lowe Physician:    [id number] Pk Foster DO Study Date:        3/6/2024             Ordering Provider:    [id number] MARY CARRASCO MRN/PID:           [id number]             Fellow: Accession#:        [id number]         Nurse: Date of Birth/Age: 1970 / 53 years Sonographer:          Otilia Stahl RDCS Gender:            M                    Additional Staff: Height:            193.00 cm            Admit Date:           3/6/2024 Weight:            75.80 kg             Admission Status:     Outpatient BSA / BMI:         2.05 m2 / 20.35      Encounter#:           [id number]                    kg/m2                                          Department Location:  [facility] Echo Lab Blood Pressure: 155 /70 mmHg Study Type:    TRANSTHORACIC ECHO (TTE) COMPLETE Diagnosis/ICD: Unspecified systolic (congestive) heart failure (CHF)-I50.20;                Chronic systolic (congestive) heart failure (CHF)-I50.22 Indication:    systolic CHF CPT Code:      Echo Complete w Full [id number] Patient History: Valve Disorders:   Aortic Stenosis. Pertinent History: HTN, CAD and Hyperlipidemia. CABG;ESRD;cardiac stent. Study Detail: The following Echo studies were performed: 2D, M-Mode, Doppler,               color flow, 3D and Strain. Technically challenging study due to               body habitus.  PHYSICIAN INTERPRETATION: Left Ventricle: The left ventricular systolic function is moderately decreased, with an estimated ejection fraction of 40%. There are no regional wall motion abnormalities. The left ventricular cavity size is normal. Spectral Doppler shows a pseudonormal pattern of left ventricular diastolic filling. Strain values are abnormal and are consistent with impaired LV function. Increased left ventricular trabeculation. Left Atrium: The left atrium is severely dilated. Right Ventricle: The right ventricle is normal in size. There is normal right ventricular global systolic function. Right Atrium: The right atrium is mildly dilated. The right atrium has a prominent Eustachean valve. Aortic Valve: The aortic valve is probably trileaflet. There is mild to moderate aortic valve cusp calcification. There is evidence of mild aortic valve stenosis. There is no evidence of aortic valve regurgitation. The peak instantaneous gradient of the aortic valve is 25.2 mmHg. The mean gradient of the aortic valve is 13.9 mmHg. Mitral Valve: The mitral valve is mildly thickened. There is trace mitral valve regurgitation. Tricuspid Valve: The tricuspid valve is structurally normal. No evidence of tricuspid regurgitation. The Doppler estimated RVSP is within normal limits at  24.8 mmHg. Pulmonic Valve: The pulmonic valve is structurally normal. There is trace pulmonic valve regurgitation. Pericardium: There is a trivial to small pericardial effusion posterior to the left ventricle. Aorta: The aortic root is normal. Systemic Veins: The inferior vena cava appears to be of normal size. There is IVC inspiratory collapse greater than 50%. In comparison to the previous echocardiogram(s): Compared with study from 3/6/2023, pulmonary HTN no longer present. Aortic valve disease is more consistent with mild.  CONCLUSIONS:  1. Left ventricular systolic function is moderately decreased with a 40% estimated ejection fraction.  2. Increased left ventricular trabeculation.  3. Spectral Doppler shows a pseudonormal pattern of left ventricular diastolic filling.  4. The left atrium is severely dilated.  5. RVSP within normal limits.  6. Mild aortic valve stenosis. QUANTITATIVE DATA SUMMARY: 2D MEASUREMENTS:                           Normal Ranges: LAs:           5.47 cm    (2.7-4.0cm) IVSd:          1.01 cm    (0.6-1.1cm) LVPWd:         1.12 cm    (0.6-1.1cm) LVIDd:         5.50 cm    (3.9-5.9cm) LVIDs:         4.41 cm LV Mass Index: 113.0 g/m2 LV % FS        19.8 % LA VOLUME:                               Normal Ranges: LA Vol A4C:        126.2 ml   (22+/-6mL/m2) LA Vol A2C:        110.0 ml LA Vol BP:         125.7 ml LA Vol Index A4C:  61.5ml/m2 LA Vol Index A2C:  53.6 ml/m2 LA Vol Index BP:   61.2 ml/m2 LA Area A4C:       31.3 cm2 LA Area A2C:       27.4 cm2 LA Major Axis A4C: 6.6 cm LA Major Axis A2C: 5.8 cm M-MODE MEASUREMENTS:              Normal Ranges: LAs: 5.35 cm (2.7-4.0cm) AORTA MEASUREMENTS:                      Normal Ranges: Ao Sinus, d: 2.90 cm (2.1-3.5cm) Ao STJ, d:   2.40 cm (1.7-3.4cm) Asc Ao, d:   3.40 cm (2.1-3.4cm) LV SYSTOLIC FUNCTION BY 2D PLANIMETRY (MOD):                     Normal Ranges: EF-A4C View: 40.9 % (>=55%) EF-A2C View: 52.7 % EF-Biplane:  44.4 % LV DIASTOLIC FUNCTION:                               Normal Ranges: MV Peak E:        1.17 m/s   (0.7-1.2 m/s) MV Peak A:        0.65 m/s   (0.42-0.7 m/s) E/A Ratio:        1.78       (1.0-2.2) MV e'             0.08 m/s   (>8.0) MV lateral e'     0.11 m/s MV medial e'      0.08 m/s MV A Dur:         93.24 msec E/e' Ratio:       14.59      (<8.0) PulmV Sys Shayne:    45.79 cm/s PulmV Cohen Shayne:   79.83 cm/s PulmV S/D Shayne:    0.57 PulmV A Revs Shayne: 29.94 cm/s PulmV A Revs Dur: 89.44 msec MITRAL VALVE:                 Normal Ranges: MV DT: 152 msec (150-240msec) AORTIC VALVE:                                    Normal Ranges: AoV Vmax:                2.51 m/s  (<=1.7m/s) AoV Peak P.2 mmHg (<20mmHg) AoV Mean P.9 mmHg (1.7-11.5mmHg) LVOT Max Shayne:            0.82 m/s  (<=1.1m/s) AoV VTI:                 60.30 cm  (18-25cm) LVOT VTI:                19.00 cm LVOT Diameter:           2.26 cm   (1.8-2.4cm) AoV Area, VTI:           1.27 cm2  (2.5-5.5cm2) AoV Area,Vmax:           1.31 cm2  (2.5-4.5cm2) AoV Dimensionless Index: 0.32  RIGHT VENTRICLE: RV Basal 4.50 cm RV Mid   3.40 cm RV Major 10.4 cm TAPSE:   19.0 mm RV s'    0.09 m/s TRICUSPID VALVE/RVSP:                             Normal Ranges: Peak TR Velocity: 2.33 m/s RV Syst Pressure: 24.8 mmHg (< 30mmHg) IVC Diam:         1.87 cm PULMONIC VALVE:                         Normal Ranges: PV Accel Time: 143 msec (>120ms) PV Max Shayne:    1.1 m/s  (0.6-0.9m/s) PV Max P.9 mmHg Pulmonary Veins: PulmV A Revs Dur: 89.44 msec PulmV A Revs Shayne: 29.94 cm/s PulmV Cohen Shayne:   79.83 cm/s PulmV S/D Shayne:    0.57 PulmV Sys Shayne:    45.79 cm/s AORTA: Asc Ao Diam 3.43 cm  [id number] Pk Foster DO Electronically signed on 3/6/2024 at 3:59:05 PM  ** Final **           SIGNATURE:  Osmar Callaway MD  DATE:  2024  TIME:  1:19 PM

## 2024-03-28 NOTE — SIGNIFICANT EVENT
Home oxygen evaluation completed.  Lowest SpO2 at rest 95% on room air.  Patient ambulated on room air with lowest SpO2 92%.  Patient does not qualify for home oxygen.

## 2024-03-28 NOTE — NURSING NOTE
Discharge instructions provided using teach back method. Pt's health related  risk factors discussed with pt. pt educated to look for any worsening sign and symptoms. Pt educated to seek medical attention if experience any medical emergency. Pt aware to follow up with outpatient clinics as scheduled. Home going meds reviewed with pt. Pt verbalized understanding of disposition and discharge instructions. All questions answered to patient's satisfaction and within nursing scope of practice. Vitals stable, IV removed.    
H/H: 10.0/30.0, Glucose: 111, Albumin: 2.9, Alk phos: 879, AST: 14

## 2024-03-28 NOTE — PROCEDURES
"Seen on dialysis.  F1 60 kidney x 2.5 hours, BFR//600 mL/minute, 3K bath, 2.5 calcium with a UF target adjusted to 1.6 kg as tolerated.  He is weaned from supplemental oxygen.  He is on antimicrobial coverage for pneumonia.  Erythropoietin and phosphorus binder ordered.  Tolerating dialysis, continue per submitted orders.  No renal barriers to discharge.    /62   Pulse 74   Temp 37.1 °C (98.7 °F) (Tympanic)   Resp 20   Ht 1.93 m (6' 4\")   Wt 76.4 kg (168 lb 6.9 oz)   SpO2 99%   BMI 20.50 kg/m²     Constitutional: Well developed, awake/alert/oriented  x3, no distress, alert and cooperative   ENMT: mucous membranes moist   Head/Neck: Neck supple, no JVD, trachea midline   Respiratory/Thorax: Patent airways, CTAB, normal  breath sounds with good chest expansion, thorax symmetric   Cardiovascular: Regular, rate and rhythm, systolic ejection murmur, normal S 1 and S 2  Right arm aVF within normal limits   Gastrointestinal: Nondistended, soft, non-tender, no rebound tenderness or guarding, no masses palpable, no organomegaly, +BS, no bruits   Musculoskeletal: ROM intact, no joint swelling, normal  strength   Extremities: No leg edema   Neurological: alert and oriented x3  Nonfocal   no asterixis     "

## 2024-03-29 ENCOUNTER — PATIENT OUTREACH (OUTPATIENT)
Dept: PRIMARY CARE | Facility: CLINIC | Age: 54
End: 2024-03-29
Payer: COMMERCIAL

## 2024-03-29 NOTE — PROGRESS NOTES
Discharge Facility:[facility]  Discharge Diagnosis:Acute on chronic systolic and diastolic heart failure due to fluid overload  Hypoxia  End-stage renal disease on home dialysis  Left lower lobe infiltrate.     Admission Date:3/26/24  Discharge Date: 3/28/24    PCP Appointment Date:4/3/24  Specialist Appointment Date: Cardiology  Hospital Encounter and Summary: Linked   See discharge assessment below for further details  Engagement  Call Start Time: 1117 (3/29/2024 11:17 AM)    Medications  Medications reviewed with patient/caregiver?: Yes (3/29/2024 11:17 AM)  Is the patient having any side effects they believe may be caused by any medication additions or changes?: No (3/29/2024 11:17 AM)  Does the patient have all medications ordered at discharge?: Yes (3/29/2024 11:17 AM)  Prescription Comments: START taking: levoFLOXacin (Levaquin) (3/29/2024 11:17 AM)  Is the patient taking all medications as directed (includes completed medication regime)?: Yes (3/29/2024 11:17 AM)  Care Management Interventions: Provided patient education (3/29/2024 11:17 AM)  Medication Comments: see med list (3/29/2024 11:17 AM)    Appointments  Does the patient have a primary care provider?: Yes (3/29/2024 11:17 AM)  Care Management Interventions: Verified appointment date/time/provider (FUV 4/3/24) (3/29/2024 11:17 AM)  Has the patient kept scheduled appointments due by today?: Yes (3/29/2024 11:17 AM)  Care Management Interventions: Educated on importance of keeping appointment (3/29/2024 11:17 AM)    Patient Teaching  Does the patient have access to their discharge instructions?: Yes (3/29/2024 11:17 AM)  Care Management Interventions: Reviewed instructions with patient (3/29/2024 11:17 AM)  What is the patient's perception of their health status since discharge?: Improving (3/29/2024 11:17 AM)  Is the patient/caregiver able to teach back the hierarchy of who to call/visit for symptoms/problems? PCP, Specialist, Home Health nurse, Urgent  [facility], ED, 911: Yes (3/29/2024 11:17 AM)    Issues Requiring Follow-Up  Follow-up with PCP in 1 week and cardiology in 1 week.     Penny Alvarez LPN

## 2024-03-31 LAB — BACTERIA BLD CULT: NORMAL

## 2024-04-02 NOTE — DOCUMENTATION CLARIFICATION NOTE
PATIENT:               LUNA FINCH  ACCT #:                  [id number]  MRN:                       [id number]  :                       1970  ADMIT DATE:       3/25/2024 8:38 PM  DISCH DATE:        3/28/2024 3:16 PM  RESPONDING PROVIDER #:        [id number]          PROVIDER RESPONSE TEXT:    Type 2 diabetes mellitus    CDI QUERY TEXT:    [facility]_Conflicting Documentation    Instruction:    Based on your assessment of the patient and the clinical information, please provide the requested documentation by clicking on the appropriate radio button and enter any additional information if prompted.    Question: Based on your medical judgment, can you please clarify which of these conditions is the most clinically supported    When answering this query, please exercise your independent professional judgment. The fact that a question is being asked, does not imply that any particular answer is desired or expected.    The patient's clinical indicators include:  Clinical Information: There is conflicting documentation in the medical record which requires clarification.    -The diagnosis of ?Type 1 diabetes mellitus? was documented on 3/25/24 by the ED Provider and in the H and P.    -The diagnosis of  ?Diabetes mellitus type 2, insulin-dependent? ?type 2 diabetes mellitus? was documented on 3/28/24 in the progress note and discharge summary.    Clinical Indicators: Glucose 3/25  157,  3/26  164,  3/27  124,  3/28  79    Treatment: Lantus 15 units 3/26, 3/27  HumaLOG per protocol  Januvia 25 mg 3/26, 3/27, 3/28    Risk Factors: n/a  Options provided:  -- Type 1 diabetes mellitus  -- Type 2 diabetes mellitus  -- Other - I will add my own diagnosis  -- Refer to Clinical Documentation Reviewer    Query created by: Lucero Patterson on 2024 12:21 PM      Electronically signed by:  LIZZ KNOTT MD 2024 4:38 PM           no complications

## 2024-04-03 ENCOUNTER — OFFICE VISIT (OUTPATIENT)
Dept: PRIMARY CARE | Facility: CLINIC | Age: 54
End: 2024-04-03
Payer: COMMERCIAL

## 2024-04-03 VITALS
BODY MASS INDEX: 19.35 KG/M2 | OXYGEN SATURATION: 98 % | DIASTOLIC BLOOD PRESSURE: 73 MMHG | SYSTOLIC BLOOD PRESSURE: 130 MMHG | WEIGHT: 159 LBS | HEART RATE: 81 BPM

## 2024-04-03 DIAGNOSIS — Z95.1 S/P CABG X 3: ICD-10-CM

## 2024-04-03 DIAGNOSIS — M32.19 SYSTEMIC LUPUS ERYTHEMATOSUS WITH OTHER ORGAN INVOLVEMENT, UNSPECIFIED SLE TYPE (MULTI): Primary | ICD-10-CM

## 2024-04-03 DIAGNOSIS — I25.118 CORONARY ARTERY DISEASE WITH OTHER FORM OF ANGINA PECTORIS, UNSPECIFIED VESSEL OR LESION TYPE, UNSPECIFIED WHETHER NATIVE OR TRANSPLANTED HEART (CMS-HCC): ICD-10-CM

## 2024-04-03 PROCEDURE — 99214 OFFICE O/P EST MOD 30 MIN: CPT | Performed by: INTERNAL MEDICINE

## 2024-04-03 PROCEDURE — 3075F SYST BP GE 130 - 139MM HG: CPT | Performed by: INTERNAL MEDICINE

## 2024-04-03 PROCEDURE — 3078F DIAST BP <80 MM HG: CPT | Performed by: INTERNAL MEDICINE

## 2024-04-03 NOTE — PROGRESS NOTES
Subjective   Patient ID: Kentrell Carreon is a 53 y.o. male who presents for Follow-up and Hospital Follow-up.    HPI         Patient is a 53-year-old male with  PMHx sig for CAD s/p PCI (LAD; 6/2017), stage C systolic HF/ICM/HFmrEF, aortic stenosis, HTN, tachycardia s/p ablation, DM and discoid lupus/SLE with ESRD on home HD (M-T-TH-F via RUE AVF since 12/2016) presents for posthospitalization follow-up.    Patient went to the hospital due to ongoing shortness of breath.  His weight was up 1 kg.  He was diuresed and given hemodialysis and subsequently discharged on Levaquin.  He is feeling significant typically better without shortness of breath.  His dry weight is down to 72 kg.  He follows with cardiology.  During the hospitalization BNP significantly elevated as well.  Likely had a component of fluid overload due to increased fluid weight as well a a component of heart failure.        Patient has type 1 diabetes and has established with endocrinology.  His sugars has improved to less than 8.  He is taking his insulin as prescribed.  Labs are consistent with type 2 diabetes.  He is under the care of endocrinology now and takes his medications as prescribed      Patient has a history of coronary artery disease with low ejection fraction.  He is established with cardiology.  He has end-stage renal disease as well as valvular abnormalities with ongoing fatigue but no shortness of breath.  His blood pressure is well-controlled.  He has evidence of moderate aortic stenosis on echocardiogram in March 2023.  No shortness of breath on exertion at this time    End-stage renal disease on dialysis.  Follows with nephrology quite regularly.  Goes to the [facility] 3 times a week.  He takes his end-stage renal disease vitamins as prescribed.       Review of Systems  Constitutional: No fever or chills  Cardiovascular: no chest pain, no palpitations and no syncope.   Respiratory: no cough, no shortness of breath during exertion and no  shortness of breath at rest.   Gastrointestinal: no abdominal pain, no nausea and no vomiting.  Neuro: No Headache, no dizziness    Objective   /73   Pulse 81   Wt 72.1 kg (159 lb)   SpO2 98%   BMI 19.35 kg/m²     Physical Exam  Constitutional: Alert and in no acute distress. Well developed, well nourished  Head and Face: Head and face: Normal.    Cardiovascular: Heart rate and rhythm were normal, normal S1 and S2. No peripheral edema.   Pulmonary: No respiratory distress. Clear bilateral breath sounds.  Musculoskeletal: Tenderness and erythema of the right great toe, with overlying skin cracks  Skin: Normal skin color and pigmentation, normal skin turgor, and no rash.    Psychiatric: Judgment and insight: Intact. Mood and affect: Normal.        Lab Results   Component Value Date    WBC 4.7 03/28/2024    HGB 8.8 (L) 03/28/2024    HCT 27.3 (L) 03/28/2024     03/28/2024    CHOL 102 03/10/2022    TRIG 112 03/10/2022    HDL 33.4 (A) 03/10/2022    ALT 18 03/25/2024    AST 20 03/25/2024     03/28/2024    K 4.7 03/28/2024    CL 95 (L) 03/28/2024    CREATININE 11.15 (H) 03/28/2024    BUN 69 (H) 03/28/2024    CO2 26 03/28/2024    TSH 3.60 02/27/2024    INR 1.1 03/25/2024    HGBA1C 7.0 (A) 02/27/2024       ECG 12 lead  Normal sinus rhythm  Possible Left atrial enlargement  Left ventricular hypertrophy with QRS widening and repolarization abnormality ( [place] product )  Abnormal ECG  When compared with ECG of 25-MAR-2024 22:43, (unconfirmed)  No significant change was found  See ED provider note for full interpretation and clinical correlation  Confirmed by Amber Mendiola ([id number]) on 3/26/2024 12:54:33 PM  ECG 12 lead  Sinus rhythm with 1st degree AV block  Possible Left atrial enlargement  Left ventricular hypertrophy with QRS widening and repolarization abnormality ( [place] product )  Abnormal ECG  When compared with ECG of 25-MAR-2024 20:34, (unconfirmed)  Questionable change in QRS axis  See ED  provider note for full interpretation and clinical correlation  Confirmed by Amber Mendiola ([id number]) on 3/26/2024 11:29:53 AM  ECG 12 lead  Normal sinus rhythm  Left ventricular hypertrophy with QRS widening and repolarization abnormality ( [place] product )  Abnormal ECG  When compared with ECG of 06-FEB-2024 16:05,  Incomplete left bundle branch block is no longer Present  See ED provider note for full interpretation and clinical correlation  Confirmed by Amber Mendiola ([id number]) on 3/26/2024 10:57:52 AM            Assessment/Plan   Problem List Items Addressed This Visit       SLE (systemic lupus erythematosus) (CMS/[facility]) - Primary    Relevant Orders    Referral to Rheumatology   Recent hospitalization due to fluid overload.  Now dry weight 72.  Compensated.  Likely a component of heart failure contributing to the shortness of breath recently.  He is on a heart failure regimen of Entresto and beta-blocker.  He is following with cardiology for titration of medications.  Currently asymptomatic and euvolemic    Continue following with endocrinology for diabetic care    Referral to rheumatology given history of SLE    Follow-up 3 months

## 2024-04-12 DIAGNOSIS — E10.40 TYPE 1 DIABETES MELLITUS WITH DIABETIC NEUROPATHY (MULTI): ICD-10-CM

## 2024-04-12 DIAGNOSIS — I11.0 HYPERTENSIVE HEART DISEASE WITH CONGESTIVE HEART FAILURE, UNSPECIFIED HEART FAILURE TYPE (MULTI): Primary | ICD-10-CM

## 2024-04-25 ENCOUNTER — PATIENT OUTREACH (OUTPATIENT)
Dept: PRIMARY CARE | Facility: CLINIC | Age: 54
End: 2024-04-25
Payer: COMMERCIAL

## 2024-05-02 DIAGNOSIS — Z95.1 S/P CABG X 3: ICD-10-CM

## 2024-05-02 DIAGNOSIS — I11.0 HYPERTENSIVE HEART DISEASE WITH CONGESTIVE HEART FAILURE, UNSPECIFIED HEART FAILURE TYPE (MULTI): Primary | ICD-10-CM

## 2024-05-02 DIAGNOSIS — I11.0 HYPERTENSIVE HEART DISEASE WITH CONGESTIVE HEART FAILURE, UNSPECIFIED HEART FAILURE TYPE (MULTI): ICD-10-CM

## 2024-05-02 DIAGNOSIS — I25.118 CORONARY ARTERY DISEASE WITH OTHER FORM OF ANGINA PECTORIS, UNSPECIFIED VESSEL OR LESION TYPE, UNSPECIFIED WHETHER NATIVE OR TRANSPLANTED HEART (CMS-HCC): ICD-10-CM

## 2024-05-02 RX ORDER — SACUBITRIL AND VALSARTAN 24; 26 MG/1; MG/1
1 TABLET, FILM COATED ORAL 2 TIMES DAILY
Qty: 180 TABLET | Refills: 2 | Status: SHIPPED | OUTPATIENT
Start: 2024-05-02 | End: 2025-01-27

## 2024-05-02 RX ORDER — SACUBITRIL AND VALSARTAN 24; 26 MG/1; MG/1
1 TABLET, FILM COATED ORAL 2 TIMES DAILY
Qty: 180 TABLET | Refills: 3 | Status: SHIPPED | OUTPATIENT
Start: 2024-05-02 | End: 2024-05-02 | Stop reason: SDUPTHER

## 2024-05-31 ENCOUNTER — HOSPITAL ENCOUNTER (INPATIENT)
Facility: HOSPITAL | Age: 54
LOS: 10 days | Discharge: HOME HEALTH | End: 2024-06-11
Attending: EMERGENCY MEDICINE | Admitting: INTERNAL MEDICINE
Payer: MEDICARE

## 2024-05-31 ENCOUNTER — APPOINTMENT (OUTPATIENT)
Dept: RADIOLOGY | Facility: HOSPITAL | Age: 54
DRG: 871 | End: 2024-05-31
Payer: MEDICARE

## 2024-05-31 ENCOUNTER — APPOINTMENT (OUTPATIENT)
Dept: CARDIOLOGY | Facility: HOSPITAL | Age: 54
DRG: 871 | End: 2024-05-31
Payer: MEDICARE

## 2024-05-31 DIAGNOSIS — I25.5 ISCHEMIC CARDIOMYOPATHY: ICD-10-CM

## 2024-05-31 DIAGNOSIS — T82.7XXA BACTEREMIA ASSOCIATED WITH INTRAVASCULAR LINE, INITIAL ENCOUNTER (CMS-HCC): ICD-10-CM

## 2024-05-31 DIAGNOSIS — I42.9 CARDIOMYOPATHY, UNSPECIFIED (MULTI): ICD-10-CM

## 2024-05-31 DIAGNOSIS — N18.6 ESRD ON DIALYSIS (MULTI): ICD-10-CM

## 2024-05-31 DIAGNOSIS — R78.81 BACTEREMIA ASSOCIATED WITH INTRAVASCULAR LINE, INITIAL ENCOUNTER (CMS-HCC): ICD-10-CM

## 2024-05-31 DIAGNOSIS — I50.20 HFREF (HEART FAILURE WITH REDUCED EJECTION FRACTION) (MULTI): ICD-10-CM

## 2024-05-31 DIAGNOSIS — R78.81 BACTEREMIA: ICD-10-CM

## 2024-05-31 DIAGNOSIS — Z99.2 ESRD ON DIALYSIS (MULTI): ICD-10-CM

## 2024-05-31 DIAGNOSIS — I50.22 CHRONIC SYSTOLIC HEART FAILURE (MULTI): Primary | ICD-10-CM

## 2024-05-31 DIAGNOSIS — I50.9 HEART FAILURE, UNSPECIFIED (MULTI): ICD-10-CM

## 2024-05-31 DIAGNOSIS — I42.8 OTHER CARDIOMYOPATHIES (MULTI): ICD-10-CM

## 2024-05-31 DIAGNOSIS — R09.02 HYPOXIA: ICD-10-CM

## 2024-05-31 LAB
ALBUMIN SERPL BCP-MCNC: 4.3 G/DL (ref 3.4–5)
ALP SERPL-CCNC: 73 U/L (ref 33–120)
ALT SERPL W P-5'-P-CCNC: 20 U/L (ref 10–52)
ANION GAP SERPL CALC-SCNC: 18 MMOL/L (ref 10–20)
AST SERPL W P-5'-P-CCNC: 30 U/L (ref 9–39)
BASOPHILS # BLD AUTO: 0.04 X10*3/UL (ref 0–0.1)
BASOPHILS NFR BLD AUTO: 0.9 %
BILIRUB SERPL-MCNC: 0.7 MG/DL (ref 0–1.2)
BNP SERPL-MCNC: >4700 PG/ML (ref 0–99)
BUN SERPL-MCNC: 41 MG/DL (ref 6–23)
CALCIUM SERPL-MCNC: 9.7 MG/DL (ref 8.6–10.3)
CARDIAC TROPONIN I PNL SERPL HS: 851 NG/L (ref 0–20)
CHLORIDE SERPL-SCNC: 88 MMOL/L (ref 98–107)
CO2 SERPL-SCNC: 29 MMOL/L (ref 21–32)
CREAT SERPL-MCNC: 8.84 MG/DL (ref 0.5–1.3)
D DIMER PPP FEU-MCNC: 2151 NG/ML FEU
EGFRCR SERPLBLD CKD-EPI 2021: 7 ML/MIN/1.73M*2
EOSINOPHIL # BLD AUTO: 0.01 X10*3/UL (ref 0–0.7)
EOSINOPHIL NFR BLD AUTO: 0.2 %
ERYTHROCYTE [DISTWIDTH] IN BLOOD BY AUTOMATED COUNT: 14.5 % (ref 11.5–14.5)
GLUCOSE SERPL-MCNC: 429 MG/DL (ref 74–99)
HCT VFR BLD AUTO: 25.5 % (ref 41–52)
HGB BLD-MCNC: 8.3 G/DL (ref 13.5–17.5)
IMM GRANULOCYTES # BLD AUTO: 0.02 X10*3/UL (ref 0–0.7)
IMM GRANULOCYTES NFR BLD AUTO: 0.4 % (ref 0–0.9)
LACTATE SERPL-SCNC: 1.9 MMOL/L (ref 0.4–2)
LYMPHOCYTES # BLD AUTO: 0.36 X10*3/UL (ref 1.2–4.8)
LYMPHOCYTES NFR BLD AUTO: 7.7 %
MCH RBC QN AUTO: 32.5 PG (ref 26–34)
MCHC RBC AUTO-ENTMCNC: 32.5 G/DL (ref 32–36)
MCV RBC AUTO: 100 FL (ref 80–100)
MONOCYTES # BLD AUTO: 0.76 X10*3/UL (ref 0.1–1)
MONOCYTES NFR BLD AUTO: 16.3 %
NEUTROPHILS # BLD AUTO: 3.47 X10*3/UL (ref 1.2–7.7)
NEUTROPHILS NFR BLD AUTO: 74.5 %
NRBC BLD-RTO: 0 /100 WBCS (ref 0–0)
PLATELET # BLD AUTO: 142 X10*3/UL (ref 150–450)
POTASSIUM SERPL-SCNC: 4.4 MMOL/L (ref 3.5–5.3)
PROT SERPL-MCNC: 7.5 G/DL (ref 6.4–8.2)
RBC # BLD AUTO: 2.55 X10*6/UL (ref 4.5–5.9)
SARS-COV-2 RNA RESP QL NAA+PROBE: NOT DETECTED
SODIUM SERPL-SCNC: 131 MMOL/L (ref 136–145)
WBC # BLD AUTO: 4.7 X10*3/UL (ref 4.4–11.3)

## 2024-05-31 PROCEDURE — 83605 ASSAY OF LACTIC ACID: CPT | Performed by: PHYSICIAN ASSISTANT

## 2024-05-31 PROCEDURE — 87040 BLOOD CULTURE FOR BACTERIA: CPT | Mod: AHULAB | Performed by: NURSE PRACTITIONER

## 2024-05-31 PROCEDURE — 80053 COMPREHEN METABOLIC PANEL: CPT | Performed by: PHYSICIAN ASSISTANT

## 2024-05-31 PROCEDURE — 85025 COMPLETE CBC W/AUTO DIFF WBC: CPT | Performed by: PHYSICIAN ASSISTANT

## 2024-05-31 PROCEDURE — 96365 THER/PROPH/DIAG IV INF INIT: CPT

## 2024-05-31 PROCEDURE — 36415 COLL VENOUS BLD VENIPUNCTURE: CPT | Performed by: NURSE PRACTITIONER

## 2024-05-31 PROCEDURE — 71046 X-RAY EXAM CHEST 2 VIEWS: CPT

## 2024-05-31 PROCEDURE — 93005 ELECTROCARDIOGRAM TRACING: CPT

## 2024-05-31 PROCEDURE — 84484 ASSAY OF TROPONIN QUANT: CPT | Performed by: PHYSICIAN ASSISTANT

## 2024-05-31 PROCEDURE — 85379 FIBRIN DEGRADATION QUANT: CPT | Performed by: NURSE PRACTITIONER

## 2024-05-31 PROCEDURE — 2500000004 HC RX 250 GENERAL PHARMACY W/ HCPCS (ALT 636 FOR OP/ED): Performed by: NURSE PRACTITIONER

## 2024-05-31 PROCEDURE — 83880 ASSAY OF NATRIURETIC PEPTIDE: CPT | Performed by: PHYSICIAN ASSISTANT

## 2024-05-31 PROCEDURE — 87635 SARS-COV-2 COVID-19 AMP PRB: CPT | Performed by: PHYSICIAN ASSISTANT

## 2024-05-31 PROCEDURE — 36415 COLL VENOUS BLD VENIPUNCTURE: CPT | Performed by: PHYSICIAN ASSISTANT

## 2024-05-31 PROCEDURE — 99285 EMERGENCY DEPT VISIT HI MDM: CPT | Mod: 25

## 2024-05-31 PROCEDURE — 71046 X-RAY EXAM CHEST 2 VIEWS: CPT | Performed by: RADIOLOGY

## 2024-05-31 RX ADMIN — AZITHROMYCIN MONOHYDRATE 500 MG: 500 INJECTION, POWDER, LYOPHILIZED, FOR SOLUTION INTRAVENOUS at 22:09

## 2024-05-31 ASSESSMENT — COLUMBIA-SUICIDE SEVERITY RATING SCALE - C-SSRS
1. IN THE PAST MONTH, HAVE YOU WISHED YOU WERE DEAD OR WISHED YOU COULD GO TO SLEEP AND NOT WAKE UP?: NO
2. HAVE YOU ACTUALLY HAD ANY THOUGHTS OF KILLING YOURSELF?: NO
6. HAVE YOU EVER DONE ANYTHING, STARTED TO DO ANYTHING, OR PREPARED TO DO ANYTHING TO END YOUR LIFE?: NO

## 2024-05-31 NOTE — Clinical Note
Single view of the saphenous vein graft to the posterior descending artery obtained using power injection.

## 2024-06-01 LAB
CARDIAC TROPONIN I PNL SERPL HS: 794 NG/L (ref 0–20)
EST. AVERAGE GLUCOSE BLD GHB EST-MCNC: 169 MG/DL
GLUCOSE BLD MANUAL STRIP-MCNC: 129 MG/DL (ref 74–99)
GLUCOSE BLD MANUAL STRIP-MCNC: 214 MG/DL (ref 74–99)
GLUCOSE BLD MANUAL STRIP-MCNC: 305 MG/DL (ref 74–99)
GLUCOSE BLD MANUAL STRIP-MCNC: 390 MG/DL (ref 74–99)
HBA1C MFR BLD: 7.5 %
HOLD SPECIMEN: NORMAL

## 2024-06-01 PROCEDURE — 82947 ASSAY GLUCOSE BLOOD QUANT: CPT

## 2024-06-01 PROCEDURE — 2500000004 HC RX 250 GENERAL PHARMACY W/ HCPCS (ALT 636 FOR OP/ED): Performed by: INTERNAL MEDICINE

## 2024-06-01 PROCEDURE — 36415 COLL VENOUS BLD VENIPUNCTURE: CPT | Performed by: INTERNAL MEDICINE

## 2024-06-01 PROCEDURE — 83036 HEMOGLOBIN GLYCOSYLATED A1C: CPT | Mod: AHULAB | Performed by: INTERNAL MEDICINE

## 2024-06-01 PROCEDURE — 2500000002 HC RX 250 W HCPCS SELF ADMINISTERED DRUGS (ALT 637 FOR MEDICARE OP, ALT 636 FOR OP/ED): Mod: MUE | Performed by: INTERNAL MEDICINE

## 2024-06-01 PROCEDURE — 99223 1ST HOSP IP/OBS HIGH 75: CPT | Performed by: INTERNAL MEDICINE

## 2024-06-01 PROCEDURE — 1100000001 HC PRIVATE ROOM DAILY

## 2024-06-01 PROCEDURE — 2500000001 HC RX 250 WO HCPCS SELF ADMINISTERED DRUGS (ALT 637 FOR MEDICARE OP): Performed by: INTERNAL MEDICINE

## 2024-06-01 RX ORDER — ASPIRIN 81 MG/1
81 TABLET ORAL DAILY
Status: DISCONTINUED | OUTPATIENT
Start: 2024-06-01 | End: 2024-06-11 | Stop reason: HOSPADM

## 2024-06-01 RX ORDER — BISMUTH SUBSALICYLATE 262 MG
1 TABLET,CHEWABLE ORAL DAILY
Status: DISCONTINUED | OUTPATIENT
Start: 2024-06-01 | End: 2024-06-01

## 2024-06-01 RX ORDER — VANCOMYCIN HYDROCHLORIDE 1 G/200ML
1000 INJECTION, SOLUTION INTRAVENOUS ONCE
Status: COMPLETED | OUTPATIENT
Start: 2024-06-01 | End: 2024-06-01

## 2024-06-01 RX ORDER — INSULIN LISPRO 100 [IU]/ML
0-10 INJECTION, SOLUTION INTRAVENOUS; SUBCUTANEOUS
Status: DISCONTINUED | OUTPATIENT
Start: 2024-06-01 | End: 2024-06-08

## 2024-06-01 RX ORDER — VANCOMYCIN HYDROCHLORIDE 1 G/20ML
INJECTION, POWDER, LYOPHILIZED, FOR SOLUTION INTRAVENOUS DAILY PRN
Status: DISCONTINUED | OUTPATIENT
Start: 2024-06-01 | End: 2024-06-11 | Stop reason: HOSPADM

## 2024-06-01 RX ORDER — DEXTROSE 50 % IN WATER (D50W) INTRAVENOUS SYRINGE
25
Status: DISCONTINUED | OUTPATIENT
Start: 2024-06-01 | End: 2024-06-11 | Stop reason: HOSPADM

## 2024-06-01 RX ORDER — ACETAMINOPHEN 325 MG/1
650 TABLET ORAL EVERY 6 HOURS PRN
Status: DISCONTINUED | OUTPATIENT
Start: 2024-06-01 | End: 2024-06-11 | Stop reason: HOSPADM

## 2024-06-01 RX ORDER — ACETAMINOPHEN 325 MG/1
975 TABLET ORAL ONCE
Status: COMPLETED | OUTPATIENT
Start: 2024-06-01 | End: 2024-06-01

## 2024-06-01 RX ORDER — POLYETHYLENE GLYCOL 3350 17 G/17G
17 POWDER, FOR SOLUTION ORAL DAILY
Status: DISCONTINUED | OUTPATIENT
Start: 2024-06-01 | End: 2024-06-11 | Stop reason: HOSPADM

## 2024-06-01 RX ORDER — CARVEDILOL 25 MG/1
25 TABLET ORAL
Status: DISCONTINUED | OUTPATIENT
Start: 2024-06-01 | End: 2024-06-11 | Stop reason: HOSPADM

## 2024-06-01 RX ORDER — DEXTROSE 50 % IN WATER (D50W) INTRAVENOUS SYRINGE
25
Status: DISCONTINUED | OUTPATIENT
Start: 2024-06-01 | End: 2024-06-01

## 2024-06-01 RX ORDER — ATORVASTATIN CALCIUM 80 MG/1
80 TABLET, FILM COATED ORAL DAILY
Status: DISCONTINUED | OUTPATIENT
Start: 2024-06-01 | End: 2024-06-11 | Stop reason: HOSPADM

## 2024-06-01 RX ORDER — SEVELAMER CARBONATE 800 MG/1
800 TABLET, FILM COATED ORAL
Status: DISCONTINUED | OUTPATIENT
Start: 2024-06-01 | End: 2024-06-11 | Stop reason: HOSPADM

## 2024-06-01 RX ORDER — INSULIN GLARGINE 100 [IU]/ML
10 INJECTION, SOLUTION SUBCUTANEOUS EVERY 12 HOURS
Status: DISCONTINUED | OUTPATIENT
Start: 2024-06-01 | End: 2024-06-11 | Stop reason: HOSPADM

## 2024-06-01 RX ORDER — DEXTROSE 50 % IN WATER (D50W) INTRAVENOUS SYRINGE
12.5
Status: DISCONTINUED | OUTPATIENT
Start: 2024-06-01 | End: 2024-06-01

## 2024-06-01 RX ORDER — MULTIVIT-MIN/IRON FUM/FOLIC AC 7.5 MG-4
1 TABLET ORAL DAILY
Status: DISCONTINUED | OUTPATIENT
Start: 2024-06-01 | End: 2024-06-11 | Stop reason: HOSPADM

## 2024-06-01 RX ADMIN — INSULIN GLARGINE 10 UNITS: 100 INJECTION, SOLUTION SUBCUTANEOUS at 10:52

## 2024-06-01 RX ADMIN — POLYETHYLENE GLYCOL 3350 17 G: 17 POWDER, FOR SOLUTION ORAL at 10:52

## 2024-06-01 RX ADMIN — ATORVASTATIN CALCIUM 80 MG: 80 TABLET, FILM COATED ORAL at 10:52

## 2024-06-01 RX ADMIN — INSULIN GLARGINE 10 UNITS: 100 INJECTION, SOLUTION SUBCUTANEOUS at 21:41

## 2024-06-01 RX ADMIN — SACUBITRIL AND VALSARTAN 1 TABLET: 24; 26 TABLET, FILM COATED ORAL at 10:51

## 2024-06-01 RX ADMIN — CEFEPIME 1 G: 1 INJECTION, POWDER, FOR SOLUTION INTRAMUSCULAR; INTRAVENOUS at 17:25

## 2024-06-01 RX ADMIN — VANCOMYCIN HYDROCHLORIDE 1000 MG: 1 INJECTION, SOLUTION INTRAVENOUS at 17:00

## 2024-06-01 RX ADMIN — MULTIPLE VITAMINS W/ MINERALS TAB 1 TABLET: TAB at 10:52

## 2024-06-01 RX ADMIN — INSULIN LISPRO 8 UNITS: 100 INJECTION, SOLUTION INTRAVENOUS; SUBCUTANEOUS at 12:50

## 2024-06-01 RX ADMIN — ACETAMINOPHEN 975 MG: 325 TABLET ORAL at 06:22

## 2024-06-01 RX ADMIN — SEVELAMER CARBONATE 800 MG: 800 TABLET, FILM COATED ORAL at 12:50

## 2024-06-01 RX ADMIN — ASPIRIN 81 MG: 81 TABLET, COATED ORAL at 10:52

## 2024-06-01 RX ADMIN — ACETAMINOPHEN 650 MG: 325 TABLET ORAL at 21:01

## 2024-06-01 RX ADMIN — SACUBITRIL AND VALSARTAN 1 TABLET: 24; 26 TABLET, FILM COATED ORAL at 21:02

## 2024-06-01 RX ADMIN — INSULIN LISPRO 10 UNITS: 100 INJECTION, SOLUTION INTRAVENOUS; SUBCUTANEOUS at 08:43

## 2024-06-01 RX ADMIN — SITAGLIPTIN 25 MG: 25 TABLET, FILM COATED ORAL at 10:52

## 2024-06-01 RX ADMIN — SEVELAMER CARBONATE 800 MG: 800 TABLET, FILM COATED ORAL at 17:25

## 2024-06-01 SDOH — SOCIAL STABILITY: SOCIAL INSECURITY: ABUSE: ADULT

## 2024-06-01 SDOH — SOCIAL STABILITY: SOCIAL INSECURITY: HAVE YOU HAD ANY THOUGHTS OF HARMING ANYONE ELSE?: NO

## 2024-06-01 SDOH — SOCIAL STABILITY: SOCIAL INSECURITY: WERE YOU ABLE TO COMPLETE ALL THE BEHAVIORAL HEALTH SCREENINGS?: YES

## 2024-06-01 SDOH — SOCIAL STABILITY: SOCIAL INSECURITY: DO YOU FEEL UNSAFE GOING BACK TO THE PLACE WHERE YOU ARE LIVING?: NO

## 2024-06-01 SDOH — SOCIAL STABILITY: SOCIAL INSECURITY: DO YOU FEEL ANYONE HAS EXPLOITED OR TAKEN ADVANTAGE OF YOU FINANCIALLY OR OF YOUR PERSONAL PROPERTY?: NO

## 2024-06-01 SDOH — SOCIAL STABILITY: SOCIAL INSECURITY: ARE YOU OR HAVE YOU BEEN THREATENED OR ABUSED PHYSICALLY, EMOTIONALLY, OR SEXUALLY BY ANYONE?: NO

## 2024-06-01 SDOH — SOCIAL STABILITY: SOCIAL INSECURITY: DOES ANYONE TRY TO KEEP YOU FROM HAVING/CONTACTING OTHER FRIENDS OR DOING THINGS OUTSIDE YOUR HOME?: NO

## 2024-06-01 SDOH — SOCIAL STABILITY: SOCIAL INSECURITY: HAVE YOU HAD THOUGHTS OF HARMING ANYONE ELSE?: YES

## 2024-06-01 SDOH — SOCIAL STABILITY: SOCIAL INSECURITY: ARE THERE ANY APPARENT SIGNS OF INJURIES/BEHAVIORS THAT COULD BE RELATED TO ABUSE/NEGLECT?: NO

## 2024-06-01 SDOH — SOCIAL STABILITY: SOCIAL INSECURITY: HAS ANYONE EVER THREATENED TO HURT YOUR FAMILY OR YOUR PETS?: NO

## 2024-06-01 ASSESSMENT — COGNITIVE AND FUNCTIONAL STATUS - GENERAL
DAILY ACTIVITIY SCORE: 24
MOBILITY SCORE: 24
DAILY ACTIVITIY SCORE: 24
PATIENT BASELINE BEDBOUND: NO
MOBILITY SCORE: 24

## 2024-06-01 ASSESSMENT — LIFESTYLE VARIABLES
SKIP TO QUESTIONS 9-10: 1
HOW OFTEN DO YOU HAVE 6 OR MORE DRINKS ON ONE OCCASION: NEVER
HOW MANY STANDARD DRINKS CONTAINING ALCOHOL DO YOU HAVE ON A TYPICAL DAY: 1 OR 2
HOW OFTEN DO YOU HAVE A DRINK CONTAINING ALCOHOL: MONTHLY OR LESS
AUDIT-C TOTAL SCORE: 1
AUDIT-C TOTAL SCORE: 1

## 2024-06-01 ASSESSMENT — ACTIVITIES OF DAILY LIVING (ADL)
PATIENT'S MEMORY ADEQUATE TO SAFELY COMPLETE DAILY ACTIVITIES?: YES
LACK_OF_TRANSPORTATION: NO
ADEQUATE_TO_COMPLETE_ADL: YES
BATHING: INDEPENDENT
FEEDING YOURSELF: INDEPENDENT
GROOMING: INDEPENDENT
BATHING: INDEPENDENT
WALKS IN HOME: INDEPENDENT
JUDGMENT_ADEQUATE_SAFELY_COMPLETE_DAILY_ACTIVITIES: YES
TOILETING: INDEPENDENT
JUDGMENT_ADEQUATE_SAFELY_COMPLETE_DAILY_ACTIVITIES: YES
FEEDING YOURSELF: INDEPENDENT
PATIENT'S MEMORY ADEQUATE TO SAFELY COMPLETE DAILY ACTIVITIES?: YES
TOILETING: INDEPENDENT
DRESSING YOURSELF: INDEPENDENT
ASSISTIVE_DEVICE: EYEGLASSES
ADEQUATE_TO_COMPLETE_ADL: YES
HEARING - RIGHT EAR: FUNCTIONAL
DRESSING YOURSELF: INDEPENDENT
GROOMING: INDEPENDENT
WALKS IN HOME: INDEPENDENT
HEARING - LEFT EAR: FUNCTIONAL

## 2024-06-01 ASSESSMENT — PAIN - FUNCTIONAL ASSESSMENT
PAIN_FUNCTIONAL_ASSESSMENT: 0-10
PAIN_FUNCTIONAL_ASSESSMENT: 0-10

## 2024-06-01 ASSESSMENT — PAIN SCALES - GENERAL
PAINLEVEL_OUTOF10: 4
PAINLEVEL_OUTOF10: 6
PAINLEVEL_OUTOF10: 0 - NO PAIN

## 2024-06-01 ASSESSMENT — PAIN DESCRIPTION - LOCATION: LOCATION: GENERALIZED

## 2024-06-01 NOTE — ED TRIAGE NOTES
TRIAGE NOTE   I saw the patient as the Clinician in Triage and performed a brief history and physical exam, established acuity, and ordered appropriate tests to develop basic plan of care. Patient will be seen by an TRE, resident and/or physician who will independently evaluate the patient. Please see subsequent provider notes for further details and disposition.     Brief HPI: In brief, Kentrell Carreon is a 53 y.o. male that presents for hypoxia.  He was 86% he had a fever at 37.7 in triage.  He took acetaminophen at 5 PM.  His heart rate was 93 bpm.  He was denying chest pain.  He was denying weakness.  In March 25 2024 he had a CT of his chest which was concerning for groundglass opacity throughout and infiltrate.  Patient does not remember receiving antibiotics but his girlfriend is bedside indicated he was started on antibiotics.  He has a history of lupus other complications include diabetes.  He has a history of congestive heart failure.  He does not have a pacemaker.  He is on dialysis and he had successful dialysis today.  He denies fluid overload but they were concerned that he might have fluid overload and he was placed on oxygen in triage.  That brought him to 92% from 86%.  He was placed on 2 L nasal cannula.  He was made a level 2.  He was denying melena or hematochezia.  He was denying hematuria..     Focused Physical exam:   Loud murmur appreciated during exam.  Rhonchorous breath sounds throughout.  No JVD no lower extremity edema.  Skin was normal in temperature and color.  Patient was able to ambulate under his own strength.  I was concerned that he may have a continuation of his pneumonia or he could have a PE.  A VTE D-dimer was ordered.  Plan/MDM:   Troponin, CBC CMP blood cultures chest x-ray EKG ordered with concern for possible PE VTE D-dimer was ordered.  Patient has a complicated medical history including end-stage renal disease with dialysis just completed today.  He has a history of heart  failure.  He is a diabetic and he has a monitor on his arm.  He was hypoxic in triage.  Please see subsequent provider note for further details and disposition

## 2024-06-01 NOTE — CONSULTS
Inpatient consult to Nephrology  Consult performed by: Fletcher Whitley DO  Consult ordered by: Osmar Callaway MD      Reason For Consult  ESRD on hemodialysis    History Of Present Illness  Kentrell Carreon is a 53 y.o. male  with a past medical history of end-stage renal disease on home hemodialysis Monday Tuesday Thursday Friday via [facility] under Dr. Epperson, coronary artery disease status post PCI, three-vessel CABG in March 2022, HFmrEF, aortic stenosis, tachycardia s/p ablation, insulin-dependent diabetes, hypertension, hyperlipidemia, history of tension pneumothorax in the setting of thoracentesis, right second toe transmetatarsal amputation, right foot osteomyelitis who presents with fever, fatigue and bodyaches.  Upon presentation, he was afebrile albeit hypoxic to 86% with a respiratory rate of 24.  There was no leukocytosis. Chest x-ray showed vascular congestion.  He has persistent troponinenemia 794/851 (previously 608 last admission).  BNP was greater than 4700.  Blood cultures were obtained and came back growing GPC's in pairs and chains.  Reportedly he was last dialyzed yesterday.  Nephrology is consulted for renal care.     Past Medical History:   Diagnosis Date    Personal history of other diseases of the circulatory system 02/01/2018    History of essential hypertension    Systemic lupus erythematosus, unspecified (Multi) 02/01/2018    History of systemic lupus erythematosus (SLE)    Type 1 diabetes mellitus with other diabetic kidney complication (Multi) 02/01/2018    Type 1 diabetes mellitus with other kidney complication       Past Surgical History:   Procedure Laterality Date    CORONARY ANGIOPLASTY WITH STENT PLACEMENT  07/13/2017    Cath Stent Placement    OTHER SURGICAL HISTORY  05/18/2017    Arteriovenous Surgery Creation Of A-V Fistula    OTHER SURGICAL HISTORY  02/01/2018    Surgery Right Foot Amputation DIP Third Toe    OTHER SURGICAL HISTORY  06/10/2022    Bronchoscopy    OTHER  "SURGICAL HISTORY  06/10/2022    Pulmonary decortication    OTHER SURGICAL HISTORY  06/10/2022    Thoracotomy    OTHER SURGICAL HISTORY  06/10/2022    Thoracentesis       Social History     Tobacco Use    Smoking status: Former     Types: Cigarettes     Passive exposure: Current    Smokeless tobacco: Never   Substance Use Topics    Alcohol use: Yes    Drug use: Never        Family History  Family History   Problem Relation Name Age of Onset    Heart failure Mother      Heart attack Father          Allergies  Iodinated contrast media, Ampicillin-sulbactam, and Angora    Review of Systems   10 point review of system obtained and negative unless stated in HPI    Physical Exam   Constitutional: Well developed, awake/alert/oriented  x3, no distress, alert and cooperative   Eyes: PERRL, EOMI, clear sclera   ENMT: mucous membranes moist   Head/Neck: Neck supple, no JVD, trachea midline   Respiratory/Thorax: Patent airways, CTAB, normal  breath sounds with good chest expansion, thorax symmetric   Cardiovascular: Regular, rate and rhythm, systolic ejection murmur, normal S 1 and S 2  Right arm aVF within normal limits   Gastrointestinal: Nondistended, soft, non-tender, no rebound tenderness or guarding, no masses palpable, no organomegaly, +BS, no bruits   Musculoskeletal: ROM intact, no joint swelling, normal  strength   Extremities: No leg edema   Neurological: alert and oriented x3  Nonfocal   no asterixis   Skin: Warm and dry, right foot dressed          I&O 24HR    Intake/Output Summary (Last 24 hours) at 6/1/2024 1426  Last data filed at 6/1/2024 1100  Gross per 24 hour   Intake 240 ml   Output --   Net 240 ml       Vitals 24HR  Heart Rate:  [61-97]   Temp:  [36.9 °C (98.4 °F)-37.7 °C (99.9 °F)]   Resp:  [10-32]   BP: (110-155)/(69-91)   Height:  [188 cm (6' 2\")-193 cm (6' 4\")]   Weight:  [68.4 kg (150 lb 12.7 oz)-72.6 kg (160 lb)]   SpO2:  [86 %-99 %]     Scheduled Medications  aspirin, 81 mg, oral, " Daily  atorvastatin, 80 mg, oral, Daily  carvedilol, 25 mg, oral, BID  insulin glargine, 10 Units, subcutaneous, q12h  insulin lispro, 0-10 Units, subcutaneous, TID  multivitamin with minerals, 1 tablet, oral, Daily  polyethylene glycol, 17 g, oral, Daily  sacubitriL-valsartan, 1 tablet, oral, BID  sevelamer carbonate, 800 mg, oral, TID  SITagliptin phosphate, 25 mg, oral, Daily      Continuous medications       PRN medications: dextrose, glucagon, glucagon     Relevant Results  Results from last 7 days   Lab Units 05/31/24  2200   WBC AUTO x10*3/uL 4.7   HEMOGLOBIN g/dL 8.3*   HEMATOCRIT % 25.5*   PLATELETS AUTO x10*3/uL 142*   NEUTROS PCT AUTO % 74.5   LYMPHS PCT AUTO % 7.7   MONOS PCT AUTO % 16.3   EOS PCT AUTO % 0.2      Results from last 7 days   Lab Units 05/31/24  2200   SODIUM mmol/L 131*   POTASSIUM mmol/L 4.4   CHLORIDE mmol/L 88*   CO2 mmol/L 29   BUN mg/dL 41*   CREATININE mg/dL 8.84*   CALCIUM mg/dL 9.7   PROTEIN TOTAL g/dL 7.5   BILIRUBIN TOTAL mg/dL 0.7   ALK PHOS U/L 73   ALT U/L 20   AST U/L 30   GLUCOSE mg/dL 429*      XR chest 2 views   Final Result   1.  Enlarged cardiac silhouette with vascular congestion. No   consolidation seen                  MACRO:   None        Signed by: Aaron Jeffers 5/31/2024 9:28 PM   Dictation workstation:   [id number]            Assessment/Plan     Kentrell Carreon is a 53 y.o. male  with a past medical history of end-stage renal disease on home hemodialysis Monday Tuesday Thursday Friday via [facility] under Dr. Epperson, coronary artery disease status post PCI, three-vessel CABG in March 2022, HFmrEF, aortic stenosis, tachycardia s/p ablation, insulin-dependent diabetes, hypertension, hyperlipidemia, history of tension pneumothorax in the setting of thoracentesis, right second toe transmetatarsal amputation, right foot osteomyelitis who presents with fever, fatigue and bodyaches.  Upon presentation, he was afebrile albeit hypoxic to 86% with a respiratory rate of  24.  There was no leukocytosis. Chest x-ray showed vascular congestion.  He has persistent troponinenemia 794/851 (previously 608 last admission).  BNP was greater than 4700.  Blood cultures were obtained and came back growing GPC's in pairs and chains.  Reportedly he was last dialyzed yesterday.  Nephrology is consulted for renal care.  Given the blood cultures he will need his antimicrobial coverage adjusted to include vancomycin.  Will alert the primary team.  Chest x-ray and clinical exam does not suggest overt heart failure despite his LVEF of 40% on his most recent 2D echo on 2/27.  His electrolytes are acceptable.  I will hold off on dialyzing him today and monitor him closely over the weekend.  Trend his RFP.  Will follow closely with his care.    Principal Problem:    Hypoxia      I spent 50 minutes in the professional and overall care of this patient.      Fletcher Whitley, DO

## 2024-06-01 NOTE — ED TRIAGE NOTES
Pt presents to ED via self for a complaint of shortness of breath, and weakness. Pt presents GCS 15, has a patent airway, and does not appear in distress. Pt was found to be 86% on RA in triage (pt denies having to wear O2). Pt placed on o2 at 2lpm via NC. Pt states concern of orthostatic hypotension. Pt seated BP was 134/80, standing was 143/68.   Pt reports HX of Lupus and dialysis.

## 2024-06-01 NOTE — H&P
History Of Present Illness  Kentrell Carreon is a 53 y.o. male presenting with General fatigue and intermittent fever and just feeling tired.  This is a 53-year-old gentleman with end-stage renal disease ,lupus nephritis, on home dialysis 4 days a week, history of hypertension, coronary artery bypass surgery and chronic systolic and diastolic heart failure, chronic anemia, diabetes mellitus type 1 insulin-dependent.  Presents with 3 to 4 days history of feeling weak tired and fatigue and intermittent fever, denied any chest pain, denies any cough or sputum production.  Had generalized body ache and patient thought that he might have had a lupus flareup.  Symptoms getting worse for past 24 hours therefore he came to emergency room.  Patient stated that at home he has noticed temperature as high as 103 °F.  Workup in the emergency room was unrevealing here however his blood cultures have come back positive with Enterococcus faecalis and gram-positive cocci.     Past Medical History  He has a past medical history of Personal history of other diseases of the circulatory system (02/01/2018), Systemic lupus erythematosus, unspecified (Multi) (02/01/2018), and Type 1 diabetes mellitus with other diabetic kidney complication (Multi) (02/01/2018).    Surgical History  He has a past surgical history that includes Other surgical history (05/18/2017); Other surgical history (02/01/2018); Other surgical history (06/10/2022); Other surgical history (06/10/2022); Other surgical history (06/10/2022); Other surgical history (06/10/2022); and Coronary angioplasty with stent (07/13/2017).     Social History  He reports that he has quit smoking. His smoking use included cigarettes. He has been exposed to tobacco smoke. He has never used smokeless tobacco. He reports current alcohol use. He reports that he does not use drugs.    Family History  Hypertension       Allergies  Iodinated contrast media, Ampicillin-sulbactam, and  [place]    Review of Systems  COMPLETE REVIEW OF SYSTEMS:    GENERAL: No weight loss, positive for general malaise and fevers., SEE HPI  HEENT: Negative for frequent or significant headaches, No changes in hearing or vision, no nose bleeds or other nasal problems  NECK: Negative for lumps, goiter, pain and significant neck swelling  RESPIRATORY: Negative for cough, wheezing or shortness of breath.  CARDIOVASCULAR: Negative for chest pain, leg swelling or palpitations.  Previous coronary artery bypass surgery and history of chronic systolic and diastolic heart failure with LV ejection fraction 40%  GI: Negative for abdominal discomfort, blood in stools or black stools or change in bowel habits  : End-stage renal disease on hemodialysis  MUSCULOSKELETAL: Negative for joint pain or swelling, back pain or muscle pain.  SKIN: Negative for lesions, rash, and itching.  PSYCH: Negative for sleep disturbance, mood disorder and recent psychosocial stressors.  HEMATOLOGY/LYMPHOLOGY: Negative for prolonged bleeding, bruising easily or swollen nodes.  ENDOCRINE:, History of type 1 insulin-dependent diabetes mellitus negative for cold or heat intolerance, polyuria, polydipsia and goiter.  NEURO: No history of headaches, syncope, paralysis, seizures or tremors  All other reviewed and negative other than HPI.      Physical Exam     Last Recorded Vitals  /69   Pulse 92   Temp 37.1 °C (98.8 °F)   Resp 18   Wt 68.4 kg (150 lb 12.7 oz)   SpO2 92%   GENERAL: Awake, alert, calm and cooperative, feels weak, no acute distress.  SKIN: turgor normal. No rashes or lesions.  HEAD/SINUSES: No significant findings.  EYES: PERRLA and EOMI  EARS: external ears normal  NOSE:  Septum midline.  OROPHARYNX: Lips, mucosa, and tongue normal. Teeth and gums normal. Oropharynx normal.  NECK: no jugulovenous distention, no carotid bruits, carotid pulse normal contour, supple  BACK: Back symmetric, Normal curvature, ROM normal, No  CVAT.  LUNGS: Lungs clear to auscultation. Good diaphragmatic excursion.  CARDIAC: Rate and rhythm is regular, normal S1 and S2; no rubs, murmurs, or gallops well-healed mid sternotomy surgical scar of previous coronary artery bypass surgery, 3/6 systolic ejection murmur left sternal border radiating to neck and apical area, tricuspid holosystolic murmur.  ABDOMEN: Abdomen soft, non-tender. BS normal. No masses or organomegaly.  EXTREMITIES: Extremities normal. No deformities, edema, clubbing or skin discoloration.  NEURO: Gait normal. Reflexes normal and symmetric. Sensation grossly intact., Cranial nerves II-XII intact  PULSES: 2+ radial, 2+ carotid  RECTAL: not done  The remainder of the physical exam is noncontributory.       Relevant Results  Scheduled medications  aspirin, 81 mg, oral, Daily  atorvastatin, 80 mg, oral, Daily  carvedilol, 25 mg, oral, BID  insulin glargine, 10 Units, subcutaneous, q12h  insulin lispro, 0-10 Units, subcutaneous, TID  multivitamin with minerals, 1 tablet, oral, Daily  polyethylene glycol, 17 g, oral, Daily  sacubitriL-valsartan, 1 tablet, oral, BID  sevelamer carbonate, 800 mg, oral, TID  SITagliptin phosphate, 25 mg, oral, Daily      Continuous medications     PRN medications  PRN medications: dextrose, glucagon, glucagon     Results for orders placed or performed during the hospital encounter of 05/31/24 (from the past 96 hour(s))   CBC and Auto Differential   Result Value Ref Range    WBC 4.7 4.4 - 11.3 x10*3/uL    nRBC 0.0 0.0 - 0.0 /100 WBCs    RBC 2.55 (L) 4.50 - 5.90 x10*6/uL    Hemoglobin 8.3 (L) 13.5 - 17.5 g/dL    Hematocrit 25.5 (L) 41.0 - 52.0 %     80 - 100 fL    MCH 32.5 26.0 - 34.0 pg    MCHC 32.5 32.0 - 36.0 g/dL    RDW 14.5 11.5 - 14.5 %    Platelets 142 (L) 150 - 450 x10*3/uL    Neutrophils % 74.5 40.0 - 80.0 %    Immature Granulocytes %, Automated 0.4 0.0 - 0.9 %    Lymphocytes % 7.7 13.0 - 44.0 %    Monocytes % 16.3 2.0 - 10.0 %    Eosinophils % 0.2 0.0  - 6.0 %    Basophils % 0.9 0.0 - 2.0 %    Neutrophils Absolute 3.47 1.20 - 7.70 x10*3/uL    Immature Granulocytes Absolute, Automated 0.02 0.00 - 0.70 x10*3/uL    Lymphocytes Absolute 0.36 (L) 1.20 - 4.80 x10*3/uL    Monocytes Absolute 0.76 0.10 - 1.00 x10*3/uL    Eosinophils Absolute 0.01 0.00 - 0.70 x10*3/uL    Basophils Absolute 0.04 0.00 - 0.10 x10*3/uL   Comprehensive Metabolic Panel   Result Value Ref Range    Glucose 429 (H) 74 - 99 mg/dL    Sodium 131 (L) 136 - 145 mmol/L    Potassium 4.4 3.5 - 5.3 mmol/L    Chloride 88 (L) 98 - 107 mmol/L    Bicarbonate 29 21 - 32 mmol/L    Anion Gap 18 10 - 20 mmol/L    Urea Nitrogen 41 (H) 6 - 23 mg/dL    Creatinine 8.84 (H) 0.50 - 1.30 mg/dL    eGFR 7 (L) >60 mL/min/1.73m*2    Calcium 9.7 8.6 - 10.3 mg/dL    Albumin 4.3 3.4 - 5.0 g/dL    Alkaline Phosphatase 73 33 - 120 U/L    Total Protein 7.5 6.4 - 8.2 g/dL    AST 30 9 - 39 U/L    Bilirubin, Total 0.7 0.0 - 1.2 mg/dL    ALT 20 10 - 52 U/L   Lactate   Result Value Ref Range    Lactate 1.9 0.4 - 2.0 mmol/L   Troponin I, High Sensitivity, Initial   Result Value Ref Range    Troponin I, High Sensitivity 851 (HH) 0 - 20 ng/L   B-type natriuretic peptide   Result Value Ref Range    BNP >4,700 (H) 0 - 99 pg/mL   Blood Culture    Specimen: Peripheral Venipuncture; Blood culture   Result Value Ref Range    Blood Culture       Identification and susceptibility testing to follow    Gram Stain Gram positive cocci, pairs and chains (AA)    Blood Culture    Specimen: Peripheral Venipuncture; Blood culture   Result Value Ref Range    Blood Culture       Identification and susceptibility testing to follow    Gram Stain Gram positive cocci, pairs and chains (AA)    Sars-CoV-2 PCR   Result Value Ref Range    Coronavirus 2019, PCR Not Detected Not Detected   D-Dimer, VTE Exclusion   Result Value Ref Range    D-Dimer, Quantitative VTE Exclusion 2,151 (H) <=500 ng/mL FEU   Troponin, High Sensitivity, 1 Hour   Result Value Ref Range     Troponin I, High Sensitivity 794 (HH) 0 - 20 ng/L   Hemoglobin A1C   Result Value Ref Range    Hemoglobin A1C 7.5 (H) see below %    Estimated Average Glucose 169 Not Established mg/dL   POCT GLUCOSE   Result Value Ref Range    POCT Glucose 390 (H) 74 - 99 mg/dL   Green Top   Result Value Ref Range    Extra Tube Hold for add-ons.    POCT GLUCOSE   Result Value Ref Range    POCT Glucose 305 (H) 74 - 99 mg/dL    XR chest 2 views    Result Date: 5/31/2024  Interpreted By:  Aaron Jeffers, STUDY: XR CHEST 2 VIEWS;  5/31/2024 9:24 pm   INDICATION: Signs/Symptoms:sob.   COMPARISON: None.   ACCESSION NUMBER(S): [id number]   ORDERING CLINICIAN: KAMALA FRIEDMAN   FINDINGS: Median sternal wires present. Some vascular stent over the right axilla.     CARDIOMEDIASTINAL SILHOUETTE: There is enlargement of the cardiac silhouette. Median sternotomy wires present.   LUNGS: There is pulmonary congestion. There is no consolidation or effusion.   ABDOMEN: No remarkable upper abdominal findings.   BONES: No acute osseous changes.       1.  Enlarged cardiac silhouette with vascular congestion. No consolidation seen       MACRO: None   Signed by: Aaron Jeffers 5/31/2024 9:28 PM Dictation workstation:   [id number]       Assessment/Plan   Principal Problem:  Generalized fatigue and fever  Enterococcus faecalis and gram-positive cocci bacteremia, ruled out CLABSI  Admitted to telemetry  Positive blood cultures for Enterococcus faecalis and gram-positive cocci  Patient is allergic to penicillin, ID started on cefepime and vancomycin  Monitor closely  TTE  Other active medical problems  End-stage renal disease on home hemodialysis, nephrology consultation requested  Patient has AV fistula right upper arm with good bruit and thrill.  Diabetes mellitus insulin-dependent, hemoglobin A1c 7.5, continue with Lantus insulin 10 units twice a day and sliding scale 4 times daily as needed  History of SLE  Hypertension stable    Chronic systolic and  diastolic heart failure  Continue sacubitril and other medications including Coreg.  Previous coronary artery bypass surgery    Reviewed all labs and medications and all imaging studies and discussed the plan of treatment with patient in detail.  Nephrology consultation requested.  Total time spent in examination counseling and coordinating care for this patient was greater than 75 minutes.               Osmar Callaway MD

## 2024-06-01 NOTE — CARE PLAN
The patient's goals for the shift include      The clinical goals for the shift include pt will remain safe thrughout the shift    Over the shift, the patient did make progress toward the following goals.

## 2024-06-01 NOTE — ED PROVIDER NOTES
HPI   Chief Complaint   Patient presents with    Shortness of Breath       53 y old male with a complaint of fatigue and aches, initially thought he may be having a lupus flare but also was concerned that he does home dialysis and could have introduced an infection. He took tylenol for low grade fever before arrival. He has felt weak but in triage was hypoxic, which he did not realize but felt greatly improved after supplemental 02, 2L NC. He has no cp or pleuritic pain. No leg swelling.                   Manjeet Coma Scale Score: 15                   Patient History   Past Medical History:   Diagnosis Date    Personal history of other diseases of the circulatory system 02/01/2018    History of essential hypertension    Systemic lupus erythematosus, unspecified (Multi) 02/01/2018    History of systemic lupus erythematosus (SLE)    Type 1 diabetes mellitus with other diabetic kidney complication (Multi) 02/01/2018    Type 1 diabetes mellitus with other kidney complication     Past Surgical History:   Procedure Laterality Date    CORONARY ANGIOPLASTY WITH STENT PLACEMENT  07/13/2017    Cath Stent Placement    OTHER SURGICAL HISTORY  05/18/2017    Arteriovenous Surgery Creation Of A-V Fistula    OTHER SURGICAL HISTORY  02/01/2018    Surgery Right Foot Amputation DIP Third Toe    OTHER SURGICAL HISTORY  06/10/2022    Bronchoscopy    OTHER SURGICAL HISTORY  06/10/2022    Pulmonary decortication    OTHER SURGICAL HISTORY  06/10/2022    Thoracotomy    OTHER SURGICAL HISTORY  06/10/2022    Thoracentesis     Family History   Problem Relation Name Age of Onset    Heart failure Mother      Heart attack Father       Social History     Tobacco Use    Smoking status: Former     Types: Cigarettes     Passive exposure: Current    Smokeless tobacco: Never   Substance Use Topics    Alcohol use: Yes    Drug use: Never       Physical Exam   ED Triage Vitals   Temperature Heart Rate Respirations BP   05/31/24 2101 05/31/24 2101 05/31/24  2200 05/31/24 2101   37.7 °C (99.9 °F) 93 (!) 24 134/80      Pulse Ox Temp Source Heart Rate Source Patient Position   05/31/24 2101 05/31/24 2101 -- 05/31/24 2101   (!) 86 % Temporal  Sitting      BP Location FiO2 (%)     05/31/24 2101 05/31/24 2245     Left arm 28 %       Physical Exam  Vitals reviewed.   Constitutional:       General: He is not in acute distress.     Appearance: Normal appearance. He is normal weight. He is not ill-appearing, toxic-appearing or diaphoretic.   HENT:      Head: Normocephalic and atraumatic.      Right Ear: External ear normal.      Left Ear: External ear normal.      Nose: Nose normal.      Mouth/Throat:      Mouth: Mucous membranes are moist.   Eyes:      Extraocular Movements: Extraocular movements intact.      Conjunctiva/sclera: Conjunctivae normal.      Pupils: Pupils are equal, round, and reactive to light.   Cardiovascular:      Rate and Rhythm: Normal rate and regular rhythm.      Heart sounds: No murmur heard.  Pulmonary:      Effort: Pulmonary effort is normal. No respiratory distress.      Breath sounds: No stridor.   Abdominal:      General: There is no distension.      Tenderness: There is no guarding or rebound.   Musculoskeletal:         General: No swelling or deformity.      Cervical back: Normal range of motion.   Skin:     General: Skin is warm.      Capillary Refill: Capillary refill takes less than 2 seconds.      Coloration: Skin is not jaundiced or pale.      Findings: No bruising or rash.   Neurological:      General: No focal deficit present.      Mental Status: He is alert and oriented to person, place, and time. Mental status is at baseline.   Psychiatric:         Mood and Affect: Mood normal.         Behavior: Behavior normal.         Thought Content: Thought content normal.         Judgment: Judgment normal.         ED Course & MDM   Diagnoses as of 06/01/24 0247   Hypoxia       Medical Decision Making  MEDICAL DECISION MAKING   Number and Complexity of  Problems  Differential Diagnosis: needs more dialysis/ fluid overload.   Do not suspect PE, ddimer likely elevated from renal disease was ordered in triage.   No chest pain, low acs suspicion, trops chronically high.   Bacteremia/Sepsis: cultures drawm, lactate normal wbc normal.  Lupus flare    MDM Data  External documents reviewed:  My EKG interpretation: 88bpm nsr qide qrs, lvh, nonspectific t wave  Discussed with: ed attending and dr prieto for admit.    Treatment and Disposition  Labs Reviewed  CBC WITH AUTO DIFFERENTIAL - Abnormal     WBC                           4.7                    nRBC                          0.0                    RBC                           2.55 (*)               Hemoglobin                    8.3 (*)                Hematocrit                    25.5 (*)               MCV                           100                    MCH                           32.5                   MCHC                          32.5                   RDW                           14.5                   Platelets                     142 (*)                Neutrophils %                 74.5                   Immature Granulocytes %, Automated   0.4                    Lymphocytes %                 7.7                    Monocytes %                   16.3                   Eosinophils %                 0.2                    Basophils %                   0.9                    Neutrophils Absolute          3.47                   Immature Granulocytes Absolute, Au*   0.02                   Lymphocytes Absolute          0.36 (*)               Monocytes Absolute            0.76                   Eosinophils Absolute          0.01                   Basophils Absolute            0.04                COMPREHENSIVE METABOLIC PANEL - Abnormal     Glucose                       429 (*)                Sodium                        131 (*)                Potassium                     4.4                    Chloride                       88 (*)                 Bicarbonate                   29                     Anion Gap                     18                     Urea Nitrogen                 41 (*)                 Creatinine                    8.84 (*)               eGFR                          7 (*)                  Calcium                       9.7                    Albumin                       4.3                    Alkaline Phosphatase          73                     Total Protein                 7.5                    AST                           30                     Bilirubin, Total              0.7                    ALT                           20                  SERIAL TROPONIN-INITIAL - Abnormal     Troponin I, High Sensitivity   851 (*)                  Narrative: Less than 99th percentile of normal range cutoff-                Female and children under 18 years old <14 ng/L; Male <21 ng/L: Negative                Repeat testing should be performed if clinically indicated.                                 Female and children under 18 years old 14-50 ng/L; Male 21-50 ng/L:                Consistent with possible cardiac damage and possible increased clinical                 risk. Serial measurements may help to assess extent of myocardial damage.                                 >50 ng/L: Consistent with cardiac damage, increased clinical risk and                myocardial infarction. Serial measurements may help assess extent of                 myocardial damage.                                  NOTE: Children less than 1 year old may have higher baseline troponin                 levels and results should be interpreted in conjunction with the overall                 clinical context.                                 NOTE: Troponin I testing is performed using a different                 testing methodology at [facility] than at other                 [facility] hospitals. Direct result comparisons should only                  be made within the same method.  B-TYPE NATRIURETIC PEPTIDE - Abnormal     BNP                           >4,700 (*)                 Narrative:    <100 pg/mL - Heart failure unlikely                100-299 pg/mL - Intermediate probability of acute heart                                failure exacerbation. Correlate with clinical                                context and patient history.                  >=300 pg/mL - Heart Failure likely. Correlate with clinical                                context and patient history.                                BNP testing is performed using different testing methodology at [facility] than at other [facility]. Direct result comparisons should only be made within the same method.                   SERIAL TROPONIN, 1 HOUR - Abnormal     Troponin I, High Sensitivity   794 (*)                  Narrative: Less than 99th percentile of normal range cutoff-                Female and children under 18 years old <14 ng/L; Male <21 ng/L: Negative                Repeat testing should be performed if clinically indicated.                                 Female and children under 18 years old 14-50 ng/L; Male 21-50 ng/L:                Consistent with possible cardiac damage and possible increased clinical                 risk. Serial measurements may help to assess extent of myocardial damage.                                 >50 ng/L: Consistent with cardiac damage, increased clinical risk and                myocardial infarction. Serial measurements may help assess extent of                 myocardial damage.                                  NOTE: Children less than 1 year old may have higher baseline troponin                 levels and results should be interpreted in conjunction with the overall                 clinical context.                                 NOTE: Troponin I testing is performed using a different                 testing methodology at  [facility] than at other                 [facility]. Direct result comparisons should only                 be made within the same method.  D-DIMER, VTE EXCLUSION - Abnormal     D-Dimer, Quantitative VTE Exclusion   2,151 (*)                 Narrative: The VTE Exclusion D-Dimer assay is reported in ng/mL Fibrinogen Equivalent Units (FEU).                                Per 's instructions for use, a value of less than 500 ng/mL (FEU) may help to exclude DVT or PE in outpatients when the assay is used with a clinical pretest probability assessment.([facility] must utilize and document eCalc 'Wells Score Deep Vein Thrombosis Risk' for DVT exclusion only. Emergency Department should utilize  Guidelines for Emergency Department Use of the VTE Exclusion D-Dimer and Clinical Pretest probability assessment model for DVT or PE exclusion.)  BLOOD CULTURE - Normal     Blood Culture                                     BLOOD CULTURE - Normal     Blood Culture                                     LACTATE - Normal     Lactate                       1.9                      Narrative: Venipuncture immediately after or during the administration of Metamizole may lead to falsely low results. Testing should be performed immediately                prior to Metamizole dosing.  SARS-COV-2 PCR - Normal     Coronavirus 2019, PCR                                  Narrative: This assay has received FDA Emergency Use Authorization (EUA) and is only authorized for the duration of time that circumstances exist to justify the authorization of the emergency use of in vitro diagnostic tests for the detection of SARS-CoV-2 virus and/or diagnosis of COVID-19 infection under section 564(b)(1) of the Act, 21 U.S.C. 360bbb-3(b)(1). This assay is an in vitro diagnostic nucleic acid amplification test for the qualitative detection of SARS-CoV-2 from nasopharyngeal specimens and has been validated for use at [facility]. Negative results do not preclude COVID-19 infections and should not be used as the sole basis for diagnosis, treatment, or other management decisions.                  TROPONIN SERIES- (INITIAL, 1 HR)       Narrative: The following orders were created for panel order Troponin Series, (0, 1 HR).                Procedure                               Abnormality         Status                                   ---------                               -----------         ------                                   Troponin I, High Sensiti...[[id number]]  Abnormal            Final result                             Troponin, High Sensitivi...[[id number]]  Abnormal            Final result                                             Please view results for these tests on the individual orders.  URINALYSIS WITH REFLEX CULTURE AND MICROSCOPIC       Narrative: The following orders were created for panel order Urinalysis with Reflex Culture and Microscopic.                Procedure                               Abnormality         Status                                   ---------                               -----------         ------                                   Urinalysis with Reflex C...[[id number]]                                                               Extra Urine Gray Tube[[id number]]                                                                                     Please view results for these tests on the individual orders.  URINALYSIS WITH REFLEX CULTURE AND MICROSCOPIC  EXTRA URINE GRAY TUBE   XR chest 2 views   Final Result    1.  Enlarged cardiac silhouette with vascular congestion. No    consolidation seen                      MACRO:    None          Signed by: Aaron Jeffers 5/31/2024 9:28 PM    Dictation workstation:   [id number]      Medications  azithromycin (Zithromax) in dextrose 5 % in water (D5W) 250 mL  mg (0 mg intravenous Stopped 5/31/24 8930)     ED Course: requireing  new 02, vascular vcongested, better on NC, will admit. No cta, medicine can persue if interested.   Was admission considered?: yes   Chronic health conditions addressed:  Critical care time: 42min             Procedure  Procedures     Oliverio White PA-C  06/01/24 8012

## 2024-06-01 NOTE — PROGRESS NOTES
Vancomycin Dosing by Pharmacy- INITIAL    Kentrell Carreon is a 53 y.o. year old male who Pharmacy has been consulted for vancomycin dosing for line infections. Based on the patient's indication and renal status this patient will be dosed based on a goal trough/random level of 15-20.     Renal function is currently stable.    Visit Vitals  /69   Pulse 61   Temp 36.9 °C (98.4 °F)   Resp 18        Lab Results   Component Value Date    CREATININE 8.84 (H) 2024    CREATININE 11.15 (H) 2024    CREATININE 9.13 (H) 2024    CREATININE 13.03 (H) 2024        Patient weight is as follows:   Vitals:    24 0754   Weight: 68.4 kg (150 lb 12.7 oz)       Cultures:  Susceptibility data for the encounter in last 14 days.  Collected Specimen Info Organism   24 Blood culture from Peripheral Venipuncture Enterococcus faecalis   24 Blood culture from Peripheral Venipuncture Enterococcus faecalis         No intake/output data recorded.  I/O during current shift:  I/O this shift:  In: 240 [P.O.:240]  Out: -     Temp (24hrs), Av.2 °C (99 °F), Min:36.9 °C (98.4 °F), Max:37.7 °C (99.9 °F)         Assessment/Plan     Patient will be given a loading dose of 1000 mg.  Will initiate vancomycin maintenance of dosing per level - will start 500 mg after next HD session    Follow-up level will be ordered on TBD once 2nd HD session determined unless clinically indicated sooner.  Will continue to monitor renal function daily while on vancomycin and order serum creatinine at least every 48 hours if not already ordered.  Follow for continued vancomycin needs, clinical response, and signs/symptoms of toxicity.       Doris Merlos, PharmD

## 2024-06-02 LAB
ALBUMIN SERPL BCP-MCNC: 3.6 G/DL (ref 3.4–5)
ALP SERPL-CCNC: 60 U/L (ref 33–120)
ALT SERPL W P-5'-P-CCNC: 17 U/L (ref 10–52)
ANION GAP SERPL CALC-SCNC: 17 MMOL/L (ref 10–20)
AST SERPL W P-5'-P-CCNC: 21 U/L (ref 9–39)
BILIRUB SERPL-MCNC: 0.5 MG/DL (ref 0–1.2)
BUN SERPL-MCNC: 60 MG/DL (ref 6–23)
CALCIUM SERPL-MCNC: 9.5 MG/DL (ref 8.6–10.3)
CHLORIDE SERPL-SCNC: 92 MMOL/L (ref 98–107)
CO2 SERPL-SCNC: 29 MMOL/L (ref 21–32)
CREAT SERPL-MCNC: 12 MG/DL (ref 0.5–1.3)
EGFRCR SERPLBLD CKD-EPI 2021: 5 ML/MIN/1.73M*2
ERYTHROCYTE [DISTWIDTH] IN BLOOD BY AUTOMATED COUNT: 14.6 % (ref 11.5–14.5)
GLUCOSE BLD MANUAL STRIP-MCNC: 116 MG/DL (ref 74–99)
GLUCOSE BLD MANUAL STRIP-MCNC: 146 MG/DL (ref 74–99)
GLUCOSE BLD MANUAL STRIP-MCNC: 153 MG/DL (ref 74–99)
GLUCOSE BLD MANUAL STRIP-MCNC: 164 MG/DL (ref 74–99)
GLUCOSE SERPL-MCNC: 106 MG/DL (ref 74–99)
HCT VFR BLD AUTO: 26.3 % (ref 41–52)
HGB BLD-MCNC: 8.5 G/DL (ref 13.5–17.5)
MCH RBC QN AUTO: 32.9 PG (ref 26–34)
MCHC RBC AUTO-ENTMCNC: 32.3 G/DL (ref 32–36)
MCV RBC AUTO: 102 FL (ref 80–100)
NRBC BLD-RTO: 0 /100 WBCS (ref 0–0)
PHOSPHATE SERPL-MCNC: 4.7 MG/DL (ref 2.5–4.9)
PLATELET # BLD AUTO: 158 X10*3/UL (ref 150–450)
POTASSIUM SERPL-SCNC: 4.3 MMOL/L (ref 3.5–5.3)
PROT SERPL-MCNC: 6.5 G/DL (ref 6.4–8.2)
RBC # BLD AUTO: 2.58 X10*6/UL (ref 4.5–5.9)
SODIUM SERPL-SCNC: 134 MMOL/L (ref 136–145)
WBC # BLD AUTO: 4.3 X10*3/UL (ref 4.4–11.3)

## 2024-06-02 PROCEDURE — 99232 SBSQ HOSP IP/OBS MODERATE 35: CPT | Performed by: INTERNAL MEDICINE

## 2024-06-02 PROCEDURE — 2500000004 HC RX 250 GENERAL PHARMACY W/ HCPCS (ALT 636 FOR OP/ED): Performed by: INTERNAL MEDICINE

## 2024-06-02 PROCEDURE — 2500000002 HC RX 250 W HCPCS SELF ADMINISTERED DRUGS (ALT 637 FOR MEDICARE OP, ALT 636 FOR OP/ED): Performed by: INTERNAL MEDICINE

## 2024-06-02 PROCEDURE — 85027 COMPLETE CBC AUTOMATED: CPT | Performed by: INTERNAL MEDICINE

## 2024-06-02 PROCEDURE — 80053 COMPREHEN METABOLIC PANEL: CPT | Performed by: INTERNAL MEDICINE

## 2024-06-02 PROCEDURE — 1100000001 HC PRIVATE ROOM DAILY

## 2024-06-02 PROCEDURE — 82947 ASSAY GLUCOSE BLOOD QUANT: CPT | Mod: 91

## 2024-06-02 PROCEDURE — 36415 COLL VENOUS BLD VENIPUNCTURE: CPT | Performed by: INTERNAL MEDICINE

## 2024-06-02 PROCEDURE — 5A1D70Z PERFORMANCE OF URINARY FILTRATION, INTERMITTENT, LESS THAN 6 HOURS PER DAY: ICD-10-PCS | Performed by: INTERNAL MEDICINE

## 2024-06-02 PROCEDURE — 2500000001 HC RX 250 WO HCPCS SELF ADMINISTERED DRUGS (ALT 637 FOR MEDICARE OP): Performed by: INTERNAL MEDICINE

## 2024-06-02 PROCEDURE — 84100 ASSAY OF PHOSPHORUS: CPT | Performed by: INTERNAL MEDICINE

## 2024-06-02 RX ADMIN — SITAGLIPTIN 25 MG: 25 TABLET, FILM COATED ORAL at 09:32

## 2024-06-02 RX ADMIN — INSULIN GLARGINE 10 UNITS: 100 INJECTION, SOLUTION SUBCUTANEOUS at 21:34

## 2024-06-02 RX ADMIN — INSULIN GLARGINE 10 UNITS: 100 INJECTION, SOLUTION SUBCUTANEOUS at 09:33

## 2024-06-02 RX ADMIN — CARVEDILOL 25 MG: 25 TABLET, FILM COATED ORAL at 09:33

## 2024-06-02 RX ADMIN — SEVELAMER CARBONATE 800 MG: 800 TABLET, FILM COATED ORAL at 17:11

## 2024-06-02 RX ADMIN — INSULIN LISPRO 2 UNITS: 100 INJECTION, SOLUTION INTRAVENOUS; SUBCUTANEOUS at 09:33

## 2024-06-02 RX ADMIN — SACUBITRIL AND VALSARTAN 1 TABLET: 24; 26 TABLET, FILM COATED ORAL at 21:36

## 2024-06-02 RX ADMIN — SEVELAMER CARBONATE 800 MG: 800 TABLET, FILM COATED ORAL at 12:49

## 2024-06-02 RX ADMIN — MULTIPLE VITAMINS W/ MINERALS TAB 1 TABLET: TAB at 09:32

## 2024-06-02 RX ADMIN — ATORVASTATIN CALCIUM 80 MG: 80 TABLET, FILM COATED ORAL at 09:33

## 2024-06-02 RX ADMIN — SACUBITRIL AND VALSARTAN 1 TABLET: 24; 26 TABLET, FILM COATED ORAL at 09:32

## 2024-06-02 RX ADMIN — ACETAMINOPHEN 650 MG: 325 TABLET ORAL at 21:36

## 2024-06-02 RX ADMIN — ASPIRIN 81 MG: 81 TABLET, COATED ORAL at 09:32

## 2024-06-02 RX ADMIN — CEFTRIAXONE 2 G: 2 INJECTION, POWDER, FOR SOLUTION INTRAMUSCULAR; INTRAVENOUS at 23:28

## 2024-06-02 RX ADMIN — CEFTRIAXONE 2 G: 2 INJECTION, POWDER, FOR SOLUTION INTRAMUSCULAR; INTRAVENOUS at 01:10

## 2024-06-02 RX ADMIN — SEVELAMER CARBONATE 800 MG: 800 TABLET, FILM COATED ORAL at 08:00

## 2024-06-02 ASSESSMENT — COGNITIVE AND FUNCTIONAL STATUS - GENERAL
DAILY ACTIVITIY SCORE: 24
MOBILITY SCORE: 24

## 2024-06-02 ASSESSMENT — PAIN SCALES - GENERAL
PAINLEVEL_OUTOF10: 0 - NO PAIN
PAINLEVEL_OUTOF10: 4

## 2024-06-02 ASSESSMENT — PAIN DESCRIPTION - LOCATION: LOCATION: LEG

## 2024-06-02 ASSESSMENT — PAIN - FUNCTIONAL ASSESSMENT: PAIN_FUNCTIONAL_ASSESSMENT: 0-10

## 2024-06-02 ASSESSMENT — PAIN DESCRIPTION - ORIENTATION: ORIENTATION: RIGHT;LEFT

## 2024-06-02 NOTE — CARE PLAN
The patient's goals for the shift include  no falls.    The clinical goals for the shift include pt will remain safe thrughout the shift    Over the shift, the patient did not make progress toward the following goals. Barriers to progression include none. Recommendations to address these barriers include none.

## 2024-06-02 NOTE — PROGRESS NOTES
"Kentrell Carreon is a 53 y.o. male on day 1 of admission presenting with Hypoxia.    Subjective   Patient seen and examined, he is coming along fine, remains afebrile for past 24 hours, denies any chest pain or shortness of breath.  His blood cultures are positive for Enterococcus faecalis and gram-positive cocci in clusters, identification pending.  Patient has been started on IV ceftriaxone and vancomycin.  Transthoracic echocardiogram pending, may need transesophageal echocardiogram, will seek ID consultation.       Objective     Physical Exam    Last Recorded Vitals  Blood pressure (!) 172/99, pulse 89, temperature 36.7 °C (98 °F), resp. rate 18, height 1.93 m (6' 4\"), weight 68.4 kg (150 lb 12.7 oz), SpO2 95%.  Intake/Output last 3 Shifts:  I/O last 3 completed shifts:  In: 240 (3.5 mL/kg) [P.O.:240]  Out: - (0 mL/kg)   Weight: 68.4 kg   GENERAL:  Alert, no distress, cooperative  SKIN:  Skin color, texture, turgor normal. No rashes or lesions.  OROPHARYNX:  Lips, mucosa, and tongue are normal.Teeth and gums, normal. Oropharynx normal.  NECK:  No jugulovenous distention, No carotid bruits, Carotid pulse normal contour, Supple  LUNGS:  Lungs clear to auscultation. Good diaphragmatic excursion.  CARDIAC: Rate and rhythm is regular with  normal S1 and S2; no rubs,  or gallops, 3/6 systolic ejection murmur left sternal border radiating to neck and apical area, well-healed open heart surgery scar of previous coronary artery bypass surgery.  ABDOMEN:  Abdomen soft, non-tender, BS normal, No masses or organomegaly  EXTREMETIES:  Extremities normal, no deformities, edema, clubbing or skin discoloration. Good capillary refill., No ulcers  Right upper arm AV fistula with good bruit and thrill.  NEURO:  Alert, oriented X 3, Gait not examined. Non-focal. Reflexes normal and symmetric. Sensation grossly intact., Cranial nerves II-XII intact  PULSES:  2+ radial, 2+ carotid  Relevant Results  Scheduled medications  aspirin, 81 mg, " oral, Daily  atorvastatin, 80 mg, oral, Daily  carvedilol, 25 mg, oral, BID  cefTRIAXone, 2 g, intravenous, q24h  insulin glargine, 10 Units, subcutaneous, q12h  insulin lispro, 0-10 Units, subcutaneous, TID  multivitamin with minerals, 1 tablet, oral, Daily  polyethylene glycol, 17 g, oral, Daily  sacubitriL-valsartan, 1 tablet, oral, BID  sevelamer carbonate, 800 mg, oral, TID  SITagliptin phosphate, 25 mg, oral, Daily      Continuous medications     PRN medications  PRN medications: acetaminophen, dextrose, glucagon, glucagon, vancomycin      XR chest 2 views    Result Date: 5/31/2024  Interpreted By:  Aaron Jeffers, STUDY: XR CHEST 2 VIEWS;  5/31/2024 9:24 pm   INDICATION: Signs/Symptoms:sob.   COMPARISON: None.   ACCESSION NUMBER(S): [id number]   ORDERING CLINICIAN: KAMALA FRIEDMAN   FINDINGS: Median sternal wires present. Some vascular stent over the right axilla.     CARDIOMEDIASTINAL SILHOUETTE: There is enlargement of the cardiac silhouette. Median sternotomy wires present.   LUNGS: There is pulmonary congestion. There is no consolidation or effusion.   ABDOMEN: No remarkable upper abdominal findings.   BONES: No acute osseous changes.       1.  Enlarged cardiac silhouette with vascular congestion. No consolidation seen       MACRO: None   Signed by: Aaron Jeffers 5/31/2024 9:28 PM Dictation workstation:   [id number]  Results for orders placed or performed during the hospital encounter of 05/31/24 (from the past 96 hour(s))   CBC and Auto Differential   Result Value Ref Range    WBC 4.7 4.4 - 11.3 x10*3/uL    nRBC 0.0 0.0 - 0.0 /100 WBCs    RBC 2.55 (L) 4.50 - 5.90 x10*6/uL    Hemoglobin 8.3 (L) 13.5 - 17.5 g/dL    Hematocrit 25.5 (L) 41.0 - 52.0 %     80 - 100 fL    MCH 32.5 26.0 - 34.0 pg    MCHC 32.5 32.0 - 36.0 g/dL    RDW 14.5 11.5 - 14.5 %    Platelets 142 (L) 150 - 450 x10*3/uL    Neutrophils % 74.5 40.0 - 80.0 %    Immature Granulocytes %, Automated 0.4 0.0 - 0.9 %    Lymphocytes % 7.7  13.0 - 44.0 %    Monocytes % 16.3 2.0 - 10.0 %    Eosinophils % 0.2 0.0 - 6.0 %    Basophils % 0.9 0.0 - 2.0 %    Neutrophils Absolute 3.47 1.20 - 7.70 x10*3/uL    Immature Granulocytes Absolute, Automated 0.02 0.00 - 0.70 x10*3/uL    Lymphocytes Absolute 0.36 (L) 1.20 - 4.80 x10*3/uL    Monocytes Absolute 0.76 0.10 - 1.00 x10*3/uL    Eosinophils Absolute 0.01 0.00 - 0.70 x10*3/uL    Basophils Absolute 0.04 0.00 - 0.10 x10*3/uL   Comprehensive Metabolic Panel   Result Value Ref Range    Glucose 429 (H) 74 - 99 mg/dL    Sodium 131 (L) 136 - 145 mmol/L    Potassium 4.4 3.5 - 5.3 mmol/L    Chloride 88 (L) 98 - 107 mmol/L    Bicarbonate 29 21 - 32 mmol/L    Anion Gap 18 10 - 20 mmol/L    Urea Nitrogen 41 (H) 6 - 23 mg/dL    Creatinine 8.84 (H) 0.50 - 1.30 mg/dL    eGFR 7 (L) >60 mL/min/1.73m*2    Calcium 9.7 8.6 - 10.3 mg/dL    Albumin 4.3 3.4 - 5.0 g/dL    Alkaline Phosphatase 73 33 - 120 U/L    Total Protein 7.5 6.4 - 8.2 g/dL    AST 30 9 - 39 U/L    Bilirubin, Total 0.7 0.0 - 1.2 mg/dL    ALT 20 10 - 52 U/L   Lactate   Result Value Ref Range    Lactate 1.9 0.4 - 2.0 mmol/L   Troponin I, High Sensitivity, Initial   Result Value Ref Range    Troponin I, High Sensitivity 851 (HH) 0 - 20 ng/L   B-type natriuretic peptide   Result Value Ref Range    BNP >4,700 (H) 0 - 99 pg/mL   Blood Culture    Specimen: Peripheral Venipuncture; Blood culture   Result Value Ref Range    Blood Culture Enterococcus faecalis (AA)     BLOOD CULTURE BOTTLE  Positive Aerobic and Anaerobic Bottle     Gram Stain Gram positive cocci, pairs and chains (AA)    Blood Culture    Specimen: Peripheral Venipuncture; Blood culture   Result Value Ref Range    Blood Culture Enterococcus faecalis (AA)     BLOOD CULTURE BOTTLE  Positive Aerobic and Anaerobic Bottle     Gram Stain Gram positive cocci, pairs and chains (AA)    Sars-CoV-2 PCR   Result Value Ref Range    Coronavirus 2019, PCR Not Detected Not Detected   D-Dimer, VTE Exclusion   Result Value Ref  Range    D-Dimer, Quantitative VTE Exclusion 2,151 (H) <=500 ng/mL FEU   Troponin, High Sensitivity, 1 Hour   Result Value Ref Range    Troponin I, High Sensitivity 794 (HH) 0 - 20 ng/L   Hemoglobin A1C   Result Value Ref Range    Hemoglobin A1C 7.5 (H) see below %    Estimated Average Glucose 169 Not Established mg/dL   POCT GLUCOSE   Result Value Ref Range    POCT Glucose 390 (H) 74 - 99 mg/dL   Green Top   Result Value Ref Range    Extra Tube Hold for add-ons.    POCT GLUCOSE   Result Value Ref Range    POCT Glucose 305 (H) 74 - 99 mg/dL   POCT GLUCOSE   Result Value Ref Range    POCT Glucose 129 (H) 74 - 99 mg/dL   POCT GLUCOSE   Result Value Ref Range    POCT Glucose 214 (H) 74 - 99 mg/dL   CBC   Result Value Ref Range    WBC 4.3 (L) 4.4 - 11.3 x10*3/uL    nRBC 0.0 0.0 - 0.0 /100 WBCs    RBC 2.58 (L) 4.50 - 5.90 x10*6/uL    Hemoglobin 8.5 (L) 13.5 - 17.5 g/dL    Hematocrit 26.3 (L) 41.0 - 52.0 %     (H) 80 - 100 fL    MCH 32.9 26.0 - 34.0 pg    MCHC 32.3 32.0 - 36.0 g/dL    RDW 14.6 (H) 11.5 - 14.5 %    Platelets 158 150 - 450 x10*3/uL   Comprehensive metabolic panel   Result Value Ref Range    Glucose 106 (H) 74 - 99 mg/dL    Sodium 134 (L) 136 - 145 mmol/L    Potassium 4.3 3.5 - 5.3 mmol/L    Chloride 92 (L) 98 - 107 mmol/L    Bicarbonate 29 21 - 32 mmol/L    Anion Gap 17 10 - 20 mmol/L    Urea Nitrogen 60 (H) 6 - 23 mg/dL    Creatinine 12.00 (H) 0.50 - 1.30 mg/dL    eGFR 5 (L) >60 mL/min/1.73m*2    Calcium 9.5 8.6 - 10.3 mg/dL    Albumin 3.6 3.4 - 5.0 g/dL    Alkaline Phosphatase 60 33 - 120 U/L    Total Protein 6.5 6.4 - 8.2 g/dL    AST 21 9 - 39 U/L    Bilirubin, Total 0.5 0.0 - 1.2 mg/dL    ALT 17 10 - 52 U/L   POCT GLUCOSE   Result Value Ref Range    POCT Glucose 153 (H) 74 - 99 mg/dL                Assessment/Plan   Principal Problem:  Generalized fatigue and fever  Enterococcus faecalis and gram-positive cocci bacteremia.  Positive blood cultures for Enterococcus faecalis and gram-positive  cocci  Patient is allergic to penicillin,  started on cefepime and vancomycin.,  IV cefepime discontinued today and changed to ceftriaxone 2 g IV every 24 hours.  Monitor closely  TTE to rule out endocarditis, consider doing MARION if necessary and will seek ID consultation.  Other active medical problems  End-stage renal disease on home hemodialysis, nephrology consultation requested  Patient has AV fistula right upper arm with good bruit and thrill.  Diabetes mellitus insulin-dependent, hemoglobin A1c 7.5, continue with Lantus insulin 10 units twice a day and sliding scale 4 times daily as needed  History of SLE  Hypertension stable     Chronic systolic and diastolic heart failure  Continue sacubitril and other medications including Coreg.  Previous coronary artery bypass surgery     Reviewed all labs and medications and all imaging studies and discussed the plan of treatment with patient in detail.  Nephrology consultation requested.  Total time spent in examination counseling and coordinating care for this patient was greater than 35 minutes.               I spent 35 minutes in the professional and overall care of this patient.      Osmar Callaway MD

## 2024-06-02 NOTE — PROGRESS NOTES
Kentrell Carreon is a 53 y.o. male on day 1 of admission presenting with Hypoxia.      Subjective   Kentrell Carreon is a 53 y.o. male  with a past medical history of end-stage renal disease on home hemodialysis Monday Tuesday Thursday Friday via [facility] under Dr. Epperson, coronary artery disease status post PCI, three-vessel CABG in March 2022, HFmrEF, aortic stenosis, tachycardia s/p ablation, insulin-dependent diabetes, hypertension, hyperlipidemia, history of tension pneumothorax in the setting of thoracentesis, right second toe transmetatarsal amputation, right foot osteomyelitis who presents with fever, fatigue and bodyaches.  Upon presentation, he was afebrile albeit hypoxic to 86% with a respiratory rate of 24.  There was no leukocytosis. Chest x-ray showed vascular congestion.  He has persistent troponinenemia 794/851 (previously 608 last admission).  BNP was greater than 4700.  Blood cultures were obtained and came back growing GPC's in pairs and chains.  Reportedly he was last dialyzed yesterday.  Nephrology is consulted for renal care.      Objective        Vitals 24HR  Heart Rate:  [74-89]   Temp:  [36.4 °C (97.6 °F)-36.9 °C (98.5 °F)]   Resp:  [17-18]   BP: (120-172)/(64-99)   SpO2:  [94 %-100 %]     Intake/Output last 3 Shifts:    Intake/Output Summary (Last 24 hours) at 6/2/2024 1233  Last data filed at 6/2/2024 1100  Gross per 24 hour   Intake 240 ml   Output --   Net 240 ml       Physical Exam  Constitutional: Well developed, awake/alert/oriented  x3, no distress, alert and cooperative   Eyes: PERRL, EOMI, clear sclera   ENMT: mucous membranes moist   Head/Neck: Neck supple, no JVD, trachea midline   Respiratory/Thorax: Patent airways, CTAB, normal  breath sounds with good chest expansion, thorax symmetric   Cardiovascular: Regular, rate and rhythm, systolic ejection murmur, normal S 1 and S 2  Right arm aVF within normal limits   Gastrointestinal: Nondistended, soft, non-tender, no rebound  tenderness or guarding, no masses palpable, no organomegaly, +BS, no bruits   Musculoskeletal: ROM intact, no joint swelling, normal  strength   Extremities: No leg edema   Neurological: alert and oriented x3  Nonfocal   no asterixis   Skin: Warm and dry, right foot dressed     Scheduled Medications    Continuous medications     PRN medications: acetaminophen, dextrose, glucagon, glucagon, vancomycin     Relevant Results  Results from last 7 days   Lab Units 06/02/24  0718 05/31/24  2200   WBC AUTO x10*3/uL 4.3* 4.7   HEMOGLOBIN g/dL 8.5* 8.3*   HEMATOCRIT % 26.3* 25.5*   PLATELETS AUTO x10*3/uL 158 142*   NEUTROS PCT AUTO %  --  74.5   LYMPHS PCT AUTO %  --  7.7   MONOS PCT AUTO %  --  16.3   EOS PCT AUTO %  --  0.2     Results from last 7 days   Lab Units 06/02/24  0718 05/31/24  2200   SODIUM mmol/L 134* 131*   POTASSIUM mmol/L 4.3 4.4   CHLORIDE mmol/L 92* 88*   CO2 mmol/L 29 29   BUN mg/dL 60* 41*   CREATININE mg/dL 12.00* 8.84*   GLUCOSE mg/dL 106* 429*   CALCIUM mg/dL 9.5 9.7       XR chest 2 views   Final Result   1.  Enlarged cardiac silhouette with vascular congestion. No   consolidation seen                  MACRO:   None        Signed by: Aaron Jeffers 5/31/2024 9:28 PM   Dictation workstation:   [id number]      Transthoracic Echo (TTE) Complete    (Results Pending)        Assessment/Plan      Kentrell Carreon is a 53 y.o. male  with a past medical history of end-stage renal disease on home hemodialysis Monday Tuesday Thursday Friday via [facility] under Dr. Epperson, coronary artery disease status post PCI, three-vessel CABG in March 2022, HFmrEF, aortic stenosis, tachycardia s/p ablation, insulin-dependent diabetes, hypertension, hyperlipidemia, history of tension pneumothorax in the setting of thoracentesis, right second toe transmetatarsal amputation, right foot osteomyelitis who presents with fever, fatigue and bodyaches.  Upon presentation, he was afebrile albeit hypoxic to 86% with a respiratory  rate of 24.  There was no leukocytosis. Chest x-ray showed vascular congestion.  He has persistent troponinenemia 794/851 (previously 608 last admission).  BNP was greater than 4700.      Blood cultures were obtained and came back growing Enterococcus faecalis. We will need ID's input as I anticipate that he will need CT and ECHO imaging. Doubt his fistula is a culprit, this does not look infected on exam. We could consider an ultrasound of the access if other potential sources are unrevealing. Antimicrobial coverage has been adjusted. Chest x-ray and clinical exam does not suggest overt heart failure despite his LVEF of 40% on his most recent 2D echo on 2/27.  His electrolytes are acceptable.  I will hold off on dialyzing him today and will plan for tomorrow. Trend his RFP. I will order erythropoietin and will obtain a phosphorus level.  Will follow closely with his care.     Principal Problem:    Hypoxia      I spent 40 minutes in the professional and overall care of this patient.      Fletcher Whitley, DO

## 2024-06-03 ENCOUNTER — APPOINTMENT (OUTPATIENT)
Dept: DIALYSIS | Facility: HOSPITAL | Age: 54
End: 2024-06-03
Payer: COMMERCIAL

## 2024-06-03 LAB
APPEARANCE UR: CLEAR
BACTERIA BLD AEROBE CULT: ABNORMAL
BACTERIA BLD AEROBE CULT: ABNORMAL
BACTERIA BLD CULT: ABNORMAL
BACTERIA BLD CULT: ABNORMAL
BILIRUB UR STRIP.AUTO-MCNC: NEGATIVE MG/DL
COLOR UR: ABNORMAL
GLUCOSE BLD MANUAL STRIP-MCNC: 121 MG/DL (ref 74–99)
GLUCOSE BLD MANUAL STRIP-MCNC: 136 MG/DL (ref 74–99)
GLUCOSE BLD MANUAL STRIP-MCNC: 159 MG/DL (ref 74–99)
GLUCOSE BLD MANUAL STRIP-MCNC: 245 MG/DL (ref 74–99)
GLUCOSE UR STRIP.AUTO-MCNC: ABNORMAL MG/DL
GRAM STN SPEC: ABNORMAL
GRAM STN SPEC: ABNORMAL
KETONES UR STRIP.AUTO-MCNC: NEGATIVE MG/DL
LEUKOCYTE ESTERASE UR QL STRIP.AUTO: NEGATIVE
NITRITE UR QL STRIP.AUTO: NEGATIVE
PH UR STRIP.AUTO: 8.5 [PH]
PROT UR STRIP.AUTO-MCNC: ABNORMAL MG/DL
RBC # UR STRIP.AUTO: NEGATIVE /UL
RBC #/AREA URNS AUTO: NORMAL /HPF
SP GR UR STRIP.AUTO: 1.01
UROBILINOGEN UR STRIP.AUTO-MCNC: NORMAL MG/DL
WBC #/AREA URNS AUTO: NORMAL /HPF

## 2024-06-03 PROCEDURE — 2500000004 HC RX 250 GENERAL PHARMACY W/ HCPCS (ALT 636 FOR OP/ED): Performed by: PHARMACIST

## 2024-06-03 PROCEDURE — 2500000002 HC RX 250 W HCPCS SELF ADMINISTERED DRUGS (ALT 637 FOR MEDICARE OP, ALT 636 FOR OP/ED): Mod: MUE | Performed by: INTERNAL MEDICINE

## 2024-06-03 PROCEDURE — 1100000001 HC PRIVATE ROOM DAILY

## 2024-06-03 PROCEDURE — 2500000001 HC RX 250 WO HCPCS SELF ADMINISTERED DRUGS (ALT 637 FOR MEDICARE OP): Performed by: INTERNAL MEDICINE

## 2024-06-03 PROCEDURE — 81001 URINALYSIS AUTO W/SCOPE: CPT | Performed by: INTERNAL MEDICINE

## 2024-06-03 PROCEDURE — 8010000001 HC DIALYSIS - HEMODIALYSIS PER DAY

## 2024-06-03 PROCEDURE — 99232 SBSQ HOSP IP/OBS MODERATE 35: CPT | Performed by: INTERNAL MEDICINE

## 2024-06-03 PROCEDURE — 6350000001 HC RX 635 EPOETIN >10,000 UNITS: Mod: JZ | Performed by: PHARMACIST

## 2024-06-03 PROCEDURE — 82947 ASSAY GLUCOSE BLOOD QUANT: CPT | Mod: 91

## 2024-06-03 PROCEDURE — 90935 HEMODIALYSIS ONE EVALUATION: CPT | Performed by: INTERNAL MEDICINE

## 2024-06-03 RX ORDER — VANCOMYCIN HYDROCHLORIDE 500 MG/100ML
500 INJECTION, SOLUTION INTRAVENOUS ONCE
Status: COMPLETED | OUTPATIENT
Start: 2024-06-03 | End: 2024-06-03

## 2024-06-03 RX ORDER — CEFTRIAXONE 2 G/1
2 INJECTION, POWDER, FOR SOLUTION INTRAMUSCULAR; INTRAVENOUS ONCE
Status: CANCELLED | OUTPATIENT
Start: 2024-06-03 | End: 2024-06-03

## 2024-06-03 RX ADMIN — INSULIN LISPRO 2 UNITS: 100 INJECTION, SOLUTION INTRAVENOUS; SUBCUTANEOUS at 16:09

## 2024-06-03 RX ADMIN — ASPIRIN 81 MG: 81 TABLET, COATED ORAL at 14:33

## 2024-06-03 RX ADMIN — ACETAMINOPHEN 650 MG: 325 TABLET ORAL at 21:17

## 2024-06-03 RX ADMIN — INSULIN GLARGINE 10 UNITS: 100 INJECTION, SOLUTION SUBCUTANEOUS at 22:21

## 2024-06-03 RX ADMIN — ATORVASTATIN CALCIUM 80 MG: 80 TABLET, FILM COATED ORAL at 14:32

## 2024-06-03 RX ADMIN — CARVEDILOL 25 MG: 25 TABLET, FILM COATED ORAL at 14:31

## 2024-06-03 RX ADMIN — SACUBITRIL AND VALSARTAN 1 TABLET: 24; 26 TABLET, FILM COATED ORAL at 14:33

## 2024-06-03 RX ADMIN — SEVELAMER CARBONATE 800 MG: 800 TABLET, FILM COATED ORAL at 14:32

## 2024-06-03 RX ADMIN — SITAGLIPTIN 25 MG: 25 TABLET, FILM COATED ORAL at 14:33

## 2024-06-03 RX ADMIN — EPOETIN ALFA-EPBX 10000 UNITS: 10000 INJECTION, SOLUTION INTRAVENOUS; SUBCUTANEOUS at 18:06

## 2024-06-03 RX ADMIN — SEVELAMER CARBONATE 800 MG: 800 TABLET, FILM COATED ORAL at 16:09

## 2024-06-03 RX ADMIN — INSULIN GLARGINE 10 UNITS: 100 INJECTION, SOLUTION SUBCUTANEOUS at 14:34

## 2024-06-03 RX ADMIN — MULTIPLE VITAMINS W/ MINERALS TAB 1 TABLET: TAB at 14:32

## 2024-06-03 RX ADMIN — ACETAMINOPHEN 650 MG: 325 TABLET ORAL at 14:40

## 2024-06-03 RX ADMIN — CARVEDILOL 25 MG: 25 TABLET, FILM COATED ORAL at 16:09

## 2024-06-03 RX ADMIN — SACUBITRIL AND VALSARTAN 1 TABLET: 24; 26 TABLET, FILM COATED ORAL at 21:17

## 2024-06-03 RX ADMIN — VANCOMYCIN HYDROCHLORIDE 500 MG: 500 INJECTION, SOLUTION INTRAVENOUS at 18:08

## 2024-06-03 ASSESSMENT — COGNITIVE AND FUNCTIONAL STATUS - GENERAL
DAILY ACTIVITIY SCORE: 24
MOBILITY SCORE: 24

## 2024-06-03 ASSESSMENT — PAIN SCALES - GENERAL
PAINLEVEL_OUTOF10: 0 - NO PAIN
PAINLEVEL_OUTOF10: 3
PAINLEVEL_OUTOF10: 0 - NO PAIN
PAINLEVEL_OUTOF10: 3
PAINLEVEL_OUTOF10: 0 - NO PAIN

## 2024-06-03 ASSESSMENT — ACTIVITIES OF DAILY LIVING (ADL): LACK_OF_TRANSPORTATION: NO

## 2024-06-03 ASSESSMENT — PAIN DESCRIPTION - LOCATION: LOCATION: BACK

## 2024-06-03 ASSESSMENT — PAIN - FUNCTIONAL ASSESSMENT
PAIN_FUNCTIONAL_ASSESSMENT: 0-10

## 2024-06-03 NOTE — PROGRESS NOTES
06/03/24 1014   Discharge Planning   Living Arrangements Alone   Support Systems Family members;Children   Type of Residence Private residence   Number of Stairs to Enter Residence 20   Patient expects to be discharged to: home   Does the patient need discharge transport arranged? No   Financial Resource Strain   How hard is it for you to pay for the very basics like food, housing, medical care, and heating? Not hard   Housing Stability   In the last 12 months, was there a time when you were not able to pay the mortgage or rent on time? N   In the last 12 months, was there a time when you did not have a steady place to sleep or slept in a shelter (including now)? N   Transportation Needs   In the past 12 months, has lack of transportation kept you from medical appointments or from getting medications? no   In the past 12 months, has lack of transportation kept you from meetings, work, or from getting things needed for daily living? No     Pt admitted with hypoxia    Plan: IV atbs.  ID following  Disposition: home  Barrier: Positive blood cultures for Enterococcus faecalis and gram-positive cocci   ADOD: 3-4 days

## 2024-06-03 NOTE — PROCEDURES
"Hemodialysis Note    Patient seen and examined while on dialysis, recent events, labs, medications reviewed.   Tolerating well, 3.5h/UF 2L  Pt is doing HHD under care od Dr Epperson  +GPC in blood, RUE AVF looks fine  /65 (BP Location: Left arm, Patient Position: Lying)   Pulse 80   Temp 36.7 °C (98.1 °F) (Temporal)   Resp 18   Ht 1.93 m (6' 4\")   Wt 68.4 kg (150 lb 12.7 oz)   SpO2 99%   BMI 18.36 kg/m²      Next planned dialysis Wednesday     Will continue to follow, overall management per primary team, and continue regular dialysis.      Olivier Delgado MD  Division of Nephrology and Hypertension    "

## 2024-06-03 NOTE — PROGRESS NOTES
PROGRESS NOTE - INTERNAL MEDICINE     PATIENT NAME:  Kentrell Carreon    MRN:  [id number]  SERVICE DATE:  6/3/2024   SERVICE TIME:  12:37 PM     ADMITTING PHYSICIAN:  Osmar Callaway MD    ASSESSMENT & PLAN:  Principal Problem:  Generalized fatigue and fever  Enterococcus faecalis and gram-positive cocci bacteremia.  Positive blood cultures for Enterococcus faecalis and gram-positive cocci  Patient is allergic to penicillin,  started  vancomycin.,  IV ceftriaxone 2 g IV every 24 hours.  Seen by infectious disease, IV ceftriaxone discontinued, will continue with vancomycin.  And repeat blood cultures have been sent.  Monitor closely  TTE to rule out endocarditis, consider doing MARION if necessary and w  Other active medical problems  End-stage renal disease on home hemodialysis, nephrology consultation requested  Patient has AV fistula right upper arm with good bruit and thrill.  Diabetes mellitus insulin-dependent, hemoglobin A1c 7.5, continue with Lantus insulin 10 units twice a day and sliding scale 4 times daily as needed  History of SLE  Hypertension stable     Chronic systolic and diastolic heart failure  Continue sacubitril and other medications including Coreg.  Previous coronary artery bypass surgery     Reviewed all labs and medications and all imaging studies and discussed the plan of treatment with patient in detail.  Nephrology consultation requested.  Total time spent in examination counseling and coordinating care for this patient was greater than 35 minutes.               SUBJECTIVE  CHIEF COMPLAINT:     Chief Complaint   Patient presents with    Shortness of Breath    Patient seen and examined, he is coming along fine, remains afebrile for past 48 hours, denies any chest pain or shortness of breath.  His blood cultures are positive for Enterococcus faecalis and gram-positive cocci in clusters, identification pending.  Patient was started on IV ceftriaxone and vancomycin.  Seen by infectious disease and IV  ceftriaxone has been discontinued, to continue with IV vancomycin.   Transthoracic echocardiogram pending,   ID consultation.       INTERVAL HISTORY OF PRESENT ILLNESS: Patient seen in dialysis, he is coming along fine has been afebrile for past 48 hours.  Breathing normally, no chest pain.  Repeat blood cultures have been sent and antibiotics continued with vancomycin and ceftriaxone discontinued on recommendation of infectious disease.  Awaiting transthoracic echocardiogram.        MEDICATIONS:   Current Facility-Administered Medications:     acetaminophen (Tylenol) tablet 650 mg, 650 mg, oral, q6h PRN, Fletcher Whitley DO, 650 mg at 06/02/24 2136    aspirin EC tablet 81 mg, 81 mg, oral, Daily, Osmar Callaway MD, 81 mg at 06/02/24 0932    atorvastatin (Lipitor) tablet 80 mg, 80 mg, oral, Daily, Osmar Callaway MD, 80 mg at 06/02/24 0933    carvedilol (Coreg) tablet 25 mg, 25 mg, oral, BID, Osmar Callaway MD, 25 mg at 06/02/24 0933    dextrose 50 % injection 25 g, 25 g, intravenous, q15 min PRN, Osmar Callaway MD    epoetin annita-epbx (Retacrit) injection 10,000 Units, 150 Units/kg, subcutaneous, Every Mon/Wed/Fri, Shell Powell, Jayne    glucagon (Glucagen) injection 1 mg, 1 mg, intramuscular, q15 min PRN, Osmar Callaway MD    glucagon (Glucagen) injection 1 mg, 1 mg, intramuscular, q15 min PRN, Osmar Callaway MD    insulin glargine (Lantus) injection 10 Units, 10 Units, subcutaneous, q12h, Osmar Callaway MD, 10 Units at 06/02/24 2134    insulin lispro (HumaLOG) injection 0-10 Units, 0-10 Units, subcutaneous, TID, Osmar Callaway MD, 2 Units at 06/02/24 0933    multivitamin with minerals 1 tablet, 1 tablet, oral, Daily, Osmar Callaway MD, 1 tablet at 06/02/24 0932    polyethylene glycol (Glycolax, Miralax) packet 17 g, 17 g, oral, Daily, Osmar Callaway MD, 17 g at 06/01/24 1052    sacubitriL-valsartan (Entresto) 24-26 mg per tablet 1 tablet, 1 tablet, oral, BID, Osmar Callaway,  "MD, 1 tablet at 06/02/24 2136    sevelamer carbonate (Renvela) tablet 800 mg, 800 mg, oral, TID, Osmar Callaway MD, 800 mg at 06/02/24 1711    SITagliptin phosphate (Januvia) tablet 25 mg, 25 mg, oral, Daily, Osmar Callaway MD, 25 mg at 06/02/24 0932    vancomycin (Vancocin) pharmacy to dose - pharmacy monitoring, , miscellaneous, Daily PRN, Osmar Callaway MD    vancomycin in dextrose 5 % (Vancocin) IVPB 500 mg, 500 mg, intravenous, Once, Shell Powell, PharmD        OBJECTIVE  Visit Vitals  /65 (BP Location: Left arm, Patient Position: Lying)   Pulse 80   Temp 36.7 °C (98.1 °F) (Temporal)   Resp 18   Ht 1.93 m (6' 4\")   Wt 68.4 kg (150 lb 12.7 oz)   SpO2 99%   BMI 18.36 kg/m²   Smoking Status Former   BSA 1.91 m²      PHYSICAL EXAM:   GENERAL:  Alert, no distress, cooperative  SKIN:  Skin color, texture, turgor normal. No rashes or lesions.  OROPHARYNX:  Lips, mucosa, and tongue are normal.Teeth and gums, normal. Oropharynx normal.  NECK:  No jugulovenous distention, No carotid bruits, Carotid pulse normal contour, Supple  LUNGS:  Lungs clear to auscultation. Good diaphragmatic excursion.  CARDIAC:  Normal S1 and S2; no rubs, murmurs, or gallops  ABDOMEN:  Abdomen soft, non-tender, BS normal, No masses or organomegaly  EXTREMETIES:  Extremities normal, no deformities, edema, clubbing or skin discoloration. Good capillary refill., No ulcers  NEURO:  Alert, oriented X 3, Gait normal. Non-focal. Reflexes normal and symmetric. Sensation grossly intact., Cranial nerves II-XII intact  PULSES:  2+ radial, 2+ carotid    DATA:   Diagnostic tests reviewed for today's visit:    Results for orders placed or performed during the hospital encounter of 05/31/24 (from the past 96 hour(s))   Electrocardiogram, 12-lead PRN ACS symptoms   Result Value Ref Range    Ventricular Rate 88 BPM    Atrial Rate 88 BPM    MT Interval 208 ms    QRS Duration 128 ms    QT Interval 418 ms    QTC Calculation(Bazett) 505 ms    " P Axis 68 degrees    R Axis 65 degrees    T Axis 217 degrees    QRS Count 14 beats    Q Onset 209 ms    P Onset 105 ms    P Offset 171 ms    T Offset 418 ms    QTC Fredericia 475 ms   CBC and Auto Differential   Result Value Ref Range    WBC 4.7 4.4 - 11.3 x10*3/uL    nRBC 0.0 0.0 - 0.0 /100 WBCs    RBC 2.55 (L) 4.50 - 5.90 x10*6/uL    Hemoglobin 8.3 (L) 13.5 - 17.5 g/dL    Hematocrit 25.5 (L) 41.0 - 52.0 %     80 - 100 fL    MCH 32.5 26.0 - 34.0 pg    MCHC 32.5 32.0 - 36.0 g/dL    RDW 14.5 11.5 - 14.5 %    Platelets 142 (L) 150 - 450 x10*3/uL    Neutrophils % 74.5 40.0 - 80.0 %    Immature Granulocytes %, Automated 0.4 0.0 - 0.9 %    Lymphocytes % 7.7 13.0 - 44.0 %    Monocytes % 16.3 2.0 - 10.0 %    Eosinophils % 0.2 0.0 - 6.0 %    Basophils % 0.9 0.0 - 2.0 %    Neutrophils Absolute 3.47 1.20 - 7.70 x10*3/uL    Immature Granulocytes Absolute, Automated 0.02 0.00 - 0.70 x10*3/uL    Lymphocytes Absolute 0.36 (L) 1.20 - 4.80 x10*3/uL    Monocytes Absolute 0.76 0.10 - 1.00 x10*3/uL    Eosinophils Absolute 0.01 0.00 - 0.70 x10*3/uL    Basophils Absolute 0.04 0.00 - 0.10 x10*3/uL   Comprehensive Metabolic Panel   Result Value Ref Range    Glucose 429 (H) 74 - 99 mg/dL    Sodium 131 (L) 136 - 145 mmol/L    Potassium 4.4 3.5 - 5.3 mmol/L    Chloride 88 (L) 98 - 107 mmol/L    Bicarbonate 29 21 - 32 mmol/L    Anion Gap 18 10 - 20 mmol/L    Urea Nitrogen 41 (H) 6 - 23 mg/dL    Creatinine 8.84 (H) 0.50 - 1.30 mg/dL    eGFR 7 (L) >60 mL/min/1.73m*2    Calcium 9.7 8.6 - 10.3 mg/dL    Albumin 4.3 3.4 - 5.0 g/dL    Alkaline Phosphatase 73 33 - 120 U/L    Total Protein 7.5 6.4 - 8.2 g/dL    AST 30 9 - 39 U/L    Bilirubin, Total 0.7 0.0 - 1.2 mg/dL    ALT 20 10 - 52 U/L   Lactate   Result Value Ref Range    Lactate 1.9 0.4 - 2.0 mmol/L   Troponin I, High Sensitivity, Initial   Result Value Ref Range    Troponin I, High Sensitivity 851 (HH) 0 - 20 ng/L   B-type natriuretic peptide   Result Value Ref Range    BNP >4,700 (H) 0  - 99 pg/mL   Blood Culture    Specimen: Peripheral Venipuncture; Blood culture   Result Value Ref Range    Blood Culture Enterococcus faecalis (AA)     BLOOD CULTURE BOTTLE  Positive Aerobic and Anaerobic Bottle     Gram Stain Gram positive cocci, pairs and chains (AA)        Susceptibility    Enterococcus faecalis - MICROSCAN     Ampicillin  Susceptible mcg/mL     Gentamicin Synergy  Susceptible mcg/mL     Penicillin  Susceptible mcg/mL     Tetracycline  Susceptible mcg/mL     Trimethoprim/Sulfamethoxazole  Resistant mcg/mL     Vancomycin  Susceptible mcg/mL   Blood Culture    Specimen: Peripheral Venipuncture; Blood culture   Result Value Ref Range    Blood Culture Enterococcus faecalis (AA)     BLOOD CULTURE BOTTLE  Positive Aerobic and Anaerobic Bottle     Gram Stain Gram positive cocci, pairs and chains (AA)    Sars-CoV-2 PCR   Result Value Ref Range    Coronavirus 2019, PCR Not Detected Not Detected   D-Dimer, VTE Exclusion   Result Value Ref Range    D-Dimer, Quantitative VTE Exclusion 2,151 (H) <=500 ng/mL FEU   Troponin, High Sensitivity, 1 Hour   Result Value Ref Range    Troponin I, High Sensitivity 794 (HH) 0 - 20 ng/L   Hemoglobin A1C   Result Value Ref Range    Hemoglobin A1C 7.5 (H) see below %    Estimated Average Glucose 169 Not Established mg/dL   POCT GLUCOSE   Result Value Ref Range    POCT Glucose 390 (H) 74 - 99 mg/dL   Green Top   Result Value Ref Range    Extra Tube Hold for add-ons.    POCT GLUCOSE   Result Value Ref Range    POCT Glucose 305 (H) 74 - 99 mg/dL   POCT GLUCOSE   Result Value Ref Range    POCT Glucose 129 (H) 74 - 99 mg/dL   POCT GLUCOSE   Result Value Ref Range    POCT Glucose 214 (H) 74 - 99 mg/dL   CBC   Result Value Ref Range    WBC 4.3 (L) 4.4 - 11.3 x10*3/uL    nRBC 0.0 0.0 - 0.0 /100 WBCs    RBC 2.58 (L) 4.50 - 5.90 x10*6/uL    Hemoglobin 8.5 (L) 13.5 - 17.5 g/dL    Hematocrit 26.3 (L) 41.0 - 52.0 %     (H) 80 - 100 fL    MCH 32.9 26.0 - 34.0 pg    MCHC 32.3 32.0  - 36.0 g/dL    RDW 14.6 (H) 11.5 - 14.5 %    Platelets 158 150 - 450 x10*3/uL   Comprehensive metabolic panel   Result Value Ref Range    Glucose 106 (H) 74 - 99 mg/dL    Sodium 134 (L) 136 - 145 mmol/L    Potassium 4.3 3.5 - 5.3 mmol/L    Chloride 92 (L) 98 - 107 mmol/L    Bicarbonate 29 21 - 32 mmol/L    Anion Gap 17 10 - 20 mmol/L    Urea Nitrogen 60 (H) 6 - 23 mg/dL    Creatinine 12.00 (H) 0.50 - 1.30 mg/dL    eGFR 5 (L) >60 mL/min/1.73m*2    Calcium 9.5 8.6 - 10.3 mg/dL    Albumin 3.6 3.4 - 5.0 g/dL    Alkaline Phosphatase 60 33 - 120 U/L    Total Protein 6.5 6.4 - 8.2 g/dL    AST 21 9 - 39 U/L    Bilirubin, Total 0.5 0.0 - 1.2 mg/dL    ALT 17 10 - 52 U/L   Phosphorus   Result Value Ref Range    Phosphorus 4.7 2.5 - 4.9 mg/dL   POCT GLUCOSE   Result Value Ref Range    POCT Glucose 153 (H) 74 - 99 mg/dL   POCT GLUCOSE   Result Value Ref Range    POCT Glucose 146 (H) 74 - 99 mg/dL   POCT GLUCOSE   Result Value Ref Range    POCT Glucose 116 (H) 74 - 99 mg/dL   POCT GLUCOSE   Result Value Ref Range    POCT Glucose 164 (H) 74 - 99 mg/dL   POCT GLUCOSE   Result Value Ref Range    POCT Glucose 136 (H) 74 - 99 mg/dL      Electrocardiogram, 12-lead PRN ACS symptoms    Result Date: 6/3/2024  Normal sinus rhythm Possible Left atrial enlargement Left ventricular hypertrophy with QRS widening ( Sokolow-Márquez , [place] product ) Marked T wave abnormality, consider inferolateral ischemia Abnormal ECG When compared with ECG of 26-MAR-2024 02:50, Questionable change in QRS axis ST now depressed in Inferior leads T wave inversion now evident in Inferior leads    XR chest 2 views    Result Date: 5/31/2024  Interpreted By:  Aaron Jeffers, STUDY: XR CHEST 2 VIEWS;  5/31/2024 9:24 pm   INDICATION: Signs/Symptoms:sob.   COMPARISON: None.   ACCESSION NUMBER(S): [id number]   ORDERING CLINICIAN: KAMALA FRIEDMAN   FINDINGS: Median sternal wires present. Some vascular stent over the right axilla.     CARDIOMEDIASTINAL SILHOUETTE: There is  enlargement of the cardiac silhouette. Median sternotomy wires present.   LUNGS: There is pulmonary congestion. There is no consolidation or effusion.   ABDOMEN: No remarkable upper abdominal findings.   BONES: No acute osseous changes.       1.  Enlarged cardiac silhouette with vascular congestion. No consolidation seen       MACRO: None   Signed by: Araon Jeffers 5/31/2024 9:28 PM Dictation workstation:   [id number]                  SIGNATURE:  Osmar Callaway MD  DATE:  Shital 3, 2024  TIME:  12:37 PM

## 2024-06-03 NOTE — NURSING NOTE
Report from Sending RN:    Report From: Tiffanie Gale  Recent Surgery of Procedure: No  Baseline Level of Consciousness (LOC): A&O X's 4  Oxygen Use: Yes  Type: 2L/ NC  Diabetic: Yes  Last BP Med Given Day of Dialysis: see EMAR  Last Pain Med Given: see EMSR  Lab Tests to be Obtained with Dialysis: No  Blood Transfusion to be Given During Dialysis: No  Available IV Access: Yes  Medications to be Administered During Dialysis: No  Continuous IV Infusion Running: No  Restraints on Currently or in the Last 24 Hours: No  Hand-Off Communication: Stable and ready for dialysis  Dialysis Catheter Dressing: will assess when present  Last Dressing Change: will assess when present

## 2024-06-03 NOTE — CONSULTS
INFECTIOUS DISEASE INPATIENT INITIAL CONSULTATION    Referred By: Osmar Callaway    Reason For Consult: Enterococcus faecalis bacteremia in a patient with end-stage renal disease and possible aortic stenosis and previous coronary artery bypass surgery     HPI:  This is a 53 y.o. male with PMH of CAD s/p CABG 2022, DM II, HTN, DLD, ESRD on HD via RUE AVF who presented with 3-4 days of intermittent fevers, weakness, fatigue.     No chest pain, cough, shortness of breath. Generalized body aches present. Temp 103 at home but here afebrile, Tmax 99.9. WBC normal. Blood cx x2 with E. Faecalis. Allergy to Unasyn - AIN. Is on IV Vanc/CTX.     Allergies:  Iodinated contrast media, Ampicillin-sulbactam, and Strawberry     Vitals (Last 24 Hours):  Heart Rate:  [75-91]   Temp:  [35.9 °C (96.6 °F)-36.8 °C (98.2 °F)]   Resp:  [16-18]   BP: ()/(55-73)   SpO2:  [94 %-100 %]      PHYSICAL EXAM:  Gen - NAD  Heart - RRR, no murmurs  Lungs - clear bilaterally, no wheezing  Abd - soft, no ttp, BS present  Ext - no LE edema  RUE - AVF looks fine, positive thrill/bruit  Skin - no rash    MEDS:    Current Facility-Administered Medications:     acetaminophen (Tylenol) tablet 650 mg, 650 mg, oral, q6h PRN, Fletcher Whitley DO, 650 mg at 06/04/24 0849    aspirin EC tablet 81 mg, 81 mg, oral, Daily, Osmar Callaway MD, 81 mg at 06/04/24 0843    atorvastatin (Lipitor) tablet 80 mg, 80 mg, oral, Daily, Osmar Callaway MD, 80 mg at 06/04/24 0843    carvedilol (Coreg) tablet 25 mg, 25 mg, oral, BID, Osmar Callaway MD, 25 mg at 06/04/24 0844    dextrose 50 % injection 25 g, 25 g, intravenous, q15 min PRN, Osmar Callaway MD    epoetin annita-epbx (Retacrit) injection 10,000 Units, 150 Units/kg, subcutaneous, Every Mon/Wed/Fri, Shell Powell, PharmD, 10,000 Units at 06/03/24 1806    glucagon (Glucagen) injection 1 mg, 1 mg, intramuscular, q15 min PRN, Osmar Callaway MD    glucagon (Glucagen) injection 1 mg, 1 mg,  intramuscular, q15 min PRN, Osmar Callaway MD    insulin glargine (Lantus) injection 10 Units, 10 Units, subcutaneous, q12h, Osmar Callaway MD, 10 Units at 06/03/24 2221    insulin lispro (HumaLOG) injection 0-10 Units, 0-10 Units, subcutaneous, TID, Osmar Callaway MD, 4 Units at 06/04/24 0841    multivitamin with minerals 1 tablet, 1 tablet, oral, Daily, Osmar Callaway MD, 1 tablet at 06/04/24 0843    polyethylene glycol (Glycolax, Miralax) packet 17 g, 17 g, oral, Daily, Osmar Callaway MD, 17 g at 06/01/24 1052    sacubitriL-valsartan (Entresto) 24-26 mg per tablet 1 tablet, 1 tablet, oral, BID, Osmar Callaway MD, 1 tablet at 06/04/24 0843    sevelamer carbonate (Renvela) tablet 800 mg, 800 mg, oral, TID, Osmar Callaway MD, 800 mg at 06/04/24 0842    SITagliptin phosphate (Januvia) tablet 25 mg, 25 mg, oral, Daily, Osmar Callaway MD, 25 mg at 06/04/24 0847    vancomycin (Vancocin) pharmacy to dose - pharmacy monitoring, , miscellaneous, Daily PRN, Osmar Callaway MD     LABS:  Lab Results   Component Value Date    WBC 5.0 06/04/2024    HGB 7.5 (L) 06/04/2024    HCT 23.3 (L) 06/04/2024     (H) 06/04/2024     06/04/2024      Results from last 72 hours   Lab Units 06/04/24  0621   SODIUM mmol/L 133*   POTASSIUM mmol/L 4.5   CHLORIDE mmol/L 93*   CO2 mmol/L 27   BUN mg/dL 41*   CREATININE mg/dL 9.30*   GLUCOSE mg/dL 235*   CALCIUM mg/dL 9.4   ANION GAP mmol/L 18   EGFR mL/min/1.73m*2 6*     Results from last 72 hours   Lab Units 06/02/24  0718   ALK PHOS U/L 60   BILIRUBIN TOTAL mg/dL 0.5   PROTEIN TOTAL g/dL 6.5   ALT U/L 17   AST U/L 21   ALBUMIN g/dL 3.6     Estimated Creatinine Clearance: 9 mL/min (A) (by C-G formula based on SCr of 9.3 mg/dL (H)).      IMAGING:  CT A/P 6/4  Impression:     Left lower lobe infiltrate consistent with pneumonia.    Mild soft tissue and mesenteric edema with septal thickening in the  lung bases and small pleural effusions consistent with  CHF/fluid  overload.    Distended gallbladder with questionable trace pericholecystic fluid.    Questionable distal esophageal wall thickening. Correlate with  possible esophagitis.    Diffuse bladder wall thickening which could be related to suboptimal  distention, cystitis and/or trabeculation.    Numerous additional findings as described above.     CXR 5/31  Impression:     1.  Enlarged cardiac silhouette with vascular congestion. No  consolidation seen     ASSESSMENT/PLAN:    Bacteremia due to E. Faecalis - unclear source, CT A/P without specific infection. I reviewed the imaging personally and think GB looks fine. LFTs are normal so no signs of biliary obstruction. Does not make much urine and no UTI symptoms.  ESRD on HD RUE AVF  Unasyn Allergy - limits abx options    Stopped IV CTX. Will keep on IV Vancomycin. Repeat blood cx x2. TTE to assess for endocarditis. We may need MARION.    Eventual plan will be IV Vancomycin with HD.    Monitoring for adverse effects of abx such as rash/itching/diarrhea.    Will follow. Thanks! D/w Dr. Nilton Vizcarra MD  [facility]  Office #[phone]

## 2024-06-04 ENCOUNTER — TELEMEDICINE (OUTPATIENT)
Dept: PHARMACY | Facility: HOSPITAL | Age: 54
End: 2024-06-04
Payer: COMMERCIAL

## 2024-06-04 ENCOUNTER — APPOINTMENT (OUTPATIENT)
Dept: RADIOLOGY | Facility: HOSPITAL | Age: 54
DRG: 871 | End: 2024-06-04
Payer: MEDICARE

## 2024-06-04 DIAGNOSIS — E10.40 TYPE 1 DIABETES MELLITUS WITH DIABETIC NEUROPATHY (MULTI): Primary | ICD-10-CM

## 2024-06-04 DIAGNOSIS — I11.0 HYPERTENSIVE HEART DISEASE WITH CONGESTIVE HEART FAILURE, UNSPECIFIED HEART FAILURE TYPE (MULTI): ICD-10-CM

## 2024-06-04 LAB
ANION GAP SERPL CALC-SCNC: 18 MMOL/L (ref 10–20)
BASOPHILS # BLD AUTO: 0.02 X10*3/UL (ref 0–0.1)
BASOPHILS NFR BLD AUTO: 0.4 %
BUN SERPL-MCNC: 41 MG/DL (ref 6–23)
CALCIUM SERPL-MCNC: 9.4 MG/DL (ref 8.6–10.3)
CHLORIDE SERPL-SCNC: 93 MMOL/L (ref 98–107)
CO2 SERPL-SCNC: 27 MMOL/L (ref 21–32)
CREAT SERPL-MCNC: 9.3 MG/DL (ref 0.5–1.3)
CRP SERPL-MCNC: 8.83 MG/DL
EGFRCR SERPLBLD CKD-EPI 2021: 6 ML/MIN/1.73M*2
EOSINOPHIL # BLD AUTO: 0.06 X10*3/UL (ref 0–0.7)
EOSINOPHIL NFR BLD AUTO: 1.2 %
ERYTHROCYTE [DISTWIDTH] IN BLOOD BY AUTOMATED COUNT: 14.6 % (ref 11.5–14.5)
ERYTHROCYTE [SEDIMENTATION RATE] IN BLOOD BY WESTERGREN METHOD: 25 MM/H (ref 0–20)
GLUCOSE BLD MANUAL STRIP-MCNC: 132 MG/DL (ref 74–99)
GLUCOSE BLD MANUAL STRIP-MCNC: 215 MG/DL (ref 74–99)
GLUCOSE BLD MANUAL STRIP-MCNC: 85 MG/DL (ref 74–99)
GLUCOSE BLD MANUAL STRIP-MCNC: 89 MG/DL (ref 74–99)
GLUCOSE SERPL-MCNC: 235 MG/DL (ref 74–99)
HCT VFR BLD AUTO: 23.3 % (ref 41–52)
HGB BLD-MCNC: 7.5 G/DL (ref 13.5–17.5)
HOLD SPECIMEN: NORMAL
IMM GRANULOCYTES # BLD AUTO: 0.02 X10*3/UL (ref 0–0.7)
IMM GRANULOCYTES NFR BLD AUTO: 0.4 % (ref 0–0.9)
LYMPHOCYTES # BLD AUTO: 0.55 X10*3/UL (ref 1.2–4.8)
LYMPHOCYTES NFR BLD AUTO: 10.9 %
MAGNESIUM SERPL-MCNC: 2.1 MG/DL (ref 1.6–2.4)
MCH RBC QN AUTO: 32.6 PG (ref 26–34)
MCHC RBC AUTO-ENTMCNC: 32.2 G/DL (ref 32–36)
MCV RBC AUTO: 101 FL (ref 80–100)
MONOCYTES # BLD AUTO: 0.94 X10*3/UL (ref 0.1–1)
MONOCYTES NFR BLD AUTO: 18.7 %
NEUTROPHILS # BLD AUTO: 3.45 X10*3/UL (ref 1.2–7.7)
NEUTROPHILS NFR BLD AUTO: 68.4 %
NRBC BLD-RTO: 0 /100 WBCS (ref 0–0)
PLATELET # BLD AUTO: 198 X10*3/UL (ref 150–450)
POTASSIUM SERPL-SCNC: 4.5 MMOL/L (ref 3.5–5.3)
RBC # BLD AUTO: 2.3 X10*6/UL (ref 4.5–5.9)
SODIUM SERPL-SCNC: 133 MMOL/L (ref 136–145)
WBC # BLD AUTO: 5 X10*3/UL (ref 4.4–11.3)

## 2024-06-04 PROCEDURE — 99233 SBSQ HOSP IP/OBS HIGH 50: CPT | Performed by: INTERNAL MEDICINE

## 2024-06-04 PROCEDURE — 36415 COLL VENOUS BLD VENIPUNCTURE: CPT | Performed by: NURSE PRACTITIONER

## 2024-06-04 PROCEDURE — 99232 SBSQ HOSP IP/OBS MODERATE 35: CPT | Performed by: INTERNAL MEDICINE

## 2024-06-04 PROCEDURE — 2500000004 HC RX 250 GENERAL PHARMACY W/ HCPCS (ALT 636 FOR OP/ED): Performed by: NURSE PRACTITIONER

## 2024-06-04 PROCEDURE — 83735 ASSAY OF MAGNESIUM: CPT | Performed by: NURSE PRACTITIONER

## 2024-06-04 PROCEDURE — 74176 CT ABD & PELVIS W/O CONTRAST: CPT

## 2024-06-04 PROCEDURE — 1100000001 HC PRIVATE ROOM DAILY

## 2024-06-04 PROCEDURE — 2500000001 HC RX 250 WO HCPCS SELF ADMINISTERED DRUGS (ALT 637 FOR MEDICARE OP): Performed by: NURSE PRACTITIONER

## 2024-06-04 PROCEDURE — 2500000001 HC RX 250 WO HCPCS SELF ADMINISTERED DRUGS (ALT 637 FOR MEDICARE OP): Performed by: INTERNAL MEDICINE

## 2024-06-04 PROCEDURE — 85025 COMPLETE CBC W/AUTO DIFF WBC: CPT | Performed by: NURSE PRACTITIONER

## 2024-06-04 PROCEDURE — 87040 BLOOD CULTURE FOR BACTERIA: CPT | Mod: 91,AHULAB | Performed by: NURSE PRACTITIONER

## 2024-06-04 PROCEDURE — 85652 RBC SED RATE AUTOMATED: CPT | Performed by: NURSE PRACTITIONER

## 2024-06-04 PROCEDURE — 82947 ASSAY GLUCOSE BLOOD QUANT: CPT | Mod: 91

## 2024-06-04 PROCEDURE — 2500000002 HC RX 250 W HCPCS SELF ADMINISTERED DRUGS (ALT 637 FOR MEDICARE OP, ALT 636 FOR OP/ED): Performed by: INTERNAL MEDICINE

## 2024-06-04 PROCEDURE — 74176 CT ABD & PELVIS W/O CONTRAST: CPT | Performed by: RADIOLOGY

## 2024-06-04 PROCEDURE — 86140 C-REACTIVE PROTEIN: CPT | Performed by: NURSE PRACTITIONER

## 2024-06-04 PROCEDURE — 80048 BASIC METABOLIC PNL TOTAL CA: CPT | Performed by: NURSE PRACTITIONER

## 2024-06-04 RX ORDER — HEPARIN SODIUM 5000 [USP'U]/ML
5000 INJECTION, SOLUTION INTRAVENOUS; SUBCUTANEOUS EVERY 8 HOURS SCHEDULED
Status: DISCONTINUED | OUTPATIENT
Start: 2024-06-04 | End: 2024-06-11 | Stop reason: HOSPADM

## 2024-06-04 RX ADMIN — INSULIN GLARGINE 10 UNITS: 100 INJECTION, SOLUTION SUBCUTANEOUS at 21:43

## 2024-06-04 RX ADMIN — SACUBITRIL AND VALSARTAN 1 TABLET: 24; 26 TABLET, FILM COATED ORAL at 08:43

## 2024-06-04 RX ADMIN — ACETAMINOPHEN 650 MG: 325 TABLET ORAL at 17:50

## 2024-06-04 RX ADMIN — SACUBITRIL AND VALSARTAN 1 TABLET: 24; 26 TABLET, FILM COATED ORAL at 21:42

## 2024-06-04 RX ADMIN — CARVEDILOL 25 MG: 25 TABLET, FILM COATED ORAL at 08:44

## 2024-06-04 RX ADMIN — HEPARIN SODIUM 5000 UNITS: 5000 INJECTION INTRAVENOUS; SUBCUTANEOUS at 15:07

## 2024-06-04 RX ADMIN — ASPIRIN 81 MG: 81 TABLET, COATED ORAL at 08:43

## 2024-06-04 RX ADMIN — SEVELAMER CARBONATE 800 MG: 800 TABLET, FILM COATED ORAL at 11:41

## 2024-06-04 RX ADMIN — SEVELAMER CARBONATE 800 MG: 800 TABLET, FILM COATED ORAL at 17:50

## 2024-06-04 RX ADMIN — ATORVASTATIN CALCIUM 80 MG: 80 TABLET, FILM COATED ORAL at 08:43

## 2024-06-04 RX ADMIN — INSULIN LISPRO 4 UNITS: 100 INJECTION, SOLUTION INTRAVENOUS; SUBCUTANEOUS at 08:41

## 2024-06-04 RX ADMIN — ACETAMINOPHEN 650 MG: 325 TABLET ORAL at 08:49

## 2024-06-04 RX ADMIN — ACETAMINOPHEN 650 MG: 325 TABLET ORAL at 03:41

## 2024-06-04 RX ADMIN — SITAGLIPTIN 25 MG: 25 TABLET, FILM COATED ORAL at 08:47

## 2024-06-04 RX ADMIN — SEVELAMER CARBONATE 800 MG: 800 TABLET, FILM COATED ORAL at 08:42

## 2024-06-04 RX ADMIN — HEPARIN SODIUM 5000 UNITS: 5000 INJECTION INTRAVENOUS; SUBCUTANEOUS at 21:42

## 2024-06-04 RX ADMIN — INSULIN GLARGINE 10 UNITS: 100 INJECTION, SOLUTION SUBCUTANEOUS at 10:57

## 2024-06-04 RX ADMIN — CARVEDILOL 25 MG: 25 TABLET, FILM COATED ORAL at 17:50

## 2024-06-04 RX ADMIN — MULTIPLE VITAMINS W/ MINERALS TAB 1 TABLET: TAB at 08:43

## 2024-06-04 ASSESSMENT — PAIN - FUNCTIONAL ASSESSMENT
PAIN_FUNCTIONAL_ASSESSMENT: 0-10

## 2024-06-04 ASSESSMENT — COGNITIVE AND FUNCTIONAL STATUS - GENERAL
MOBILITY SCORE: 24
DAILY ACTIVITIY SCORE: 24
MOBILITY SCORE: 24
DAILY ACTIVITIY SCORE: 24

## 2024-06-04 ASSESSMENT — PAIN SCALES - GENERAL
PAINLEVEL_OUTOF10: 3
PAINLEVEL_OUTOF10: 3
PAINLEVEL_OUTOF10: 0 - NO PAIN
PAINLEVEL_OUTOF10: 3

## 2024-06-04 ASSESSMENT — PAIN SCALES - PAIN ASSESSMENT IN ADVANCED DEMENTIA (PAINAD): TOTALSCORE: REPOSITIONED;DISTRACTION;FOOD

## 2024-06-04 ASSESSMENT — PAIN DESCRIPTION - LOCATION
LOCATION: ABDOMEN
LOCATION: BACK

## 2024-06-04 ASSESSMENT — PAIN DESCRIPTION - ORIENTATION: ORIENTATION: LEFT;LOWER

## 2024-06-04 NOTE — CARE PLAN
The patient's goals for the shift include  pain control    The clinical goals for the shift include Pt will remain HDS      Problem: Pain  Goal: My pain/discomfort is manageable  Outcome: Progressing     Problem: Safety  Goal: Patient will be injury free during hospitalization  Outcome: Progressing     Problem: Daily Care  Goal: Daily care needs are met  Outcome: Progressing     Problem: Psychosocial Needs  Goal: Demonstrates ability to cope with hospitalization/illness  Outcome: Progressing     Problem: Discharge Barriers  Goal: My discharge needs are met  Outcome: Progressing

## 2024-06-04 NOTE — PROGRESS NOTES
PROGRESS NOTE - INTERNAL MEDICINE     PATIENT NAME:  Kentrell Carreon    MRN:  [id number]  SERVICE DATE:  6/4/2024   SERVICE TIME:  10:19 AM     ADMITTING PHYSICIAN:  Osmar Callaway MD  ASSESSMENT & PLAN:  Principal Problem:  Generalized fatigue and fever  Enterococcus faecalis and gram-positive cocci bacteremia.  Positive blood cultures for Enterococcus faecalis and gram-positive cocci    Seen by infectious disease, continue with vancomycin.  And repeat blood cultures have been sent today  Monitor closely  TTE pending to rule out endocarditis, consider doing MARION if necessary and w  Other active medical problems  End-stage renal disease on home hemodialysis, nephrology consultation requested  Patient has AV fistula right upper arm with good bruit and thrill.  Diabetes mellitus insulin-dependent, hemoglobin A1c 7.5, continue with Lantus insulin 10 units twice a day and sliding scale 4 times daily as needed  History of SLE  Hypertension stable     Chronic systolic and diastolic heart failure  Continue sacubitril and other medications including Coreg.  Previous coronary artery bypass surgery     Reviewed all labs and medications and all imaging studies and discussed the plan of treatment with patient in detail.  Nephrology consultation requested.  Total time spent in examination counseling and coordinating care for this patient was greater than 35 minutes.                 SUBJECTIVE  CHIEF COMPLAINT:         Chief Complaint   Patient presents with    Shortness of Breath    Patient seen and examined, he is coming along fine, remains afebrile for past 48 hours, denies any chest pain or shortness of breath.  His blood cultures are positive for Enterococcus faecalis and gram-positive cocci in clusters, identification pending.  Patient to continue with IV vancomycin.   Transthoracic echocardiogram pending,   ID on board        INTERVAL HISTORY OF PRESENT ILLNESS: Patient seen in dialysis, he is coming along fine has been  afebrile for past 48 hours.  Breathing normally, no chest pain.  Repeat blood cultures have been sent and antibiotics continued with vancomycin.  Awaiting transthoracic echocardiogram.           MEDICATIONS:   Current Facility-Administered Medications:     acetaminophen (Tylenol) tablet 650 mg, 650 mg, oral, q6h PRN, Fletcher Whitley DO, 650 mg at 06/04/24 0849    aspirin EC tablet 81 mg, 81 mg, oral, Daily, Osmar Callaway MD, 81 mg at 06/04/24 0843    atorvastatin (Lipitor) tablet 80 mg, 80 mg, oral, Daily, Osmar Callaway MD, 80 mg at 06/04/24 0843    carvedilol (Coreg) tablet 25 mg, 25 mg, oral, BID, Osmar Callaway MD, 25 mg at 06/04/24 0844    dextrose 50 % injection 25 g, 25 g, intravenous, q15 min PRN, Osmar Callaway MD    epoetin annita-epbx (Retacrit) injection 10,000 Units, 150 Units/kg, subcutaneous, Every Mon/Wed/Fri, Shell Powell, PharmD, 10,000 Units at 06/03/24 1806    glucagon (Glucagen) injection 1 mg, 1 mg, intramuscular, q15 min PRN, Osmar Callaway MD    glucagon (Glucagen) injection 1 mg, 1 mg, intramuscular, q15 min PRN, Osmar Callaway MD    insulin glargine (Lantus) injection 10 Units, 10 Units, subcutaneous, q12h, Osmar Callaway MD, 10 Units at 06/03/24 2221    insulin lispro (HumaLOG) injection 0-10 Units, 0-10 Units, subcutaneous, TID, Osmar Callaway MD, 4 Units at 06/04/24 0841    multivitamin with minerals 1 tablet, 1 tablet, oral, Daily, Osmar Callaway MD, 1 tablet at 06/04/24 0843    polyethylene glycol (Glycolax, Miralax) packet 17 g, 17 g, oral, Daily, Osmar Callaway MD, 17 g at 06/01/24 1052    sacubitriL-valsartan (Entresto) 24-26 mg per tablet 1 tablet, 1 tablet, oral, BID, Osmar Callaway MD, 1 tablet at 06/04/24 0843    sevelamer carbonate (Renvela) tablet 800 mg, 800 mg, oral, TID, Osmar Callaway MD, 800 mg at 06/04/24 0842    SITagliptin phosphate (Januvia) tablet 25 mg, 25 mg, oral, Daily, Osmar Callaway MD, 25 mg at 06/04/24 0847     "vancomycin (Vancocin) pharmacy to dose - pharmacy monitoring, , miscellaneous, Daily PRN, Osmar Callaway MD        OBJECTIVE  Visit Vitals  BP 90/55 (BP Location: Left arm, Patient Position: Lying)   Pulse 75   Temp 36.8 °C (98.2 °F) (Oral)   Resp 16   Ht 1.93 m (6' 4\")   Wt 68.4 kg (150 lb 12.7 oz)   SpO2 100%   BMI 18.36 kg/m²   Smoking Status Former   BSA 1.91 m²      PHYSICAL EXAM:   GENERAL:  Alert, no distress, cooperative  SKIN:  Skin color, texture, turgor normal. No rashes or lesions.  OROPHARYNX:  Lips, mucosa, and tongue are normal.Teeth and gums, normal. Oropharynx normal.  NECK:  No jugulovenous distention, No carotid bruits, Carotid pulse normal contour, Supple  LUNGS:  Lungs clear to auscultation. Good diaphragmatic excursion.  CARDIAC: Rate and rhythm is regular, normal S1 and S2; no rubs,  or gallops, 3/6 systolic ejection murmur left sternal border radiating to neck and apical area consistent and moderate to severe aortic stenosis, mitral holosystolic murmur and tricuspid regurgitation murmur, no overt CHF.  ABDOMEN:  Abdomen soft, non-tender, BS normal, No masses or organomegaly  EXTREMETIES:  Extremities normal, no deformities, edema, clubbing or skin discoloration. Good capillary refill., No ulcers  AV fistula right upper arm with good bruit and thrill.  NEURO:  Alert, oriented X 3, Gait normal. Non-focal. Reflexes normal and symmetric. Sensation grossly intact., Cranial nerves II-XII intact  PULSES:  2+ radial, 2+ carotid        DATA:   Diagnostic tests reviewed for today's visit:    Results for orders placed or performed during the hospital encounter of 05/31/24 (from the past 96 hour(s))   Electrocardiogram, 12-lead PRN ACS symptoms   Result Value Ref Range    Ventricular Rate 88 BPM    Atrial Rate 88 BPM    AL Interval 208 ms    QRS Duration 128 ms    QT Interval 418 ms    QTC Calculation(Bazett) 505 ms    P Axis 68 degrees    R Axis 65 degrees    T Axis 217 degrees    QRS Count 14 beats "    Q Onset 209 ms    P Onset 105 ms    P Offset 171 ms    T Offset 418 ms    QTC Fredericia 475 ms   CBC and Auto Differential   Result Value Ref Range    WBC 4.7 4.4 - 11.3 x10*3/uL    nRBC 0.0 0.0 - 0.0 /100 WBCs    RBC 2.55 (L) 4.50 - 5.90 x10*6/uL    Hemoglobin 8.3 (L) 13.5 - 17.5 g/dL    Hematocrit 25.5 (L) 41.0 - 52.0 %     80 - 100 fL    MCH 32.5 26.0 - 34.0 pg    MCHC 32.5 32.0 - 36.0 g/dL    RDW 14.5 11.5 - 14.5 %    Platelets 142 (L) 150 - 450 x10*3/uL    Neutrophils % 74.5 40.0 - 80.0 %    Immature Granulocytes %, Automated 0.4 0.0 - 0.9 %    Lymphocytes % 7.7 13.0 - 44.0 %    Monocytes % 16.3 2.0 - 10.0 %    Eosinophils % 0.2 0.0 - 6.0 %    Basophils % 0.9 0.0 - 2.0 %    Neutrophils Absolute 3.47 1.20 - 7.70 x10*3/uL    Immature Granulocytes Absolute, Automated 0.02 0.00 - 0.70 x10*3/uL    Lymphocytes Absolute 0.36 (L) 1.20 - 4.80 x10*3/uL    Monocytes Absolute 0.76 0.10 - 1.00 x10*3/uL    Eosinophils Absolute 0.01 0.00 - 0.70 x10*3/uL    Basophils Absolute 0.04 0.00 - 0.10 x10*3/uL   Comprehensive Metabolic Panel   Result Value Ref Range    Glucose 429 (H) 74 - 99 mg/dL    Sodium 131 (L) 136 - 145 mmol/L    Potassium 4.4 3.5 - 5.3 mmol/L    Chloride 88 (L) 98 - 107 mmol/L    Bicarbonate 29 21 - 32 mmol/L    Anion Gap 18 10 - 20 mmol/L    Urea Nitrogen 41 (H) 6 - 23 mg/dL    Creatinine 8.84 (H) 0.50 - 1.30 mg/dL    eGFR 7 (L) >60 mL/min/1.73m*2    Calcium 9.7 8.6 - 10.3 mg/dL    Albumin 4.3 3.4 - 5.0 g/dL    Alkaline Phosphatase 73 33 - 120 U/L    Total Protein 7.5 6.4 - 8.2 g/dL    AST 30 9 - 39 U/L    Bilirubin, Total 0.7 0.0 - 1.2 mg/dL    ALT 20 10 - 52 U/L   Lactate   Result Value Ref Range    Lactate 1.9 0.4 - 2.0 mmol/L   Troponin I, High Sensitivity, Initial   Result Value Ref Range    Troponin I, High Sensitivity 851 (HH) 0 - 20 ng/L   B-type natriuretic peptide   Result Value Ref Range    BNP >4,700 (H) 0 - 99 pg/mL   Blood Culture    Specimen: Peripheral Venipuncture; Blood culture    Result Value Ref Range    Blood Culture Enterococcus faecalis (AA)     BLOOD CULTURE BOTTLE  Positive Aerobic and Anaerobic Bottle     Gram Stain Gram positive cocci, pairs and chains (AA)        Susceptibility    Enterococcus faecalis - MICROSCAN     Ampicillin  Susceptible mcg/mL     Gentamicin Synergy  Susceptible mcg/mL     Penicillin  Susceptible mcg/mL     Tetracycline  Susceptible mcg/mL     Trimethoprim/Sulfamethoxazole  Resistant mcg/mL     Vancomycin  Susceptible mcg/mL   Blood Culture    Specimen: Peripheral Venipuncture; Blood culture   Result Value Ref Range    Blood Culture Enterococcus faecalis (AA)     BLOOD CULTURE BOTTLE  Positive Aerobic and Anaerobic Bottle     Gram Stain Gram positive cocci, pairs and chains (AA)    Sars-CoV-2 PCR   Result Value Ref Range    Coronavirus 2019, PCR Not Detected Not Detected   D-Dimer, VTE Exclusion   Result Value Ref Range    D-Dimer, Quantitative VTE Exclusion 2,151 (H) <=500 ng/mL FEU   Troponin, High Sensitivity, 1 Hour   Result Value Ref Range    Troponin I, High Sensitivity 794 (HH) 0 - 20 ng/L   Hemoglobin A1C   Result Value Ref Range    Hemoglobin A1C 7.5 (H) see below %    Estimated Average Glucose 169 Not Established mg/dL   POCT GLUCOSE   Result Value Ref Range    POCT Glucose 390 (H) 74 - 99 mg/dL   Green Top   Result Value Ref Range    Extra Tube Hold for add-ons.    POCT GLUCOSE   Result Value Ref Range    POCT Glucose 305 (H) 74 - 99 mg/dL   POCT GLUCOSE   Result Value Ref Range    POCT Glucose 129 (H) 74 - 99 mg/dL   POCT GLUCOSE   Result Value Ref Range    POCT Glucose 214 (H) 74 - 99 mg/dL   CBC   Result Value Ref Range    WBC 4.3 (L) 4.4 - 11.3 x10*3/uL    nRBC 0.0 0.0 - 0.0 /100 WBCs    RBC 2.58 (L) 4.50 - 5.90 x10*6/uL    Hemoglobin 8.5 (L) 13.5 - 17.5 g/dL    Hematocrit 26.3 (L) 41.0 - 52.0 %     (H) 80 - 100 fL    MCH 32.9 26.0 - 34.0 pg    MCHC 32.3 32.0 - 36.0 g/dL    RDW 14.6 (H) 11.5 - 14.5 %    Platelets 158 150 - 450 x10*3/uL    Comprehensive metabolic panel   Result Value Ref Range    Glucose 106 (H) 74 - 99 mg/dL    Sodium 134 (L) 136 - 145 mmol/L    Potassium 4.3 3.5 - 5.3 mmol/L    Chloride 92 (L) 98 - 107 mmol/L    Bicarbonate 29 21 - 32 mmol/L    Anion Gap 17 10 - 20 mmol/L    Urea Nitrogen 60 (H) 6 - 23 mg/dL    Creatinine 12.00 (H) 0.50 - 1.30 mg/dL    eGFR 5 (L) >60 mL/min/1.73m*2    Calcium 9.5 8.6 - 10.3 mg/dL    Albumin 3.6 3.4 - 5.0 g/dL    Alkaline Phosphatase 60 33 - 120 U/L    Total Protein 6.5 6.4 - 8.2 g/dL    AST 21 9 - 39 U/L    Bilirubin, Total 0.5 0.0 - 1.2 mg/dL    ALT 17 10 - 52 U/L   Phosphorus   Result Value Ref Range    Phosphorus 4.7 2.5 - 4.9 mg/dL   POCT GLUCOSE   Result Value Ref Range    POCT Glucose 153 (H) 74 - 99 mg/dL   POCT GLUCOSE   Result Value Ref Range    POCT Glucose 146 (H) 74 - 99 mg/dL   POCT GLUCOSE   Result Value Ref Range    POCT Glucose 116 (H) 74 - 99 mg/dL   POCT GLUCOSE   Result Value Ref Range    POCT Glucose 164 (H) 74 - 99 mg/dL   POCT GLUCOSE   Result Value Ref Range    POCT Glucose 136 (H) 74 - 99 mg/dL   POCT GLUCOSE   Result Value Ref Range    POCT Glucose 121 (H) 74 - 99 mg/dL   POCT GLUCOSE   Result Value Ref Range    POCT Glucose 159 (H) 74 - 99 mg/dL   POCT GLUCOSE   Result Value Ref Range    POCT Glucose 245 (H) 74 - 99 mg/dL   Urinalysis with Reflex Microscopic   Result Value Ref Range    Color, Urine Light-Yellow Light-Yellow, Yellow, Dark-Yellow    Appearance, Urine Clear Clear    Specific Gravity, Urine 1.010 1.005 - 1.035    pH, Urine 8.5 (N) 5.0, 5.5, 6.0, 6.5, 7.0, 7.5, 8.0    Protein, Urine 70 (1+) (A) NEGATIVE, 10 (TRACE), 20 (TRACE) mg/dL    Glucose, Urine 500 (3+) (A) Normal mg/dL    Blood, Urine NEGATIVE NEGATIVE    Ketones, Urine NEGATIVE NEGATIVE mg/dL    Bilirubin, Urine NEGATIVE NEGATIVE    Urobilinogen, Urine Normal Normal mg/dL    Nitrite, Urine NEGATIVE NEGATIVE    Leukocyte Esterase, Urine NEGATIVE NEGATIVE   Microscopic Only, Urine   Result Value Ref  Range    WBC, Urine 1-5 1-5, NONE /HPF    RBC, Urine NONE NONE, 1-2, 3-5 /HPF   CBC and Auto Differential   Result Value Ref Range    WBC 5.0 4.4 - 11.3 x10*3/uL    nRBC 0.0 0.0 - 0.0 /100 WBCs    RBC 2.30 (L) 4.50 - 5.90 x10*6/uL    Hemoglobin 7.5 (L) 13.5 - 17.5 g/dL    Hematocrit 23.3 (L) 41.0 - 52.0 %     (H) 80 - 100 fL    MCH 32.6 26.0 - 34.0 pg    MCHC 32.2 32.0 - 36.0 g/dL    RDW 14.6 (H) 11.5 - 14.5 %    Platelets 198 150 - 450 x10*3/uL    Neutrophils % 68.4 40.0 - 80.0 %    Immature Granulocytes %, Automated 0.4 0.0 - 0.9 %    Lymphocytes % 10.9 13.0 - 44.0 %    Monocytes % 18.7 2.0 - 10.0 %    Eosinophils % 1.2 0.0 - 6.0 %    Basophils % 0.4 0.0 - 2.0 %    Neutrophils Absolute 3.45 1.20 - 7.70 x10*3/uL    Immature Granulocytes Absolute, Automated 0.02 0.00 - 0.70 x10*3/uL    Lymphocytes Absolute 0.55 (L) 1.20 - 4.80 x10*3/uL    Monocytes Absolute 0.94 0.10 - 1.00 x10*3/uL    Eosinophils Absolute 0.06 0.00 - 0.70 x10*3/uL    Basophils Absolute 0.02 0.00 - 0.10 x10*3/uL   Basic Metabolic Panel   Result Value Ref Range    Glucose 235 (H) 74 - 99 mg/dL    Sodium 133 (L) 136 - 145 mmol/L    Potassium 4.5 3.5 - 5.3 mmol/L    Chloride 93 (L) 98 - 107 mmol/L    Bicarbonate 27 21 - 32 mmol/L    Anion Gap 18 10 - 20 mmol/L    Urea Nitrogen 41 (H) 6 - 23 mg/dL    Creatinine 9.30 (H) 0.50 - 1.30 mg/dL    eGFR 6 (L) >60 mL/min/1.73m*2    Calcium 9.4 8.6 - 10.3 mg/dL   Magnesium   Result Value Ref Range    Magnesium 2.10 1.60 - 2.40 mg/dL   C-Reactive Protein   Result Value Ref Range    C-Reactive Protein 8.83 (H) <1.00 mg/dL   Sedimentation Rate   Result Value Ref Range    Sedimentation Rate 25 (H) 0 - 20 mm/h   SST TOP   Result Value Ref Range    Extra Tube Hold for add-ons.    POCT GLUCOSE   Result Value Ref Range    POCT Glucose 215 (H) 74 - 99 mg/dL    CT abdomen pelvis wo IV contrast    Result Date: 6/4/2024  Interpreted By:  Ketty Ramirez, STUDY: CT ABDOMEN PELVIS WO IV CONTRAST;  6/4/2024 4:08 am    INDICATION: Signs/Symptoms:bacteremia  source?.   COMPARISON: 03/25/2024   ACCESSION NUMBER(S): [id number]   ORDERING CLINICIAN: GREG CALDERA   TECHNIQUE: CT of the abdomen and pelvis was performed. Sagittal and coronal reconstructions were generated.  No intravenous contrast given for the exam.   FINDINGS: Solid organ and vessel evaluation limited without IV contrast.   ABDOMINAL ORGANS:   LIVER: No focal lesion within limits of unenhanced exam prominent vascular calcifications near the hilum.   GALL BLADDER AND BILIARY TREE: No calcified gallstone. Gallbladder is distended. Questionable trace pericholecystic fluid.   SPLEEN: Stable isodense presumed splenule adjacent to the anterior pole.   PANCREAS: No focal lesion within limits of unenhanced exam   ADRENALS: No adrenal mass   KIDNEYS AND URETERS: Atrophic with prominent vascular calcifications and small rounded hypodensities in each kidney similar to the prior exam.   BOWEL: Questionable distal esophageal wall thickening. Stomach is mildly distended with debris and air. No abnormally dilated small bowel loops. Moderate fecal residue in the proximal colon. Rectum is distended with gas and fecal debris.   PERITONEUM, RETROPERITONEUM, NODES: No significant free fluid. No free air. Slight increased density in the mesentery. No significant retroperitoneal adenopathy within limits of unenhanced exam.   VESSELS: Relative decreased density of cardiac lumen suggesting anemia. Extensive vascular calcifications. No abdominal aortic aneurysm. Lack of IV contrast precludes vascular luminal assessment.   PELVIS: Urinary bladder is partially distended with diffusely thickened wall. Vas deferens and faint possible seminal vesicle calcifications bilaterally similar to the prior exam. No gross prostate enlargement.   ABDOMINAL WALL: Focal density in the right lower quadrant abdominal wall subcutaneous fat similar to the prior exam. No sizable abdominal wall hernia. Mild soft  tissue edema.   BONES: Generalized increased osseous density consistent with metabolic bone disease similar to the prior exam. Focal smooth superior L2 endplate depression consistent with Schmorl's node with adjacent tiny vacuum disc is more conspicuous.   LOWER CHEST: Patchy/nodular left lower lobe infiltrate. Septal thickening in both lung bases. Small bilateral pleural effusions. Cardiomegaly. Surgical material along the margins of the heart again seen.       Left lower lobe infiltrate consistent with pneumonia.   Mild soft tissue and mesenteric edema with septal thickening in the lung bases and small pleural effusions consistent with CHF/fluid overload.   Distended gallbladder with questionable trace pericholecystic fluid.   Questionable distal esophageal wall thickening. Correlate with possible esophagitis.   Diffuse bladder wall thickening which could be related to suboptimal distention, cystitis and/or trabeculation.   Numerous additional findings as described above.   MACRO: None.   Signed by: Ketty Ramirez 6/4/2024 8:48 AM Dictation workstation:   [id number]    Electrocardiogram, 12-lead PRN ACS symptoms    Result Date: 6/3/2024  Normal sinus rhythm Possible Left atrial enlargement Left ventricular hypertrophy with QRS widening ( Sokolow-Márquez , [place]lyn product ) Marked T wave abnormality, consider inferolateral ischemia Abnormal ECG When compared with ECG of 26-MAR-2024 02:50, Questionable change in QRS axis ST now depressed in Inferior leads T wave inversion now evident in Inferior leads    XR chest 2 views    Result Date: 5/31/2024  Interpreted By:  Aaron Jeffers, STUDY: XR CHEST 2 VIEWS;  5/31/2024 9:24 pm   INDICATION: Signs/Symptoms:sob.   COMPARISON: None.   ACCESSION NUMBER(S): [id number]   ORDERING CLINICIAN: KAMALA FRIEDMNA   FINDINGS: Median sternal wires present. Some vascular stent over the right axilla.     CARDIOMEDIASTINAL SILHOUETTE: There is enlargement of the cardiac silhouette. Median  sternotomy wires present.   LUNGS: There is pulmonary congestion. There is no consolidation or effusion.   ABDOMEN: No remarkable upper abdominal findings.   BONES: No acute osseous changes.       1.  Enlarged cardiac silhouette with vascular congestion. No consolidation seen       MACRO: None   Signed by: Aaron Jeffers 5/31/2024 9:28 PM Dictation workstation:   [id number]                    SIGNATURE:  Osmar Callaway MD  DATE:  June 4, 2024  TIME:  10:19 AM

## 2024-06-04 NOTE — PROGRESS NOTES
"Kentrell Carreon is a 53 y.o. male who was referred to the Clinical Pharmacy Team to complete a virtual Transitions of Care encounter for discharge medication optimization. The patient was referred for their DM and HF.    Attending: Osmar Callaway MD    PCP: German Pfeiffer, DO    _______________________________________________________________________  PHARMACY ASSESSMENT    Home Pharmacy: [facility] [place]  Meds to beds? yes    No issues reported in regards to affordability, accessibility, adherence, organization, and adverse effects  _______________________________________________________________________  DIABETES ASSESSMENT    CURRENT PHARMACOTHERAPY  - Insulin glargine (Lantus): patient states down to 15 units once daily at bedtime per MD guidance   - Insulin lispro (Humalog): carb ratio/correction scale - patient states rarely using based on and BG readings per MD guidance   - Januvia 25mg once daily - patient very happy with medication    BLOOD SUGAR TESTING AT HOME  -Meter: Accu-Chek Guide - only using if CGM fails  -CGM: FreeStyle José Luis 3 - states \"had some issues with sensor in past\"    SECONDARY PREVENTION  - Statin? Atorvastatin 80mg QD  - ACE-I/ARB? Entresto 24-26mg BID  - Aspirin? 81mg QD    Lab Results   Component Value Date    HGBA1C 7.5 (H) 06/01/2024       Lab Results   Component Value Date    CHOL 102 03/10/2022     Lab Results   Component Value Date    HDL 33.4 (A) 03/10/2022     No results found for: \"LDLCALC\"  Lab Results   Component Value Date    TRIG 112 03/10/2022     No components found for: \"CHOLHDL\"    _______________________________________________________________________  CHRONIC HEART FAILURE ASSESSMENT  -The ASCVD Risk score (Hina CASTILLO et al., 2019) failed to calculate for the following reasons:    The patient has a prior MI or stroke diagnosis   -HTN present/diagnosed: yes    LVEF: 40%  eGFR: 6mL/minute/1.73 m2  Beta blocker: Carvedilol 25mg BID  ACEi/ARB/ARNI: Entresto 24-26mg BID  MRA: " Not recommended d/t eGFR  SGLT2i: Not recommended d/t eGFR  _______________________________________________________________________  PATIENT EDUCATION/GOALS  - Fasting B - 130 mg/dL  - Postprandial BG: less than 180 mg/dL  - A1c: less than 7%  - LDL Goal: < 70mg/dL  - Your blood pressure goal is less than 130/80 mmHg  - Continue to integrate exercise into your weekly routine. The recommended exercise regimen is 30-40 minutes of moderate-intensity exercise (such as jogging, biking, swimming, etc.) for 5 days a week.  - Answered all patient questions and concerns to best of my ability  _______________________________________________________________________  RECOMMENDATIONS/PLAN  Patient states that he has had some issues with his FreeStyle José Luis sensor in the past, follow up with education and support moving forward.   Continue all medications per medical team - Patient is very acknowledgeable of his medications as well as how he takes them and what they are for.  Please send prescriptions to [facility] pharmacy for assistance on insurance prior authorization and copay. Prescriptions will be delivered to the patient's bedside prior to discharge with the Meds to [facility] program.   Continuity of care will be provided by PCP and clinical pharmacy team      Miguelina Jones PharmD    Verbal consent to manage patient's drug therapy was obtained from the patient. They were informed they may decline to participate or withdraw from participation in pharmacy services at any time.

## 2024-06-04 NOTE — PROGRESS NOTES
"Kentrell Carreon is a 53 y.o. male on day 3 of admission presenting with Hypoxia.    Subjective     Feeling better today.  Notes sweats overnight and occasionally aching legs which is currently better. On 2 L, satting 100%. States \"I don't think I need it\" Reports some SOB on exertion.       Objective     Physical Exam  Vitals reviewed.   Constitutional:       General: He is not in acute distress.     Appearance: Normal appearance. He is not ill-appearing, toxic-appearing or diaphoretic.   Eyes:      General: No scleral icterus.     Conjunctiva/sclera: Conjunctivae normal.   Cardiovascular:      Rate and Rhythm: Normal rate and regular rhythm.      Pulses: Normal pulses.      Heart sounds: Murmur heard.      Systolic murmur is present.   Pulmonary:      Effort: Pulmonary effort is normal.      Breath sounds: Normal air entry. Examination of the left-lower field reveals rales. Rales present.   Abdominal:      General: Abdomen is flat. Bowel sounds are normal. There is distension (soft).      Palpations: Abdomen is soft.      Tenderness: There is no abdominal tenderness.   Musculoskeletal:         General: Normal range of motion.      Right lower leg: No edema.      Left lower leg: No edema.      Comments: SHAYY DUNCAN   Feet:      Comments: Bilateral great toes with thick yellow discoloration.  No open wounds, ulcerations, erythema or edema.      Skin:     General: Skin is warm and dry.      Capillary Refill: Capillary refill takes less than 2 seconds.   Neurological:      General: No focal deficit present.      Mental Status: He is alert and oriented to person, place, and time.      Motor: Motor function is intact.      Coordination: Coordination is intact.   Psychiatric:         Attention and Perception: Attention normal.         Mood and Affect: Mood normal.         Speech: Speech normal.         Behavior: Behavior normal. Behavior is cooperative.         Last Recorded Vitals  Blood pressure 109/57, pulse 78, temperature " "36.5 °C (97.7 °F), temperature source Oral, resp. rate 18, height 1.93 m (6' 4\"), weight 68.4 kg (150 lb 12.7 oz), SpO2 94%.  Intake/Output last 3 Shifts:  I/O last 3 completed shifts:  In: 400 (5.8 mL/kg) [I.V.:400 (5.8 mL/kg)]  Out: 2000 (29.2 mL/kg) [Other:2000]  Weight: 68.4 kg     Relevant Results              Scheduled medications  aspirin, 81 mg, oral, Daily  atorvastatin, 80 mg, oral, Daily  carvedilol, 25 mg, oral, BID  epoetin annita or biosimilar, 150 Units/kg, subcutaneous, Every Mon/Wed/Fri  insulin glargine, 10 Units, subcutaneous, q12h  insulin lispro, 0-10 Units, subcutaneous, TID  multivitamin with minerals, 1 tablet, oral, Daily  polyethylene glycol, 17 g, oral, Daily  sacubitriL-valsartan, 1 tablet, oral, BID  sevelamer carbonate, 800 mg, oral, TID  SITagliptin phosphate, 25 mg, oral, Daily      Continuous medications     PRN medications  PRN medications: acetaminophen, dextrose, glucagon, glucagon, vancomycin     Results for orders placed or performed during the hospital encounter of 05/31/24 (from the past 24 hour(s))   POCT GLUCOSE   Result Value Ref Range    POCT Glucose 159 (H) 74 - 99 mg/dL   POCT GLUCOSE   Result Value Ref Range    POCT Glucose 245 (H) 74 - 99 mg/dL   Urinalysis with Reflex Microscopic   Result Value Ref Range    Color, Urine Light-Yellow Light-Yellow, Yellow, Dark-Yellow    Appearance, Urine Clear Clear    Specific Gravity, Urine 1.010 1.005 - 1.035    pH, Urine 8.5 (N) 5.0, 5.5, 6.0, 6.5, 7.0, 7.5, 8.0    Protein, Urine 70 (1+) (A) NEGATIVE, 10 (TRACE), 20 (TRACE) mg/dL    Glucose, Urine 500 (3+) (A) Normal mg/dL    Blood, Urine NEGATIVE NEGATIVE    Ketones, Urine NEGATIVE NEGATIVE mg/dL    Bilirubin, Urine NEGATIVE NEGATIVE    Urobilinogen, Urine Normal Normal mg/dL    Nitrite, Urine NEGATIVE NEGATIVE    Leukocyte Esterase, Urine NEGATIVE NEGATIVE   Microscopic Only, Urine   Result Value Ref Range    WBC, Urine 1-5 1-5, NONE /HPF    RBC, Urine NONE NONE, 1-2, 3-5 /HPF "   CBC and Auto Differential   Result Value Ref Range    WBC 5.0 4.4 - 11.3 x10*3/uL    nRBC 0.0 0.0 - 0.0 /100 WBCs    RBC 2.30 (L) 4.50 - 5.90 x10*6/uL    Hemoglobin 7.5 (L) 13.5 - 17.5 g/dL    Hematocrit 23.3 (L) 41.0 - 52.0 %     (H) 80 - 100 fL    MCH 32.6 26.0 - 34.0 pg    MCHC 32.2 32.0 - 36.0 g/dL    RDW 14.6 (H) 11.5 - 14.5 %    Platelets 198 150 - 450 x10*3/uL    Neutrophils % 68.4 40.0 - 80.0 %    Immature Granulocytes %, Automated 0.4 0.0 - 0.9 %    Lymphocytes % 10.9 13.0 - 44.0 %    Monocytes % 18.7 2.0 - 10.0 %    Eosinophils % 1.2 0.0 - 6.0 %    Basophils % 0.4 0.0 - 2.0 %    Neutrophils Absolute 3.45 1.20 - 7.70 x10*3/uL    Immature Granulocytes Absolute, Automated 0.02 0.00 - 0.70 x10*3/uL    Lymphocytes Absolute 0.55 (L) 1.20 - 4.80 x10*3/uL    Monocytes Absolute 0.94 0.10 - 1.00 x10*3/uL    Eosinophils Absolute 0.06 0.00 - 0.70 x10*3/uL    Basophils Absolute 0.02 0.00 - 0.10 x10*3/uL   Basic Metabolic Panel   Result Value Ref Range    Glucose 235 (H) 74 - 99 mg/dL    Sodium 133 (L) 136 - 145 mmol/L    Potassium 4.5 3.5 - 5.3 mmol/L    Chloride 93 (L) 98 - 107 mmol/L    Bicarbonate 27 21 - 32 mmol/L    Anion Gap 18 10 - 20 mmol/L    Urea Nitrogen 41 (H) 6 - 23 mg/dL    Creatinine 9.30 (H) 0.50 - 1.30 mg/dL    eGFR 6 (L) >60 mL/min/1.73m*2    Calcium 9.4 8.6 - 10.3 mg/dL   Magnesium   Result Value Ref Range    Magnesium 2.10 1.60 - 2.40 mg/dL   C-Reactive Protein   Result Value Ref Range    C-Reactive Protein 8.83 (H) <1.00 mg/dL   Sedimentation Rate   Result Value Ref Range    Sedimentation Rate 25 (H) 0 - 20 mm/h   SST TOP   Result Value Ref Range    Extra Tube Hold for add-ons.    POCT GLUCOSE   Result Value Ref Range    POCT Glucose 215 (H) 74 - 99 mg/dL   POCT GLUCOSE   Result Value Ref Range    POCT Glucose 85 74 - 99 mg/dL     CT abdomen pelvis wo IV contrast    Result Date: 6/4/2024  Interpreted By:  Ketty Ramirez, STUDY: CT ABDOMEN PELVIS WO IV CONTRAST;  6/4/2024 4:08 am    INDICATION: Signs/Symptoms:bacteremia  source?.   COMPARISON: 03/25/2024   ACCESSION NUMBER(S): [id number]   ORDERING CLINICIAN: GREG CALDERA   TECHNIQUE: CT of the abdomen and pelvis was performed. Sagittal and coronal reconstructions were generated.  No intravenous contrast given for the exam.   FINDINGS: Solid organ and vessel evaluation limited without IV contrast.   ABDOMINAL ORGANS:   LIVER: No focal lesion within limits of unenhanced exam prominent vascular calcifications near the hilum.   GALL BLADDER AND BILIARY TREE: No calcified gallstone. Gallbladder is distended. Questionable trace pericholecystic fluid.   SPLEEN: Stable isodense presumed splenule adjacent to the anterior pole.   PANCREAS: No focal lesion within limits of unenhanced exam   ADRENALS: No adrenal mass   KIDNEYS AND URETERS: Atrophic with prominent vascular calcifications and small rounded hypodensities in each kidney similar to the prior exam.   BOWEL: Questionable distal esophageal wall thickening. Stomach is mildly distended with debris and air. No abnormally dilated small bowel loops. Moderate fecal residue in the proximal colon. Rectum is distended with gas and fecal debris.   PERITONEUM, RETROPERITONEUM, NODES: No significant free fluid. No free air. Slight increased density in the mesentery. No significant retroperitoneal adenopathy within limits of unenhanced exam.   VESSELS: Relative decreased density of cardiac lumen suggesting anemia. Extensive vascular calcifications. No abdominal aortic aneurysm. Lack of IV contrast precludes vascular luminal assessment.   PELVIS: Urinary bladder is partially distended with diffusely thickened wall. Vas deferens and faint possible seminal vesicle calcifications bilaterally similar to the prior exam. No gross prostate enlargement.   ABDOMINAL WALL: Focal density in the right lower quadrant abdominal wall subcutaneous fat similar to the prior exam. No sizable abdominal wall hernia. Mild soft  tissue edema.   BONES: Generalized increased osseous density consistent with metabolic bone disease similar to the prior exam. Focal smooth superior L2 endplate depression consistent with Schmorl's node with adjacent tiny vacuum disc is more conspicuous.   LOWER CHEST: Patchy/nodular left lower lobe infiltrate. Septal thickening in both lung bases. Small bilateral pleural effusions. Cardiomegaly. Surgical material along the margins of the heart again seen.       Left lower lobe infiltrate consistent with pneumonia.   Mild soft tissue and mesenteric edema with septal thickening in the lung bases and small pleural effusions consistent with CHF/fluid overload.   Distended gallbladder with questionable trace pericholecystic fluid.   Questionable distal esophageal wall thickening. Correlate with possible esophagitis.   Diffuse bladder wall thickening which could be related to suboptimal distention, cystitis and/or trabeculation.   Numerous additional findings as described above.   MACRO: None.   Signed by: Ketty Ramirez 6/4/2024 8:48 AM Dictation workstation:   [id number]         Assessment/Plan        Kentrell Carreon is a 52 yo male with PMH of ESRD on home dialysis 4 days a week, CAD s/p PIC and CABG x 3 (March 2022), SLE, HFmrEF (LVEF 40%), mild aortic stenosis, IDDM, HTN, HLD, history of right toe OM s/p transmetatarsal amputation right second toe.  He p/w fever of up to 103, bodyaches, malaise.  In ED, he was found to be hypoxic on room air at 86%, improved with 2 L NC. CXR showed vascular congestion.  He was treated with empiric antibiotics and admitted.  Blood culture became positive for gram positive cocci in clusters, enterococcus faecalis.     Bacteremia   - unclear source   - CT A/P shows a left lower lobe infiltrate, gallbladder distention, bladder wall thickening and esophageal wall thickening.  Not felt to be gallbladder source- no symptoms, LFTs normal, UA negative   - ID is following   - continue IV  vancomycin   - repeat blood cultures taken today   - echo pending to rule out endocarditis     Acute hypoxia, resolved   HFmrEF   - hypoxia is resolved today, weaned to room air   - Likely related to volume overload.  possible infiltrate on CT, low suspicion for pneumonia given lack of respiratory symptoms. Check procalcitonin   - volume management per dialysis   - continue carvedilol, entresto, januvia     ESRD   - continue dialysis per nephrology through RUE AVF     CAD   CABG  - continue aspirin, statin     SLE   - follow with rheum as outpatient     Aortic stenosis   - echo pending     DM2   - sliding scale insulin, lantus   - A1c 7.5    HTN   - stable, continue current medications with holding parameters for low BP    HLD   - continue statin     VTE/GI prophylaxis   - subcutaneous heparin   - bowel regimen in place     Discharge Planning   - plan to discharge home when medically stable       Discussed with Dr. Callaway, [facility].       I spent 50 minutes in the professional and overall care of this patient.      Yana Jaeger, APRN-CNP

## 2024-06-04 NOTE — PROGRESS NOTES
Nephrology Consult Progress Note    Admit Date: 5/31/2024    Interval history:  No complaints     CURRENT MEDICATIONS:    Current Facility-Administered Medications:     acetaminophen (Tylenol) tablet 650 mg, 650 mg, oral, q6h PRN, Fletcher Whitley DO, 650 mg at 06/04/24 0849    aspirin EC tablet 81 mg, 81 mg, oral, Daily, Osmar Callaway MD, 81 mg at 06/04/24 0843    atorvastatin (Lipitor) tablet 80 mg, 80 mg, oral, Daily, Osmar Callaway MD, 80 mg at 06/04/24 0843    carvedilol (Coreg) tablet 25 mg, 25 mg, oral, BID, Osmar Callaway MD, 25 mg at 06/04/24 0844    dextrose 50 % injection 25 g, 25 g, intravenous, q15 min PRN, Osmar Callaway MD    epoetin annita-epbx (Retacrit) injection 10,000 Units, 150 Units/kg, subcutaneous, Every Mon/Wed/Fri, Shell Powell, PharmD, 10,000 Units at 06/03/24 1806    glucagon (Glucagen) injection 1 mg, 1 mg, intramuscular, q15 min PRN, Osmar Callaway MD    glucagon (Glucagen) injection 1 mg, 1 mg, intramuscular, q15 min PRN, Osmar Callaway MD    insulin glargine (Lantus) injection 10 Units, 10 Units, subcutaneous, q12h, Osmar Callaway MD, 10 Units at 06/04/24 1057    insulin lispro (HumaLOG) injection 0-10 Units, 0-10 Units, subcutaneous, TID, Osmar Callaway MD, 4 Units at 06/04/24 0841    multivitamin with minerals 1 tablet, 1 tablet, oral, Daily, Osmar Callaway MD, 1 tablet at 06/04/24 0843    polyethylene glycol (Glycolax, Miralax) packet 17 g, 17 g, oral, Daily, Osmar Callaway MD, 17 g at 06/01/24 1052    sacubitriL-valsartan (Entresto) 24-26 mg per tablet 1 tablet, 1 tablet, oral, BID, Osmar Callaway MD, 1 tablet at 06/04/24 0843    sevelamer carbonate (Renvela) tablet 800 mg, 800 mg, oral, TID, Osmar Callaway MD, 800 mg at 06/04/24 0842    SITagliptin phosphate (Januvia) tablet 25 mg, 25 mg, oral, Daily, Osmar Callaway MD, 25 mg at 06/04/24 0847    vancomycin (Vancocin) pharmacy to dose - pharmacy monitoring, , miscellaneous, Daily PRN,  "Osmar Callaway MD       Intake/Output Summary (Last 24 hours) at 6/4/2024 1108  Last data filed at 6/4/2024 0834  Gross per 24 hour   Intake 560 ml   Output 2000 ml   Net -1440 ml       PHYSICAL EXAM:  BP 90/55 (BP Location: Left arm, Patient Position: Lying)   Pulse 75   Temp 36.8 °C (98.2 °F) (Oral)   Resp 16   Ht 1.93 m (6' 4\")   Wt 68.4 kg (150 lb 12.7 oz)   SpO2 100%   BMI 18.36 kg/m²     Intake/Output Summary (Last 24 hours) at 6/4/2024 1108  Last data filed at 6/4/2024 0834  Gross per 24 hour   Intake 560 ml   Output 2000 ml   Net -1440 ml     Gen: AAO, NAD  Neck: No JVD  Cardiac: RRR  Resp: clear BS  Abd: Soft, non tender, +BS, non distended   Ext: No edema   Access: RUE AVF  Neuro: moves 4 ext  Peripheral Pulses: Capillary refill <2secs, strong peripheral pulses.  Skin: Skin color, texture, turgor normal, no suspicious rashes or lesions.    Labs:  Results for orders placed or performed during the hospital encounter of 05/31/24 (from the past 24 hour(s))   POCT GLUCOSE   Result Value Ref Range    POCT Glucose 121 (H) 74 - 99 mg/dL   POCT GLUCOSE   Result Value Ref Range    POCT Glucose 159 (H) 74 - 99 mg/dL   POCT GLUCOSE   Result Value Ref Range    POCT Glucose 245 (H) 74 - 99 mg/dL   Urinalysis with Reflex Microscopic   Result Value Ref Range    Color, Urine Light-Yellow Light-Yellow, Yellow, Dark-Yellow    Appearance, Urine Clear Clear    Specific Gravity, Urine 1.010 1.005 - 1.035    pH, Urine 8.5 (N) 5.0, 5.5, 6.0, 6.5, 7.0, 7.5, 8.0    Protein, Urine 70 (1+) (A) NEGATIVE, 10 (TRACE), 20 (TRACE) mg/dL    Glucose, Urine 500 (3+) (A) Normal mg/dL    Blood, Urine NEGATIVE NEGATIVE    Ketones, Urine NEGATIVE NEGATIVE mg/dL    Bilirubin, Urine NEGATIVE NEGATIVE    Urobilinogen, Urine Normal Normal mg/dL    Nitrite, Urine NEGATIVE NEGATIVE    Leukocyte Esterase, Urine NEGATIVE NEGATIVE   Microscopic Only, Urine   Result Value Ref Range    WBC, Urine 1-5 1-5, NONE /HPF    RBC, Urine NONE NONE, 1-2, " 3-5 /HPF   CBC and Auto Differential   Result Value Ref Range    WBC 5.0 4.4 - 11.3 x10*3/uL    nRBC 0.0 0.0 - 0.0 /100 WBCs    RBC 2.30 (L) 4.50 - 5.90 x10*6/uL    Hemoglobin 7.5 (L) 13.5 - 17.5 g/dL    Hematocrit 23.3 (L) 41.0 - 52.0 %     (H) 80 - 100 fL    MCH 32.6 26.0 - 34.0 pg    MCHC 32.2 32.0 - 36.0 g/dL    RDW 14.6 (H) 11.5 - 14.5 %    Platelets 198 150 - 450 x10*3/uL    Neutrophils % 68.4 40.0 - 80.0 %    Immature Granulocytes %, Automated 0.4 0.0 - 0.9 %    Lymphocytes % 10.9 13.0 - 44.0 %    Monocytes % 18.7 2.0 - 10.0 %    Eosinophils % 1.2 0.0 - 6.0 %    Basophils % 0.4 0.0 - 2.0 %    Neutrophils Absolute 3.45 1.20 - 7.70 x10*3/uL    Immature Granulocytes Absolute, Automated 0.02 0.00 - 0.70 x10*3/uL    Lymphocytes Absolute 0.55 (L) 1.20 - 4.80 x10*3/uL    Monocytes Absolute 0.94 0.10 - 1.00 x10*3/uL    Eosinophils Absolute 0.06 0.00 - 0.70 x10*3/uL    Basophils Absolute 0.02 0.00 - 0.10 x10*3/uL   Basic Metabolic Panel   Result Value Ref Range    Glucose 235 (H) 74 - 99 mg/dL    Sodium 133 (L) 136 - 145 mmol/L    Potassium 4.5 3.5 - 5.3 mmol/L    Chloride 93 (L) 98 - 107 mmol/L    Bicarbonate 27 21 - 32 mmol/L    Anion Gap 18 10 - 20 mmol/L    Urea Nitrogen 41 (H) 6 - 23 mg/dL    Creatinine 9.30 (H) 0.50 - 1.30 mg/dL    eGFR 6 (L) >60 mL/min/1.73m*2    Calcium 9.4 8.6 - 10.3 mg/dL   Magnesium   Result Value Ref Range    Magnesium 2.10 1.60 - 2.40 mg/dL   C-Reactive Protein   Result Value Ref Range    C-Reactive Protein 8.83 (H) <1.00 mg/dL   Sedimentation Rate   Result Value Ref Range    Sedimentation Rate 25 (H) 0 - 20 mm/h   SST TOP   Result Value Ref Range    Extra Tube Hold for add-ons.    POCT GLUCOSE   Result Value Ref Range    POCT Glucose 215 (H) 74 - 99 mg/dL        DATA:   Diagnostic tests reviewed for today's visit:    New labs and imaging     Assessment and Plan:  Pt came with Generalized fatigue and fever, found to have Enterococcus faecalis and gram-positive cocci bacteremia.  -  ESKD on HD: euvolemic  Had HD yesterday, next tomorrow  Using RUE AVF, not looking infected   HTN: controlled  Lytes and acid base; acceptable  Hb 7.5 on epogen    Will continue to follow.     Signature: Olivier Delgado MD

## 2024-06-04 NOTE — CARE PLAN
Problem: Pain  Goal: My pain/discomfort is manageable  Outcome: Progressing     Problem: Safety  Goal: Patient will be injury free during hospitalization  Outcome: Progressing  Goal: I will remain free of falls  Outcome: Progressing     Problem: Daily Care  Goal: Daily care needs are met  Outcome: Progressing     Problem: Psychosocial Needs  Goal: Demonstrates ability to cope with hospitalization/illness  Outcome: Progressing  Goal: Collaborate with me, my family, and caregiver to identify my specific goals  Outcome: Progressing     Problem: Discharge Barriers  Goal: My discharge needs are met  Outcome: Progressing   The patient's goals for the shift include      The clinical goals for the shift include Pt will remain HDS

## 2024-06-05 ENCOUNTER — APPOINTMENT (OUTPATIENT)
Dept: CARDIOLOGY | Facility: HOSPITAL | Age: 54
End: 2024-06-05
Payer: MEDICARE

## 2024-06-05 ENCOUNTER — APPOINTMENT (OUTPATIENT)
Dept: DIALYSIS | Facility: HOSPITAL | Age: 54
End: 2024-06-05
Payer: COMMERCIAL

## 2024-06-05 LAB
ALBUMIN SERPL BCP-MCNC: 3.7 G/DL (ref 3.4–5)
ANION GAP SERPL CALC-SCNC: 21 MMOL/L (ref 10–20)
AORTIC VALVE MEAN GRADIENT: 19.7 MMHG
AORTIC VALVE PEAK VELOCITY: 3.05 M/S
AV PEAK GRADIENT: 37.2 MMHG
AVA (PEAK VEL): 1.25 CM2
AVA (VTI): 1.34 CM2
BASOPHILS # BLD AUTO: 0.01 X10*3/UL (ref 0–0.1)
BASOPHILS NFR BLD AUTO: 0.2 %
BUN SERPL-MCNC: 54 MG/DL (ref 6–23)
CALCIUM SERPL-MCNC: 9.5 MG/DL (ref 8.6–10.3)
CHLORIDE SERPL-SCNC: 92 MMOL/L (ref 98–107)
CO2 SERPL-SCNC: 26 MMOL/L (ref 21–32)
CREAT SERPL-MCNC: 11.32 MG/DL (ref 0.5–1.3)
EGFRCR SERPLBLD CKD-EPI 2021: 5 ML/MIN/1.73M*2
EJECTION FRACTION APICAL 4 CHAMBER: 29
EOSINOPHIL # BLD AUTO: 0.12 X10*3/UL (ref 0–0.7)
EOSINOPHIL NFR BLD AUTO: 2.1 %
ERYTHROCYTE [DISTWIDTH] IN BLOOD BY AUTOMATED COUNT: 14.6 % (ref 11.5–14.5)
GLOBAL LONGITUDINAL STRAIN: 6.3 %
GLUCOSE BLD MANUAL STRIP-MCNC: 128 MG/DL (ref 74–99)
GLUCOSE BLD MANUAL STRIP-MCNC: 251 MG/DL (ref 74–99)
GLUCOSE BLD MANUAL STRIP-MCNC: 61 MG/DL (ref 74–99)
GLUCOSE BLD MANUAL STRIP-MCNC: 73 MG/DL (ref 74–99)
GLUCOSE BLD MANUAL STRIP-MCNC: 83 MG/DL (ref 74–99)
GLUCOSE SERPL-MCNC: 93 MG/DL (ref 74–99)
HCT VFR BLD AUTO: 23.3 % (ref 41–52)
HGB BLD-MCNC: 7.8 G/DL (ref 13.5–17.5)
IMM GRANULOCYTES # BLD AUTO: 0.01 X10*3/UL (ref 0–0.7)
IMM GRANULOCYTES NFR BLD AUTO: 0.2 % (ref 0–0.9)
LEFT ATRIUM VOLUME AREA LENGTH INDEX BSA: 66 ML/M2
LEFT VENTRICLE INTERNAL DIMENSION DIASTOLE: 5.73 CM (ref 3.5–6)
LEFT VENTRICULAR OUTFLOW TRACT DIAMETER: 2.01 CM
LV EJECTION FRACTION BIPLANE: 39 %
LYMPHOCYTES # BLD AUTO: 0.88 X10*3/UL (ref 1.2–4.8)
LYMPHOCYTES NFR BLD AUTO: 15.7 %
MAGNESIUM SERPL-MCNC: 2.4 MG/DL (ref 1.6–2.4)
MCH RBC QN AUTO: 32.5 PG (ref 26–34)
MCHC RBC AUTO-ENTMCNC: 33.5 G/DL (ref 32–36)
MCV RBC AUTO: 97 FL (ref 80–100)
MITRAL VALVE E/E' RATIO: 8.06
MONOCYTES # BLD AUTO: 0.96 X10*3/UL (ref 0.1–1)
MONOCYTES NFR BLD AUTO: 17.2 %
NEUTROPHILS # BLD AUTO: 3.61 X10*3/UL (ref 1.2–7.7)
NEUTROPHILS NFR BLD AUTO: 64.6 %
NRBC BLD-RTO: 0 /100 WBCS (ref 0–0)
PHOSPHATE SERPL-MCNC: 4.8 MG/DL (ref 2.5–4.9)
PLATELET # BLD AUTO: 217 X10*3/UL (ref 150–450)
POTASSIUM SERPL-SCNC: 4.9 MMOL/L (ref 3.5–5.3)
PROCALCITONIN SERPL-MCNC: 5.19 NG/ML
RBC # BLD AUTO: 2.4 X10*6/UL (ref 4.5–5.9)
RIGHT VENTRICLE FREE WALL PEAK S': 9 CM/S
RIGHT VENTRICLE PEAK SYSTOLIC PRESSURE: 44.5 MMHG
SODIUM SERPL-SCNC: 134 MMOL/L (ref 136–145)
TRICUSPID ANNULAR PLANE SYSTOLIC EXCURSION: 1.2 CM
VANCOMYCIN SERPL-MCNC: 14.2 UG/ML (ref 5–20)
WBC # BLD AUTO: 5.6 X10*3/UL (ref 4.4–11.3)

## 2024-06-05 PROCEDURE — 84145 PROCALCITONIN (PCT): CPT | Mod: AHULAB | Performed by: NURSE PRACTITIONER

## 2024-06-05 PROCEDURE — 8010000001 HC DIALYSIS - HEMODIALYSIS PER DAY

## 2024-06-05 PROCEDURE — 90935 HEMODIALYSIS ONE EVALUATION: CPT | Performed by: INTERNAL MEDICINE

## 2024-06-05 PROCEDURE — 82947 ASSAY GLUCOSE BLOOD QUANT: CPT | Mod: 91

## 2024-06-05 PROCEDURE — 85025 COMPLETE CBC W/AUTO DIFF WBC: CPT | Performed by: NURSE PRACTITIONER

## 2024-06-05 PROCEDURE — 2500000004 HC RX 250 GENERAL PHARMACY W/ HCPCS (ALT 636 FOR OP/ED): Performed by: NURSE PRACTITIONER

## 2024-06-05 PROCEDURE — 1100000001 HC PRIVATE ROOM DAILY

## 2024-06-05 PROCEDURE — 2500000001 HC RX 250 WO HCPCS SELF ADMINISTERED DRUGS (ALT 637 FOR MEDICARE OP): Performed by: NURSE PRACTITIONER

## 2024-06-05 PROCEDURE — 2500000001 HC RX 250 WO HCPCS SELF ADMINISTERED DRUGS (ALT 637 FOR MEDICARE OP): Performed by: INTERNAL MEDICINE

## 2024-06-05 PROCEDURE — 6350000001 HC RX 635 EPOETIN >10,000 UNITS: Mod: JZ | Performed by: PHARMACIST

## 2024-06-05 PROCEDURE — 80202 ASSAY OF VANCOMYCIN: CPT | Performed by: PHARMACIST

## 2024-06-05 PROCEDURE — 83735 ASSAY OF MAGNESIUM: CPT | Performed by: NURSE PRACTITIONER

## 2024-06-05 PROCEDURE — 2500000004 HC RX 250 GENERAL PHARMACY W/ HCPCS (ALT 636 FOR OP/ED): Performed by: PHARMACIST

## 2024-06-05 PROCEDURE — 99232 SBSQ HOSP IP/OBS MODERATE 35: CPT | Performed by: INTERNAL MEDICINE

## 2024-06-05 PROCEDURE — 93306 TTE W/DOPPLER COMPLETE: CPT | Performed by: INTERNAL MEDICINE

## 2024-06-05 PROCEDURE — 36415 COLL VENOUS BLD VENIPUNCTURE: CPT | Performed by: NURSE PRACTITIONER

## 2024-06-05 PROCEDURE — 93306 TTE W/DOPPLER COMPLETE: CPT

## 2024-06-05 PROCEDURE — 2500000002 HC RX 250 W HCPCS SELF ADMINISTERED DRUGS (ALT 637 FOR MEDICARE OP, ALT 636 FOR OP/ED): Mod: MUE | Performed by: INTERNAL MEDICINE

## 2024-06-05 PROCEDURE — 80069 RENAL FUNCTION PANEL: CPT | Performed by: NURSE PRACTITIONER

## 2024-06-05 RX ORDER — VANCOMYCIN HYDROCHLORIDE 750 MG/150ML
750 INJECTION, SOLUTION INTRAVENOUS ONCE
Status: COMPLETED | OUTPATIENT
Start: 2024-06-05 | End: 2024-06-05

## 2024-06-05 RX ADMIN — ASPIRIN 81 MG: 81 TABLET, COATED ORAL at 12:28

## 2024-06-05 RX ADMIN — VANCOMYCIN HYDROCHLORIDE 750 MG: 750 INJECTION, SOLUTION INTRAVENOUS at 17:20

## 2024-06-05 RX ADMIN — EPOETIN ALFA-EPBX 10000 UNITS: 10000 INJECTION, SOLUTION INTRAVENOUS; SUBCUTANEOUS at 17:21

## 2024-06-05 RX ADMIN — HEPARIN SODIUM 5000 UNITS: 5000 INJECTION INTRAVENOUS; SUBCUTANEOUS at 15:04

## 2024-06-05 RX ADMIN — SACUBITRIL AND VALSARTAN 1 TABLET: 24; 26 TABLET, FILM COATED ORAL at 20:11

## 2024-06-05 RX ADMIN — HEPARIN SODIUM 5000 UNITS: 5000 INJECTION INTRAVENOUS; SUBCUTANEOUS at 07:04

## 2024-06-05 RX ADMIN — SITAGLIPTIN 25 MG: 25 TABLET, FILM COATED ORAL at 12:28

## 2024-06-05 RX ADMIN — SEVELAMER CARBONATE 800 MG: 800 TABLET, FILM COATED ORAL at 17:21

## 2024-06-05 RX ADMIN — ATORVASTATIN CALCIUM 80 MG: 80 TABLET, FILM COATED ORAL at 12:28

## 2024-06-05 RX ADMIN — CARVEDILOL 25 MG: 25 TABLET, FILM COATED ORAL at 17:20

## 2024-06-05 RX ADMIN — MULTIPLE VITAMINS W/ MINERALS TAB 1 TABLET: TAB at 12:28

## 2024-06-05 RX ADMIN — SEVELAMER CARBONATE 800 MG: 800 TABLET, FILM COATED ORAL at 12:28

## 2024-06-05 ASSESSMENT — PAIN SCALES - GENERAL
PAINLEVEL_OUTOF10: 4
PAINLEVEL_OUTOF10: 0 - NO PAIN
PAINLEVEL_OUTOF10: 0 - NO PAIN

## 2024-06-05 ASSESSMENT — COGNITIVE AND FUNCTIONAL STATUS - GENERAL
MOBILITY SCORE: 24
DAILY ACTIVITIY SCORE: 24
MOBILITY SCORE: 24
DAILY ACTIVITIY SCORE: 24

## 2024-06-05 ASSESSMENT — PAIN DESCRIPTION - DESCRIPTORS: DESCRIPTORS: ACHING

## 2024-06-05 ASSESSMENT — PAIN - FUNCTIONAL ASSESSMENT
PAIN_FUNCTIONAL_ASSESSMENT: 0-10
PAIN_FUNCTIONAL_ASSESSMENT: NO/DENIES PAIN

## 2024-06-05 NOTE — PROCEDURES
"Hemodialysis Note    Patient seen and examined while on dialysis, recent events, labs, medications reviewed.   Tolerating well, 3.5h/UF 2L  Pt is doing HHD under care od Dr Epperson  +GPC in blood, RUE AVF looks fine  /70 (BP Location: Left arm, Patient Position: Sitting)   Pulse 82   Temp 35.9 °C (96.6 °F) (Temporal)   Resp 18   Ht 1.93 m (6' 4\")   Wt 68.4 kg (150 lb 12.7 oz)   SpO2 94%   BMI 18.36 kg/m²    Hb 7.8, on epogen    Next planned dialysis friday    Will continue to follow, overall management per primary team, and continue regular dialysis.      Olivier Delgado MD  Division of Nephrology and Hypertension    "

## 2024-06-05 NOTE — PROGRESS NOTES
PROGRESS NOTE - INTERNAL MEDICINE     PATIENT NAME:  Kentrell Carreon    MRN:  [id number]  SERVICE DATE:  6/5/2024   SERVICE TIME:  10:07 AM     ADMITTING PHYSICIAN:  Osmar Callaway MD    ASSESSMENT & PLAN:  Principal Problem:  Generalized fatigue and fever  Enterococcus faecalis and gram-positive cocci bacteremia.  Positive blood cultures for Enterococcus faecalis and gram-positive cocci     Seen by infectious disease, continue with vancomycin.  And repeat blood cultures from June 4, 2024 1 out of 2 is positive for gram-positive cocci in pairs and chains.  TTE pending to rule out endocarditis, consider doing MARION if necessary .  Other active medical problems  End-stage renal disease on home hemodialysis, nephrology consultation requested  Patient has AV fistula right upper arm with good bruit and thrill.  Diabetes mellitus insulin-dependent, hemoglobin A1c 7.5, continue with Lantus insulin 10 units twice a day and sliding scale 4 times daily as needed  History of SLE  Hypertension stable     Chronic systolic and diastolic heart failure  Continue sacubitril and other medications including Coreg.  Previous coronary artery bypass surgery     Reviewed all labs and medications and all imaging studies and discussed the plan of treatment with patient in detail.  Nephrology consultation requested.  Total time spent in examination counseling and coordinating care for this patient was greater than 35 minutes.                 SUBJECTIVE  CHIEF COMPLAINT:           Chief Complaint   Patient presents with    Shortness of Breath    Patient seen and examined, he is coming along fine, remains afebrile for past 48 hours, denies any chest pain or shortness of breath.  His blood cultures are positive for Enterococcus faecalis Patient to continue with IV vancomycin.     Repeat Blood cultures from 6/4/24, 1 out of 2 is positive for gram-positive cocci.  Transthoracic echocardiogram pending,   ID on board        INTERVAL HISTORY OF PRESENT  ILLNESS: Patient seen in dialysis, he is coming along fine has been afebrile . Breathing normally, no chest pain.  antibiotics continued with vancomycin.  Awaiting transthoracic echocardiogram.  Repeat blood culture  from 6/4/24 1 out of 2 is positive for gram-positive positive cocci.            MEDICATIONS:   Current Facility-Administered Medications:     acetaminophen (Tylenol) tablet 650 mg, 650 mg, oral, q6h PRN, Fletcher Whitley DO, 650 mg at 06/04/24 1750    aspirin EC tablet 81 mg, 81 mg, oral, Daily, Osmar Callaway MD, 81 mg at 06/04/24 0843    atorvastatin (Lipitor) tablet 80 mg, 80 mg, oral, Daily, Osmar Callaway MD, 80 mg at 06/04/24 0843    carvedilol (Coreg) tablet 25 mg, 25 mg, oral, BID, SOTERO Skinner-CNP, 25 mg at 06/04/24 1750    dextrose 50 % injection 25 g, 25 g, intravenous, q15 min PRN, Osmar Callaway MD    epoetin annita-epbx (Retacrit) injection 10,000 Units, 150 Units/kg, subcutaneous, Every Mon/Wed/Fri, Shell Powell, PharmD, 10,000 Units at 06/03/24 1806    glucagon (Glucagen) injection 1 mg, 1 mg, intramuscular, q15 min PRN, Osmar Callaway MD    glucagon (Glucagen) injection 1 mg, 1 mg, intramuscular, q15 min PRN, Osmar Callaway MD    heparin (porcine) injection 5,000 Units, 5,000 Units, subcutaneous, q8h [facility], SOTERO Skinner-CNP, 5,000 Units at 06/05/24 0704    insulin glargine (Lantus) injection 10 Units, 10 Units, subcutaneous, q12h, Osmar Callaway MD, 10 Units at 06/04/24 2143    insulin lispro (HumaLOG) injection 0-10 Units, 0-10 Units, subcutaneous, TID, Osmar Callaway MD, 4 Units at 06/04/24 0841    multivitamin with minerals 1 tablet, 1 tablet, oral, Daily, Osmar Callaway MD, 1 tablet at 06/04/24 0843    polyethylene glycol (Glycolax, Miralax) packet 17 g, 17 g, oral, Daily, Osmar Callaway MD, 17 g at 06/01/24 1052    sacubitriL-valsartan (Entresto) 24-26 mg per tablet 1 tablet, 1 tablet, oral, BID, SOTERO Skinner-CNP, 1 tablet at  "06/04/24 2142    sevelamer carbonate (Renvela) tablet 800 mg, 800 mg, oral, TID, Osmar Callaway MD, 800 mg at 06/04/24 1750    SITagliptin phosphate (Januvia) tablet 25 mg, 25 mg, oral, Daily, Osmar Callaway MD, 25 mg at 06/04/24 0847    vancomycin (Vancocin) pharmacy to dose - pharmacy monitoring, , miscellaneous, Daily PRN, Osmar Callaway MD    vancomycin in dextrose 5 % (Vancocin) IVPB 750 mg, 750 mg, intravenous, Once, Shell Powell, PharmD        OBJECTIVE  Visit Vitals  /70 (BP Location: Left arm, Patient Position: Sitting)   Pulse 82   Temp 35.9 °C (96.6 °F) (Temporal)   Resp 18   Ht 1.93 m (6' 4\")   Wt 68.4 kg (150 lb 12.7 oz)   SpO2 94%   BMI 18.36 kg/m²   Smoking Status Former   BSA 1.91 m²      PHYSICAL EXAM:   GENERAL:  Alert, no distress, cooperative  SKIN:  Skin color, texture, turgor normal. No rashes or lesions.  OROPHARYNX:  Lips, mucosa, and tongue are normal.Teeth and gums, normal. Oropharynx normal.  NECK:  No jugulovenous distention, No carotid bruits, Carotid pulse normal contour, Supple  LUNGS:  Lungs clear to auscultation. Good diaphragmatic excursion.  CARDIAC: Rate and rhythm is regular, normal S1 and S2; no rubs,  or gallops, 3/6 systolic ejection murmur left sternal border radiating to neck and apical area consistent and moderate to severe aortic stenosis, mitral holosystolic murmur and tricuspid regurgitation murmur, no overt CHF.  ABDOMEN:  Abdomen soft, non-tender, BS normal, No masses or organomegaly  EXTREMETIES:  Extremities normal, no deformities, edema, clubbing or skin discoloration. Good capillary refill., No ulcers  AV fistula right upper arm with good bruit and thrill.  NEURO:  Alert, oriented X 3, Gait normal. Non-focal. Reflexes normal and symmetric. Sensation grossly intact., Cranial nerves II-XII intact  PULSES:  2+ radial, 2+ carotid        DATA:   Diagnostic tests reviewed for today's visit:    Results for orders placed or performed during the " hospital encounter of 05/31/24 (from the past 96 hour(s))   POCT GLUCOSE   Result Value Ref Range    POCT Glucose 305 (H) 74 - 99 mg/dL   POCT GLUCOSE   Result Value Ref Range    POCT Glucose 129 (H) 74 - 99 mg/dL   POCT GLUCOSE   Result Value Ref Range    POCT Glucose 214 (H) 74 - 99 mg/dL   CBC   Result Value Ref Range    WBC 4.3 (L) 4.4 - 11.3 x10*3/uL    nRBC 0.0 0.0 - 0.0 /100 WBCs    RBC 2.58 (L) 4.50 - 5.90 x10*6/uL    Hemoglobin 8.5 (L) 13.5 - 17.5 g/dL    Hematocrit 26.3 (L) 41.0 - 52.0 %     (H) 80 - 100 fL    MCH 32.9 26.0 - 34.0 pg    MCHC 32.3 32.0 - 36.0 g/dL    RDW 14.6 (H) 11.5 - 14.5 %    Platelets 158 150 - 450 x10*3/uL   Comprehensive metabolic panel   Result Value Ref Range    Glucose 106 (H) 74 - 99 mg/dL    Sodium 134 (L) 136 - 145 mmol/L    Potassium 4.3 3.5 - 5.3 mmol/L    Chloride 92 (L) 98 - 107 mmol/L    Bicarbonate 29 21 - 32 mmol/L    Anion Gap 17 10 - 20 mmol/L    Urea Nitrogen 60 (H) 6 - 23 mg/dL    Creatinine 12.00 (H) 0.50 - 1.30 mg/dL    eGFR 5 (L) >60 mL/min/1.73m*2    Calcium 9.5 8.6 - 10.3 mg/dL    Albumin 3.6 3.4 - 5.0 g/dL    Alkaline Phosphatase 60 33 - 120 U/L    Total Protein 6.5 6.4 - 8.2 g/dL    AST 21 9 - 39 U/L    Bilirubin, Total 0.5 0.0 - 1.2 mg/dL    ALT 17 10 - 52 U/L   Phosphorus   Result Value Ref Range    Phosphorus 4.7 2.5 - 4.9 mg/dL   POCT GLUCOSE   Result Value Ref Range    POCT Glucose 153 (H) 74 - 99 mg/dL   POCT GLUCOSE   Result Value Ref Range    POCT Glucose 146 (H) 74 - 99 mg/dL   POCT GLUCOSE   Result Value Ref Range    POCT Glucose 116 (H) 74 - 99 mg/dL   POCT GLUCOSE   Result Value Ref Range    POCT Glucose 164 (H) 74 - 99 mg/dL   POCT GLUCOSE   Result Value Ref Range    POCT Glucose 136 (H) 74 - 99 mg/dL   POCT GLUCOSE   Result Value Ref Range    POCT Glucose 121 (H) 74 - 99 mg/dL   POCT GLUCOSE   Result Value Ref Range    POCT Glucose 159 (H) 74 - 99 mg/dL   POCT GLUCOSE   Result Value Ref Range    POCT Glucose 245 (H) 74 - 99 mg/dL    Urinalysis with Reflex Microscopic   Result Value Ref Range    Color, Urine Light-Yellow Light-Yellow, Yellow, Dark-Yellow    Appearance, Urine Clear Clear    Specific Gravity, Urine 1.010 1.005 - 1.035    pH, Urine 8.5 (N) 5.0, 5.5, 6.0, 6.5, 7.0, 7.5, 8.0    Protein, Urine 70 (1+) (A) NEGATIVE, 10 (TRACE), 20 (TRACE) mg/dL    Glucose, Urine 500 (3+) (A) Normal mg/dL    Blood, Urine NEGATIVE NEGATIVE    Ketones, Urine NEGATIVE NEGATIVE mg/dL    Bilirubin, Urine NEGATIVE NEGATIVE    Urobilinogen, Urine Normal Normal mg/dL    Nitrite, Urine NEGATIVE NEGATIVE    Leukocyte Esterase, Urine NEGATIVE NEGATIVE   Microscopic Only, Urine   Result Value Ref Range    WBC, Urine 1-5 1-5, NONE /HPF    RBC, Urine NONE NONE, 1-2, 3-5 /HPF   CBC and Auto Differential   Result Value Ref Range    WBC 5.0 4.4 - 11.3 x10*3/uL    nRBC 0.0 0.0 - 0.0 /100 WBCs    RBC 2.30 (L) 4.50 - 5.90 x10*6/uL    Hemoglobin 7.5 (L) 13.5 - 17.5 g/dL    Hematocrit 23.3 (L) 41.0 - 52.0 %     (H) 80 - 100 fL    MCH 32.6 26.0 - 34.0 pg    MCHC 32.2 32.0 - 36.0 g/dL    RDW 14.6 (H) 11.5 - 14.5 %    Platelets 198 150 - 450 x10*3/uL    Neutrophils % 68.4 40.0 - 80.0 %    Immature Granulocytes %, Automated 0.4 0.0 - 0.9 %    Lymphocytes % 10.9 13.0 - 44.0 %    Monocytes % 18.7 2.0 - 10.0 %    Eosinophils % 1.2 0.0 - 6.0 %    Basophils % 0.4 0.0 - 2.0 %    Neutrophils Absolute 3.45 1.20 - 7.70 x10*3/uL    Immature Granulocytes Absolute, Automated 0.02 0.00 - 0.70 x10*3/uL    Lymphocytes Absolute 0.55 (L) 1.20 - 4.80 x10*3/uL    Monocytes Absolute 0.94 0.10 - 1.00 x10*3/uL    Eosinophils Absolute 0.06 0.00 - 0.70 x10*3/uL    Basophils Absolute 0.02 0.00 - 0.10 x10*3/uL   Basic Metabolic Panel   Result Value Ref Range    Glucose 235 (H) 74 - 99 mg/dL    Sodium 133 (L) 136 - 145 mmol/L    Potassium 4.5 3.5 - 5.3 mmol/L    Chloride 93 (L) 98 - 107 mmol/L    Bicarbonate 27 21 - 32 mmol/L    Anion Gap 18 10 - 20 mmol/L    Urea Nitrogen 41 (H) 6 - 23 mg/dL     Creatinine 9.30 (H) 0.50 - 1.30 mg/dL    eGFR 6 (L) >60 mL/min/1.73m*2    Calcium 9.4 8.6 - 10.3 mg/dL   Blood Culture    Specimen: Peripheral Venipuncture; Blood culture   Result Value Ref Range    Blood Culture Loaded on Instrument - Culture in progress    Blood Culture    Specimen: Peripheral Venipuncture; Blood culture   Result Value Ref Range    Blood Culture Loaded on Instrument - Culture in progress    Magnesium   Result Value Ref Range    Magnesium 2.10 1.60 - 2.40 mg/dL   C-Reactive Protein   Result Value Ref Range    C-Reactive Protein 8.83 (H) <1.00 mg/dL   Sedimentation Rate   Result Value Ref Range    Sedimentation Rate 25 (H) 0 - 20 mm/h   SST TOP   Result Value Ref Range    Extra Tube Hold for add-ons.    POCT GLUCOSE   Result Value Ref Range    POCT Glucose 215 (H) 74 - 99 mg/dL   POCT GLUCOSE   Result Value Ref Range    POCT Glucose 85 74 - 99 mg/dL   POCT GLUCOSE   Result Value Ref Range    POCT Glucose 89 74 - 99 mg/dL   POCT GLUCOSE   Result Value Ref Range    POCT Glucose 132 (H) 74 - 99 mg/dL   CBC and Auto Differential   Result Value Ref Range    WBC 5.6 4.4 - 11.3 x10*3/uL    nRBC 0.0 0.0 - 0.0 /100 WBCs    RBC 2.40 (L) 4.50 - 5.90 x10*6/uL    Hemoglobin 7.8 (L) 13.5 - 17.5 g/dL    Hematocrit 23.3 (L) 41.0 - 52.0 %    MCV 97 80 - 100 fL    MCH 32.5 26.0 - 34.0 pg    MCHC 33.5 32.0 - 36.0 g/dL    RDW 14.6 (H) 11.5 - 14.5 %    Platelets 217 150 - 450 x10*3/uL    Neutrophils % 64.6 40.0 - 80.0 %    Immature Granulocytes %, Automated 0.2 0.0 - 0.9 %    Lymphocytes % 15.7 13.0 - 44.0 %    Monocytes % 17.2 2.0 - 10.0 %    Eosinophils % 2.1 0.0 - 6.0 %    Basophils % 0.2 0.0 - 2.0 %    Neutrophils Absolute 3.61 1.20 - 7.70 x10*3/uL    Immature Granulocytes Absolute, Automated 0.01 0.00 - 0.70 x10*3/uL    Lymphocytes Absolute 0.88 (L) 1.20 - 4.80 x10*3/uL    Monocytes Absolute 0.96 0.10 - 1.00 x10*3/uL    Eosinophils Absolute 0.12 0.00 - 0.70 x10*3/uL    Basophils Absolute 0.01 0.00 - 0.10 x10*3/uL    Magnesium   Result Value Ref Range    Magnesium 2.40 1.60 - 2.40 mg/dL   Vancomycin   Result Value Ref Range    Vancomycin 14.2 5.0 - 20.0 ug/mL   Renal Function Panel   Result Value Ref Range    Glucose 93 74 - 99 mg/dL    Sodium 134 (L) 136 - 145 mmol/L    Potassium 4.9 3.5 - 5.3 mmol/L    Chloride 92 (L) 98 - 107 mmol/L    Bicarbonate 26 21 - 32 mmol/L    Anion Gap 21 (H) 10 - 20 mmol/L    Urea Nitrogen 54 (H) 6 - 23 mg/dL    Creatinine 11.32 (H) 0.50 - 1.30 mg/dL    eGFR 5 (L) >60 mL/min/1.73m*2    Calcium 9.5 8.6 - 10.3 mg/dL    Phosphorus 4.8 2.5 - 4.9 mg/dL    Albumin 3.7 3.4 - 5.0 g/dL   POCT GLUCOSE   Result Value Ref Range    POCT Glucose 83 74 - 99 mg/dL      CT abdomen pelvis wo IV contrast    Result Date: 6/4/2024  Interpreted By:  Ketty Ramirez, STUDY: CT ABDOMEN PELVIS WO IV CONTRAST;  6/4/2024 4:08 am   INDICATION: Signs/Symptoms:bacteremia  source?.   COMPARISON: 03/25/2024   ACCESSION NUMBER(S): [id number]   ORDERING CLINICIAN: GREG CALDERA   TECHNIQUE: CT of the abdomen and pelvis was performed. Sagittal and coronal reconstructions were generated.  No intravenous contrast given for the exam.   FINDINGS: Solid organ and vessel evaluation limited without IV contrast.   ABDOMINAL ORGANS:   LIVER: No focal lesion within limits of unenhanced exam prominent vascular calcifications near the hilum.   GALL BLADDER AND BILIARY TREE: No calcified gallstone. Gallbladder is distended. Questionable trace pericholecystic fluid.   SPLEEN: Stable isodense presumed splenule adjacent to the anterior pole.   PANCREAS: No focal lesion within limits of unenhanced exam   ADRENALS: No adrenal mass   KIDNEYS AND URETERS: Atrophic with prominent vascular calcifications and small rounded hypodensities in each kidney similar to the prior exam.   BOWEL: Questionable distal esophageal wall thickening. Stomach is mildly distended with debris and air. No abnormally dilated small bowel loops. Moderate fecal residue in the  proximal colon. Rectum is distended with gas and fecal debris.   PERITONEUM, RETROPERITONEUM, NODES: No significant free fluid. No free air. Slight increased density in the mesentery. No significant retroperitoneal adenopathy within limits of unenhanced exam.   VESSELS: Relative decreased density of cardiac lumen suggesting anemia. Extensive vascular calcifications. No abdominal aortic aneurysm. Lack of IV contrast precludes vascular luminal assessment.   PELVIS: Urinary bladder is partially distended with diffusely thickened wall. Vas deferens and faint possible seminal vesicle calcifications bilaterally similar to the prior exam. No gross prostate enlargement.   ABDOMINAL WALL: Focal density in the right lower quadrant abdominal wall subcutaneous fat similar to the prior exam. No sizable abdominal wall hernia. Mild soft tissue edema.   BONES: Generalized increased osseous density consistent with metabolic bone disease similar to the prior exam. Focal smooth superior L2 endplate depression consistent with Schmorl's node with adjacent tiny vacuum disc is more conspicuous.   LOWER CHEST: Patchy/nodular left lower lobe infiltrate. Septal thickening in both lung bases. Small bilateral pleural effusions. Cardiomegaly. Surgical material along the margins of the heart again seen.       Left lower lobe infiltrate consistent with pneumonia.   Mild soft tissue and mesenteric edema with septal thickening in the lung bases and small pleural effusions consistent with CHF/fluid overload.   Distended gallbladder with questionable trace pericholecystic fluid.   Questionable distal esophageal wall thickening. Correlate with possible esophagitis.   Diffuse bladder wall thickening which could be related to suboptimal distention, cystitis and/or trabeculation.   Numerous additional findings as described above.   MACRO: None.   Signed by: Ketty Ramirez 6/4/2024 8:48 AM Dictation workstation:   [id number]    Electrocardiogram, 12-lead  PRN ACS symptoms    Result Date: 6/3/2024  Normal sinus rhythm Possible Left atrial enlargement Left ventricular hypertrophy with QRS widening ( Sokolow-Márquez , [place] product ) Marked T wave abnormality, consider inferolateral ischemia Abnormal ECG When compared with ECG of 26-MAR-2024 02:50, Questionable change in QRS axis ST now depressed in Inferior leads T wave inversion now evident in Inferior leads    XR chest 2 views    Result Date: 5/31/2024  Interpreted By:  Aaron Jeffers, STUDY: XR CHEST 2 VIEWS;  5/31/2024 9:24 pm   INDICATION: Signs/Symptoms:sob.   COMPARISON: None.   ACCESSION NUMBER(S): [id number]   ORDERING CLINICIAN: KAMALA FRIEDMAN   FINDINGS: Median sternal wires present. Some vascular stent over the right axilla.     CARDIOMEDIASTINAL SILHOUETTE: There is enlargement of the cardiac silhouette. Median sternotomy wires present.   LUNGS: There is pulmonary congestion. There is no consolidation or effusion.   ABDOMEN: No remarkable upper abdominal findings.   BONES: No acute osseous changes.       1.  Enlarged cardiac silhouette with vascular congestion. No consolidation seen       MACRO: None   Signed by: Aaron Jeffers 5/31/2024 9:28 PM Dictation workstation:   [id number]          Addendum  Transthoracic echocardiogram today  revealed global hypokinesia with LV ejection fraction of 20% no evidence of valvular vegetation.            SIGNATURE:  Osmar Callaway MD  DATE:  June 5, 2024  TIME:  10:07 AM

## 2024-06-05 NOTE — PROGRESS NOTES
Vancomycin Dosing by Pharmacy- FOLLOW UP    Kentrell Carreon is a 53 y.o. year old male who Pharmacy has been consulted for vancomycin dosing for line infections. Based on the patient's indication and renal status this patient is being dosed based on a goal pre-HD level of 20-25.     Renal function is currently stable.    Most recent random level: 14.2 mcg/mL    Visit Vitals  /70 (BP Location: Left arm, Patient Position: Sitting)   Pulse 82   Temp 35.9 °C (96.6 °F) (Temporal)   Resp 18        Lab Results   Component Value Date    CREATININE 11.32 (H) 2024    CREATININE 9.30 (H) 2024    CREATININE 12.00 (H) 2024    CREATININE 8.84 (H) 2024        Patient weight is as follows:   Vitals:    24 0754   Weight: 68.4 kg (150 lb 12.7 oz)       Cultures:  Susceptibility data for the encounter in last 14 days.  Collected Specimen Info Organism Ampicillin Gentamicin Synergy Penicillin Tetracycline Trimethoprim/Sulfamethoxazole Vancomycin   24 Blood culture from Peripheral Venipuncture Enterococcus faecalis  S  S  S  S  R  S   24 Blood culture from Peripheral Venipuncture Enterococcus faecalis              I/O last 3 completed shifts:  In: 1190 (17.4 mL/kg) [P.O.:1190]  Out: 0 (0 mL/kg)   Weight: 68.4 kg   I/O during current shift:  I/O this shift:  In: 400 [I.V.:400]  Out: -     Temp (24hrs), Av.5 °C (97.7 °F), Min:35.9 °C (96.6 °F), Max:37 °C (98.6 °F)      Assessment/Plan    Will give one dose of 750mg today. The next level will be obtained TBD. May be obtained sooner if clinically indicated.   Will continue to monitor renal function daily while on vancomycin and order serum creatinine at least every 48 hours if not already ordered.  Follow for continued vancomycin needs, clinical response, and signs/symptoms of toxicity.       Shell Powell, PharmD

## 2024-06-05 NOTE — PROGRESS NOTES
Report from Sending RN:    Report From: Shama  Recent Surgery of Procedure: No  Baseline Level of Consciousness (LOC): AOx4  Oxygen Use: No  Type: NA  Diabetic: Yes  Last BP Med Given Day of Dialysis: See EMAR  Last Pain Med Given: See EMAR  Lab Tests to be Obtained with Dialysis: No  Blood Transfusion to be Given During Dialysis: No  Available IV Access: Yes  Medications to be Administered During Dialysis: No  Continuous IV Infusion Running: No  Restraints on Currently or in the Last 24 Hours: No  Hand-Off Communication: Pt stable and able to come to PACU for tx  Dialysis Catheter Dressing: NA  Last Dressing Change: NA

## 2024-06-05 NOTE — PROGRESS NOTES
06/05/24 1240   Discharge Planning   Patient expects to be discharged to: home     Pt will dc on home IV atbs. Pt will administer with home HD. Will watch for ID final recs.

## 2024-06-05 NOTE — PROGRESS NOTES
"  INFECTIOUS DISEASE DAILY PROGRESS NOTE    SUBJECTIVE:    No overnight events. Afebrile. No rash/itching/diarrhea. He has some new LLQ abd pain now and some back pain as well but he thinks back pain may just more chronic in nature. We did just have a CT A/P that did not reveal intra-abd infection.    OBJECTIVE:  VITALS (Last 24 Hours)  /70 (BP Location: Left arm, Patient Position: Sitting)   Pulse 82   Temp 35.9 °C (96.6 °F) (Temporal)   Resp 18   Ht 1.93 m (6' 4\")   Wt 68.4 kg (150 lb 12.7 oz)   SpO2 94%   BMI 18.36 kg/m²     PHYSICAL EXAM:  Gen - NAD  Abd - soft, no ttp, BS present  RUE - AVF present  Skin - no rash    ABX: IV Vanc    LABS:  Lab Results   Component Value Date    WBC 5.6 06/05/2024    HGB 7.8 (L) 06/05/2024    HCT 23.3 (L) 06/05/2024    MCV 97 06/05/2024     06/05/2024     Lab Results   Component Value Date    GLUCOSE 93 06/05/2024    CALCIUM 9.5 06/05/2024     (L) 06/05/2024    K 4.9 06/05/2024    CO2 26 06/05/2024    CL 92 (L) 06/05/2024    BUN 54 (H) 06/05/2024    CREATININE 11.32 (H) 06/05/2024     Results from last 72 hours   Lab Units 06/05/24  0638   ALBUMIN g/dL 3.7     Estimated Creatinine Clearance: 7.4 mL/min (A) (by C-G formula based on SCr of 11.32 mg/dL (H)).    ASSESSMENT/PLAN:     Bacteremia due to E. Faecalis - unclear source, CT A/P without specific infection. I reviewed the imaging personally and think GB looks fine. LFTs are normal so no signs of biliary obstruction. Does not make much urine and no UTI symptoms.  ESRD on HD RUE AVF  Unasyn Allergy - limits abx options     IV Vancomycin dosed for iHD. Repeat blood cx x2 from 6/4 are showing positive growth. Repeat blood cx in the morning again. If his abd pain or back persist/worsen will need repeat imaging potentially or maybe dedicated spine imaging.     TTE to assess for endocarditis. We may need MARION.     Monitoring for adverse effects of abx such as rash/itching/diarrhea.     Will follow. " Thanks!    Andrew Vizcarra MD  [facility]  Office #[phone]

## 2024-06-06 ENCOUNTER — ANESTHESIA EVENT (OUTPATIENT)
Dept: CARDIOLOGY | Facility: HOSPITAL | Age: 54
End: 2024-06-06
Payer: MEDICARE

## 2024-06-06 PROBLEM — I25.5 ISCHEMIC CARDIOMYOPATHY: Status: ACTIVE | Noted: 2024-05-31

## 2024-06-06 LAB
ALBUMIN SERPL BCP-MCNC: 3.8 G/DL (ref 3.4–5)
ANION GAP SERPL CALC-SCNC: 18 MMOL/L (ref 10–20)
BASOPHILS # BLD MANUAL: 0 X10*3/UL (ref 0–0.1)
BASOPHILS NFR BLD MANUAL: 0 %
BUN SERPL-MCNC: 35 MG/DL (ref 6–23)
CALCIUM SERPL-MCNC: 9.4 MG/DL (ref 8.6–10.3)
CHLORIDE SERPL-SCNC: 91 MMOL/L (ref 98–107)
CO2 SERPL-SCNC: 29 MMOL/L (ref 21–32)
CREAT SERPL-MCNC: 7.94 MG/DL (ref 0.5–1.3)
EGFRCR SERPLBLD CKD-EPI 2021: 8 ML/MIN/1.73M*2
EOSINOPHIL # BLD MANUAL: 0.06 X10*3/UL (ref 0–0.7)
EOSINOPHIL NFR BLD MANUAL: 1 %
ERYTHROCYTE [DISTWIDTH] IN BLOOD BY AUTOMATED COUNT: 14.8 % (ref 11.5–14.5)
GLUCOSE BLD MANUAL STRIP-MCNC: 225 MG/DL (ref 74–99)
GLUCOSE BLD MANUAL STRIP-MCNC: 225 MG/DL (ref 74–99)
GLUCOSE BLD MANUAL STRIP-MCNC: 237 MG/DL (ref 74–99)
GLUCOSE SERPL-MCNC: 259 MG/DL (ref 74–99)
HCT VFR BLD AUTO: 25.6 % (ref 41–52)
HGB BLD-MCNC: 8 G/DL (ref 13.5–17.5)
IMM GRANULOCYTES # BLD AUTO: 0.03 X10*3/UL (ref 0–0.7)
IMM GRANULOCYTES NFR BLD AUTO: 0.5 % (ref 0–0.9)
LYMPHOCYTES # BLD MANUAL: 1.43 X10*3/UL (ref 1.2–4.8)
LYMPHOCYTES NFR BLD MANUAL: 26 %
MAGNESIUM SERPL-MCNC: 2.2 MG/DL (ref 1.6–2.4)
MCH RBC QN AUTO: 32.7 PG (ref 26–34)
MCHC RBC AUTO-ENTMCNC: 31.3 G/DL (ref 32–36)
MCV RBC AUTO: 105 FL (ref 80–100)
MONOCYTES # BLD MANUAL: 0.72 X10*3/UL (ref 0.1–1)
MONOCYTES NFR BLD MANUAL: 13 %
NEUTS SEG # BLD MANUAL: 3.25 X10*3/UL (ref 1.2–7)
NEUTS SEG NFR BLD MANUAL: 59 %
NRBC BLD-RTO: 0 /100 WBCS (ref 0–0)
PHOSPHATE SERPL-MCNC: 5.3 MG/DL (ref 2.5–4.9)
PLATELET # BLD AUTO: 242 X10*3/UL (ref 150–450)
POTASSIUM SERPL-SCNC: 5 MMOL/L (ref 3.5–5.3)
RBC # BLD AUTO: 2.45 X10*6/UL (ref 4.5–5.9)
RBC MORPH BLD: NORMAL
SODIUM SERPL-SCNC: 133 MMOL/L (ref 136–145)
TOTAL CELLS COUNTED BLD: 100
VARIANT LYMPHS # BLD MANUAL: 0.06 X10*3/UL (ref 0–0.5)
VARIANT LYMPHS NFR BLD: 1 %
WBC # BLD AUTO: 5.5 X10*3/UL (ref 4.4–11.3)

## 2024-06-06 PROCEDURE — 99233 SBSQ HOSP IP/OBS HIGH 50: CPT | Performed by: INTERNAL MEDICINE

## 2024-06-06 PROCEDURE — 85007 BL SMEAR W/DIFF WBC COUNT: CPT | Performed by: NURSE PRACTITIONER

## 2024-06-06 PROCEDURE — 87040 BLOOD CULTURE FOR BACTERIA: CPT | Mod: AHULAB | Performed by: INTERNAL MEDICINE

## 2024-06-06 PROCEDURE — 2500000001 HC RX 250 WO HCPCS SELF ADMINISTERED DRUGS (ALT 637 FOR MEDICARE OP): Performed by: INTERNAL MEDICINE

## 2024-06-06 PROCEDURE — 80069 RENAL FUNCTION PANEL: CPT | Performed by: NURSE PRACTITIONER

## 2024-06-06 PROCEDURE — 2500000004 HC RX 250 GENERAL PHARMACY W/ HCPCS (ALT 636 FOR OP/ED): Performed by: NURSE PRACTITIONER

## 2024-06-06 PROCEDURE — 2500000001 HC RX 250 WO HCPCS SELF ADMINISTERED DRUGS (ALT 637 FOR MEDICARE OP): Performed by: NURSE PRACTITIONER

## 2024-06-06 PROCEDURE — 2500000002 HC RX 250 W HCPCS SELF ADMINISTERED DRUGS (ALT 637 FOR MEDICARE OP, ALT 636 FOR OP/ED): Mod: MUE | Performed by: INTERNAL MEDICINE

## 2024-06-06 PROCEDURE — 36415 COLL VENOUS BLD VENIPUNCTURE: CPT | Performed by: INTERNAL MEDICINE

## 2024-06-06 PROCEDURE — 99232 SBSQ HOSP IP/OBS MODERATE 35: CPT | Performed by: INTERNAL MEDICINE

## 2024-06-06 PROCEDURE — 2500000004 HC RX 250 GENERAL PHARMACY W/ HCPCS (ALT 636 FOR OP/ED): Performed by: INTERNAL MEDICINE

## 2024-06-06 PROCEDURE — 1100000001 HC PRIVATE ROOM DAILY

## 2024-06-06 PROCEDURE — 36415 COLL VENOUS BLD VENIPUNCTURE: CPT | Performed by: NURSE PRACTITIONER

## 2024-06-06 PROCEDURE — 85027 COMPLETE CBC AUTOMATED: CPT | Performed by: NURSE PRACTITIONER

## 2024-06-06 PROCEDURE — 83735 ASSAY OF MAGNESIUM: CPT | Performed by: NURSE PRACTITIONER

## 2024-06-06 PROCEDURE — 82947 ASSAY GLUCOSE BLOOD QUANT: CPT | Mod: 91

## 2024-06-06 PROCEDURE — 99223 1ST HOSP IP/OBS HIGH 75: CPT | Performed by: INTERNAL MEDICINE

## 2024-06-06 RX ORDER — DIPHENHYDRAMINE HYDROCHLORIDE 50 MG/ML
50 INJECTION INTRAMUSCULAR; INTRAVENOUS ONCE
Status: COMPLETED | OUTPATIENT
Start: 2024-06-07 | End: 2024-06-07

## 2024-06-06 RX ORDER — SODIUM CHLORIDE 9 MG/ML
50 INJECTION, SOLUTION INTRAVENOUS CONTINUOUS
Status: CANCELLED | OUTPATIENT
Start: 2024-06-06

## 2024-06-06 RX ORDER — LIDOCAINE HYDROCHLORIDE 20 MG/ML
15 SOLUTION OROPHARYNGEAL ONCE
Status: CANCELLED | OUTPATIENT
Start: 2024-06-06 | End: 2024-06-06

## 2024-06-06 RX ADMIN — POLYETHYLENE GLYCOL 3350 17 G: 17 POWDER, FOR SOLUTION ORAL at 08:51

## 2024-06-06 RX ADMIN — ACETAMINOPHEN 650 MG: 325 TABLET ORAL at 20:57

## 2024-06-06 RX ADMIN — ACETAMINOPHEN 650 MG: 325 TABLET ORAL at 00:05

## 2024-06-06 RX ADMIN — INSULIN LISPRO 4 UNITS: 100 INJECTION, SOLUTION INTRAVENOUS; SUBCUTANEOUS at 08:51

## 2024-06-06 RX ADMIN — MULTIPLE VITAMINS W/ MINERALS TAB 1 TABLET: TAB at 08:51

## 2024-06-06 RX ADMIN — ATORVASTATIN CALCIUM 80 MG: 80 TABLET, FILM COATED ORAL at 08:51

## 2024-06-06 RX ADMIN — HEPARIN SODIUM 5000 UNITS: 5000 INJECTION INTRAVENOUS; SUBCUTANEOUS at 06:03

## 2024-06-06 RX ADMIN — SITAGLIPTIN 25 MG: 25 TABLET, FILM COATED ORAL at 08:51

## 2024-06-06 RX ADMIN — HEPARIN SODIUM 5000 UNITS: 5000 INJECTION INTRAVENOUS; SUBCUTANEOUS at 00:05

## 2024-06-06 RX ADMIN — SACUBITRIL AND VALSARTAN 1 TABLET: 24; 26 TABLET, FILM COATED ORAL at 20:57

## 2024-06-06 RX ADMIN — ASPIRIN 81 MG: 81 TABLET, COATED ORAL at 08:51

## 2024-06-06 RX ADMIN — HEPARIN SODIUM 5000 UNITS: 5000 INJECTION INTRAVENOUS; SUBCUTANEOUS at 21:02

## 2024-06-06 RX ADMIN — SEVELAMER CARBONATE 800 MG: 800 TABLET, FILM COATED ORAL at 08:51

## 2024-06-06 RX ADMIN — SEVELAMER CARBONATE 800 MG: 800 TABLET, FILM COATED ORAL at 16:58

## 2024-06-06 RX ADMIN — INSULIN LISPRO 4 UNITS: 100 INJECTION, SOLUTION INTRAVENOUS; SUBCUTANEOUS at 12:39

## 2024-06-06 RX ADMIN — SEVELAMER CARBONATE 800 MG: 800 TABLET, FILM COATED ORAL at 12:39

## 2024-06-06 RX ADMIN — SACUBITRIL AND VALSARTAN 1 TABLET: 24; 26 TABLET, FILM COATED ORAL at 08:51

## 2024-06-06 RX ADMIN — INSULIN LISPRO 4 UNITS: 100 INJECTION, SOLUTION INTRAVENOUS; SUBCUTANEOUS at 16:58

## 2024-06-06 RX ADMIN — HEPARIN SODIUM 5000 UNITS: 5000 INJECTION INTRAVENOUS; SUBCUTANEOUS at 12:42

## 2024-06-06 ASSESSMENT — COGNITIVE AND FUNCTIONAL STATUS - GENERAL
DAILY ACTIVITIY SCORE: 24
MOBILITY SCORE: 24

## 2024-06-06 ASSESSMENT — PAIN SCALES - GENERAL
PAINLEVEL_OUTOF10: 0 - NO PAIN
PAINLEVEL_OUTOF10: 0 - NO PAIN
PAINLEVEL_OUTOF10: 4

## 2024-06-06 ASSESSMENT — PAIN - FUNCTIONAL ASSESSMENT: PAIN_FUNCTIONAL_ASSESSMENT: 0-10

## 2024-06-06 ASSESSMENT — PAIN DESCRIPTION - LOCATION: LOCATION: ABDOMEN

## 2024-06-06 NOTE — CARE PLAN
The patient's goals for the shift include      The clinical goals for the shift include Patient will remain free from pain this shift.      Problem: Pain  Goal: My pain/discomfort is manageable  Outcome: Progressing     Problem: Safety  Goal: Patient will be injury free during hospitalization  Outcome: Progressing  Goal: I will remain free of falls  Outcome: Progressing     Problem: Daily Care  Goal: Daily care needs are met  Outcome: Progressing     Problem: Psychosocial Needs  Goal: Demonstrates ability to cope with hospitalization/illness  Outcome: Progressing  Goal: Collaborate with me, my family, and caregiver to identify my specific goals  Outcome: Progressing     Problem: Discharge Barriers  Goal: My discharge needs are met  Outcome: Progressing

## 2024-06-06 NOTE — DOCUMENTATION CLARIFICATION NOTE
"    PATIENT:               LUNA FINCH  ACCT #:                  [id number]  MRN:                       [id number]  :                       1970  ADMIT DATE:       2024 9:45 PM  DISCH DATE:  RESPONDING PROVIDER #:        [id number]          PROVIDER RESPONSE TEXT:    Pneumonia ruled out after workup    CDI QUERY TEXT:    Clarification        Instruction:    Based on your assessment of the patient and the clinical information, please provide the requested documentation by clicking on the appropriate radio button and enter any additional information if prompted.    Question: Please further clarify the abnormal findings on the 6/3/24 CT of abd/pelvis as    When answering this query, please exercise your independent professional judgment. The fact that a question is being asked, does not imply that any particular answer is desired or expected.    The patient's clinical indicators include:  Clinical Information: Patient admitted with Enterococcus faecalis bacteremia of unknown etiology    Clinical Indicators:  -ED note-HPI and focused physical exam: \"male that presents for hypoxia.  He was 86% he had a fever at 37.7 in triage.  In 2024 he had a CT of his chest which was concerning for groundglass opacity throughout and infiltrate.  Patient does not remember receiving antibiotics but his girlfriend is bedside indicated he was started on antibiotics.  I was concerned that he may have a continuation of his pneumonia or he could have a PE.  A VTE D-dimer was ordered.\"    -24 CXR: \" Enlarged cardiac silhouette with vascular congestion. No consolidation seen\"    -6/3/24 CT of abdomen/pelvis: \"Left lower lobe infiltrate consistent with pneumonia.\"      Treatment: IV Vanco    Risk Factors: recent pneumonia and unclear if compliant with abx, hypoxia, positive blood cx., ESRD  Options provided:  -- Pneumonia ruled out after workup  -- Pneumonia ruled in for this admission  -- Other - I will add my own " diagnosis  -- Refer to Clinical Documentation Reviewer    Query created by: Xiao French on 6/5/2024 5:38 PM      Electronically signed by:  LIZZ KNOTT MD 6/6/2024 12:22 AM

## 2024-06-06 NOTE — PROGRESS NOTES
Nephrology Consult Progress Note    Admit Date: 5/31/2024    Interval history:  Occasional back pain    CURRENT MEDICATIONS:    Current Facility-Administered Medications:     acetaminophen (Tylenol) tablet 650 mg, 650 mg, oral, q6h PRN, Fletcher Whitley DO, 650 mg at 06/06/24 0005    aspirin EC tablet 81 mg, 81 mg, oral, Daily, Osmar Callaway MD, 81 mg at 06/06/24 0851    atorvastatin (Lipitor) tablet 80 mg, 80 mg, oral, Daily, Osmar Callaway MD, 80 mg at 06/06/24 0851    carvedilol (Coreg) tablet 25 mg, 25 mg, oral, BID, KATHARINE Skinner, 25 mg at 06/05/24 1720    dextrose 50 % injection 25 g, 25 g, intravenous, q15 min PRN, Osmar Callaway MD    epoetin annita-epbx (Retacrit) injection 10,000 Units, 150 Units/kg, subcutaneous, Every Mon/Wed/Fri, Shell Powell, PharmD, 10,000 Units at 06/05/24 1721    glucagon (Glucagen) injection 1 mg, 1 mg, intramuscular, q15 min PRN, Osmar Clalaway MD    glucagon (Glucagen) injection 1 mg, 1 mg, intramuscular, q15 min PRN, Osmar Callaway MD    heparin (porcine) injection 5,000 Units, 5,000 Units, subcutaneous, q8h [facility], SOTERO Skinner-CNP, 5,000 Units at 06/06/24 0603    [Held by provider] insulin glargine (Lantus) injection 10 Units, 10 Units, subcutaneous, q12h, SOTERO Oliveira-CNP, 10 Units at 06/04/24 2143    insulin lispro (HumaLOG) injection 0-10 Units, 0-10 Units, subcutaneous, TID, Osmar Callaway MD, 4 Units at 06/06/24 0851    multivitamin with minerals 1 tablet, 1 tablet, oral, Daily, Osmar Callaway MD, 1 tablet at 06/06/24 0851    polyethylene glycol (Glycolax, Miralax) packet 17 g, 17 g, oral, Daily, Osmar Callaway MD, 17 g at 06/06/24 0851    sacubitriL-valsartan (Entresto) 24-26 mg per tablet 1 tablet, 1 tablet, oral, BID, SOTERO Skinner-CNP, 1 tablet at 06/06/24 0851    sevelamer carbonate (Renvela) tablet 800 mg, 800 mg, oral, TID, Osmar Callaway MD, 800 mg at 06/06/24 0851    SITagliptin phosphate (Januvia)  "tablet 25 mg, 25 mg, oral, Daily, Osmar Callaway MD, 25 mg at 06/06/24 0851    vancomycin (Vancocin) pharmacy to dose - pharmacy monitoring, , miscellaneous, Daily PRN, Osmar Callaway MD       Intake/Output Summary (Last 24 hours) at 6/6/2024 1039  Last data filed at 6/6/2024 0915  Gross per 24 hour   Intake 1040 ml   Output 2400 ml   Net -1360 ml       PHYSICAL EXAM:  /67 (BP Location: Left arm, Patient Position: Lying)   Pulse 79   Temp 36.2 °C (97.2 °F) (Temporal)   Resp 16   Ht 1.93 m (6' 4\")   Wt 68 kg (150 lb)   SpO2 96%   BMI 18.26 kg/m²     Intake/Output Summary (Last 24 hours) at 6/6/2024 1039  Last data filed at 6/6/2024 0915  Gross per 24 hour   Intake 1040 ml   Output 2400 ml   Net -1360 ml     Gen: AAO, NAD  Neck: No JVD  Cardiac: RRR  Resp: clear BS  Abd: Soft, non tender, +BS, non distended   Ext: No edema   Access: RUE AVF  Neuro: moves 4 ext  Peripheral Pulses: Capillary refill <2secs, strong peripheral pulses.  Skin: Skin color, texture, turgor normal, no suspicious rashes or lesions.    Labs:  Results for orders placed or performed during the hospital encounter of 05/31/24 (from the past 24 hour(s))   POCT GLUCOSE   Result Value Ref Range    POCT Glucose 73 (L) 74 - 99 mg/dL   POCT GLUCOSE   Result Value Ref Range    POCT Glucose 61 (L) 74 - 99 mg/dL   Transthoracic Echo (TTE) Complete   Result Value Ref Range    AV pk polina 3.05 m/s    AV mn grad 19.7 mmHg    LVOT diam 2.01 cm    LV Biplane EF 39 %    MV avg E/e' ratio 8.06     LA vol index A/L 66.0 ml/m2    Tricuspid annular plane systolic excursion 1.2 cm    RV free wall pk S' 9.00 cm/s    LV GLS 6.3 %    LVIDd 5.73 cm    RVSP 44.5 mmHg    Aortic Valve Area by Continuity of VTI 1.34 cm2    Aortic Valve Area by Continuity of Peak Velocity 1.25 cm2    AV pk grad 37.2 mmHg    LV A4C EF 29.0    POCT GLUCOSE   Result Value Ref Range    POCT Glucose 128 (H) 74 - 99 mg/dL   POCT GLUCOSE   Result Value Ref Range    POCT Glucose 251 (H) " 74 - 99 mg/dL   CBC and Auto Differential   Result Value Ref Range    WBC 5.5 4.4 - 11.3 x10*3/uL    nRBC 0.0 0.0 - 0.0 /100 WBCs    RBC 2.45 (L) 4.50 - 5.90 x10*6/uL    Hemoglobin 8.0 (L) 13.5 - 17.5 g/dL    Hematocrit 25.6 (L) 41.0 - 52.0 %     (H) 80 - 100 fL    MCH 32.7 26.0 - 34.0 pg    MCHC 31.3 (L) 32.0 - 36.0 g/dL    RDW 14.8 (H) 11.5 - 14.5 %    Platelets 242 150 - 450 x10*3/uL    Immature Granulocytes %, Automated 0.5 0.0 - 0.9 %    Immature Granulocytes Absolute, Automated 0.03 0.00 - 0.70 x10*3/uL   Magnesium   Result Value Ref Range    Magnesium 2.20 1.60 - 2.40 mg/dL   Renal Function Panel   Result Value Ref Range    Glucose 259 (H) 74 - 99 mg/dL    Sodium 133 (L) 136 - 145 mmol/L    Potassium 5.0 3.5 - 5.3 mmol/L    Chloride 91 (L) 98 - 107 mmol/L    Bicarbonate 29 21 - 32 mmol/L    Anion Gap 18 10 - 20 mmol/L    Urea Nitrogen 35 (H) 6 - 23 mg/dL    Creatinine 7.94 (H) 0.50 - 1.30 mg/dL    eGFR 8 (L) >60 mL/min/1.73m*2    Calcium 9.4 8.6 - 10.3 mg/dL    Phosphorus 5.3 (H) 2.5 - 4.9 mg/dL    Albumin 3.8 3.4 - 5.0 g/dL   Manual Differential   Result Value Ref Range    Neutrophils %, Manual 59.0 40.0 - 80.0 %    Lymphocytes %, Manual 26.0 13.0 - 44.0 %    Monocytes %, Manual 13.0 2.0 - 10.0 %    Eosinophils %, Manual 1.0 0.0 - 6.0 %    Basophils %, Manual 0.0 0.0 - 2.0 %    Atypical Lymphocytes %, Manual 1.0 0.0 - 2.0 %    Seg Neutrophils Absolute, Manual 3.25 1.20 - 7.00 x10*3/uL    Lymphocytes Absolute, Manual 1.43 1.20 - 4.80 x10*3/uL    Monocytes Absolute, Manual 0.72 0.10 - 1.00 x10*3/uL    Eosinophils Absolute, Manual 0.06 0.00 - 0.70 x10*3/uL    Basophils Absolute, Manual 0.00 0.00 - 0.10 x10*3/uL    Atypical Lymphs Absolute, Manual 0.06 0.00 - 0.50 x10*3/uL    Total Cells Counted 100     RBC Morphology No significant RBC morphology present    POCT GLUCOSE   Result Value Ref Range    POCT Glucose 237 (H) 74 - 99 mg/dL        DATA:   Diagnostic tests reviewed for today's visit:    New labs  and imaging     Assessment and Plan:  Pt came with Generalized fatigue and fever, found to have Enterococcus faecalis and gram-positive cocci bacteremia, negative TTE for endocarditis  - ESKD on HD: euvolemic  Had HD yesterday, next tomorrow  Using RUE AVF, not looking infected   When ready to discharge will help to have vancomycin with dialysis, still with +blood cultures   HTN: controlled  Lytes and acid base; acceptable  Hb 8 on epogen    Will continue to follow.     Signature: Olivier Delgado MD

## 2024-06-06 NOTE — PROGRESS NOTES
06/06/24 6733   Discharge Planning   Patient expects to be discharged to: home     TCC messaged ID and renal. Plan is for IV atbs with HD at home. Will watch for ID final atb recs.

## 2024-06-06 NOTE — CARE PLAN
The patient's goals for the shift include      The clinical goals for the shift include Patient will remain free from pain this shift.

## 2024-06-06 NOTE — PROGRESS NOTES
"  INFECTIOUS DISEASE DAILY PROGRESS NOTE    SUBJECTIVE:    No overnight events. No new complaints. Afebrile. No rash/itching. Abd pain seems better. I discussed TTE results and need for MARION which he is OK with.    OBJECTIVE:  VITALS (Last 24 Hours)  /67 (BP Location: Left arm, Patient Position: Lying)   Pulse 79   Temp 36.2 °C (97.2 °F) (Temporal)   Resp 16   Ht 1.93 m (6' 4\")   Wt 68 kg (150 lb)   SpO2 96%   BMI 18.26 kg/m²     PHYSICAL EXAM:  Gen - NAD  Abd - soft, no ttp, BS present  RUE - AVF present  Skin - no rash    ABX: IV Vanc    LABS:  Lab Results   Component Value Date    WBC 5.5 06/06/2024    HGB 8.0 (L) 06/06/2024    HCT 25.6 (L) 06/06/2024     (H) 06/06/2024     06/06/2024     Lab Results   Component Value Date    GLUCOSE 259 (H) 06/06/2024    CALCIUM 9.4 06/06/2024     (L) 06/06/2024    K 5.0 06/06/2024    CO2 29 06/06/2024    CL 91 (L) 06/06/2024    BUN 35 (H) 06/06/2024    CREATININE 7.94 (H) 06/06/2024     Results from last 72 hours   Lab Units 06/06/24  0543   ALBUMIN g/dL 3.8     Estimated Creatinine Clearance: 10.5 mL/min (A) (by C-G formula based on SCr of 7.94 mg/dL (H)).    IMAGING:  TTE 6/5  CONCLUSIONS:   1. Left ventricular systolic function is severely decreased with a 20% estimated ejection fraction.   2. No definite valvular vegetations were visualized.   3. Spectral Doppler shows an abnormal pattern of left ventricular diastolic filling.   4. There is eccentric left ventricular hypertrophy.   5. Moderately enlarged right ventricle.   6. There is reduced right ventricular systolic function.   7. The left atrium is severely dilated.   8. Absent A-wave on MV spectral Doppler tracing, consistent with atrial fibrillation.   9. Mild to moderate mitral valve regurgitation.  10. Mildly elevated RVSP.  11. Moderate aortic valve stenosis.  12. There is moderate aortic valve cusp calcification.  13. Mild aortic valve regurgitation.  14. There is global hypokinesis " of the left ventricle with minor regional variations.    ASSESSMENT/PLAN:     Bacteremia due to E. Faecalis - unclear source, CT A/P without specific infection. I reviewed the imaging personally and think GB looks fine. LFTs are normal so no signs of biliary obstruction. Does not make much urine and no UTI symptoms.  ESRD on HD RUE AVF  Unasyn Allergy - limits abx options  Systolic Heart Failure - EF down to 20% now, hx CAD     IV Vancomycin dosed for iHD. Repeat blood cx x2 today given still positive growth from 6/4.    TTE negative for endocarditis - newly depressed EF and cardiology evaluating. I will order MARION for tomorrow and NPO after midnight.    Monitoring for adverse effects of abx such as rash/itching/diarrhea - some loose stool but no frequent/watery diarrhea.     Will follow. Thanks!    Andrew Vizcarra MD  [facility]  Office #[phone]

## 2024-06-06 NOTE — PROGRESS NOTES
PROGRESS NOTE - INTERNAL MEDICINE     PATIENT NAME:  Kentrell Carreon    MRN:  [id number]  SERVICE DATE:  6/6/2024   SERVICE TIME:  12:37 PM     ADMITTING PHYSICIAN:  Osmar Callaway MD    ASSESSMENT & PLAN:  Principal Problem:  Generalized fatigue and fever  Enterococcus faecalis and gram-positive cocci bacteremia.  Positive blood cultures for Enterococcus faecalis and gram-positive cocci     Seen by infectious disease, continue with vancomycin.  And repeat blood cultures from June 4, 2024 1 out of 2 is positive for gram-positive cocci in pairs and chains.  TTE pending to rule out endocarditis, consider doing MARION if necessary .  Other active medical problems  End-stage renal disease on home hemodialysis, nephrology consultation requested  Patient has AV fistula right upper arm with good bruit and thrill.  Diabetes mellitus insulin-dependent, hemoglobin A1c 7.5, continue with Lantus insulin 10 units twice a day and sliding scale 4 times daily as needed  History of SLE  Hypertension stable     Chronic systolic and diastolic heart failure  Echocardiogram revealed LV ejection fraction of 20%, no vegetation seen on transthoracic echocardiogram.  Patient seen by cardiology and will have a MARION tomorrow morning to rule out endocarditis as well as has been scheduled to have left heart catheterization because of significantly reduced LV ejection fraction.  Continue sacubitril and other medications including Coreg.  Previous coronary artery bypass surgery     Reviewed all labs and medications and all imaging studies and discussed the plan of treatment with patient in detail.  Nephrology consultation requested.  Total time spent in examination counseling and coordinating care for this patient was greater than 35 minutes.                 SUBJECTIVE  CHIEF COMPLAINT:           Chief Complaint   Patient presents with    Shortness of Breath    Patient seen and examined, he is coming along fine, remains afebrile for past 48 hours,  denies any chest pain or shortness of breath.  His blood cultures are positive for Enterococcus faecalis Patient to continue with IV vancomycin.      Repeat Blood cultures from 6/4/24, 1 out of 2 is positive for gram-positive cocci.  Transthoracic echocardiogram reviewed, cardiology has been consulted,   ID on board        INTERVAL HISTORY OF PRESENT ILLNESS: Patient seen , he is coming along fine has been afebrile . Breathing normally, no chest pain.  antibiotics continued with vancomycin. Transthoracic echocardiogram reveals significant decrease in LV ejection fraction to 20% and therefore seen by cardiology and patient is scheduled to have left heart catheterization, also scheduled for transesophageal echocardiogram to rule out endocarditis because of positive blood cultures.  Repeat blood culture  from 6/4/24 1 out of 2 is positive for gram-positive positive cocci.             MEDICATIONS:   Current Facility-Administered Medications:     acetaminophen (Tylenol) tablet 650 mg, 650 mg, oral, q6h PRN, Fletcher Whitley DO, 650 mg at 06/06/24 0005    aspirin EC tablet 81 mg, 81 mg, oral, Daily, Osmar Callaway MD, 81 mg at 06/06/24 0851    atorvastatin (Lipitor) tablet 80 mg, 80 mg, oral, Daily, Osmar Callaway MD, 80 mg at 06/06/24 0851    carvedilol (Coreg) tablet 25 mg, 25 mg, oral, BID, KATHARINE Skinner, 25 mg at 06/05/24 1720    dextrose 50 % injection 25 g, 25 g, intravenous, q15 min PRN, Osmar Callaway MD    epoetin annita-epbx (Retacrit) injection 10,000 Units, 150 Units/kg, subcutaneous, Every Mon/Wed/Fri, Annette GaleasD, 10,000 Units at 06/05/24 1721    glucagon (Glucagen) injection 1 mg, 1 mg, intramuscular, q15 min PRN, Osmar Callaway MD    glucagon (Glucagen) injection 1 mg, 1 mg, intramuscular, q15 min PRN, Osmar Callaway MD    heparin (porcine) injection 5,000 Units, 5,000 Units, subcutaneous, q8h [facility], KATHARINE Skinner, 5,000 Units at 06/06/24 0603    [Held by  "provider] insulin glargine (Lantus) injection 10 Units, 10 Units, subcutaneous, q12h, Jasmyn Lord, APRN-CNP, 10 Units at 06/04/24 2143    insulin lispro (HumaLOG) injection 0-10 Units, 0-10 Units, subcutaneous, TID, Osmar Callaway MD, 4 Units at 06/06/24 0851    multivitamin with minerals 1 tablet, 1 tablet, oral, Daily, Osmar Callaway MD, 1 tablet at 06/06/24 0851    polyethylene glycol (Glycolax, Miralax) packet 17 g, 17 g, oral, Daily, Osmar Callaway MD, 17 g at 06/06/24 0851    sacubitriL-valsartan (Entresto) 24-26 mg per tablet 1 tablet, 1 tablet, oral, BID, Yana Jaeger, APRN-CNP, 1 tablet at 06/06/24 0851    sevelamer carbonate (Renvela) tablet 800 mg, 800 mg, oral, TID, Osmar Callaway MD, 800 mg at 06/06/24 0851    SITagliptin phosphate (Januvia) tablet 25 mg, 25 mg, oral, Daily, Osmar Callaway MD, 25 mg at 06/06/24 0851    vancomycin (Vancocin) pharmacy to dose - pharmacy monitoring, , miscellaneous, Daily PRN, Osmar Callaway MD        OBJECTIVE  Visit Vitals  /63 (BP Location: Left arm, Patient Position: Lying)   Pulse 81   Temp 36.5 °C (97.7 °F) (Temporal)   Resp 16   Ht 1.93 m (6' 4\")   Wt 68 kg (150 lb)   SpO2 96%   BMI 18.26 kg/m²   Smoking Status Former   BSA 1.91 m²      PHYSICAL EXAM:   GENERAL:  Alert, no distress, cooperative  SKIN:  Skin color, texture, turgor normal. No rashes or lesions.  OROPHARYNX:  Lips, mucosa, and tongue are normal.Teeth and gums, normal. Oropharynx normal.  NECK:  No jugulovenous distention, No carotid bruits, Carotid pulse normal contour, Supple  LUNGS:  Lungs clear to auscultation. Good diaphragmatic excursion.  CARDIAC: Rate and rhythm is regular, normal S1 and S2; no rubs,  or gallops, 3/6 systolic ejection murmur left sternal border radiating to neck and apical area consistent and moderate to severe aortic stenosis, mitral holosystolic murmur and tricuspid regurgitation murmur, no overt CHF.  ABDOMEN:  Abdomen soft, non-tender, BS " normal, No masses or organomegaly  EXTREMETIES:  Extremities normal, no deformities, edema, clubbing or skin discoloration. Good capillary refill., No ulcers  AV fistula right upper arm with good bruit and thrill.  NEURO:  Alert, oriented X 3, Gait normal. Non-focal. Reflexes normal and symmetric. Sensation grossly intact., Cranial nerves II-XII intact  PULSES:  2+ radial, 2+ carotid             DATA:   Diagnostic tests reviewed for today's visit:    Results for orders placed or performed during the hospital encounter of 05/31/24 (from the past 96 hour(s))   POCT GLUCOSE   Result Value Ref Range    POCT Glucose 116 (H) 74 - 99 mg/dL   POCT GLUCOSE   Result Value Ref Range    POCT Glucose 164 (H) 74 - 99 mg/dL   POCT GLUCOSE   Result Value Ref Range    POCT Glucose 136 (H) 74 - 99 mg/dL   POCT GLUCOSE   Result Value Ref Range    POCT Glucose 121 (H) 74 - 99 mg/dL   POCT GLUCOSE   Result Value Ref Range    POCT Glucose 159 (H) 74 - 99 mg/dL   POCT GLUCOSE   Result Value Ref Range    POCT Glucose 245 (H) 74 - 99 mg/dL   Urinalysis with Reflex Microscopic   Result Value Ref Range    Color, Urine Light-Yellow Light-Yellow, Yellow, Dark-Yellow    Appearance, Urine Clear Clear    Specific Gravity, Urine 1.010 1.005 - 1.035    pH, Urine 8.5 (N) 5.0, 5.5, 6.0, 6.5, 7.0, 7.5, 8.0    Protein, Urine 70 (1+) (A) NEGATIVE, 10 (TRACE), 20 (TRACE) mg/dL    Glucose, Urine 500 (3+) (A) Normal mg/dL    Blood, Urine NEGATIVE NEGATIVE    Ketones, Urine NEGATIVE NEGATIVE mg/dL    Bilirubin, Urine NEGATIVE NEGATIVE    Urobilinogen, Urine Normal Normal mg/dL    Nitrite, Urine NEGATIVE NEGATIVE    Leukocyte Esterase, Urine NEGATIVE NEGATIVE   Microscopic Only, Urine   Result Value Ref Range    WBC, Urine 1-5 1-5, NONE /HPF    RBC, Urine NONE NONE, 1-2, 3-5 /HPF   CBC and Auto Differential   Result Value Ref Range    WBC 5.0 4.4 - 11.3 x10*3/uL    nRBC 0.0 0.0 - 0.0 /100 WBCs    RBC 2.30 (L) 4.50 - 5.90 x10*6/uL    Hemoglobin 7.5 (L) 13.5 -  17.5 g/dL    Hematocrit 23.3 (L) 41.0 - 52.0 %     (H) 80 - 100 fL    MCH 32.6 26.0 - 34.0 pg    MCHC 32.2 32.0 - 36.0 g/dL    RDW 14.6 (H) 11.5 - 14.5 %    Platelets 198 150 - 450 x10*3/uL    Neutrophils % 68.4 40.0 - 80.0 %    Immature Granulocytes %, Automated 0.4 0.0 - 0.9 %    Lymphocytes % 10.9 13.0 - 44.0 %    Monocytes % 18.7 2.0 - 10.0 %    Eosinophils % 1.2 0.0 - 6.0 %    Basophils % 0.4 0.0 - 2.0 %    Neutrophils Absolute 3.45 1.20 - 7.70 x10*3/uL    Immature Granulocytes Absolute, Automated 0.02 0.00 - 0.70 x10*3/uL    Lymphocytes Absolute 0.55 (L) 1.20 - 4.80 x10*3/uL    Monocytes Absolute 0.94 0.10 - 1.00 x10*3/uL    Eosinophils Absolute 0.06 0.00 - 0.70 x10*3/uL    Basophils Absolute 0.02 0.00 - 0.10 x10*3/uL   Basic Metabolic Panel   Result Value Ref Range    Glucose 235 (H) 74 - 99 mg/dL    Sodium 133 (L) 136 - 145 mmol/L    Potassium 4.5 3.5 - 5.3 mmol/L    Chloride 93 (L) 98 - 107 mmol/L    Bicarbonate 27 21 - 32 mmol/L    Anion Gap 18 10 - 20 mmol/L    Urea Nitrogen 41 (H) 6 - 23 mg/dL    Creatinine 9.30 (H) 0.50 - 1.30 mg/dL    eGFR 6 (L) >60 mL/min/1.73m*2    Calcium 9.4 8.6 - 10.3 mg/dL   Blood Culture    Specimen: Peripheral Venipuncture; Blood culture   Result Value Ref Range    Blood Culture       Identification and susceptibility testing to follow    Gram Stain Gram positive cocci, pairs and chains (AA)    Blood Culture    Specimen: Peripheral Venipuncture; Blood culture   Result Value Ref Range    Blood Culture No growth at 1 day    Magnesium   Result Value Ref Range    Magnesium 2.10 1.60 - 2.40 mg/dL   C-Reactive Protein   Result Value Ref Range    C-Reactive Protein 8.83 (H) <1.00 mg/dL   Sedimentation Rate   Result Value Ref Range    Sedimentation Rate 25 (H) 0 - 20 mm/h   SST TOP   Result Value Ref Range    Extra Tube Hold for add-ons.    POCT GLUCOSE   Result Value Ref Range    POCT Glucose 215 (H) 74 - 99 mg/dL   POCT GLUCOSE   Result Value Ref Range    POCT Glucose 85 74 -  99 mg/dL   POCT GLUCOSE   Result Value Ref Range    POCT Glucose 89 74 - 99 mg/dL   POCT GLUCOSE   Result Value Ref Range    POCT Glucose 132 (H) 74 - 99 mg/dL   CBC and Auto Differential   Result Value Ref Range    WBC 5.6 4.4 - 11.3 x10*3/uL    nRBC 0.0 0.0 - 0.0 /100 WBCs    RBC 2.40 (L) 4.50 - 5.90 x10*6/uL    Hemoglobin 7.8 (L) 13.5 - 17.5 g/dL    Hematocrit 23.3 (L) 41.0 - 52.0 %    MCV 97 80 - 100 fL    MCH 32.5 26.0 - 34.0 pg    MCHC 33.5 32.0 - 36.0 g/dL    RDW 14.6 (H) 11.5 - 14.5 %    Platelets 217 150 - 450 x10*3/uL    Neutrophils % 64.6 40.0 - 80.0 %    Immature Granulocytes %, Automated 0.2 0.0 - 0.9 %    Lymphocytes % 15.7 13.0 - 44.0 %    Monocytes % 17.2 2.0 - 10.0 %    Eosinophils % 2.1 0.0 - 6.0 %    Basophils % 0.2 0.0 - 2.0 %    Neutrophils Absolute 3.61 1.20 - 7.70 x10*3/uL    Immature Granulocytes Absolute, Automated 0.01 0.00 - 0.70 x10*3/uL    Lymphocytes Absolute 0.88 (L) 1.20 - 4.80 x10*3/uL    Monocytes Absolute 0.96 0.10 - 1.00 x10*3/uL    Eosinophils Absolute 0.12 0.00 - 0.70 x10*3/uL    Basophils Absolute 0.01 0.00 - 0.10 x10*3/uL   Magnesium   Result Value Ref Range    Magnesium 2.40 1.60 - 2.40 mg/dL   Vancomycin   Result Value Ref Range    Vancomycin 14.2 5.0 - 20.0 ug/mL   Renal Function Panel   Result Value Ref Range    Glucose 93 74 - 99 mg/dL    Sodium 134 (L) 136 - 145 mmol/L    Potassium 4.9 3.5 - 5.3 mmol/L    Chloride 92 (L) 98 - 107 mmol/L    Bicarbonate 26 21 - 32 mmol/L    Anion Gap 21 (H) 10 - 20 mmol/L    Urea Nitrogen 54 (H) 6 - 23 mg/dL    Creatinine 11.32 (H) 0.50 - 1.30 mg/dL    eGFR 5 (L) >60 mL/min/1.73m*2    Calcium 9.5 8.6 - 10.3 mg/dL    Phosphorus 4.8 2.5 - 4.9 mg/dL    Albumin 3.7 3.4 - 5.0 g/dL   Procalcitonin   Result Value Ref Range    Procalcitonin 5.19 (H) <=0.07 ng/mL   POCT GLUCOSE   Result Value Ref Range    POCT Glucose 83 74 - 99 mg/dL   POCT GLUCOSE   Result Value Ref Range    POCT Glucose 73 (L) 74 - 99 mg/dL   POCT GLUCOSE   Result Value Ref  Range    POCT Glucose 61 (L) 74 - 99 mg/dL   Transthoracic Echo (TTE) Complete   Result Value Ref Range    AV pk polina 3.05 m/s    AV mn grad 19.7 mmHg    LVOT diam 2.01 cm    LV Biplane EF 39 %    MV avg E/e' ratio 8.06     LA vol index A/L 66.0 ml/m2    Tricuspid annular plane systolic excursion 1.2 cm    RV free wall pk S' 9.00 cm/s    LV GLS 6.3 %    LVIDd 5.73 cm    RVSP 44.5 mmHg    Aortic Valve Area by Continuity of VTI 1.34 cm2    Aortic Valve Area by Continuity of Peak Velocity 1.25 cm2    AV pk grad 37.2 mmHg    LV A4C EF 29.0    POCT GLUCOSE   Result Value Ref Range    POCT Glucose 128 (H) 74 - 99 mg/dL   POCT GLUCOSE   Result Value Ref Range    POCT Glucose 251 (H) 74 - 99 mg/dL   CBC and Auto Differential   Result Value Ref Range    WBC 5.5 4.4 - 11.3 x10*3/uL    nRBC 0.0 0.0 - 0.0 /100 WBCs    RBC 2.45 (L) 4.50 - 5.90 x10*6/uL    Hemoglobin 8.0 (L) 13.5 - 17.5 g/dL    Hematocrit 25.6 (L) 41.0 - 52.0 %     (H) 80 - 100 fL    MCH 32.7 26.0 - 34.0 pg    MCHC 31.3 (L) 32.0 - 36.0 g/dL    RDW 14.8 (H) 11.5 - 14.5 %    Platelets 242 150 - 450 x10*3/uL    Immature Granulocytes %, Automated 0.5 0.0 - 0.9 %    Immature Granulocytes Absolute, Automated 0.03 0.00 - 0.70 x10*3/uL   Magnesium   Result Value Ref Range    Magnesium 2.20 1.60 - 2.40 mg/dL   Renal Function Panel   Result Value Ref Range    Glucose 259 (H) 74 - 99 mg/dL    Sodium 133 (L) 136 - 145 mmol/L    Potassium 5.0 3.5 - 5.3 mmol/L    Chloride 91 (L) 98 - 107 mmol/L    Bicarbonate 29 21 - 32 mmol/L    Anion Gap 18 10 - 20 mmol/L    Urea Nitrogen 35 (H) 6 - 23 mg/dL    Creatinine 7.94 (H) 0.50 - 1.30 mg/dL    eGFR 8 (L) >60 mL/min/1.73m*2    Calcium 9.4 8.6 - 10.3 mg/dL    Phosphorus 5.3 (H) 2.5 - 4.9 mg/dL    Albumin 3.8 3.4 - 5.0 g/dL   Manual Differential   Result Value Ref Range    Neutrophils %, Manual 59.0 40.0 - 80.0 %    Lymphocytes %, Manual 26.0 13.0 - 44.0 %    Monocytes %, Manual 13.0 2.0 - 10.0 %    Eosinophils %, Manual 1.0 0.0 -  6.0 %    Basophils %, Manual 0.0 0.0 - 2.0 %    Atypical Lymphocytes %, Manual 1.0 0.0 - 2.0 %    Seg Neutrophils Absolute, Manual 3.25 1.20 - 7.00 x10*3/uL    Lymphocytes Absolute, Manual 1.43 1.20 - 4.80 x10*3/uL    Monocytes Absolute, Manual 0.72 0.10 - 1.00 x10*3/uL    Eosinophils Absolute, Manual 0.06 0.00 - 0.70 x10*3/uL    Basophils Absolute, Manual 0.00 0.00 - 0.10 x10*3/uL    Atypical Lymphs Absolute, Manual 0.06 0.00 - 0.50 x10*3/uL    Total Cells Counted 100     RBC Morphology No significant RBC morphology present    POCT GLUCOSE   Result Value Ref Range    POCT Glucose 237 (H) 74 - 99 mg/dL   POCT GLUCOSE   Result Value Ref Range    POCT Glucose 225 (H) 74 - 99 mg/dL      Transthoracic Echo (TTE) Complete    Result Date: 6/5/2024   [facility], [address] [place] [address]              Tel [phone] and Fax [phone] TRANSTHORACIC ECHOCARDIOGRAM REPORT  Patient Name:      LUNA Lowe Physician:    [id number] Ignacio Ibrahim MD Study Date:        6/5/2024             Ordering Provider:    [id number] LIZZ KNOTT MRN/PID:           [id number]             Fellow: Accession#:        [id number]         Nurse: Date of Birth/Age: 1970 / 53 years Sonographer:          Shama Montes [facility] Gender:            M                    Additional Staff: Height:            193.00 cm            Admit Date:           5/31/2024 Weight:            68.00 kg             Admission Status:     Inpatient -                                                               Routine BSA / BMI:         1.96 m2 / 18.26      Encounter#:           [id number]                    kg/m2                                         Department Location:  [facility]                                                               Invasive Blood Pressure: 141 /67 mmHg Study Type:     TRANSTHORACIC ECHO (TTE) COMPLETE Diagnosis/ICD: Bacteremia-R78.81 Indication:    Bacteremia CPT Code:      Echo Complete w Full [id number] Patient History: Diabetes:          Yes Pertinent History: CAD, Chest Pain, HTN and CHF. ESRD, Hypoxia. Study Detail: The following Echo studies were performed: 2D, M-Mode, Doppler and               color flow.  PHYSICIAN INTERPRETATION: Left Ventricle: The left ventricular systolic function is severely decreased, with an estimated ejection fraction of 20%. There is global hypokinesis of the left ventricle with minor regional variations. The left ventricular cavity size is upper limits of normal. There is eccentric left ventricular hypertrophy. Left Ventricular Global Longitudinal Strain - 6.3 %. Spectral Doppler shows an abnormal pattern of left ventricular diastolic filling. Left Atrium: The left atrium is severely dilated. Right Ventricle: The right ventricle is moderately enlarged. There is reduced right ventricular systolic function. Right Atrium: The right atrium is normal in size. Aortic Valve: The aortic valve is trileaflet. There is moderate aortic valve cusp calcification. There is reduced aortic valve non coronary cusp excursion. There is evidence of moderate aortic valve stenosis. There is mild aortic valve regurgitation. The peak instantaneous gradient of the aortic valve is 37.2 mmHg. The mean gradient of the aortic valve is 19.7 mmHg. Mitral Valve: The mitral valve is normal in structure. The mitral valve spectral Doppler A-wave is absent, consistent with atrial fibrillation. There is mild to moderate mitral annular calcification. There is mild to moderate mitral valve regurgitation. Tricuspid Valve: The tricuspid valve is structurally normal. There is mild tricuspid regurgitation. The Doppler estimated RVSP is mildly elevated at 44.5 mmHg. Pulmonic Valve: The pulmonic valve is structurally normal. There is mild pulmonic valve regurgitation. Pericardium: There  is a trivial pericardial effusion. Aorta: The aortic root is normal. Systemic Veins: The inferior vena cava appears dilated. There is IVC inspiratory collapse greater than 50%. In comparison to the previous echocardiogram(s): Compared with study from 3/6/2024, the ejection fraction is comparatively reduced but was somewhat overestimated previously. There is a greater degree of aortic stenosis presently but this was underestimated previously.  CONCLUSIONS:  1. Left ventricular systolic function is severely decreased with a 20% estimated ejection fraction.  2. No definite valvular vegetations were visualized.  3. Spectral Doppler shows an abnormal pattern of left ventricular diastolic filling.  4. There is eccentric left ventricular hypertrophy.  5. Moderately enlarged right ventricle.  6. There is reduced right ventricular systolic function.  7. The left atrium is severely dilated.  8. Absent A-wave on MV spectral Doppler tracing, consistent with atrial fibrillation.  9. Mild to moderate mitral valve regurgitation. 10. Mildly elevated RVSP. 11. Moderate aortic valve stenosis. 12. There is moderate aortic valve cusp calcification. 13. Mild aortic valve regurgitation. 14. There is global hypokinesis of the left ventricle with minor regional variations. QUANTITATIVE DATA SUMMARY: 2D MEASUREMENTS:                           Normal Ranges: LAs:           4.98 cm    (2.7-4.0cm) IVSd:          1.08 cm    (0.6-1.1cm) LVPWd:         1.06 cm    (0.6-1.1cm) LVIDd:         5.73 cm    (3.9-5.9cm) LVIDs:         4.81 cm LV Mass Index: 127.2 g/m2 LV % FS        16.1 % LA VOLUME:                               Normal Ranges: LA Vol A4C:        115.9 ml   (22+/-6mL/m2) LA Vol A2C:        131.8 ml LA Vol BP:         129.3 ml LA Vol Index A4C:  59.2ml/m2 LA Vol Index A2C:  67.2 ml/m2 LA Vol Index BP:   66.0 ml/m2 LA Area A4C:       30.9 cm2 LA Area A2C:       31.5 cm2 LA Major Axis A4C: 7.0 cm LA Major Axis A2C: 6.4 cm LA Volume Index:    65.3 ml/m2 LA Vol A4C:        111.1 ml LA Vol A2C:        127.7 ml RA VOLUME BY A/L METHOD:                               Normal Ranges: RA Vol A4C:        85.8 ml    (8.3-19.5ml) RA Vol Index A4C:  43.8 ml/m2 RA Area A4C:       26.2 cm2 RA Major Axis A4C: 6.8 cm M-MODE MEASUREMENTS:                  Normal Ranges: Ao Root: 3.10 cm (2.0-3.7cm) LAs:     4.84 cm (2.7-4.0cm) AORTA MEASUREMENTS:                      Normal Ranges: Ao Sinus, d: 2.90 cm (2.1-3.5cm) Ao STJ, d:   2.20 cm (1.7-3.4cm) Asc Ao, d:   3.00 cm (2.1-3.4cm) LV SYSTOLIC FUNCTION BY 2D PLANIMETRY (MOD):                                          Normal Ranges: EF-A4C View:                      29.0 % (>=55%) EF-A2C View:                      43.8 % EF-Biplane:                       39.1 % Global Longitudinal Strain (GLS): 6.3 % LV DIASTOLIC FUNCTION:                        Normal Ranges: MV Peak E:    1.37 m/s (0.7-1.2 m/s) MV e'         0.17 m/s (>8.0) MV lateral e' 0.17 m/s MV medial e'  0.10 m/s E/e' Ratio:   8.06     (<8.0) MITRAL VALVE:                      Normal Ranges: MV Vmax:    1.52 m/s (<=1.3m/s) MV peak P.2 mmHg (<5mmHg) MV mean P.1 mmHg (<2mmHg) MV VTI:     23.83 cm (10-13cm) MITRAL INSUFFICIENCY:                         Normal Ranges: MR VTI:     130.99 cm MR Vmax:    486.88 cm/s MR Volume:  13.47 ml MR Flow Rt: 50.06 ml/s MR EROA:    0.10 cm2 AORTIC VALVE:                                    Normal Ranges: AoV Vmax:                3.05 m/s  (<=1.7m/s) AoV Peak P.2 mmHg (<20mmHg) AoV Mean P.7 mmHg (1.7-11.5mmHg) LVOT Max Shayne:            1.20 m/s  (<=1.1m/s) AoV VTI:                 51.47 cm  (18-25cm) LVOT VTI:                21.65 cm LVOT Diameter:           2.01 cm   (1.8-2.4cm) AoV Area, VTI:           1.34 cm2  (2.5-5.5cm2) AoV Area,Vmax:           1.25 cm2  (2.5-4.5cm2) AoV Dimensionless Index: 0.42 AORTIC INSUFFICIENCY: AI Vmax:       3.21 m/s AI Half-time:  349 msec AI Decel Time: 1203 msec  AI Decel Rate: 266.64 cm/s2  RIGHT VENTRICLE: RV Basal 4.80 cm RV Mid   3.00 cm RV Major 9.9 cm TAPSE:   12.0 mm RV s'    0.09 m/s TRICUSPID VALVE/RVSP:                             Normal Ranges: Peak TR Velocity: 3.02 m/s Est. RA Pressure: 8 mmHg RV Syst Pressure: 44.5 mmHg (< 30mmHg) IVC Diam:         2.23 cm PULMONIC VALVE:                      Normal Ranges: RVOT Vmax:  0.51 m/s (0.6-0.9m/s) PV Max Shayne: 1.2 m/s  (0.6-0.9m/s) PV Max P.6 mmHg AORTA: Asc Ao Diam 2.99 cm  [id number] Ignacio Ibrahim MD Electronically signed on 2024 at 4:50:52 PM  ** Final **     CT abdomen pelvis wo IV contrast    Result Date: 2024  Interpreted By:  Ketty Ramirez, STUDY: CT ABDOMEN PELVIS WO IV CONTRAST;  2024 4:08 am   INDICATION: Signs/Symptoms:bacteremia  source?.   COMPARISON: 2024   ACCESSION NUMBER(S): [id number]   ORDERING CLINICIAN: GREG CALDERA   TECHNIQUE: CT of the abdomen and pelvis was performed. Sagittal and coronal reconstructions were generated.  No intravenous contrast given for the exam.   FINDINGS: Solid organ and vessel evaluation limited without IV contrast.   ABDOMINAL ORGANS:   LIVER: No focal lesion within limits of unenhanced exam prominent vascular calcifications near the hilum.   GALL BLADDER AND BILIARY TREE: No calcified gallstone. Gallbladder is distended. Questionable trace pericholecystic fluid.   SPLEEN: Stable isodense presumed splenule adjacent to the anterior pole.   PANCREAS: No focal lesion within limits of unenhanced exam   ADRENALS: No adrenal mass   KIDNEYS AND URETERS: Atrophic with prominent vascular calcifications and small rounded hypodensities in each kidney similar to the prior exam.   BOWEL: Questionable distal esophageal wall thickening. Stomach is mildly distended with debris and air. No abnormally dilated small bowel loops. Moderate fecal residue in the proximal colon. Rectum is distended with gas and fecal debris.   PERITONEUM, RETROPERITONEUM, NODES: No  significant free fluid. No free air. Slight increased density in the mesentery. No significant retroperitoneal adenopathy within limits of unenhanced exam.   VESSELS: Relative decreased density of cardiac lumen suggesting anemia. Extensive vascular calcifications. No abdominal aortic aneurysm. Lack of IV contrast precludes vascular luminal assessment.   PELVIS: Urinary bladder is partially distended with diffusely thickened wall. Vas deferens and faint possible seminal vesicle calcifications bilaterally similar to the prior exam. No gross prostate enlargement.   ABDOMINAL WALL: Focal density in the right lower quadrant abdominal wall subcutaneous fat similar to the prior exam. No sizable abdominal wall hernia. Mild soft tissue edema.   BONES: Generalized increased osseous density consistent with metabolic bone disease similar to the prior exam. Focal smooth superior L2 endplate depression consistent with Schmorl's node with adjacent tiny vacuum disc is more conspicuous.   LOWER CHEST: Patchy/nodular left lower lobe infiltrate. Septal thickening in both lung bases. Small bilateral pleural effusions. Cardiomegaly. Surgical material along the margins of the heart again seen.       Left lower lobe infiltrate consistent with pneumonia.   Mild soft tissue and mesenteric edema with septal thickening in the lung bases and small pleural effusions consistent with CHF/fluid overload.   Distended gallbladder with questionable trace pericholecystic fluid.   Questionable distal esophageal wall thickening. Correlate with possible esophagitis.   Diffuse bladder wall thickening which could be related to suboptimal distention, cystitis and/or trabeculation.   Numerous additional findings as described above.   MACRO: None.   Signed by: Ketty Ramirez 6/4/2024 8:48 AM Dictation workstation:   [id number]    Electrocardiogram, 12-lead PRN ACS symptoms    Result Date: 6/3/2024  Normal sinus rhythm Possible Left atrial enlargement Left  ventricular hypertrophy with QRS widening ( Sokolow-Márquez , [place] product ) Marked T wave abnormality, consider inferolateral ischemia Abnormal ECG When compared with ECG of 26-MAR-2024 02:50, Questionable change in QRS axis ST now depressed in Inferior leads T wave inversion now evident in Inferior leads    XR chest 2 views    Result Date: 5/31/2024  Interpreted By:  Aaron Jeffers, STUDY: XR CHEST 2 VIEWS;  5/31/2024 9:24 pm   INDICATION: Signs/Symptoms:sob.   COMPARISON: None.   ACCESSION NUMBER(S): [id number]   ORDERING CLINICIAN: KAMALA FRIEDMAN   FINDINGS: Median sternal wires present. Some vascular stent over the right axilla.     CARDIOMEDIASTINAL SILHOUETTE: There is enlargement of the cardiac silhouette. Median sternotomy wires present.   LUNGS: There is pulmonary congestion. There is no consolidation or effusion.   ABDOMEN: No remarkable upper abdominal findings.   BONES: No acute osseous changes.       1.  Enlarged cardiac silhouette with vascular congestion. No consolidation seen       MACRO: None   Signed by: Aaron Jeffers 5/31/2024 9:28 PM Dictation workstation:   [id number]                  SIGNATURE:  Osmar Callaway MD  DATE:  June 6, 2024  TIME:  12:37 PM

## 2024-06-06 NOTE — CONSULTS
"Inpatient consult to Cardiology  Consult performed by: Fabienne Kendall, SOTERO-CNP  Consult ordered by: SOTERO Oliveira-CNP  Reason for consult: \"worsening cardiomyopathy and AS\".      HF: Casi 2/8/2024  History Of Present Illness:    Mr. Carreon is a 53M with a PMHx sig for CAD s/p PCI (LAD; 6/2017), CABG x 3 3/2022, stage C systolic HF/ICM/HFmrEF now HEFrEF 20% 6/2024, mod aortic stenosis, HTN, tachycardia s/p ablation, DM and discoid lupus/SLE with ESRD on home HD (M-T-TH-F via RUE AVF since 12/2016) presents to [facility] ED on May 31, 2024 with fatigue, body aches and low-grade fevers.  Patient blood cultures came back positive for Enterococcus bacillus and gram-positive cocci.  ID was consulted wanted a TTE to assess for endocarditis with possible MARION.  TTE was done yesterday which showed a decrease in EF to 20% from 40 to 45% in June of last year.  Cardiology was consulted for \"worsening cardiomyopathy and AS\".    Patient claims last week he started developing fevers, general malaise just not having any energy.  He thought he might have some kind of infection so he decided to come to the ER.  He states also might be a lupus flareup.  Prior to this patient complains of generalized increased shortness of breath and dyspnea on exertion.  She says he has not felt good over the last half a year.  He denies any kind of overt chest pain, nausea, vomiting, lightheadedness.  He takes all his medications and follows his dialysis schedule regularly.    Today patient is afebrile, heart rate 79, blood pressure 125/67, 96% on room air.  Notable labs today BUNs/CR 35/7.94, procalcitonin 5.19, H&H 8.0/25.6(little below baseline), BNP over 4700,   High sensitive troponin 794/851 (previously 608 last admission), chest x-ray shows enlarged cardiac silhouette with congestion, CT of abdomen pelvis left lower lobe infiltrate consistent with pneumonia, Mild soft tissue and mesenteric edema with septal thickening in the lung " bases and small pleural effusions consistent with CHF/fluid overload. Distended gallbladder with questionable trace pericholecystic fluid. Questionable distal esophageal wall thickening. Correlate with possible esophagitis. Diffuse bladder wall thickening which could be related to suboptima distention, cystitis and/or trabeculation.      Home cardiac medications include aspirin 81 daily, atorvastatin 80 mg daily, Coreg 25 mg twice daily, Entresto 24/26 twice daily.      Admitted March 25-28 2024 for shortness of breath and fluid overload-> dialyzed  Admitted March 16-20, 2023 for FVO  Admitted July 21-22, 2023 for cellulitis of R great toe  Admitted August 4-11, 2023 for osteomyelitis   ED visit 2/6/2024 for hyperkalemia        Past Cardiology Tests (Last 3 Years):  Echo (6/5/2024):   1. Left ventricular systolic function is severely decreased with a 20% estimated ejection fraction.   2. No definite valvular vegetations were visualized.   3. Spectral Doppler shows an abnormal pattern of left ventricular diastolic filling.   4. There is eccentric left ventricular hypertrophy.   5. Moderately enlarged right ventricle.   6. There is reduced right ventricular systolic function.   7. The left atrium is severely dilated.   8. Absent A-wave on MV spectral Doppler tracing, consistent with atrial fibrillation.   9. Mild to moderate mitral valve regurgitation.  10. Mildly elevated RVSP.  11. Moderate aortic valve stenosis.  12. There is moderate aortic valve cusp calcification.  13. Mild aortic valve regurgitation.  14. There is global hypokinesis of the left ventricle with minor regional variations.       Echo (3/6/23):  1. Left ventricular systolic function is mildly decreased with a 40-45% estimated ejection fraction.  2. Abnormal septal motion consistent with post-operative status.  3. Spectral Doppler shows a pseudonormal pattern of left ventricular diastolic filling.  4. The left atrium is severely dilated.  5. The  right atrium is severely dilated.  6. Mild to moderate mitral valve regurgitation.  7. Mild to moderate tricuspid regurgitation visualized.  8. Moderate aortic valve stenosis.  9. Moderately elevated pulmonary artery pressure, estimated RVSP 52mmHg.     Cardiac cath (3/10/22):  1. Significant left main coronary artery disease and double vessel coronary artery disease with proximal left anterior descending involvement.  2. Left Ventricular end-diastolic pressure = 28.     Echo (3/8/22):  1. The left ventricular systolic function is moderately decreased with a 35-40% estimated ejection fraction.  2. There is moderate to severe eccentric left ventricular hypertrophy.  3. Mild to moderate aortic valve stenosis.  4. There is moderate aortic valve cusp calcification.  5. The left ventricular cavity size is moderate to severely dilated.  6. There is global hypokinesis of the left ventricle with minor regional variations.     Nuclear stress (2/8/21):  1. Normal myocardial perfusion study without evidence of ischemia or prior infarction.  2. The left ventricle is severely dilated in size.  3. Globally reduced LV wall motion with an LV EF estimated at 40-46%.  4. Since 1/3/2019, ejection fraction appears improved and the LV is less dilated (205-206 ml on today's exam, vs 261 ml on 1/3/2019). There still remains no definitive signs of ischemia or prior infarct.     Echo (2/4/21):  1. The left ventricular systolic function is normal with a 55-60% estimated ejection fraction.  2. Spectral Doppler shows an impaired relaxation pattern of left ventricular diastolic filling.  3. There is moderate concentric left ventricular hypertrophy.  4. Mild aortic valve stenosis.     Echo (2/3/20):  1. The left ventricular systolic function is low normal with a 50-55% estimated ejection fraction.  2. Poorly visualized anatomical structures due to suboptimal image quality.  3. Limited and incomplete echo by cardiology fellow.  4. Recommend  complete study by echo lab.  5. LVH appears slightly worse on this exam, however maybe secondary to off axis window.     Nuclear stress (1/3/19):  1. Fixed decrease in counts involving the apex, thought to relate to myocardial thinning in the setting of left ventricular dilatation. The remainder of the left ventricle demonstrates normal perfusion without evidence of stress-induced ischemia or prior infarction.  2. Decreased left ventricular function with an ejection fraction of 34%.     ECG (12/13/18):  Sinus rhythm (HR 86), LVH with repol     Coronary angiography (6/20/17):  The coronary circulation is co-dominant.  LM: No CAD. The left main coronary artery showed very short, almost dual ostia.  LAD: Proximal 90%, mid 60% with small poststenotic aneurysmal segment inbetween, LADnwraps far around apex with only mild irregularities.  LCx: Codominant, mild irregularities.  RCA: The RCA showed codominant vessel but smaller caliber than the LCX. Mild-moderate diffuse disease with normal rPDA.  PCI:  1. EBU 3.75 was selective in the LCX, EBU 3.5 was better suited. LAD wired with some difficulty from the aorta, and a second wire placed into D1. The LAD was predilated with 3.0NC and stented with a 3.0x38mm RESOLUTE ALEXEY, postdil 3.0NC distally, 3.5NC proximally, and a 3.75NC was used prox-mid to appose the stent in the poststenotic segment.     ECG (5/18/17):  Sinus rhythm (HR 77), LVH, possible septal infarct     Echo (11/1/16 at [facility])  Normal LV function (LVEF 65%), normal RV size/function. No hemodynamically significant valvular disease.       Past Medical History:  He has a past medical history of Personal history of other diseases of the circulatory system (02/01/2018), Systemic lupus erythematosus, unspecified (Multi) (02/01/2018), and Type 1 diabetes mellitus with other diabetic kidney complication (Multi) (02/01/2018).    Past Surgical History:  He has a past surgical history that includes Other surgical history  (2017); Other surgical history (2018); Other surgical history (06/10/2022); Other surgical history (06/10/2022); Other surgical history (06/10/2022); Other surgical history (06/10/2022); and Coronary angioplasty with stent (2017).      Social History:  He reports that he has quit smoking. His smoking use included cigarettes. He has been exposed to tobacco smoke. He has never used smokeless tobacco. He reports current alcohol use. He reports that he does not use drugs.    Family History:  Family History   Problem Relation Name Age of Onset    Heart failure Mother      Heart attack Father          Allergies:  Iodinated contrast media, Ampicillin-sulbactam, and Strawberry    ROS:  10 point review of systems including (Constitutional, Eyes, ENMT, Respiratory, Cardiac, Gastrointestinal, Neurological, Psychiatric, and Hematologic) was performed and is otherwise negative.    Objective Data:  Last Recorded Vitals:  Vitals:    24 1602 24 1959 24 0000 24 0742   BP: 119/74 101/63 105/58 125/67   BP Location: Left arm Left arm Left arm Left arm   Patient Position: Lying Lying Lying Lying   Pulse: 102 71 82 79   Resp: 18 18 18 16   Temp: 36.3 °C (97.3 °F) 36.9 °C (98.4 °F) 36.6 °C (97.8 °F) 36.2 °C (97.2 °F)   TempSrc: Temporal Temporal Oral Temporal   SpO2: 92% 99% 99% 96%   Weight:       Height:         Medical Gas Therapy: None (Room air)  O2 Delivery Method: Nasal cannula  Weight  Av.7 kg (153 lb 9.6 oz)  Min: 68 kg (150 lb)  Max: 72.6 kg (160 lb)      LABS:  CMP:  Results from last 7 days   Lab Units 24  0543 24  0638 24  0621 24  0718 24  2200   SODIUM mmol/L 133* 134* 133* 134* 131*   POTASSIUM mmol/L 5.0 4.9 4.5 4.3 4.4   CHLORIDE mmol/L 91* 92* 93* 92* 88*   CO2 mmol/L 29 26 27 29 29   ANION GAP mmol/L 18 21* 18 17 18   BUN mg/dL 35* 54* 41* 60* 41*   CREATININE mg/dL 7.94* 11.32* 9.30* 12.00* 8.84*   EGFR mL/min/1.73m*2 8* 5* 6* 5* 7*  "  MAGNESIUM mg/dL 2.20 2.40 2.10  --   --    ALBUMIN g/dL 3.8 3.7  --  3.6 4.3   ALT U/L  --   --   --  17 20   AST U/L  --   --   --  21 30   BILIRUBIN TOTAL mg/dL  --   --   --  0.5 0.7     CBC:  Results from last 7 days   Lab Units 06/06/24  0543 06/05/24  0638 06/04/24  0621 06/02/24  0718 05/31/24  2200   WBC AUTO x10*3/uL 5.5 5.6 5.0 4.3* 4.7   HEMOGLOBIN g/dL 8.0* 7.8* 7.5* 8.5* 8.3*   HEMATOCRIT % 25.6* 23.3* 23.3* 26.3* 25.5*   PLATELETS AUTO x10*3/uL 242 217 198 158 142*   MCV fL 105* 97 101* 102* 100     COAG:     ABO: No results found for: \"ABO\"  HEME/ENDO:  Results from last 7 days   Lab Units 06/01/24  0805   HEMOGLOBIN A1C % 7.5*      CARDIAC:   Results from last 7 days   Lab Units 05/31/24  2357 05/31/24  2200   TROPHS ng/L 794* 851*   BNP pg/mL  --  >4,700*      Results from last 7 days   Lab Units 06/01/24  0805   HEMOGLOBIN A1C % 7.5*        Last I/O:    Intake/Output Summary (Last 24 hours) at 6/6/2024 0825  Last data filed at 6/5/2024 1150  Gross per 24 hour   Intake 800 ml   Output 2400 ml   Net -1600 ml     Net IO Since Admission: -1,130 mL [06/06/24 0825]      Imaging Results:  Electrocardiogram, 12-lead PRN ACS symptoms    Result Date: 6/3/2024  Normal sinus rhythm Possible Left atrial enlargement Left ventricular hypertrophy with QRS widening ( Sokolow-Márquez , [place] product ) Marked T wave abnormality, consider inferolateral ischemia Abnormal ECG When compared with ECG of 26-MAR-2024 02:50, Questionable change in QRS axis ST now depressed in Inferior leads T wave inversion now evident in Inferior leads    XR chest 2 views    Result Date: 5/31/2024  Interpreted By:  Aaron Jeffers, STUDY: XR CHEST 2 VIEWS;  5/31/2024 9:24 pm   INDICATION: Signs/Symptoms:sob.   COMPARISON: None.   ACCESSION NUMBER(S): [id number]   ORDERING CLINICIAN: KAMALA FRIEDMAN   FINDINGS: Median sternal wires present. Some vascular stent over the right axilla.     CARDIOMEDIASTINAL SILHOUETTE: There is enlargement of " the cardiac silhouette. Median sternotomy wires present.   LUNGS: There is pulmonary congestion. There is no consolidation or effusion.   ABDOMEN: No remarkable upper abdominal findings.   BONES: No acute osseous changes.       1.  Enlarged cardiac silhouette with vascular congestion. No consolidation seen       MACRO: None   Signed by: Aaron Jeffers 5/31/2024 9:28 PM Dictation workstation:   [id number]      Inpatient Medications:  Scheduled medications   Medication Dose Route Frequency    aspirin  81 mg oral Daily    atorvastatin  80 mg oral Daily    carvedilol  25 mg oral BID    epoetin annita or biosimilar  150 Units/kg subcutaneous Every Mon/Wed/Fri    heparin (porcine)  5,000 Units subcutaneous q8h [facility]    [Held by provider] insulin glargine  10 Units subcutaneous q12h    insulin lispro  0-10 Units subcutaneous TID    multivitamin with minerals  1 tablet oral Daily    polyethylene glycol  17 g oral Daily    sacubitriL-valsartan  1 tablet oral BID    sevelamer carbonate  800 mg oral TID    SITagliptin phosphate  25 mg oral Daily     PRN medications   Medication    acetaminophen    dextrose    glucagon    glucagon    vancomycin     Continuous Medications   Medication Dose Last Rate       Outpatient Medications:  Current Outpatient Medications   Medication Instructions    Accu-Chek Guide test strips strip Accu-Chek Guide In Vitro Strip   Quantity: 200  Refills: 0        Start : 25-Mar-2022   Active    Accu-Chek Softclix Lancets misc     acetaminophen (TYLENOL) 650 mg, oral, Every 6 hours PRN    aspirin 81 mg EC tablet 1 tablet, oral, Daily    atorvastatin (LIPITOR) 80 mg, oral, Daily    blood sugar diagnostic strip 1 each, miscellaneous, 3-4x daily    blood-glucose sensor (FreeStyle José Luis 3 Sensor) device Change sensor every 14 days    carvedilol (COREG) 25 mg, oral, 2 times daily (morning and late afternoon)    EPOETIN ANNITA INJ 8,000 Units, subcutaneous, With every dialysis tx    FreeStyle José Luis reader  "(FreeStyle José Luis 2 [place]) misc Use to check blood sugars at least every 8 hours    FreeStyle José Luis sensor system (FreeStyle José Luis 2 Sensor) kit Change sensor every 14 days. Check blood sugar at least every 8 hours    insulin glargine (Lantus) 100 unit/mL (3 mL) pen Inject 10 units twice a day    insulin lispro (HumaLOG KwikPen Insulin) 100 unit/mL injection Inject before every meal using carb ratio and correction scale. MDD 50 units.    insulin syringe-needle U-100 31G X 5/16\" 0.5 mL syringe 1 Syringe, subcutaneous, 4 times daily, With meals and at bedtime    multivitamin (Daily Multi-Vitamin) tablet 1 tablet, oral, Daily    pen needle, diabetic (BD Ultra-Fine Agueda Pen Needle) 32 gauge x 5/32\" needle Use to inject insulin 5 times per day    sacubitriL-valsartan (Entresto) 24-26 mg tablet 1 tablet, oral, 2 times daily    sevelamer carbonate (RENVELA) 800 mg, oral, 3 times daily (morning, midday, late afternoon)    SITagliptin phosphate (JANUVIA) 25 mg, oral, Daily    Ventolin HFA 90 mcg/actuation inhaler 1-2 puffs, inhalation, Every 6 hours PRN       Physical Exam:  General:  Patient is awake, alert, and oriented.  Patient is in no acute distress.  HEENT:  Pupils equal and reactive.  Normocephalic.  Moist mucosa.    Neck:  No thyromegaly.  Normal Jugular Venous Pressure.  Cardiovascular:  Regular rate and rhythm.  Normal S1 and S2.  Pulmonary:  Clear to auscultation bilaterally.  Abdomen:  Soft. Non-tender.   Non-distended.  Positive bowel sounds.  Lower Extremities:  2+ pedal pulses. No LE edema.  Neurologic:  Cranial nerves intact.  No focal deficit.   Skin: Skin warm and dry, normal skin turgor.   Psychiatric: Normal affect.     Assessment/Plan   HF: Casi 2/8/2024    Mr. Carreon is a 53M with a PMHx sig for CAD s/p PCI (LAD; 6/2017), CABG x 3 3/2022, stage C systolic HF/ICM/HFmrEF now HEFrEF 20% 6/2024, mod aortic stenosis, HTN, tachycardia s/p ablation, DM and discoid lupus/SLE with ESRD on home HD (M-T-TH-F " "via RUE AVF since 12/2016) presents to [facility] ED on May 31, 2024 with fatigue, body aches and low-grade fevers.  Patient blood cultures came back positive for Enterococcus bacillus and gram-positive cocci.  ID was consulted wanted a TTE to assess for endocarditis with possible MARION.  TTE was done yesterday which showed a decrease in EF to 20% from 40 to 45% in June of last year.  Cardiology was consulted for \"worsening cardiomyopathy and AS\".    Home cardiac medications include aspirin 81 daily, atorvastatin 80 mg daily, Coreg 25 mg twice daily, Entresto 24/26 twice daily.     #Acute on chronic ICM/HEFrEF (LVEF 40-45% 3/2024 now 20% TTE 6/2024)  -Admit BNP> 4700, Chest CT with congestion    #CAD s/p PCI to LAD (6/2017) s/p 3V CABG (LIMA to LAD, SVG to OM, SVG to PDA; 3/16/22)   #HTN  #Aortic Stenosis ->Moderate on the most recent echo  #Elevated troponin 2/2 ESRD and volume overload (in line with previous trops)  #New bacteremia 2/2 E. Faecalis with unclear source    RECS:  -NPO at midnight   -repeat LHC to r/o any new ischemia causing new cardiomyopathy   -MARION to r/o endocarditis  -c/w current meds       Code Status:  Full Code    I spent 45 minutes in the professional and overall care of this patient.        Fabienne Kendall, APRN-CNP   "

## 2024-06-06 NOTE — ANESTHESIA PREPROCEDURE EVALUATION
Patient: Kentrell Carreon    Procedure Information       Date/Time: 06/07/24 1030    Scheduled providers: Devan Mallory MD; Kevin Merchant MD    Procedure: TRANSESOPHAGEAL ECHO (MARION)    Location: [facility]; [facility]            Relevant Problems   Cardiac   (+) Accelerated hypertension   (+) Chest pain   (+) Congestive heart failure, hypertensive (Multi)   (+) Coronary artery disease with other form of angina pectoris, unspecified vessel or lesion type, unspecified whether native or transplanted heart ([facility])      Neuro   (+) Carotid stenosis      /Renal   (+) ESRD on dialysis (Multi)   (+) End-stage renal disease on hemodialysis (Multi)   (+) Hyponatremia      Endocrine   (+) DM (diabetes mellitus), type 1 with neurological complications (Multi)      Musculoskeletal   (+) SLE (systemic lupus erythematosus) (Multi)      ID   (+) Acute osteomyelitis of phalanx of foot (Multi)   (+) Onychomycosis   (+) Osteomyelitis of forefoot (Multi)       Clinical information reviewed:    Allergies  Meds                Past Medical History:   Diagnosis Date    Aortic stenosis     CAD (coronary artery disease)     CHF (congestive heart failure) (Multi)     ESRD (end stage renal disease) (Multi)     HTN (hypertension)     Systemic lupus erythematosus, unspecified (Multi)     Type 1 diabetes mellitus with other diabetic kidney complication (Multi) 02/01/2018    Type 1 diabetes mellitus with other kidney complication      Past Surgical History:   Procedure Laterality Date    AV FISTULA PLACEMENT      BUNIONECTOMY Right 08/2023    COLONOSCOPY      CORONARY ANGIOPLASTY WITH STENT PLACEMENT  06/20/2017    LAD ALEXEY    CORONARY ARTERY BYPASS GRAFT  03/26/2022    HENDERSON to LAD, saphenous vein graft to the obtuse marginal, and saphenous vein graft to PDA.    LUNG SURGERY Left 05/26/2022    Emergent VATS evacuation of hemothorax    TOE AMPUTATION       Social History     Tobacco Use    Smoking status: Former      "Types: Cigarettes     Passive exposure: Current    Smokeless tobacco: Never   Substance Use Topics    Alcohol use: Yes    Drug use: Never      Current Outpatient Medications   Medication Instructions    Accu-Chek Guide test strips strip Accu-Chek Guide In Vitro Strip   Quantity: 200  Refills: 0        Start : 25-Mar-2022   Active    Accu-Chek Softclix Lancets misc     acetaminophen (TYLENOL) 650 mg, oral, Every 6 hours PRN    aspirin 81 mg EC tablet 1 tablet, oral, Daily    atorvastatin (LIPITOR) 80 mg, oral, Daily    blood sugar diagnostic strip 1 each, miscellaneous, 3-4x daily    blood-glucose sensor (FreeStyle José Luis 3 Sensor) device Change sensor every 14 days    carvedilol (COREG) 25 mg, oral, 2 times daily (morning and late afternoon)    EPOETIN DEIRDRE INJ 8,000 Units, subcutaneous, With every dialysis tx    FreeStyle José Luis reader (FreeStyle José Luis 2 Pinnacle) misc Use to check blood sugars at least every 8 hours    FreeStyle José Luis sensor system (FreeStyle José Luis 2 Sensor) kit Change sensor every 14 days. Check blood sugar at least every 8 hours    insulin glargine (Lantus) 100 unit/mL (3 mL) pen Inject 10 units twice a day    insulin lispro (HumaLOG KwikPen Insulin) 100 unit/mL injection Inject before every meal using carb ratio and correction scale. MDD 50 units.    insulin syringe-needle U-100 31G X 5/16\" 0.5 mL syringe 1 Syringe, subcutaneous, 4 times daily, With meals and at bedtime    multivitamin (Daily Multi-Vitamin) tablet 1 tablet, oral, Daily    pen needle, diabetic (BD Ultra-Fine Agueda Pen Needle) 32 gauge x 5/32\" needle Use to inject insulin 5 times per day    sacubitriL-valsartan (Entresto) 24-26 mg tablet 1 tablet, oral, 2 times daily    sevelamer carbonate (RENVELA) 800 mg, oral, 3 times daily (morning, midday, late afternoon)    SITagliptin phosphate (JANUVIA) 25 mg, oral, Daily    Ventolin HFA 90 mcg/actuation inhaler 1-2 puffs, inhalation, Every 6 hours PRN      Allergies   Allergen Reactions    " "Iodinated Contrast Media Anaphylaxis    Ampicillin-Sulbactam Other     Renal Failure. AIN (INSTERSTITIAL NEPHRITIS))    Strawberry Rash, Other and Unknown        Chemistry    Lab Results   Component Value Date/Time     (L) 06/07/2024 0546    K 6.8 (HH) 06/07/2024 0546    CL 88 (L) 06/07/2024 0546    CO2 23 06/07/2024 0546    BUN 51 (H) 06/07/2024 0546    CREATININE 9.74 (H) 06/07/2024 0546    Lab Results   Component Value Date/Time    CALCIUM 9.3 06/07/2024 0546    ALKPHOS 60 06/02/2024 0718    AST 21 06/02/2024 0718    ALT 17 06/02/2024 0718    BILITOT 0.5 06/02/2024 0718          Lab Results   Component Value Date    HGBA1C 7.5 (H) 06/01/2024     Lab Results   Component Value Date/Time    WBC 5.5 06/07/2024 0546    HGB 7.9 (L) 06/07/2024 0546    HCT 24.8 (L) 06/07/2024 0546     06/07/2024 0546     Lab Results   Component Value Date/Time    PROTIME 12.9 (H) 03/25/2024 2108    INR 1.1 03/25/2024 2108     No results found for: \"ABORH\"  Encounter Date: 05/31/24   Electrocardiogram, 12-lead PRN ACS symptoms   Result Value    Ventricular Rate 88    Atrial Rate 88    OK Interval 208    QRS Duration 128    QT Interval 418    QTC Calculation(Bazett) 505    P Axis 68    R Axis 65    T Axis 217    QRS Count 14    Q Onset 209    P Onset 105    P Offset 171    T Offset 418    QTC Fredericia 475    Narrative    Normal sinus rhythm  Possible Left atrial enlargement  Left ventricular hypertrophy with QRS widening ( Sokolow-Márquez , [place] product )  Marked T wave abnormality, consider inferolateral ischemia  Abnormal ECG  When compared with ECG of 26-MAR-2024 02:50,  Questionable change in QRS axis  ST now depressed in Inferior leads  T wave inversion now evident in Inferior leads     No results found for this or any previous visit from the past 1095 days.   Echo 6/6/2024:  Left Ventricle: The left ventricular systolic function is severely decreased, with an estimated ejection fraction of 20%. There is global " hypokinesis of the left ventricle with minor regional variations. The left ventricular cavity size is upper limits of normal. There is eccentric left ventricular hypertrophy. Left Ventricular Global Longitudinal Strain - 6.3 %. Spectral Doppler shows an abnormal pattern of left ventricular diastolic filling.  Left Atrium: The left atrium is severely dilated.  Right Ventricle: The right ventricle is moderately enlarged. There is reduced right ventricular systolic function.  Right Atrium: The right atrium is normal in size.  Aortic Valve: The aortic valve is trileaflet. There is moderate aortic valve cusp calcification. There is reduced aortic valve non coronary cusp excursion. There is evidence of moderate aortic valve stenosis.  There is mild aortic valve regurgitation. The peak instantaneous gradient of the aortic valve is 37.2 mmHg. The mean gradient of the aortic valve is 19.7 mmHg.  Mitral Valve: The mitral valve is normal in structure. The mitral valve spectral Doppler A-wave is absent, consistent with atrial fibrillation. There is mild to moderate mitral annular calcification. There is mild to moderate mitral valve regurgitation.  Tricuspid Valve: The tricuspid valve is structurally normal. There is mild tricuspid regurgitation. The Doppler estimated RVSP is mildly elevated at 44.5 mmHg.  Pulmonic Valve: The pulmonic valve is structurally normal. There is mild pulmonic valve regurgitation.  Pericardium: There is a trivial pericardial effusion.  Aorta: The aortic root is normal.  Systemic Veins: The inferior vena cava appears dilated. There is IVC inspiratory collapse greater than 50%.  In comparison to the previous echocardiogram(s): Compared with study from 3/6/2024, the ejection fraction is comparatively reduced but was somewhat overestimated previously. There is a greater degree of aortic stenosis presently but this was underestimated previously.        CONCLUSIONS:   1. Left ventricular systolic function  "is severely decreased with a 20% estimated ejection fraction.   2. No definite valvular vegetations were visualized.   3. Spectral Doppler shows an abnormal pattern of left ventricular diastolic filling.   4. There is eccentric left ventricular hypertrophy.   5. Moderately enlarged right ventricle.   6. There is reduced right ventricular systolic function.   7. The left atrium is severely dilated.   8. Absent A-wave on MV spectral Doppler tracing, consistent with atrial fibrillation.   9. Mild to moderate mitral valve regurgitation.  10. Mildly elevated RVSP.  11. Moderate aortic valve stenosis.  12. There is moderate aortic valve cusp calcification.  13. Mild aortic valve regurgitation.  14. There is global hypokinesis of the left ventricle with minor regional variations.    Visit Vitals  /80 (BP Location: Right arm, Patient Position: Sitting)   Pulse (!) 111   Temp 36.3 °C (97.3 °F) (Temporal)   Resp 18   Ht 1.93 m (6' 4\")   Wt 68 kg (150 lb)   SpO2 95%   BMI 18.26 kg/m²   Smoking Status Former   BSA 1.91 m²     No data recorded     Physical Exam    Airway  Mallampati: III  TM distance: >3 FB  Neck ROM: full     Cardiovascular   Rhythm: regular  Rate: normal     Dental - normal exam     Pulmonary   Breath sounds clear to auscultation     Abdominal - normal exam              Anesthesia Plan    History of general anesthesia?: yes  History of complications of general anesthesia?: no    ASA 4     MAC   (With standard ASA monitoring.)  intravenous induction   Anesthetic plan and risks discussed with patient.    Plan discussed with CRNA and CAA.        "

## 2024-06-07 ENCOUNTER — HOSPITAL ENCOUNTER (OUTPATIENT)
Dept: CARDIOLOGY | Facility: HOSPITAL | Age: 54
Discharge: HOME | End: 2024-06-07
Payer: COMMERCIAL

## 2024-06-07 ENCOUNTER — ANESTHESIA (OUTPATIENT)
Dept: CARDIOLOGY | Facility: HOSPITAL | Age: 54
End: 2024-06-07
Payer: MEDICARE

## 2024-06-07 ENCOUNTER — APPOINTMENT (OUTPATIENT)
Dept: DIALYSIS | Facility: HOSPITAL | Age: 54
End: 2024-06-07
Payer: COMMERCIAL

## 2024-06-07 ENCOUNTER — APPOINTMENT (OUTPATIENT)
Dept: CARDIOLOGY | Facility: HOSPITAL | Age: 54
DRG: 871 | End: 2024-06-07
Payer: MEDICARE

## 2024-06-07 LAB
ALBUMIN SERPL BCP-MCNC: 3.9 G/DL (ref 3.4–5)
ANION GAP SERPL CALC-SCNC: 23 MMOL/L (ref 10–20)
ANION GAP SERPL CALC-SCNC: 23 MMOL/L (ref 10–20)
BASOPHILS # BLD AUTO: 0.02 X10*3/UL (ref 0–0.1)
BASOPHILS NFR BLD AUTO: 0.4 %
BUN SERPL-MCNC: 25 MG/DL (ref 6–23)
BUN SERPL-MCNC: 51 MG/DL (ref 6–23)
CALCIUM SERPL-MCNC: 9.3 MG/DL (ref 8.6–10.3)
CALCIUM SERPL-MCNC: 9.7 MG/DL (ref 8.6–10.3)
CHLORIDE SERPL-SCNC: 88 MMOL/L (ref 98–107)
CHLORIDE SERPL-SCNC: 93 MMOL/L (ref 98–107)
CO2 SERPL-SCNC: 23 MMOL/L (ref 21–32)
CO2 SERPL-SCNC: 24 MMOL/L (ref 21–32)
CREAT SERPL-MCNC: 4.88 MG/DL (ref 0.5–1.3)
CREAT SERPL-MCNC: 9.74 MG/DL (ref 0.5–1.3)
EGFRCR SERPLBLD CKD-EPI 2021: 14 ML/MIN/1.73M*2
EGFRCR SERPLBLD CKD-EPI 2021: 6 ML/MIN/1.73M*2
EOSINOPHIL # BLD AUTO: 0.01 X10*3/UL (ref 0–0.7)
EOSINOPHIL NFR BLD AUTO: 0.2 %
ERYTHROCYTE [DISTWIDTH] IN BLOOD BY AUTOMATED COUNT: 14.7 % (ref 11.5–14.5)
GLUCOSE BLD MANUAL STRIP-MCNC: 193 MG/DL (ref 74–99)
GLUCOSE BLD MANUAL STRIP-MCNC: 262 MG/DL (ref 74–99)
GLUCOSE BLD MANUAL STRIP-MCNC: 278 MG/DL (ref 74–99)
GLUCOSE BLD MANUAL STRIP-MCNC: 451 MG/DL (ref 74–99)
GLUCOSE SERPL-MCNC: 192 MG/DL (ref 74–99)
GLUCOSE SERPL-MCNC: 397 MG/DL (ref 74–99)
HCT VFR BLD AUTO: 24.8 % (ref 41–52)
HGB BLD-MCNC: 7.9 G/DL (ref 13.5–17.5)
IMM GRANULOCYTES # BLD AUTO: 0.04 X10*3/UL (ref 0–0.7)
IMM GRANULOCYTES NFR BLD AUTO: 0.7 % (ref 0–0.9)
LYMPHOCYTES # BLD AUTO: 0.36 X10*3/UL (ref 1.2–4.8)
LYMPHOCYTES NFR BLD AUTO: 6.5 %
MAGNESIUM SERPL-MCNC: 2.2 MG/DL (ref 1.6–2.4)
MCH RBC QN AUTO: 32.6 PG (ref 26–34)
MCHC RBC AUTO-ENTMCNC: 31.9 G/DL (ref 32–36)
MCV RBC AUTO: 103 FL (ref 80–100)
MONOCYTES # BLD AUTO: 0.15 X10*3/UL (ref 0.1–1)
MONOCYTES NFR BLD AUTO: 2.7 %
NEUTROPHILS # BLD AUTO: 4.95 X10*3/UL (ref 1.2–7.7)
NEUTROPHILS NFR BLD AUTO: 89.5 %
NRBC BLD-RTO: 0 /100 WBCS (ref 0–0)
PHOSPHATE SERPL-MCNC: 5.7 MG/DL (ref 2.5–4.9)
PLATELET # BLD AUTO: 260 X10*3/UL (ref 150–450)
POTASSIUM SERPL-SCNC: 4.3 MMOL/L (ref 3.5–5.3)
POTASSIUM SERPL-SCNC: 6.8 MMOL/L (ref 3.5–5.3)
RBC # BLD AUTO: 2.42 X10*6/UL (ref 4.5–5.9)
SODIUM SERPL-SCNC: 127 MMOL/L (ref 136–145)
SODIUM SERPL-SCNC: 136 MMOL/L (ref 136–145)
VANCOMYCIN SERPL-MCNC: 15.4 UG/ML (ref 5–20)
WBC # BLD AUTO: 5.5 X10*3/UL (ref 4.4–11.3)

## 2024-06-07 PROCEDURE — 80048 BASIC METABOLIC PNL TOTAL CA: CPT | Mod: CCI | Performed by: NURSE PRACTITIONER

## 2024-06-07 PROCEDURE — 2500000004 HC RX 250 GENERAL PHARMACY W/ HCPCS (ALT 636 FOR OP/ED): Performed by: NURSE ANESTHETIST, CERTIFIED REGISTERED

## 2024-06-07 PROCEDURE — B2111ZZ FLUOROSCOPY OF MULTIPLE CORONARY ARTERIES USING LOW OSMOLAR CONTRAST: ICD-10-PCS | Performed by: HOSPITALIST

## 2024-06-07 PROCEDURE — C1760 CLOSURE DEV, VASC: HCPCS | Performed by: HOSPITALIST

## 2024-06-07 PROCEDURE — 99232 SBSQ HOSP IP/OBS MODERATE 35: CPT | Performed by: INTERNAL MEDICINE

## 2024-06-07 PROCEDURE — 2500000004 HC RX 250 GENERAL PHARMACY W/ HCPCS (ALT 636 FOR OP/ED): Performed by: NURSE PRACTITIONER

## 2024-06-07 PROCEDURE — C1894 INTRO/SHEATH, NON-LASER: HCPCS | Performed by: HOSPITALIST

## 2024-06-07 PROCEDURE — 80202 ASSAY OF VANCOMYCIN: CPT | Performed by: INTERNAL MEDICINE

## 2024-06-07 PROCEDURE — 99222 1ST HOSP IP/OBS MODERATE 55: CPT | Performed by: NURSE PRACTITIONER

## 2024-06-07 PROCEDURE — 83735 ASSAY OF MAGNESIUM: CPT | Performed by: NURSE PRACTITIONER

## 2024-06-07 PROCEDURE — 4A023N7 MEASUREMENT OF CARDIAC SAMPLING AND PRESSURE, LEFT HEART, PERCUTANEOUS APPROACH: ICD-10-PCS | Performed by: HOSPITALIST

## 2024-06-07 PROCEDURE — 93005 ELECTROCARDIOGRAM TRACING: CPT

## 2024-06-07 PROCEDURE — 2500000005 HC RX 250 GENERAL PHARMACY W/O HCPCS: Performed by: HOSPITALIST

## 2024-06-07 PROCEDURE — 2780000003 HC OR 278 NO HCPCS: Performed by: HOSPITALIST

## 2024-06-07 PROCEDURE — 80069 RENAL FUNCTION PANEL: CPT | Performed by: NURSE PRACTITIONER

## 2024-06-07 PROCEDURE — 8010000001 HC DIALYSIS - HEMODIALYSIS PER DAY

## 2024-06-07 PROCEDURE — 7100000009 HC PHASE TWO TIME - INITIAL BASE CHARGE: Performed by: HOSPITALIST

## 2024-06-07 PROCEDURE — 93458 L HRT ARTERY/VENTRICLE ANGIO: CPT | Performed by: HOSPITALIST

## 2024-06-07 PROCEDURE — 2500000001 HC RX 250 WO HCPCS SELF ADMINISTERED DRUGS (ALT 637 FOR MEDICARE OP): Performed by: NURSE PRACTITIONER

## 2024-06-07 PROCEDURE — 94640 AIRWAY INHALATION TREATMENT: CPT

## 2024-06-07 PROCEDURE — 2500000005 HC RX 250 GENERAL PHARMACY W/O HCPCS: Performed by: NURSE PRACTITIONER

## 2024-06-07 PROCEDURE — 82947 ASSAY GLUCOSE BLOOD QUANT: CPT | Mod: 91

## 2024-06-07 PROCEDURE — 2500000004 HC RX 250 GENERAL PHARMACY W/ HCPCS (ALT 636 FOR OP/ED): Performed by: HOSPITALIST

## 2024-06-07 PROCEDURE — B2181ZZ FLUOROSCOPY OF LEFT INTERNAL MAMMARY BYPASS GRAFT USING LOW OSMOLAR CONTRAST: ICD-10-PCS | Performed by: HOSPITALIST

## 2024-06-07 PROCEDURE — 36415 COLL VENOUS BLD VENIPUNCTURE: CPT | Performed by: NURSE PRACTITIONER

## 2024-06-07 PROCEDURE — 93320 DOPPLER ECHO COMPLETE: CPT

## 2024-06-07 PROCEDURE — B24BZZ4 ULTRASONOGRAPHY OF HEART WITH AORTA, TRANSESOPHAGEAL: ICD-10-PCS | Performed by: INTERNAL MEDICINE

## 2024-06-07 PROCEDURE — 1100000001 HC PRIVATE ROOM DAILY

## 2024-06-07 PROCEDURE — G0269 OCCLUSIVE DEVICE IN VEIN ART: HCPCS | Mod: TC | Performed by: HOSPITALIST

## 2024-06-07 PROCEDURE — 99152 MOD SED SAME PHYS/QHP 5/>YRS: CPT | Performed by: HOSPITALIST

## 2024-06-07 PROCEDURE — 93312 ECHO TRANSESOPHAGEAL: CPT | Performed by: INTERNAL MEDICINE

## 2024-06-07 PROCEDURE — 93459 L HRT ART/GRFT ANGIO: CPT | Performed by: HOSPITALIST

## 2024-06-07 PROCEDURE — 99233 SBSQ HOSP IP/OBS HIGH 50: CPT

## 2024-06-07 PROCEDURE — 3700000001 HC GENERAL ANESTHESIA TIME - INITIAL BASE CHARGE

## 2024-06-07 PROCEDURE — 7100000010 HC PHASE TWO TIME - EACH INCREMENTAL 1 MINUTE: Performed by: HOSPITALIST

## 2024-06-07 PROCEDURE — 2500000004 HC RX 250 GENERAL PHARMACY W/ HCPCS (ALT 636 FOR OP/ED)

## 2024-06-07 PROCEDURE — 2500000002 HC RX 250 W HCPCS SELF ADMINISTERED DRUGS (ALT 637 FOR MEDICARE OP, ALT 636 FOR OP/ED): Performed by: INTERNAL MEDICINE

## 2024-06-07 PROCEDURE — 2720000007 HC OR 272 NO HCPCS: Performed by: HOSPITALIST

## 2024-06-07 PROCEDURE — B2131ZZ FLUOROSCOPY OF MULTIPLE CORONARY ARTERY BYPASS GRAFTS USING LOW OSMOLAR CONTRAST: ICD-10-PCS | Performed by: HOSPITALIST

## 2024-06-07 PROCEDURE — 6350000001 HC RX 635 EPOETIN >10,000 UNITS: Mod: JZ | Performed by: PHARMACIST

## 2024-06-07 PROCEDURE — 85025 COMPLETE CBC W/AUTO DIFF WBC: CPT | Performed by: NURSE PRACTITIONER

## 2024-06-07 PROCEDURE — 3700000002 HC GENERAL ANESTHESIA TIME - EACH INCREMENTAL 1 MINUTE

## 2024-06-07 PROCEDURE — C1769 GUIDE WIRE: HCPCS | Performed by: HOSPITALIST

## 2024-06-07 RX ORDER — FENTANYL CITRATE 50 UG/ML
INJECTION, SOLUTION INTRAMUSCULAR; INTRAVENOUS AS NEEDED
Status: DISCONTINUED | OUTPATIENT
Start: 2024-06-07 | End: 2024-06-07 | Stop reason: HOSPADM

## 2024-06-07 RX ORDER — MIDAZOLAM HYDROCHLORIDE 1 MG/ML
INJECTION INTRAMUSCULAR; INTRAVENOUS AS NEEDED
Status: DISCONTINUED | OUTPATIENT
Start: 2024-06-07 | End: 2024-06-07

## 2024-06-07 RX ORDER — SODIUM CHLORIDE 9 MG/ML
50 INJECTION, SOLUTION INTRAVENOUS CONTINUOUS
Status: DISCONTINUED | OUTPATIENT
Start: 2024-06-07 | End: 2024-06-07

## 2024-06-07 RX ORDER — SODIUM CHLORIDE 9 MG/ML
INJECTION, SOLUTION INTRAVENOUS CONTINUOUS PRN
Status: DISCONTINUED | OUTPATIENT
Start: 2024-06-07 | End: 2024-06-07

## 2024-06-07 RX ORDER — DEXTROSE 50 % IN WATER (D50W) INTRAVENOUS SYRINGE
25 ONCE
Status: DISCONTINUED | OUTPATIENT
Start: 2024-06-07 | End: 2024-06-11 | Stop reason: HOSPADM

## 2024-06-07 RX ORDER — ALBUTEROL SULFATE 0.83 MG/ML
20 SOLUTION RESPIRATORY (INHALATION) ONCE
Status: COMPLETED | OUTPATIENT
Start: 2024-06-07 | End: 2024-06-07

## 2024-06-07 RX ORDER — SODIUM CHLORIDE 9 MG/ML
INJECTION, SOLUTION INTRAVENOUS CONTINUOUS PRN
Status: COMPLETED | OUTPATIENT
Start: 2024-06-07 | End: 2024-06-07

## 2024-06-07 RX ORDER — FAMOTIDINE 10 MG/ML
20 INJECTION INTRAVENOUS ONCE
Status: COMPLETED | OUTPATIENT
Start: 2024-06-07 | End: 2024-06-07

## 2024-06-07 RX ORDER — DEXTROSE MONOHYDRATE 100 MG/ML
50 INJECTION, SOLUTION INTRAVENOUS CONTINUOUS
Status: ACTIVE | OUTPATIENT
Start: 2024-06-07 | End: 2024-06-07

## 2024-06-07 RX ORDER — VANCOMYCIN HYDROCHLORIDE 750 MG/150ML
750 INJECTION, SOLUTION INTRAVENOUS ONCE
Status: COMPLETED | OUTPATIENT
Start: 2024-06-07 | End: 2024-06-07

## 2024-06-07 RX ORDER — PROPOFOL 10 MG/ML
INJECTION, EMULSION INTRAVENOUS AS NEEDED
Status: DISCONTINUED | OUTPATIENT
Start: 2024-06-07 | End: 2024-06-07

## 2024-06-07 RX ORDER — INSULIN GLARGINE 100 [IU]/ML
10 INJECTION, SOLUTION SUBCUTANEOUS ONCE
Status: COMPLETED | OUTPATIENT
Start: 2024-06-07 | End: 2024-06-07

## 2024-06-07 RX ORDER — LIDOCAINE HYDROCHLORIDE 20 MG/ML
15 SOLUTION OROPHARYNGEAL ONCE
Status: COMPLETED | OUTPATIENT
Start: 2024-06-07 | End: 2024-06-07

## 2024-06-07 RX ORDER — MIDAZOLAM HYDROCHLORIDE 1 MG/ML
INJECTION, SOLUTION INTRAMUSCULAR; INTRAVENOUS AS NEEDED
Status: DISCONTINUED | OUTPATIENT
Start: 2024-06-07 | End: 2024-06-07 | Stop reason: HOSPADM

## 2024-06-07 RX ORDER — LIDOCAINE HYDROCHLORIDE 20 MG/ML
INJECTION, SOLUTION INFILTRATION; PERINEURAL AS NEEDED
Status: DISCONTINUED | OUTPATIENT
Start: 2024-06-07 | End: 2024-06-07 | Stop reason: HOSPADM

## 2024-06-07 RX ORDER — ALBUTEROL SULFATE 0.83 MG/ML
SOLUTION RESPIRATORY (INHALATION)
Status: DISPENSED
Start: 2024-06-07 | End: 2024-06-07

## 2024-06-07 RX ORDER — INSULIN LISPRO 100 [IU]/ML
6 INJECTION, SOLUTION INTRAVENOUS; SUBCUTANEOUS ONCE
Status: COMPLETED | OUTPATIENT
Start: 2024-06-07 | End: 2024-06-07

## 2024-06-07 RX ORDER — SODIUM BICARBONATE 1 MEQ/ML
50 SYRINGE (ML) INTRAVENOUS ONCE
Status: DISCONTINUED | OUTPATIENT
Start: 2024-06-07 | End: 2024-06-11 | Stop reason: HOSPADM

## 2024-06-07 RX ADMIN — SODIUM ZIRCONIUM CYCLOSILICATE 10 G: 10 POWDER, FOR SUSPENSION ORAL at 23:01

## 2024-06-07 RX ADMIN — FAMOTIDINE 20 MG: 10 INJECTION, SOLUTION INTRAVENOUS at 14:54

## 2024-06-07 RX ADMIN — INSULIN LISPRO 6 UNITS: 100 INJECTION, SOLUTION INTRAVENOUS; SUBCUTANEOUS at 23:01

## 2024-06-07 RX ADMIN — PROPOFOL 50 MG: 10 INJECTION, EMULSION INTRAVENOUS at 14:37

## 2024-06-07 RX ADMIN — HEPARIN SODIUM 5000 UNITS: 5000 INJECTION INTRAVENOUS; SUBCUTANEOUS at 23:02

## 2024-06-07 RX ADMIN — LIDOCAINE HYDROCHLORIDE 15 ML: 20 SOLUTION ORAL; TOPICAL at 14:17

## 2024-06-07 RX ADMIN — ALBUTEROL SULFATE 20 MG: 2.5 SOLUTION RESPIRATORY (INHALATION) at 08:47

## 2024-06-07 RX ADMIN — INSULIN HUMAN 10 UNITS: 100 INJECTION, SOLUTION PARENTERAL at 08:38

## 2024-06-07 RX ADMIN — SODIUM CHLORIDE: 900 INJECTION, SOLUTION INTRAVENOUS at 14:04

## 2024-06-07 RX ADMIN — SACUBITRIL AND VALSARTAN 1 TABLET: 24; 26 TABLET, FILM COATED ORAL at 23:02

## 2024-06-07 RX ADMIN — EPOETIN ALFA-EPBX 10000 UNITS: 10000 INJECTION, SOLUTION INTRAVENOUS; SUBCUTANEOUS at 23:01

## 2024-06-07 RX ADMIN — PROPOFOL 50 MG: 10 INJECTION, EMULSION INTRAVENOUS at 14:22

## 2024-06-07 RX ADMIN — Medication 2 L/MIN: at 17:30

## 2024-06-07 RX ADMIN — METHYLPREDNISOLONE SODIUM SUCCINATE 40 MG: 40 INJECTION, POWDER, FOR SOLUTION INTRAMUSCULAR; INTRAVENOUS at 04:17

## 2024-06-07 RX ADMIN — DIPHENHYDRAMINE HYDROCHLORIDE 50 MG: 50 INJECTION INTRAMUSCULAR; INTRAVENOUS at 14:56

## 2024-06-07 RX ADMIN — VANCOMYCIN HYDROCHLORIDE 750 MG: 750 INJECTION, SOLUTION INTRAVENOUS at 23:01

## 2024-06-07 RX ADMIN — METHYLPREDNISOLONE SODIUM SUCCINATE 40 MG: 125 INJECTION, POWDER, LYOPHILIZED, FOR SOLUTION INTRAMUSCULAR; INTRAVENOUS at 14:58

## 2024-06-07 RX ADMIN — SODIUM CHLORIDE: 900 INJECTION, SOLUTION INTRAVENOUS at 14:10

## 2024-06-07 RX ADMIN — PROPOFOL 50 MG: 10 INJECTION, EMULSION INTRAVENOUS at 14:18

## 2024-06-07 RX ADMIN — MIDAZOLAM HYDROCHLORIDE 1 MG: 1 INJECTION, SOLUTION INTRAMUSCULAR; INTRAVENOUS at 14:18

## 2024-06-07 RX ADMIN — ACETAMINOPHEN 650 MG: 325 TABLET ORAL at 04:16

## 2024-06-07 RX ADMIN — METHYLPREDNISOLONE SODIUM SUCCINATE 40 MG: 40 INJECTION, POWDER, FOR SOLUTION INTRAMUSCULAR; INTRAVENOUS at 00:32

## 2024-06-07 RX ADMIN — INSULIN GLARGINE 10 UNITS: 100 INJECTION, SOLUTION SUBCUTANEOUS at 23:01

## 2024-06-07 RX ADMIN — MIDAZOLAM HYDROCHLORIDE 1 MG: 1 INJECTION, SOLUTION INTRAMUSCULAR; INTRAVENOUS at 14:17

## 2024-06-07 RX ADMIN — BENZOCAINE, BUTAMBEN, AND TETRACAINE HYDROCHLORIDE 2 SPRAY: .028; .004; .004 AEROSOL, SPRAY TOPICAL at 14:18

## 2024-06-07 RX ADMIN — PROPOFOL 50 MG: 10 INJECTION, EMULSION INTRAVENOUS at 14:31

## 2024-06-07 ASSESSMENT — PAIN SCALES - GENERAL
PAINLEVEL_OUTOF10: 0 - NO PAIN
PAINLEVEL_OUTOF10: 3
PAINLEVEL_OUTOF10: 0 - NO PAIN

## 2024-06-07 ASSESSMENT — ENCOUNTER SYMPTOMS
HEMATOLOGIC/LYMPHATIC NEGATIVE: 1
PSYCHIATRIC NEGATIVE: 1
GASTROINTESTINAL NEGATIVE: 1
MUSCULOSKELETAL NEGATIVE: 1
CARDIOVASCULAR NEGATIVE: 1
FATIGUE: 1
SHORTNESS OF BREATH: 1
CHILLS: 1
NEUROLOGICAL NEGATIVE: 1
ENDOCRINE NEGATIVE: 1
EYES NEGATIVE: 1
FEVER: 1
ALLERGIC/IMMUNOLOGIC NEGATIVE: 1

## 2024-06-07 ASSESSMENT — COGNITIVE AND FUNCTIONAL STATUS - GENERAL
MOBILITY SCORE: 24
DAILY ACTIVITIY SCORE: 24

## 2024-06-07 ASSESSMENT — PAIN DESCRIPTION - ORIENTATION: ORIENTATION: RIGHT

## 2024-06-07 ASSESSMENT — PAIN DESCRIPTION - LOCATION: LOCATION: FOOT

## 2024-06-07 NOTE — PROGRESS NOTES
Report to Receiving RN:    Report To: Celia Longo  Time Report Called: [id number]  Hand-Off Communication: Patient was dialyzed for 3.5H, net UF took off was 2L. He is vitaally stable, AOX4 and pressure dressing applied over Right AVF with no oozing noted.  Complications During Treatment: No  Ultrafiltration Treatment: Yes  Medications Administered During Dialysis: No  Blood Products Administered During Dialysis: No  Labs Sent During Dialysis: No  Heparin Drip Rate Changes: No  Dialysis Catheter Dressing: N/A  Last Dressing Change: N/A    Electronic Signatures:   (Signed )   Authored:    (Signed)   Authored:     Last Updated: 1:35 PM by MARTIN THOMAS

## 2024-06-07 NOTE — PROGRESS NOTES
Vancomycin Dosing by Pharmacy- FOLLOW UP    Kentrell Carreon is a 53 y.o. year old male who Pharmacy has been consulted for vancomycin dosing for line infections. Based on the patient's indication and renal status this patient is being dosed based on a goal trough/random level of 15-20.     Renal function is currently stable. On HD     Most recent random level: 15.4 mcg/mL    Visit Vitals  /72 (BP Location: Left arm, Patient Position: Sitting)   Pulse 85   Temp 36.5 °C (97.7 °F) (Oral)   Resp 18        Lab Results   Component Value Date    CREATININE 9.74 (H) 2024    CREATININE 7.94 (H) 2024    CREATININE 11.32 (H) 2024    CREATININE 9.30 (H) 2024        Patient weight is as follows:   Vitals:    24 1237   Weight: 68 kg (150 lb)       Cultures:  Susceptibility data for the encounter in last 14 days.  Collected Specimen Info Organism Ampicillin Gentamicin Synergy Penicillin Tetracycline Trimethoprim/Sulfamethoxazole Vancomycin   24 Blood culture from Peripheral Venipuncture Enterococcus faecalis         24 Blood culture from Peripheral Venipuncture Enterococcus faecalis  S  S  S  S  R  S   24 Blood culture from Peripheral Venipuncture Enterococcus faecalis              I/O last 3 completed shifts:  In: 240 (3.5 mL/kg) [P.O.:240]  Out: - (0 mL/kg)   Weight: 68 kg   I/O during current shift:  No intake/output data recorded.    Temp (24hrs), Av.5 °C (97.7 °F), Min:36.2 °C (97.2 °F), Max:36.8 °C (98.2 °F)      Assessment/Plan    Will give one dose of 750mg today after dialysis    The next level will be obtained on 6/10 at 0500. May be obtained sooner if clinically indicated.   Will continue to monitor renal function daily while on vancomycin and order serum creatinine at least every 48 hours if not already ordered.  Follow for continued vancomycin needs, clinical response, and signs/symptoms of toxicity.       Myah Williamson, PharmD

## 2024-06-07 NOTE — CARE PLAN
The patient's goals for the shift include      The clinical goals for the shift include PT WILL REMAIN FREE FROM INJURY THIS SHIFT      Problem: Pain  Goal: My pain/discomfort is manageable  Outcome: Progressing     Problem: Safety  Goal: Patient will be injury free during hospitalization  Outcome: Progressing  Goal: I will remain free of falls  Outcome: Progressing     Problem: Daily Care  Goal: Daily care needs are met  Outcome: Progressing     Problem: Psychosocial Needs  Goal: Demonstrates ability to cope with hospitalization/illness  Outcome: Progressing  Goal: Collaborate with me, my family, and caregiver to identify my specific goals  Outcome: Progressing     Problem: Discharge Barriers  Goal: My discharge needs are met  Outcome: Progressing

## 2024-06-07 NOTE — PROGRESS NOTES
PROGRESS NOTE - INTERNAL MEDICINE     PATIENT NAME:  Kentrell Carreon    MRN:  [id number]  SERVICE DATE:  6/7/2024   SERVICE TIME:  12:01 PM     ADMITTING PHYSICIAN:  Osmar Callaway MD    ASSESSMENT & PLAN:  Principal Problem:  Generalized fatigue and fever  Enterococcus faecalis and gram-positive cocci bacteremia.  Positive blood cultures for Enterococcus faecalis and gram-positive cocci     Seen by infectious disease, continue with vancomycin.  And repeat blood cultures from June 4, 2024 1 out of 2 is positive for gram-positive cocci in pairs and chains.  TTE revealed no evidence of endocarditis, , MARION pending.  Patient is transitioning today because of low ejection fraction of 20% and noted to have patent grafts to LAD and patent saphenous vein graft to circumflex and RCA.  Other active medical problems  Hyperkalemia this morning with a serum potassium of 6.8 and patient was urgently dialyzed.  End-stage renal disease on home hemodialysis, nephrology consultation requested  Patient has AV fistula right upper arm with good bruit and thrill.  Diabetes mellitus insulin-dependent, hemoglobin A1c 7.5, continue with Lantus insulin 10 units twice a day and sliding scale 4 times daily as needed  History of SLE  Hypertension stable     Chronic systolic and diastolic heart failure  Echocardiogram revealed LV ejection fraction of 20%, no vegetation seen on transthoracic echocardiogram.  Patient seen by cardiology   Continue sacubitril and other medications including Coreg.  Previous coronary artery bypass surgery     Reviewed all labs and medications and all imaging studies and discussed the plan of treatment with patient in detail.  Nephrology consultation requested.  Total time spent in examination counseling and coordinating care for this patient was greater than 35 minutes.      SUBJECTIVE  CHIEF COMPLAINT:           Chief Complaint   Patient presents with    Shortness of Breath    Patient seen and examined, he is coming  along fine, remains afebrile for past 48 hours, denies any chest pain or shortness of breath.  His blood cultures are positive for Enterococcus faecalis Patient to continue with IV vancomycin.      Repeat Blood cultures from 6/4/24, 1 out of 2 is positive for gram-positive cocci.  Transthoracic echocardiogram reviewed, cardiology has been consulted,   ID on board        INTERVAL HISTORY OF PRESENT ILLNESS: Patient seen , he is coming along fine has been afebrile . Breathing normally, no chest pain.  antibiotics continued with vancomycin. Transthoracic echocardiogram reveals significant decrease in LV ejection fraction to 20% and therefore seen by cardiology and patient  had left heart catheterization,on 06/07/24,   also scheduled for transesophageal echocardiogram to rule out endocarditis because of positive blood cultures.  Repeat blood culture  from 6/4/24 1 out of 2 is positive for gram-positive positive cocci.            MEDICATIONS:   Current Facility-Administered Medications:     acetaminophen (Tylenol) tablet 650 mg, 650 mg, oral, q6h PRN, Fletcher Whitley DO, 650 mg at 06/07/24 0416    albuterol nebulizer solution  - Omnicell Override Pull, , , ,     aspirin EC tablet 81 mg, 81 mg, oral, Daily, Osmar Callaway MD, 81 mg at 06/06/24 0851    atorvastatin (Lipitor) tablet 80 mg, 80 mg, oral, Daily, Osmar Callaway MD, 80 mg at 06/06/24 0851    carvedilol (Coreg) tablet 25 mg, 25 mg, oral, BID, SOTERO Skinner-CNP, 25 mg at 06/05/24 1720    [COMPLETED] insulin regular (HumuLIN R,NovoLIN R) injection 10 Units, 10 Units, intravenous, Once, 10 Units at 06/07/24 0838 **FOLLOWED BY** dextrose 50 % injection 25 g, 25 g, intravenous, Once **FOLLOWED BY** dextrose 10 % in water (D10W) infusion, 50 mL/hr, intravenous, Continuous, Osmar Callaway MD    dextrose 50 % injection 25 g, 25 g, intravenous, q15 min PRN, Osmar Callaway MD    diphenhydrAMINE (BENADryl) injection 50 mg, 50 mg, intravenous, Once, Fabienne ANDERSON  KATHARINE Kendall    epoetin annita-epbx (Retacrit) injection 10,000 Units, 150 Units/kg, subcutaneous, Every Mon/Wed/Fri, Shell Powell, PharmD, 10,000 Units at 06/05/24 1721    glucagon (Glucagen) injection 1 mg, 1 mg, intramuscular, q15 min PRN, Osmar Callaway MD    glucagon (Glucagen) injection 1 mg, 1 mg, intramuscular, q15 min PRN, Osmar Callaway MD    heparin (porcine) injection 5,000 Units, 5,000 Units, subcutaneous, q8h SHANA, KATHARINE Skinner, 5,000 Units at 06/06/24 2102    [Held by provider] insulin glargine (Lantus) injection 10 Units, 10 Units, subcutaneous, q12h, KATHARINE Oliveira, 10 Units at 06/04/24 2143    insulin lispro (HumaLOG) injection 0-10 Units, 0-10 Units, subcutaneous, TID, Osmar Callaway MD, 4 Units at 06/06/24 1658    methylPREDNISolone sod succinate (SOLU-Medrol) 40 mg/mL injection 40 mg, 40 mg, intravenous, q4h, KATHARINE Mirza, 40 mg at 06/07/24 0417    multivitamin with minerals 1 tablet, 1 tablet, oral, Daily, Osmar Callaway MD, 1 tablet at 06/06/24 0851    polyethylene glycol (Glycolax, Miralax) packet 17 g, 17 g, oral, Daily, Osmar Callaway MD, 17 g at 06/06/24 0851    sacubitriL-valsartan (Entresto) 24-26 mg per tablet 1 tablet, 1 tablet, oral, BID, KATHARINE Skinner, 1 tablet at 06/06/24 2057    sevelamer carbonate (Renvela) tablet 800 mg, 800 mg, oral, TID, Osmar Callaway MD, 800 mg at 06/06/24 1658    SITagliptin phosphate (Januvia) tablet 25 mg, 25 mg, oral, Daily, Osmar Callaway MD, 25 mg at 06/06/24 0851    sodium bicarbonate 8.4 % (1 mEq/mL) 50 mEq, 50 mEq, intravenous, Once, Osmar Callaway MD    sodium zirconium cyclosilicate (Lokelma) packet 10 g, 10 g, oral, q8h, Osmar Callaway MD    vancomycin (Vancocin) pharmacy to dose - pharmacy monitoring, , miscellaneous, Daily PRN, Osmar Callaway MD    vancomycin in dextrose 5 % (Vancocin) IVPB 750 mg, 750 mg, intravenous, Once, Myah Williamson, AnnetteD       "  OBJECTIVE  Visit Vitals  /80 (BP Location: Right arm, Patient Position: Sitting)   Pulse 86   Temp 36.5 °C (97.7 °F) (Temporal)   Resp 18   Ht 1.93 m (6' 4\")   Wt 68 kg (150 lb)   SpO2 95%   BMI 18.26 kg/m²   Smoking Status Former   BSA 1.91 m²      PHYSICAL EXAM:   GENERAL:  Alert, no distress, cooperative  SKIN:  Skin color, texture, turgor normal. No rashes or lesions.  OROPHARYNX:  Lips, mucosa, and tongue are normal.Teeth and gums, normal. Oropharynx normal.  NECK:  No jugulovenous distention, No carotid bruits, Carotid pulse normal contour, Supple  LUNGS:  Lungs clear to auscultation. Good diaphragmatic excursion.  CARDIAC: Rate and rhythm is regular, normal S1 and S2; no rubs,  or gallops, 3/6 systolic ejection murmur left sternal border radiating to neck and apical area consistent and moderate to severe aortic stenosis, mitral holosystolic murmur and tricuspid regurgitation murmur, no overt CHF.  ABDOMEN:  Abdomen soft, non-tender, BS normal, No masses or organomegaly  EXTREMETIES:  Extremities normal, no deformities, edema, clubbing or skin discoloration. Good capillary refill., No ulcers  AV fistula right upper arm with good bruit and thrill.  NEURO:  Alert, oriented X 3, Gait normal. Non-focal. Reflexes normal and symmetric. Sensation grossly intact., Cranial nerves II-XII intact  PULSES:  2+ radial, 2+ carotid      DATA:   Diagnostic tests reviewed for today's visit:    Results for orders placed or performed during the hospital encounter of 05/31/24 (from the past 96 hour(s))   POCT GLUCOSE   Result Value Ref Range    POCT Glucose 121 (H) 74 - 99 mg/dL   POCT GLUCOSE   Result Value Ref Range    POCT Glucose 159 (H) 74 - 99 mg/dL   POCT GLUCOSE   Result Value Ref Range    POCT Glucose 245 (H) 74 - 99 mg/dL   Urinalysis with Reflex Microscopic   Result Value Ref Range    Color, Urine Light-Yellow Light-Yellow, Yellow, Dark-Yellow    Appearance, Urine Clear Clear    Specific Gravity, Urine 1.010 " 1.005 - 1.035    pH, Urine 8.5 (N) 5.0, 5.5, 6.0, 6.5, 7.0, 7.5, 8.0    Protein, Urine 70 (1+) (A) NEGATIVE, 10 (TRACE), 20 (TRACE) mg/dL    Glucose, Urine 500 (3+) (A) Normal mg/dL    Blood, Urine NEGATIVE NEGATIVE    Ketones, Urine NEGATIVE NEGATIVE mg/dL    Bilirubin, Urine NEGATIVE NEGATIVE    Urobilinogen, Urine Normal Normal mg/dL    Nitrite, Urine NEGATIVE NEGATIVE    Leukocyte Esterase, Urine NEGATIVE NEGATIVE   Microscopic Only, Urine   Result Value Ref Range    WBC, Urine 1-5 1-5, NONE /HPF    RBC, Urine NONE NONE, 1-2, 3-5 /HPF   CBC and Auto Differential   Result Value Ref Range    WBC 5.0 4.4 - 11.3 x10*3/uL    nRBC 0.0 0.0 - 0.0 /100 WBCs    RBC 2.30 (L) 4.50 - 5.90 x10*6/uL    Hemoglobin 7.5 (L) 13.5 - 17.5 g/dL    Hematocrit 23.3 (L) 41.0 - 52.0 %     (H) 80 - 100 fL    MCH 32.6 26.0 - 34.0 pg    MCHC 32.2 32.0 - 36.0 g/dL    RDW 14.6 (H) 11.5 - 14.5 %    Platelets 198 150 - 450 x10*3/uL    Neutrophils % 68.4 40.0 - 80.0 %    Immature Granulocytes %, Automated 0.4 0.0 - 0.9 %    Lymphocytes % 10.9 13.0 - 44.0 %    Monocytes % 18.7 2.0 - 10.0 %    Eosinophils % 1.2 0.0 - 6.0 %    Basophils % 0.4 0.0 - 2.0 %    Neutrophils Absolute 3.45 1.20 - 7.70 x10*3/uL    Immature Granulocytes Absolute, Automated 0.02 0.00 - 0.70 x10*3/uL    Lymphocytes Absolute 0.55 (L) 1.20 - 4.80 x10*3/uL    Monocytes Absolute 0.94 0.10 - 1.00 x10*3/uL    Eosinophils Absolute 0.06 0.00 - 0.70 x10*3/uL    Basophils Absolute 0.02 0.00 - 0.10 x10*3/uL   Basic Metabolic Panel   Result Value Ref Range    Glucose 235 (H) 74 - 99 mg/dL    Sodium 133 (L) 136 - 145 mmol/L    Potassium 4.5 3.5 - 5.3 mmol/L    Chloride 93 (L) 98 - 107 mmol/L    Bicarbonate 27 21 - 32 mmol/L    Anion Gap 18 10 - 20 mmol/L    Urea Nitrogen 41 (H) 6 - 23 mg/dL    Creatinine 9.30 (H) 0.50 - 1.30 mg/dL    eGFR 6 (L) >60 mL/min/1.73m*2    Calcium 9.4 8.6 - 10.3 mg/dL   Blood Culture    Specimen: Peripheral Venipuncture; Blood culture   Result Value Ref  Range    Blood Culture Enterococcus faecalis (AA)     BLOOD CULTURE BOTTLE  Positive Aerobic Bottle     Gram Stain Gram positive cocci, pairs and chains (AA)    Blood Culture    Specimen: Peripheral Venipuncture; Blood culture   Result Value Ref Range    Blood Culture No growth at 2 days    Magnesium   Result Value Ref Range    Magnesium 2.10 1.60 - 2.40 mg/dL   C-Reactive Protein   Result Value Ref Range    C-Reactive Protein 8.83 (H) <1.00 mg/dL   Sedimentation Rate   Result Value Ref Range    Sedimentation Rate 25 (H) 0 - 20 mm/h   SST TOP   Result Value Ref Range    Extra Tube Hold for add-ons.    POCT GLUCOSE   Result Value Ref Range    POCT Glucose 215 (H) 74 - 99 mg/dL   POCT GLUCOSE   Result Value Ref Range    POCT Glucose 85 74 - 99 mg/dL   POCT GLUCOSE   Result Value Ref Range    POCT Glucose 89 74 - 99 mg/dL   POCT GLUCOSE   Result Value Ref Range    POCT Glucose 132 (H) 74 - 99 mg/dL   CBC and Auto Differential   Result Value Ref Range    WBC 5.6 4.4 - 11.3 x10*3/uL    nRBC 0.0 0.0 - 0.0 /100 WBCs    RBC 2.40 (L) 4.50 - 5.90 x10*6/uL    Hemoglobin 7.8 (L) 13.5 - 17.5 g/dL    Hematocrit 23.3 (L) 41.0 - 52.0 %    MCV 97 80 - 100 fL    MCH 32.5 26.0 - 34.0 pg    MCHC 33.5 32.0 - 36.0 g/dL    RDW 14.6 (H) 11.5 - 14.5 %    Platelets 217 150 - 450 x10*3/uL    Neutrophils % 64.6 40.0 - 80.0 %    Immature Granulocytes %, Automated 0.2 0.0 - 0.9 %    Lymphocytes % 15.7 13.0 - 44.0 %    Monocytes % 17.2 2.0 - 10.0 %    Eosinophils % 2.1 0.0 - 6.0 %    Basophils % 0.2 0.0 - 2.0 %    Neutrophils Absolute 3.61 1.20 - 7.70 x10*3/uL    Immature Granulocytes Absolute, Automated 0.01 0.00 - 0.70 x10*3/uL    Lymphocytes Absolute 0.88 (L) 1.20 - 4.80 x10*3/uL    Monocytes Absolute 0.96 0.10 - 1.00 x10*3/uL    Eosinophils Absolute 0.12 0.00 - 0.70 x10*3/uL    Basophils Absolute 0.01 0.00 - 0.10 x10*3/uL   Magnesium   Result Value Ref Range    Magnesium 2.40 1.60 - 2.40 mg/dL   Vancomycin   Result Value Ref Range     Vancomycin 14.2 5.0 - 20.0 ug/mL   Renal Function Panel   Result Value Ref Range    Glucose 93 74 - 99 mg/dL    Sodium 134 (L) 136 - 145 mmol/L    Potassium 4.9 3.5 - 5.3 mmol/L    Chloride 92 (L) 98 - 107 mmol/L    Bicarbonate 26 21 - 32 mmol/L    Anion Gap 21 (H) 10 - 20 mmol/L    Urea Nitrogen 54 (H) 6 - 23 mg/dL    Creatinine 11.32 (H) 0.50 - 1.30 mg/dL    eGFR 5 (L) >60 mL/min/1.73m*2    Calcium 9.5 8.6 - 10.3 mg/dL    Phosphorus 4.8 2.5 - 4.9 mg/dL    Albumin 3.7 3.4 - 5.0 g/dL   Procalcitonin   Result Value Ref Range    Procalcitonin 5.19 (H) <=0.07 ng/mL   POCT GLUCOSE   Result Value Ref Range    POCT Glucose 83 74 - 99 mg/dL   POCT GLUCOSE   Result Value Ref Range    POCT Glucose 73 (L) 74 - 99 mg/dL   POCT GLUCOSE   Result Value Ref Range    POCT Glucose 61 (L) 74 - 99 mg/dL   Transthoracic Echo (TTE) Complete   Result Value Ref Range    AV pk polina 3.05 m/s    AV mn grad 19.7 mmHg    LVOT diam 2.01 cm    LV Biplane EF 39 %    MV avg E/e' ratio 8.06     LA vol index A/L 66.0 ml/m2    Tricuspid annular plane systolic excursion 1.2 cm    RV free wall pk S' 9.00 cm/s    LV GLS 6.3 %    LVIDd 5.73 cm    RVSP 44.5 mmHg    Aortic Valve Area by Continuity of VTI 1.34 cm2    Aortic Valve Area by Continuity of Peak Velocity 1.25 cm2    AV pk grad 37.2 mmHg    LV A4C EF 29.0    POCT GLUCOSE   Result Value Ref Range    POCT Glucose 128 (H) 74 - 99 mg/dL   POCT GLUCOSE   Result Value Ref Range    POCT Glucose 251 (H) 74 - 99 mg/dL   CBC and Auto Differential   Result Value Ref Range    WBC 5.5 4.4 - 11.3 x10*3/uL    nRBC 0.0 0.0 - 0.0 /100 WBCs    RBC 2.45 (L) 4.50 - 5.90 x10*6/uL    Hemoglobin 8.0 (L) 13.5 - 17.5 g/dL    Hematocrit 25.6 (L) 41.0 - 52.0 %     (H) 80 - 100 fL    MCH 32.7 26.0 - 34.0 pg    MCHC 31.3 (L) 32.0 - 36.0 g/dL    RDW 14.8 (H) 11.5 - 14.5 %    Platelets 242 150 - 450 x10*3/uL    Immature Granulocytes %, Automated 0.5 0.0 - 0.9 %    Immature Granulocytes Absolute, Automated 0.03 0.00 - 0.70  x10*3/uL   Magnesium   Result Value Ref Range    Magnesium 2.20 1.60 - 2.40 mg/dL   Renal Function Panel   Result Value Ref Range    Glucose 259 (H) 74 - 99 mg/dL    Sodium 133 (L) 136 - 145 mmol/L    Potassium 5.0 3.5 - 5.3 mmol/L    Chloride 91 (L) 98 - 107 mmol/L    Bicarbonate 29 21 - 32 mmol/L    Anion Gap 18 10 - 20 mmol/L    Urea Nitrogen 35 (H) 6 - 23 mg/dL    Creatinine 7.94 (H) 0.50 - 1.30 mg/dL    eGFR 8 (L) >60 mL/min/1.73m*2    Calcium 9.4 8.6 - 10.3 mg/dL    Phosphorus 5.3 (H) 2.5 - 4.9 mg/dL    Albumin 3.8 3.4 - 5.0 g/dL   Manual Differential   Result Value Ref Range    Neutrophils %, Manual 59.0 40.0 - 80.0 %    Lymphocytes %, Manual 26.0 13.0 - 44.0 %    Monocytes %, Manual 13.0 2.0 - 10.0 %    Eosinophils %, Manual 1.0 0.0 - 6.0 %    Basophils %, Manual 0.0 0.0 - 2.0 %    Atypical Lymphocytes %, Manual 1.0 0.0 - 2.0 %    Seg Neutrophils Absolute, Manual 3.25 1.20 - 7.00 x10*3/uL    Lymphocytes Absolute, Manual 1.43 1.20 - 4.80 x10*3/uL    Monocytes Absolute, Manual 0.72 0.10 - 1.00 x10*3/uL    Eosinophils Absolute, Manual 0.06 0.00 - 0.70 x10*3/uL    Basophils Absolute, Manual 0.00 0.00 - 0.10 x10*3/uL    Atypical Lymphs Absolute, Manual 0.06 0.00 - 0.50 x10*3/uL    Total Cells Counted 100     RBC Morphology No significant RBC morphology present    POCT GLUCOSE   Result Value Ref Range    POCT Glucose 237 (H) 74 - 99 mg/dL   Blood Culture    Specimen: Peripheral Venipuncture; Blood culture   Result Value Ref Range    Blood Culture Loaded on Instrument - Culture in progress    Blood Culture    Specimen: Peripheral Venipuncture; Blood culture   Result Value Ref Range    Blood Culture Loaded on Instrument - Culture in progress    POCT GLUCOSE   Result Value Ref Range    POCT Glucose 225 (H) 74 - 99 mg/dL   POCT GLUCOSE   Result Value Ref Range    POCT Glucose 225 (H) 74 - 99 mg/dL   CBC and Auto Differential   Result Value Ref Range    WBC 5.5 4.4 - 11.3 x10*3/uL    nRBC 0.0 0.0 - 0.0 /100 WBCs    RBC  2.42 (L) 4.50 - 5.90 x10*6/uL    Hemoglobin 7.9 (L) 13.5 - 17.5 g/dL    Hematocrit 24.8 (L) 41.0 - 52.0 %     (H) 80 - 100 fL    MCH 32.6 26.0 - 34.0 pg    MCHC 31.9 (L) 32.0 - 36.0 g/dL    RDW 14.7 (H) 11.5 - 14.5 %    Platelets 260 150 - 450 x10*3/uL    Neutrophils % 89.5 40.0 - 80.0 %    Immature Granulocytes %, Automated 0.7 0.0 - 0.9 %    Lymphocytes % 6.5 13.0 - 44.0 %    Monocytes % 2.7 2.0 - 10.0 %    Eosinophils % 0.2 0.0 - 6.0 %    Basophils % 0.4 0.0 - 2.0 %    Neutrophils Absolute 4.95 1.20 - 7.70 x10*3/uL    Immature Granulocytes Absolute, Automated 0.04 0.00 - 0.70 x10*3/uL    Lymphocytes Absolute 0.36 (L) 1.20 - 4.80 x10*3/uL    Monocytes Absolute 0.15 0.10 - 1.00 x10*3/uL    Eosinophils Absolute 0.01 0.00 - 0.70 x10*3/uL    Basophils Absolute 0.02 0.00 - 0.10 x10*3/uL   Magnesium   Result Value Ref Range    Magnesium 2.20 1.60 - 2.40 mg/dL   Renal Function Panel   Result Value Ref Range    Glucose 397 (H) 74 - 99 mg/dL    Sodium 127 (L) 136 - 145 mmol/L    Potassium 6.8 (HH) 3.5 - 5.3 mmol/L    Chloride 88 (L) 98 - 107 mmol/L    Bicarbonate 23 21 - 32 mmol/L    Anion Gap 23 (H) 10 - 20 mmol/L    Urea Nitrogen 51 (H) 6 - 23 mg/dL    Creatinine 9.74 (H) 0.50 - 1.30 mg/dL    eGFR 6 (L) >60 mL/min/1.73m*2    Calcium 9.3 8.6 - 10.3 mg/dL    Phosphorus 5.7 (H) 2.5 - 4.9 mg/dL    Albumin 3.9 3.4 - 5.0 g/dL   Vancomycin   Result Value Ref Range    Vancomycin 15.4 5.0 - 20.0 ug/mL   POCT GLUCOSE   Result Value Ref Range    POCT Glucose 262 (H) 74 - 99 mg/dL   POCT GLUCOSE   Result Value Ref Range    POCT Glucose 193 (H) 74 - 99 mg/dL                      SIGNATURE:  Osmar Callaway MD  DATE:  June 7, 2024  TIME:  12:01 PM

## 2024-06-07 NOTE — PROGRESS NOTES
Kentrell Carreon is a 53 y.o. male on day 6 of admission presenting with Hypoxia.      Subjective   Doing better with no new complaints, seen on hemodialysis today.       Objective        Vitals 24HR  Heart Rate:  []   Temp:  [36.3 °C (97.3 °F)-37.3 °C (99.1 °F)]   Resp:  [16-18]   BP: (103-143)/(55-83)   SpO2:  [91 %-98 %]     Intake/Output last 3 Shifts:    Intake/Output Summary (Last 24 hours) at 6/7/2024 1649  Last data filed at 6/7/2024 1557  Gross per 24 hour   Intake 750 ml   Output 2001 ml   Net -1251 ml       Physical Exam    GEN appearance; awake alert no acute distress  Head and ENT; normocephalic/atraumatic/supple neck/no JVD  Lungs; CTA  Heart: RRR  Abdomen; soft no tenderness  Extremities: No edema    Dialysis access :right upper extremity AV fistula                Relevant Results     Results from last 7 days   Lab Units 06/07/24  0546 06/06/24  0543 06/05/24  0638   WBC AUTO x10*3/uL 5.5 5.5 5.6   HEMOGLOBIN g/dL 7.9* 8.0* 7.8*   HEMATOCRIT % 24.8* 25.6* 23.3*   PLATELETS AUTO x10*3/uL 260 242 217      Results from last 7 days   Lab Units 06/07/24  1354 06/07/24  0546 06/06/24  0543   SODIUM mmol/L 136 127* 133*   POTASSIUM mmol/L 4.3 6.8* 5.0   CHLORIDE mmol/L 93* 88* 91*   CO2 mmol/L 24 23 29   BUN mg/dL 25* 51* 35*   CREATININE mg/dL 4.88* 9.74* 7.94*   GLUCOSE mg/dL 192* 397* 259*   CALCIUM mg/dL 9.7 9.3 9.4        Current Facility-Administered Medications:     acetaminophen (Tylenol) tablet 650 mg, 650 mg, oral, q6h PRN, Fletcher Whitley DO, 650 mg at 06/07/24 0416    albuterol nebulizer solution  - Omnicell Override Pull, , , ,     aspirin EC tablet 81 mg, 81 mg, oral, Daily, Osmar Callaway MD, 81 mg at 06/06/24 0851    atorvastatin (Lipitor) tablet 80 mg, 80 mg, oral, Daily, Osmar Callaway MD, 80 mg at 06/06/24 0851    carvedilol (Coreg) tablet 25 mg, 25 mg, oral, BID, Yana Jaeger, SOTERO-CNP, 25 mg at 06/05/24 1720    dextrose 50 % injection 25 g, 25 g, intravenous, q15 min PRN,  Osmar Callaway MD    [COMPLETED] insulin regular (HumuLIN R,NovoLIN R) injection 10 Units, 10 Units, intravenous, Once, 10 Units at 24 0838 **FOLLOWED BY** dextrose 50 % injection 25 g, 25 g, intravenous, Once **FOLLOWED BY** [] dextrose 10 % in water (D10W) infusion, 50 mL/hr, intravenous, Continuous, Osmar Callaway MD    epoetin annita-epbx (Retacrit) injection 10,000 Units, 150 Units/kg, subcutaneous, Every Mon/Wed/Fri, Shell Powell, PharmD, 10,000 Units at 24 1721    glucagon (Glucagen) injection 1 mg, 1 mg, intramuscular, q15 min PRN, Osmar Callaway MD    glucagon (Glucagen) injection 1 mg, 1 mg, intramuscular, q15 min PRN, Osmar Callaway MD    heparin (porcine) injection 5,000 Units, 5,000 Units, subcutaneous, q8h [facility], Yana Jaeger, SOTERO-CNP, 5,000 Units at 24 210    [Held by provider] insulin glargine (Lantus) injection 10 Units, 10 Units, subcutaneous, q12h, SOTERO Oliveira-CNP, 10 Units at 24 2143    insulin lispro (HumaLOG) injection 0-10 Units, 0-10 Units, subcutaneous, TID, Osmar Callaway MD, 4 Units at 24 1658    multivitamin with minerals 1 tablet, 1 tablet, oral, Daily, Osmar Callaway MD, 1 tablet at 24 0851    oxygen (O2) therapy, , inhalation, Continuous PRN - O2/gases, SOTERO Mariee-CNP    oxygen (O2) therapy, , inhalation, Continuous - 02/gases, SOTERO Mariee-CNP    polyethylene glycol (Glycolax, Miralax) packet 17 g, 17 g, oral, Daily, Osmar Callaway MD, 17 g at 24 0851    sacubitriL-valsartan (Entresto) 24-26 mg per tablet 1 tablet, 1 tablet, oral, BID, Yana Jaeger, APRN-CNP, 1 tablet at 24    sevelamer carbonate (Renvela) tablet 800 mg, 800 mg, oral, TID, Osmar Callaway MD, 800 mg at 24 165    SITagliptin phosphate (Januvia) tablet 25 mg, 25 mg, oral, Daily, Osmar Callaway MD, 25 mg at 24 0851    sodium bicarbonate 8.4 % (1 mEq/mL) 50 mEq, 50 mEq,  intravenous, Once, Osmar Callaway MD    sodium zirconium cyclosilicate (Lokelma) packet 10 g, 10 g, oral, q8h, Osmar Callaway MD    vancomycin (Vancocin) pharmacy to dose - pharmacy monitoring, , miscellaneous, Daily PRN, Osmar Callaway MD    vancomycin in dextrose 5 % (Vancocin) IVPB 750 mg, 750 mg, intravenous, Once, Annette VallejoD           Assessment/Plan   1.  ESKD on hemodialysis on Monday/Wednesday/Friday via right upper extremity AV fistula.  Hemodialysis scheduled for today  2.  Bacteremia; following with ID  3.  Hypertension; continue current meds  4.  Ischemic cardiomyopathy, will follow with cardiology  Will continue to follow patient on daily basis reviewing all the labs and other consultants input.        Principal Problem:    Hypoxia  Active Problems:    Ischemic cardiomyopathy              I spent 35 minutes in the professional and overall care of this patient.      Yanely Balbuena MD

## 2024-06-07 NOTE — PROGRESS NOTES
"Subjective Data:  Patient resting, HD in progress  Patient denies any complaints of chest pain, dyspnea, palpitations, dizziness or lightheadedness    Overnight Events:    None reported     Objective Data:  Last Recorded Vitals:  Vitals:    24 1900 24 2300 24 0847 24 0900   BP: 143/83 131/72 120/80    BP Location: Left arm Left arm Right arm    Patient Position: Sitting Sitting Sitting    Pulse: 103 85 (!) 111 86   Resp: 16 18 18    Temp: 36.8 °C (98.2 °F) 36.5 °C (97.7 °F) 36.3 °C (97.3 °F) 36.5 °C (97.7 °F)   TempSrc: Oral Oral Temporal Temporal   SpO2: 98% 94% 95% 95%   Weight:       Height:         Medical Gas Therapy: None (Room air)  O2 Delivery Method: Nasal cannula  Weight  Av.7 kg (153 lb 9.6 oz)  Min: 68 kg (150 lb)  Max: 72.6 kg (160 lb)    LABS:  CMP:  Results from last 7 days   Lab Units 24  0546 24  0543 24  0638 24  0621 24  0718 24  2200   SODIUM mmol/L 127* 133* 134* 133* 134* 131*   POTASSIUM mmol/L 6.8* 5.0 4.9 4.5 4.3 4.4   CHLORIDE mmol/L 88* 91* 92* 93* 92* 88*   CO2 mmol/L 23 29 26 27 29 29   ANION GAP mmol/L 23* 18 21* 18 17 18   BUN mg/dL 51* 35* 54* 41* 60* 41*   CREATININE mg/dL 9.74* 7.94* 11.32* 9.30* 12.00* 8.84*   EGFR mL/min/1.73m*2 6* 8* 5* 6* 5* 7*   MAGNESIUM mg/dL 2.20 2.20 2.40 2.10  --   --    ALBUMIN g/dL 3.9 3.8 3.7  --  3.6 4.3   ALT U/L  --   --   --   --  17 20   AST U/L  --   --   --   --  21 30   BILIRUBIN TOTAL mg/dL  --   --   --   --  0.5 0.7     CBC:  Results from last 7 days   Lab Units 24  0546 24  0543 24  0638 24  0621 24  0718 24  2200   WBC AUTO x10*3/uL 5.5 5.5 5.6 5.0 4.3* 4.7   HEMOGLOBIN g/dL 7.9* 8.0* 7.8* 7.5* 8.5* 8.3*   HEMATOCRIT % 24.8* 25.6* 23.3* 23.3* 26.3* 25.5*   PLATELETS AUTO x10*3/uL 260 242 217 198 158 142*   MCV fL 103* 105* 97 101* 102* 100     COAG:     ABO: No results found for: \"ABO\"  HEME/ENDO:  Results from last 7 days   Lab Units " 06/01/24  0805   HEMOGLOBIN A1C % 7.5*      CARDIAC:   Results from last 7 days   Lab Units 05/31/24  2357 05/31/24  2200   TROPHS ng/L 794* 851*   BNP pg/mL  --  >4,700*      Results from last 7 days   Lab Units 06/01/24  0805   HEMOGLOBIN A1C % 7.5*        Last I/O:    Intake/Output Summary (Last 24 hours) at 6/7/2024 1234  Last data filed at 6/7/2024 0914  Gross per 24 hour   Intake 400 ml   Output --   Net 400 ml     Net IO Since Admission: -490 mL [06/07/24 1234]            Inpatient Medications:  Scheduled medications   Medication Dose Route Frequency    albuterol        aspirin  81 mg oral Daily    atorvastatin  80 mg oral Daily    carvedilol  25 mg oral BID    dextrose  25 g intravenous Once    diphenhydrAMINE  50 mg intravenous Once    epoetin annita or biosimilar  150 Units/kg subcutaneous Every Mon/Wed/Fri    heparin (porcine)  5,000 Units subcutaneous q8h [facility]    [Held by provider] insulin glargine  10 Units subcutaneous q12h    insulin lispro  0-10 Units subcutaneous TID    methylPREDNISolone sodium succinate (PF)  40 mg intravenous q4h    multivitamin with minerals  1 tablet oral Daily    polyethylene glycol  17 g oral Daily    sacubitriL-valsartan  1 tablet oral BID    sevelamer carbonate  800 mg oral TID    SITagliptin phosphate  25 mg oral Daily    sodium bicarbonate  50 mEq intravenous Once    sodium zirconium cyclosilicate  10 g oral q8h    vancomycin  750 mg intravenous Once     PRN medications   Medication    acetaminophen    albuterol    dextrose    glucagon    glucagon    vancomycin     Continuous Medications   Medication Dose Last Rate    dextrose 10 % in water (D10W)  50 mL/hr       Echocardiogram 6/5/24:  CONCLUSIONS:   1. Left ventricular systolic function is severely decreased with a 20% estimated ejection fraction.   2. No definite valvular vegetations were visualized.   3. Spectral Doppler shows an abnormal pattern of left ventricular diastolic filling.   4. There is eccentric left  "ventricular hypertrophy.   5. Moderately enlarged right ventricle.   6. There is reduced right ventricular systolic function.   7. The left atrium is severely dilated.   8. Absent A-wave on MV spectral Doppler tracing, consistent with atrial fibrillation.   9. Mild to moderate mitral valve regurgitation.  10. Mildly elevated RVSP.  11. Moderate aortic valve stenosis.  12. There is moderate aortic valve cusp calcification.  13. Mild aortic valve regurgitation.  14. There is global hypokinesis of the left ventricle with minor regional variations.    Physical Exam:  General: Pleasant male, alert and oriented x 3, NAD  HEENT:  Pupils equal and reactive.  Normocephalic.  Moist mucosa.    Neck:  No thyromegaly.  Normal Jugular Venous Pressure.  Cardiovascular:  Regular rate and rhythm.  + cardiac murmur. Normal S1 and S2.  Pulmonary:  Clear to auscultation bilaterally.  Abdomen:  Soft. Non-tender.   Non-distended.  Positive bowel sounds.  Lower Extremities:  2+ pedal pulses. No LE edema.  Neurologic:  Cranial nerves intact.  No focal deficit.   Skin: Skin warm and dry, normal skin turgor.   Psychiatric: Normal affect.        Assessment/Plan   HF: Dr. Lance 2/8/2024    Mr. Carreon is a 53M with a PMHx sig for CAD s/p PCI (LAD; 6/2017), CABG x 3 3/2022, stage C systolic HF/ICM/HFmrEF now HEFrEF 20% 6/2024, mod aortic stenosis, HTN, tachycardia s/p ablation, DM and discoid lupus/SLE with ESRD on home HD (M-T-TH-F via RUE AVF since 12/2016) presents to [facility] ED on May 31, 2024 with fatigue, body aches and low-grade fevers.  Patient blood cultures came back positive for Enterococcus bacillus and gram-positive cocci.  ID was consulted wanted a TTE to assess for endocarditis with possible MARION.  TTE was done yesterday which showed a decrease in EF to 20% from 40 to 45% in June of last year.  Cardiology was consulted for \"worsening cardiomyopathy and AS\".     Home cardiac medications include aspirin 81 daily, atorvastatin 80 mg " daily, Coreg 25 mg twice daily, Entresto 24/26 twice daily.       Patient currently not on telemetry     Cardiomyopathy / HFrEF  EF worse than previous. ? Related to sepsis, recurrent ischemic cardiomyopathy / graft failure, other. Volume management per nephrology. On a reasonable outpatient regimen as his renal function allows.  Aortic stenosis  Moderate. For continued outpatient monitoring.  CAD s/p CABG  ? graft failure given worsened EF. Indefinite aspirin / statin.   4. Bacteremia  MARION recommended by ID.  5. Elevated troponin  Consistent with a non-MI troponin elevation / chronic non-traumatic myocardial injury. Core measures do not apply. In the setting of ESRD, prior CABG, aortic stenosis, etc.      Recommendations   N.p.o. for MARION per ID request  And Coronary angiography planned for today pending improved repeated electrolytes after hemodialysis; K 6.8  Probable Lifevest near discharge with outpatient EF reassessment and outpatient ICD consideration.    Code Status:  Full Code    I spent 45 minutes in the professional and overall care of this patient.        Tata Ndiaye, APRN-CNP

## 2024-06-07 NOTE — PROGRESS NOTES
06/07/24 1024   Discharge Planning   Patient expects to be discharged to: home     Plan: IV atbs  Disposition: home   Barrier: MARION pending, repeat blood cx pending  ADOD: 1-2 days    Pt will dc with IV atbs with home HD. Renal will set up home IV atbs.

## 2024-06-07 NOTE — POST-PROCEDURE NOTE
Physician Transition of Care Summary  Invasive Cardiovascular Lab    Procedure Date: 6/7/2024  Attending:    Danielito Horner - Primary  Resident/Fellow/Other Assistant: Surgeons and Role:  * No surgeons found with a matching role *    Indications:   Pre-op Diagnosis     * Ischemic cardiomyopathy [I25.5]    Post-procedure diagnosis:   Post-op Diagnosis     * Ischemic cardiomyopathy [I25.5]    Procedure(s):   Left Heart Cath with Coronary Angiography and LV  40964 - CO CATH PLMT L HRT & ARTS W/NJX & ANGIO IMG S&I    Procedure Findings:   -Native coronaries: Critical distal left main disease, proximal LCx , proximal LAD , and moderate RCA disease in a co-dominant system.  -Bypasses: Patent LIMA to LAD which fills back the whole LAD system including both diagonals, patent SVG to OM1 which fills back the whole codominant LCx system, and patent SVG to right PDA.    Access of the Procedure:   5 Hebrew right CFA, closed with Vascade manual pressure.    Complications:   None.    Stents/Implants:   None.    Anticoagulation/Antiplatelet Plan:   None.    Estimated Blood Loss:   1 mL    Anesthesia: Moderate Sedation Anesthesia Staff: No anesthesia staff entered.    Any Specimen(s) Removed:   No specimens collected during this procedure.    Disposition:   -Further management as per inpatient cardiology team.      Electronically signed by: Valerie Horner MD, 6/7/2024 4:13 PM

## 2024-06-07 NOTE — H&P
History Of Present Illness  Kentrell Carreon is a 53 y.o. male presenting with bacteremia, HFrEF here for MARION and LHC. PMHx significant for CAD s/p PCI (LAD; 6/2017), CABG x 3 3/2022, stage C systolic HF/ICM/HFmrEF now HEFrEF 20% 6/2024, mod aortic stenosis, HTN, tachycardia s/p ablation, DM and discoid lupus/SLE with ESRD on home HD (M-T-TH-F via RUE AVF since 12/2016). He presented to [facility] ED on May 31, 2024 with fatigue, body aches and low-grade fevers.  He was found to have bacteremia 2/2 to E. Faecalis. TTE 6/5/24 which revealed decrease in EF to 20% from 40 to 45% in June 2023, diastolic dysfunction, moderately enlarged RV, reduced RV systolic fx, severely dilated LA, mild to moderate MR, mildly elevated RVSP, moderate aortic stenosis, mild aortic regurgitation.       Hyperkalemic this AM, tx with Insulin, D5, S/p urgent dialysis this morning.      Past Medical History  He has a past medical history of Aortic stenosis, CAD (coronary artery disease), CHF (congestive heart failure) (Multi), ESRD (end stage renal disease) (Multi), HTN (hypertension), Systemic lupus erythematosus, unspecified (Multi), and Type 1 diabetes mellitus with other diabetic kidney complication (Multi).    Surgical History  He has a past surgical history that includes Coronary angioplasty with stent (06/20/2017); Bunionectomy (Right, 08/2023); AV fistula placement; Colonoscopy; Lung surgery (Left, 05/26/2022); Coronary artery bypass graft (03/26/2022); and Toe amputation.     Social History  He reports that he has quit smoking. His smoking use included cigarettes. He has been exposed to tobacco smoke. He has never used smokeless tobacco. He reports current alcohol use. He reports that he does not use drugs.    Family History  Family History   Problem Relation Name Age of Onset    Heart failure Mother      Heart attack Father          Allergies  Iodinated contrast media, Ampicillin-sulbactam, and [place]    Home Medications  No current  "facility-administered medications on file prior to encounter.     Current Outpatient Medications on File Prior to Encounter   Medication Sig Dispense Refill    acetaminophen (Tylenol) 325 mg tablet Take 2 tablets (650 mg) by mouth every 6 hours if needed.      aspirin 81 mg EC tablet Take 1 tablet (81 mg) by mouth once daily.      atorvastatin (Lipitor) 80 mg tablet Take 1 tablet (80 mg) by mouth once daily. 90 tablet 3    carvedilol (Coreg) 25 mg tablet Take 1 tablet (25 mg) by mouth 2 times daily (morning and late afternoon).      insulin glargine (Lantus) 100 unit/mL (3 mL) pen Inject 10 units twice a day 15 mL 5    multivitamin (Daily Multi-Vitamin) tablet Take 1 tablet by mouth once daily.      sacubitriL-valsartan (Entresto) 24-26 mg tablet Take 1 tablet by mouth 2 times a day. 180 tablet 2    sevelamer carbonate (Renvela) 800 mg tablet Take 1 tablet (800 mg) by mouth 3 times daily (morning, midday, late afternoon).      SITagliptin phosphate (Januvia) 25 mg tablet Take 1 tablet (25 mg) by mouth once daily. 30 tablet 5    blood-glucose sensor (FreeStyle José Luis 3 Sensor) device Change sensor every 14 days 2 each 5    EPOETIN DEIRDRE INJ Inject 8,000 Units under the skin. With every dialysis tx      FreeStyle José Luis reader (FreeStyle José Luis 2 [place]) misc Use to check blood sugars at least every 8 hours 1 each 0    FreeStyle José Luis sensor system (FreeStyle José Luis 2 Sensor) kit Change sensor every 14 days. Check blood sugar at least every 8 hours 6 each 3    insulin lispro (HumaLOG KwikPen Insulin) 100 unit/mL injection Inject before every meal using carb ratio and correction scale. MDD 50 units. 15 mL 5    pen needle, diabetic (BD Ultra-Fine Agueda Pen Needle) 32 gauge x 5/32\" needle Use to inject insulin 5 times per day 450 each 1    [DISCONTINUED] Accu-Chek Guide test strips strip Accu-Chek Guide In Vitro Strip   Quantity: 200  Refills: 0        Start : 25-Mar-2022   Active      [DISCONTINUED] Accu-Chek Softclix Lancets " "misc       [DISCONTINUED] blood sugar diagnostic strip 1 each. 3-4x daily      [DISCONTINUED] insulin syringe-needle U-100 31G X 5/16\" 0.5 mL syringe Inject 1 each under the skin 4 times a day. With meals and at bedtime      [DISCONTINUED] Ventolin HFA 90 mcg/actuation inhaler Inhale 1-2 puffs every 6 hours if needed.            Inpatient Medications:  Scheduled medications   Medication Dose Route Frequency    albuterol        aspirin  81 mg oral Daily    atorvastatin  80 mg oral Daily    carvedilol  25 mg oral BID    dextrose  25 g intravenous Once    diphenhydrAMINE  50 mg intravenous Once    epoetin annita or biosimilar  150 Units/kg subcutaneous Every Mon/Wed/Fri    famotidine  20 mg intravenous Once    heparin (porcine)  5,000 Units subcutaneous q8h [facility]    [Held by provider] insulin glargine  10 Units subcutaneous q12h    insulin lispro  0-10 Units subcutaneous TID    methylPREDNISolone sodium succinate (PF)  40 mg intravenous q4h    multivitamin with minerals  1 tablet oral Daily    polyethylene glycol  17 g oral Daily    sacubitriL-valsartan  1 tablet oral BID    sevelamer carbonate  800 mg oral TID    SITagliptin phosphate  25 mg oral Daily    sodium bicarbonate  50 mEq intravenous Once    sodium zirconium cyclosilicate  10 g oral q8h    vancomycin  750 mg intravenous Once     PRN medications   Medication    acetaminophen    albuterol    dextrose    glucagon    glucagon    oxygen    vancomycin     Continuous Medications   Medication Dose Last Rate    sodium chloride 0.9%  50 mL/hr      sodium chloride 0.9%  50 mL/hr           Review of Systems   Constitutional:  Positive for chills, fatigue and fever.   HENT: Negative.     Eyes: Negative.    Respiratory:  Positive for shortness of breath (GILBERT).    Cardiovascular: Negative.    Gastrointestinal: Negative.    Endocrine: Negative.    Genitourinary: Negative.    Musculoskeletal: Negative.    Skin: Negative.    Allergic/Immunologic: Negative.    Neurological: " "Negative.    Hematological: Negative.    Psychiatric/Behavioral: Negative.            Physical Exam    General:  Patient is awake, alert, and oriented.  Patient is in no acute distress.  HEENT:  Pupils equal and reactive.  Normocephalic.  Moist mucosa.    Neck:  No JVD.   Cardiovascular:  Regular rate and rhythm.  +Systolic Ejection Murmur.  Radial pulses 2+.   Pulmonary:  Clear to auscultation bilaterally.  Abdomen:  Soft. Non-tender.   Non-distended.  Positive bowel sounds.  Lower Extremities:  Pedal pulses 2+ No LE edema.  Neurologic:  Cranial nerves II-XII grossly intact.   No focal deficit.   Skin: Skin warm and dry, no lesions. Normal skin turgor. RUE AVF.   Psychiatric: Normal affect.     Sedation Plan    ASA 3     Mallampati class: III.         Last Recorded Vitals  Blood pressure 133/62, pulse 80, temperature 37 °C (98.6 °F), temperature source Tympanic, resp. rate 16, height 1.93 m (6' 4\"), weight 68 kg (150 lb), SpO2 96%.         Vitals from the Past 24 Hours  Heart Rate:  []   Temp:  [36.2 °C (97.2 °F)-37 °C (98.6 °F)]   Resp:  [16-18]   BP: (120-143)/(62-83)   SpO2:  [94 %-98 %]          Relevant Results    Labs    CBC:   Recent Labs     06/07/24  0546 06/06/24  0543 06/05/24  0638 06/04/24  0621 06/02/24  0718 05/31/24  2200   WBC 5.5 5.5 5.6 5.0 4.3* 4.7   HGB 7.9* 8.0* 7.8* 7.5* 8.5* 8.3*   HCT 24.8* 25.6* 23.3* 23.3* 26.3* 25.5*    242 217 198 158 142*   * 105* 97 101* 102* 100     BMP/CMP:   Recent Labs     06/07/24  0546 06/06/24  0543 06/05/24  0638 06/04/24  0621 06/02/24  0718 05/31/24  2200 03/26/24  0758 03/25/24  2108 02/27/24  0954 02/06/24  1618 11/27/23  1005 08/07/23  0633 08/04/23  1627   * 133* 134* 133* 134* 131*   < > 139   < > 138 138   < > 131*   K 6.8* 5.0 4.9 4.5 4.3 4.4   < > 6.0*   < > 4.4 5.0   < > 4.4   CL 88* 91* 92* 93* 92* 88*   < > 96*   < > 94* 95*   < > 89*   BUN 51* 35* 54* 41* 60* 41*   < > 83*   < > 39* 65*   < > 54*   CREATININE 9.74* 7.94* " "11.32* 9.30* 12.00* 8.84*   < > 12.14*   < > 8.12* 9.48*   < > 8.62*   CO2 23 29 26 27 29 29   < > 24   < > 30 27   < > 26   CALCIUM 9.3 9.4 9.5 9.4 9.5 9.7   < > 9.2   < > 10.3 10.0   < > 9.5   PROT  --   --   --   --  6.5 7.5  --  7.8  --  7.8 8.2  --  8.6*   BILITOT  --   --   --   --  0.5 0.7  --  0.4  --  0.4 0.4  --  0.4   ALKPHOS  --   --   --   --  60 73  --  83  --  79 91  --  68   ALT  --   --   --   --  17 20  --  18  --  14 21  --  7*   AST  --   --   --   --  21 30  --  20  --  17 18  --  14   GLUCOSE 397* 259* 93 235* 106* 429*   < > 157*   < > 213* 173*   < > 340*    < > = values in this interval not displayed.      Lipid Panel:   Recent Labs     03/10/22  0702   CHOL 102   HDL 33.4*   CHHDL 3.1   VLDL 22   TRIG 112     Cardiac   Results from last 7 days   Lab Units 05/31/24  2357 05/31/24  2200   TROPHS ng/L 794* 851*   BNP pg/mL  --  >4,700*      Hemoglobin A1C:   Recent Labs     06/01/24  0805 02/27/24  1404 10/17/23  1039 07/21/23  2102 03/27/23  1153 03/10/22  0702 07/18/18  1751 12/30/17  1200   HGBA1C 7.5* 7.0* 8.3* 9.0* 9.8* 8.7* 6.4 7.3     TSH/ Free T4:   Recent Labs     02/27/24  0954 03/10/22  1429 03/10/22  0702   TSH 3.60 3.65 4.79*   FREET4  --   --  1.09     Iron:   Recent Labs     05/31/24  2200 03/25/24  2108 02/27/24  0954 03/16/23  0153 03/05/23  0652 03/07/22  0702 02/02/20  1859 07/18/18  1751   [facility]  --   --  301  --   --   --   --   --    IRONSAT  --   --  37  --   --   --   --   --    BNP >4,700* >4,700*  --  2,657* 2,168* 2,324* 1,698* 1,058*     Coag:     ABO: No results found for: \"ABO\"    Past Cardiology Tests (Last 3 Years):    EKG:  Recent Labs     05/31/24  2115 03/26/24  0255 03/25/24  2245   ATRRATE 88 89 86   VENTRATE 88 89 86   PRINT 208 206 212   QRSDUR 128 122 130   QTCFRED 475 460 480   QTCCALCB 505 491 509     Encounter Date: 05/31/24   Electrocardiogram, 12-lead PRN ACS symptoms   Result Value    Ventricular Rate 88    Atrial Rate 88    OK Interval 208    QRS " Duration 128    QT Interval 418    QTC Calculation(Bazett) 505    P Axis 68    R Axis 65    T Axis 217    QRS Count 14    Q Onset 209    P Onset 105    P Offset 171    T Offset 418    QTC Fredericia 475    Narrative    Normal sinus rhythm  Possible Left atrial enlargement  Left ventricular hypertrophy with QRS widening ( Sokolow-Márquez , Franklyn product )  Marked T wave abnormality, consider inferolateral ischemia  Abnormal ECG  When compared with ECG of 26-MAR-2024 02:50,  Questionable change in QRS axis  ST now depressed in Inferior leads  T wave inversion now evident in Inferior leads     Echo:  Echocardiogram:   Transthoracic Echo (TTE) Complete 06/05/2024    Narrative  [facility], [address] [place] [address]  Tel [phone] and Fax [phone]    TRANSTHORACIC ECHOCARDIOGRAM REPORT      Patient Name:      LUNA Lowe Physician:    [id number] Ignacio Ibrahim MD  Study Date:        6/5/2024             Ordering Provider:    [id number] LIZZ KNOTT  MRN/PID:           [id number]             Fellow:  Accession#:        [id number]         Nurse:  Date of Birth/Age: 1970 / 53 years Sonographer:          Shama Montes [facility]  Gender:            M                    Additional Staff:  Height:            193.00 cm            Admit Date:           5/31/2024  Weight:            68.00 kg             Admission Status:     Inpatient -  Routine  BSA / BMI:         1.96 m2 / 18.26      Encounter#:           [id number]  kg/m2  Department Location:  [facility] Non  Invasive  Blood Pressure: 141 /67 mmHg    Study Type:    TRANSTHORACIC ECHO (TTE) COMPLETE  Diagnosis/ICD: Bacteremia-R78.81  Indication:    Bacteremia  CPT Code:      Echo Complete w Full Doppler-[id number]    Patient History:  Diabetes:          Yes  Pertinent History: CAD, Chest Pain, HTN and CHF. ESRD, Hypoxia.    Study Detail: The following Echo studies were performed: 2D, M-Mode, Doppler and  color flow.      PHYSICIAN  INTERPRETATION:  Left Ventricle: The left ventricular systolic function is severely decreased, with an estimated ejection fraction of 20%. There is global hypokinesis of the left ventricle with minor regional variations. The left ventricular cavity size is upper limits of normal. There is eccentric left ventricular hypertrophy. Left Ventricular Global Longitudinal Strain - 6.3 %. Spectral Doppler shows an abnormal pattern of left ventricular diastolic filling.  Left Atrium: The left atrium is severely dilated.  Right Ventricle: The right ventricle is moderately enlarged. There is reduced right ventricular systolic function.  Right Atrium: The right atrium is normal in size.  Aortic Valve: The aortic valve is trileaflet. There is moderate aortic valve cusp calcification. There is reduced aortic valve non coronary cusp excursion. There is evidence of moderate aortic valve stenosis.  There is mild aortic valve regurgitation. The peak instantaneous gradient of the aortic valve is 37.2 mmHg. The mean gradient of the aortic valve is 19.7 mmHg.  Mitral Valve: The mitral valve is normal in structure. The mitral valve spectral Doppler A-wave is absent, consistent with atrial fibrillation. There is mild to moderate mitral annular calcification. There is mild to moderate mitral valve regurgitation.  Tricuspid Valve: The tricuspid valve is structurally normal. There is mild tricuspid regurgitation. The Doppler estimated RVSP is mildly elevated at 44.5 mmHg.  Pulmonic Valve: The pulmonic valve is structurally normal. There is mild pulmonic valve regurgitation.  Pericardium: There is a trivial pericardial effusion.  Aorta: The aortic root is normal.  Systemic Veins: The inferior vena cava appears dilated. There is IVC inspiratory collapse greater than 50%.  In comparison to the previous echocardiogram(s): Compared with study from 3/6/2024, the ejection fraction is comparatively reduced but was somewhat overestimated previously.  There is a greater degree of aortic stenosis presently but this was underestimated previously.      CONCLUSIONS:  1. Left ventricular systolic function is severely decreased with a 20% estimated ejection fraction.  2. No definite valvular vegetations were visualized.  3. Spectral Doppler shows an abnormal pattern of left ventricular diastolic filling.  4. There is eccentric left ventricular hypertrophy.  5. Moderately enlarged right ventricle.  6. There is reduced right ventricular systolic function.  7. The left atrium is severely dilated.  8. Absent A-wave on MV spectral Doppler tracing, consistent with atrial fibrillation.  9. Mild to moderate mitral valve regurgitation.  10. Mildly elevated RVSP.  11. Moderate aortic valve stenosis.  12. There is moderate aortic valve cusp calcification.  13. Mild aortic valve regurgitation.  14. There is global hypokinesis of the left ventricle with minor regional variations.    QUANTITATIVE DATA SUMMARY:  2D MEASUREMENTS:  Normal Ranges:  LAs:           4.98 cm    (2.7-4.0cm)  IVSd:          1.08 cm    (0.6-1.1cm)  LVPWd:         1.06 cm    (0.6-1.1cm)  LVIDd:         5.73 cm    (3.9-5.9cm)  LVIDs:         4.81 cm  LV Mass Index: 127.2 g/m2  LV % FS        16.1 %    LA VOLUME:  Normal Ranges:  LA Vol A4C:        115.9 ml   (22+/-6mL/m2)  LA Vol A2C:        131.8 ml  LA Vol BP:         129.3 ml  LA Vol Index A4C:  59.2ml/m2  LA Vol Index A2C:  67.2 ml/m2  LA Vol Index BP:   66.0 ml/m2  LA Area A4C:       30.9 cm2  LA Area A2C:       31.5 cm2  LA Major Axis A4C: 7.0 cm  LA Major Axis A2C: 6.4 cm  LA Volume Index:   65.3 ml/m2  LA Vol A4C:        111.1 ml  LA Vol A2C:        127.7 ml    RA VOLUME BY A/L METHOD:  Normal Ranges:  RA Vol A4C:        85.8 ml    (8.3-19.5ml)  RA Vol Index A4C:  43.8 ml/m2  RA Area A4C:       26.2 cm2  RA Major Axis A4C: 6.8 cm    M-MODE MEASUREMENTS:  Normal Ranges:  Ao Root: 3.10 cm (2.0-3.7cm)  LAs:     4.84 cm (2.7-4.0cm)    AORTA  MEASUREMENTS:  Normal Ranges:  Ao Sinus, d: 2.90 cm (2.1-3.5cm)  Ao STJ, d:   2.20 cm (1.7-3.4cm)  Asc Ao, d:   3.00 cm (2.1-3.4cm)    LV SYSTOLIC FUNCTION BY 2D PLANIMETRY (MOD):  Normal Ranges:  EF-A4C View:                      29.0 % (>=55%)  EF-A2C View:                      43.8 %  EF-Biplane:                       39.1 %  Global Longitudinal Strain (GLS): 6.3 %    LV DIASTOLIC FUNCTION:  Normal Ranges:  MV Peak E:    1.37 m/s (0.7-1.2 m/s)  MV e'         0.17 m/s (>8.0)  MV lateral e' 0.17 m/s  MV medial e'  0.10 m/s  E/e' Ratio:   8.06     (<8.0)    MITRAL VALVE:  Normal Ranges:  MV Vmax:    1.52 m/s (<=1.3m/s)  MV peak P.2 mmHg (<5mmHg)  MV mean P.1 mmHg (<2mmHg)  MV VTI:     23.83 cm (10-13cm)    MITRAL INSUFFICIENCY:  Normal Ranges:  MR VTI:     130.99 cm  MR Vmax:    486.88 cm/s  MR Volume:  13.47 ml  MR Flow Rt: 50.06 ml/s  MR EROA:    0.10 cm2    AORTIC VALVE:  Normal Ranges:  AoV Vmax:                3.05 m/s  (<=1.7m/s)  AoV Peak P.2 mmHg (<20mmHg)  AoV Mean P.7 mmHg (1.7-11.5mmHg)  LVOT Max Shayne:            1.20 m/s  (<=1.1m/s)  AoV VTI:                 51.47 cm  (18-25cm)  LVOT VTI:                21.65 cm  LVOT Diameter:           2.01 cm   (1.8-2.4cm)  AoV Area, VTI:           1.34 cm2  (2.5-5.5cm2)  AoV Area,Vmax:           1.25 cm2  (2.5-4.5cm2)  AoV Dimensionless Index: 0.42    AORTIC INSUFFICIENCY:  AI Vmax:       3.21 m/s  AI Half-time:  349 msec  AI Decel Time: 1203 msec  AI Decel Rate: 266.64 cm/s2      RIGHT VENTRICLE:  RV Basal 4.80 cm  RV Mid   3.00 cm  RV Major 9.9 cm  TAPSE:   12.0 mm  RV s'    0.09 m/s    TRICUSPID VALVE/RVSP:  Normal Ranges:  Peak TR Velocity: 3.02 m/s  Est. RA Pressure: 8 mmHg  RV Syst Pressure: 44.5 mmHg (< 30mmHg)  IVC Diam:         2.23 cm    PULMONIC VALVE:  Normal Ranges:  RVOT Vmax:  0.51 m/s (0.6-0.9m/s)  PV Max Shayne: 1.2 m/s  (0.6-0.9m/s)  PV Max P.6 mmHg    AORTA:  Asc Ao Diam 2.99 cm      [id number] Ignacio Ibrahim  MD  Electronically signed on 6/5/2024 at 4:50:52 PM        ** Final **    Ejection Fractions:  LV EF   Date/Time Value Ref Range Status   03/06/2024 02:34 PM 44 %      Cath:  Coronary Angiography:   Adult Cath     Narrative  [facility], Cath Lab, [address] [place] [address]    Cardiovascular Catheterization Report    Patient Name:     LUNA FINCH Performing Physician:  [id number] Roel De Santiago MD  Study Date:       3/9/2022     Verifying Physician:   [id number] Roel De Santiago MD  MRN/PID:          [id number]     Cardiologist/Co-scrub:  Accession/Order#: [id number]    Fellow:                [id number] Soham Henderson MD  YOB: 1970    Fellow:  Gender:           M            Referring Physician:   SOHAM HENDERSON  Admit Date:                    Referring Physician:   [id number] James Thomas MD  Surgeon:                       Referring Physician:   xxx      Study: Left Heart Catheterization      Indications:  LUNA FINCH is a 52 year old male who presents with dyslipidemia, hypertension, renal failure and prior percutaneous coronary intervention. Acute coronary syndrome > 24 hrs, with a chest pain assessment of typical angina. Study performed as an urgent cath procedure.    Medical History:  Stress test performed: No. CTA performed: No. [facility] accessed: No. LVEF Assessed: Yes. LVEF = 35%.    Procedure Description:  After infiltration with 2% Lidocaine, the right femoral artery was cannulated with a modified Seldinger technique. Subsequently a 4 Vietnamese sheath was placed in the right femoral artery. The arterial sheath was sized up to 6 Vietnamese. Additional catheter(s) used to visualize the coronary arteries were: EBU 3.5. Multiple injections of contrast were made into the left and right coronary arteries with angiograms recorded in multiple projections. After completion of the procedure, femoral artery angiography was performed. This demonstrated a common femoral artery puncture appropriate for closure.  An Angio-Seal VIP 6F ([employer]) vascular closure device was placed per protocol.    Coronary Angiography:  The coronary circulation is right dominant.    Left Main Coronary Artery:  The left main coronary artery is a normal caliber vessel. The left main arises normally from the left coronary sinus of Valsalva and bifurcates into the LAD and circumflex coronary arteries. The ostial left main coronary artery showed 90% stenosis.    Left Anterior Descending Coronary Artery Distribution:  The left anterior descending coronary artery is a normal caliber vessel. The LAD arises normally from the left main coronary artery. The ostial left anterior descending coronary artery showed 80% stenosis. An additional lesion, located at the proximal to mid left anterior descending coronary artery, revealed 70% in-stent restenosis.    Circumflex Coronary Artery Distribution:  The circumflex coronary artery is a normal caliber vessel. The circumflex arises normally from the left main coronary artery and terminates in the AV groove. The ostial circumflex coronary artery showed 80% stenosis. An additional lesion, located at the proximal circumflex coronary artery, demonstrated 60% stenosis.    Right Coronary Artery Distribution:    The right coronary artery is a normal caliber vessel. The RCA arises normally from the right sinus of Valsalva. The mid and distal right coronary artery showed 50% stenosis.    Coronary Interventions:    Coronary Lesion Summary:  Vessel            Stenosis         Vessel Segment  Left Main       90% stenosis           ostial  LAD             80% stenosis           ostial  LAD        70% in-stent restenosis proximal to mid  Circumflex      80% stenosis           ostial  Circumflex      60% stenosis          proximal  RCA             50% stenosis       mid and distal      Hemo Personnel:  +---------------------+-------------+  Name                 Duty            +---------------------+-------------+  Roel De Santiago MD          PROC MD 1  +---------------------+-------------+  Nimisha Ferris RN      PROC CIRC 1  +---------------------+-------------+  Ana Nicholas RN    PROC CIRC 2  +---------------------+-------------+  Cynthia TrejoPROC RECORD 1  +---------------------+-------------+  Gail Hardy RN    PROC NURSE 1  +---------------------+-------------+      Sedation Time:  +------------------------+----------------------------------------+  Sedation Start/End TimesTime                                      +------------------------+----------------------------------------+  Start                   3/9/2022 15:13:46                         +------------------------+----------------------------------------+  Drugs                   Fentanyl 25 mcg IV per physician for sed  +------------------------+----------------------------------------+  End                     3/9/2022 15:49:00                         +------------------------+----------------------------------------+      Fluoroscopy Time:  +--------------------------+--------+  X-Ray Summary Fluoro Time:7.10 min  +--------------------------+--------+      +------------+--------+  Contrast:   Dose:     +------------+--------+  Optiray_300:50.00 ml  +------------+--------+      Hemodynamic Pressures:    +----+------------------+---------+-------------+-------------+------+---------+  Site    Date Time       Phase  Systolic mmHg  Diastolic    ED  Mean mmHg                          Name                    mmHg      mmHg            +----+------------------+---------+-------------+-------------+------+---------+    AO  3/9/2022 3:20:20     Rest          174           85            103                      PM                                                    +----+------------------+---------+-------------+-------------+------+---------+     AO  3/9/2022 3:24:57     Rest          125           75             98                      PM                                                    +----+------------------+---------+-------------+-------------+------+---------+    LV  3/9/2022 3:31:59     Rest          178            1    38                               PM                                                    +----+------------------+---------+-------------+-------------+------+---------+    LV  3/9/2022 3:32:08     Rest          174            0    28                               PM                                                    +----+------------------+---------+-------------+-------------+------+---------+   LVp  3/9/2022 3:32:14     Rest          175            0    27                               PM                                                    +----+------------------+---------+-------------+-------------+------+---------+   AOp  3/9/2022 3:32:21     Rest          177            0             76                      PM                                                    +----+------------------+---------+-------------+-------------+------+---------+    AO  3/9/2022 3:34:23     Rest          188           56            104                      PM                                                    +----+------------------+---------+-------------+-------------+------+---------+    AO  3/9/2022 3:35:57     Rest          154           73            107                      PM                                                    +----+------------------+---------+-------------+-------------+------+---------+        Oxygen Saturation %:  +-----------+------------+  Sample SiteHB (g/100ml)  +-----------+------------+      SYS ART         9.0  +-----------+------------+      SYS SAMINA         9.0  +-----------+------------+       PUL ART         9.0  +-----------+------------+      PUL SAMINA         9.0  +-----------+------------+      Complications:  No in-lab complications observed.    Cardiac Cath Transition of Care Summary:  Post Procedure Diagnosis: Severe left main, ostial LAD and ostial LCX disease.  Blood Loss:               Estimated blood loss during the procedure was 10 ml  mls.  Specimens Removed:        Number of specimen(s) removed: none.      Recommendations:  Agressive risk factor modification efforts.  Lipid lowering agent or Statin therapy.  Coronary artery bypass graft surgery.  Beta blocker therapy.    ____________________________________________________________________________________  CONCLUSIONS:  1. Significant left main coronary artery disease and double vessel coronary artery disease with proximal left anterior descending involvement.  2. Left Ventricular end-diastolic pressure = 28.    ____________________________________________________________________________________  CPT Codes:  Left Heart Cath (visualization of coronaries) and LV-70893; Moderate Sedation Services initial 15 minutes patient >5 years-82120; Moderate Sedation Services 1st additional 15 minutes patient >5 years-80803; Moderate Sedation Services 2nd additional 15 minutes patient >5 years-35525; Moderate Sedation Services 3rd additional 15 minutes patient >5 years-32747; IVUS, Left Main initial Vessel (PCI)-12914.LM    ICD 10 Codes:  I21.4-Non ST elevation (NSTEMI) myocardial infarction    [id number] Roel De Santiago MD  Performing Physician  Electronically signed by [id number] Roel De Santiago MD on 3/10/2022 at 2:23:12 PM      cc Report to: [id number] JULI HENDERSON    cc Report to: [id number] James Thomas MD    cc Report to: xxx              Right Heart Cath: No results found for this or any previous visit from the past 1800 days.    Stress Test:  Nuclear:  NM CARDIAC STRESS REST (MYOCARDIAL PERFUSION MIBI) 02/08/2021    Narrative  MRN: [id number]  Patient Name: JOSETTE  LUNA    STUDY:  CARDIAC STRESS/REST INJECTION; CARDIAC STRESS/REST (MYOCARDIAL  PERFUSION/MIBI); PART 2 STRESS OR REST (NO CHARGE);  2/8/2021 10:17 am    INDICATION:  chest pain.    COMPARISON:  1/3/2019    ACCESSION NUMBER(S):  [id number]; [id number]; [id number]    ORDERING CLINICIAN:  TOÑO ARCINIEGA    TECHNIQUE:  DIVISION OF NUCLEAR MEDICINE  PHARMACOLOGIC STRESS MYOCARDIAL PERFUSION SCAN, ONE DAY PROTOCOL    The patient received an intravenous dose of 10.1 mCi of Tc-99m  Myoview and resting emission tomographic (SPECT) images of the  myocardium were acquired. The patient then received an intravenous  infusion of 0.4mg regadenoson (Lexiscan)  followed by an additional  dose of 30.6 mCi of Tc-99m  Myoview. Stress phase SPECT images of the  myocardium were then acquired. These included ECG-gated images to  assess and quantify ventricular function.  A low-dose, nondiagnostic  regional CT was utilized for attenuation correction purposes.    FINDINGS:  Both stress and rest studies demonstrate grossly normal perfusion  throughout the left ventricle. Mild inferior irregularities likely  reflect diaphragmatic attenuation artifact.    The left ventricle is severely dilated in size  ml with  stress, 206 ml with rest.    Gated images demonstrate globally reduced LV wall motion with an LV  EF estimated at 40% on rest, 46% on stress.    Attenuation correction CT images demonstrate no gross anatomic  abnormalities.    Impression  1.  Normal myocardial perfusion study without evidence of ischemia or  prior infarction.  2. The left ventricle is severely dilated in size.  3. Globally reduced LV wall motion with an LV EF estimated at 40-46%.  4. Since 1/3/2019, ejection fraction appears improved and the LV is  less dilated (205-206 ml on today's exam, vs 261 ml on 1/3/2019).  There still remains no definitive signs of ischemia or prior infarct.      I personally reviewed the images/study and I agree with the  findings  as stated. This study was interpreted at [facility], [place], [place].    Metabolic Stress: No results found for this or any previous visit from the past 1800 days.    Cardiac Imaging:  Cardiac Scoring: No results found for this or any previous visit from the past 1800 days.    Cardiac MRI: No results found for this or any previous visit from the past 1800 days.         Assessment/Plan  Assessment/Plan   Principal Problem:    Hypoxia  Active Problems:    Ischemic cardiomyopathy      #Bacteremia  #HFrEF  -MARION with Dr. Mallory 6/7/24  -Hyperkalemic this AM to 6.8, tx with Insulin, D5, S/p completion of urgent dialysis this morning. Repeat potassium 4.3.   -Planned for [facility] this afternoon with Dr. Horner following MARION recovery for further evaluation of recently reduced LVEF from 40-45% to 20%.     I spent 30 minutes in the professional and overall care of this patient.      Lucy Farooq, SOTERO-CNP

## 2024-06-07 NOTE — PROGRESS NOTES
"Pharmacy Medication History Review    Kentrell Carreon is a 53 y.o. male admitted for Hypoxia. Pharmacy reviewed the patient's [id number] medications and allergies for accuracy.    The list below reflectives the updated PTA list. Please review each medication in order reconciliation for additional clarification and justification.  Prior to Admission Medications   Prescriptions Last Dose Informant   EPOETIN DEIRDRE INJ Unknown Self   Sig: Inject 8,000 Units under the skin. With every dialysis tx   FreeStyle José Luis reader (FreeStyle José Luis 2 [place]) misc Unknown Self   Sig: Use to check blood sugars at least every 8 hours   FreeStyle José Luis sensor system (FreeStyle José Luis 2 Sensor) kit Unknown Self   Sig: Change sensor every 14 days. Check blood sugar at least every 8 hours   SITagliptin phosphate (Januvia) 25 mg tablet 6/1/2024 Self   Sig: Take 1 tablet (25 mg) by mouth once daily.   acetaminophen (Tylenol) 325 mg tablet 5/31/2024 Self   Sig: Take 2 tablets (650 mg) by mouth every 6 hours if needed.   aspirin 81 mg EC tablet 6/1/2024 Self   Sig: Take 1 tablet (81 mg) by mouth once daily.   atorvastatin (Lipitor) 80 mg tablet 6/1/2024 Self   Sig: Take 1 tablet (80 mg) by mouth once daily.   blood-glucose sensor (FreeStyle José Luis 3 Sensor) device Unknown Self   Sig: Change sensor every 14 days   carvedilol (Coreg) 25 mg tablet 6/1/2024 Self   Sig: Take 1 tablet (25 mg) by mouth 2 times daily (morning and late afternoon).   insulin glargine (Lantus) 100 unit/mL (3 mL) pen 6/1/2024 Self   Sig: Inject 10 units twice a day   insulin lispro (HumaLOG KwikPen Insulin) 100 unit/mL injection Unknown Self   Sig: Inject before every meal using carb ratio and correction scale. MDD 50 units.   multivitamin (Daily Multi-Vitamin) tablet 6/1/2024 Self   Sig: Take 1 tablet by mouth once daily.   pen needle, diabetic (BD Ultra-Fine Agueda Pen Needle) 32 gauge x 5/32\" needle Unknown Self   Sig: Use to inject insulin 5 times per day "   sacubitriL-valsartan (Entresto) 24-26 mg tablet 6/1/2024 Self   Sig: Take 1 tablet by mouth 2 times a day.   sevelamer carbonate (Renvela) 800 mg tablet 5/31/2024 Self   Sig: Take 1 tablet (800 mg) by mouth 3 times daily (morning, midday, late afternoon).      Facility-Administered Medications: None         The list below reflectives the updated allergy list. Please review each documented allergy for additional clarification and justification.  Allergies  Reviewed by Ana Anderson RN on 6/4/2024        Severity Reactions Comments    Iodinated Contrast Media High Anaphylaxis     Ampicillin-sulbactam Not Specified Other Renal Failure. AIN (INSTERSTITIAL NEPHRITIS))    [place] Low Rash, Other, Unknown             Below are additional concerns with the patient's PTA list.  Spoke to patient    Valorie Vieira

## 2024-06-07 NOTE — NURSING NOTE
Patient Velma was transported to PACU #[id number] in stable condition via bed. Hand-off report given to BRIAN eKnyon.

## 2024-06-07 NOTE — PROGRESS NOTES
"  INFECTIOUS DISEASE DAILY PROGRESS NOTE    SUBJECTIVE:    No overnight events. No new complaints. Afebrile. No rash/itching/diarrhea. K is high today, will be having dialysis. MARION may be post-poned.     OBJECTIVE:  VITALS (Last 24 Hours)  /72 (BP Location: Left arm, Patient Position: Sitting)   Pulse 85   Temp 36.5 °C (97.7 °F) (Oral)   Resp 18   Ht 1.93 m (6' 4\")   Wt 68 kg (150 lb)   SpO2 94%   BMI 18.26 kg/m²     PHYSICAL EXAM:  Gen - NAD  Abd - soft, no ttp, BS present  RUE - AVF present  Skin - no rash    ABX: IV Vanc    LABS:  Lab Results   Component Value Date    WBC 5.5 06/07/2024    HGB 7.9 (L) 06/07/2024    HCT 24.8 (L) 06/07/2024     (H) 06/07/2024     06/07/2024     Lab Results   Component Value Date    GLUCOSE 397 (H) 06/07/2024    CALCIUM 9.3 06/07/2024     (L) 06/07/2024    K 6.8 (HH) 06/07/2024    CO2 23 06/07/2024    CL 88 (L) 06/07/2024    BUN 51 (H) 06/07/2024    CREATININE 9.74 (H) 06/07/2024     Results from last 72 hours   Lab Units 06/07/24  0546   ALBUMIN g/dL 3.9     Estimated Creatinine Clearance: 8.5 mL/min (A) (by C-G formula based on SCr of 9.74 mg/dL (H)).      ASSESSMENT/PLAN:     Bacteremia due to E. Faecalis - unclear source. Blood cx 6/4 still positive. Repeat x2 6/6 pending. TTE without endocarditis. MARION pending.  ESRD on HD RUE AVF  Unasyn Allergy - limits abx options  Systolic Heart Failure - EF down to 20% now, hx CAD     IV Vancomycin dosed for iHD.    Monitoring for adverse effects of abx such as rash/itching/diarrhea - some loose stool but no frequent/watery diarrhea.     Will follow peripherally over the weekend. Please call Dr. Mccabe with questions. Thanks!    Andrew Vizcarra MD  [facility]  Office #[phone]      "

## 2024-06-07 NOTE — PROGRESS NOTES
Report from Sending RN:    Report From: Celia Longo  Recent Surgery of Procedure: No  Baseline Level of Consciousness (LOC): AOx4  Oxygen Use: No  Type:   Diabetic: Yes  Last BP Med Given Day of Dialysis: see eMAR  Last Pain Med Given: see eMAR  Lab Tests to be Obtained with Dialysis: No  Blood Transfusion to be Given During Dialysis: No  Available IV Access: Yes  Medications to be Administered During Dialysis: No  Continuous IV Infusion Running: No  Restraints on Currently or in the Last 24 Hours: No  Hand-Off Communication: Patient was admitted due to generalized fatigue and fever. He is scheduled for MARION and cardiac cath after dialysis. Patient is hyperkalemic (6.8) and needs urgent dialysis, nonetheless, vitally stable.  Dialysis Catheter Dressing:   Last Dressing Change:

## 2024-06-08 ENCOUNTER — APPOINTMENT (OUTPATIENT)
Dept: CARDIOLOGY | Facility: HOSPITAL | Age: 54
End: 2024-06-08
Payer: MEDICARE

## 2024-06-08 LAB
ALBUMIN SERPL BCP-MCNC: 4 G/DL (ref 3.4–5)
ANION GAP SERPL CALC-SCNC: 22 MMOL/L (ref 10–20)
BACTERIA BLD CULT: NORMAL
BASOPHILS # BLD AUTO: 0 X10*3/UL (ref 0–0.1)
BASOPHILS NFR BLD AUTO: 0 %
BUN SERPL-MCNC: 57 MG/DL (ref 6–23)
CALCIUM SERPL-MCNC: 9.1 MG/DL (ref 8.6–10.3)
CHLORIDE SERPL-SCNC: 86 MMOL/L (ref 98–107)
CO2 SERPL-SCNC: 23 MMOL/L (ref 21–32)
CREAT SERPL-MCNC: 6.79 MG/DL (ref 0.5–1.3)
EGFRCR SERPLBLD CKD-EPI 2021: 9 ML/MIN/1.73M*2
EJECTION FRACTION APICAL 4 CHAMBER: 48.3
EOSINOPHIL # BLD AUTO: 0 X10*3/UL (ref 0–0.7)
EOSINOPHIL NFR BLD AUTO: 0 %
ERYTHROCYTE [DISTWIDTH] IN BLOOD BY AUTOMATED COUNT: 15.4 % (ref 11.5–14.5)
GLOBAL LONGITUDINAL STRAIN: 8.7 %
GLUCOSE BLD MANUAL STRIP-MCNC: 228 MG/DL (ref 74–99)
GLUCOSE BLD MANUAL STRIP-MCNC: 231 MG/DL (ref 74–99)
GLUCOSE BLD MANUAL STRIP-MCNC: 356 MG/DL (ref 74–99)
GLUCOSE BLD MANUAL STRIP-MCNC: 516 MG/DL (ref 74–99)
GLUCOSE BLD MANUAL STRIP-MCNC: >600 MG/DL (ref 74–99)
GLUCOSE SERPL-MCNC: 766 MG/DL (ref 74–99)
GLUCOSE SERPL-MCNC: 799 MG/DL (ref 74–99)
HCT VFR BLD AUTO: 25.8 % (ref 41–52)
HGB BLD-MCNC: 8 G/DL (ref 13.5–17.5)
IMM GRANULOCYTES # BLD AUTO: 0.03 X10*3/UL (ref 0–0.7)
IMM GRANULOCYTES NFR BLD AUTO: 0.5 % (ref 0–0.9)
LEFT VENTRICLE INTERNAL DIMENSION DIASTOLE: 5.75 CM (ref 3.5–6)
LV EJECTION FRACTION BIPLANE: 50 %
LYMPHOCYTES # BLD AUTO: 0.34 X10*3/UL (ref 1.2–4.8)
LYMPHOCYTES NFR BLD AUTO: 5.5 %
MAGNESIUM SERPL-MCNC: 2.3 MG/DL (ref 1.6–2.4)
MCH RBC QN AUTO: 32.9 PG (ref 26–34)
MCHC RBC AUTO-ENTMCNC: 31 G/DL (ref 32–36)
MCV RBC AUTO: 106 FL (ref 80–100)
MONOCYTES # BLD AUTO: 0.71 X10*3/UL (ref 0.1–1)
MONOCYTES NFR BLD AUTO: 11.6 %
NEUTROPHILS # BLD AUTO: 5.05 X10*3/UL (ref 1.2–7.7)
NEUTROPHILS NFR BLD AUTO: 82.4 %
NRBC BLD-RTO: 0 /100 WBCS (ref 0–0)
PHOSPHATE SERPL-MCNC: 7.2 MG/DL (ref 2.5–4.9)
PLATELET # BLD AUTO: 319 X10*3/UL (ref 150–450)
POTASSIUM SERPL-SCNC: 5.6 MMOL/L (ref 3.5–5.3)
RBC # BLD AUTO: 2.43 X10*6/UL (ref 4.5–5.9)
RIGHT VENTRICLE PEAK SYSTOLIC PRESSURE: 35.1 MMHG
SODIUM SERPL-SCNC: 125 MMOL/L (ref 136–145)
TRICUSPID ANNULAR PLANE SYSTOLIC EXCURSION: 1.3 CM
WBC # BLD AUTO: 6.1 X10*3/UL (ref 4.4–11.3)

## 2024-06-08 PROCEDURE — 2500000004 HC RX 250 GENERAL PHARMACY W/ HCPCS (ALT 636 FOR OP/ED): Performed by: NURSE PRACTITIONER

## 2024-06-08 PROCEDURE — 36415 COLL VENOUS BLD VENIPUNCTURE: CPT | Performed by: INTERNAL MEDICINE

## 2024-06-08 PROCEDURE — 93356 MYOCRD STRAIN IMG SPCKL TRCK: CPT | Performed by: STUDENT IN AN ORGANIZED HEALTH CARE EDUCATION/TRAINING PROGRAM

## 2024-06-08 PROCEDURE — 82947 ASSAY GLUCOSE BLOOD QUANT: CPT | Performed by: INTERNAL MEDICINE

## 2024-06-08 PROCEDURE — 93308 TTE F-UP OR LMTD: CPT

## 2024-06-08 PROCEDURE — 93321 DOPPLER ECHO F-UP/LMTD STD: CPT | Performed by: STUDENT IN AN ORGANIZED HEALTH CARE EDUCATION/TRAINING PROGRAM

## 2024-06-08 PROCEDURE — 36415 COLL VENOUS BLD VENIPUNCTURE: CPT | Performed by: NURSE PRACTITIONER

## 2024-06-08 PROCEDURE — 2500000002 HC RX 250 W HCPCS SELF ADMINISTERED DRUGS (ALT 637 FOR MEDICARE OP, ALT 636 FOR OP/ED): Performed by: INTERNAL MEDICINE

## 2024-06-08 PROCEDURE — 2500000001 HC RX 250 WO HCPCS SELF ADMINISTERED DRUGS (ALT 637 FOR MEDICARE OP): Performed by: NURSE PRACTITIONER

## 2024-06-08 PROCEDURE — 83735 ASSAY OF MAGNESIUM: CPT | Performed by: NURSE PRACTITIONER

## 2024-06-08 PROCEDURE — 93325 DOPPLER ECHO COLOR FLOW MAPG: CPT | Performed by: STUDENT IN AN ORGANIZED HEALTH CARE EDUCATION/TRAINING PROGRAM

## 2024-06-08 PROCEDURE — 85025 COMPLETE CBC W/AUTO DIFF WBC: CPT | Performed by: NURSE PRACTITIONER

## 2024-06-08 PROCEDURE — 1100000001 HC PRIVATE ROOM DAILY

## 2024-06-08 PROCEDURE — 99232 SBSQ HOSP IP/OBS MODERATE 35: CPT | Performed by: INTERNAL MEDICINE

## 2024-06-08 PROCEDURE — 2500000002 HC RX 250 W HCPCS SELF ADMINISTERED DRUGS (ALT 637 FOR MEDICARE OP, ALT 636 FOR OP/ED): Mod: MUE | Performed by: NURSE PRACTITIONER

## 2024-06-08 PROCEDURE — 82947 ASSAY GLUCOSE BLOOD QUANT: CPT | Mod: 91

## 2024-06-08 PROCEDURE — 99222 1ST HOSP IP/OBS MODERATE 55: CPT | Performed by: INTERNAL MEDICINE

## 2024-06-08 PROCEDURE — 93308 TTE F-UP OR LMTD: CPT | Performed by: STUDENT IN AN ORGANIZED HEALTH CARE EDUCATION/TRAINING PROGRAM

## 2024-06-08 PROCEDURE — 99233 SBSQ HOSP IP/OBS HIGH 50: CPT

## 2024-06-08 PROCEDURE — 80069 RENAL FUNCTION PANEL: CPT | Performed by: NURSE PRACTITIONER

## 2024-06-08 RX ORDER — INSULIN LISPRO 100 [IU]/ML
0-10 INJECTION, SOLUTION INTRAVENOUS; SUBCUTANEOUS
Status: DISCONTINUED | OUTPATIENT
Start: 2024-06-09 | End: 2024-06-11 | Stop reason: HOSPADM

## 2024-06-08 RX ORDER — INSULIN LISPRO 100 [IU]/ML
6 INJECTION, SOLUTION INTRAVENOUS; SUBCUTANEOUS
Status: DISCONTINUED | OUTPATIENT
Start: 2024-06-08 | End: 2024-06-09

## 2024-06-08 RX ORDER — INSULIN LISPRO 100 [IU]/ML
0-15 INJECTION, SOLUTION INTRAVENOUS; SUBCUTANEOUS EVERY 4 HOURS
Status: DISCONTINUED | OUTPATIENT
Start: 2024-06-08 | End: 2024-06-08

## 2024-06-08 RX ORDER — INSULIN GLARGINE 100 [IU]/ML
10 INJECTION, SOLUTION SUBCUTANEOUS ONCE
Status: COMPLETED | OUTPATIENT
Start: 2024-06-08 | End: 2024-06-08

## 2024-06-08 RX ORDER — INSULIN LISPRO 100 [IU]/ML
10 INJECTION, SOLUTION INTRAVENOUS; SUBCUTANEOUS ONCE
Status: DISCONTINUED | OUTPATIENT
Start: 2024-06-08 | End: 2024-06-10

## 2024-06-08 RX ORDER — INSULIN LISPRO 100 [IU]/ML
20 INJECTION, SOLUTION INTRAVENOUS; SUBCUTANEOUS ONCE
Status: COMPLETED | OUTPATIENT
Start: 2024-06-08 | End: 2024-06-08

## 2024-06-08 RX ADMIN — INSULIN LISPRO 6 UNITS: 100 INJECTION, SOLUTION INTRAVENOUS; SUBCUTANEOUS at 16:19

## 2024-06-08 RX ADMIN — INSULIN LISPRO 6 UNITS: 100 INJECTION, SOLUTION INTRAVENOUS; SUBCUTANEOUS at 12:18

## 2024-06-08 RX ADMIN — SACUBITRIL AND VALSARTAN 1 TABLET: 24; 26 TABLET, FILM COATED ORAL at 09:00

## 2024-06-08 RX ADMIN — INSULIN LISPRO 20 UNITS: 100 INJECTION, SOLUTION INTRAVENOUS; SUBCUTANEOUS at 08:33

## 2024-06-08 RX ADMIN — INSULIN HUMAN 20 UNITS: 100 INJECTION, SUSPENSION SUBCUTANEOUS at 13:16

## 2024-06-08 RX ADMIN — SEVELAMER CARBONATE 800 MG: 800 TABLET, FILM COATED ORAL at 16:14

## 2024-06-08 RX ADMIN — INSULIN LISPRO 6 UNITS: 100 INJECTION, SOLUTION INTRAVENOUS; SUBCUTANEOUS at 21:02

## 2024-06-08 RX ADMIN — INSULIN LISPRO 15 UNITS: 100 INJECTION, SOLUTION INTRAVENOUS; SUBCUTANEOUS at 16:13

## 2024-06-08 RX ADMIN — INSULIN GLARGINE 10 UNITS: 100 INJECTION, SOLUTION SUBCUTANEOUS at 08:51

## 2024-06-08 RX ADMIN — SEVELAMER CARBONATE 800 MG: 800 TABLET, FILM COATED ORAL at 08:59

## 2024-06-08 RX ADMIN — SITAGLIPTIN 25 MG: 25 TABLET, FILM COATED ORAL at 08:59

## 2024-06-08 RX ADMIN — SACUBITRIL AND VALSARTAN 1 TABLET: 24; 26 TABLET, FILM COATED ORAL at 21:00

## 2024-06-08 RX ADMIN — SODIUM ZIRCONIUM CYCLOSILICATE 10 G: 10 POWDER, FOR SUSPENSION ORAL at 15:34

## 2024-06-08 RX ADMIN — CARVEDILOL 25 MG: 25 TABLET, FILM COATED ORAL at 08:59

## 2024-06-08 RX ADMIN — MULTIPLE VITAMINS W/ MINERALS TAB 1 TABLET: TAB at 08:58

## 2024-06-08 RX ADMIN — ATORVASTATIN CALCIUM 80 MG: 80 TABLET, FILM COATED ORAL at 08:59

## 2024-06-08 RX ADMIN — SODIUM ZIRCONIUM CYCLOSILICATE 10 G: 10 POWDER, FOR SUSPENSION ORAL at 06:22

## 2024-06-08 RX ADMIN — ASPIRIN 81 MG: 81 TABLET, COATED ORAL at 08:57

## 2024-06-08 RX ADMIN — CARVEDILOL 25 MG: 25 TABLET, FILM COATED ORAL at 16:14

## 2024-06-08 RX ADMIN — HEPARIN SODIUM 5000 UNITS: 5000 INJECTION INTRAVENOUS; SUBCUTANEOUS at 21:00

## 2024-06-08 RX ADMIN — INSULIN LISPRO 15 UNITS: 100 INJECTION, SOLUTION INTRAVENOUS; SUBCUTANEOUS at 12:18

## 2024-06-08 RX ADMIN — SEVELAMER CARBONATE 800 MG: 800 TABLET, FILM COATED ORAL at 12:19

## 2024-06-08 RX ADMIN — HEPARIN SODIUM 5000 UNITS: 5000 INJECTION INTRAVENOUS; SUBCUTANEOUS at 13:16

## 2024-06-08 RX ADMIN — INSULIN GLARGINE 10 UNITS: 100 INJECTION, SOLUTION SUBCUTANEOUS at 21:01

## 2024-06-08 RX ADMIN — HEPARIN SODIUM 5000 UNITS: 5000 INJECTION INTRAVENOUS; SUBCUTANEOUS at 06:22

## 2024-06-08 ASSESSMENT — PAIN SCALES - GENERAL
PAINLEVEL_OUTOF10: 0 - NO PAIN
PAINLEVEL_OUTOF10: 0 - NO PAIN

## 2024-06-08 ASSESSMENT — PAIN SCALES - WONG BAKER
WONGBAKER_NUMERICALRESPONSE: NO HURT
WONGBAKER_NUMERICALRESPONSE: NO HURT

## 2024-06-08 NOTE — PROGRESS NOTES
Kentrell Carreon is a 53 y.o. male on day 7 of admission presenting with Hypoxia.      Subjective   Patient was seen and evaluated and examined he is doing well no further complaints       Objective        Vitals 24HR  Heart Rate:  [80-92]   Temp:  [36.1 °C (97 °F)-37.3 °C (99.1 °F)]   Resp:  [14-20]   BP: (103-149)/(55-81)   SpO2:  [91 %-100 %]     Intake/Output last 3 Shifts:    Intake/Output Summary (Last 24 hours) at 6/8/2024 1343  Last data filed at 6/7/2024 2346  Gross per 24 hour   Intake 300 ml   Output 1 ml   Net 299 ml       Physical Exam        GEN appearance; awake alert no acute distress  Head and ENT; normocephalic/atraumatic/supple neck/no JVD  Lungs; CTA  Heart: RRR  Abdomen; soft no tenderness  Extremities: No edema     Dialysis access :right upper extremity AV fistula          Relevant Results     Results from last 7 days   Lab Units 06/08/24  0623 06/07/24  0546 06/06/24  0543   WBC AUTO x10*3/uL 6.1 5.5 5.5   HEMOGLOBIN g/dL 8.0* 7.9* 8.0*   HEMATOCRIT % 25.8* 24.8* 25.6*   PLATELETS AUTO x10*3/uL 319 260 242      Results from last 7 days   Lab Units 06/08/24  1044 06/08/24  0623 06/07/24  1354 06/07/24  0546   SODIUM mmol/L  --  125* 136 127*   POTASSIUM mmol/L  --  5.6* 4.3 6.8*   CHLORIDE mmol/L  --  86* 93* 88*   CO2 mmol/L  --  23 24 23   BUN mg/dL  --  57* 25* 51*   CREATININE mg/dL  --  6.79* 4.88* 9.74*   GLUCOSE mg/dL 766* 799* 192* 397*   CALCIUM mg/dL  --  9.1 9.7 9.3        Current Facility-Administered Medications:     acetaminophen (Tylenol) tablet 650 mg, 650 mg, oral, q6h PRN, SOTERO Mariee-CNP, 650 mg at 06/07/24 0416    aspirin EC tablet 81 mg, 81 mg, oral, Daily, KATHARINE Mariee, 81 mg at 06/08/24 0857    atorvastatin (Lipitor) tablet 80 mg, 80 mg, oral, Daily, KATHARINE Mariee, 80 mg at 06/08/24 0859    carvedilol (Coreg) tablet 25 mg, 25 mg, oral, BID, KATHARINE Mariee, 25 mg at 06/08/24 0859    dextrose 50 % injection 25 g, 25  g, intravenous, q15 min PRN, KATHARINE Mariee    [COMPLETED] insulin regular (HumuLIN R,NovoLIN R) injection 10 Units, 10 Units, intravenous, Once, 10 Units at 24 0838 **FOLLOWED BY** dextrose 50 % injection 25 g, 25 g, intravenous, Once **FOLLOWED BY** [] dextrose 10 % in water (D10W) infusion, 50 mL/hr, intravenous, Continuous, Osmar Callaway MD    epoetin annita-epbx (Retacrit) injection 10,000 Units, 150 Units/kg, subcutaneous, Every Mon/Wed/Fri, KATHARINE Mariee, 10,000 Units at 24 2301    glucagon (Glucagen) injection 1 mg, 1 mg, intramuscular, q15 min PRN, KATHARINE Mariee    glucagon (Glucagen) injection 1 mg, 1 mg, intramuscular, q15 min PRN, KATHARINE Mariee    heparin (porcine) injection 5,000 Units, 5,000 Units, subcutaneous, q8h SHANA, KATHARINE Mariee, 5,000 Units at 24 1316    insulin glargine (Lantus) injection 10 Units, 10 Units, subcutaneous, q12h, Osmar Callaway MD, 10 Units at 24 2143    insulin lispro (HumaLOG) injection 0-15 Units, 0-15 Units, subcutaneous, q4h, Abiodun Reyes MD, 15 Units at 24 1218    insulin lispro (HumaLOG) injection 10 Units, 10 Units, subcutaneous, Once, Osmar Callaway MD    insulin lispro (HumaLOG) injection 6 Units, 6 Units, subcutaneous, TID, Abiodun Reyes MD, 6 Units at 24 1218    multivitamin with minerals 1 tablet, 1 tablet, oral, Daily, KATHARINE Mariee, 1 tablet at 24 0858    oxygen (O2) therapy, , inhalation, Continuous - /, KATHARINE Mariee    polyethylene glycol (Glycolax, Miralax) packet 17 g, 17 g, oral, Daily, KATHARINE Mariee, 17 g at 24 0851    sacubitriL-valsartan (Entresto) 24-26 mg per tablet 1 tablet, 1 tablet, oral, BID, KATHARINE Mariee, 1 tablet at 24 0900    sevelamer carbonate (Renvela) tablet 800 mg, 800 mg, oral, TID, Lucy RENTERIA  Pohlchuck, APRN-CNP, 800 mg at 06/08/24 1219    SITagliptin phosphate (Januvia) tablet 25 mg, 25 mg, oral, Daily, KATHARINE Mariee, 25 mg at 06/08/24 0859    sodium bicarbonate 8.4 % (1 mEq/mL) 50 mEq, 50 mEq, intravenous, Once, KATHARINE Mariee    sodium zirconium cyclosilicate (Lokelma) packet 10 g, 10 g, oral, q8h, KATHARINE Mariee, 10 g at 06/08/24 0622    vancomycin (Vancocin) pharmacy to dose - pharmacy monitoring, , miscellaneous, Daily PRN, KATHARINE Mariee           Assessment/Plan          1.  ESKD on hemodialysis on Monday/Wednesday/Friday via right upper extremity AV fistula.  Hemodialysis scheduled for today  2.  Bacteremia; following with ID  3.  Hypertension; continue current meds  4.  Ischemic cardiomyopathy, will follow with cardiology  Will continue to follow patient on daily basis reviewing all the labs and other consultants input.     Hemodialysis scheduled for Monday if patient remains in-house.          Principal Problem:    Hypoxia  Active Problems:    Ischemic cardiomyopathy              I spent 35 minutes in the professional and overall care of this patient.      Yanely Balbuena MD

## 2024-06-08 NOTE — NURSING NOTE
"Pts morning glucose 799. 10 units of lispro and 20 of lantus given per provider. Instructed to recheck in an hour. Recheck POCT BG reading HI on glucometer. Pt agitated and yelling. Pt states \"You are idiots. It;s because I didn't get my lantus last night.\" Dr Callaway notified of result and other labs. Provider ordered 10 units of of lispro again and states that he will be in in one hour to see the pt. This nurse went into the room to administer the second dose and the pt states \"I already did it myself. I gave myself 10 units.\" Pt refused insulin ordered via MAR.  "

## 2024-06-08 NOTE — CONSULTS
Inpatient consult to Endocrinology  Consult performed by: Abiodun Reyes MD  Consult ordered by: Osmar Callaway MD  Reason for consult: ESRD on dialysis, bacteremia with E faecalis, diabetes mellitus type 2 on Lantus and sliding scale. Was doing well however since this morning blood sugars have shot up to over 700.          Reason For Consult  ESRD on dialysis, bacteremia with E faecalis, diabetes mellitus type 2 on Lantus and sliding scale. Was doing well however since this morning blood sugars have shot up to over 700.     History Of Present Illness  Kentrell Carreon is a 53 y.o. male presenting with fatigue, body aches and low-grade fevers.  He was found to have bacteremia secondary to E. Faecalis. TTE was performed on 6/5/24 which revealed decrease in EF to 20% from 40 to 45% in June 2023, diastolic dysfunction, moderately enlarged RV, reduced RV systolic fx, severely dilated LA, mild to moderate MR, mildly elevated RVSP, moderate aortic stenosis, mild aortic regurgitation.       An endocrinology consult has been placed as the patient developed markedly elevated glucose values today.  It is noted that he received Solu-Medrol 40 mg IV yesterday at 14:58.  The patient was not keen on this as he states that his glucose values will get messed up for 3 days following.    Outpatient endocrinology notes have been reviewed.  His home regimen consists of:  Lantus 8-10 units subcutaneous twice daily   I:C 1:15g of carb for breakfast and lunch; 1:13g for dinner '    He is tolerating meals.       Past Medical History  He has a past medical history of Aortic stenosis, CAD (coronary artery disease), CHF (congestive heart failure) (Multi), ESRD (end stage renal disease) (Multi), HTN (hypertension), Systemic lupus erythematosus, unspecified (Multi), and Type 1 diabetes mellitus with other diabetic kidney complication (Multi).    Surgical History  He has a past surgical history that includes Coronary angioplasty with  "stent (06/20/2017); Bunionectomy (Right, 08/2023); AV fistula placement; Colonoscopy; Lung surgery (Left, 05/26/2022); Coronary artery bypass graft (03/26/2022); and Toe amputation.     Social History  He reports that he has quit smoking. His smoking use included cigarettes. He has been exposed to tobacco smoke. He has never used smokeless tobacco. He reports current alcohol use. He reports that he does not use drugs.    Family History  Family History   Problem Relation Name Age of Onset    Heart failure Mother      Heart attack Father          Allergies  Iodinated contrast media, Ampicillin-sulbactam, and [place]    Review of Systems   HENT:          Dry mouth    All other systems reviewed and are negative.       Physical Exam  Vitals and nursing note reviewed.   Constitutional:       General: He is not in acute distress.     Appearance: Normal appearance. He is normal weight.   HENT:      Head: Normocephalic and atraumatic.      Nose: Nose normal.      Mouth/Throat:      Mouth: Mucous membranes are moist.   Eyes:      Extraocular Movements: Extraocular movements intact.   Pulmonary:      Effort: Pulmonary effort is normal.   Musculoskeletal:         General: Normal range of motion.   Neurological:      Mental Status: He is alert and oriented to person, place, and time.   Psychiatric:         Mood and Affect: Mood normal.          Last Recorded Vitals  Blood pressure 127/75, pulse 87, temperature 36.6 °C (97.8 °F), temperature source Oral, resp. rate 17, height 1.93 m (6' 4\"), weight 68 kg (150 lb), SpO2 95%.    Relevant Results  Scheduled medications  aspirin, 81 mg, oral, Daily  atorvastatin, 80 mg, oral, Daily  carvedilol, 25 mg, oral, BID  dextrose, 25 g, intravenous, Once  epoetin annita or biosimilar, 150 Units/kg, subcutaneous, Every Mon/Wed/Fri  heparin (porcine), 5,000 Units, subcutaneous, q8h SHANA  [Held by provider] insulin glargine, 10 Units, subcutaneous, q12h  insulin lispro, 0-10 Units, subcutaneous, " TID  insulin lispro, 10 Units, subcutaneous, Once  multivitamin with minerals, 1 tablet, oral, Daily  oxygen, , inhalation, Continuous - 02/gases  polyethylene glycol, 17 g, oral, Daily  sacubitriL-valsartan, 1 tablet, oral, BID  sevelamer carbonate, 800 mg, oral, TID  SITagliptin phosphate, 25 mg, oral, Daily  sodium bicarbonate, 50 mEq, intravenous, Once  sodium zirconium cyclosilicate, 10 g, oral, q8h      Continuous medications     PRN medications  PRN medications: acetaminophen, dextrose, glucagon, glucagon, vancomycin    Results for orders placed or performed during the hospital encounter of 05/31/24 (from the past 96 hour(s))   POCT GLUCOSE   Result Value Ref Range    POCT Glucose 85 74 - 99 mg/dL   POCT GLUCOSE   Result Value Ref Range    POCT Glucose 89 74 - 99 mg/dL   POCT GLUCOSE   Result Value Ref Range    POCT Glucose 132 (H) 74 - 99 mg/dL   CBC and Auto Differential   Result Value Ref Range    WBC 5.6 4.4 - 11.3 x10*3/uL    nRBC 0.0 0.0 - 0.0 /100 WBCs    RBC 2.40 (L) 4.50 - 5.90 x10*6/uL    Hemoglobin 7.8 (L) 13.5 - 17.5 g/dL    Hematocrit 23.3 (L) 41.0 - 52.0 %    MCV 97 80 - 100 fL    MCH 32.5 26.0 - 34.0 pg    MCHC 33.5 32.0 - 36.0 g/dL    RDW 14.6 (H) 11.5 - 14.5 %    Platelets 217 150 - 450 x10*3/uL    Neutrophils % 64.6 40.0 - 80.0 %    Immature Granulocytes %, Automated 0.2 0.0 - 0.9 %    Lymphocytes % 15.7 13.0 - 44.0 %    Monocytes % 17.2 2.0 - 10.0 %    Eosinophils % 2.1 0.0 - 6.0 %    Basophils % 0.2 0.0 - 2.0 %    Neutrophils Absolute 3.61 1.20 - 7.70 x10*3/uL    Immature Granulocytes Absolute, Automated 0.01 0.00 - 0.70 x10*3/uL    Lymphocytes Absolute 0.88 (L) 1.20 - 4.80 x10*3/uL    Monocytes Absolute 0.96 0.10 - 1.00 x10*3/uL    Eosinophils Absolute 0.12 0.00 - 0.70 x10*3/uL    Basophils Absolute 0.01 0.00 - 0.10 x10*3/uL   Magnesium   Result Value Ref Range    Magnesium 2.40 1.60 - 2.40 mg/dL   Vancomycin   Result Value Ref Range    Vancomycin 14.2 5.0 - 20.0 ug/mL   Renal Function  Panel   Result Value Ref Range    Glucose 93 74 - 99 mg/dL    Sodium 134 (L) 136 - 145 mmol/L    Potassium 4.9 3.5 - 5.3 mmol/L    Chloride 92 (L) 98 - 107 mmol/L    Bicarbonate 26 21 - 32 mmol/L    Anion Gap 21 (H) 10 - 20 mmol/L    Urea Nitrogen 54 (H) 6 - 23 mg/dL    Creatinine 11.32 (H) 0.50 - 1.30 mg/dL    eGFR 5 (L) >60 mL/min/1.73m*2    Calcium 9.5 8.6 - 10.3 mg/dL    Phosphorus 4.8 2.5 - 4.9 mg/dL    Albumin 3.7 3.4 - 5.0 g/dL   Procalcitonin   Result Value Ref Range    Procalcitonin 5.19 (H) <=0.07 ng/mL   POCT GLUCOSE   Result Value Ref Range    POCT Glucose 83 74 - 99 mg/dL   POCT GLUCOSE   Result Value Ref Range    POCT Glucose 73 (L) 74 - 99 mg/dL   POCT GLUCOSE   Result Value Ref Range    POCT Glucose 61 (L) 74 - 99 mg/dL   Transthoracic Echo (TTE) Complete   Result Value Ref Range    AV pk polina 3.05 m/s    AV mn grad 19.7 mmHg    LVOT diam 2.01 cm    LV Biplane EF 39 %    MV avg E/e' ratio 8.06     LA vol index A/L 66.0 ml/m2    Tricuspid annular plane systolic excursion 1.2 cm    RV free wall pk S' 9.00 cm/s    LV GLS 6.3 %    LVIDd 5.73 cm    RVSP 44.5 mmHg    Aortic Valve Area by Continuity of VTI 1.34 cm2    Aortic Valve Area by Continuity of Peak Velocity 1.25 cm2    AV pk grad 37.2 mmHg    LV A4C EF 29.0    POCT GLUCOSE   Result Value Ref Range    POCT Glucose 128 (H) 74 - 99 mg/dL   POCT GLUCOSE   Result Value Ref Range    POCT Glucose 251 (H) 74 - 99 mg/dL   CBC and Auto Differential   Result Value Ref Range    WBC 5.5 4.4 - 11.3 x10*3/uL    nRBC 0.0 0.0 - 0.0 /100 WBCs    RBC 2.45 (L) 4.50 - 5.90 x10*6/uL    Hemoglobin 8.0 (L) 13.5 - 17.5 g/dL    Hematocrit 25.6 (L) 41.0 - 52.0 %     (H) 80 - 100 fL    MCH 32.7 26.0 - 34.0 pg    MCHC 31.3 (L) 32.0 - 36.0 g/dL    RDW 14.8 (H) 11.5 - 14.5 %    Platelets 242 150 - 450 x10*3/uL    Immature Granulocytes %, Automated 0.5 0.0 - 0.9 %    Immature Granulocytes Absolute, Automated 0.03 0.00 - 0.70 x10*3/uL   Magnesium   Result Value Ref Range     Magnesium 2.20 1.60 - 2.40 mg/dL   Renal Function Panel   Result Value Ref Range    Glucose 259 (H) 74 - 99 mg/dL    Sodium 133 (L) 136 - 145 mmol/L    Potassium 5.0 3.5 - 5.3 mmol/L    Chloride 91 (L) 98 - 107 mmol/L    Bicarbonate 29 21 - 32 mmol/L    Anion Gap 18 10 - 20 mmol/L    Urea Nitrogen 35 (H) 6 - 23 mg/dL    Creatinine 7.94 (H) 0.50 - 1.30 mg/dL    eGFR 8 (L) >60 mL/min/1.73m*2    Calcium 9.4 8.6 - 10.3 mg/dL    Phosphorus 5.3 (H) 2.5 - 4.9 mg/dL    Albumin 3.8 3.4 - 5.0 g/dL   Manual Differential   Result Value Ref Range    Neutrophils %, Manual 59.0 40.0 - 80.0 %    Lymphocytes %, Manual 26.0 13.0 - 44.0 %    Monocytes %, Manual 13.0 2.0 - 10.0 %    Eosinophils %, Manual 1.0 0.0 - 6.0 %    Basophils %, Manual 0.0 0.0 - 2.0 %    Atypical Lymphocytes %, Manual 1.0 0.0 - 2.0 %    Seg Neutrophils Absolute, Manual 3.25 1.20 - 7.00 x10*3/uL    Lymphocytes Absolute, Manual 1.43 1.20 - 4.80 x10*3/uL    Monocytes Absolute, Manual 0.72 0.10 - 1.00 x10*3/uL    Eosinophils Absolute, Manual 0.06 0.00 - 0.70 x10*3/uL    Basophils Absolute, Manual 0.00 0.00 - 0.10 x10*3/uL    Atypical Lymphs Absolute, Manual 0.06 0.00 - 0.50 x10*3/uL    Total Cells Counted 100     RBC Morphology No significant RBC morphology present    POCT GLUCOSE   Result Value Ref Range    POCT Glucose 237 (H) 74 - 99 mg/dL   Blood Culture    Specimen: Peripheral Venipuncture; Blood culture   Result Value Ref Range    Blood Culture No growth at 1 day    Blood Culture    Specimen: Peripheral Venipuncture; Blood culture   Result Value Ref Range    Blood Culture No growth at 1 day    POCT GLUCOSE   Result Value Ref Range    POCT Glucose 225 (H) 74 - 99 mg/dL   POCT GLUCOSE   Result Value Ref Range    POCT Glucose 225 (H) 74 - 99 mg/dL   CBC and Auto Differential   Result Value Ref Range    WBC 5.5 4.4 - 11.3 x10*3/uL    nRBC 0.0 0.0 - 0.0 /100 WBCs    RBC 2.42 (L) 4.50 - 5.90 x10*6/uL    Hemoglobin 7.9 (L) 13.5 - 17.5 g/dL    Hematocrit 24.8 (L) 41.0  - 52.0 %     (H) 80 - 100 fL    MCH 32.6 26.0 - 34.0 pg    MCHC 31.9 (L) 32.0 - 36.0 g/dL    RDW 14.7 (H) 11.5 - 14.5 %    Platelets 260 150 - 450 x10*3/uL    Neutrophils % 89.5 40.0 - 80.0 %    Immature Granulocytes %, Automated 0.7 0.0 - 0.9 %    Lymphocytes % 6.5 13.0 - 44.0 %    Monocytes % 2.7 2.0 - 10.0 %    Eosinophils % 0.2 0.0 - 6.0 %    Basophils % 0.4 0.0 - 2.0 %    Neutrophils Absolute 4.95 1.20 - 7.70 x10*3/uL    Immature Granulocytes Absolute, Automated 0.04 0.00 - 0.70 x10*3/uL    Lymphocytes Absolute 0.36 (L) 1.20 - 4.80 x10*3/uL    Monocytes Absolute 0.15 0.10 - 1.00 x10*3/uL    Eosinophils Absolute 0.01 0.00 - 0.70 x10*3/uL    Basophils Absolute 0.02 0.00 - 0.10 x10*3/uL   Magnesium   Result Value Ref Range    Magnesium 2.20 1.60 - 2.40 mg/dL   Renal Function Panel   Result Value Ref Range    Glucose 397 (H) 74 - 99 mg/dL    Sodium 127 (L) 136 - 145 mmol/L    Potassium 6.8 (HH) 3.5 - 5.3 mmol/L    Chloride 88 (L) 98 - 107 mmol/L    Bicarbonate 23 21 - 32 mmol/L    Anion Gap 23 (H) 10 - 20 mmol/L    Urea Nitrogen 51 (H) 6 - 23 mg/dL    Creatinine 9.74 (H) 0.50 - 1.30 mg/dL    eGFR 6 (L) >60 mL/min/1.73m*2    Calcium 9.3 8.6 - 10.3 mg/dL    Phosphorus 5.7 (H) 2.5 - 4.9 mg/dL    Albumin 3.9 3.4 - 5.0 g/dL   Vancomycin   Result Value Ref Range    Vancomycin 15.4 5.0 - 20.0 ug/mL   POCT GLUCOSE   Result Value Ref Range    POCT Glucose 262 (H) 74 - 99 mg/dL   POCT GLUCOSE   Result Value Ref Range    POCT Glucose 193 (H) 74 - 99 mg/dL   Basic Metabolic Panel   Result Value Ref Range    Glucose 192 (H) 74 - 99 mg/dL    Sodium 136 136 - 145 mmol/L    Potassium 4.3 3.5 - 5.3 mmol/L    Chloride 93 (L) 98 - 107 mmol/L    Bicarbonate 24 21 - 32 mmol/L    Anion Gap 23 (H) 10 - 20 mmol/L    Urea Nitrogen 25 (H) 6 - 23 mg/dL    Creatinine 4.88 (H) 0.50 - 1.30 mg/dL    eGFR 14 (L) >60 mL/min/1.73m*2    Calcium 9.7 8.6 - 10.3 mg/dL   POCT GLUCOSE   Result Value Ref Range    POCT Glucose 278 (H) 74 - 99 mg/dL    POCT GLUCOSE   Result Value Ref Range    POCT Glucose 451 (H) 74 - 99 mg/dL   CBC and Auto Differential   Result Value Ref Range    WBC 6.1 4.4 - 11.3 x10*3/uL    nRBC 0.0 0.0 - 0.0 /100 WBCs    RBC 2.43 (L) 4.50 - 5.90 x10*6/uL    Hemoglobin 8.0 (L) 13.5 - 17.5 g/dL    Hematocrit 25.8 (L) 41.0 - 52.0 %     (H) 80 - 100 fL    MCH 32.9 26.0 - 34.0 pg    MCHC 31.0 (L) 32.0 - 36.0 g/dL    RDW 15.4 (H) 11.5 - 14.5 %    Platelets 319 150 - 450 x10*3/uL    Neutrophils % 82.4 40.0 - 80.0 %    Immature Granulocytes %, Automated 0.5 0.0 - 0.9 %    Lymphocytes % 5.5 13.0 - 44.0 %    Monocytes % 11.6 2.0 - 10.0 %    Eosinophils % 0.0 0.0 - 6.0 %    Basophils % 0.0 0.0 - 2.0 %    Neutrophils Absolute 5.05 1.20 - 7.70 x10*3/uL    Immature Granulocytes Absolute, Automated 0.03 0.00 - 0.70 x10*3/uL    Lymphocytes Absolute 0.34 (L) 1.20 - 4.80 x10*3/uL    Monocytes Absolute 0.71 0.10 - 1.00 x10*3/uL    Eosinophils Absolute 0.00 0.00 - 0.70 x10*3/uL    Basophils Absolute 0.00 0.00 - 0.10 x10*3/uL   Magnesium   Result Value Ref Range    Magnesium 2.30 1.60 - 2.40 mg/dL   Renal Function Panel   Result Value Ref Range    Glucose 799 (HH) 74 - 99 mg/dL    Sodium 125 (L) 136 - 145 mmol/L    Potassium 5.6 (H) 3.5 - 5.3 mmol/L    Chloride 86 (L) 98 - 107 mmol/L    Bicarbonate 23 21 - 32 mmol/L    Anion Gap 22 (H) 10 - 20 mmol/L    Urea Nitrogen 57 (H) 6 - 23 mg/dL    Creatinine 6.79 (H) 0.50 - 1.30 mg/dL    eGFR 9 (L) >60 mL/min/1.73m*2    Calcium 9.1 8.6 - 10.3 mg/dL    Phosphorus 7.2 (H) 2.5 - 4.9 mg/dL    Albumin 4.0 3.4 - 5.0 g/dL   POCT GLUCOSE   Result Value Ref Range    POCT Glucose >600 (H) 74 - 99 mg/dL   POCT GLUCOSE   Result Value Ref Range    POCT Glucose >600 (H) 74 - 99 mg/dL      Transesophageal Echo (MARION)    Result Date: 6/7/2024   [facility], [address] [place] [address]              Tel [phone] and Fax [phone] TRANSESOPHAGEAL ECHOCARDIOGRAM REPORT  Patient Name:      LUNA FINCH         Reading Physician:    [id number] Ignacio Ibrahim MD Study Date:        6/7/2024            Ordering Provider:    [id number] STANLEY DUPONTGIOVANNY MRN/PID:           [id number]            Fellow: Accession#:        [id number]        Nurse:                Feng Whalen RN Date of Birth/Age: 1970 / 53      Sonographer:          Shama Montes RDCS                    years Gender:            M                   Additional Staff:     Melissa Ann CRNA Height:            193.04 cm           Admit Date:           5/31/2024 Weight:            68.04 kg            Admission Status:     Inpatient -                                                              Routine BSA / BMI:         1.96 m2 / 18.26     Encounter#:           [id number]                    kg/m2                                        Department Location:  [facility] Cath                                                              Lab Blood Pressure: 133 /62 mmHg Study Type:    TRANSESOPHAGEAL ECHO (MARION) Diagnosis/ICD: Bacteremia-R78.81 Indication:    R/o Endocarditis CPT Code:      MARION Complete-09170; Doppler Limited-30810; Color Doppler-70347 Patient History: Allergies:         Iodinated contrast media, Ampicillin-Sulbactam, [place]. Smoker:            Former. Diabetes:          Yes Insulin Pertinent History: CAD, CHF, HTN and Cardiomyopathy. 53 y.o. male presents for                    MARION for bacteremia. Study Detail: The following Echo studies were performed: 2D. Agitated saline               used as a contrast agent for intraseptal flow evaluation. Total               contrast used for this procedure was 18 mL via IV push. Patient's               heart rhythm is normal sinus rhythm. The patient was sedated.  PHYSICIAN INTERPRETATION: MARION Details: The MARION probe used was  5177. Technically adequate omniplane transesophageal echocardiogram performed. MARION Medication: The pharynx was anesthetized with Cetacaine spray and viscous lidocaine. The patient was sedated by Anesthesia; please refer to anesthesia flow sheet for medications used. MARION Procedure: The probe was passed without difficulty. Left Ventricle: The left ventricular systolic function is moderately decreased, with an estimated ejection fraction of 35-40%. There is global hypokinesis of the left ventricle with minor regional variations. The left ventricular cavity size was not assessed. Left ventricular diastolic filling was not assessed. Left Atrium: The left atrium is severely dilated. A bubble study using agitated saline was performed. Bubble study is negative. There is evidence of an atrial septal aneurysm. The left atrial appendage Doppler velocities are reduced and there is no thrombus visualized in the left atrial appendage. Right Ventricle: The right ventricle was not assessed. There is reduced right ventricular systolic function. Right Atrium: The right atrium was not assessed. Aortic Valve: The aortic valve is trileaflet. There is There is reduced systolic aortic valve leaflet excursion. There is mild aortic valve regurgitation. The degree of aortic stenosis was not fully assessed (HUMPHREY >2 cm^2 by planimetry). Mitral Valve: The mitral valve is normal in structure. There is mild mitral valve regurgitation. Tricuspid Valve: The tricuspid valve is structurally normal. There is mild tricuspid regurgitation. Pulmonic Valve: The pulmonic valve is structurally normal. There is no indication of pulmonic valve regurgitation. Pericardium: There is no pericardial effusion noted. Aorta: The aortic root is normal. In comparison to the previous echocardiogram(s): Compared with study from 6/5/2024, the ejection fraction appears somewhat improved presently.  CONCLUSIONS:  1. Left ventricular systolic function is moderately decreased  with a 35-40% estimated ejection fraction.  2. No definite valvular vegetations were visualized.  3. There is reduced right ventricular systolic function.  4. The left atrium is severely dilated.  5. Mild aortic valve regurgitation.  6. Atrial septal aneurysm present.  7. There is global hypokinesis of the left ventricle with minor regional variations. QUANTITATIVE DATA SUMMARY:  [id number] Ignacio Ibrahim MD Electronically signed on 6/7/2024 at 5:19:53 PM  ** Final **     Cardiac Catheterization Procedure    Result Date: 6/7/2024   [facility], Cath Lab, [address] [place] [address] Cardiovascular Catheterization Report Patient Name:     LUNA FINCH        Performing Physician:  [id number] Horner MD Study Date:       6/7/2024            Verifying Physician:   Kaity Horner MD MRN/PID:          [id number]            Cardiologist/Co-Scrub: Accession#:       [id number]        Ordering Provider:     [id number] STANLEY GLORIA Date of           1970 / 53      Cardiologist: Birth/Age:        years Gender:           M                   Fellow: Encounter#:       [id number]          Surgeon:  Study: Left Heart Cath  Indications: LUNA FINCH is a 54 year old male who presents with end stage renal disease on dialysis, prior coronary artery bypass graft surgery, prior percutaneous coronary intervention and an asymptomatic chest pain assessment. Cardiomyopathy.  Procedure Description: After infiltration with 2% Lidocaine, the right femoral artery was cannulated with a modified Seldinger technique. Subsequently a 5 Faroese sheath was placed in the right femoral artery. Selective coronary catheterization was performed using a 5 Fr catheter(s) exchanged over a guide wire to cannulate the coronary arteries. A JL 4 tip catheter  was used for left coronary injections. A JR 4 tip catheter was used for right coronary injections. Additional catheter(s) used to visualize the coronary arteries were: IM. Multiple injections of contrast were made into the left and right coronary arteries with angiograms recorded in multiple projections. After completion of the procedure, femoral artery angiography was performed. This demonstrated a common femoral artery puncture appropriate for closure. A Vascade 5F vascular closure device was placed per protocol.  Coronary Angiography: The coronary circulation is co-dominant.  Left Main Coronary Artery: The left main coronary artery is a normal caliber vessel. The left main arises normally from the left coronary sinus of Valsalva and bifurcates into the LAD and circumflex coronary arteries. The mid to distal left main coronary artery showed 95%.  Left Anterior Descending Coronary Artery Distribution: The left anterior descending coronary artery is a normal caliber vessel. The LAD arises normally from the left main coronary artery. The proximal left anterior descending coronary artery showed .  Circumflex Coronary Artery Distribution: The circumflex coronary artery is a normal caliber vessel. The circumflex arises normally from the left main coronary artery and terminates in the AV groove. The proximal circumflex coronary artery showed .  Right Coronary Artery Distribution: The right coronary artery is a normal caliber vessel. The RCA arises normally from the right sinus of Valsalva. The entire right coronary artery showed diffuse moderate disease.  Coronary Grafts:  LIMA Graft: Left internal mammary artery graft conduit, originating in situ and attached to the mid left anterior descending, is patent. Saphaneous Vein Graft(s): The saphenous vein graft, originating from the aorta and attached to the 1st obtuse marginal, is patent. The 2nd saphenous vein graft, originating from the aorta and attached to the  right posterior descending artery, is patent.  Coronary Lesion Summary: Vessel             Stenosis         Vessel Segment Left Main            95%            mid to distal LAD                                 proximal Circumflex                          proximal RCA        diffuse moderate disease     entire  Graft Stenosis Summary: Graft       Destination of Graft LIMA  mid left anterior descending SVG 1 1st obtuse marginal SVG 2 right posterior descending artery  Complications: No in-lab complications observed.  Cardiac Cath Post Procedure Notes: Post Procedure Diagnosis: See below. Blood Loss:               Estimated blood loss during the procedure was 5 mls. Specimens Removed:        Number of specimen(s) removed: none. ____________________________________________________________________________________ CONCLUSIONS:  1. Native coronaries: Critical distal left main disease, proximal LCx , proximal LAD , and moderate RCA disease in co-dominant system.  2. Bypasses: Patent LIMA to LAD which fills back the whole LAD system including both diagonals, patent SVG to OM1 which fills back the whole codominant LCx system, and patent SVG to right PDA.  3. Further management as per inpatient cardiology team. ICD 10 Codes: Other cardiomyopathies-I42.8  CPT Codes: Left Heart Cath Bypass Graft w ventriculography and coronary angio(LHC)-31277; Moderate Sedation Services 1st additional 15 minutes patient >5 years-37847; Ultrasound guidance for vascular access-49149  [id number] Valerie Horner MD Performing Physician Electronically signed by [id number] Valerie Horner MD on 6/7/2024 at 4:22:01 PM  ** Final **     Transthoracic Echo (TTE) Complete    Result Date: 6/5/2024   [facility], [address] [place] [address]              Tel [phone] and Fax [phone] TRANSTHORACIC ECHOCARDIOGRAM REPORT  Patient Name:      LUNA FINCH         Reading Physician:    [id number] Pierre                                                                [place] VIEIRA Study Date:        6/5/2024             Ordering Provider:    [id number] LIZZ KNOTT MRN/PID:           [id number]             Fellow: Accession#:        [id number]         Nurse: Date of Birth/Age: 1970 / 53 years Sonographer:          Shama Montes RDCS Gender:            M                    Additional Staff: Height:            193.00 cm            Admit Date:           5/31/2024 Weight:            68.00 kg             Admission Status:     Inpatient -                                                               Routine BSA / BMI:         1.96 m2 / 18.26      Encounter#:           [id number]                    kg/m2                                         Department Location:  [facility] Non                                                               Invasive Blood Pressure: 141 /67 mmHg Study Type:    TRANSTHORACIC ECHO (TTE) COMPLETE Diagnosis/ICD: Bacteremia-R78.81 Indication:    Bacteremia CPT Code:      Echo Complete w Full Doppler-[id number] Patient History: Diabetes:          Yes Pertinent History: CAD, Chest Pain, HTN and CHF. ESRD, Hypoxia. Study Detail: The following Echo studies were performed: 2D, M-Mode, Doppler and               color flow.  PHYSICIAN INTERPRETATION: Left Ventricle: The left ventricular systolic function is severely decreased, with an estimated ejection fraction of 20%. There is global hypokinesis of the left ventricle with minor regional variations. The left ventricular cavity size is upper limits of normal. There is eccentric left ventricular hypertrophy. Left Ventricular Global Longitudinal Strain - 6.3 %. Spectral Doppler shows an abnormal pattern of left ventricular diastolic filling. Left Atrium: The left atrium is severely dilated. Right Ventricle: The right ventricle is moderately enlarged. There is reduced right ventricular systolic function. Right Atrium: The right atrium is  normal in size. Aortic Valve: The aortic valve is trileaflet. There is moderate aortic valve cusp calcification. There is reduced aortic valve non coronary cusp excursion. There is evidence of moderate aortic valve stenosis. There is mild aortic valve regurgitation. The peak instantaneous gradient of the aortic valve is 37.2 mmHg. The mean gradient of the aortic valve is 19.7 mmHg. Mitral Valve: The mitral valve is normal in structure. The mitral valve spectral Doppler A-wave is absent, consistent with atrial fibrillation. There is mild to moderate mitral annular calcification. There is mild to moderate mitral valve regurgitation. Tricuspid Valve: The tricuspid valve is structurally normal. There is mild tricuspid regurgitation. The Doppler estimated RVSP is mildly elevated at 44.5 mmHg. Pulmonic Valve: The pulmonic valve is structurally normal. There is mild pulmonic valve regurgitation. Pericardium: There is a trivial pericardial effusion. Aorta: The aortic root is normal. Systemic Veins: The inferior vena cava appears dilated. There is IVC inspiratory collapse greater than 50%. In comparison to the previous echocardiogram(s): Compared with study from 3/6/2024, the ejection fraction is comparatively reduced but was somewhat overestimated previously. There is a greater degree of aortic stenosis presently but this was underestimated previously.  CONCLUSIONS:  1. Left ventricular systolic function is severely decreased with a 20% estimated ejection fraction.  2. No definite valvular vegetations were visualized.  3. Spectral Doppler shows an abnormal pattern of left ventricular diastolic filling.  4. There is eccentric left ventricular hypertrophy.  5. Moderately enlarged right ventricle.  6. There is reduced right ventricular systolic function.  7. The left atrium is severely dilated.  8. Absent A-wave on MV spectral Doppler tracing, consistent with atrial fibrillation.  9. Mild to moderate mitral valve  regurgitation. 10. Mildly elevated RVSP. 11. Moderate aortic valve stenosis. 12. There is moderate aortic valve cusp calcification. 13. Mild aortic valve regurgitation. 14. There is global hypokinesis of the left ventricle with minor regional variations. QUANTITATIVE DATA SUMMARY: 2D MEASUREMENTS:                           Normal Ranges: LAs:           4.98 cm    (2.7-4.0cm) IVSd:          1.08 cm    (0.6-1.1cm) LVPWd:         1.06 cm    (0.6-1.1cm) LVIDd:         5.73 cm    (3.9-5.9cm) LVIDs:         4.81 cm LV Mass Index: 127.2 g/m2 LV % FS        16.1 % LA VOLUME:                               Normal Ranges: LA Vol A4C:        115.9 ml   (22+/-6mL/m2) LA Vol A2C:        131.8 ml LA Vol BP:         129.3 ml LA Vol Index A4C:  59.2ml/m2 LA Vol Index A2C:  67.2 ml/m2 LA Vol Index BP:   66.0 ml/m2 LA Area A4C:       30.9 cm2 LA Area A2C:       31.5 cm2 LA Major Axis A4C: 7.0 cm LA Major Axis A2C: 6.4 cm LA Volume Index:   65.3 ml/m2 LA Vol A4C:        111.1 ml LA Vol A2C:        127.7 ml RA VOLUME BY A/L METHOD:                               Normal Ranges: RA Vol A4C:        85.8 ml    (8.3-19.5ml) RA Vol Index A4C:  43.8 ml/m2 RA Area A4C:       26.2 cm2 RA Major Axis A4C: 6.8 cm M-MODE MEASUREMENTS:                  Normal Ranges: Ao Root: 3.10 cm (2.0-3.7cm) LAs:     4.84 cm (2.7-4.0cm) AORTA MEASUREMENTS:                      Normal Ranges: Ao Sinus, d: 2.90 cm (2.1-3.5cm) Ao STJ, d:   2.20 cm (1.7-3.4cm) Asc Ao, d:   3.00 cm (2.1-3.4cm) LV SYSTOLIC FUNCTION BY 2D PLANIMETRY (MOD):                                          Normal Ranges: EF-A4C View:                      29.0 % (>=55%) EF-A2C View:                      43.8 % EF-Biplane:                       39.1 % Global Longitudinal Strain (GLS): 6.3 % LV DIASTOLIC FUNCTION:                        Normal Ranges: MV Peak E:    1.37 m/s (0.7-1.2 m/s) MV e'         0.17 m/s (>8.0) MV lateral e' 0.17 m/s MV medial e'  0.10 m/s E/e' Ratio:   8.06     (<8.0) MITRAL  VALVE:                      Normal Ranges: MV Vmax:    1.52 m/s (<=1.3m/s) MV peak P.2 mmHg (<5mmHg) MV mean P.1 mmHg (<2mmHg) MV VTI:     23.83 cm (10-13cm) MITRAL INSUFFICIENCY:                         Normal Ranges: MR VTI:     130.99 cm MR Vmax:    486.88 cm/s MR Volume:  13.47 ml MR Flow Rt: 50.06 ml/s MR EROA:    0.10 cm2 AORTIC VALVE:                                    Normal Ranges: AoV Vmax:                3.05 m/s  (<=1.7m/s) AoV Peak P.2 mmHg (<20mmHg) AoV Mean P.7 mmHg (1.7-11.5mmHg) LVOT Max Shayne:            1.20 m/s  (<=1.1m/s) AoV VTI:                 51.47 cm  (18-25cm) LVOT VTI:                21.65 cm LVOT Diameter:           2.01 cm   (1.8-2.4cm) AoV Area, VTI:           1.34 cm2  (2.5-5.5cm2) AoV Area,Vmax:           1.25 cm2  (2.5-4.5cm2) AoV Dimensionless Index: 0.42 AORTIC INSUFFICIENCY: AI Vmax:       3.21 m/s AI Half-time:  349 msec AI Decel Time: 1203 msec AI Decel Rate: 266.64 cm/s2  RIGHT VENTRICLE: RV Basal 4.80 cm RV Mid   3.00 cm RV Major 9.9 cm TAPSE:   12.0 mm RV s'    0.09 m/s TRICUSPID VALVE/RVSP:                             Normal Ranges: Peak TR Velocity: 3.02 m/s Est. RA Pressure: 8 mmHg RV Syst Pressure: 44.5 mmHg (< 30mmHg) IVC Diam:         2.23 cm PULMONIC VALVE:                      Normal Ranges: RVOT Vmax:  0.51 m/s (0.6-0.9m/s) PV Max Shayne: 1.2 m/s  (0.6-0.9m/s) PV Max P.6 mmHg AORTA: Asc Ao Diam 2.99 cm  [id number] Ignacio Ibrahim MD Electronically signed on 2024 at 4:50:52 PM  ** Final **     CT abdomen pelvis wo IV contrast    Result Date: 2024  Interpreted By:  Ketty Ramirez, STUDY: CT ABDOMEN PELVIS WO IV CONTRAST;  2024 4:08 am   INDICATION: Signs/Symptoms:bacteremia  source?.   COMPARISON: 2024   ACCESSION NUMBER(S): [id number]   ORDERING CLINICIAN: GREG CALDERA   TECHNIQUE: CT of the abdomen and pelvis was performed. Sagittal and coronal reconstructions were generated.  No intravenous contrast given for  the exam.   FINDINGS: Solid organ and vessel evaluation limited without IV contrast.   ABDOMINAL ORGANS:   LIVER: No focal lesion within limits of unenhanced exam prominent vascular calcifications near the hilum.   GALL BLADDER AND BILIARY TREE: No calcified gallstone. Gallbladder is distended. Questionable trace pericholecystic fluid.   SPLEEN: Stable isodense presumed splenule adjacent to the anterior pole.   PANCREAS: No focal lesion within limits of unenhanced exam   ADRENALS: No adrenal mass   KIDNEYS AND URETERS: Atrophic with prominent vascular calcifications and small rounded hypodensities in each kidney similar to the prior exam.   BOWEL: Questionable distal esophageal wall thickening. Stomach is mildly distended with debris and air. No abnormally dilated small bowel loops. Moderate fecal residue in the proximal colon. Rectum is distended with gas and fecal debris.   PERITONEUM, RETROPERITONEUM, NODES: No significant free fluid. No free air. Slight increased density in the mesentery. No significant retroperitoneal adenopathy within limits of unenhanced exam.   VESSELS: Relative decreased density of cardiac lumen suggesting anemia. Extensive vascular calcifications. No abdominal aortic aneurysm. Lack of IV contrast precludes vascular luminal assessment.   PELVIS: Urinary bladder is partially distended with diffusely thickened wall. Vas deferens and faint possible seminal vesicle calcifications bilaterally similar to the prior exam. No gross prostate enlargement.   ABDOMINAL WALL: Focal density in the right lower quadrant abdominal wall subcutaneous fat similar to the prior exam. No sizable abdominal wall hernia. Mild soft tissue edema.   BONES: Generalized increased osseous density consistent with metabolic bone disease similar to the prior exam. Focal smooth superior L2 endplate depression consistent with Schmorl's node with adjacent tiny vacuum disc is more conspicuous.   LOWER CHEST: Patchy/nodular left  lower lobe infiltrate. Septal thickening in both lung bases. Small bilateral pleural effusions. Cardiomegaly. Surgical material along the margins of the heart again seen.       Left lower lobe infiltrate consistent with pneumonia.   Mild soft tissue and mesenteric edema with septal thickening in the lung bases and small pleural effusions consistent with CHF/fluid overload.   Distended gallbladder with questionable trace pericholecystic fluid.   Questionable distal esophageal wall thickening. Correlate with possible esophagitis.   Diffuse bladder wall thickening which could be related to suboptimal distention, cystitis and/or trabeculation.   Numerous additional findings as described above.   MACRO: None.   Signed by: Ketty Ramirez 6/4/2024 8:48 AM Dictation workstation:   [id number]    Electrocardiogram, 12-lead PRN ACS symptoms    Result Date: 6/3/2024  Normal sinus rhythm Possible Left atrial enlargement Left ventricular hypertrophy with QRS widening ( Sokolow-Márquez , [place] product ) Marked T wave abnormality, consider inferolateral ischemia Abnormal ECG When compared with ECG of 26-MAR-2024 02:50, Questionable change in QRS axis ST now depressed in Inferior leads T wave inversion now evident in Inferior leads    XR chest 2 views    Result Date: 5/31/2024  Interpreted By:  Aaron Jeffers, STUDY: XR CHEST 2 VIEWS;  5/31/2024 9:24 pm   INDICATION: Signs/Symptoms:sob.   COMPARISON: None.   ACCESSION NUMBER(S): [id number]   ORDERING CLINICIAN: KAMALA FRIEDMAN   FINDINGS: Median sternal wires present. Some vascular stent over the right axilla.     CARDIOMEDIASTINAL SILHOUETTE: There is enlargement of the cardiac silhouette. Median sternotomy wires present.   LUNGS: There is pulmonary congestion. There is no consolidation or effusion.   ABDOMEN: No remarkable upper abdominal findings.   BONES: No acute osseous changes.       1.  Enlarged cardiac silhouette with vascular congestion. No consolidation seen       MACRO:  None   Signed by: Aaron Jeffers 5/31/2024 9:28 PM Dictation workstation:   [id number]       Assessment/Plan   IMPRESSION  Type II DM  Long term use of insulin   Hyperglycemia secondary to glucocorticoid administration yesterday  A1C of 7.5% as of June 2024    Recommendations:  To continue Lantus 10 untis subcutaneous bedtime   For NPH 20 units subcutaneous x 1   To commence Humalog 6 units TID AC   To commence insulin correction scale every 4 hours in 4 units incremetns until glucose values fall below 200mg/dL   Hyperglycemia due to 40mg of Solumedtrol yesterday   Diabetic diet as tolerated   Accu-Cheks ACHS    Hypoglycemic protocol   Will continue to follow    Thank you for the courtesy of this consult     Abiodun Reyes MD

## 2024-06-08 NOTE — PROGRESS NOTES
PROGRESS NOTE - INTERNAL MEDICINE     PATIENT NAME:  Kentrell Carreon    MRN:  [id number]  SERVICE DATE:  6/8/2024   SERVICE TIME:  10:56 AM     ADMITTING PHYSICIAN:  Osmar Callaway MD    ASSESSMENT & PLAN:  Principal Problem:  1.Generalized fatigue and fever  Enterococcus faecalis and gram-positive cocci bacteremia.  Positive blood cultures for Enterococcus faecalis and gram-positive cocci     Seen by infectious disease, continue with vancomycin.  And repeat blood cultures from June 4, 2024 1 out of 2 is positive for gram-positive cocci in pairs and chains.  TTE revealed no evidence of endocarditis, , MARION was also completed which revealed no evidence of valvular vegetation, LV ejection fraction is about 35 to 40% and moderately dilated left atrium and right atrium.  Patient had left heart cath on 6/7/24,has patent grafts to LAD and patent saphenous vein graft to circumflex and RCA.  Other active medical problems  Hyperkalemia noted other day has resolved after dialysis.  End-stage renal disease on home hemodialysis, nephrology on board.  Patient has AV fistula right upper arm with good bruit and thrill.  Diabetes mellitus insulin-dependent, hemoglobin A1c 7.5, continue with Lantus insulin 10 units twice a day and sliding scale 4 times daily as needed  However blood sugars were elevated this morning greater than 700 could have been due to IV steroids which patient called prior to cardiac catheterization, endocrinology is consulted and insulin doses are being adjusted.  History of SLE  Hypertension stable     Chronic systolic and diastolic heart failure  Echocardiogram revealed LV ejection fraction of 35 to 40%, no vegetation seen on transthoracic echocardiogram and MARION. Patient seen by cardiology   Continue sacubitril and other medications including Coreg.  Previous coronary artery bypass surgery     Reviewed all labs and medications and all imaging studies and discussed the plan of treatment with patient in detail.   Nephrology consultation requested.  Total time spent in examination counseling and coordinating care for this patient was greater than 35 minutes.      SUBJECTIVE  CHIEF COMPLAINT:           Chief Complaint   Patient presents with    Shortness of Breath    Patient seen and examined, he is coming along fine, remains afebrile , denies any chest pain or shortness of breath.  His blood cultures are positive for Enterococcus faecalis Patient to continue with IV vancomycin.      Repeat Blood cultures from 6/4/24, 1 out of 2 is positive for gram-positive cocci.  Repeat blood cultures from 6/6/2024 and negative to date.        INTERVAL HISTORY OF PRESENT ILLNESS: Patient seen , he is coming along fine has been afebrile . Breathing normally, no chest pain.  antibiotics continued with vancomycin.   Overall patient general condition is stable, he is not short of breath and has no chest pain.  Left heart catheterization done on June 7,2024 revealed patent graft to LAD and patent saphenous graft to the RCA and circumflex.  Transesophageal echocardiogram done on June 7, 2024 revealed reduced LV ejection fraction of 35 to 40%, no valvular vegetations, moderately enlarged right atrium and severely enlarged left atrium.  And global hypokinesia.    MEDICATIONS:   Current Facility-Administered Medications:     acetaminophen (Tylenol) tablet 650 mg, 650 mg, oral, q6h PRN, KATHARINE Mariee, 650 mg at 06/07/24 0416    aspirin EC tablet 81 mg, 81 mg, oral, Daily, KATHARINE Mariee, 81 mg at 06/08/24 0857    atorvastatin (Lipitor) tablet 80 mg, 80 mg, oral, Daily, KATHARINE Mariee, 80 mg at 06/08/24 0859    carvedilol (Coreg) tablet 25 mg, 25 mg, oral, BID, KATHARINE Mariee, 25 mg at 06/08/24 0859    dextrose 50 % injection 25 g, 25 g, intravenous, q15 min PRN, KATHARINE Mariee    [COMPLETED] insulin regular (HumuLIN R,NovoLIN R) injection 10 Units, 10 Units, intravenous,  Once, 10 Units at 24 0838 **FOLLOWED BY** dextrose 50 % injection 25 g, 25 g, intravenous, Once **FOLLOWED BY** [] dextrose 10 % in water (D10W) infusion, 50 mL/hr, intravenous, Continuous, Osmar Callaway MD    epoetin annita-epbx (Retacrit) injection 10,000 Units, 150 Units/kg, subcutaneous, Every Mon/Wed/Fri, KATHARINE Mariee, 10,000 Units at 24 2301    glucagon (Glucagen) injection 1 mg, 1 mg, intramuscular, q15 min PRN, KATHARINE Mariee    glucagon (Glucagen) injection 1 mg, 1 mg, intramuscular, q15 min PRN, KATHARINE Mariee    heparin (porcine) injection 5,000 Units, 5,000 Units, subcutaneous, q8h SHANA, KATHARINE Mariee, 5,000 Units at 24 0622    insulin glargine (Lantus) injection 10 Units, 10 Units, subcutaneous, q12h, Osmar Callaway MD, 10 Units at 24 2143    insulin lispro (HumaLOG) injection 0-10 Units, 0-10 Units, subcutaneous, TID, KATHARINE Mariee, 4 Units at 24 1658    insulin lispro (HumaLOG) injection 10 Units, 10 Units, subcutaneous, Once, Osmar Callaway MD    insulin lispro (HumaLOG) injection 6 Units, 6 Units, subcutaneous, TID, Abiodun Reyes MD    multivitamin with minerals 1 tablet, 1 tablet, oral, Daily, KATHARINE Mariee, 1 tablet at 24 0858    oxygen (O2) therapy, , inhalation, Continuous - , KATHARINE Mariee    polyethylene glycol (Glycolax, Miralax) packet 17 g, 17 g, oral, Daily, KATHARINE Mariee, 17 g at 24 0851    sacubitriL-valsartan (Entresto) 24-26 mg per tablet 1 tablet, 1 tablet, oral, BID, KATHARINE Mariee, 1 tablet at 24 0900    sevelamer carbonate (Renvela) tablet 800 mg, 800 mg, oral, TID, KATHARINE aMriee, 800 mg at 24 0859    SITagliptin phosphate (Januvia) tablet 25 mg, 25 mg, oral, Daily, KATHARINE Mariee, 25 mg at 24 0859    sodium  "bicarbonate 8.4 % (1 mEq/mL) 50 mEq, 50 mEq, intravenous, Once, KATHARINE Mariee    sodium zirconium cyclosilicate (Lokelma) packet 10 g, 10 g, oral, q8h, KATHARINE Mariee, 10 g at 06/08/24 0622    vancomycin (Vancocin) pharmacy to dose - pharmacy monitoring, , miscellaneous, Daily PRN, KATHARINE Mariee        OBJECTIVE  Visit Vitals  /75 (BP Location: Right arm, Patient Position: Lying)   Pulse 87   Temp 36.6 °C (97.8 °F) (Oral)   Resp 17   Ht 1.93 m (6' 4\")   Wt 68 kg (150 lb)   SpO2 95%   BMI 18.26 kg/m²   Smoking Status Former   BSA 1.91 m²      PHYSICAL EXAM:   GENERAL:  Alert, no distress, cooperative  SKIN:  Skin color, texture, turgor normal. No rashes or lesions.  OROPHARYNX:  Lips, mucosa, and tongue are normal.Teeth and gums, normal. Oropharynx normal.  NECK:  No jugulovenous distention, No carotid bruits, Carotid pulse normal contour, Supple  LUNGS:  Lungs clear to auscultation. Good diaphragmatic excursion.  CARDIAC: Rate rhythm is regular, normal S1 and S2; no rub or gallops, 3/6 systolic ejection murmur left sternal border and mitral and tricuspid holosystolic murmur.  ABDOMEN:  Abdomen soft, non-tender, BS normal, No masses or organomegaly  EXTREMETIES:  Extremities normal, no deformities, edema, clubbing or skin discoloration. Good capillary refill.,  Right upper arm AV fistula with good bruit and thrill.  NEURO:  Alert, oriented X 3, Gait normal. Non-focal. Reflexes normal and symmetric. Sensation grossly intact., Cranial nerves II-XII intact  PULSES:  2+ radial, 2+ carotid        DATA:   Diagnostic tests reviewed for today's visit:    Results for orders placed or performed during the hospital encounter of 05/31/24 (from the past 96 hour(s))   POCT GLUCOSE   Result Value Ref Range    POCT Glucose 85 74 - 99 mg/dL   POCT GLUCOSE   Result Value Ref Range    POCT Glucose 89 74 - 99 mg/dL   POCT GLUCOSE   Result Value Ref Range    POCT Glucose 132 (H) 74 - " 99 mg/dL   CBC and Auto Differential   Result Value Ref Range    WBC 5.6 4.4 - 11.3 x10*3/uL    nRBC 0.0 0.0 - 0.0 /100 WBCs    RBC 2.40 (L) 4.50 - 5.90 x10*6/uL    Hemoglobin 7.8 (L) 13.5 - 17.5 g/dL    Hematocrit 23.3 (L) 41.0 - 52.0 %    MCV 97 80 - 100 fL    MCH 32.5 26.0 - 34.0 pg    MCHC 33.5 32.0 - 36.0 g/dL    RDW 14.6 (H) 11.5 - 14.5 %    Platelets 217 150 - 450 x10*3/uL    Neutrophils % 64.6 40.0 - 80.0 %    Immature Granulocytes %, Automated 0.2 0.0 - 0.9 %    Lymphocytes % 15.7 13.0 - 44.0 %    Monocytes % 17.2 2.0 - 10.0 %    Eosinophils % 2.1 0.0 - 6.0 %    Basophils % 0.2 0.0 - 2.0 %    Neutrophils Absolute 3.61 1.20 - 7.70 x10*3/uL    Immature Granulocytes Absolute, Automated 0.01 0.00 - 0.70 x10*3/uL    Lymphocytes Absolute 0.88 (L) 1.20 - 4.80 x10*3/uL    Monocytes Absolute 0.96 0.10 - 1.00 x10*3/uL    Eosinophils Absolute 0.12 0.00 - 0.70 x10*3/uL    Basophils Absolute 0.01 0.00 - 0.10 x10*3/uL   Magnesium   Result Value Ref Range    Magnesium 2.40 1.60 - 2.40 mg/dL   Vancomycin   Result Value Ref Range    Vancomycin 14.2 5.0 - 20.0 ug/mL   Renal Function Panel   Result Value Ref Range    Glucose 93 74 - 99 mg/dL    Sodium 134 (L) 136 - 145 mmol/L    Potassium 4.9 3.5 - 5.3 mmol/L    Chloride 92 (L) 98 - 107 mmol/L    Bicarbonate 26 21 - 32 mmol/L    Anion Gap 21 (H) 10 - 20 mmol/L    Urea Nitrogen 54 (H) 6 - 23 mg/dL    Creatinine 11.32 (H) 0.50 - 1.30 mg/dL    eGFR 5 (L) >60 mL/min/1.73m*2    Calcium 9.5 8.6 - 10.3 mg/dL    Phosphorus 4.8 2.5 - 4.9 mg/dL    Albumin 3.7 3.4 - 5.0 g/dL   Procalcitonin   Result Value Ref Range    Procalcitonin 5.19 (H) <=0.07 ng/mL   POCT GLUCOSE   Result Value Ref Range    POCT Glucose 83 74 - 99 mg/dL   POCT GLUCOSE   Result Value Ref Range    POCT Glucose 73 (L) 74 - 99 mg/dL   POCT GLUCOSE   Result Value Ref Range    POCT Glucose 61 (L) 74 - 99 mg/dL   Transthoracic Echo (TTE) Complete   Result Value Ref Range    AV pk polina 3.05 m/s    AV mn grad 19.7 mmHg     LVOT diam 2.01 cm    LV Biplane EF 39 %    MV avg E/e' ratio 8.06     LA vol index A/L 66.0 ml/m2    Tricuspid annular plane systolic excursion 1.2 cm    RV free wall pk S' 9.00 cm/s    LV GLS 6.3 %    LVIDd 5.73 cm    RVSP 44.5 mmHg    Aortic Valve Area by Continuity of VTI 1.34 cm2    Aortic Valve Area by Continuity of Peak Velocity 1.25 cm2    AV pk grad 37.2 mmHg    LV A4C EF 29.0    POCT GLUCOSE   Result Value Ref Range    POCT Glucose 128 (H) 74 - 99 mg/dL   POCT GLUCOSE   Result Value Ref Range    POCT Glucose 251 (H) 74 - 99 mg/dL   CBC and Auto Differential   Result Value Ref Range    WBC 5.5 4.4 - 11.3 x10*3/uL    nRBC 0.0 0.0 - 0.0 /100 WBCs    RBC 2.45 (L) 4.50 - 5.90 x10*6/uL    Hemoglobin 8.0 (L) 13.5 - 17.5 g/dL    Hematocrit 25.6 (L) 41.0 - 52.0 %     (H) 80 - 100 fL    MCH 32.7 26.0 - 34.0 pg    MCHC 31.3 (L) 32.0 - 36.0 g/dL    RDW 14.8 (H) 11.5 - 14.5 %    Platelets 242 150 - 450 x10*3/uL    Immature Granulocytes %, Automated 0.5 0.0 - 0.9 %    Immature Granulocytes Absolute, Automated 0.03 0.00 - 0.70 x10*3/uL   Magnesium   Result Value Ref Range    Magnesium 2.20 1.60 - 2.40 mg/dL   Renal Function Panel   Result Value Ref Range    Glucose 259 (H) 74 - 99 mg/dL    Sodium 133 (L) 136 - 145 mmol/L    Potassium 5.0 3.5 - 5.3 mmol/L    Chloride 91 (L) 98 - 107 mmol/L    Bicarbonate 29 21 - 32 mmol/L    Anion Gap 18 10 - 20 mmol/L    Urea Nitrogen 35 (H) 6 - 23 mg/dL    Creatinine 7.94 (H) 0.50 - 1.30 mg/dL    eGFR 8 (L) >60 mL/min/1.73m*2    Calcium 9.4 8.6 - 10.3 mg/dL    Phosphorus 5.3 (H) 2.5 - 4.9 mg/dL    Albumin 3.8 3.4 - 5.0 g/dL   Manual Differential   Result Value Ref Range    Neutrophils %, Manual 59.0 40.0 - 80.0 %    Lymphocytes %, Manual 26.0 13.0 - 44.0 %    Monocytes %, Manual 13.0 2.0 - 10.0 %    Eosinophils %, Manual 1.0 0.0 - 6.0 %    Basophils %, Manual 0.0 0.0 - 2.0 %    Atypical Lymphocytes %, Manual 1.0 0.0 - 2.0 %    Seg Neutrophils Absolute, Manual 3.25 1.20 - 7.00  x10*3/uL    Lymphocytes Absolute, Manual 1.43 1.20 - 4.80 x10*3/uL    Monocytes Absolute, Manual 0.72 0.10 - 1.00 x10*3/uL    Eosinophils Absolute, Manual 0.06 0.00 - 0.70 x10*3/uL    Basophils Absolute, Manual 0.00 0.00 - 0.10 x10*3/uL    Atypical Lymphs Absolute, Manual 0.06 0.00 - 0.50 x10*3/uL    Total Cells Counted 100     RBC Morphology No significant RBC morphology present    POCT GLUCOSE   Result Value Ref Range    POCT Glucose 237 (H) 74 - 99 mg/dL   Blood Culture    Specimen: Peripheral Venipuncture; Blood culture   Result Value Ref Range    Blood Culture No growth at 1 day    Blood Culture    Specimen: Peripheral Venipuncture; Blood culture   Result Value Ref Range    Blood Culture No growth at 1 day    POCT GLUCOSE   Result Value Ref Range    POCT Glucose 225 (H) 74 - 99 mg/dL   POCT GLUCOSE   Result Value Ref Range    POCT Glucose 225 (H) 74 - 99 mg/dL   CBC and Auto Differential   Result Value Ref Range    WBC 5.5 4.4 - 11.3 x10*3/uL    nRBC 0.0 0.0 - 0.0 /100 WBCs    RBC 2.42 (L) 4.50 - 5.90 x10*6/uL    Hemoglobin 7.9 (L) 13.5 - 17.5 g/dL    Hematocrit 24.8 (L) 41.0 - 52.0 %     (H) 80 - 100 fL    MCH 32.6 26.0 - 34.0 pg    MCHC 31.9 (L) 32.0 - 36.0 g/dL    RDW 14.7 (H) 11.5 - 14.5 %    Platelets 260 150 - 450 x10*3/uL    Neutrophils % 89.5 40.0 - 80.0 %    Immature Granulocytes %, Automated 0.7 0.0 - 0.9 %    Lymphocytes % 6.5 13.0 - 44.0 %    Monocytes % 2.7 2.0 - 10.0 %    Eosinophils % 0.2 0.0 - 6.0 %    Basophils % 0.4 0.0 - 2.0 %    Neutrophils Absolute 4.95 1.20 - 7.70 x10*3/uL    Immature Granulocytes Absolute, Automated 0.04 0.00 - 0.70 x10*3/uL    Lymphocytes Absolute 0.36 (L) 1.20 - 4.80 x10*3/uL    Monocytes Absolute 0.15 0.10 - 1.00 x10*3/uL    Eosinophils Absolute 0.01 0.00 - 0.70 x10*3/uL    Basophils Absolute 0.02 0.00 - 0.10 x10*3/uL   Magnesium   Result Value Ref Range    Magnesium 2.20 1.60 - 2.40 mg/dL   Renal Function Panel   Result Value Ref Range    Glucose 397 (H) 74 -  99 mg/dL    Sodium 127 (L) 136 - 145 mmol/L    Potassium 6.8 (HH) 3.5 - 5.3 mmol/L    Chloride 88 (L) 98 - 107 mmol/L    Bicarbonate 23 21 - 32 mmol/L    Anion Gap 23 (H) 10 - 20 mmol/L    Urea Nitrogen 51 (H) 6 - 23 mg/dL    Creatinine 9.74 (H) 0.50 - 1.30 mg/dL    eGFR 6 (L) >60 mL/min/1.73m*2    Calcium 9.3 8.6 - 10.3 mg/dL    Phosphorus 5.7 (H) 2.5 - 4.9 mg/dL    Albumin 3.9 3.4 - 5.0 g/dL   Vancomycin   Result Value Ref Range    Vancomycin 15.4 5.0 - 20.0 ug/mL   POCT GLUCOSE   Result Value Ref Range    POCT Glucose 262 (H) 74 - 99 mg/dL   POCT GLUCOSE   Result Value Ref Range    POCT Glucose 193 (H) 74 - 99 mg/dL   Basic Metabolic Panel   Result Value Ref Range    Glucose 192 (H) 74 - 99 mg/dL    Sodium 136 136 - 145 mmol/L    Potassium 4.3 3.5 - 5.3 mmol/L    Chloride 93 (L) 98 - 107 mmol/L    Bicarbonate 24 21 - 32 mmol/L    Anion Gap 23 (H) 10 - 20 mmol/L    Urea Nitrogen 25 (H) 6 - 23 mg/dL    Creatinine 4.88 (H) 0.50 - 1.30 mg/dL    eGFR 14 (L) >60 mL/min/1.73m*2    Calcium 9.7 8.6 - 10.3 mg/dL   POCT GLUCOSE   Result Value Ref Range    POCT Glucose 278 (H) 74 - 99 mg/dL   POCT GLUCOSE   Result Value Ref Range    POCT Glucose 451 (H) 74 - 99 mg/dL   CBC and Auto Differential   Result Value Ref Range    WBC 6.1 4.4 - 11.3 x10*3/uL    nRBC 0.0 0.0 - 0.0 /100 WBCs    RBC 2.43 (L) 4.50 - 5.90 x10*6/uL    Hemoglobin 8.0 (L) 13.5 - 17.5 g/dL    Hematocrit 25.8 (L) 41.0 - 52.0 %     (H) 80 - 100 fL    MCH 32.9 26.0 - 34.0 pg    MCHC 31.0 (L) 32.0 - 36.0 g/dL    RDW 15.4 (H) 11.5 - 14.5 %    Platelets 319 150 - 450 x10*3/uL    Neutrophils % 82.4 40.0 - 80.0 %    Immature Granulocytes %, Automated 0.5 0.0 - 0.9 %    Lymphocytes % 5.5 13.0 - 44.0 %    Monocytes % 11.6 2.0 - 10.0 %    Eosinophils % 0.0 0.0 - 6.0 %    Basophils % 0.0 0.0 - 2.0 %    Neutrophils Absolute 5.05 1.20 - 7.70 x10*3/uL    Immature Granulocytes Absolute, Automated 0.03 0.00 - 0.70 x10*3/uL    Lymphocytes Absolute 0.34 (L) 1.20 - 4.80  x10*3/uL    Monocytes Absolute 0.71 0.10 - 1.00 x10*3/uL    Eosinophils Absolute 0.00 0.00 - 0.70 x10*3/uL    Basophils Absolute 0.00 0.00 - 0.10 x10*3/uL   Magnesium   Result Value Ref Range    Magnesium 2.30 1.60 - 2.40 mg/dL   Renal Function Panel   Result Value Ref Range    Glucose 799 (HH) 74 - 99 mg/dL    Sodium 125 (L) 136 - 145 mmol/L    Potassium 5.6 (H) 3.5 - 5.3 mmol/L    Chloride 86 (L) 98 - 107 mmol/L    Bicarbonate 23 21 - 32 mmol/L    Anion Gap 22 (H) 10 - 20 mmol/L    Urea Nitrogen 57 (H) 6 - 23 mg/dL    Creatinine 6.79 (H) 0.50 - 1.30 mg/dL    eGFR 9 (L) >60 mL/min/1.73m*2    Calcium 9.1 8.6 - 10.3 mg/dL    Phosphorus 7.2 (H) 2.5 - 4.9 mg/dL    Albumin 4.0 3.4 - 5.0 g/dL   POCT GLUCOSE   Result Value Ref Range    POCT Glucose >600 (H) 74 - 99 mg/dL   POCT GLUCOSE   Result Value Ref Range    POCT Glucose >600 (H) 74 - 99 mg/dL      Transesophageal Echo (MARION)    Result Date: 6/7/2024   [facility], [address] [place] [address]              Tel [phone] and Fax [phone] TRANSESOPHAGEAL ECHOCARDIOGRAM REPORT  Patient Name:      LUNA Lowe Physician:    [id number] Ignacio Ibrahim MD Study Date:        6/7/2024            Ordering Provider:    [id number] STANLEY GLORIA MRN/PID:           [id number]            Fellow: Accession#:        [id number]        Nurse:                Feng Whalen RN Date of Birth/Age: 1970 / 53      Sonographer:          Shama Montes RDCS                    years Gender:            M                   Additional Staff:     Melissa Ann CRNA Height:            193.04 cm           Admit Date:           5/31/2024 Weight:            68.04 kg            Admission Status:     Inpatient -                                                              Routine  BSA / BMI:         1.96 m2 / 18.26     Encounter#:           [id number]                    kg/m2                                        Department Location:  [facility] Cath                                                              Lab Blood Pressure: 133 /62 mmHg Study Type:    TRANSESOPHAGEAL ECHO (MARION) Diagnosis/ICD: Bacteremia-R78.81 Indication:    R/o Endocarditis CPT Code:      MARION Complete-02844; Doppler Limited-24826; Color Doppler-02896 Patient History: Allergies:         Iodinated contrast media, Ampicillin-Sulbactam, Garner. Smoker:            Former. Diabetes:          Yes Insulin Pertinent History: CAD, CHF, HTN and Cardiomyopathy. 53 y.o. male presents for                    MARION for bacteremia. Study Detail: The following Echo studies were performed: 2D. Agitated saline               used as a contrast agent for intraseptal flow evaluation. Total               contrast used for this procedure was 18 mL via IV push. Patient's               heart rhythm is normal sinus rhythm. The patient was sedated.  PHYSICIAN INTERPRETATION: MARION Details: The MARION probe used was 5177. Technically adequate omniplane transesophageal echocardiogram performed. MARION Medication: The pharynx was anesthetized with Cetacaine spray and viscous lidocaine. The patient was sedated by Anesthesia; please refer to anesthesia flow sheet for medications used. MARION Procedure: The probe was passed without difficulty. Left Ventricle: The left ventricular systolic function is moderately decreased, with an estimated ejection fraction of 35-40%. There is global hypokinesis of the left ventricle with minor regional variations. The left ventricular cavity size was not assessed. Left ventricular diastolic filling was not assessed. Left Atrium: The left atrium is severely dilated. A bubble study using agitated saline was performed. Bubble study is negative. There is evidence of an atrial septal aneurysm. The left atrial appendage Doppler  velocities are reduced and there is no thrombus visualized in the left atrial appendage. Right Ventricle: The right ventricle was not assessed. There is reduced right ventricular systolic function. Right Atrium: The right atrium was not assessed. Aortic Valve: The aortic valve is trileaflet. There is There is reduced systolic aortic valve leaflet excursion. There is mild aortic valve regurgitation. The degree of aortic stenosis was not fully assessed (HUMPHREY >2 cm^2 by planimetry). Mitral Valve: The mitral valve is normal in structure. There is mild mitral valve regurgitation. Tricuspid Valve: The tricuspid valve is structurally normal. There is mild tricuspid regurgitation. Pulmonic Valve: The pulmonic valve is structurally normal. There is no indication of pulmonic valve regurgitation. Pericardium: There is no pericardial effusion noted. Aorta: The aortic root is normal. In comparison to the previous echocardiogram(s): Compared with study from 6/5/2024, the ejection fraction appears somewhat improved presently.  CONCLUSIONS:  1. Left ventricular systolic function is moderately decreased with a 35-40% estimated ejection fraction.  2. No definite valvular vegetations were visualized.  3. There is reduced right ventricular systolic function.  4. The left atrium is severely dilated.  5. Mild aortic valve regurgitation.  6. Atrial septal aneurysm present.  7. There is global hypokinesis of the left ventricle with minor regional variations. QUANTITATIVE DATA SUMMARY:  [id number] Ignacio Ibrahim MD Electronically signed on 6/7/2024 at 5:19:53 PM  ** Final **     Cardiac Catheterization Procedure    Result Date: 6/7/2024   [facility], Cath Lab, [address] [place] [address] Cardiovascular Catheterization Report Patient Name:     LUNA FINCH        Performing Physician:  [id number] Valerie Horner MD Study Date:       6/7/2024            Verifying Physician:    [id number] Valerie Horner MD MRN/PID:          [id number]            Cardiologist/Co-Scrub: Accession#:       [id number]        Ordering Provider:     [id number] STANLEY GLORIA Date of           1970 / 53      Cardiologist: Birth/Age:        years Gender:           M                   Fellow: Encounter#:       [id number]          Surgeon:  Study: Left Heart Cath  Indications: LUNA FINCH is a 54 year old male who presents with end stage renal disease on dialysis, prior coronary artery bypass graft surgery, prior percutaneous coronary intervention and an asymptomatic chest pain assessment. Cardiomyopathy.  Procedure Description: After infiltration with 2% Lidocaine, the right femoral artery was cannulated with a modified Seldinger technique. Subsequently a 5 Thai sheath was placed in the right femoral artery. Selective coronary catheterization was performed using a 5 Fr catheter(s) exchanged over a guide wire to cannulate the coronary arteries. A JL 4 tip catheter was used for left coronary injections. A JR 4 tip catheter was used for right coronary injections. Additional catheter(s) used to visualize the coronary arteries were: IM. Multiple injections of contrast were made into the left and right coronary arteries with angiograms recorded in multiple projections. After completion of the procedure, femoral artery angiography was performed. This demonstrated a common femoral artery puncture appropriate for closure. A Vascade 5F vascular closure device was placed per protocol.  Coronary Angiography: The coronary circulation is co-dominant.  Left Main Coronary Artery: The left main coronary artery is a normal caliber vessel. The left main arises normally from the left coronary sinus of Valsalva and bifurcates into the LAD and circumflex coronary arteries. The mid to distal left main coronary artery  showed 95%.  Left Anterior Descending Coronary Artery Distribution: The left anterior descending coronary artery is a normal caliber vessel. The LAD arises normally from the left main coronary artery. The proximal left anterior descending coronary artery showed .  Circumflex Coronary Artery Distribution: The circumflex coronary artery is a normal caliber vessel. The circumflex arises normally from the left main coronary artery and terminates in the AV groove. The proximal circumflex coronary artery showed .  Right Coronary Artery Distribution: The right coronary artery is a normal caliber vessel. The RCA arises normally from the right sinus of Valsalva. The entire right coronary artery showed diffuse moderate disease.  Coronary Grafts:  LIMA Graft: Left internal mammary artery graft conduit, originating in situ and attached to the mid left anterior descending, is patent. Saphaneous Vein Graft(s): The saphenous vein graft, originating from the aorta and attached to the 1st obtuse marginal, is patent. The 2nd saphenous vein graft, originating from the aorta and attached to the right posterior descending artery, is patent.  Coronary Lesion Summary: Vessel             Stenosis         Vessel Segment Left Main            95%            mid to distal LAD                                 proximal Circumflex                          proximal RCA        diffuse moderate disease     entire  Graft Stenosis Summary: Graft       Destination of Graft LIMA  mid left anterior descending SVG 1 1st obtuse marginal SVG 2 right posterior descending artery  Complications: No in-lab complications observed.  Cardiac Cath Post Procedure Notes: Post Procedure Diagnosis: See below. Blood Loss:               Estimated blood loss during the procedure was 5 mls. Specimens Removed:        Number of specimen(s) removed: none. ____________________________________________________________________________________ CONCLUSIONS:  1. Native  coronaries: Critical distal left main disease, proximal LCx , proximal LAD , and moderate RCA disease in co-dominant system.  2. Bypasses: Patent LIMA to LAD which fills back the whole LAD system including both diagonals, patent SVG to OM1 which fills back the whole codominant LCx system, and patent SVG to right PDA.  3. Further management as per inpatient cardiology team. ICD 10 Codes: Other cardiomyopathies-I42.8  CPT Codes: Left Heart Cath Bypass Graft w ventriculography and coronary angio(LHC)-25830; Moderate Sedation Services 1st additional 15 minutes patient >5 years-10193; Ultrasound guidance for vascular access-21187  [id number] Valerie Horner MD Performing Physician Electronically signed by [id number] Valerie Horner MD on 6/7/2024 at 4:22:01 PM  ** Final **     Transthoracic Echo (TTE) Complete    Result Date: 6/5/2024   [facility], [address] [place] [address]              Tel [phone] and Fax [phone] TRANSTHORACIC ECHOCARDIOGRAM REPORT  Patient Name:      LUNA Lowe Physician:    [id number] Ignacio Ibrahim MD Study Date:        6/5/2024             Ordering Provider:    [id number] LIZZ KNOTT MRN/PID:           [id number]             Fellow: Accession#:        [id number]         Nurse: Date of Birth/Age: 1970 / 53 years Sonographer:          Shama Montes RDCS Gender:            M                    Additional Staff: Height:            193.00 cm            Admit Date:           5/31/2024 Weight:            68.00 kg             Admission Status:     Inpatient -                                                               Routine BSA / BMI:         1.96 m2 / 18.26      Encounter#:           [id number]                    kg/m2                                         Department Location:  [facility]                                                                Invasive Blood Pressure: 141 /67 mmHg Study Type:    TRANSTHORACIC ECHO (TTE) COMPLETE Diagnosis/ICD: Bacteremia-R78.81 Indication:    Bacteremia CPT Code:      Echo Complete w Full Doppler-20801 Patient History: Diabetes:          Yes Pertinent History: CAD, Chest Pain, HTN and CHF. ESRD, Hypoxia. Study Detail: The following Echo studies were performed: 2D, M-Mode, Doppler and               color flow.  PHYSICIAN INTERPRETATION: Left Ventricle: The left ventricular systolic function is severely decreased, with an estimated ejection fraction of 20%. There is global hypokinesis of the left ventricle with minor regional variations. The left ventricular cavity size is upper limits of normal. There is eccentric left ventricular hypertrophy. Left Ventricular Global Longitudinal Strain - 6.3 %. Spectral Doppler shows an abnormal pattern of left ventricular diastolic filling. Left Atrium: The left atrium is severely dilated. Right Ventricle: The right ventricle is moderately enlarged. There is reduced right ventricular systolic function. Right Atrium: The right atrium is normal in size. Aortic Valve: The aortic valve is trileaflet. There is moderate aortic valve cusp calcification. There is reduced aortic valve non coronary cusp excursion. There is evidence of moderate aortic valve stenosis. There is mild aortic valve regurgitation. The peak instantaneous gradient of the aortic valve is 37.2 mmHg. The mean gradient of the aortic valve is 19.7 mmHg. Mitral Valve: The mitral valve is normal in structure. The mitral valve spectral Doppler A-wave is absent, consistent with atrial fibrillation. There is mild to moderate mitral annular calcification. There is mild to moderate mitral valve regurgitation. Tricuspid Valve: The tricuspid valve is structurally normal. There is mild tricuspid regurgitation. The Doppler estimated RVSP is mildly elevated at 44.5 mmHg. Pulmonic Valve: The pulmonic valve is structurally  normal. There is mild pulmonic valve regurgitation. Pericardium: There is a trivial pericardial effusion. Aorta: The aortic root is normal. Systemic Veins: The inferior vena cava appears dilated. There is IVC inspiratory collapse greater than 50%. In comparison to the previous echocardiogram(s): Compared with study from 3/6/2024, the ejection fraction is comparatively reduced but was somewhat overestimated previously. There is a greater degree of aortic stenosis presently but this was underestimated previously.  CONCLUSIONS:  1. Left ventricular systolic function is severely decreased with a 20% estimated ejection fraction.  2. No definite valvular vegetations were visualized.  3. Spectral Doppler shows an abnormal pattern of left ventricular diastolic filling.  4. There is eccentric left ventricular hypertrophy.  5. Moderately enlarged right ventricle.  6. There is reduced right ventricular systolic function.  7. The left atrium is severely dilated.  8. Absent A-wave on MV spectral Doppler tracing, consistent with atrial fibrillation.  9. Mild to moderate mitral valve regurgitation. 10. Mildly elevated RVSP. 11. Moderate aortic valve stenosis. 12. There is moderate aortic valve cusp calcification. 13. Mild aortic valve regurgitation. 14. There is global hypokinesis of the left ventricle with minor regional variations. QUANTITATIVE DATA SUMMARY: 2D MEASUREMENTS:                           Normal Ranges: LAs:           4.98 cm    (2.7-4.0cm) IVSd:          1.08 cm    (0.6-1.1cm) LVPWd:         1.06 cm    (0.6-1.1cm) LVIDd:         5.73 cm    (3.9-5.9cm) LVIDs:         4.81 cm LV Mass Index: 127.2 g/m2 LV % FS        16.1 % LA VOLUME:                               Normal Ranges: LA Vol A4C:        115.9 ml   (22+/-6mL/m2) LA Vol A2C:        131.8 ml LA Vol BP:         129.3 ml LA Vol Index A4C:  59.2ml/m2 LA Vol Index A2C:  67.2 ml/m2 LA Vol Index BP:   66.0 ml/m2 LA Area A4C:       30.9 cm2 LA Area A2C:       31.5 cm2  LA Major Axis A4C: 7.0 cm LA Major Axis A2C: 6.4 cm LA Volume Index:   65.3 ml/m2 LA Vol A4C:        111.1 ml LA Vol A2C:        127.7 ml RA VOLUME BY A/L METHOD:                               Normal Ranges: RA Vol A4C:        85.8 ml    (8.3-19.5ml) RA Vol Index A4C:  43.8 ml/m2 RA Area A4C:       26.2 cm2 RA Major Axis A4C: 6.8 cm M-MODE MEASUREMENTS:                  Normal Ranges: Ao Root: 3.10 cm (2.0-3.7cm) LAs:     4.84 cm (2.7-4.0cm) AORTA MEASUREMENTS:                      Normal Ranges: Ao Sinus, d: 2.90 cm (2.1-3.5cm) Ao STJ, d:   2.20 cm (1.7-3.4cm) Asc Ao, d:   3.00 cm (2.1-3.4cm) LV SYSTOLIC FUNCTION BY 2D PLANIMETRY (MOD):                                          Normal Ranges: EF-A4C View:                      29.0 % (>=55%) EF-A2C View:                      43.8 % EF-Biplane:                       39.1 % Global Longitudinal Strain (GLS): 6.3 % LV DIASTOLIC FUNCTION:                        Normal Ranges: MV Peak E:    1.37 m/s (0.7-1.2 m/s) MV e'         0.17 m/s (>8.0) MV lateral e' 0.17 m/s MV medial e'  0.10 m/s E/e' Ratio:   8.06     (<8.0) MITRAL VALVE:                      Normal Ranges: MV Vmax:    1.52 m/s (<=1.3m/s) MV peak P.2 mmHg (<5mmHg) MV mean P.1 mmHg (<2mmHg) MV VTI:     23.83 cm (10-13cm) MITRAL INSUFFICIENCY:                         Normal Ranges: MR VTI:     130.99 cm MR Vmax:    486.88 cm/s MR Volume:  13.47 ml MR Flow Rt: 50.06 ml/s MR EROA:    0.10 cm2 AORTIC VALVE:                                    Normal Ranges: AoV Vmax:                3.05 m/s  (<=1.7m/s) AoV Peak P.2 mmHg (<20mmHg) AoV Mean P.7 mmHg (1.7-11.5mmHg) LVOT Max Shayne:            1.20 m/s  (<=1.1m/s) AoV VTI:                 51.47 cm  (18-25cm) LVOT VTI:                21.65 cm LVOT Diameter:           2.01 cm   (1.8-2.4cm) AoV Area, VTI:           1.34 cm2  (2.5-5.5cm2) AoV Area,Vmax:           1.25 cm2  (2.5-4.5cm2) AoV Dimensionless Index: 0.42 AORTIC INSUFFICIENCY: AI  Vmax:       3.21 m/s AI Half-time:  349 msec AI Decel Time: 1203 msec AI Decel Rate: 266.64 cm/s2  RIGHT VENTRICLE: RV Basal 4.80 cm RV Mid   3.00 cm RV Major 9.9 cm TAPSE:   12.0 mm RV s'    0.09 m/s TRICUSPID VALVE/RVSP:                             Normal Ranges: Peak TR Velocity: 3.02 m/s Est. RA Pressure: 8 mmHg RV Syst Pressure: 44.5 mmHg (< 30mmHg) IVC Diam:         2.23 cm PULMONIC VALVE:                      Normal Ranges: RVOT Vmax:  0.51 m/s (0.6-0.9m/s) PV Max Shayne: 1.2 m/s  (0.6-0.9m/s) PV Max P.6 mmHg AORTA: Asc Ao Diam 2.99 cm  [id number] Ignacio Ibrahim MD Electronically signed on 2024 at 4:50:52 PM  ** Final **     CT abdomen pelvis wo IV contrast    Result Date: 2024  Interpreted By:  Ketty Ramirez, STUDY: CT ABDOMEN PELVIS WO IV CONTRAST;  2024 4:08 am   INDICATION: Signs/Symptoms:bacteremia  source?.   COMPARISON: 2024   ACCESSION NUMBER(S): [id number]   ORDERING CLINICIAN: GREG CALDERA   TECHNIQUE: CT of the abdomen and pelvis was performed. Sagittal and coronal reconstructions were generated.  No intravenous contrast given for the exam.   FINDINGS: Solid organ and vessel evaluation limited without IV contrast.   ABDOMINAL ORGANS:   LIVER: No focal lesion within limits of unenhanced exam prominent vascular calcifications near the hilum.   GALL BLADDER AND BILIARY TREE: No calcified gallstone. Gallbladder is distended. Questionable trace pericholecystic fluid.   SPLEEN: Stable isodense presumed splenule adjacent to the anterior pole.   PANCREAS: No focal lesion within limits of unenhanced exam   ADRENALS: No adrenal mass   KIDNEYS AND URETERS: Atrophic with prominent vascular calcifications and small rounded hypodensities in each kidney similar to the prior exam.   BOWEL: Questionable distal esophageal wall thickening. Stomach is mildly distended with debris and air. No abnormally dilated small bowel loops. Moderate fecal residue in the proximal colon. Rectum is distended with  gas and fecal debris.   PERITONEUM, RETROPERITONEUM, NODES: No significant free fluid. No free air. Slight increased density in the mesentery. No significant retroperitoneal adenopathy within limits of unenhanced exam.   VESSELS: Relative decreased density of cardiac lumen suggesting anemia. Extensive vascular calcifications. No abdominal aortic aneurysm. Lack of IV contrast precludes vascular luminal assessment.   PELVIS: Urinary bladder is partially distended with diffusely thickened wall. Vas deferens and faint possible seminal vesicle calcifications bilaterally similar to the prior exam. No gross prostate enlargement.   ABDOMINAL WALL: Focal density in the right lower quadrant abdominal wall subcutaneous fat similar to the prior exam. No sizable abdominal wall hernia. Mild soft tissue edema.   BONES: Generalized increased osseous density consistent with metabolic bone disease similar to the prior exam. Focal smooth superior L2 endplate depression consistent with Schmorl's node with adjacent tiny vacuum disc is more conspicuous.   LOWER CHEST: Patchy/nodular left lower lobe infiltrate. Septal thickening in both lung bases. Small bilateral pleural effusions. Cardiomegaly. Surgical material along the margins of the heart again seen.       Left lower lobe infiltrate consistent with pneumonia.   Mild soft tissue and mesenteric edema with septal thickening in the lung bases and small pleural effusions consistent with CHF/fluid overload.   Distended gallbladder with questionable trace pericholecystic fluid.   Questionable distal esophageal wall thickening. Correlate with possible esophagitis.   Diffuse bladder wall thickening which could be related to suboptimal distention, cystitis and/or trabeculation.   Numerous additional findings as described above.   MACRO: None.   Signed by: Ketty Ramirez 6/4/2024 8:48 AM Dictation workstation:   [id number]    Electrocardiogram, 12-lead PRN ACS symptoms    Result Date:  6/3/2024  Normal sinus rhythm Possible Left atrial enlargement Left ventricular hypertrophy with QRS widening ( Sokolow-Márquez , [place] product ) Marked T wave abnormality, consider inferolateral ischemia Abnormal ECG When compared with ECG of 26-MAR-2024 02:50, Questionable change in QRS axis ST now depressed in Inferior leads T wave inversion now evident in Inferior leads    XR chest 2 views    Result Date: 5/31/2024  Interpreted By:  Aaron Jeffers, STUDY: XR CHEST 2 VIEWS;  5/31/2024 9:24 pm   INDICATION: Signs/Symptoms:sob.   COMPARISON: None.   ACCESSION NUMBER(S): [id number]   ORDERING CLINICIAN: KAMALA FRIEDMAN   FINDINGS: Median sternal wires present. Some vascular stent over the right axilla.     CARDIOMEDIASTINAL SILHOUETTE: There is enlargement of the cardiac silhouette. Median sternotomy wires present.   LUNGS: There is pulmonary congestion. There is no consolidation or effusion.   ABDOMEN: No remarkable upper abdominal findings.   BONES: No acute osseous changes.       1.  Enlarged cardiac silhouette with vascular congestion. No consolidation seen       MACRO: None   Signed by: Aaron Jeffers 5/31/2024 9:28 PM Dictation workstation:   [id number]                  SIGNATURE:  Osmar Callaway MD  DATE:  June 8, 2024  TIME:  10:54 AM

## 2024-06-08 NOTE — PROGRESS NOTES
"Subjective Data:  Patient resting comfortably in bed, no complaints of chest pain or dyspnea  Patient reports that he declines wearing telemetry monitoring    Overnight Events:    None reported     Objective Data:  Last Recorded Vitals:  Vitals:    24 1237 24 0400 24 0803 24 1148   BP:  126/72 127/75 142/79   BP Location:  Left arm Right arm Right arm   Patient Position:  Lying Lying Lying   Pulse:  92 87 91   Resp:  18 17 17   Temp:  36.4 °C (97.5 °F) 36.6 °C (97.8 °F) 36.8 °C (98.2 °F)   TempSrc:  Temporal Oral Temporal   SpO2:  98% 95% 96%   Weight: 68 kg (150 lb)      Height: 1.93 m (6' 4\")        Medical Gas Therapy: None (Room air)  O2 Delivery Method: Nasal cannula (3 lpm)  Weight  Av.7 kg (153 lb 9.6 oz)  Min: 68 kg (150 lb)  Max: 72.6 kg (160 lb)    LABS:  CMP:  Results from last 7 days   Lab Units 24  0623 24  1354 24  0546 24  0543 24  0638 24  0621 24  0718   SODIUM mmol/L 125* 136 127* 133* 134* 133* 134*   POTASSIUM mmol/L 5.6* 4.3 6.8* 5.0 4.9 4.5 4.3   CHLORIDE mmol/L 86* 93* 88* 91* 92* 93* 92*   CO2 mmol/L 23 24 23 29 26 27 29   ANION GAP mmol/L 22* 23* 23* 18 21* 18 17   BUN mg/dL 57* 25* 51* 35* 54* 41* 60*   CREATININE mg/dL 6.79* 4.88* 9.74* 7.94* 11.32* 9.30* 12.00*   EGFR mL/min/1.73m*2 9* 14* 6* 8* 5* 6* 5*   MAGNESIUM mg/dL 2.30  --  2.20 2.20 2.40 2.10  --    ALBUMIN g/dL 4.0  --  3.9 3.8 3.7  --  3.6   ALT U/L  --   --   --   --   --   --  17   AST U/L  --   --   --   --   --   --  21   BILIRUBIN TOTAL mg/dL  --   --   --   --   --   --  0.5     CBC:  Results from last 7 days   Lab Units 24  0623 24  0546 24  0543 24  0638 24  0621 24  0718   WBC AUTO x10*3/uL 6.1 5.5 5.5 5.6 5.0 4.3*   HEMOGLOBIN g/dL 8.0* 7.9* 8.0* 7.8* 7.5* 8.5*   HEMATOCRIT % 25.8* 24.8* 25.6* 23.3* 23.3* 26.3*   PLATELETS AUTO x10*3/uL 319 260 242 217 198 158   MCV fL 106* 103* 105* 97 101* 102*     COAG:   " "  ABO: No results found for: \"ABO\"  HEME/ENDO:     CARDIAC:       No lab exists for component: \"CK\", \"CKMBP\"            Inpatient Medications:  Scheduled medications   Medication Dose Route Frequency    aspirin  81 mg oral Daily    atorvastatin  80 mg oral Daily    carvedilol  25 mg oral BID    dextrose  25 g intravenous Once    epoetin annita or biosimilar  150 Units/kg subcutaneous Every Mon/Wed/Fri    heparin (porcine)  5,000 Units subcutaneous q8h SHANA    insulin glargine  10 Units subcutaneous q12h    insulin lispro  0-15 Units subcutaneous q4h    insulin lispro  10 Units subcutaneous Once    insulin lispro  6 Units subcutaneous TID    multivitamin with minerals  1 tablet oral Daily    oxygen   inhalation Continuous - 02/gases    polyethylene glycol  17 g oral Daily    sacubitriL-valsartan  1 tablet oral BID    sevelamer carbonate  800 mg oral TID    SITagliptin phosphate  25 mg oral Daily    sodium bicarbonate  50 mEq intravenous Once    sodium zirconium cyclosilicate  10 g oral q8h     PRN medications   Medication    acetaminophen    dextrose    glucagon    glucagon    vancomycin     Continuous Medications   Medication Dose Last Rate     Echocardiogram 6/8/2024:  CONCLUSIONS:   1. Left ventricular systolic function is mildly to moderately decreased with a 35-40% estimated ejection fraction.   2. Left Ventricular Global Longitudinal Strain - 8.7 % with more pronounced abnormalities involving the basal and mid segments with preservation of the apex more consistent with a possible infiltrative cardiomyopathy.   3. There is global hypokinesis of the left ventricle with minor regional variations.   4. Spectral Doppler shows an impaired relaxation pattern of left ventricular diastolic filling.   5. The left atrium is moderately dilated.   6. The right atrium is mild to moderately dilated.   7. Moderate tricuspid regurgitation visualized.   8. Compared with study from 6/5/2024, there is an improvement in calculated " "LVEF.    Physical Exam:  General: Pleasant male, alert and oriented x 3, NAD  HEENT:  Pupils equal and reactive.  Normocephalic.  Moist mucosa.    Neck:  No thyromegaly.  Normal Jugular Venous Pressure.  Cardiovascular:  Regular rate and rhythm.  + cardiac murmur. Normal S1 and S2.  Pulmonary:  Clear to auscultation bilaterally.  Abdomen:  Soft. Non-tender.   Non-distended.  Positive bowel sounds.  Lower Extremities:  2+ pedal pulses. No LE edema.  Neurologic:  Cranial nerves intact.  No focal deficit.   Skin: Skin warm and dry, normal skin turgor.   Psychiatric: Normal affect.    Assessment  HF: Outpatient cardiologist Dr. Lance 2/8/2024  Mr. Carreon is a 53M with a PMHx sig for CAD s/p PCI (LAD; 6/2017), CABG x 3 3/2022, stage C systolic HF/ICM/HFmrEF now HEFrEF 20% 6/2024, mod aortic stenosis, HTN, tachycardia s/p ablation, DM and discoid lupus/SLE with ESRD on home HD (M-T-TH-F via RUE AVF since 12/2016) presents to [facility] ED on May 31, 2024 with fatigue, body aches and low-grade fevers.  Patient blood cultures came back positive for Enterococcus bacillus and gram-positive cocci.  ID was consulted wanted a TTE to assess for endocarditis with possible MARION.  TTE was done yesterday which showed a decrease in EF to 20% from 40 to 45% in June of last year.  Cardiology was consulted for \"worsening cardiomyopathy and AS\".     Home cardiac medications include aspirin 81 daily, atorvastatin 80 mg daily, Coreg 25 mg twice daily, Entresto 24/26 twice daily.     ICM/ HFrEF  EF improved on repeat TTE 6/8/24> EF 35-40% was 20% on TTE 6/5/24  Grafts patent by coronary angiography 6/7/24.   Volume management per nephrology. On a reasonable outpatient regimen as his renal function allows.  Aortic stenosis  Moderate. For continued outpatient monitoring.  CAD s/p CABG  Grafts remain patent. Indefinite aspirin / statin.   4. Bacteremia  No endocarditis by MARION 6/7/24  5. Elevated troponin  Consistent with a non-MI troponin " elevation / chronic non-traumatic myocardial injury. Core measures do not apply. In the setting of ESRD, prior CABG, aortic stenosis, etc.      Recommendations   D/t Improved EF 35-40% by repeat Echo today 6/8/24, will hold on LifeVest initiation this admission as it is not indicated, patient can be reevaluated by his outpatient advance heart failure cardiologist Dr. Lance post discharge at follow up.   Continue CV medications as ordered  No cardiac barriers to discharge  Cardiology will sign off  Please call with questions           Code Status:  Full Code    I spent 50 minutes in the professional and overall care of this patient.        Tata Ndiaye, APRN-CNP

## 2024-06-09 LAB
ALBUMIN SERPL BCP-MCNC: 3.3 G/DL (ref 3.4–5)
ANION GAP SERPL CALC-SCNC: 21 MMOL/L (ref 10–20)
ATRIAL RATE: 88 BPM
BACTERIA BLD AEROBE CULT: ABNORMAL
BACTERIA BLD CULT: ABNORMAL
BASOPHILS # BLD AUTO: 0.03 X10*3/UL (ref 0–0.1)
BASOPHILS NFR BLD AUTO: 0.4 %
BUN SERPL-MCNC: 82 MG/DL (ref 6–23)
CALCIUM SERPL-MCNC: 8.6 MG/DL (ref 8.6–10.3)
CHLORIDE SERPL-SCNC: 90 MMOL/L (ref 98–107)
CO2 SERPL-SCNC: 25 MMOL/L (ref 21–32)
CREAT SERPL-MCNC: 8.88 MG/DL (ref 0.5–1.3)
EGFRCR SERPLBLD CKD-EPI 2021: 7 ML/MIN/1.73M*2
EOSINOPHIL # BLD AUTO: 0.06 X10*3/UL (ref 0–0.7)
EOSINOPHIL NFR BLD AUTO: 0.8 %
ERYTHROCYTE [DISTWIDTH] IN BLOOD BY AUTOMATED COUNT: 15.4 % (ref 11.5–14.5)
GLUCOSE BLD MANUAL STRIP-MCNC: 106 MG/DL (ref 74–99)
GLUCOSE BLD MANUAL STRIP-MCNC: 138 MG/DL (ref 74–99)
GLUCOSE BLD MANUAL STRIP-MCNC: 164 MG/DL (ref 74–99)
GLUCOSE BLD MANUAL STRIP-MCNC: 172 MG/DL (ref 74–99)
GLUCOSE BLD MANUAL STRIP-MCNC: 233 MG/DL (ref 74–99)
GLUCOSE BLD MANUAL STRIP-MCNC: 90 MG/DL (ref 74–99)
GLUCOSE SERPL-MCNC: 159 MG/DL (ref 74–99)
GRAM STN SPEC: ABNORMAL
HCT VFR BLD AUTO: 26.7 % (ref 41–52)
HGB BLD-MCNC: 8.4 G/DL (ref 13.5–17.5)
IMM GRANULOCYTES # BLD AUTO: 0.07 X10*3/UL (ref 0–0.7)
IMM GRANULOCYTES NFR BLD AUTO: 1 % (ref 0–0.9)
LYMPHOCYTES # BLD AUTO: 1.22 X10*3/UL (ref 1.2–4.8)
LYMPHOCYTES NFR BLD AUTO: 16.6 %
MAGNESIUM SERPL-MCNC: 2.2 MG/DL (ref 1.6–2.4)
MCH RBC QN AUTO: 31.9 PG (ref 26–34)
MCHC RBC AUTO-ENTMCNC: 31.5 G/DL (ref 32–36)
MCV RBC AUTO: 102 FL (ref 80–100)
MONOCYTES # BLD AUTO: 0.91 X10*3/UL (ref 0.1–1)
MONOCYTES NFR BLD AUTO: 12.4 %
NEUTROPHILS # BLD AUTO: 5.07 X10*3/UL (ref 1.2–7.7)
NEUTROPHILS NFR BLD AUTO: 68.8 %
NRBC BLD-RTO: 0.3 /100 WBCS (ref 0–0)
P AXIS: 68 DEGREES
P OFFSET: 171 MS
P ONSET: 105 MS
PHOSPHATE SERPL-MCNC: 6.2 MG/DL (ref 2.5–4.9)
PLATELET # BLD AUTO: 326 X10*3/UL (ref 150–450)
POTASSIUM SERPL-SCNC: 4.6 MMOL/L (ref 3.5–5.3)
PR INTERVAL: 208 MS
Q ONSET: 209 MS
QRS COUNT: 14 BEATS
QRS DURATION: 128 MS
QT INTERVAL: 418 MS
QTC CALCULATION(BAZETT): 505 MS
QTC FREDERICIA: 475 MS
R AXIS: 65 DEGREES
RBC # BLD AUTO: 2.63 X10*6/UL (ref 4.5–5.9)
SODIUM SERPL-SCNC: 131 MMOL/L (ref 136–145)
T AXIS: 217 DEGREES
T OFFSET: 418 MS
VENTRICULAR RATE: 88 BPM
WBC # BLD AUTO: 7.4 X10*3/UL (ref 4.4–11.3)

## 2024-06-09 PROCEDURE — 1100000001 HC PRIVATE ROOM DAILY

## 2024-06-09 PROCEDURE — 99233 SBSQ HOSP IP/OBS HIGH 50: CPT | Performed by: INTERNAL MEDICINE

## 2024-06-09 PROCEDURE — 2500000002 HC RX 250 W HCPCS SELF ADMINISTERED DRUGS (ALT 637 FOR MEDICARE OP, ALT 636 FOR OP/ED): Performed by: INTERNAL MEDICINE

## 2024-06-09 PROCEDURE — 99232 SBSQ HOSP IP/OBS MODERATE 35: CPT | Performed by: INTERNAL MEDICINE

## 2024-06-09 PROCEDURE — 2500000002 HC RX 250 W HCPCS SELF ADMINISTERED DRUGS (ALT 637 FOR MEDICARE OP, ALT 636 FOR OP/ED): Mod: MUE | Performed by: NURSE PRACTITIONER

## 2024-06-09 PROCEDURE — 2500000001 HC RX 250 WO HCPCS SELF ADMINISTERED DRUGS (ALT 637 FOR MEDICARE OP): Performed by: NURSE PRACTITIONER

## 2024-06-09 PROCEDURE — 82947 ASSAY GLUCOSE BLOOD QUANT: CPT | Mod: 91

## 2024-06-09 PROCEDURE — 36415 COLL VENOUS BLD VENIPUNCTURE: CPT | Performed by: NURSE PRACTITIONER

## 2024-06-09 PROCEDURE — 85025 COMPLETE CBC W/AUTO DIFF WBC: CPT | Performed by: NURSE PRACTITIONER

## 2024-06-09 PROCEDURE — 80069 RENAL FUNCTION PANEL: CPT | Performed by: NURSE PRACTITIONER

## 2024-06-09 PROCEDURE — 83735 ASSAY OF MAGNESIUM: CPT | Performed by: NURSE PRACTITIONER

## 2024-06-09 PROCEDURE — 2500000004 HC RX 250 GENERAL PHARMACY W/ HCPCS (ALT 636 FOR OP/ED): Performed by: NURSE PRACTITIONER

## 2024-06-09 RX ADMIN — INSULIN LISPRO 2 UNITS: 100 INJECTION, SOLUTION INTRAVENOUS; SUBCUTANEOUS at 08:57

## 2024-06-09 RX ADMIN — HEPARIN SODIUM 5000 UNITS: 5000 INJECTION INTRAVENOUS; SUBCUTANEOUS at 21:40

## 2024-06-09 RX ADMIN — INSULIN GLARGINE 10 UNITS: 100 INJECTION, SOLUTION SUBCUTANEOUS at 21:38

## 2024-06-09 RX ADMIN — SODIUM ZIRCONIUM CYCLOSILICATE 10 G: 10 POWDER, FOR SUSPENSION ORAL at 00:20

## 2024-06-09 RX ADMIN — SITAGLIPTIN 25 MG: 25 TABLET, FILM COATED ORAL at 08:57

## 2024-06-09 RX ADMIN — ATORVASTATIN CALCIUM 80 MG: 80 TABLET, FILM COATED ORAL at 08:57

## 2024-06-09 RX ADMIN — SEVELAMER CARBONATE 800 MG: 800 TABLET, FILM COATED ORAL at 08:57

## 2024-06-09 RX ADMIN — HEPARIN SODIUM 5000 UNITS: 5000 INJECTION INTRAVENOUS; SUBCUTANEOUS at 14:44

## 2024-06-09 RX ADMIN — SEVELAMER CARBONATE 800 MG: 800 TABLET, FILM COATED ORAL at 12:28

## 2024-06-09 RX ADMIN — SACUBITRIL AND VALSARTAN 1 TABLET: 24; 26 TABLET, FILM COATED ORAL at 08:57

## 2024-06-09 RX ADMIN — INSULIN LISPRO 6 UNITS: 100 INJECTION, SOLUTION INTRAVENOUS; SUBCUTANEOUS at 08:58

## 2024-06-09 RX ADMIN — ASPIRIN 81 MG: 81 TABLET, COATED ORAL at 08:57

## 2024-06-09 RX ADMIN — HEPARIN SODIUM 5000 UNITS: 5000 INJECTION INTRAVENOUS; SUBCUTANEOUS at 06:19

## 2024-06-09 RX ADMIN — POLYETHYLENE GLYCOL 3350 17 G: 17 POWDER, FOR SOLUTION ORAL at 08:57

## 2024-06-09 RX ADMIN — MULTIPLE VITAMINS W/ MINERALS TAB 1 TABLET: TAB at 08:57

## 2024-06-09 RX ADMIN — SEVELAMER CARBONATE 800 MG: 800 TABLET, FILM COATED ORAL at 17:58

## 2024-06-09 RX ADMIN — INSULIN LISPRO 2 UNITS: 100 INJECTION, SOLUTION INTRAVENOUS; SUBCUTANEOUS at 17:56

## 2024-06-09 RX ADMIN — SACUBITRIL AND VALSARTAN 1 TABLET: 24; 26 TABLET, FILM COATED ORAL at 21:40

## 2024-06-09 RX ADMIN — INSULIN GLARGINE 10 UNITS: 100 INJECTION, SOLUTION SUBCUTANEOUS at 10:14

## 2024-06-09 ASSESSMENT — COGNITIVE AND FUNCTIONAL STATUS - GENERAL
MOBILITY SCORE: 24
DAILY ACTIVITIY SCORE: 24

## 2024-06-09 ASSESSMENT — PAIN - FUNCTIONAL ASSESSMENT: PAIN_FUNCTIONAL_ASSESSMENT: 0-10

## 2024-06-09 ASSESSMENT — PAIN SCALES - GENERAL
PAINLEVEL_OUTOF10: 0 - NO PAIN
PAINLEVEL_OUTOF10: 0 - NO PAIN

## 2024-06-09 NOTE — CARE PLAN
Problem: Pain  Goal: My pain/discomfort is manageable  Outcome: Progressing     Problem: Safety  Goal: Patient will be injury free during hospitalization  Outcome: Progressing  Goal: I will remain free of falls  Outcome: Progressing     Problem: Safety  Goal: I will remain free of falls  Outcome: Progressing

## 2024-06-09 NOTE — PROGRESS NOTES
"Kentrell Carreon is a 53 y.o. male on day 8 of admission presenting with Hypoxia.    Subjective   Patient admits to feeling better.  He ate eggs and red this morning for breakfast and thus thinks the six units of prandial insulin would have been too excessive.     I have reviewed histories, allergies and medications have been reviewed and there are no changes       Objective     Physical Exam  Vitals and nursing note reviewed.   Constitutional:       General: He is not in acute distress.     Appearance: Normal appearance. He is normal weight.   HENT:      Head: Normocephalic and atraumatic.      Nose: Nose normal.      Mouth/Throat:      Mouth: Mucous membranes are moist.   Eyes:      Extraocular Movements: Extraocular movements intact.   Cardiovascular:      Comments: AV fistula to right arm   Pulmonary:      Effort: Pulmonary effort is normal.   Musculoskeletal:         General: Normal range of motion.   Neurological:      Mental Status: He is alert and oriented to person, place, and time.   Psychiatric:         Mood and Affect: Mood normal.         Last Recorded Vitals  Blood pressure 101/63, pulse 68, temperature 36.5 °C (97.7 °F), temperature source Oral, resp. rate 17, height 1.93 m (6' 4\"), weight 68 kg (150 lb), SpO2 94%.  Intake/Output last 3 Shifts:  I/O last 3 completed shifts:  In: 390 (5.7 mL/kg) [P.O.:240; IV Piggyback:150]  Out: - (0 mL/kg)   Weight: 68 kg     Relevant Results  Results from last 7 days   Lab Units 06/09/24  0801 06/09/24  0704 06/09/24  0457 06/09/24  0035 06/08/24  2059 06/08/24  1907 06/08/24  1144 06/08/24  1044 06/08/24  0807 06/08/24  0623 06/07/24  1754 06/07/24  1354 06/07/24  1020 06/07/24  0546   POCT GLUCOSE mg/dL 172*  --  138* 90 228* 231*   < >  --    < >  --    < >  --    < >  --    GLUCOSE mg/dL  --  159*  --   --   --   --   --  766*  --  799*  --  192*  --  397*    < > = values in this interval not displayed.     Scheduled medications  aspirin, 81 mg, oral, " Daily  atorvastatin, 80 mg, oral, Daily  carvedilol, 25 mg, oral, BID  dextrose, 25 g, intravenous, Once  epoetin annita or biosimilar, 150 Units/kg, subcutaneous, Every Mon/Wed/Fri  heparin (porcine), 5,000 Units, subcutaneous, q8h SHANA  insulin glargine, 10 Units, subcutaneous, q12h  insulin lispro, 0-10 Units, subcutaneous, TID  insulin lispro, 10 Units, subcutaneous, Once  insulin lispro, 6 Units, subcutaneous, TID  multivitamin with minerals, 1 tablet, oral, Daily  oxygen, , inhalation, Continuous - 02/gases  polyethylene glycol, 17 g, oral, Daily  sacubitriL-valsartan, 1 tablet, oral, BID  sevelamer carbonate, 800 mg, oral, TID  SITagliptin phosphate, 25 mg, oral, Daily  sodium bicarbonate, 50 mEq, intravenous, Once      Continuous medications     PRN medications  PRN medications: acetaminophen, dextrose, glucagon, glucagon, vancomycin    Results for orders placed or performed during the hospital encounter of 05/31/24 (from the past 24 hour(s))   POCT GLUCOSE   Result Value Ref Range    POCT Glucose >600 (H) 74 - 99 mg/dL   Glucose, random   Result Value Ref Range    Glucose 766 (HH) 74 - 99 mg/dL   POCT GLUCOSE   Result Value Ref Range    POCT Glucose >600 (H) 74 - 99 mg/dL   POCT GLUCOSE   Result Value Ref Range    POCT Glucose 516 (H) 74 - 99 mg/dL   POCT GLUCOSE   Result Value Ref Range    POCT Glucose 356 (H) 74 - 99 mg/dL   Transthoracic Echo (TTE) Limited   Result Value Ref Range    LV Biplane EF 50 %    Tricuspid annular plane systolic excursion 1.3 cm    LV GLS 8.7 %    LVIDd 5.75 cm    RVSP 35.1 mmHg    LV A4C EF 48.3    POCT GLUCOSE   Result Value Ref Range    POCT Glucose 231 (H) 74 - 99 mg/dL   POCT GLUCOSE   Result Value Ref Range    POCT Glucose 228 (H) 74 - 99 mg/dL   POCT GLUCOSE   Result Value Ref Range    POCT Glucose 90 74 - 99 mg/dL   POCT GLUCOSE   Result Value Ref Range    POCT Glucose 138 (H) 74 - 99 mg/dL   CBC and Auto Differential   Result Value Ref Range    WBC 7.4 4.4 - 11.3 x10*3/uL     nRBC 0.3 (H) 0.0 - 0.0 /100 WBCs    RBC 2.63 (L) 4.50 - 5.90 x10*6/uL    Hemoglobin 8.4 (L) 13.5 - 17.5 g/dL    Hematocrit 26.7 (L) 41.0 - 52.0 %     (H) 80 - 100 fL    MCH 31.9 26.0 - 34.0 pg    MCHC 31.5 (L) 32.0 - 36.0 g/dL    RDW 15.4 (H) 11.5 - 14.5 %    Platelets 326 150 - 450 x10*3/uL    Neutrophils % 68.8 40.0 - 80.0 %    Immature Granulocytes %, Automated 1.0 (H) 0.0 - 0.9 %    Lymphocytes % 16.6 13.0 - 44.0 %    Monocytes % 12.4 2.0 - 10.0 %    Eosinophils % 0.8 0.0 - 6.0 %    Basophils % 0.4 0.0 - 2.0 %    Neutrophils Absolute 5.07 1.20 - 7.70 x10*3/uL    Immature Granulocytes Absolute, Automated 0.07 0.00 - 0.70 x10*3/uL    Lymphocytes Absolute 1.22 1.20 - 4.80 x10*3/uL    Monocytes Absolute 0.91 0.10 - 1.00 x10*3/uL    Eosinophils Absolute 0.06 0.00 - 0.70 x10*3/uL    Basophils Absolute 0.03 0.00 - 0.10 x10*3/uL   Magnesium   Result Value Ref Range    Magnesium 2.20 1.60 - 2.40 mg/dL   Renal Function Panel   Result Value Ref Range    Glucose 159 (H) 74 - 99 mg/dL    Sodium 131 (L) 136 - 145 mmol/L    Potassium 4.6 3.5 - 5.3 mmol/L    Chloride 90 (L) 98 - 107 mmol/L    Bicarbonate 25 21 - 32 mmol/L    Anion Gap 21 (H) 10 - 20 mmol/L    Urea Nitrogen 82 (H) 6 - 23 mg/dL    Creatinine 8.88 (H) 0.50 - 1.30 mg/dL    eGFR 7 (L) >60 mL/min/1.73m*2    Calcium 8.6 8.6 - 10.3 mg/dL    Phosphorus 6.2 (H) 2.5 - 4.9 mg/dL    Albumin 3.3 (L) 3.4 - 5.0 g/dL   POCT GLUCOSE   Result Value Ref Range    POCT Glucose 172 (H) 74 - 99 mg/dL      Transthoracic Echo (TTE) Limited    Result Date: 6/8/2024   [facility], [address] [place] [address]              Tel [phone] and Fax [phone] TRANSTHORACIC ECHOCARDIOGRAM REPORT  Patient Name:      LUNA Lowe Physician:    [id number] Jarett Walsh MD Study Date:        6/8/2024             Ordering Provider:    [id number] MADDIE POND                                                                KAILEE MRN/PID:           [id number]             Fellow: Accession#:        [id number]         Nurse: Date of Birth/Age: 1970 / 53 years Sonographer:          Marlee Mejía RDCS Gender:            M                    Additional Staff: Height:            193.00 cm            Admit Date:           6/8/2024 Weight:            68.00 kg             Admission Status:     Inpatient -                                                               Routine BSA / BMI:         1.96 m2 / 18.26      Encounter#:           [id number]                    kg/m2                                         Department Location:  [facility] Blood Pressure: 142 /79 mmHg Study Type:    TRANSTHORACIC ECHO (TTE) LIMITED Diagnosis/ICD: Cardiomyopathy, unspecified-I42.9; Heart failure,                unspecified-I50.9 Indication:    Cardiomyopathy, heart failure CPT Code:      Echo Limited-98391; Doppler Limited-14692; Color Doppler-06173;                Myocardial Strain Imaging-20035 Patient History: Diabetes:          Yes Pertinent History: CAD, Cardiomyopathy and HTN. s/p CABG x3. Study Detail: The following Echo studies were performed: 2D, M-Mode, Doppler,               color flow and Strain.  PHYSICIAN INTERPRETATION: Left Ventricle: The left ventricular systolic function is mildly to moderately decreased, with an estimated ejection fraction of 35-40%. There is global hypokinesis of the left ventricle with minor regional variations. The left ventricular cavity size is normal. Left Ventricular Global Longitudinal Strain - 8.7 %. Spectral Doppler shows an impaired relaxation pattern of left ventricular diastolic filling. Left Atrium: The left atrium is moderately dilated. Right Ventricle: The right ventricle is normal in size. There is normal right ventricular global systolic function. Right Atrium: The right atrium is mild to moderately dilated. Aortic Valve: The aortic valve appears structurally normal. There is no  evidence of aortic valve regurgitation. Mitral Valve: The mitral valve is mildly thickened. There is mild mitral valve regurgitation. Tricuspid Valve: The tricuspid valve is structurally normal. There is moderate tricuspid regurgitation. Pulmonic Valve: The pulmonic valve is structurally normal. There is mild to moderate pulmonic valve regurgitation. Pericardium: There is a trivial to small pericardial effusion. Aorta: The aortic root is normal. Pulmonary Artery: The tricuspid regurgitant velocity is 2.83 m/s, and with an estimated right atrial pressure of 3 mmHg, the estimated pulmonary artery pressure is mildly elevated with the RVSP at 35.1 mmHg. Systemic Veins: The inferior vena cava appears to be of normal size. There is IVC inspiratory collapse greater than 50%. In comparison to the previous echocardiogram(s): Compared with study from 6/5/2024, there is an improvement in calculated LVEF.  Onco-Cardiology Measurements: Current Measurements Global Longitudinal Strain (GLS) -8.7 GLS Tracking Quality:  CONCLUSIONS:  1. Left ventricular systolic function is mildly to moderately decreased with a 35-40% estimated ejection fraction.  2. Left Ventricular Global Longitudinal Strain - 8.7 % with more pronounced abnormalities involving the basal and mid segments with preservation of the apex more consistent with a possible infiltrative cardiomyopathy.  3. There is global hypokinesis of the left ventricle with minor regional variations.  4. Spectral Doppler shows an impaired relaxation pattern of left ventricular diastolic filling.  5. The left atrium is moderately dilated.  6. The right atrium is mild to moderately dilated.  7. Moderate tricuspid regurgitation visualized.  8. Compared with study from 6/5/2024, there is an improvement in calculated LVEF. QUANTITATIVE DATA SUMMARY: 2D MEASUREMENTS:                           Normal Ranges: IVSd:          1.00 cm    (0.6-1.1cm) LVPWd:         1.10 cm    (0.6-1.1cm) LVIDd:          5.75 cm    (3.9-5.9cm) LVIDs:         4.50 cm LV Mass Index: 124.8 g/m2 LV % FS        21.8 % LV SYSTOLIC FUNCTION BY 2D PLANIMETRY (MOD):                                         Normal Ranges: EF-A4C View:                      34 %  (>=55%) EF-A2C View:                      38 % EF-Biplane:                       36 % Global Longitudinal Strain (GLS): 8.7 % LV DIASTOLIC FUNCTION:                               Normal Ranges: MV lateral e'     0.11 m/s MV medial e'      0.03 m/s PulmV Sys Shayne:    26.28 cm/s PulmV Cohen Shayne:   29.43 cm/s PulmV S/D Shayne:    0.89 PulmV A Revs Shayne: 29.33 cm/s PulmV A Revs Dur: 119.89 msec  RIGHT VENTRICLE: TAPSE: 13.0 mm TRICUSPID VALVE/RVSP:                             Normal Ranges: Peak TR Velocity: 2.83 m/s RV Syst Pressure: 35.1 mmHg (< 30mmHg) IVC Diam:         1.68 cm PULMONIC VALVE:                         Normal Ranges: PV Accel Time: 80 msec  (>120ms) PV Max Shayne:    1.3 m/s  (0.6-0.9m/s) PV Max P.1 mmHg Pulmonary Veins: PulmV A Revs Dur: 119.89 msec PulmV A Revs Shayne: 29.33 cm/s PulmV Cohen Shayne:   29.43 cm/s PulmV S/D Shayne:    0.89 PulmV Sys Shayne:    26.28 cm/s  [id number] Jarett Walsh MD Electronically signed on 2024 at 4:42:36 PM  ** Final **     Transesophageal Echo (MARION)    Result Date: 2024   [facility], [address] [place] [address]              Tel [phone] and Fax [phone] TRANSESOPHAGEAL ECHOCARDIOGRAM REPORT  Patient Name:      LUNA Lowe Physician:    [id number] Ignacio Ibrahim MD Study Date:        2024            Ordering Provider:    [id number] STANLEY GLORIA MRN/PID:           [id number]            Fellow: Accession#:        [id number]        Nurse:                Feng Whalen RN Date of Birth/Age: 1970       Sonographer:          Shama Montes RDCS                    years Gender:            M                   Additional Staff:     Melissa Ann CRNA Height:            193.04 cm           Admit Date:           5/31/2024 Weight:            68.04 kg            Admission Status:     Inpatient -                                                              Routine BSA / BMI:         1.96 m2 / 18.26     Encounter#:           [id number]                    kg/m2                                        Department Location:  [facility] Cath                                                              Lab Blood Pressure: 133 /62 mmHg Study Type:    TRANSESOPHAGEAL ECHO (MARION) Diagnosis/ICD: Bacteremia-R78.81 Indication:    R/o Endocarditis CPT Code:      MARION Complete-67866; Doppler Limited-26456; Color Doppler-21275 Patient History: Allergies:         Iodinated contrast media, Ampicillin-Sulbactam, [place]. Smoker:            Former. Diabetes:          Yes Insulin Pertinent History: CAD, CHF, HTN and Cardiomyopathy. 53 y.o. male presents for                    MARION for bacteremia. Study Detail: The following Echo studies were performed: 2D. Agitated saline               used as a contrast agent for intraseptal flow evaluation. Total               contrast used for this procedure was 18 mL via IV push. Patient's               heart rhythm is normal sinus rhythm. The patient was sedated.  PHYSICIAN INTERPRETATION: MARION Details: The MARION probe used was 5177. Technically adequate omniplane transesophageal echocardiogram performed. MARION Medication: The pharynx was anesthetized with Cetacaine spray and viscous lidocaine. The patient was sedated by Anesthesia; please refer to anesthesia flow sheet for medications used. MARION Procedure: The probe was passed without difficulty. Left Ventricle: The left ventricular systolic function is moderately decreased, with an estimated ejection fraction of 35-40%. There is global hypokinesis of the left  ventricle with minor regional variations. The left ventricular cavity size was not assessed. Left ventricular diastolic filling was not assessed. Left Atrium: The left atrium is severely dilated. A bubble study using agitated saline was performed. Bubble study is negative. There is evidence of an atrial septal aneurysm. The left atrial appendage Doppler velocities are reduced and there is no thrombus visualized in the left atrial appendage. Right Ventricle: The right ventricle was not assessed. There is reduced right ventricular systolic function. Right Atrium: The right atrium was not assessed. Aortic Valve: The aortic valve is trileaflet. There is There is reduced systolic aortic valve leaflet excursion. There is mild aortic valve regurgitation. The degree of aortic stenosis was not fully assessed (HUMPHREY >2 cm^2 by planimetry). Mitral Valve: The mitral valve is normal in structure. There is mild mitral valve regurgitation. Tricuspid Valve: The tricuspid valve is structurally normal. There is mild tricuspid regurgitation. Pulmonic Valve: The pulmonic valve is structurally normal. There is no indication of pulmonic valve regurgitation. Pericardium: There is no pericardial effusion noted. Aorta: The aortic root is normal. In comparison to the previous echocardiogram(s): Compared with study from 6/5/2024, the ejection fraction appears somewhat improved presently.  CONCLUSIONS:  1. Left ventricular systolic function is moderately decreased with a 35-40% estimated ejection fraction.  2. No definite valvular vegetations were visualized.  3. There is reduced right ventricular systolic function.  4. The left atrium is severely dilated.  5. Mild aortic valve regurgitation.  6. Atrial septal aneurysm present.  7. There is global hypokinesis of the left ventricle with minor regional variations. QUANTITATIVE DATA SUMMARY:  [id number] Ignacio Ibrahim MD Electronically signed on 6/7/2024 at 5:19:53 PM  ** Final **     Cardiac  Catheterization Procedure    Result Date: 6/7/2024   [facility], Cath Lab, [address] [place] [address] Cardiovascular Catheterization Report Patient Name:     LUNA FINCH        Performing Physician:  [id number] Horner MD Study Date:       6/7/2024            Verifying Physician:   Kaity Horner MD MRN/PID:          [id number]            Cardiologist/Co-Scrub: Accession#:       [id number]        Ordering Provider:     [id number] STANLEY GLORIA Date of           1970 / 53      Cardiologist: Birth/Age:        years Gender:           M                   Fellow: Encounter#:       [id number]          Surgeon:  Study: Left Heart Cath  Indications: LUNA FINCH is a 54 year old male who presents with end stage renal disease on dialysis, prior coronary artery bypass graft surgery, prior percutaneous coronary intervention and an asymptomatic chest pain assessment. Cardiomyopathy.  Procedure Description: After infiltration with 2% Lidocaine, the right femoral artery was cannulated with a modified Seldinger technique. Subsequently a 5 Latvian sheath was placed in the right femoral artery. Selective coronary catheterization was performed using a 5 Fr catheter(s) exchanged over a guide wire to cannulate the coronary arteries. A JL 4 tip catheter was used for left coronary injections. A JR 4 tip catheter was used for right coronary injections. Additional catheter(s) used to visualize the coronary arteries were: IM. Multiple injections of contrast were made into the left and right coronary arteries with angiograms recorded in multiple projections. After completion of the procedure, femoral artery angiography was performed. This demonstrated a common femoral artery puncture appropriate for closure. A Vascade 5F vascular  closure device was placed per protocol.  Coronary Angiography: The coronary circulation is co-dominant.  Left Main Coronary Artery: The left main coronary artery is a normal caliber vessel. The left main arises normally from the left coronary sinus of Valsalva and bifurcates into the LAD and circumflex coronary arteries. The mid to distal left main coronary artery showed 95%.  Left Anterior Descending Coronary Artery Distribution: The left anterior descending coronary artery is a normal caliber vessel. The LAD arises normally from the left main coronary artery. The proximal left anterior descending coronary artery showed .  Circumflex Coronary Artery Distribution: The circumflex coronary artery is a normal caliber vessel. The circumflex arises normally from the left main coronary artery and terminates in the AV groove. The proximal circumflex coronary artery showed .  Right Coronary Artery Distribution: The right coronary artery is a normal caliber vessel. The RCA arises normally from the right sinus of Valsalva. The entire right coronary artery showed diffuse moderate disease.  Coronary Grafts:  LIMA Graft: Left internal mammary artery graft conduit, originating in situ and attached to the mid left anterior descending, is patent. Saphaneous Vein Graft(s): The saphenous vein graft, originating from the aorta and attached to the 1st obtuse marginal, is patent. The 2nd saphenous vein graft, originating from the aorta and attached to the right posterior descending artery, is patent.  Coronary Lesion Summary: Vessel             Stenosis         Vessel Segment Left Main            95%            mid to distal LAD                                 proximal Circumflex                          proximal RCA        diffuse moderate disease     entire  Graft Stenosis Summary: Graft       Destination of Graft LIMA  mid left anterior descending SVG 1 1st obtuse marginal SVG 2 right posterior descending artery   Complications: No in-lab complications observed.  Cardiac Cath Post Procedure Notes: Post Procedure Diagnosis: See below. Blood Loss:               Estimated blood loss during the procedure was 5 mls. Specimens Removed:        Number of specimen(s) removed: none. ____________________________________________________________________________________ CONCLUSIONS:  1. Native coronaries: Critical distal left main disease, proximal LCx , proximal LAD , and moderate RCA disease in co-dominant system.  2. Bypasses: Patent LIMA to LAD which fills back the whole LAD system including both diagonals, patent SVG to OM1 which fills back the whole codominant LCx system, and patent SVG to right PDA.  3. Further management as per inpatient cardiology team. ICD 10 Codes: Other cardiomyopathies-I42.8  CPT Codes: Left Heart Cath Bypass Graft w ventriculography and coronary angio(LHC)-55210; Moderate Sedation Services 1st additional 15 minutes patient >5 years-96347; Ultrasound guidance for vascular access-87690  [id number] Valerie Horner MD Performing Physician Electronically signed by [id number] Valerie Horner MD on 6/7/2024 at 4:22:01 PM  ** Final **     Transthoracic Echo (TTE) Complete    Result Date: 6/5/2024   [facility], [address] [place] [address]              Tel [phone] and Fax [phone] TRANSTHORACIC ECHOCARDIOGRAM REPORT  Patient Name:      LUNA Lowe Physician:    [id number] Ignacio Ibrahim MD Study Date:        6/5/2024             Ordering Provider:    [id number] LIZZ KNOTT MRN/PID:           [id number]             Fellow: Accession#:        [id number]         Nurse: Date of Birth/Age: 1970 / 53 years Sonographer:          Shama Montes RDCS Gender:            M                    Additional Staff: Height:            193.00 cm            Admit Date:            5/31/2024 Weight:            68.00 kg             Admission Status:     Inpatient -                                                               Routine BSA / BMI:         1.96 m2 / 18.26      Encounter#:           [id number]                    kg/m2                                         Department Location:  [facility] Non                                                               Invasive Blood Pressure: 141 /67 mmHg Study Type:    TRANSTHORACIC ECHO (TTE) COMPLETE Diagnosis/ICD: Bacteremia-R78.81 Indication:    Bacteremia CPT Code:      Echo Complete w Full Doppler-41774 Patient History: Diabetes:          Yes Pertinent History: CAD, Chest Pain, HTN and CHF. ESRD, Hypoxia. Study Detail: The following Echo studies were performed: 2D, M-Mode, Doppler and               color flow.  PHYSICIAN INTERPRETATION: Left Ventricle: The left ventricular systolic function is severely decreased, with an estimated ejection fraction of 20%. There is global hypokinesis of the left ventricle with minor regional variations. The left ventricular cavity size is upper limits of normal. There is eccentric left ventricular hypertrophy. Left Ventricular Global Longitudinal Strain - 6.3 %. Spectral Doppler shows an abnormal pattern of left ventricular diastolic filling. Left Atrium: The left atrium is severely dilated. Right Ventricle: The right ventricle is moderately enlarged. There is reduced right ventricular systolic function. Right Atrium: The right atrium is normal in size. Aortic Valve: The aortic valve is trileaflet. There is moderate aortic valve cusp calcification. There is reduced aortic valve non coronary cusp excursion. There is evidence of moderate aortic valve stenosis. There is mild aortic valve regurgitation. The peak instantaneous gradient of the aortic valve is 37.2 mmHg. The mean gradient of the aortic valve is 19.7 mmHg. Mitral Valve: The mitral valve is normal in structure. The mitral valve spectral Doppler  A-wave is absent, consistent with atrial fibrillation. There is mild to moderate mitral annular calcification. There is mild to moderate mitral valve regurgitation. Tricuspid Valve: The tricuspid valve is structurally normal. There is mild tricuspid regurgitation. The Doppler estimated RVSP is mildly elevated at 44.5 mmHg. Pulmonic Valve: The pulmonic valve is structurally normal. There is mild pulmonic valve regurgitation. Pericardium: There is a trivial pericardial effusion. Aorta: The aortic root is normal. Systemic Veins: The inferior vena cava appears dilated. There is IVC inspiratory collapse greater than 50%. In comparison to the previous echocardiogram(s): Compared with study from 3/6/2024, the ejection fraction is comparatively reduced but was somewhat overestimated previously. There is a greater degree of aortic stenosis presently but this was underestimated previously.  CONCLUSIONS:  1. Left ventricular systolic function is severely decreased with a 20% estimated ejection fraction.  2. No definite valvular vegetations were visualized.  3. Spectral Doppler shows an abnormal pattern of left ventricular diastolic filling.  4. There is eccentric left ventricular hypertrophy.  5. Moderately enlarged right ventricle.  6. There is reduced right ventricular systolic function.  7. The left atrium is severely dilated.  8. Absent A-wave on MV spectral Doppler tracing, consistent with atrial fibrillation.  9. Mild to moderate mitral valve regurgitation. 10. Mildly elevated RVSP. 11. Moderate aortic valve stenosis. 12. There is moderate aortic valve cusp calcification. 13. Mild aortic valve regurgitation. 14. There is global hypokinesis of the left ventricle with minor regional variations. QUANTITATIVE DATA SUMMARY: 2D MEASUREMENTS:                           Normal Ranges: LAs:           4.98 cm    (2.7-4.0cm) IVSd:          1.08 cm    (0.6-1.1cm) LVPWd:         1.06 cm    (0.6-1.1cm) LVIDd:         5.73 cm     (3.9-5.9cm) LVIDs:         4.81 cm LV Mass Index: 127.2 g/m2 LV % FS        16.1 % LA VOLUME:                               Normal Ranges: LA Vol A4C:        115.9 ml   (22+/-6mL/m2) LA Vol A2C:        131.8 ml LA Vol BP:         129.3 ml LA Vol Index A4C:  59.2ml/m2 LA Vol Index A2C:  67.2 ml/m2 LA Vol Index BP:   66.0 ml/m2 LA Area A4C:       30.9 cm2 LA Area A2C:       31.5 cm2 LA Major Axis A4C: 7.0 cm LA Major Axis A2C: 6.4 cm LA Volume Index:   65.3 ml/m2 LA Vol A4C:        111.1 ml LA Vol A2C:        127.7 ml RA VOLUME BY A/L METHOD:                               Normal Ranges: RA Vol A4C:        85.8 ml    (8.3-19.5ml) RA Vol Index A4C:  43.8 ml/m2 RA Area A4C:       26.2 cm2 RA Major Axis A4C: 6.8 cm M-MODE MEASUREMENTS:                  Normal Ranges: Ao Root: 3.10 cm (2.0-3.7cm) LAs:     4.84 cm (2.7-4.0cm) AORTA MEASUREMENTS:                      Normal Ranges: Ao Sinus, d: 2.90 cm (2.1-3.5cm) Ao STJ, d:   2.20 cm (1.7-3.4cm) Asc Ao, d:   3.00 cm (2.1-3.4cm) LV SYSTOLIC FUNCTION BY 2D PLANIMETRY (MOD):                                          Normal Ranges: EF-A4C View:                      29.0 % (>=55%) EF-A2C View:                      43.8 % EF-Biplane:                       39.1 % Global Longitudinal Strain (GLS): 6.3 % LV DIASTOLIC FUNCTION:                        Normal Ranges: MV Peak E:    1.37 m/s (0.7-1.2 m/s) MV e'         0.17 m/s (>8.0) MV lateral e' 0.17 m/s MV medial e'  0.10 m/s E/e' Ratio:   8.06     (<8.0) MITRAL VALVE:                      Normal Ranges: MV Vmax:    1.52 m/s (<=1.3m/s) MV peak P.2 mmHg (<5mmHg) MV mean P.1 mmHg (<2mmHg) MV VTI:     23.83 cm (10-13cm) MITRAL INSUFFICIENCY:                         Normal Ranges: MR VTI:     130.99 cm MR Vmax:    486.88 cm/s MR Volume:  13.47 ml MR Flow Rt: 50.06 ml/s MR EROA:    0.10 cm2 AORTIC VALVE:                                    Normal Ranges: AoV Vmax:                3.05 m/s  (<=1.7m/s) AoV Peak P.2  mmHg (<20mmHg) AoV Mean P.7 mmHg (1.7-11.5mmHg) LVOT Max Shayne:            1.20 m/s  (<=1.1m/s) AoV VTI:                 51.47 cm  (18-25cm) LVOT VTI:                21.65 cm LVOT Diameter:           2.01 cm   (1.8-2.4cm) AoV Area, VTI:           1.34 cm2  (2.5-5.5cm2) AoV Area,Vmax:           1.25 cm2  (2.5-4.5cm2) AoV Dimensionless Index: 0.42 AORTIC INSUFFICIENCY: AI Vmax:       3.21 m/s AI Half-time:  349 msec AI Decel Time: 1203 msec AI Decel Rate: 266.64 cm/s2  RIGHT VENTRICLE: RV Basal 4.80 cm RV Mid   3.00 cm RV Major 9.9 cm TAPSE:   12.0 mm RV s'    0.09 m/s TRICUSPID VALVE/RVSP:                             Normal Ranges: Peak TR Velocity: 3.02 m/s Est. RA Pressure: 8 mmHg RV Syst Pressure: 44.5 mmHg (< 30mmHg) IVC Diam:         2.23 cm PULMONIC VALVE:                      Normal Ranges: RVOT Vmax:  0.51 m/s (0.6-0.9m/s) PV Max Shayne: 1.2 m/s  (0.6-0.9m/s) PV Max P.6 mmHg AORTA: Asc Ao Diam 2.99 cm  [id number] Ignacio Ibrahim MD Electronically signed on 2024 at 4:50:52 PM  ** Final **     CT abdomen pelvis wo IV contrast    Result Date: 2024  Interpreted By:  Ketty Ramirez, STUDY: CT ABDOMEN PELVIS WO IV CONTRAST;  2024 4:08 am   INDICATION: Signs/Symptoms:bacteremia  source?.   COMPARISON: 2024   ACCESSION NUMBER(S): [id number]   ORDERING CLINICIAN: GREG CALDERA   TECHNIQUE: CT of the abdomen and pelvis was performed. Sagittal and coronal reconstructions were generated.  No intravenous contrast given for the exam.   FINDINGS: Solid organ and vessel evaluation limited without IV contrast.   ABDOMINAL ORGANS:   LIVER: No focal lesion within limits of unenhanced exam prominent vascular calcifications near the hilum.   GALL BLADDER AND BILIARY TREE: No calcified gallstone. Gallbladder is distended. Questionable trace pericholecystic fluid.   SPLEEN: Stable isodense presumed splenule adjacent to the anterior pole.   PANCREAS: No focal lesion within limits of unenhanced exam    ADRENALS: No adrenal mass   KIDNEYS AND URETERS: Atrophic with prominent vascular calcifications and small rounded hypodensities in each kidney similar to the prior exam.   BOWEL: Questionable distal esophageal wall thickening. Stomach is mildly distended with debris and air. No abnormally dilated small bowel loops. Moderate fecal residue in the proximal colon. Rectum is distended with gas and fecal debris.   PERITONEUM, RETROPERITONEUM, NODES: No significant free fluid. No free air. Slight increased density in the mesentery. No significant retroperitoneal adenopathy within limits of unenhanced exam.   VESSELS: Relative decreased density of cardiac lumen suggesting anemia. Extensive vascular calcifications. No abdominal aortic aneurysm. Lack of IV contrast precludes vascular luminal assessment.   PELVIS: Urinary bladder is partially distended with diffusely thickened wall. Vas deferens and faint possible seminal vesicle calcifications bilaterally similar to the prior exam. No gross prostate enlargement.   ABDOMINAL WALL: Focal density in the right lower quadrant abdominal wall subcutaneous fat similar to the prior exam. No sizable abdominal wall hernia. Mild soft tissue edema.   BONES: Generalized increased osseous density consistent with metabolic bone disease similar to the prior exam. Focal smooth superior L2 endplate depression consistent with Schmorl's node with adjacent tiny vacuum disc is more conspicuous.   LOWER CHEST: Patchy/nodular left lower lobe infiltrate. Septal thickening in both lung bases. Small bilateral pleural effusions. Cardiomegaly. Surgical material along the margins of the heart again seen.       Left lower lobe infiltrate consistent with pneumonia.   Mild soft tissue and mesenteric edema with septal thickening in the lung bases and small pleural effusions consistent with CHF/fluid overload.   Distended gallbladder with questionable trace pericholecystic fluid.   Questionable distal  esophageal wall thickening. Correlate with possible esophagitis.   Diffuse bladder wall thickening which could be related to suboptimal distention, cystitis and/or trabeculation.   Numerous additional findings as described above.   MACRO: None.   Signed by: Ketty Ramirez 6/4/2024 8:48 AM Dictation workstation:   [id number]    Electrocardiogram, 12-lead PRN ACS symptoms    Result Date: 6/3/2024  Normal sinus rhythm Possible Left atrial enlargement Left ventricular hypertrophy with QRS widening ( Sokolow-Márquez , Franklyn product ) Marked T wave abnormality, consider inferolateral ischemia Abnormal ECG When compared with ECG of 26-MAR-2024 02:50, Questionable change in QRS axis ST now depressed in Inferior leads T wave inversion now evident in Inferior leads    XR chest 2 views    Result Date: 5/31/2024  Interpreted By:  Aaron Jeffers, STUDY: XR CHEST 2 VIEWS;  5/31/2024 9:24 pm   INDICATION: Signs/Symptoms:sob.   COMPARISON: None.   ACCESSION NUMBER(S): [id number]   ORDERING CLINICIAN: KAMALA FRIEDMAN   FINDINGS: Median sternal wires present. Some vascular stent over the right axilla.     CARDIOMEDIASTINAL SILHOUETTE: There is enlargement of the cardiac silhouette. Median sternotomy wires present.   LUNGS: There is pulmonary congestion. There is no consolidation or effusion.   ABDOMEN: No remarkable upper abdominal findings.   BONES: No acute osseous changes.       1.  Enlarged cardiac silhouette with vascular congestion. No consolidation seen       MACRO: None   Signed by: Aaron Jeffers 5/31/2024 9:28 PM Dictation workstation:   [id number]            Assessment/Plan   Principal Problem:    Hypoxia  Active Problems:    Ischemic cardiomyopathy    IMPRESSION  Type II DM  Long term use of insulin   Hyperglycemia secondary to glucocorticoid administration yesterday  A1C of 7.5% as of June 2024     Recommendations:  To continue Lantus 10 untis subcutaneous bedtime   To discontinue Humalog 6 units TID AC   To continue  insulin correction scale TID AC   Diabetic diet as tolerated   Accu-Cheks ACHS    Hypoglycemic protocol   Will continue to follow    Dr. Burton will assume the service tomorrow      Abiodun Reyes MD

## 2024-06-09 NOTE — ANESTHESIA POSTPROCEDURE EVALUATION
Patient: Kentrell Carreon    Procedure Summary       Date: 06/07/24 Room / Location: [facility] [place]; [facility] [place]    Anesthesia Start: 1410 Anesthesia Stop: 1440    Procedure: TRANSESOPHAGEAL ECHO (MARION) Diagnosis:       Bacteremia      (R/o endocarditis)    Scheduled Providers: Kevin Merchant MD; Ignacio Ibrahim MD Responsible Provider: Kevin Merchant MD    Anesthesia Type: MAC ASA Status: 4            Anesthesia Type: MAC    Vitals Value Taken Time   /68 06/07/24 1816   Temp 36.6 °C (97.9 °F) 06/07/24 1800   Pulse 87 06/07/24 1822   Resp 14 06/07/24 1815   SpO2 100 % 06/07/24 1815   Vitals shown include unfiled device data.    Anesthesia Post Evaluation    Patient location during evaluation: PACU  Patient participation: complete - patient participated  Level of consciousness: awake and alert  Pain management: adequate  Airway patency: patent  Cardiovascular status: acceptable and hemodynamically stable  Respiratory status: acceptable, spontaneous ventilation and nonlabored ventilation  Hydration status: acceptable  Postoperative Nausea and Vomiting: none        There were no known notable events for this encounter.

## 2024-06-09 NOTE — PROGRESS NOTES
Kentrell Carreon is a 53 y.o. male on day 8 of admission presenting with Hypoxia.      Subjective   Patient awake and alert with no acute problems or complaints  Will schedule for hemodialysis tomorrow on Monday.       Objective        Vitals 24HR  Heart Rate:  [68-83]   Temp:  [36.5 °C (97.7 °F)-36.8 °C (98.2 °F)]   Resp:  [16-18]   BP: ()/(46-63)   SpO2:  [93 %-96 %]     Intake/Output last 3 Shifts:    Intake/Output Summary (Last 24 hours) at 6/9/2024 1358  Last data filed at 6/8/2024 2100  Gross per 24 hour   Intake 240 ml   Output --   Net 240 ml       Physical Exam      GEN appearance; awake alert no acute distress  Head and ENT; normocephalic/atraumatic/supple neck/no JVD  Lungs; CTA  Heart: RRR  Abdomen; soft no tenderness  Extremities: No edema     Dialysis access :right upper extremity AV fistula           Relevant Results     Results from last 7 days   Lab Units 06/09/24  0704 06/08/24  0623 06/07/24  0546   WBC AUTO x10*3/uL 7.4 6.1 5.5   HEMOGLOBIN g/dL 8.4* 8.0* 7.9*   HEMATOCRIT % 26.7* 25.8* 24.8*   PLATELETS AUTO x10*3/uL 326 319 260      Results from last 7 days   Lab Units 06/09/24  0704 06/08/24  1044 06/08/24  0623 06/07/24  1354   SODIUM mmol/L 131*  --  125* 136   POTASSIUM mmol/L 4.6  --  5.6* 4.3   CHLORIDE mmol/L 90*  --  86* 93*   CO2 mmol/L 25  --  23 24   BUN mg/dL 82*  --  57* 25*   CREATININE mg/dL 8.88*  --  6.79* 4.88*   GLUCOSE mg/dL 159*   < > 799* 192*   CALCIUM mg/dL 8.6  --  9.1 9.7    < > = values in this interval not displayed.        Current Facility-Administered Medications:     acetaminophen (Tylenol) tablet 650 mg, 650 mg, oral, q6h PRN, Lucy Farooq, APRN-CNP, 650 mg at 06/07/24 0416    aspirin EC tablet 81 mg, 81 mg, oral, Daily, KATHARINE Mariee, 81 mg at 06/09/24 0857    atorvastatin (Lipitor) tablet 80 mg, 80 mg, oral, Daily, KATHARINE Mariee, 80 mg at 06/09/24 0857    carvedilol (Coreg) tablet 25 mg, 25 mg, oral, BID, Lucy RENTERIA  Pohlchuck, APRN-CNP, 25 mg at 24 1614    dextrose 50 % injection 25 g, 25 g, intravenous, q15 min PRN, KATHARINE Mariee    [COMPLETED] insulin regular (HumuLIN R,NovoLIN R) injection 10 Units, 10 Units, intravenous, Once, 10 Units at 24 0838 **FOLLOWED BY** dextrose 50 % injection 25 g, 25 g, intravenous, Once **FOLLOWED BY** [] dextrose 10 % in water (D10W) infusion, 50 mL/hr, intravenous, Continuous, Osmar Callaway MD    epoetin annita-epbx (Retacrit) injection 10,000 Units, 150 Units/kg, subcutaneous, Every Mon/Wed/Fri, KATHARINE Mariee, 10,000 Units at 24 2301    glucagon (Glucagen) injection 1 mg, 1 mg, intramuscular, q15 min PRN, KATHARINE Mariee    glucagon (Glucagen) injection 1 mg, 1 mg, intramuscular, q15 min PRN, KATHARINE Mariee    heparin (porcine) injection 5,000 Units, 5,000 Units, subcutaneous, q8h SHANA, KATHARINE Mariee, 5,000 Units at 24 0619    insulin glargine (Lantus) injection 10 Units, 10 Units, subcutaneous, q12h, Osmar Callaway MD, 10 Units at 24 1014    insulin lispro (HumaLOG) injection 0-10 Units, 0-10 Units, subcutaneous, TID, Abiodun Reyes MD, 2 Units at 24 0857    insulin lispro (HumaLOG) injection 10 Units, 10 Units, subcutaneous, Once, Osmar Callaway MD    multivitamin with minerals 1 tablet, 1 tablet, oral, Daily, KATHARINE Mariee, 1 tablet at 24 0857    oxygen (O2) therapy, , inhalation, Continuous - /, KATHARINE Mariee    polyethylene glycol (Glycolax, Miralax) packet 17 g, 17 g, oral, Daily, KATHARINE Mariee, 17 g at 24 0857    sacubitriL-valsartan (Entresto) 24-26 mg per tablet 1 tablet, 1 tablet, oral, BID, KATHARINE Mariee, 1 tablet at 24 0857    sevelamer carbonate (Renvela) tablet 800 mg, 800 mg, oral, TID, KATHARINE Mariee, 800 mg at 24 1228     SITagliptin phosphate (Januvia) tablet 25 mg, 25 mg, oral, Daily, KATHARINE Mariee, 25 mg at 06/09/24 0857    sodium bicarbonate 8.4 % (1 mEq/mL) 50 mEq, 50 mEq, intravenous, Once, KATHARINE Mariee    vancomycin (Vancocin) pharmacy to dose - pharmacy monitoring, , miscellaneous, Daily PRN, KATHARINE Mariee           Assessment/Plan        1.  ESKD on hemodialysis on Monday/Wednesday/Friday via right upper extremity AV fistula.  No need for dialysis today on Sunday.  Scheduled for dialysis tomorrow on Monday.  2.  Bacteremia; following with ID  3.  Hypertension; continue current meds  4.  Ischemic cardiomyopathy, will follow with cardiology  Will continue to follow patient on daily basis reviewing all the labs and other consultants input.     Hemodialysis scheduled for Monday if patient remains in-house.                   I spent 35 minutes in the professional and overall care of this patient.      Yanely Balbuena MD

## 2024-06-09 NOTE — PROGRESS NOTES
"Kentrell Carreon is a 53 y.o. male on day 8 of admission presenting with Hypoxia.    Subjective   Feeling good, not short of breath, no chest pain and no leg swelling and would like to go home.  This 53-year-old gentleman with previous coronary artery bypass surgery, hypertension, end-stage renal disease on hemodialysis was admitted with fatigue and intermittent fever and was noted to have positive blood culture for Enterococcus faecalis.  Subsequently a transesophageal echocardiogram revealed no vegetations and a left heart catheterization was done which revealed patent HENDERSON to LAD and patent saphenous vein graft to RCA and circumflex.  Patient is being treated with IV vancomycin for Enterococcus faecalis bacteremia.  Infectious disease in the holding patient is coming along fine.  Patient's blood sugars are rather severely elevated other day with blood sugar of greater than 700 which could have been due to IV steroids which she reports prior to left heart catheterization, insulin doses have been adjusted and blood sugars are improving now.  Endocrine consult was also obtained.       Objective     Physical Exam    Last Recorded Vitals  Blood pressure 119/64, pulse 90, temperature 36.5 °C (97.7 °F), temperature source Oral, resp. rate 18, height 1.93 m (6' 4\"), weight 68 kg (150 lb), SpO2 97%.  Intake/Output last 3 Shifts:  I/O last 3 completed shifts:  In: 240 (3.5 mL/kg) [P.O.:240]  Out: - (0 mL/kg)   Weight: 68 kg   GENERAL:  Alert, no distress, cooperative  SKIN:  Skin color, texture, turgor normal. No rashes or lesions.  OROPHARYNX:  Lips, mucosa, and tongue are normal.Teeth and gums, normal. Oropharynx normal.  NECK:  No jugulovenous distention, No carotid bruits, Carotid pulse normal contour, Supple  LUNGS:  Lungs clear to auscultation. Good diaphragmatic excursion.  CARDIAC: Rate rhythm is regular, normal S1 and S2; no rubs,  or gallops, 3/6 systolic ejection murmur left sternal border and mitral and tricuspid " holosystolic murmurs, no peripheral edema.  ABDOMEN:  Abdomen soft, non-tender, BS normal, No masses or organomegaly  EXTREMETIES:  Extremities normal, no deformities, edema, clubbing or skin discoloration. Good capillary refill., No ulcers  Right upper arm AV fistula with good bruit and thrill.  NEURO:  Alert, oriented X 3, Gait normal. Non-focal. Reflexes normal and symmetric. Sensation grossly intact., Cranial nerves II-XII intact  PULSES:  2+ radial, 2+ carotid    Relevant Results  Scheduled medications  aspirin, 81 mg, oral, Daily  atorvastatin, 80 mg, oral, Daily  carvedilol, 25 mg, oral, BID  dextrose, 25 g, intravenous, Once  epoetin annita or biosimilar, 150 Units/kg, subcutaneous, Every Mon/Wed/Fri  heparin (porcine), 5,000 Units, subcutaneous, q8h SHANA  insulin glargine, 10 Units, subcutaneous, q12h  insulin lispro, 0-10 Units, subcutaneous, TID  insulin lispro, 10 Units, subcutaneous, Once  multivitamin with minerals, 1 tablet, oral, Daily  oxygen, , inhalation, Continuous - 02/gases  polyethylene glycol, 17 g, oral, Daily  sacubitriL-valsartan, 1 tablet, oral, BID  sevelamer carbonate, 800 mg, oral, TID  SITagliptin phosphate, 25 mg, oral, Daily  sodium bicarbonate, 50 mEq, intravenous, Once      Continuous medications     PRN medications  PRN medications: acetaminophen, dextrose, glucagon, glucagon, vancomycin     Results for orders placed or performed during the hospital encounter of 05/31/24 (from the past 96 hour(s))   CBC and Auto Differential   Result Value Ref Range    WBC 5.5 4.4 - 11.3 x10*3/uL    nRBC 0.0 0.0 - 0.0 /100 WBCs    RBC 2.45 (L) 4.50 - 5.90 x10*6/uL    Hemoglobin 8.0 (L) 13.5 - 17.5 g/dL    Hematocrit 25.6 (L) 41.0 - 52.0 %     (H) 80 - 100 fL    MCH 32.7 26.0 - 34.0 pg    MCHC 31.3 (L) 32.0 - 36.0 g/dL    RDW 14.8 (H) 11.5 - 14.5 %    Platelets 242 150 - 450 x10*3/uL    Immature Granulocytes %, Automated 0.5 0.0 - 0.9 %    Immature Granulocytes Absolute, Automated 0.03 0.00 -  0.70 x10*3/uL   Magnesium   Result Value Ref Range    Magnesium 2.20 1.60 - 2.40 mg/dL   Renal Function Panel   Result Value Ref Range    Glucose 259 (H) 74 - 99 mg/dL    Sodium 133 (L) 136 - 145 mmol/L    Potassium 5.0 3.5 - 5.3 mmol/L    Chloride 91 (L) 98 - 107 mmol/L    Bicarbonate 29 21 - 32 mmol/L    Anion Gap 18 10 - 20 mmol/L    Urea Nitrogen 35 (H) 6 - 23 mg/dL    Creatinine 7.94 (H) 0.50 - 1.30 mg/dL    eGFR 8 (L) >60 mL/min/1.73m*2    Calcium 9.4 8.6 - 10.3 mg/dL    Phosphorus 5.3 (H) 2.5 - 4.9 mg/dL    Albumin 3.8 3.4 - 5.0 g/dL   Manual Differential   Result Value Ref Range    Neutrophils %, Manual 59.0 40.0 - 80.0 %    Lymphocytes %, Manual 26.0 13.0 - 44.0 %    Monocytes %, Manual 13.0 2.0 - 10.0 %    Eosinophils %, Manual 1.0 0.0 - 6.0 %    Basophils %, Manual 0.0 0.0 - 2.0 %    Atypical Lymphocytes %, Manual 1.0 0.0 - 2.0 %    Seg Neutrophils Absolute, Manual 3.25 1.20 - 7.00 x10*3/uL    Lymphocytes Absolute, Manual 1.43 1.20 - 4.80 x10*3/uL    Monocytes Absolute, Manual 0.72 0.10 - 1.00 x10*3/uL    Eosinophils Absolute, Manual 0.06 0.00 - 0.70 x10*3/uL    Basophils Absolute, Manual 0.00 0.00 - 0.10 x10*3/uL    Atypical Lymphs Absolute, Manual 0.06 0.00 - 0.50 x10*3/uL    Total Cells Counted 100     RBC Morphology No significant RBC morphology present    POCT GLUCOSE   Result Value Ref Range    POCT Glucose 237 (H) 74 - 99 mg/dL   Blood Culture    Specimen: Peripheral Venipuncture; Blood culture   Result Value Ref Range    Blood Culture No growth at 3 days    Blood Culture    Specimen: Peripheral Venipuncture; Blood culture   Result Value Ref Range    Blood Culture No growth at 3 days    POCT GLUCOSE   Result Value Ref Range    POCT Glucose 225 (H) 74 - 99 mg/dL   POCT GLUCOSE   Result Value Ref Range    POCT Glucose 225 (H) 74 - 99 mg/dL   CBC and Auto Differential   Result Value Ref Range    WBC 5.5 4.4 - 11.3 x10*3/uL    nRBC 0.0 0.0 - 0.0 /100 WBCs    RBC 2.42 (L) 4.50 - 5.90 x10*6/uL     Hemoglobin 7.9 (L) 13.5 - 17.5 g/dL    Hematocrit 24.8 (L) 41.0 - 52.0 %     (H) 80 - 100 fL    MCH 32.6 26.0 - 34.0 pg    MCHC 31.9 (L) 32.0 - 36.0 g/dL    RDW 14.7 (H) 11.5 - 14.5 %    Platelets 260 150 - 450 x10*3/uL    Neutrophils % 89.5 40.0 - 80.0 %    Immature Granulocytes %, Automated 0.7 0.0 - 0.9 %    Lymphocytes % 6.5 13.0 - 44.0 %    Monocytes % 2.7 2.0 - 10.0 %    Eosinophils % 0.2 0.0 - 6.0 %    Basophils % 0.4 0.0 - 2.0 %    Neutrophils Absolute 4.95 1.20 - 7.70 x10*3/uL    Immature Granulocytes Absolute, Automated 0.04 0.00 - 0.70 x10*3/uL    Lymphocytes Absolute 0.36 (L) 1.20 - 4.80 x10*3/uL    Monocytes Absolute 0.15 0.10 - 1.00 x10*3/uL    Eosinophils Absolute 0.01 0.00 - 0.70 x10*3/uL    Basophils Absolute 0.02 0.00 - 0.10 x10*3/uL   Magnesium   Result Value Ref Range    Magnesium 2.20 1.60 - 2.40 mg/dL   Renal Function Panel   Result Value Ref Range    Glucose 397 (H) 74 - 99 mg/dL    Sodium 127 (L) 136 - 145 mmol/L    Potassium 6.8 (HH) 3.5 - 5.3 mmol/L    Chloride 88 (L) 98 - 107 mmol/L    Bicarbonate 23 21 - 32 mmol/L    Anion Gap 23 (H) 10 - 20 mmol/L    Urea Nitrogen 51 (H) 6 - 23 mg/dL    Creatinine 9.74 (H) 0.50 - 1.30 mg/dL    eGFR 6 (L) >60 mL/min/1.73m*2    Calcium 9.3 8.6 - 10.3 mg/dL    Phosphorus 5.7 (H) 2.5 - 4.9 mg/dL    Albumin 3.9 3.4 - 5.0 g/dL   Vancomycin   Result Value Ref Range    Vancomycin 15.4 5.0 - 20.0 ug/mL   POCT GLUCOSE   Result Value Ref Range    POCT Glucose 262 (H) 74 - 99 mg/dL   POCT GLUCOSE   Result Value Ref Range    POCT Glucose 193 (H) 74 - 99 mg/dL   Basic Metabolic Panel   Result Value Ref Range    Glucose 192 (H) 74 - 99 mg/dL    Sodium 136 136 - 145 mmol/L    Potassium 4.3 3.5 - 5.3 mmol/L    Chloride 93 (L) 98 - 107 mmol/L    Bicarbonate 24 21 - 32 mmol/L    Anion Gap 23 (H) 10 - 20 mmol/L    Urea Nitrogen 25 (H) 6 - 23 mg/dL    Creatinine 4.88 (H) 0.50 - 1.30 mg/dL    eGFR 14 (L) >60 mL/min/1.73m*2    Calcium 9.7 8.6 - 10.3 mg/dL   POCT GLUCOSE    Result Value Ref Range    POCT Glucose 278 (H) 74 - 99 mg/dL   POCT GLUCOSE   Result Value Ref Range    POCT Glucose 451 (H) 74 - 99 mg/dL   CBC and Auto Differential   Result Value Ref Range    WBC 6.1 4.4 - 11.3 x10*3/uL    nRBC 0.0 0.0 - 0.0 /100 WBCs    RBC 2.43 (L) 4.50 - 5.90 x10*6/uL    Hemoglobin 8.0 (L) 13.5 - 17.5 g/dL    Hematocrit 25.8 (L) 41.0 - 52.0 %     (H) 80 - 100 fL    MCH 32.9 26.0 - 34.0 pg    MCHC 31.0 (L) 32.0 - 36.0 g/dL    RDW 15.4 (H) 11.5 - 14.5 %    Platelets 319 150 - 450 x10*3/uL    Neutrophils % 82.4 40.0 - 80.0 %    Immature Granulocytes %, Automated 0.5 0.0 - 0.9 %    Lymphocytes % 5.5 13.0 - 44.0 %    Monocytes % 11.6 2.0 - 10.0 %    Eosinophils % 0.0 0.0 - 6.0 %    Basophils % 0.0 0.0 - 2.0 %    Neutrophils Absolute 5.05 1.20 - 7.70 x10*3/uL    Immature Granulocytes Absolute, Automated 0.03 0.00 - 0.70 x10*3/uL    Lymphocytes Absolute 0.34 (L) 1.20 - 4.80 x10*3/uL    Monocytes Absolute 0.71 0.10 - 1.00 x10*3/uL    Eosinophils Absolute 0.00 0.00 - 0.70 x10*3/uL    Basophils Absolute 0.00 0.00 - 0.10 x10*3/uL   Magnesium   Result Value Ref Range    Magnesium 2.30 1.60 - 2.40 mg/dL   Renal Function Panel   Result Value Ref Range    Glucose 799 (HH) 74 - 99 mg/dL    Sodium 125 (L) 136 - 145 mmol/L    Potassium 5.6 (H) 3.5 - 5.3 mmol/L    Chloride 86 (L) 98 - 107 mmol/L    Bicarbonate 23 21 - 32 mmol/L    Anion Gap 22 (H) 10 - 20 mmol/L    Urea Nitrogen 57 (H) 6 - 23 mg/dL    Creatinine 6.79 (H) 0.50 - 1.30 mg/dL    eGFR 9 (L) >60 mL/min/1.73m*2    Calcium 9.1 8.6 - 10.3 mg/dL    Phosphorus 7.2 (H) 2.5 - 4.9 mg/dL    Albumin 4.0 3.4 - 5.0 g/dL   POCT GLUCOSE   Result Value Ref Range    POCT Glucose >600 (H) 74 - 99 mg/dL   POCT GLUCOSE   Result Value Ref Range    POCT Glucose >600 (H) 74 - 99 mg/dL   Glucose, random   Result Value Ref Range    Glucose 766 (HH) 74 - 99 mg/dL   POCT GLUCOSE   Result Value Ref Range    POCT Glucose >600 (H) 74 - 99 mg/dL   POCT GLUCOSE   Result  Value Ref Range    POCT Glucose 516 (H) 74 - 99 mg/dL   POCT GLUCOSE   Result Value Ref Range    POCT Glucose 356 (H) 74 - 99 mg/dL   Transthoracic Echo (TTE) Limited   Result Value Ref Range    LV Biplane EF 50 %    Tricuspid annular plane systolic excursion 1.3 cm    LV GLS 8.7 %    LVIDd 5.75 cm    RVSP 35.1 mmHg    LV A4C EF 48.3    POCT GLUCOSE   Result Value Ref Range    POCT Glucose 231 (H) 74 - 99 mg/dL   POCT GLUCOSE   Result Value Ref Range    POCT Glucose 228 (H) 74 - 99 mg/dL   POCT GLUCOSE   Result Value Ref Range    POCT Glucose 90 74 - 99 mg/dL   POCT GLUCOSE   Result Value Ref Range    POCT Glucose 138 (H) 74 - 99 mg/dL   CBC and Auto Differential   Result Value Ref Range    WBC 7.4 4.4 - 11.3 x10*3/uL    nRBC 0.3 (H) 0.0 - 0.0 /100 WBCs    RBC 2.63 (L) 4.50 - 5.90 x10*6/uL    Hemoglobin 8.4 (L) 13.5 - 17.5 g/dL    Hematocrit 26.7 (L) 41.0 - 52.0 %     (H) 80 - 100 fL    MCH 31.9 26.0 - 34.0 pg    MCHC 31.5 (L) 32.0 - 36.0 g/dL    RDW 15.4 (H) 11.5 - 14.5 %    Platelets 326 150 - 450 x10*3/uL    Neutrophils % 68.8 40.0 - 80.0 %    Immature Granulocytes %, Automated 1.0 (H) 0.0 - 0.9 %    Lymphocytes % 16.6 13.0 - 44.0 %    Monocytes % 12.4 2.0 - 10.0 %    Eosinophils % 0.8 0.0 - 6.0 %    Basophils % 0.4 0.0 - 2.0 %    Neutrophils Absolute 5.07 1.20 - 7.70 x10*3/uL    Immature Granulocytes Absolute, Automated 0.07 0.00 - 0.70 x10*3/uL    Lymphocytes Absolute 1.22 1.20 - 4.80 x10*3/uL    Monocytes Absolute 0.91 0.10 - 1.00 x10*3/uL    Eosinophils Absolute 0.06 0.00 - 0.70 x10*3/uL    Basophils Absolute 0.03 0.00 - 0.10 x10*3/uL   Magnesium   Result Value Ref Range    Magnesium 2.20 1.60 - 2.40 mg/dL   Renal Function Panel   Result Value Ref Range    Glucose 159 (H) 74 - 99 mg/dL    Sodium 131 (L) 136 - 145 mmol/L    Potassium 4.6 3.5 - 5.3 mmol/L    Chloride 90 (L) 98 - 107 mmol/L    Bicarbonate 25 21 - 32 mmol/L    Anion Gap 21 (H) 10 - 20 mmol/L    Urea Nitrogen 82 (H) 6 - 23 mg/dL     Creatinine 8.88 (H) 0.50 - 1.30 mg/dL    eGFR 7 (L) >60 mL/min/1.73m*2    Calcium 8.6 8.6 - 10.3 mg/dL    Phosphorus 6.2 (H) 2.5 - 4.9 mg/dL    Albumin 3.3 (L) 3.4 - 5.0 g/dL   POCT GLUCOSE   Result Value Ref Range    POCT Glucose 172 (H) 74 - 99 mg/dL   POCT GLUCOSE   Result Value Ref Range    POCT Glucose 106 (H) 74 - 99 mg/dL   POCT GLUCOSE   Result Value Ref Range    POCT Glucose 164 (H) 74 - 99 mg/dL   Electrocardiogram, 12-lead PRN ACS symptoms    Result Date: 6/9/2024  Normal sinus rhythm Possible Left atrial enlargement Left ventricular hypertrophy with QRS widening ( Sokolow-Márquez , [place] product ) Marked T wave abnormality, consider inferolateral ischemia Abnormal ECG When compared with ECG of 26-MAR-2024 02:50, Questionable change in QRS axis ST now depressed in Inferior leads T wave inversion now evident in Inferior leads See ED provider note for full interpretation and clinical correlation Confirmed by Ana Garcia ([id number]) on 6/9/2024 10:08:03 AM    Transthoracic Echo (TTE) Limited    Result Date: 6/8/2024   [facility], [address] [place] [address]              Tel [phone] and Fax [phone] TRANSTHORACIC ECHOCARDIOGRAM REPORT  Patient Name:      LUNA Lowe Physician:    [id number] Jarett Walsh MD Study Date:        6/8/2024             Ordering Provider:    [id number] MADDIE DUGAN MRN/PID:           [id number]             Fellow: Accession#:        [id number]         Nurse: Date of Birth/Age: 1970 / 53 years Sonographer:          Marlee Mejía RDCS Gender:            M                    Additional Staff: Height:            193.00 cm            Admit Date:           6/8/2024 Weight:            68.00 kg             Admission Status:     Inpatient -                                                               Routine BSA / BMI:          1.96 m2 / 18.26      Encounter#:           [id number]                    kg/m2                                         Department Location:  [facility] Blood Pressure: 142 /79 mmHg Study Type:    TRANSTHORACIC ECHO (TTE) LIMITED Diagnosis/ICD: Cardiomyopathy, unspecified-I42.9; Heart failure,                unspecified-I50.9 Indication:    Cardiomyopathy, heart failure CPT Code:      Echo Limited-61498; Doppler Limited-18084; Color Doppler-26298;                Myocardial Strain Imaging-48345 Patient History: Diabetes:          Yes Pertinent History: CAD, Cardiomyopathy and HTN. s/p CABG x3. Study Detail: The following Echo studies were performed: 2D, M-Mode, Doppler,               color flow and Strain.  PHYSICIAN INTERPRETATION: Left Ventricle: The left ventricular systolic function is mildly to moderately decreased, with an estimated ejection fraction of 35-40%. There is global hypokinesis of the left ventricle with minor regional variations. The left ventricular cavity size is normal. Left Ventricular Global Longitudinal Strain - 8.7 %. Spectral Doppler shows an impaired relaxation pattern of left ventricular diastolic filling. Left Atrium: The left atrium is moderately dilated. Right Ventricle: The right ventricle is normal in size. There is normal right ventricular global systolic function. Right Atrium: The right atrium is mild to moderately dilated. Aortic Valve: The aortic valve appears structurally normal. There is no evidence of aortic valve regurgitation. Mitral Valve: The mitral valve is mildly thickened. There is mild mitral valve regurgitation. Tricuspid Valve: The tricuspid valve is structurally normal. There is moderate tricuspid regurgitation. Pulmonic Valve: The pulmonic valve is structurally normal. There is mild to moderate pulmonic valve regurgitation. Pericardium: There is a trivial to small pericardial effusion. Aorta: The aortic root is normal. Pulmonary Artery: The tricuspid  regurgitant velocity is 2.83 m/s, and with an estimated right atrial pressure of 3 mmHg, the estimated pulmonary artery pressure is mildly elevated with the RVSP at 35.1 mmHg. Systemic Veins: The inferior vena cava appears to be of normal size. There is IVC inspiratory collapse greater than 50%. In comparison to the previous echocardiogram(s): Compared with study from 6/5/2024, there is an improvement in calculated LVEF.  Onco-Cardiology Measurements: Current Measurements Global Longitudinal Strain (GLS) -8.7 GLS Tracking Quality:  CONCLUSIONS:  1. Left ventricular systolic function is mildly to moderately decreased with a 35-40% estimated ejection fraction.  2. Left Ventricular Global Longitudinal Strain - 8.7 % with more pronounced abnormalities involving the basal and mid segments with preservation of the apex more consistent with a possible infiltrative cardiomyopathy.  3. There is global hypokinesis of the left ventricle with minor regional variations.  4. Spectral Doppler shows an impaired relaxation pattern of left ventricular diastolic filling.  5. The left atrium is moderately dilated.  6. The right atrium is mild to moderately dilated.  7. Moderate tricuspid regurgitation visualized.  8. Compared with study from 6/5/2024, there is an improvement in calculated LVEF. QUANTITATIVE DATA SUMMARY: 2D MEASUREMENTS:                           Normal Ranges: IVSd:          1.00 cm    (0.6-1.1cm) LVPWd:         1.10 cm    (0.6-1.1cm) LVIDd:         5.75 cm    (3.9-5.9cm) LVIDs:         4.50 cm LV Mass Index: 124.8 g/m2 LV % FS        21.8 % LV SYSTOLIC FUNCTION BY 2D PLANIMETRY (MOD):                                         Normal Ranges: EF-A4C View:                      34 %  (>=55%) EF-A2C View:                      38 % EF-Biplane:                       36 % Global Longitudinal Strain (GLS): 8.7 % LV DIASTOLIC FUNCTION:                               Normal Ranges: MV lateral e'     0.11 m/s MV medial e'      0.03  m/s PulmV Sys Shayne:    26.28 cm/s PulmV Cohen Shayne:   29.43 cm/s PulmV S/D Shayne:    0.89 PulmV A Revs Shayne: 29.33 cm/s PulmV A Revs Dur: 119.89 msec  RIGHT VENTRICLE: TAPSE: 13.0 mm TRICUSPID VALVE/RVSP:                             Normal Ranges: Peak TR Velocity: 2.83 m/s RV Syst Pressure: 35.1 mmHg (< 30mmHg) IVC Diam:         1.68 cm PULMONIC VALVE:                         Normal Ranges: PV Accel Time: 80 msec  (>120ms) PV Max Shayne:    1.3 m/s  (0.6-0.9m/s) PV Max P.1 mmHg Pulmonary Veins: PulmV A Revs Dur: 119.89 msec PulmV A Revs Shayne: 29.33 cm/s PulmV Cohen Shayne:   29.43 cm/s PulmV S/D Shayne:    0.89 PulmV Sys Shayne:    26.28 cm/s  [id number] Jarett Walsh MD Electronically signed on 2024 at 4:42:36 PM  ** Final **     Transesophageal Echo (MARION)    Result Date: 2024   [facility], [address] [place] [address]              Tel [phone] and Fax [phone] TRANSESOPHAGEAL ECHOCARDIOGRAM REPORT  Patient Name:      LUNA Lowe Physician:    [id number] Ignacio Ibrahim MD Study Date:        2024            Ordering Provider:    [id number] STANLEY GLORIA MRN/PID:           [id number]            Fellow: Accession#:        [id number]        Nurse:                Feng Whalen RN Date of Birth/Age: 1970      Sonographer:          Shama Montes RDCS                    years Gender:            M                   Additional Staff:     Melissa Ann CRNA Height:            193.04 cm           Admit Date:           2024 Weight:            68.04 kg            Admission Status:     Inpatient -                                                              Routine BSA / BMI:         1.96 m2 / 18.26     Encounter#:           [id number]                    kg/m2                                         Department Location:  [facility] Cath                                                              Lab Blood Pressure: 133 /62 mmHg Study Type:    TRANSESOPHAGEAL ECHO (MARION) Diagnosis/ICD: Bacteremia-R78.81 Indication:    R/o Endocarditis CPT Code:      MARION Complete-84221; Doppler Limited-34672; Color Doppler-47580 Patient History: Allergies:         Iodinated contrast media, Ampicillin-Sulbactam, [place]. Smoker:            Former. Diabetes:          Yes Insulin Pertinent History: CAD, CHF, HTN and Cardiomyopathy. 53 y.o. male presents for                    MARION for bacteremia. Study Detail: The following Echo studies were performed: 2D. Agitated saline               used as a contrast agent for intraseptal flow evaluation. Total               contrast used for this procedure was 18 mL via IV push. Patient's               heart rhythm is normal sinus rhythm. The patient was sedated.  PHYSICIAN INTERPRETATION: MARION Details: The MARION probe used was 5177. Technically adequate omniplane transesophageal echocardiogram performed. MARION Medication: The pharynx was anesthetized with Cetacaine spray and viscous lidocaine. The patient was sedated by Anesthesia; please refer to anesthesia flow sheet for medications used. MARION Procedure: The probe was passed without difficulty. Left Ventricle: The left ventricular systolic function is moderately decreased, with an estimated ejection fraction of 35-40%. There is global hypokinesis of the left ventricle with minor regional variations. The left ventricular cavity size was not assessed. Left ventricular diastolic filling was not assessed. Left Atrium: The left atrium is severely dilated. A bubble study using agitated saline was performed. Bubble study is negative. There is evidence of an atrial septal aneurysm. The left atrial appendage Doppler velocities are reduced and there is no thrombus visualized in the left atrial appendage. Right Ventricle: The right ventricle was not  assessed. There is reduced right ventricular systolic function. Right Atrium: The right atrium was not assessed. Aortic Valve: The aortic valve is trileaflet. There is There is reduced systolic aortic valve leaflet excursion. There is mild aortic valve regurgitation. The degree of aortic stenosis was not fully assessed (HUMPHREY >2 cm^2 by planimetry). Mitral Valve: The mitral valve is normal in structure. There is mild mitral valve regurgitation. Tricuspid Valve: The tricuspid valve is structurally normal. There is mild tricuspid regurgitation. Pulmonic Valve: The pulmonic valve is structurally normal. There is no indication of pulmonic valve regurgitation. Pericardium: There is no pericardial effusion noted. Aorta: The aortic root is normal. In comparison to the previous echocardiogram(s): Compared with study from 6/5/2024, the ejection fraction appears somewhat improved presently.  CONCLUSIONS:  1. Left ventricular systolic function is moderately decreased with a 35-40% estimated ejection fraction.  2. No definite valvular vegetations were visualized.  3. There is reduced right ventricular systolic function.  4. The left atrium is severely dilated.  5. Mild aortic valve regurgitation.  6. Atrial septal aneurysm present.  7. There is global hypokinesis of the left ventricle with minor regional variations. QUANTITATIVE DATA SUMMARY:  [id number] Ignacio Ibrahim MD Electronically signed on 6/7/2024 at 5:19:53 PM  ** Final **     Cardiac Catheterization Procedure    Result Date: 6/7/2024   [facility], Cath Lab, [address] [place] [address] Cardiovascular Catheterization Report Patient Name:     LUNA FINCH        Performing Physician:  [id number] Horner MD Study Date:       6/7/2024            Verifying Physician:   Kaity Horner MD MRN/PID:          [id number]             Cardiologist/Co-Scrub: Accession#:       [id number]        Ordering Provider:     [id number] STANLEY ANDERSON                                                              JUANI Date of           1970 / 53      Cardiologist: Birth/Age:        years Gender:           M                   Fellow: Encounter#:       [id number]          Surgeon:  Study: Left Heart Cath  Indications: LUNA FINCH is a 54 year old male who presents with end stage renal disease on dialysis, prior coronary artery bypass graft surgery, prior percutaneous coronary intervention and an asymptomatic chest pain assessment. Cardiomyopathy.  Procedure Description: After infiltration with 2% Lidocaine, the right femoral artery was cannulated with a modified Seldinger technique. Subsequently a 5 Portuguese sheath was placed in the right femoral artery. Selective coronary catheterization was performed using a 5 Fr catheter(s) exchanged over a guide wire to cannulate the coronary arteries. A JL 4 tip catheter was used for left coronary injections. A JR 4 tip catheter was used for right coronary injections. Additional catheter(s) used to visualize the coronary arteries were: IM. Multiple injections of contrast were made into the left and right coronary arteries with angiograms recorded in multiple projections. After completion of the procedure, femoral artery angiography was performed. This demonstrated a common femoral artery puncture appropriate for closure. A Vascade 5F vascular closure device was placed per protocol.  Coronary Angiography: The coronary circulation is co-dominant.  Left Main Coronary Artery: The left main coronary artery is a normal caliber vessel. The left main arises normally from the left coronary sinus of Valsalva and bifurcates into the LAD and circumflex coronary arteries. The mid to distal left main coronary artery showed 95%.  Left Anterior Descending Coronary Artery Distribution: The left anterior descending coronary artery is a normal  caliber vessel. The LAD arises normally from the left main coronary artery. The proximal left anterior descending coronary artery showed .  Circumflex Coronary Artery Distribution: The circumflex coronary artery is a normal caliber vessel. The circumflex arises normally from the left main coronary artery and terminates in the AV groove. The proximal circumflex coronary artery showed .  Right Coronary Artery Distribution: The right coronary artery is a normal caliber vessel. The RCA arises normally from the right sinus of Valsalva. The entire right coronary artery showed diffuse moderate disease.  Coronary Grafts:  LIMA Graft: Left internal mammary artery graft conduit, originating in situ and attached to the mid left anterior descending, is patent. Saphaneous Vein Graft(s): The saphenous vein graft, originating from the aorta and attached to the 1st obtuse marginal, is patent. The 2nd saphenous vein graft, originating from the aorta and attached to the right posterior descending artery, is patent.  Coronary Lesion Summary: Vessel             Stenosis         Vessel Segment Left Main            95%            mid to distal LAD                                 proximal Circumflex                          proximal RCA        diffuse moderate disease     entire  Graft Stenosis Summary: Graft       Destination of Graft LIMA  mid left anterior descending SVG 1 1st obtuse marginal SVG 2 right posterior descending artery  Complications: No in-lab complications observed.  Cardiac Cath Post Procedure Notes: Post Procedure Diagnosis: See below. Blood Loss:               Estimated blood loss during the procedure was 5 mls. Specimens Removed:        Number of specimen(s) removed: none. ____________________________________________________________________________________ CONCLUSIONS:  1. Native coronaries: Critical distal left main disease, proximal LCx , proximal LAD , and moderate RCA disease in co-dominant  system.  2. Bypasses: Patent LIMA to LAD which fills back the whole LAD system including both diagonals, patent SVG to OM1 which fills back the whole codominant LCx system, and patent SVG to right PDA.  3. Further management as per inpatient cardiology team. ICD 10 Codes: Other cardiomyopathies-I42.8  CPT Codes: Left Heart Cath Bypass Graft w ventriculography and coronary angio(LHC)-13665; Moderate Sedation Services 1st additional 15 minutes patient >5 years-80892; Ultrasound guidance for vascular access-00943  [id number] Valerie Horner MD Performing Physician Electronically signed by [id number] Valerie Horner MD on 6/7/2024 at 4:22:01 PM  ** Final **     Transthoracic Echo (TTE) Complete    Result Date: 6/5/2024   [facility], [address] [place] [address]              Tel [phone] and Fax [phone] TRANSTHORACIC ECHOCARDIOGRAM REPORT  Patient Name:      LUNA Lowe Physician:    [id number] Ignacio Ibrahim MD Study Date:        6/5/2024             Ordering Provider:    [id number] LIZZ KNOTT MRN/PID:           [id number]             Fellow: Accession#:        [id number]         Nurse: Date of Birth/Age: 1970 / 53 years Sonographer:          Shama Montes RDCS Gender:            M                    Additional Staff: Height:            193.00 cm            Admit Date:           5/31/2024 Weight:            68.00 kg             Admission Status:     Inpatient -                                                               Routine BSA / BMI:         1.96 m2 / 18.26      Encounter#:           [id number]                    kg/m2                                         Department Location:  [facility] Non                                                               Invasive Blood Pressure: 141 /67 mmHg Study Type:    TRANSTHORACIC ECHO (TTE) COMPLETE Diagnosis/ICD:  Bacteremia-R78.81 Indication:    Bacteremia CPT Code:      Echo Complete w Full Doppler-08375 Patient History: Diabetes:          Yes Pertinent History: CAD, Chest Pain, HTN and CHF. ESRD, Hypoxia. Study Detail: The following Echo studies were performed: 2D, M-Mode, Doppler and               color flow.  PHYSICIAN INTERPRETATION: Left Ventricle: The left ventricular systolic function is severely decreased, with an estimated ejection fraction of 20%. There is global hypokinesis of the left ventricle with minor regional variations. The left ventricular cavity size is upper limits of normal. There is eccentric left ventricular hypertrophy. Left Ventricular Global Longitudinal Strain - 6.3 %. Spectral Doppler shows an abnormal pattern of left ventricular diastolic filling. Left Atrium: The left atrium is severely dilated. Right Ventricle: The right ventricle is moderately enlarged. There is reduced right ventricular systolic function. Right Atrium: The right atrium is normal in size. Aortic Valve: The aortic valve is trileaflet. There is moderate aortic valve cusp calcification. There is reduced aortic valve non coronary cusp excursion. There is evidence of moderate aortic valve stenosis. There is mild aortic valve regurgitation. The peak instantaneous gradient of the aortic valve is 37.2 mmHg. The mean gradient of the aortic valve is 19.7 mmHg. Mitral Valve: The mitral valve is normal in structure. The mitral valve spectral Doppler A-wave is absent, consistent with atrial fibrillation. There is mild to moderate mitral annular calcification. There is mild to moderate mitral valve regurgitation. Tricuspid Valve: The tricuspid valve is structurally normal. There is mild tricuspid regurgitation. The Doppler estimated RVSP is mildly elevated at 44.5 mmHg. Pulmonic Valve: The pulmonic valve is structurally normal. There is mild pulmonic valve regurgitation. Pericardium: There is a trivial pericardial effusion. Aorta: The  aortic root is normal. Systemic Veins: The inferior vena cava appears dilated. There is IVC inspiratory collapse greater than 50%. In comparison to the previous echocardiogram(s): Compared with study from 3/6/2024, the ejection fraction is comparatively reduced but was somewhat overestimated previously. There is a greater degree of aortic stenosis presently but this was underestimated previously.  CONCLUSIONS:  1. Left ventricular systolic function is severely decreased with a 20% estimated ejection fraction.  2. No definite valvular vegetations were visualized.  3. Spectral Doppler shows an abnormal pattern of left ventricular diastolic filling.  4. There is eccentric left ventricular hypertrophy.  5. Moderately enlarged right ventricle.  6. There is reduced right ventricular systolic function.  7. The left atrium is severely dilated.  8. Absent A-wave on MV spectral Doppler tracing, consistent with atrial fibrillation.  9. Mild to moderate mitral valve regurgitation. 10. Mildly elevated RVSP. 11. Moderate aortic valve stenosis. 12. There is moderate aortic valve cusp calcification. 13. Mild aortic valve regurgitation. 14. There is global hypokinesis of the left ventricle with minor regional variations. QUANTITATIVE DATA SUMMARY: 2D MEASUREMENTS:                           Normal Ranges: LAs:           4.98 cm    (2.7-4.0cm) IVSd:          1.08 cm    (0.6-1.1cm) LVPWd:         1.06 cm    (0.6-1.1cm) LVIDd:         5.73 cm    (3.9-5.9cm) LVIDs:         4.81 cm LV Mass Index: 127.2 g/m2 LV % FS        16.1 % LA VOLUME:                               Normal Ranges: LA Vol A4C:        115.9 ml   (22+/-6mL/m2) LA Vol A2C:        131.8 ml LA Vol BP:         129.3 ml LA Vol Index A4C:  59.2ml/m2 LA Vol Index A2C:  67.2 ml/m2 LA Vol Index BP:   66.0 ml/m2 LA Area A4C:       30.9 cm2 LA Area A2C:       31.5 cm2 LA Major Axis A4C: 7.0 cm LA Major Axis A2C: 6.4 cm LA Volume Index:   65.3 ml/m2 LA Vol A4C:        111.1 ml LA Vol  A2C:        127.7 ml RA VOLUME BY A/L METHOD:                               Normal Ranges: RA Vol A4C:        85.8 ml    (8.3-19.5ml) RA Vol Index A4C:  43.8 ml/m2 RA Area A4C:       26.2 cm2 RA Major Axis A4C: 6.8 cm M-MODE MEASUREMENTS:                  Normal Ranges: Ao Root: 3.10 cm (2.0-3.7cm) LAs:     4.84 cm (2.7-4.0cm) AORTA MEASUREMENTS:                      Normal Ranges: Ao Sinus, d: 2.90 cm (2.1-3.5cm) Ao STJ, d:   2.20 cm (1.7-3.4cm) Asc Ao, d:   3.00 cm (2.1-3.4cm) LV SYSTOLIC FUNCTION BY 2D PLANIMETRY (MOD):                                          Normal Ranges: EF-A4C View:                      29.0 % (>=55%) EF-A2C View:                      43.8 % EF-Biplane:                       39.1 % Global Longitudinal Strain (GLS): 6.3 % LV DIASTOLIC FUNCTION:                        Normal Ranges: MV Peak E:    1.37 m/s (0.7-1.2 m/s) MV e'         0.17 m/s (>8.0) MV lateral e' 0.17 m/s MV medial e'  0.10 m/s E/e' Ratio:   8.06     (<8.0) MITRAL VALVE:                      Normal Ranges: MV Vmax:    1.52 m/s (<=1.3m/s) MV peak P.2 mmHg (<5mmHg) MV mean P.1 mmHg (<2mmHg) MV VTI:     23.83 cm (10-13cm) MITRAL INSUFFICIENCY:                         Normal Ranges: MR VTI:     130.99 cm MR Vmax:    486.88 cm/s MR Volume:  13.47 ml MR Flow Rt: 50.06 ml/s MR EROA:    0.10 cm2 AORTIC VALVE:                                    Normal Ranges: AoV Vmax:                3.05 m/s  (<=1.7m/s) AoV Peak P.2 mmHg (<20mmHg) AoV Mean P.7 mmHg (1.7-11.5mmHg) LVOT Max Shayne:            1.20 m/s  (<=1.1m/s) AoV VTI:                 51.47 cm  (18-25cm) LVOT VTI:                21.65 cm LVOT Diameter:           2.01 cm   (1.8-2.4cm) AoV Area, VTI:           1.34 cm2  (2.5-5.5cm2) AoV Area,Vmax:           1.25 cm2  (2.5-4.5cm2) AoV Dimensionless Index: 0.42 AORTIC INSUFFICIENCY: AI Vmax:       3.21 m/s AI Half-time:  349 msec AI Decel Time: 1203 msec AI Decel Rate: 266.64 cm/s2  RIGHT VENTRICLE:  RV Basal 4.80 cm RV Mid   3.00 cm RV Major 9.9 cm TAPSE:   12.0 mm RV s'    0.09 m/s TRICUSPID VALVE/RVSP:                             Normal Ranges: Peak TR Velocity: 3.02 m/s Est. RA Pressure: 8 mmHg RV Syst Pressure: 44.5 mmHg (< 30mmHg) IVC Diam:         2.23 cm PULMONIC VALVE:                      Normal Ranges: RVOT Vmax:  0.51 m/s (0.6-0.9m/s) PV Max Shayne: 1.2 m/s  (0.6-0.9m/s) PV Max P.6 mmHg AORTA: Asc Ao Diam 2.99 cm  [id number] Ignacio Ibrahim MD Electronically signed on 2024 at 4:50:52 PM  ** Final **     CT abdomen pelvis wo IV contrast    Result Date: 2024  Interpreted By:  Ketty Ramirez, STUDY: CT ABDOMEN PELVIS WO IV CONTRAST;  2024 4:08 am   INDICATION: Signs/Symptoms:bacteremia  source?.   COMPARISON: 2024   ACCESSION NUMBER(S): [id number]   ORDERING CLINICIAN: GREG CALDERA   TECHNIQUE: CT of the abdomen and pelvis was performed. Sagittal and coronal reconstructions were generated.  No intravenous contrast given for the exam.   FINDINGS: Solid organ and vessel evaluation limited without IV contrast.   ABDOMINAL ORGANS:   LIVER: No focal lesion within limits of unenhanced exam prominent vascular calcifications near the hilum.   GALL BLADDER AND BILIARY TREE: No calcified gallstone. Gallbladder is distended. Questionable trace pericholecystic fluid.   SPLEEN: Stable isodense presumed splenule adjacent to the anterior pole.   PANCREAS: No focal lesion within limits of unenhanced exam   ADRENALS: No adrenal mass   KIDNEYS AND URETERS: Atrophic with prominent vascular calcifications and small rounded hypodensities in each kidney similar to the prior exam.   BOWEL: Questionable distal esophageal wall thickening. Stomach is mildly distended with debris and air. No abnormally dilated small bowel loops. Moderate fecal residue in the proximal colon. Rectum is distended with gas and fecal debris.   PERITONEUM, RETROPERITONEUM, NODES: No significant free fluid. No free air. Slight increased  density in the mesentery. No significant retroperitoneal adenopathy within limits of unenhanced exam.   VESSELS: Relative decreased density of cardiac lumen suggesting anemia. Extensive vascular calcifications. No abdominal aortic aneurysm. Lack of IV contrast precludes vascular luminal assessment.   PELVIS: Urinary bladder is partially distended with diffusely thickened wall. Vas deferens and faint possible seminal vesicle calcifications bilaterally similar to the prior exam. No gross prostate enlargement.   ABDOMINAL WALL: Focal density in the right lower quadrant abdominal wall subcutaneous fat similar to the prior exam. No sizable abdominal wall hernia. Mild soft tissue edema.   BONES: Generalized increased osseous density consistent with metabolic bone disease similar to the prior exam. Focal smooth superior L2 endplate depression consistent with Schmorl's node with adjacent tiny vacuum disc is more conspicuous.   LOWER CHEST: Patchy/nodular left lower lobe infiltrate. Septal thickening in both lung bases. Small bilateral pleural effusions. Cardiomegaly. Surgical material along the margins of the heart again seen.       Left lower lobe infiltrate consistent with pneumonia.   Mild soft tissue and mesenteric edema with septal thickening in the lung bases and small pleural effusions consistent with CHF/fluid overload.   Distended gallbladder with questionable trace pericholecystic fluid.   Questionable distal esophageal wall thickening. Correlate with possible esophagitis.   Diffuse bladder wall thickening which could be related to suboptimal distention, cystitis and/or trabeculation.   Numerous additional findings as described above.   MACRO: None.   Signed by: Ketty Ramirez 6/4/2024 8:48 AM Dictation workstation:   [id number]    XR chest 2 views    Result Date: 5/31/2024  Interpreted By:  Aaron Jeffers, STUDY: XR CHEST 2 VIEWS;  5/31/2024 9:24 pm   INDICATION: Signs/Symptoms:sob.   COMPARISON: None.    ACCESSION NUMBER(S): [id number]   ORDERING CLINICIAN: KAMALA FRIEDMAN   FINDINGS: Median sternal wires present. Some vascular stent over the right axilla.     CARDIOMEDIASTINAL SILHOUETTE: There is enlargement of the cardiac silhouette. Median sternotomy wires present.   LUNGS: There is pulmonary congestion. There is no consolidation or effusion.   ABDOMEN: No remarkable upper abdominal findings.   BONES: No acute osseous changes.       1.  Enlarged cardiac silhouette with vascular congestion. No consolidation seen       MACRO: None   Signed by: Aaron Jeffers 5/31/2024 9:28 PM Dictation workstation:   [id number] is done                          Assessment/Plan   Principal Problem:    Hypoxia  Active Problems:  Enterococcus faecalis bacteremia, no evidence of valvular vegetations by transesophageal echocardiogram in June 7, 2024.    Ischemic cardiomyopathy, LV ejection fraction 35 to 40%  Previous coronary artery bypass surgery, status post left heart catheterization in June 7, 2024 which revealed patent HENDERSON to LAD and patent saphenous graft to circumflex and RCA.    End-stage renal disease on home hemodialysis  Diabetes mellitus type 2 insulin-dependent, was rather uncontrolled for couple of days has been resumed on his Lantus insulin and sliding scale and blood sugars are improving, hemoglobin A1c 8.0.    Reviewed all labs and imaging studies including transesophageal echocardiogram and left heart catheterization.  Patient has been on IV vancomycin since admission nondialysis days for Enterococcus faecalis and infectious disease on board.  Anticipate discharge home soon on IV antibiotics for total 6 weeks.    Total time spent in examination counseling coordinating care for this patient was greater than 35 minutes.       I spent 35 minutes in the professional and overall care of this patient.      Osmar Callaway MD

## 2024-06-10 ENCOUNTER — APPOINTMENT (OUTPATIENT)
Dept: DIALYSIS | Facility: HOSPITAL | Age: 54
End: 2024-06-10
Payer: COMMERCIAL

## 2024-06-10 LAB
ALBUMIN SERPL BCP-MCNC: 3.4 G/DL (ref 3.4–5)
ANION GAP SERPL CALC-SCNC: 22 MMOL/L (ref 10–20)
BACTERIA BLD CULT: NORMAL
BACTERIA BLD CULT: NORMAL
BASOPHILS # BLD AUTO: 0.04 X10*3/UL (ref 0–0.1)
BASOPHILS NFR BLD AUTO: 0.5 %
BUN SERPL-MCNC: 99 MG/DL (ref 6–23)
CALCIUM SERPL-MCNC: 8.5 MG/DL (ref 8.6–10.3)
CHLORIDE SERPL-SCNC: 90 MMOL/L (ref 98–107)
CO2 SERPL-SCNC: 24 MMOL/L (ref 21–32)
CREAT SERPL-MCNC: 10.53 MG/DL (ref 0.5–1.3)
EGFRCR SERPLBLD CKD-EPI 2021: 5 ML/MIN/1.73M*2
EOSINOPHIL # BLD AUTO: 0.11 X10*3/UL (ref 0–0.7)
EOSINOPHIL NFR BLD AUTO: 1.4 %
ERYTHROCYTE [DISTWIDTH] IN BLOOD BY AUTOMATED COUNT: 15.7 % (ref 11.5–14.5)
GLUCOSE BLD MANUAL STRIP-MCNC: 150 MG/DL (ref 74–99)
GLUCOSE BLD MANUAL STRIP-MCNC: 172 MG/DL (ref 74–99)
GLUCOSE BLD MANUAL STRIP-MCNC: 195 MG/DL (ref 74–99)
GLUCOSE BLD MANUAL STRIP-MCNC: 214 MG/DL (ref 74–99)
GLUCOSE SERPL-MCNC: 218 MG/DL (ref 74–99)
HCT VFR BLD AUTO: 28.6 % (ref 41–52)
HGB BLD-MCNC: 9 G/DL (ref 13.5–17.5)
IMM GRANULOCYTES # BLD AUTO: 0.07 X10*3/UL (ref 0–0.7)
IMM GRANULOCYTES NFR BLD AUTO: 0.9 % (ref 0–0.9)
LYMPHOCYTES # BLD AUTO: 1.28 X10*3/UL (ref 1.2–4.8)
LYMPHOCYTES NFR BLD AUTO: 16.1 %
MAGNESIUM SERPL-MCNC: 2.4 MG/DL (ref 1.6–2.4)
MCH RBC QN AUTO: 31.8 PG (ref 26–34)
MCHC RBC AUTO-ENTMCNC: 31.5 G/DL (ref 32–36)
MCV RBC AUTO: 101 FL (ref 80–100)
MONOCYTES # BLD AUTO: 1.01 X10*3/UL (ref 0.1–1)
MONOCYTES NFR BLD AUTO: 12.7 %
NEUTROPHILS # BLD AUTO: 5.46 X10*3/UL (ref 1.2–7.7)
NEUTROPHILS NFR BLD AUTO: 68.4 %
NRBC BLD-RTO: 0.3 /100 WBCS (ref 0–0)
PHOSPHATE SERPL-MCNC: 6.6 MG/DL (ref 2.5–4.9)
PLATELET # BLD AUTO: 344 X10*3/UL (ref 150–450)
POTASSIUM SERPL-SCNC: 4.8 MMOL/L (ref 3.5–5.3)
RBC # BLD AUTO: 2.83 X10*6/UL (ref 4.5–5.9)
SODIUM SERPL-SCNC: 131 MMOL/L (ref 136–145)
VANCOMYCIN SERPL-MCNC: 17.9 UG/ML (ref 5–20)
WBC # BLD AUTO: 8 X10*3/UL (ref 4.4–11.3)

## 2024-06-10 PROCEDURE — 2500000002 HC RX 250 W HCPCS SELF ADMINISTERED DRUGS (ALT 637 FOR MEDICARE OP, ALT 636 FOR OP/ED): Performed by: INTERNAL MEDICINE

## 2024-06-10 PROCEDURE — 83735 ASSAY OF MAGNESIUM: CPT | Performed by: NURSE PRACTITIONER

## 2024-06-10 PROCEDURE — 2500000004 HC RX 250 GENERAL PHARMACY W/ HCPCS (ALT 636 FOR OP/ED): Performed by: NURSE PRACTITIONER

## 2024-06-10 PROCEDURE — 99232 SBSQ HOSP IP/OBS MODERATE 35: CPT | Performed by: INTERNAL MEDICINE

## 2024-06-10 PROCEDURE — 85025 COMPLETE CBC W/AUTO DIFF WBC: CPT | Performed by: NURSE PRACTITIONER

## 2024-06-10 PROCEDURE — 6350000001 HC RX 635 EPOETIN >10,000 UNITS: Mod: JZ | Performed by: NURSE PRACTITIONER

## 2024-06-10 PROCEDURE — 80069 RENAL FUNCTION PANEL: CPT | Performed by: NURSE PRACTITIONER

## 2024-06-10 PROCEDURE — 80202 ASSAY OF VANCOMYCIN: CPT | Performed by: NURSE PRACTITIONER

## 2024-06-10 PROCEDURE — 2500000001 HC RX 250 WO HCPCS SELF ADMINISTERED DRUGS (ALT 637 FOR MEDICARE OP): Performed by: NURSE PRACTITIONER

## 2024-06-10 PROCEDURE — 8010000001 HC DIALYSIS - HEMODIALYSIS PER DAY

## 2024-06-10 PROCEDURE — 1100000001 HC PRIVATE ROOM DAILY

## 2024-06-10 PROCEDURE — 82947 ASSAY GLUCOSE BLOOD QUANT: CPT | Mod: 91

## 2024-06-10 PROCEDURE — 2500000002 HC RX 250 W HCPCS SELF ADMINISTERED DRUGS (ALT 637 FOR MEDICARE OP, ALT 636 FOR OP/ED): Mod: MUE | Performed by: NURSE PRACTITIONER

## 2024-06-10 PROCEDURE — 36415 COLL VENOUS BLD VENIPUNCTURE: CPT | Performed by: NURSE PRACTITIONER

## 2024-06-10 PROCEDURE — 2500000004 HC RX 250 GENERAL PHARMACY W/ HCPCS (ALT 636 FOR OP/ED): Performed by: PHARMACIST

## 2024-06-10 RX ORDER — INSULIN LISPRO 100 [IU]/ML
4 INJECTION, SOLUTION INTRAVENOUS; SUBCUTANEOUS
Status: DISCONTINUED | OUTPATIENT
Start: 2024-06-10 | End: 2024-06-11 | Stop reason: HOSPADM

## 2024-06-10 RX ORDER — VANCOMYCIN HYDROCHLORIDE 750 MG/150ML
750 INJECTION, SOLUTION INTRAVENOUS ONCE
Status: COMPLETED | OUTPATIENT
Start: 2024-06-10 | End: 2024-06-10

## 2024-06-10 RX ADMIN — INSULIN LISPRO 4 UNITS: 100 INJECTION, SOLUTION INTRAVENOUS; SUBCUTANEOUS at 13:13

## 2024-06-10 RX ADMIN — INSULIN GLARGINE 10 UNITS: 100 INJECTION, SOLUTION SUBCUTANEOUS at 22:12

## 2024-06-10 RX ADMIN — ATORVASTATIN CALCIUM 80 MG: 80 TABLET, FILM COATED ORAL at 12:57

## 2024-06-10 RX ADMIN — SEVELAMER CARBONATE 800 MG: 800 TABLET, FILM COATED ORAL at 18:19

## 2024-06-10 RX ADMIN — HEPARIN SODIUM 5000 UNITS: 5000 INJECTION INTRAVENOUS; SUBCUTANEOUS at 22:08

## 2024-06-10 RX ADMIN — VANCOMYCIN HYDROCHLORIDE 750 MG: 750 INJECTION, SOLUTION INTRAVENOUS at 15:37

## 2024-06-10 RX ADMIN — ASPIRIN 81 MG: 81 TABLET, COATED ORAL at 12:57

## 2024-06-10 RX ADMIN — CARVEDILOL 25 MG: 25 TABLET, FILM COATED ORAL at 18:19

## 2024-06-10 RX ADMIN — INSULIN GLARGINE 10 UNITS: 100 INJECTION, SOLUTION SUBCUTANEOUS at 13:03

## 2024-06-10 RX ADMIN — MULTIPLE VITAMINS W/ MINERALS TAB 1 TABLET: TAB at 12:56

## 2024-06-10 RX ADMIN — SACUBITRIL AND VALSARTAN 1 TABLET: 24; 26 TABLET, FILM COATED ORAL at 22:09

## 2024-06-10 RX ADMIN — EPOETIN ALFA-EPBX 10000 UNITS: 10000 INJECTION, SOLUTION INTRAVENOUS; SUBCUTANEOUS at 18:19

## 2024-06-10 RX ADMIN — SITAGLIPTIN 25 MG: 25 TABLET, FILM COATED ORAL at 12:57

## 2024-06-10 RX ADMIN — HEPARIN SODIUM 5000 UNITS: 5000 INJECTION INTRAVENOUS; SUBCUTANEOUS at 14:58

## 2024-06-10 RX ADMIN — INSULIN LISPRO 2 UNITS: 100 INJECTION, SOLUTION INTRAVENOUS; SUBCUTANEOUS at 13:16

## 2024-06-10 RX ADMIN — SACUBITRIL AND VALSARTAN 1 TABLET: 24; 26 TABLET, FILM COATED ORAL at 12:56

## 2024-06-10 RX ADMIN — CARVEDILOL 25 MG: 25 TABLET, FILM COATED ORAL at 12:56

## 2024-06-10 RX ADMIN — INSULIN LISPRO 4 UNITS: 100 INJECTION, SOLUTION INTRAVENOUS; SUBCUTANEOUS at 18:19

## 2024-06-10 RX ADMIN — SEVELAMER CARBONATE 800 MG: 800 TABLET, FILM COATED ORAL at 12:57

## 2024-06-10 RX ADMIN — HEPARIN SODIUM 5000 UNITS: 5000 INJECTION INTRAVENOUS; SUBCUTANEOUS at 06:41

## 2024-06-10 ASSESSMENT — PAIN SCALES - WONG BAKER: WONGBAKER_NUMERICALRESPONSE: NO HURT

## 2024-06-10 ASSESSMENT — COGNITIVE AND FUNCTIONAL STATUS - GENERAL
MOBILITY SCORE: 24
DAILY ACTIVITIY SCORE: 24

## 2024-06-10 ASSESSMENT — PAIN SCALES - GENERAL
PAINLEVEL_OUTOF10: 0 - NO PAIN
PAINLEVEL_OUTOF10: 0 - NO PAIN

## 2024-06-10 ASSESSMENT — PAIN - FUNCTIONAL ASSESSMENT: PAIN_FUNCTIONAL_ASSESSMENT: NO/DENIES PAIN

## 2024-06-10 NOTE — PROGRESS NOTES
Vancomycin Dosing by Pharmacy- FOLLOW UP    Kentrell Carreon is a 53 y.o. year old male who Pharmacy has been consulted for vancomycin dosing for line infections. Based on the patient's indication and renal status this patient is being dosed based on a goal pre-HD level of 20-25.     Renal function is currently stable.    Most recent random level: 17.9 mcg/mL    Visit Vitals  /67 (Patient Position: Lying)   Pulse 80   Temp 36.9 °C (98.4 °F)   Resp 18        Lab Results   Component Value Date    CREATININE 10.53 (H) 06/10/2024    CREATININE 8.88 (H) 2024    CREATININE 6.79 (H) 2024    CREATININE 4.88 (H) 2024        Patient weight is as follows:   Vitals:    24 1237   Weight: 68 kg (150 lb)       Cultures:  Susceptibility data for the encounter in last 14 days.  Collected Specimen Info Organism Ampicillin Gentamicin Synergy Penicillin Tetracycline Trimethoprim/Sulfamethoxazole Vancomycin   24 Blood culture from Peripheral Venipuncture Enterococcus faecalis         24 Blood culture from Peripheral Venipuncture Enterococcus faecalis  S  S  S  S  R  S   24 Blood culture from Peripheral Venipuncture Enterococcus faecalis              I/O last 3 completed shifts:  In: 480 (7.1 mL/kg) [P.O.:480]  Out: - (0 mL/kg)   Weight: 68 kg   I/O during current shift:  I/O this shift:  In: 400 [I.V.:400]  Out: -     Temp (24hrs), Av.6 °C (97.9 °F), Min:36.4 °C (97.5 °F), Max:37 °C (98.6 °F)      Assessment/Plan    Within goal random/trough level. Will give one dose of 750mg today after HD. Next level TBD  Will continue to monitor renal function daily while on vancomycin and order serum creatinine at least every 48 hours if not already ordered.  Follow for continued vancomycin needs, clinical response, and signs/symptoms of toxicity.       Sehll Powell, PharmD

## 2024-06-10 NOTE — PROGRESS NOTES
Report from Sending RN:    Report From: Frederic Bloom  Recent Surgery of Procedure: No  Baseline Level of Consciousness (LOC): AOx4  Oxygen Use: Yes  Type: NC  Diabetic: Yes  Last BP Med Given Day of Dialysis: See EMAR  Last Pain Med Given: See EMAR  Lab Tests to be Obtained with Dialysis: No  Blood Transfusion to be Given During Dialysis: No  Available IV Access: Yes  Medications to be Administered During Dialysis: No  Continuous IV Infusion Running: No  Restraints on Currently or in the Last 24 Hours: No  Hand-Off Communication: Pt stable and able to come to PACU for dialysis tx  Dialysis Catheter Dressing: NA  Last Dressing Change: NA

## 2024-06-10 NOTE — PROGRESS NOTES
Report to Receiving RN:    Report To: Frederic  Time Report Called: [id number]  Hand-Off Communication: pt took off 2.4 L of fluid post /68 HR 84  Complications During Treatment: No  Ultrafiltration Treatment: No  Medications Administered During Dialysis: No  Blood Products Administered During Dialysis: No  Labs Sent During Dialysis: No  Heparin Drip Rate Changes: No  Dialysis Catheter Dressing: NA  Last Dressing Change: NA    Electronic Signatures:  Vianey Nguyen RN (Signed )   Authored:    (Signed )   Authored:     Last Updated: 12:32 PM by VIANEY NGUYEN

## 2024-06-10 NOTE — CARE PLAN
TTE/MARION negative for endocarditis.    Plan will be 2 weeks total IV Vancomycin 750mg with HD from negative culture date. Stop date for abx would be 6/19/24. I don't need any labs as outpatient.    This can be done at home with HD if possible to be arranged. If not then would be at HD at a dialysis center.    Will sign off. Please call back with questions. Thanks!    Andrew Vizcarra MD  [facility]  Office #[phone]

## 2024-06-10 NOTE — PROCEDURES
"Seen on dialysis. Mr. Carreon had a complicated clinical course consistent with Enterococcus faecalis bacteremia with an unclear source.  CT showed no acute process.  MARION was negative for endocarditis.  2D echo when he presented showed an EF down to 20%.  He went for left heart catheterization which noted patency of coronary grafts on 6/7/2024.  Repeat cultures are without growth.  He remains on antimicrobial coverage with vancomycin.  He receives dialysis at home.  We will need to discuss the duration of treatment with ID.  He may possibly require an center dialysis while on antibiotics.  I have alerted the discharge team.  Otherwise, he is dialyzing on F1 60 kidney with a UF target of 2 L, 2K bath.  He is tolerating dialysis, continue per submitted orders.    /67 (Patient Position: Lying)   Pulse 80   Temp 36.9 °C (98.4 °F)   Resp 18   Ht 1.93 m (6' 4\")   Wt 68 kg (150 lb)   SpO2 95%   BMI 18.26 kg/m²     "

## 2024-06-10 NOTE — CARE PLAN
The patient's goals for the shift include      The clinical goals for the shift include pt remains safe throughout the shift    Over the shift, the patient did  make progress toward the following goals.

## 2024-06-10 NOTE — PROGRESS NOTES
Kentrell Carreon is a 53 y.o. male on day 9 of admission presenting with Hypoxia.    Subjective   Patient seen and examined, he is coming along fine denies any new complaints.  Remains afebrile.  Repeat blood cultures from June 6 were negative.  Transesophageal echocardiogram revealed no evidence of endocarditis or vegetation  Left heart catheterization revealed patent grafts to LAD and saphenous vein graft to RCA and circumflex  Transthoracic echocardiogram revealed an ejection fraction of 35 to 40%.  Patient is being continued on IV vancomycin for Enterococcus faecalis bacteremia on dialysis days, he remains afebrile and is ready for discharge once IV antibiotics arrangements can be made and I will discuss this with infectious disease, patient will need IV vancomycin for another 2 weeks after discharge.         Objective     Physical Exam  GENERAL:  Alert, no distress, cooperative  SKIN:  Skin color, texture, turgor normal. No rashes or lesions.  OROPHARYNX:  Lips, mucosa, and tongue are normal.Teeth and gums, normal. Oropharynx normal.  NECK:  No jugulovenous distention, No carotid bruits, Carotid pulse normal contour, Supple  LUNGS:  Lungs clear to auscultation. Good diaphragmatic excursion.  CARDIAC: Rate rhythm is regular, normal S1 and S2; no rubs,  or gallops, 3/6 systolic ejection murmur left sternal border and mitral and tricuspid holosystolic murmurs, no peripheral edema.  ABDOMEN:  Abdomen soft, non-tender, BS normal, No masses or organomegaly  EXTREMETIES:  Extremities normal, no deformities, edema, clubbing or skin discoloration. Good capillary refill., No ulcers  Right upper arm AV fistula with good bruit and thrill.  NEURO:  Alert, oriented X 3, Gait normal. Non-focal. Reflexes normal and symmetric. Sensation grossly intact., Cranial nerves II-XII intact  PULSES:  2+ radial, 2+ carotid  Last Recorded Vitals  Blood pressure 130/67, pulse 80, temperature 36.9 °C (98.4 °F), resp. rate 18, height 1.93 m (6'  "4\"), weight 68 kg (150 lb), SpO2 95%.  Intake/Output last 3 Shifts:  I/O last 3 completed shifts:  In: 480 (7.1 mL/kg) [P.O.:480]  Out: - (0 mL/kg)   Weight: 68 kg     Relevant Results  Scheduled medications  aspirin, 81 mg, oral, Daily  atorvastatin, 80 mg, oral, Daily  carvedilol, 25 mg, oral, BID  dextrose, 25 g, intravenous, Once  epoetin annita or biosimilar, 150 Units/kg, subcutaneous, Every Mon/Wed/Fri  heparin (porcine), 5,000 Units, subcutaneous, q8h SHANA  insulin glargine, 10 Units, subcutaneous, q12h  insulin lispro, 0-10 Units, subcutaneous, TID  insulin lispro, 4 Units, subcutaneous, TID  multivitamin with minerals, 1 tablet, oral, Daily  oxygen, , inhalation, Continuous - 02/gases  polyethylene glycol, 17 g, oral, Daily  sacubitriL-valsartan, 1 tablet, oral, BID  sevelamer carbonate, 800 mg, oral, TID  SITagliptin phosphate, 25 mg, oral, Daily  sodium bicarbonate, 50 mEq, intravenous, Once  vancomycin, 750 mg, intravenous, Once      Continuous medications     PRN medications  PRN medications: acetaminophen, dextrose, glucagon, glucagon, vancomycin      Results for orders placed or performed during the hospital encounter of 05/31/24 (from the past 96 hour(s))   POCT GLUCOSE   Result Value Ref Range    POCT Glucose 225 (H) 74 - 99 mg/dL   CBC and Auto Differential   Result Value Ref Range    WBC 5.5 4.4 - 11.3 x10*3/uL    nRBC 0.0 0.0 - 0.0 /100 WBCs    RBC 2.42 (L) 4.50 - 5.90 x10*6/uL    Hemoglobin 7.9 (L) 13.5 - 17.5 g/dL    Hematocrit 24.8 (L) 41.0 - 52.0 %     (H) 80 - 100 fL    MCH 32.6 26.0 - 34.0 pg    MCHC 31.9 (L) 32.0 - 36.0 g/dL    RDW 14.7 (H) 11.5 - 14.5 %    Platelets 260 150 - 450 x10*3/uL    Neutrophils % 89.5 40.0 - 80.0 %    Immature Granulocytes %, Automated 0.7 0.0 - 0.9 %    Lymphocytes % 6.5 13.0 - 44.0 %    Monocytes % 2.7 2.0 - 10.0 %    Eosinophils % 0.2 0.0 - 6.0 %    Basophils % 0.4 0.0 - 2.0 %    Neutrophils Absolute 4.95 1.20 - 7.70 x10*3/uL    Immature Granulocytes " Absolute, Automated 0.04 0.00 - 0.70 x10*3/uL    Lymphocytes Absolute 0.36 (L) 1.20 - 4.80 x10*3/uL    Monocytes Absolute 0.15 0.10 - 1.00 x10*3/uL    Eosinophils Absolute 0.01 0.00 - 0.70 x10*3/uL    Basophils Absolute 0.02 0.00 - 0.10 x10*3/uL   Magnesium   Result Value Ref Range    Magnesium 2.20 1.60 - 2.40 mg/dL   Renal Function Panel   Result Value Ref Range    Glucose 397 (H) 74 - 99 mg/dL    Sodium 127 (L) 136 - 145 mmol/L    Potassium 6.8 (HH) 3.5 - 5.3 mmol/L    Chloride 88 (L) 98 - 107 mmol/L    Bicarbonate 23 21 - 32 mmol/L    Anion Gap 23 (H) 10 - 20 mmol/L    Urea Nitrogen 51 (H) 6 - 23 mg/dL    Creatinine 9.74 (H) 0.50 - 1.30 mg/dL    eGFR 6 (L) >60 mL/min/1.73m*2    Calcium 9.3 8.6 - 10.3 mg/dL    Phosphorus 5.7 (H) 2.5 - 4.9 mg/dL    Albumin 3.9 3.4 - 5.0 g/dL   Vancomycin   Result Value Ref Range    Vancomycin 15.4 5.0 - 20.0 ug/mL   POCT GLUCOSE   Result Value Ref Range    POCT Glucose 262 (H) 74 - 99 mg/dL   POCT GLUCOSE   Result Value Ref Range    POCT Glucose 193 (H) 74 - 99 mg/dL   Basic Metabolic Panel   Result Value Ref Range    Glucose 192 (H) 74 - 99 mg/dL    Sodium 136 136 - 145 mmol/L    Potassium 4.3 3.5 - 5.3 mmol/L    Chloride 93 (L) 98 - 107 mmol/L    Bicarbonate 24 21 - 32 mmol/L    Anion Gap 23 (H) 10 - 20 mmol/L    Urea Nitrogen 25 (H) 6 - 23 mg/dL    Creatinine 4.88 (H) 0.50 - 1.30 mg/dL    eGFR 14 (L) >60 mL/min/1.73m*2    Calcium 9.7 8.6 - 10.3 mg/dL   POCT GLUCOSE   Result Value Ref Range    POCT Glucose 278 (H) 74 - 99 mg/dL   POCT GLUCOSE   Result Value Ref Range    POCT Glucose 451 (H) 74 - 99 mg/dL   CBC and Auto Differential   Result Value Ref Range    WBC 6.1 4.4 - 11.3 x10*3/uL    nRBC 0.0 0.0 - 0.0 /100 WBCs    RBC 2.43 (L) 4.50 - 5.90 x10*6/uL    Hemoglobin 8.0 (L) 13.5 - 17.5 g/dL    Hematocrit 25.8 (L) 41.0 - 52.0 %     (H) 80 - 100 fL    MCH 32.9 26.0 - 34.0 pg    MCHC 31.0 (L) 32.0 - 36.0 g/dL    RDW 15.4 (H) 11.5 - 14.5 %    Platelets 319 150 - 450  x10*3/uL    Neutrophils % 82.4 40.0 - 80.0 %    Immature Granulocytes %, Automated 0.5 0.0 - 0.9 %    Lymphocytes % 5.5 13.0 - 44.0 %    Monocytes % 11.6 2.0 - 10.0 %    Eosinophils % 0.0 0.0 - 6.0 %    Basophils % 0.0 0.0 - 2.0 %    Neutrophils Absolute 5.05 1.20 - 7.70 x10*3/uL    Immature Granulocytes Absolute, Automated 0.03 0.00 - 0.70 x10*3/uL    Lymphocytes Absolute 0.34 (L) 1.20 - 4.80 x10*3/uL    Monocytes Absolute 0.71 0.10 - 1.00 x10*3/uL    Eosinophils Absolute 0.00 0.00 - 0.70 x10*3/uL    Basophils Absolute 0.00 0.00 - 0.10 x10*3/uL   Magnesium   Result Value Ref Range    Magnesium 2.30 1.60 - 2.40 mg/dL   Renal Function Panel   Result Value Ref Range    Glucose 799 (HH) 74 - 99 mg/dL    Sodium 125 (L) 136 - 145 mmol/L    Potassium 5.6 (H) 3.5 - 5.3 mmol/L    Chloride 86 (L) 98 - 107 mmol/L    Bicarbonate 23 21 - 32 mmol/L    Anion Gap 22 (H) 10 - 20 mmol/L    Urea Nitrogen 57 (H) 6 - 23 mg/dL    Creatinine 6.79 (H) 0.50 - 1.30 mg/dL    eGFR 9 (L) >60 mL/min/1.73m*2    Calcium 9.1 8.6 - 10.3 mg/dL    Phosphorus 7.2 (H) 2.5 - 4.9 mg/dL    Albumin 4.0 3.4 - 5.0 g/dL   POCT GLUCOSE   Result Value Ref Range    POCT Glucose >600 (H) 74 - 99 mg/dL   POCT GLUCOSE   Result Value Ref Range    POCT Glucose >600 (H) 74 - 99 mg/dL   Glucose, random   Result Value Ref Range    Glucose 766 (HH) 74 - 99 mg/dL   POCT GLUCOSE   Result Value Ref Range    POCT Glucose >600 (H) 74 - 99 mg/dL   POCT GLUCOSE   Result Value Ref Range    POCT Glucose 516 (H) 74 - 99 mg/dL   POCT GLUCOSE   Result Value Ref Range    POCT Glucose 356 (H) 74 - 99 mg/dL   Transthoracic Echo (TTE) Limited   Result Value Ref Range    LV Biplane EF 50 %    Tricuspid annular plane systolic excursion 1.3 cm    LV GLS 8.7 %    LVIDd 5.75 cm    RVSP 35.1 mmHg    LV A4C EF 48.3    POCT GLUCOSE   Result Value Ref Range    POCT Glucose 231 (H) 74 - 99 mg/dL   POCT GLUCOSE   Result Value Ref Range    POCT Glucose 228 (H) 74 - 99 mg/dL   POCT GLUCOSE   Result  Value Ref Range    POCT Glucose 90 74 - 99 mg/dL   POCT GLUCOSE   Result Value Ref Range    POCT Glucose 138 (H) 74 - 99 mg/dL   CBC and Auto Differential   Result Value Ref Range    WBC 7.4 4.4 - 11.3 x10*3/uL    nRBC 0.3 (H) 0.0 - 0.0 /100 WBCs    RBC 2.63 (L) 4.50 - 5.90 x10*6/uL    Hemoglobin 8.4 (L) 13.5 - 17.5 g/dL    Hematocrit 26.7 (L) 41.0 - 52.0 %     (H) 80 - 100 fL    MCH 31.9 26.0 - 34.0 pg    MCHC 31.5 (L) 32.0 - 36.0 g/dL    RDW 15.4 (H) 11.5 - 14.5 %    Platelets 326 150 - 450 x10*3/uL    Neutrophils % 68.8 40.0 - 80.0 %    Immature Granulocytes %, Automated 1.0 (H) 0.0 - 0.9 %    Lymphocytes % 16.6 13.0 - 44.0 %    Monocytes % 12.4 2.0 - 10.0 %    Eosinophils % 0.8 0.0 - 6.0 %    Basophils % 0.4 0.0 - 2.0 %    Neutrophils Absolute 5.07 1.20 - 7.70 x10*3/uL    Immature Granulocytes Absolute, Automated 0.07 0.00 - 0.70 x10*3/uL    Lymphocytes Absolute 1.22 1.20 - 4.80 x10*3/uL    Monocytes Absolute 0.91 0.10 - 1.00 x10*3/uL    Eosinophils Absolute 0.06 0.00 - 0.70 x10*3/uL    Basophils Absolute 0.03 0.00 - 0.10 x10*3/uL   Magnesium   Result Value Ref Range    Magnesium 2.20 1.60 - 2.40 mg/dL   Renal Function Panel   Result Value Ref Range    Glucose 159 (H) 74 - 99 mg/dL    Sodium 131 (L) 136 - 145 mmol/L    Potassium 4.6 3.5 - 5.3 mmol/L    Chloride 90 (L) 98 - 107 mmol/L    Bicarbonate 25 21 - 32 mmol/L    Anion Gap 21 (H) 10 - 20 mmol/L    Urea Nitrogen 82 (H) 6 - 23 mg/dL    Creatinine 8.88 (H) 0.50 - 1.30 mg/dL    eGFR 7 (L) >60 mL/min/1.73m*2    Calcium 8.6 8.6 - 10.3 mg/dL    Phosphorus 6.2 (H) 2.5 - 4.9 mg/dL    Albumin 3.3 (L) 3.4 - 5.0 g/dL   POCT GLUCOSE   Result Value Ref Range    POCT Glucose 172 (H) 74 - 99 mg/dL   POCT GLUCOSE   Result Value Ref Range    POCT Glucose 106 (H) 74 - 99 mg/dL   POCT GLUCOSE   Result Value Ref Range    POCT Glucose 164 (H) 74 - 99 mg/dL   POCT GLUCOSE   Result Value Ref Range    POCT Glucose 233 (H) 74 - 99 mg/dL   CBC and Auto Differential   Result  Value Ref Range    WBC 8.0 4.4 - 11.3 x10*3/uL    nRBC 0.3 (H) 0.0 - 0.0 /100 WBCs    RBC 2.83 (L) 4.50 - 5.90 x10*6/uL    Hemoglobin 9.0 (L) 13.5 - 17.5 g/dL    Hematocrit 28.6 (L) 41.0 - 52.0 %     (H) 80 - 100 fL    MCH 31.8 26.0 - 34.0 pg    MCHC 31.5 (L) 32.0 - 36.0 g/dL    RDW 15.7 (H) 11.5 - 14.5 %    Platelets 344 150 - 450 x10*3/uL    Neutrophils % 68.4 40.0 - 80.0 %    Immature Granulocytes %, Automated 0.9 0.0 - 0.9 %    Lymphocytes % 16.1 13.0 - 44.0 %    Monocytes % 12.7 2.0 - 10.0 %    Eosinophils % 1.4 0.0 - 6.0 %    Basophils % 0.5 0.0 - 2.0 %    Neutrophils Absolute 5.46 1.20 - 7.70 x10*3/uL    Immature Granulocytes Absolute, Automated 0.07 0.00 - 0.70 x10*3/uL    Lymphocytes Absolute 1.28 1.20 - 4.80 x10*3/uL    Monocytes Absolute 1.01 (H) 0.10 - 1.00 x10*3/uL    Eosinophils Absolute 0.11 0.00 - 0.70 x10*3/uL    Basophils Absolute 0.04 0.00 - 0.10 x10*3/uL   Magnesium   Result Value Ref Range    Magnesium 2.40 1.60 - 2.40 mg/dL   Renal Function Panel   Result Value Ref Range    Glucose 218 (H) 74 - 99 mg/dL    Sodium 131 (L) 136 - 145 mmol/L    Potassium 4.8 3.5 - 5.3 mmol/L    Chloride 90 (L) 98 - 107 mmol/L    Bicarbonate 24 21 - 32 mmol/L    Anion Gap 22 (H) 10 - 20 mmol/L    Urea Nitrogen 99 (HH) 6 - 23 mg/dL    Creatinine 10.53 (H) 0.50 - 1.30 mg/dL    eGFR 5 (L) >60 mL/min/1.73m*2    Calcium 8.5 (L) 8.6 - 10.3 mg/dL    Phosphorus 6.6 (H) 2.5 - 4.9 mg/dL    Albumin 3.4 3.4 - 5.0 g/dL   Vancomycin   Result Value Ref Range    Vancomycin 17.9 5.0 - 20.0 ug/mL   POCT GLUCOSE   Result Value Ref Range    POCT Glucose 214 (H) 74 - 99 mg/dL    Electrocardiogram, 12-lead PRN ACS symptoms    Result Date: 6/9/2024  Normal sinus rhythm Possible Left atrial enlargement Left ventricular hypertrophy with QRS widening ( Sokolow-Márquez , Willis product ) Marked T wave abnormality, consider inferolateral ischemia Abnormal ECG When compared with ECG of 26-MAR-2024 02:50, Questionable change in QRS axis ST  now depressed in Inferior leads T wave inversion now evident in Inferior leads See ED provider note for full interpretation and clinical correlation Confirmed by Ana Garcia ([id number]) on 6/9/2024 10:08:03 AM    Transthoracic Echo (TTE) Limited    Result Date: 6/8/2024   [facility], [address] [place] [address]              Tel [phone] and Fax [phone] TRANSTHORACIC ECHOCARDIOGRAM REPORT  Patient Name:      LUNA Lowe Physician:    [id number] Jarett Walsh MD Study Date:        6/8/2024             Ordering Provider:    [id number] MADDIE DUGAN MRN/PID:           [id number]             Fellow: Accession#:        [id number]         Nurse: Date of Birth/Age: 1970 / 53 years Sonographer:          Marlee Mejía RDCS Gender:            M                    Additional Staff: Height:            193.00 cm            Admit Date:           6/8/2024 Weight:            68.00 kg             Admission Status:     Inpatient -                                                               Routine BSA / BMI:         1.96 m2 / 18.26      Encounter#:           [id number]                    kg/m2                                         Department Location:  [facility] Blood Pressure: 142 /79 mmHg Study Type:    TRANSTHORACIC ECHO (TTE) LIMITED Diagnosis/ICD: Cardiomyopathy, unspecified-I42.9; Heart failure,                unspecified-I50.9 Indication:    Cardiomyopathy, heart failure CPT Code:      Echo Limited-25258; Doppler Limited-05035; Color Doppler-62101;                Myocardial Strain Imaging-00635 Patient History: Diabetes:          Yes Pertinent History: CAD, Cardiomyopathy and HTN. s/p CABG x3. Study Detail: The following Echo studies were performed: 2D, M-Mode, Doppler,               color flow and Strain.  PHYSICIAN INTERPRETATION: Left Ventricle:  The left ventricular systolic function is mildly to moderately decreased, with an estimated ejection fraction of 35-40%. There is global hypokinesis of the left ventricle with minor regional variations. The left ventricular cavity size is normal. Left Ventricular Global Longitudinal Strain - 8.7 %. Spectral Doppler shows an impaired relaxation pattern of left ventricular diastolic filling. Left Atrium: The left atrium is moderately dilated. Right Ventricle: The right ventricle is normal in size. There is normal right ventricular global systolic function. Right Atrium: The right atrium is mild to moderately dilated. Aortic Valve: The aortic valve appears structurally normal. There is no evidence of aortic valve regurgitation. Mitral Valve: The mitral valve is mildly thickened. There is mild mitral valve regurgitation. Tricuspid Valve: The tricuspid valve is structurally normal. There is moderate tricuspid regurgitation. Pulmonic Valve: The pulmonic valve is structurally normal. There is mild to moderate pulmonic valve regurgitation. Pericardium: There is a trivial to small pericardial effusion. Aorta: The aortic root is normal. Pulmonary Artery: The tricuspid regurgitant velocity is 2.83 m/s, and with an estimated right atrial pressure of 3 mmHg, the estimated pulmonary artery pressure is mildly elevated with the RVSP at 35.1 mmHg. Systemic Veins: The inferior vena cava appears to be of normal size. There is IVC inspiratory collapse greater than 50%. In comparison to the previous echocardiogram(s): Compared with study from 6/5/2024, there is an improvement in calculated LVEF.  Onco-Cardiology Measurements: Current Measurements Global Longitudinal Strain (GLS) -8.7 GLS Tracking Quality:  CONCLUSIONS:  1. Left ventricular systolic function is mildly to moderately decreased with a 35-40% estimated ejection fraction.  2. Left Ventricular Global Longitudinal Strain - 8.7 % with more pronounced abnormalities involving the  basal and mid segments with preservation of the apex more consistent with a possible infiltrative cardiomyopathy.  3. There is global hypokinesis of the left ventricle with minor regional variations.  4. Spectral Doppler shows an impaired relaxation pattern of left ventricular diastolic filling.  5. The left atrium is moderately dilated.  6. The right atrium is mild to moderately dilated.  7. Moderate tricuspid regurgitation visualized.  8. Compared with study from 2024, there is an improvement in calculated LVEF. QUANTITATIVE DATA SUMMARY: 2D MEASUREMENTS:                           Normal Ranges: IVSd:          1.00 cm    (0.6-1.1cm) LVPWd:         1.10 cm    (0.6-1.1cm) LVIDd:         5.75 cm    (3.9-5.9cm) LVIDs:         4.50 cm LV Mass Index: 124.8 g/m2 LV % FS        21.8 % LV SYSTOLIC FUNCTION BY 2D PLANIMETRY (MOD):                                         Normal Ranges: EF-A4C View:                      34 %  (>=55%) EF-A2C View:                      38 % EF-Biplane:                       36 % Global Longitudinal Strain (GLS): 8.7 % LV DIASTOLIC FUNCTION:                               Normal Ranges: MV lateral e'     0.11 m/s MV medial e'      0.03 m/s PulmV Sys Shayne:    26.28 cm/s PulmV Cohen Shayne:   29.43 cm/s PulmV S/D Shayne:    0.89 PulmV A Revs Shayne: 29.33 cm/s PulmV A Revs Dur: 119.89 msec  RIGHT VENTRICLE: TAPSE: 13.0 mm TRICUSPID VALVE/RVSP:                             Normal Ranges: Peak TR Velocity: 2.83 m/s RV Syst Pressure: 35.1 mmHg (< 30mmHg) IVC Diam:         1.68 cm PULMONIC VALVE:                         Normal Ranges: PV Accel Time: 80 msec  (>120ms) PV Max Shayne:    1.3 m/s  (0.6-0.9m/s) PV Max P.1 mmHg Pulmonary Veins: PulmV A Revs Dur: 119.89 msec PulmV A Revs Shayne: 29.33 cm/s PulmV Cohen Shayne:   29.43 cm/s PulmV S/D Shayne:    0.89 PulmV Sys Shayne:    26.28 cm/s  [id number] Jarett Walsh MD Electronically signed on 2024 at 4:42:36 PM  ** Final **     Transesophageal Echo (MARION)    Result Date:  6/7/2024   [facility], [address] [place] [address]              Tel [phone] and Fax [phone] TRANSESOPHAGEAL ECHOCARDIOGRAM REPORT  Patient Name:      LUNA Lowe Physician:    [id number] Ignacio Ibrahim MD Study Date:        6/7/2024            Ordering Provider:    [id number] STANLEY GLORIA MRN/PID:           [id number]            Fellow: Accession#:        [id number]        Nurse:                Feng Whalen RN Date of Birth/Age: 1970 / 53      Sonographer:          Shama Montes RDCS                    years Gender:            M                   Additional Staff:     Melissa Ann CRNA Height:            193.04 cm           Admit Date:           5/31/2024 Weight:            68.04 kg            Admission Status:     Inpatient -                                                              Routine BSA / BMI:         1.96 m2 / 18.26     Encounter#:           [id number]                    kg/m2                                        Department Location:  [facility] Cath                                                              Lab Blood Pressure: 133 /62 mmHg Study Type:    TRANSESOPHAGEAL ECHO (MARION) Diagnosis/ICD: Bacteremia-R78.81 Indication:    R/o Endocarditis CPT Code:      MARION Complete-52181; Doppler Limited-62806; Color Doppler-98580 Patient History: Allergies:         Iodinated contrast media, Ampicillin-Sulbactam, [place]. Smoker:            Former. Diabetes:          Yes Insulin Pertinent History: CAD, CHF, HTN and Cardiomyopathy. 53 y.o. male presents for                    MARION for bacteremia. Study Detail: The following Echo studies were performed: 2D. Agitated saline               used as a contrast agent for intraseptal flow evaluation. Total               contrast  used for this procedure was 18 mL via IV push. Patient's               heart rhythm is normal sinus rhythm. The patient was sedated.  PHYSICIAN INTERPRETATION: MARION Details: The MARION probe used was 5177. Technically adequate omniplane transesophageal echocardiogram performed. MARION Medication: The pharynx was anesthetized with Cetacaine spray and viscous lidocaine. The patient was sedated by Anesthesia; please refer to anesthesia flow sheet for medications used. MARION Procedure: The probe was passed without difficulty. Left Ventricle: The left ventricular systolic function is moderately decreased, with an estimated ejection fraction of 35-40%. There is global hypokinesis of the left ventricle with minor regional variations. The left ventricular cavity size was not assessed. Left ventricular diastolic filling was not assessed. Left Atrium: The left atrium is severely dilated. A bubble study using agitated saline was performed. Bubble study is negative. There is evidence of an atrial septal aneurysm. The left atrial appendage Doppler velocities are reduced and there is no thrombus visualized in the left atrial appendage. Right Ventricle: The right ventricle was not assessed. There is reduced right ventricular systolic function. Right Atrium: The right atrium was not assessed. Aortic Valve: The aortic valve is trileaflet. There is There is reduced systolic aortic valve leaflet excursion. There is mild aortic valve regurgitation. The degree of aortic stenosis was not fully assessed (HUMPHREY >2 cm^2 by planimetry). Mitral Valve: The mitral valve is normal in structure. There is mild mitral valve regurgitation. Tricuspid Valve: The tricuspid valve is structurally normal. There is mild tricuspid regurgitation. Pulmonic Valve: The pulmonic valve is structurally normal. There is no indication of pulmonic valve regurgitation. Pericardium: There is no pericardial effusion noted. Aorta: The aortic root is normal. In comparison to the  previous echocardiogram(s): Compared with study from 6/5/2024, the ejection fraction appears somewhat improved presently.  CONCLUSIONS:  1. Left ventricular systolic function is moderately decreased with a 35-40% estimated ejection fraction.  2. No definite valvular vegetations were visualized.  3. There is reduced right ventricular systolic function.  4. The left atrium is severely dilated.  5. Mild aortic valve regurgitation.  6. Atrial septal aneurysm present.  7. There is global hypokinesis of the left ventricle with minor regional variations. QUANTITATIVE DATA SUMMARY:  [id number] Ignacio Ibrahim MD Electronically signed on 6/7/2024 at 5:19:53 PM  ** Final **     Cardiac Catheterization Procedure    Result Date: 6/7/2024   [facility], Cath Lab, [address] [place] [address] Cardiovascular Catheterization Report Patient Name:     LUNA FINCH        Performing Physician:  [id number] Horner MD Study Date:       6/7/2024            Verifying Physician:   Kaity Horner MD MRN/PID:          [id number]            Cardiologist/Co-Scrub: Accession#:       [id number]        Ordering Provider:     [id number] STANLEY GLORIA Date of           1970 / 53      Cardiologist: Birth/Age:        years Gender:           M                   Fellow: Encounter#:       [id number]          Surgeon:  Study: Left Heart Cath  Indications: LUNA FINCH is a 54 year old male who presents with end stage renal disease on dialysis, prior coronary artery bypass graft surgery, prior percutaneous coronary intervention and an asymptomatic chest pain assessment. Cardiomyopathy.  Procedure Description: After infiltration with 2% Lidocaine, the right femoral artery was cannulated with a modified Seldinger technique. Subsequently a 5 Vietnamese sheath  was placed in the right femoral artery. Selective coronary catheterization was performed using a 5 Fr catheter(s) exchanged over a guide wire to cannulate the coronary arteries. A JL 4 tip catheter was used for left coronary injections. A JR 4 tip catheter was used for right coronary injections. Additional catheter(s) used to visualize the coronary arteries were: IM. Multiple injections of contrast were made into the left and right coronary arteries with angiograms recorded in multiple projections. After completion of the procedure, femoral artery angiography was performed. This demonstrated a common femoral artery puncture appropriate for closure. A Vascade 5F vascular closure device was placed per protocol.  Coronary Angiography: The coronary circulation is co-dominant.  Left Main Coronary Artery: The left main coronary artery is a normal caliber vessel. The left main arises normally from the left coronary sinus of Valsalva and bifurcates into the LAD and circumflex coronary arteries. The mid to distal left main coronary artery showed 95%.  Left Anterior Descending Coronary Artery Distribution: The left anterior descending coronary artery is a normal caliber vessel. The LAD arises normally from the left main coronary artery. The proximal left anterior descending coronary artery showed .  Circumflex Coronary Artery Distribution: The circumflex coronary artery is a normal caliber vessel. The circumflex arises normally from the left main coronary artery and terminates in the AV groove. The proximal circumflex coronary artery showed .  Right Coronary Artery Distribution: The right coronary artery is a normal caliber vessel. The RCA arises normally from the right sinus of Valsalva. The entire right coronary artery showed diffuse moderate disease.  Coronary Grafts:  LIMA Graft: Left internal mammary artery graft conduit, originating in situ and attached to the mid left anterior descending, is patent. Saphaneous  Vein Graft(s): The saphenous vein graft, originating from the aorta and attached to the 1st obtuse marginal, is patent. The 2nd saphenous vein graft, originating from the aorta and attached to the right posterior descending artery, is patent.  Coronary Lesion Summary: Vessel             Stenosis         Vessel Segment Left Main            95%            mid to distal LAD                                 proximal Circumflex                          proximal RCA        diffuse moderate disease     entire  Graft Stenosis Summary: Graft       Destination of Graft LIMA  mid left anterior descending SVG 1 1st obtuse marginal SVG 2 right posterior descending artery  Complications: No in-lab complications observed.  Cardiac Cath Post Procedure Notes: Post Procedure Diagnosis: See below. Blood Loss:               Estimated blood loss during the procedure was 5 mls. Specimens Removed:        Number of specimen(s) removed: none. ____________________________________________________________________________________ CONCLUSIONS:  1. Native coronaries: Critical distal left main disease, proximal LCx , proximal LAD , and moderate RCA disease in co-dominant system.  2. Bypasses: Patent LIMA to LAD which fills back the whole LAD system including both diagonals, patent SVG to OM1 which fills back the whole codominant LCx system, and patent SVG to right PDA.  3. Further management as per inpatient cardiology team. ICD 10 Codes: Other cardiomyopathies-I42.8  CPT Codes: Left Heart Cath Bypass Graft w ventriculography and coronary angio(LHC)-49302; Moderate Sedation Services 1st additional 15 minutes patient >5 years-53932; Ultrasound guidance for vascular access-54936  [id number] Valerie Horner MD Performing Physician Electronically signed by [id number] Valerie Horner MD on 6/7/2024 at 4:22:01 PM  ** Final **     Transthoracic Echo (TTE) Complete    Result Date: 6/5/2024   [facility], [address] [place] [address]               Tel [phone] and Fax [phone] TRANSTHORACIC ECHOCARDIOGRAM REPORT  Patient Name:      LUAN FINCH         Reading Physician:    [id number] Ignacio Ibrahim MD Study Date:        6/5/2024             Ordering Provider:    [id number] LIZZHARDIK KNOTT MRN/PID:           [id number]             Fellow: Accession#:        [id number]         Nurse: Date of Birth/Age: 1970 / 53 years Sonographer:          Shama Montes RDCS Gender:            M                    Additional Staff: Height:            193.00 cm            Admit Date:           5/31/2024 Weight:            68.00 kg             Admission Status:     Inpatient -                                                               Routine BSA / BMI:         1.96 m2 / 18.26      Encounter#:           [id number]                    kg/m2                                         Department Location:  [facility] Non                                                               Invasive Blood Pressure: 141 /67 mmHg Study Type:    TRANSTHORACIC ECHO (TTE) COMPLETE Diagnosis/ICD: Bacteremia-R78.81 Indication:    Bacteremia CPT Code:      Echo Complete w Full Doppler-71075 Patient History: Diabetes:          Yes Pertinent History: CAD, Chest Pain, HTN and CHF. ESRD, Hypoxia. Study Detail: The following Echo studies were performed: 2D, M-Mode, Doppler and               color flow.  PHYSICIAN INTERPRETATION: Left Ventricle: The left ventricular systolic function is severely decreased, with an estimated ejection fraction of 20%. There is global hypokinesis of the left ventricle with minor regional variations. The left ventricular cavity size is upper limits of normal. There is eccentric left ventricular hypertrophy. Left Ventricular Global Longitudinal Strain - 6.3 %. Spectral Doppler shows an abnormal pattern of left ventricular diastolic filling. Left  Atrium: The left atrium is severely dilated. Right Ventricle: The right ventricle is moderately enlarged. There is reduced right ventricular systolic function. Right Atrium: The right atrium is normal in size. Aortic Valve: The aortic valve is trileaflet. There is moderate aortic valve cusp calcification. There is reduced aortic valve non coronary cusp excursion. There is evidence of moderate aortic valve stenosis. There is mild aortic valve regurgitation. The peak instantaneous gradient of the aortic valve is 37.2 mmHg. The mean gradient of the aortic valve is 19.7 mmHg. Mitral Valve: The mitral valve is normal in structure. The mitral valve spectral Doppler A-wave is absent, consistent with atrial fibrillation. There is mild to moderate mitral annular calcification. There is mild to moderate mitral valve regurgitation. Tricuspid Valve: The tricuspid valve is structurally normal. There is mild tricuspid regurgitation. The Doppler estimated RVSP is mildly elevated at 44.5 mmHg. Pulmonic Valve: The pulmonic valve is structurally normal. There is mild pulmonic valve regurgitation. Pericardium: There is a trivial pericardial effusion. Aorta: The aortic root is normal. Systemic Veins: The inferior vena cava appears dilated. There is IVC inspiratory collapse greater than 50%. In comparison to the previous echocardiogram(s): Compared with study from 3/6/2024, the ejection fraction is comparatively reduced but was somewhat overestimated previously. There is a greater degree of aortic stenosis presently but this was underestimated previously.  CONCLUSIONS:  1. Left ventricular systolic function is severely decreased with a 20% estimated ejection fraction.  2. No definite valvular vegetations were visualized.  3. Spectral Doppler shows an abnormal pattern of left ventricular diastolic filling.  4. There is eccentric left ventricular hypertrophy.  5. Moderately enlarged right ventricle.  6. There is reduced right ventricular  systolic function.  7. The left atrium is severely dilated.  8. Absent A-wave on MV spectral Doppler tracing, consistent with atrial fibrillation.  9. Mild to moderate mitral valve regurgitation. 10. Mildly elevated RVSP. 11. Moderate aortic valve stenosis. 12. There is moderate aortic valve cusp calcification. 13. Mild aortic valve regurgitation. 14. There is global hypokinesis of the left ventricle with minor regional variations. QUANTITATIVE DATA SUMMARY: 2D MEASUREMENTS:                           Normal Ranges: LAs:           4.98 cm    (2.7-4.0cm) IVSd:          1.08 cm    (0.6-1.1cm) LVPWd:         1.06 cm    (0.6-1.1cm) LVIDd:         5.73 cm    (3.9-5.9cm) LVIDs:         4.81 cm LV Mass Index: 127.2 g/m2 LV % FS        16.1 % LA VOLUME:                               Normal Ranges: LA Vol A4C:        115.9 ml   (22+/-6mL/m2) LA Vol A2C:        131.8 ml LA Vol BP:         129.3 ml LA Vol Index A4C:  59.2ml/m2 LA Vol Index A2C:  67.2 ml/m2 LA Vol Index BP:   66.0 ml/m2 LA Area A4C:       30.9 cm2 LA Area A2C:       31.5 cm2 LA Major Axis A4C: 7.0 cm LA Major Axis A2C: 6.4 cm LA Volume Index:   65.3 ml/m2 LA Vol A4C:        111.1 ml LA Vol A2C:        127.7 ml RA VOLUME BY A/L METHOD:                               Normal Ranges: RA Vol A4C:        85.8 ml    (8.3-19.5ml) RA Vol Index A4C:  43.8 ml/m2 RA Area A4C:       26.2 cm2 RA Major Axis A4C: 6.8 cm M-MODE MEASUREMENTS:                  Normal Ranges: Ao Root: 3.10 cm (2.0-3.7cm) LAs:     4.84 cm (2.7-4.0cm) AORTA MEASUREMENTS:                      Normal Ranges: Ao Sinus, d: 2.90 cm (2.1-3.5cm) Ao STJ, d:   2.20 cm (1.7-3.4cm) Asc Ao, d:   3.00 cm (2.1-3.4cm) LV SYSTOLIC FUNCTION BY 2D PLANIMETRY (MOD):                                          Normal Ranges: EF-A4C View:                      29.0 % (>=55%) EF-A2C View:                      43.8 % EF-Biplane:                       39.1 % Global Longitudinal Strain (GLS): 6.3 % LV DIASTOLIC FUNCTION:                         Normal Ranges: MV Peak E:    1.37 m/s (0.7-1.2 m/s) MV e'         0.17 m/s (>8.0) MV lateral e' 0.17 m/s MV medial e'  0.10 m/s E/e' Ratio:   8.06     (<8.0) MITRAL VALVE:                      Normal Ranges: MV Vmax:    1.52 m/s (<=1.3m/s) MV peak P.2 mmHg (<5mmHg) MV mean P.1 mmHg (<2mmHg) MV VTI:     23.83 cm (10-13cm) MITRAL INSUFFICIENCY:                         Normal Ranges: MR VTI:     130.99 cm MR Vmax:    486.88 cm/s MR Volume:  13.47 ml MR Flow Rt: 50.06 ml/s MR EROA:    0.10 cm2 AORTIC VALVE:                                    Normal Ranges: AoV Vmax:                3.05 m/s  (<=1.7m/s) AoV Peak P.2 mmHg (<20mmHg) AoV Mean P.7 mmHg (1.7-11.5mmHg) LVOT Max Shayne:            1.20 m/s  (<=1.1m/s) AoV VTI:                 51.47 cm  (18-25cm) LVOT VTI:                21.65 cm LVOT Diameter:           2.01 cm   (1.8-2.4cm) AoV Area, VTI:           1.34 cm2  (2.5-5.5cm2) AoV Area,Vmax:           1.25 cm2  (2.5-4.5cm2) AoV Dimensionless Index: 0.42 AORTIC INSUFFICIENCY: AI Vmax:       3.21 m/s AI Half-time:  349 msec AI Decel Time: 1203 msec AI Decel Rate: 266.64 cm/s2  RIGHT VENTRICLE: RV Basal 4.80 cm RV Mid   3.00 cm RV Major 9.9 cm TAPSE:   12.0 mm RV s'    0.09 m/s TRICUSPID VALVE/RVSP:                             Normal Ranges: Peak TR Velocity: 3.02 m/s Est. RA Pressure: 8 mmHg RV Syst Pressure: 44.5 mmHg (< 30mmHg) IVC Diam:         2.23 cm PULMONIC VALVE:                      Normal Ranges: RVOT Vmax:  0.51 m/s (0.6-0.9m/s) PV Max Shayne: 1.2 m/s  (0.6-0.9m/s) PV Max P.6 mmHg AORTA: Asc Ao Diam 2.99 cm  [id number] Ignacio Ibrahim MD Electronically signed on 2024 at 4:50:52 PM  ** Final **     CT abdomen pelvis wo IV contrast    Result Date: 2024  Interpreted By:  Ketty Ramirez, STUDY: CT ABDOMEN PELVIS WO IV CONTRAST;  2024 4:08 am   INDICATION: Signs/Symptoms:bacteremia  source?.   COMPARISON: 2024   ACCESSION NUMBER(S): [id number]    ORDERING CLINICIAN: GREG CALDERA   TECHNIQUE: CT of the abdomen and pelvis was performed. Sagittal and coronal reconstructions were generated.  No intravenous contrast given for the exam.   FINDINGS: Solid organ and vessel evaluation limited without IV contrast.   ABDOMINAL ORGANS:   LIVER: No focal lesion within limits of unenhanced exam prominent vascular calcifications near the hilum.   GALL BLADDER AND BILIARY TREE: No calcified gallstone. Gallbladder is distended. Questionable trace pericholecystic fluid.   SPLEEN: Stable isodense presumed splenule adjacent to the anterior pole.   PANCREAS: No focal lesion within limits of unenhanced exam   ADRENALS: No adrenal mass   KIDNEYS AND URETERS: Atrophic with prominent vascular calcifications and small rounded hypodensities in each kidney similar to the prior exam.   BOWEL: Questionable distal esophageal wall thickening. Stomach is mildly distended with debris and air. No abnormally dilated small bowel loops. Moderate fecal residue in the proximal colon. Rectum is distended with gas and fecal debris.   PERITONEUM, RETROPERITONEUM, NODES: No significant free fluid. No free air. Slight increased density in the mesentery. No significant retroperitoneal adenopathy within limits of unenhanced exam.   VESSELS: Relative decreased density of cardiac lumen suggesting anemia. Extensive vascular calcifications. No abdominal aortic aneurysm. Lack of IV contrast precludes vascular luminal assessment.   PELVIS: Urinary bladder is partially distended with diffusely thickened wall. Vas deferens and faint possible seminal vesicle calcifications bilaterally similar to the prior exam. No gross prostate enlargement.   ABDOMINAL WALL: Focal density in the right lower quadrant abdominal wall subcutaneous fat similar to the prior exam. No sizable abdominal wall hernia. Mild soft tissue edema.   BONES: Generalized increased osseous density consistent with metabolic bone disease similar to  the prior exam. Focal smooth superior L2 endplate depression consistent with Schmorl's node with adjacent tiny vacuum disc is more conspicuous.   LOWER CHEST: Patchy/nodular left lower lobe infiltrate. Septal thickening in both lung bases. Small bilateral pleural effusions. Cardiomegaly. Surgical material along the margins of the heart again seen.       Left lower lobe infiltrate consistent with pneumonia.   Mild soft tissue and mesenteric edema with septal thickening in the lung bases and small pleural effusions consistent with CHF/fluid overload.   Distended gallbladder with questionable trace pericholecystic fluid.   Questionable distal esophageal wall thickening. Correlate with possible esophagitis.   Diffuse bladder wall thickening which could be related to suboptimal distention, cystitis and/or trabeculation.   Numerous additional findings as described above.   MACRO: None.   Signed by: Ketty Ramirez 6/4/2024 8:48 AM Dictation workstation:   [id number]    XR chest 2 views    Result Date: 5/31/2024  Interpreted By:  Aaron Jeffers, STUDY: XR CHEST 2 VIEWS;  5/31/2024 9:24 pm   INDICATION: Signs/Symptoms:sob.   COMPARISON: None.   ACCESSION NUMBER(S): [id number]   ORDERING CLINICIAN: KAMALA FRIEDMAN   FINDINGS: Median sternal wires present. Some vascular stent over the right axilla.     CARDIOMEDIASTINAL SILHOUETTE: There is enlargement of the cardiac silhouette. Median sternotomy wires present.   LUNGS: There is pulmonary congestion. There is no consolidation or effusion.   ABDOMEN: No remarkable upper abdominal findings.   BONES: No acute osseous changes.       1.  Enlarged cardiac silhouette with vascular congestion. No consolidation seen       MACRO: None   Signed by: Aaron Jeffers 5/31/2024 9:28 PM Dictation workstation:   [id number]               Assessment/Plan   Principal Problem:    Hypoxia  Active Problems:  Enterococcus faecalis bacteremia, no evidence of valvular vegetations by transesophageal  echocardiogram in June 7, 2024.     Ischemic cardiomyopathy, LV ejection fraction 35 to 40%  Previous coronary artery bypass surgery, status post left heart catheterization in June 7, 2024 which revealed patent HENDERSON to LAD and patent saphenous graft to circumflex and RCA.     End-stage renal disease on home hemodialysis  Diabetes mellitus type 2 insulin-dependent, was rather uncontrolled for couple of days has been resumed on his Lantus insulin and sliding scale and blood sugars are improving, hemoglobin A1c 8.0.     Reviewed all labs and imaging studies including transesophageal echocardiogram and left heart catheterization.  Patient has been on IV vancomycin since admission nondialysis days for Enterococcus faecalis and infectious disease on board.  Anticipate discharge home soon on IV antibiotics for 2 weeks after discharge.     Total time spent in examination counseling coordinating care for this patient was greater than 35 minutes.           I spent 35 minutes in the professional and overall care of this patient.          Osmar Callaway MD

## 2024-06-11 ENCOUNTER — APPOINTMENT (OUTPATIENT)
Dept: PRIMARY CARE | Facility: CLINIC | Age: 54
End: 2024-06-11
Payer: COMMERCIAL

## 2024-06-11 ENCOUNTER — HOME INFUSION (OUTPATIENT)
Dept: INFUSION THERAPY | Age: 54
End: 2024-06-11

## 2024-06-11 VITALS
SYSTOLIC BLOOD PRESSURE: 130 MMHG | HEART RATE: 73 BPM | RESPIRATION RATE: 18 BRPM | BODY MASS INDEX: 18.27 KG/M2 | DIASTOLIC BLOOD PRESSURE: 71 MMHG | OXYGEN SATURATION: 97 % | TEMPERATURE: 97.4 F | HEIGHT: 76 IN | WEIGHT: 150 LBS

## 2024-06-11 LAB
ALBUMIN SERPL BCP-MCNC: 3.1 G/DL (ref 3.4–5)
ANION GAP SERPL CALC-SCNC: 18 MMOL/L (ref 10–20)
BASOPHILS # BLD AUTO: 0.03 X10*3/UL (ref 0–0.1)
BASOPHILS NFR BLD AUTO: 0.5 %
BUN SERPL-MCNC: 59 MG/DL (ref 6–23)
CALCIUM SERPL-MCNC: 8.2 MG/DL (ref 8.6–10.3)
CHLORIDE SERPL-SCNC: 96 MMOL/L (ref 98–107)
CO2 SERPL-SCNC: 25 MMOL/L (ref 21–32)
CREAT SERPL-MCNC: 7.33 MG/DL (ref 0.5–1.3)
EGFRCR SERPLBLD CKD-EPI 2021: 8 ML/MIN/1.73M*2
EOSINOPHIL # BLD AUTO: 0.1 X10*3/UL (ref 0–0.7)
EOSINOPHIL NFR BLD AUTO: 1.5 %
ERYTHROCYTE [DISTWIDTH] IN BLOOD BY AUTOMATED COUNT: 16 % (ref 11.5–14.5)
GLUCOSE BLD MANUAL STRIP-MCNC: 109 MG/DL (ref 74–99)
GLUCOSE BLD MANUAL STRIP-MCNC: 110 MG/DL (ref 74–99)
GLUCOSE BLD MANUAL STRIP-MCNC: 66 MG/DL (ref 74–99)
GLUCOSE SERPL-MCNC: 78 MG/DL (ref 74–99)
HCT VFR BLD AUTO: 27.7 % (ref 41–52)
HGB BLD-MCNC: 8.6 G/DL (ref 13.5–17.5)
IMM GRANULOCYTES # BLD AUTO: 0.06 X10*3/UL (ref 0–0.7)
IMM GRANULOCYTES NFR BLD AUTO: 0.9 % (ref 0–0.9)
LYMPHOCYTES # BLD AUTO: 1.21 X10*3/UL (ref 1.2–4.8)
LYMPHOCYTES NFR BLD AUTO: 18.5 %
MAGNESIUM SERPL-MCNC: 2.3 MG/DL (ref 1.6–2.4)
MCH RBC QN AUTO: 32.6 PG (ref 26–34)
MCHC RBC AUTO-ENTMCNC: 31 G/DL (ref 32–36)
MCV RBC AUTO: 105 FL (ref 80–100)
MONOCYTES # BLD AUTO: 0.89 X10*3/UL (ref 0.1–1)
MONOCYTES NFR BLD AUTO: 13.6 %
NEUTROPHILS # BLD AUTO: 4.26 X10*3/UL (ref 1.2–7.7)
NEUTROPHILS NFR BLD AUTO: 65 %
NRBC BLD-RTO: 0 /100 WBCS (ref 0–0)
PHOSPHATE SERPL-MCNC: 5.3 MG/DL (ref 2.5–4.9)
PLATELET # BLD AUTO: 314 X10*3/UL (ref 150–450)
POTASSIUM SERPL-SCNC: 4 MMOL/L (ref 3.5–5.3)
RBC # BLD AUTO: 2.64 X10*6/UL (ref 4.5–5.9)
SODIUM SERPL-SCNC: 135 MMOL/L (ref 136–145)
WBC # BLD AUTO: 6.6 X10*3/UL (ref 4.4–11.3)

## 2024-06-11 PROCEDURE — 2500000002 HC RX 250 W HCPCS SELF ADMINISTERED DRUGS (ALT 637 FOR MEDICARE OP, ALT 636 FOR OP/ED): Mod: MUE | Performed by: NURSE PRACTITIONER

## 2024-06-11 PROCEDURE — 36415 COLL VENOUS BLD VENIPUNCTURE: CPT | Performed by: NURSE PRACTITIONER

## 2024-06-11 PROCEDURE — 2500000002 HC RX 250 W HCPCS SELF ADMINISTERED DRUGS (ALT 637 FOR MEDICARE OP, ALT 636 FOR OP/ED): Performed by: INTERNAL MEDICINE

## 2024-06-11 PROCEDURE — 99239 HOSP IP/OBS DSCHRG MGMT >30: CPT | Performed by: INTERNAL MEDICINE

## 2024-06-11 PROCEDURE — 83735 ASSAY OF MAGNESIUM: CPT | Performed by: NURSE PRACTITIONER

## 2024-06-11 PROCEDURE — 82947 ASSAY GLUCOSE BLOOD QUANT: CPT | Mod: 91

## 2024-06-11 PROCEDURE — 85025 COMPLETE CBC W/AUTO DIFF WBC: CPT | Performed by: NURSE PRACTITIONER

## 2024-06-11 PROCEDURE — 2500000001 HC RX 250 WO HCPCS SELF ADMINISTERED DRUGS (ALT 637 FOR MEDICARE OP): Performed by: NURSE PRACTITIONER

## 2024-06-11 PROCEDURE — 80069 RENAL FUNCTION PANEL: CPT | Performed by: NURSE PRACTITIONER

## 2024-06-11 PROCEDURE — 99231 SBSQ HOSP IP/OBS SF/LOW 25: CPT | Performed by: INTERNAL MEDICINE

## 2024-06-11 RX ORDER — VANCOMYCIN/0.9 % SOD CHLORIDE 1 G/100 ML
750 PLASTIC BAG, INJECTION (ML) INTRAVENOUS
Qty: 2000 ML | Refills: 0 | Status: SHIPPED | OUTPATIENT
Start: 2024-06-11 | End: 2024-06-19

## 2024-06-11 RX ADMIN — SEVELAMER CARBONATE 800 MG: 800 TABLET, FILM COATED ORAL at 11:27

## 2024-06-11 RX ADMIN — CARVEDILOL 25 MG: 25 TABLET, FILM COATED ORAL at 08:15

## 2024-06-11 RX ADMIN — SEVELAMER CARBONATE 800 MG: 800 TABLET, FILM COATED ORAL at 08:14

## 2024-06-11 RX ADMIN — ASPIRIN 81 MG: 81 TABLET, COATED ORAL at 08:15

## 2024-06-11 RX ADMIN — SACUBITRIL AND VALSARTAN 1 TABLET: 24; 26 TABLET, FILM COATED ORAL at 08:14

## 2024-06-11 RX ADMIN — MULTIPLE VITAMINS W/ MINERALS TAB 1 TABLET: TAB at 08:15

## 2024-06-11 RX ADMIN — INSULIN LISPRO 4 UNITS: 100 INJECTION, SOLUTION INTRAVENOUS; SUBCUTANEOUS at 08:13

## 2024-06-11 RX ADMIN — SITAGLIPTIN 25 MG: 25 TABLET, FILM COATED ORAL at 08:15

## 2024-06-11 RX ADMIN — ATORVASTATIN CALCIUM 80 MG: 80 TABLET, FILM COATED ORAL at 08:15

## 2024-06-11 RX ADMIN — INSULIN GLARGINE 10 UNITS: 100 INJECTION, SOLUTION SUBCUTANEOUS at 09:39

## 2024-06-11 NOTE — PROGRESS NOTES
Kentrell Carreon is a 53 y.o. male on day 10 of admission presenting with No Principal Problem: There is no principal problem currently on the Problem List. Please update the Problem List and refresh..      Subjective   Seen and examined.   Had dialysis yesterday uneventfully. Reports having access swelling late last night. Now resolved with ice and elevation.        Objective          Vitals 24HR  Heart Rate:  []   Temp:  [36.3 °C (97.4 °F)-37.3 °C (99.1 °F)]   Resp:  [18]   BP: (105-130)/(63-71)   SpO2:  [94 %-98 %]     Intake/Output last 3 Shifts:    Intake/Output Summary (Last 24 hours) at 6/11/2024 1336  Last data filed at 6/11/2024 1123  Gross per 24 hour   Intake 300 ml   Output --   Net 300 ml       Physical Exam  Constitutional: Well developed, awake/alert/oriented  x3, no distress, alert and cooperative   Eyes: PERRL, EOMI, clear sclera   ENMT: mucous membranes moist   Head/Neck: Neck supple, no JVD, trachea midline   Respiratory/Thorax: Patent airways, CTAB, normal  breath sounds with good chest expansion, thorax symmetric   Cardiovascular: Regular, rate and rhythm, systolic ejection murmur, normal S 1 and S 2  Right arm aVF within normal limits   Gastrointestinal: Nondistended, soft, non-tender, no rebound tenderness or guarding, no masses palpable, no organomegaly, +BS, no bruits   Musculoskeletal: ROM intact, no joint swelling, normal  strength   Extremities: No leg edema   Neurological: alert and oriented x3  Nonfocal   no asterixis   Skin: Warm and dry      Scheduled Medications  aspirin, 81 mg, oral, Daily  atorvastatin, 80 mg, oral, Daily  carvedilol, 25 mg, oral, BID  dextrose, 25 g, intravenous, Once  epoetin annita or biosimilar, 150 Units/kg, subcutaneous, Every Mon/Wed/Fri  heparin (porcine), 5,000 Units, subcutaneous, q8h SHANA  insulin glargine, 10 Units, subcutaneous, q12h  insulin lispro, 0-10 Units, subcutaneous, TID  insulin lispro, 4 Units, subcutaneous, TID  multivitamin with minerals, 1  tablet, oral, Daily  oxygen, , inhalation, Continuous - 02/gases  polyethylene glycol, 17 g, oral, Daily  sacubitriL-valsartan, 1 tablet, oral, BID  sevelamer carbonate, 800 mg, oral, TID  SITagliptin phosphate, 25 mg, oral, Daily  sodium bicarbonate, 50 mEq, intravenous, Once      Continuous medications       PRN medications: acetaminophen, dextrose, glucagon, glucagon, vancomycin     Relevant Results  Results from last 7 days   Lab Units 06/11/24  0554 06/10/24  0722 06/09/24  0704   WBC AUTO x10*3/uL 6.6 8.0 7.4   HEMOGLOBIN g/dL 8.6* 9.0* 8.4*   HEMATOCRIT % 27.7* 28.6* 26.7*   PLATELETS AUTO x10*3/uL 314 344 326   NEUTROS PCT AUTO % 65.0 68.4 68.8   LYMPHS PCT AUTO % 18.5 16.1 16.6   MONOS PCT AUTO % 13.6 12.7 12.4   EOS PCT AUTO % 1.5 1.4 0.8     Results from last 7 days   Lab Units 06/11/24  0554 06/10/24  0722 06/09/24  0704   SODIUM mmol/L 135* 131* 131*   POTASSIUM mmol/L 4.0 4.8 4.6   CHLORIDE mmol/L 96* 90* 90*   CO2 mmol/L 25 24 25   BUN mg/dL 59* 99* 82*   CREATININE mg/dL 7.33* 10.53* 8.88*   GLUCOSE mg/dL 78 218* 159*   CALCIUM mg/dL 8.2* 8.5* 8.6       Transthoracic Echo (TTE) Limited   Final Result      Cardiac Catheterization Procedure   Final Result      Transesophageal Echo (MARION)   Final Result      Transthoracic Echo (TTE) Complete   Final Result      CT abdomen pelvis wo IV contrast   Final Result   Left lower lobe infiltrate consistent with pneumonia.        Mild soft tissue and mesenteric edema with septal thickening in the   lung bases and small pleural effusions consistent with CHF/fluid   overload.        Distended gallbladder with questionable trace pericholecystic fluid.        Questionable distal esophageal wall thickening. Correlate with   possible esophagitis.        Diffuse bladder wall thickening which could be related to suboptimal   distention, cystitis and/or trabeculation.        Numerous additional findings as described above.        MACRO:   None.        Signed by: Ketty  James 6/4/2024 8:48 AM   Dictation workstation:   [id number]      XR chest 2 views   Final Result   1.  Enlarged cardiac silhouette with vascular congestion. No   consolidation seen                  MACRO:   None        Signed by: Aaron Jeffers 5/31/2024 9:28 PM   Dictation workstation:   [id number]               Assessment/Plan          Kentrell Carreon is a 53 y.o. male with a past medical history of end-stage renal disease on home hemodialysis Monday Tuesday Thursday Friday via [facility] under Dr. Epperson, coronary artery disease status post PCI, three-vessel CABG in March 2022, HFmrEF, aortic stenosis, tachycardia s/p ablation, insulin-dependent diabetes, hypertension, hyperlipidemia, history of tension pneumothorax in the setting of thoracentesis, right second toe transmetatarsal amputation, right foot osteomyelitis who presents with fever, fatigue and bodyaches. Upon presentation, he was afebrile albeit hypoxic to 86% with a respiratory rate of 24. There was no leukocytosis. Chest x-ray showed vascular congestion. He has persistent troponinenemia 794/851 (previously 608 last admission). BNP was greater than 4700.     Mr. Carreon had a complicated clinical course consistent with Enterococcus faecalis bacteremia with an unclear source.  CT showed no acute process.  MARION was negative for endocarditis.  2D echo when he presented showed an EF down to 20%.  He went for left heart catheterization which noted patency of coronary grafts on 6/7/2024.  Repeat cultures are without growth.  He remains on antimicrobial coverage with vancomycin.  He receives dialysis at home.  [place] reached out to his home dialysis unit.  They recommended antimicrobial infusions at home.  Dr. Vizcarra will place the order.  He will require 750 mg of vancomycin until 6/19.  There are no renal barriers to discharge.    Active Problems:    Coronary artery disease with other form of angina pectoris, unspecified vessel or lesion  type, unspecified whether native or transplanted heart (CMS-[facility])         I spent 35 minutes in the professional and overall care of this patient.      Fletcher Whitley, DO

## 2024-06-11 NOTE — PROGRESS NOTES
Music Therapy Note    Therapy Session  Referral Type: New referral this admission  Visit Type: New visit  Session Start Time: 1120  Session End Time: 1123  Intervention Delivery: In-person  Conflict of Service: None               Treatment/Interventions  Music Therapy Interventions: Assessment    Post-assessment  Total Session Time (min): 3 minutes    Narrative  Assessment Detail: Met with pt at bedside as part of interdisciplinary rounds with assistant nurse manager. Upon assessment pt reported some frustration related to wanting to go home. MT introduced and offered music therapy support.  Intervention: Pt declined services at this time.  Follow-up: Will sign off; pt can request MT follow up through care team should he need additional support.    Education Documentation  No documentation found.

## 2024-06-11 NOTE — PROGRESS NOTES
06/11/24  ASSESSMENT DATE    REVIEWED PT INFO AS CORRECT.   DX... esrd, bacteremia  REVIEWED ALLERGIES…   Allergies   Allergen Reactions    Iodinated Contrast Media Anaphylaxis    Ampicillin-Sulbactam Other     Renal Failure. 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    TOB:  reports that he has quit smoking. His smoking use included cigarettes. He has been exposed to tobacco smoke. He has never used smokeless tobacco.    WEIGHT….  68 KG HEIGHT…. 193  CM       NO MEDICATION INTERACTIONS.  REVIEWED RELEVANT BASELINE VALUES  LABS FOR HOME INFUSION ARE …….. none  PT HAS HD LINE PLACED.  CONTINUE MED THRU TENTATIVE STOP DATE …. 06/19/24  MD FOLLOWING….DR MORAN  MED PLACED IN ELASTOMERIC PUMP  CARE PLAN DONE TODAY      Kentrell Carreon   IS A  53 y.o. male  THAT IS BEING DISCHARGED FROM THE HOSPITAL WITH A DIAGNOSIS OF ESRD AND BACTEREMIA…NO NURSING ...ALLERGIES AND PMH LISTED ABOVE ...PATIENT IS ORDERED VANCOMYCIN 750MH IV AFTER EACH HD SESSION THROUGH HD FISTULA..START OF CARE IS 06/12/24 AND CONTINUES THROUGH 06/19/24 . PER PATIENT, HE WILL HAVE 4 HD SESSIONS...ORDERS REVIEWED AND ARE APPROPRIATE FOR PATIENT AND INDICATION...NO WEEKLY LABS ORDERED AND DR MORAN IS FOLLOWING    MEDICATION PROFILE REVIEWED AND APPROPRIATE    CONFIRMED ADDRESS AND DELIVERY TIME WITH PATIENT… CORRECTED STREET NUMBER IN [facility]. AGREES TO DELIVERY BY 9PM TO HOME. SEND ALCOHOL SWABS AND MICROCLAVES FOR ONLY SUPPLIES.  TO CALL BEFORE ARRIVAL     PROCESSED FILL OF 4 HP VANCO FOR 06/11/24 MIX AND DELIVERY BY 9PM ...FILL IS AN 8 DAY SUPPLY AND COVERS 06/12 THROUGH 06/19/24     FOLLOW UP 06/19 WITH  DR MORAN ON PLAN OF CARE

## 2024-06-11 NOTE — PROGRESS NOTES
06/11/24 1457   Discharge Planning   Patient expects to be discharged to: home with [facility] infusion     Confirmed with [facility] pharmacy delivery will be by 9 pm tonight. Pt stated he will go to [facility] for infusion teaching tomorrow.

## 2024-06-11 NOTE — NURSING NOTE
Pt right arm underneath elbow swelling has reduced and pt with no c/o pain awake given some snacks at this time per his request and no other assessment changes.

## 2024-06-11 NOTE — DISCHARGE SUMMARY
"ADMISSION DATE: 5/31/2024     DISCHARGE DATE:  06/11/24     Discharge Diagnosis  Enterococcus faecalis bacteremia  End-stage renal disease on hemodialysis  Coronary artery disease status post CABG  Chronic systolic heart failure with LV ejection fraction of 35 to 40%  Diabetes mellitus type 2 insulin-dependent, hemoglobin A1c of 8.0    Issues Requiring Follow-Up  Discharge home with  home health care, patient to continue with IV vancomycin on dialysis days 3 times a week as advised by infectious disease for next 2 weeks.  Follow-up with PCP and nephrology in 1 week        Discharge Meds     Your medication list        CONTINUE taking these medications        Instructions Last Dose Given Next Dose Due   FreeStyle José Luis 2 Floweree misc  Generic drug: flash glucose scanning reader      Use to check blood sugars at least every 8 hours       FreeStyle José Luis 2 Sensor kit  Generic drug: flash glucose sensor kit      Change sensor every 14 days. Check blood sugar at least every 8 hours       FreeStyle José Luis 3 Sensor device  Generic drug: blood-glucose sensor      Change sensor every 14 days       pen needle, diabetic 32 gauge x 5/32\" needle  Commonly known as: BD Ultra-Fine Agueda Pen Needle      Use to inject insulin 5 times per day              ASK your doctor about these medications        Instructions Last Dose Given Next Dose Due   acetaminophen 325 mg tablet  Commonly known as: Tylenol           aspirin 81 mg EC tablet           atorvastatin 80 mg tablet  Commonly known as: Lipitor      Take 1 tablet (80 mg) by mouth once daily.       carvedilol 25 mg tablet  Commonly known as: Coreg           Daily Multi-Vitamin tablet  Generic drug: multivitamin           Entresto 24-26 mg tablet  Generic drug: sacubitriL-valsartan      Take 1 tablet by mouth 2 times a day.       EPOETIN DEIRDRE INJ           insulin glargine 100 unit/mL (3 mL) pen  Commonly known as: Lantus      Inject 10 units twice a day       insulin lispro 100 " unit/mL injection  Commonly known as: HumaLOG KwikPen Insulin      Inject before every meal using carb ratio and correction scale. MDD 50 units.       sevelamer carbonate 800 mg tablet  Commonly known as: Renvela           SITagliptin phosphate 25 mg tablet  Commonly known as: Januvia      Take 1 tablet (25 mg) by mouth once daily.                    Results for orders placed or performed during the hospital encounter of 05/31/24 (from the past 24 hour(s))   POCT GLUCOSE   Result Value Ref Range    POCT Glucose 172 (H) 74 - 99 mg/dL   POCT GLUCOSE   Result Value Ref Range    POCT Glucose 150 (H) 74 - 99 mg/dL   POCT GLUCOSE   Result Value Ref Range    POCT Glucose 195 (H) 74 - 99 mg/dL   CBC and Auto Differential   Result Value Ref Range    WBC 6.6 4.4 - 11.3 x10*3/uL    nRBC 0.0 0.0 - 0.0 /100 WBCs    RBC 2.64 (L) 4.50 - 5.90 x10*6/uL    Hemoglobin 8.6 (L) 13.5 - 17.5 g/dL    Hematocrit 27.7 (L) 41.0 - 52.0 %     (H) 80 - 100 fL    MCH 32.6 26.0 - 34.0 pg    MCHC 31.0 (L) 32.0 - 36.0 g/dL    RDW 16.0 (H) 11.5 - 14.5 %    Platelets 314 150 - 450 x10*3/uL    Neutrophils % 65.0 40.0 - 80.0 %    Immature Granulocytes %, Automated 0.9 0.0 - 0.9 %    Lymphocytes % 18.5 13.0 - 44.0 %    Monocytes % 13.6 2.0 - 10.0 %    Eosinophils % 1.5 0.0 - 6.0 %    Basophils % 0.5 0.0 - 2.0 %    Neutrophils Absolute 4.26 1.20 - 7.70 x10*3/uL    Immature Granulocytes Absolute, Automated 0.06 0.00 - 0.70 x10*3/uL    Lymphocytes Absolute 1.21 1.20 - 4.80 x10*3/uL    Monocytes Absolute 0.89 0.10 - 1.00 x10*3/uL    Eosinophils Absolute 0.10 0.00 - 0.70 x10*3/uL    Basophils Absolute 0.03 0.00 - 0.10 x10*3/uL   Magnesium   Result Value Ref Range    Magnesium 2.30 1.60 - 2.40 mg/dL   Renal Function Panel   Result Value Ref Range    Glucose 78 74 - 99 mg/dL    Sodium 135 (L) 136 - 145 mmol/L    Potassium 4.0 3.5 - 5.3 mmol/L    Chloride 96 (L) 98 - 107 mmol/L    Bicarbonate 25 21 - 32 mmol/L    Anion Gap 18 10 - 20 mmol/L    Urea  Nitrogen 59 (H) 6 - 23 mg/dL    Creatinine 7.33 (H) 0.50 - 1.30 mg/dL    eGFR 8 (L) >60 mL/min/1.73m*2    Calcium 8.2 (L) 8.6 - 10.3 mg/dL    Phosphorus 5.3 (H) 2.5 - 4.9 mg/dL    Albumin 3.1 (L) 3.4 - 5.0 g/dL   POCT GLUCOSE   Result Value Ref Range    POCT Glucose 109 (H) 74 - 99 mg/dL   POCT GLUCOSE   Result Value Ref Range    POCT Glucose 66 (L) 74 - 99 mg/dL            Hospital Course  83-year-old gentleman with history of end-stage renal disease, previous coronary artery bypass surgery, chronic systolic heart failure was admitted with fatigue feeling chills and just not feeling good.  Patient does home dialysis.  Soon after admission his blood cultures came positive for Enterococcus faecalis and patient was started on IV vancomycin which has been continued since then.  In view of bacteremia he had a MARION which was negative for valvular vegetations.  He also had a left heart catheterization because of significantly reduced LV ejection fraction which revealed patent LIMA to LAD and saphenous vein graft to circumflex and RCA.  Patient general condition is improved with IV vancomycin he has no fever and repeat blood cultures have been negative.  He has been seen by infectious disease and to continue with IV vancomycin on dialysis days for another 2 weeks.  And  home health care has been provided.     Principal Problem:    Hypoxia  Active Problems:  Enterococcus faecalis bacteremia, no evidence of valvular vegetations by transesophageal echocardiogram in June 7, 2024.     Ischemic cardiomyopathy, LV ejection fraction 35 to 40%  Previous coronary artery bypass surgery, status post left heart catheterization in June 7, 2024 which revealed patent HENDERSON to LAD and patent saphenous graft to circumflex and RCA.     End-stage renal disease on home hemodialysis  Diabetes mellitus type 2 insulin-dependent, was rather uncontrolled for couple of days has been resumed on his Lantus insulin and sliding scale and blood sugars  "are improving, hemoglobin A1c 8.0.     Reviewed all labs and imaging studies including transesophageal echocardiogram and left heart catheterization.  Patient has been on IV vancomycin since admission nondialysis days for Enterococcus faecalis and infectious disease on board.  Anticipate discharge home soon on IV antibiotics for 2 weeks after discharge.     Total time spent in examination counseling coordinating care for this patient was greater than 35 minutes.  Stable for discharge today.           I spent 35 minutes in the professional and overall care of this patient.           Pertinent Physical Exam At Time of Discharge  GENERAL:  Alert, no distress, cooperative  SKIN:  Skin color, texture, turgor normal. No rashes or lesions.  OROPHARYNX:  Lips, mucosa, and tongue are normal.Teeth and gums, normal. Oropharynx normal.  NECK:  No jugulovenous distention, No carotid bruits, Carotid pulse normal contour, Supple  LUNGS:  Lungs clear to auscultation. Good diaphragmatic excursion.  CARDIAC: Rate rhythm is regular, normal S1 and S2; no rubs,  or gallops, 3/6 systolic ejection murmur left sternal border and mitral and tricuspid holosystolic murmurs, no peripheral edema.  ABDOMEN:  Abdomen soft, non-tender, BS normal, No masses or organomegaly  EXTREMETIES:  Extremities normal, no deformities, edema, clubbing or skin discoloration. Good capillary refill., No ulcers  Right upper arm AV fistula with good bruit and thrill.  NEURO:  Alert, oriented X 3, Gait normal. Non-focal. Reflexes normal and symmetric. Sensation grossly intact., Cranial nerves II-XII intact  PULSES:  2+ radial, 2+ carotid  Last Recorded Vitals  Blood pressure 130/67, pulse 80, temperature 36.9 °C (98.4 °F), resp. rate 18, height 1.93 m (6' 4\"), weight 68 kg (150 lb), SpO2 95%.  Intake/Output last 3 Shifts:  I/O last 3 completed shifts:  In: 480 (7.1 mL/kg) [P.O.:480]  Out: - (0 mL/kg)   Weight: 68 kg      Relevant Results  Scheduled " medications  aspirin, 81 mg, oral, Daily  atorvastatin, 80 mg, oral, Daily  carvedilol, 25 mg, oral, BID  dextrose, 25 g, intravenous, Once  epoetin annita or biosimilar, 150 Units/kg, subcutaneous, Every Mon/Wed/Fri  heparin (porcine), 5,000 Units, subcutaneous, q8h SHANA  insulin glargine, 10 Units, subcutaneous, q12h  insulin lispro, 0-10 Units, subcutaneous, TID  insulin lispro, 4 Units, subcutaneous, TID  multivitamin with minerals, 1 tablet, oral, Daily  oxygen, , inhalation, Continuous - 02/gases  polyethylene glycol, 17 g, oral, Daily  sacubitriL-valsartan, 1 tablet, oral, BID  sevelamer carbonate, 800 mg, oral, TID  SITagliptin phosphate, 25 mg, oral, Daily  sodium bicarbonate, 50 mEq, intravenous, Once  vancomycin, 750 mg, intravenous, Once        Continuous medications  PRN medications  PRN medications: acetaminophen, dextrose, glucagon, glucagon, vancomycin             Results for orders placed or performed during the hospital encounter of 05/31/24 (from the past 96 hour(s))   POCT GLUCOSE   Result Value Ref Range     POCT Glucose 225 (H) 74 - 99 mg/dL   CBC and Auto Differential   Result Value Ref Range     WBC 5.5 4.4 - 11.3 x10*3/uL     nRBC 0.0 0.0 - 0.0 /100 WBCs     RBC 2.42 (L) 4.50 - 5.90 x10*6/uL     Hemoglobin 7.9 (L) 13.5 - 17.5 g/dL     Hematocrit 24.8 (L) 41.0 - 52.0 %      (H) 80 - 100 fL     MCH 32.6 26.0 - 34.0 pg     MCHC 31.9 (L) 32.0 - 36.0 g/dL     RDW 14.7 (H) 11.5 - 14.5 %     Platelets 260 150 - 450 x10*3/uL     Neutrophils % 89.5 40.0 - 80.0 %     Immature Granulocytes %, Automated 0.7 0.0 - 0.9 %     Lymphocytes % 6.5 13.0 - 44.0 %     Monocytes % 2.7 2.0 - 10.0 %     Eosinophils % 0.2 0.0 - 6.0 %     Basophils % 0.4 0.0 - 2.0 %     Neutrophils Absolute 4.95 1.20 - 7.70 x10*3/uL     Immature Granulocytes Absolute, Automated 0.04 0.00 - 0.70 x10*3/uL     Lymphocytes Absolute 0.36 (L) 1.20 - 4.80 x10*3/uL     Monocytes Absolute 0.15 0.10 - 1.00 x10*3/uL     Eosinophils Absolute  0.01 0.00 - 0.70 x10*3/uL     Basophils Absolute 0.02 0.00 - 0.10 x10*3/uL   Magnesium   Result Value Ref Range     Magnesium 2.20 1.60 - 2.40 mg/dL   Renal Function Panel   Result Value Ref Range     Glucose 397 (H) 74 - 99 mg/dL     Sodium 127 (L) 136 - 145 mmol/L     Potassium 6.8 (HH) 3.5 - 5.3 mmol/L     Chloride 88 (L) 98 - 107 mmol/L     Bicarbonate 23 21 - 32 mmol/L     Anion Gap 23 (H) 10 - 20 mmol/L     Urea Nitrogen 51 (H) 6 - 23 mg/dL     Creatinine 9.74 (H) 0.50 - 1.30 mg/dL     eGFR 6 (L) >60 mL/min/1.73m*2     Calcium 9.3 8.6 - 10.3 mg/dL     Phosphorus 5.7 (H) 2.5 - 4.9 mg/dL     Albumin 3.9 3.4 - 5.0 g/dL   Vancomycin   Result Value Ref Range     Vancomycin 15.4 5.0 - 20.0 ug/mL   POCT GLUCOSE   Result Value Ref Range     POCT Glucose 262 (H) 74 - 99 mg/dL   POCT GLUCOSE   Result Value Ref Range     POCT Glucose 193 (H) 74 - 99 mg/dL   Basic Metabolic Panel   Result Value Ref Range     Glucose 192 (H) 74 - 99 mg/dL     Sodium 136 136 - 145 mmol/L     Potassium 4.3 3.5 - 5.3 mmol/L     Chloride 93 (L) 98 - 107 mmol/L     Bicarbonate 24 21 - 32 mmol/L     Anion Gap 23 (H) 10 - 20 mmol/L     Urea Nitrogen 25 (H) 6 - 23 mg/dL     Creatinine 4.88 (H) 0.50 - 1.30 mg/dL     eGFR 14 (L) >60 mL/min/1.73m*2     Calcium 9.7 8.6 - 10.3 mg/dL   POCT GLUCOSE   Result Value Ref Range     POCT Glucose 278 (H) 74 - 99 mg/dL   POCT GLUCOSE   Result Value Ref Range     POCT Glucose 451 (H) 74 - 99 mg/dL   CBC and Auto Differential   Result Value Ref Range     WBC 6.1 4.4 - 11.3 x10*3/uL     nRBC 0.0 0.0 - 0.0 /100 WBCs     RBC 2.43 (L) 4.50 - 5.90 x10*6/uL     Hemoglobin 8.0 (L) 13.5 - 17.5 g/dL     Hematocrit 25.8 (L) 41.0 - 52.0 %      (H) 80 - 100 fL     MCH 32.9 26.0 - 34.0 pg     MCHC 31.0 (L) 32.0 - 36.0 g/dL     RDW 15.4 (H) 11.5 - 14.5 %     Platelets 319 150 - 450 x10*3/uL     Neutrophils % 82.4 40.0 - 80.0 %     Immature Granulocytes %, Automated 0.5 0.0 - 0.9 %     Lymphocytes % 5.5 13.0 - 44.0 %      Monocytes % 11.6 2.0 - 10.0 %     Eosinophils % 0.0 0.0 - 6.0 %     Basophils % 0.0 0.0 - 2.0 %     Neutrophils Absolute 5.05 1.20 - 7.70 x10*3/uL     Immature Granulocytes Absolute, Automated 0.03 0.00 - 0.70 x10*3/uL     Lymphocytes Absolute 0.34 (L) 1.20 - 4.80 x10*3/uL     Monocytes Absolute 0.71 0.10 - 1.00 x10*3/uL     Eosinophils Absolute 0.00 0.00 - 0.70 x10*3/uL     Basophils Absolute 0.00 0.00 - 0.10 x10*3/uL   Magnesium   Result Value Ref Range     Magnesium 2.30 1.60 - 2.40 mg/dL   Renal Function Panel   Result Value Ref Range     Glucose 799 (HH) 74 - 99 mg/dL     Sodium 125 (L) 136 - 145 mmol/L     Potassium 5.6 (H) 3.5 - 5.3 mmol/L     Chloride 86 (L) 98 - 107 mmol/L     Bicarbonate 23 21 - 32 mmol/L     Anion Gap 22 (H) 10 - 20 mmol/L     Urea Nitrogen 57 (H) 6 - 23 mg/dL     Creatinine 6.79 (H) 0.50 - 1.30 mg/dL     eGFR 9 (L) >60 mL/min/1.73m*2     Calcium 9.1 8.6 - 10.3 mg/dL     Phosphorus 7.2 (H) 2.5 - 4.9 mg/dL     Albumin 4.0 3.4 - 5.0 g/dL   POCT GLUCOSE   Result Value Ref Range     POCT Glucose >600 (H) 74 - 99 mg/dL   POCT GLUCOSE   Result Value Ref Range     POCT Glucose >600 (H) 74 - 99 mg/dL   Glucose, random   Result Value Ref Range     Glucose 766 (HH) 74 - 99 mg/dL   POCT GLUCOSE   Result Value Ref Range     POCT Glucose >600 (H) 74 - 99 mg/dL   POCT GLUCOSE   Result Value Ref Range     POCT Glucose 516 (H) 74 - 99 mg/dL   POCT GLUCOSE   Result Value Ref Range     POCT Glucose 356 (H) 74 - 99 mg/dL   Transthoracic Echo (TTE) Limited   Result Value Ref Range     LV Biplane EF 50 %     Tricuspid annular plane systolic excursion 1.3 cm     LV GLS 8.7 %     LVIDd 5.75 cm     RVSP 35.1 mmHg     LV A4C EF 48.3     POCT GLUCOSE   Result Value Ref Range     POCT Glucose 231 (H) 74 - 99 mg/dL   POCT GLUCOSE   Result Value Ref Range     POCT Glucose 228 (H) 74 - 99 mg/dL   POCT GLUCOSE   Result Value Ref Range     POCT Glucose 90 74 - 99 mg/dL   POCT GLUCOSE   Result Value Ref Range     POCT  Glucose 138 (H) 74 - 99 mg/dL   CBC and Auto Differential   Result Value Ref Range     WBC 7.4 4.4 - 11.3 x10*3/uL     nRBC 0.3 (H) 0.0 - 0.0 /100 WBCs     RBC 2.63 (L) 4.50 - 5.90 x10*6/uL     Hemoglobin 8.4 (L) 13.5 - 17.5 g/dL     Hematocrit 26.7 (L) 41.0 - 52.0 %      (H) 80 - 100 fL     MCH 31.9 26.0 - 34.0 pg     MCHC 31.5 (L) 32.0 - 36.0 g/dL     RDW 15.4 (H) 11.5 - 14.5 %     Platelets 326 150 - 450 x10*3/uL     Neutrophils % 68.8 40.0 - 80.0 %     Immature Granulocytes %, Automated 1.0 (H) 0.0 - 0.9 %     Lymphocytes % 16.6 13.0 - 44.0 %     Monocytes % 12.4 2.0 - 10.0 %     Eosinophils % 0.8 0.0 - 6.0 %     Basophils % 0.4 0.0 - 2.0 %     Neutrophils Absolute 5.07 1.20 - 7.70 x10*3/uL     Immature Granulocytes Absolute, Automated 0.07 0.00 - 0.70 x10*3/uL     Lymphocytes Absolute 1.22 1.20 - 4.80 x10*3/uL     Monocytes Absolute 0.91 0.10 - 1.00 x10*3/uL     Eosinophils Absolute 0.06 0.00 - 0.70 x10*3/uL     Basophils Absolute 0.03 0.00 - 0.10 x10*3/uL   Magnesium   Result Value Ref Range     Magnesium 2.20 1.60 - 2.40 mg/dL   Renal Function Panel   Result Value Ref Range     Glucose 159 (H) 74 - 99 mg/dL     Sodium 131 (L) 136 - 145 mmol/L     Potassium 4.6 3.5 - 5.3 mmol/L     Chloride 90 (L) 98 - 107 mmol/L     Bicarbonate 25 21 - 32 mmol/L     Anion Gap 21 (H) 10 - 20 mmol/L     Urea Nitrogen 82 (H) 6 - 23 mg/dL     Creatinine 8.88 (H) 0.50 - 1.30 mg/dL     eGFR 7 (L) >60 mL/min/1.73m*2     Calcium 8.6 8.6 - 10.3 mg/dL     Phosphorus 6.2 (H) 2.5 - 4.9 mg/dL     Albumin 3.3 (L) 3.4 - 5.0 g/dL   POCT GLUCOSE   Result Value Ref Range     POCT Glucose 172 (H) 74 - 99 mg/dL   POCT GLUCOSE   Result Value Ref Range     POCT Glucose 106 (H) 74 - 99 mg/dL   POCT GLUCOSE   Result Value Ref Range     POCT Glucose 164 (H) 74 - 99 mg/dL   POCT GLUCOSE   Result Value Ref Range     POCT Glucose 233 (H) 74 - 99 mg/dL   CBC and Auto Differential   Result Value Ref Range     WBC 8.0 4.4 - 11.3 x10*3/uL     nRBC  0.3 (H) 0.0 - 0.0 /100 WBCs     RBC 2.83 (L) 4.50 - 5.90 x10*6/uL     Hemoglobin 9.0 (L) 13.5 - 17.5 g/dL     Hematocrit 28.6 (L) 41.0 - 52.0 %      (H) 80 - 100 fL     MCH 31.8 26.0 - 34.0 pg     MCHC 31.5 (L) 32.0 - 36.0 g/dL     RDW 15.7 (H) 11.5 - 14.5 %     Platelets 344 150 - 450 x10*3/uL     Neutrophils % 68.4 40.0 - 80.0 %     Immature Granulocytes %, Automated 0.9 0.0 - 0.9 %     Lymphocytes % 16.1 13.0 - 44.0 %     Monocytes % 12.7 2.0 - 10.0 %     Eosinophils % 1.4 0.0 - 6.0 %     Basophils % 0.5 0.0 - 2.0 %     Neutrophils Absolute 5.46 1.20 - 7.70 x10*3/uL     Immature Granulocytes Absolute, Automated 0.07 0.00 - 0.70 x10*3/uL     Lymphocytes Absolute 1.28 1.20 - 4.80 x10*3/uL     Monocytes Absolute 1.01 (H) 0.10 - 1.00 x10*3/uL     Eosinophils Absolute 0.11 0.00 - 0.70 x10*3/uL     Basophils Absolute 0.04 0.00 - 0.10 x10*3/uL   Magnesium   Result Value Ref Range     Magnesium 2.40 1.60 - 2.40 mg/dL   Renal Function Panel   Result Value Ref Range     Glucose 218 (H) 74 - 99 mg/dL     Sodium 131 (L) 136 - 145 mmol/L     Potassium 4.8 3.5 - 5.3 mmol/L     Chloride 90 (L) 98 - 107 mmol/L     Bicarbonate 24 21 - 32 mmol/L     Anion Gap 22 (H) 10 - 20 mmol/L     Urea Nitrogen 99 (HH) 6 - 23 mg/dL     Creatinine 10.53 (H) 0.50 - 1.30 mg/dL     eGFR 5 (L) >60 mL/min/1.73m*2     Calcium 8.5 (L) 8.6 - 10.3 mg/dL     Phosphorus 6.6 (H) 2.5 - 4.9 mg/dL     Albumin 3.4 3.4 - 5.0 g/dL   Vancomycin   Result Value Ref Range     Vancomycin 17.9 5.0 - 20.0 ug/mL   POCT GLUCOSE   Result Value Ref Range     POCT Glucose 214 (H) 74 - 99 mg/dL    Electrocardiogram, 12-lead PRN ACS symptoms     Result Date: 6/9/2024  Normal sinus rhythm Possible Left atrial enlargement Left ventricular hypertrophy with QRS widening ( Sokolow-Márquez , [place] product ) Marked T wave abnormality, consider inferolateral ischemia Abnormal ECG When compared with ECG of 26-MAR-2024 02:50, Questionable change in QRS axis ST now depressed in  Inferior leads T wave inversion now evident in Inferior leads See ED provider note for full interpretation and clinical correlation Confirmed by Ana Garcia ([id number]) on 6/9/2024 10:08:03 AM     Transthoracic Echo (TTE) Limited     Result Date: 6/8/2024   [facility], [address] [place] [address]              Tel [phone] and Fax [phone] TRANSTHORACIC ECHOCARDIOGRAM REPORT  Patient Name:      LUNA Lowe Physician:    [id number] Jarett Walsh MD Study Date:        6/8/2024             Ordering Provider:    [id number] MADDIE DUGAN MRN/PID:           [id number]             Fellow: Accession#:        [id number]         Nurse: Date of Birth/Age: 1970 / 53 years Sonographer:          Marlee Mejía RDCS Gender:            M                    Additional Staff: Height:            193.00 cm            Admit Date:           6/8/2024 Weight:            68.00 kg             Admission Status:     Inpatient -                                                               Routine BSA / BMI:         1.96 m2 / 18.26      Encounter#:           [id number]                    kg/m2                                         Department Location:  [facility] Blood Pressure: 142 /79 mmHg Study Type:    TRANSTHORACIC ECHO (TTE) LIMITED Diagnosis/ICD: Cardiomyopathy, unspecified-I42.9; Heart failure,                unspecified-I50.9 Indication:    Cardiomyopathy, heart failure CPT Code:      Echo Limited-98419; Doppler Limited-16542; Color Doppler-47333;                Myocardial Strain Imaging-93628 Patient History: Diabetes:          Yes Pertinent History: CAD, Cardiomyopathy and HTN. s/p CABG x3. Study Detail: The following Echo studies were performed: 2D, M-Mode, Doppler,               color flow and Strain.  PHYSICIAN INTERPRETATION: Left Ventricle: The left  ventricular systolic function is mildly to moderately decreased, with an estimated ejection fraction of 35-40%. There is global hypokinesis of the left ventricle with minor regional variations. The left ventricular cavity size is normal. Left Ventricular Global Longitudinal Strain - 8.7 %. Spectral Doppler shows an impaired relaxation pattern of left ventricular diastolic filling. Left Atrium: The left atrium is moderately dilated. Right Ventricle: The right ventricle is normal in size. There is normal right ventricular global systolic function. Right Atrium: The right atrium is mild to moderately dilated. Aortic Valve: The aortic valve appears structurally normal. There is no evidence of aortic valve regurgitation. Mitral Valve: The mitral valve is mildly thickened. There is mild mitral valve regurgitation. Tricuspid Valve: The tricuspid valve is structurally normal. There is moderate tricuspid regurgitation. Pulmonic Valve: The pulmonic valve is structurally normal. There is mild to moderate pulmonic valve regurgitation. Pericardium: There is a trivial to small pericardial effusion. Aorta: The aortic root is normal. Pulmonary Artery: The tricuspid regurgitant velocity is 2.83 m/s, and with an estimated right atrial pressure of 3 mmHg, the estimated pulmonary artery pressure is mildly elevated with the RVSP at 35.1 mmHg. Systemic Veins: The inferior vena cava appears to be of normal size. There is IVC inspiratory collapse greater than 50%. In comparison to the previous echocardiogram(s): Compared with study from 6/5/2024, there is an improvement in calculated LVEF.  Onco-Cardiology Measurements: Current Measurements Global Longitudinal Strain (GLS) -8.7 GLS Tracking Quality:  CONCLUSIONS:  1. Left ventricular systolic function is mildly to moderately decreased with a 35-40% estimated ejection fraction.  2. Left Ventricular Global Longitudinal Strain - 8.7 % with more pronounced abnormalities involving the basal and  mid segments with preservation of the apex more consistent with a possible infiltrative cardiomyopathy.  3. There is global hypokinesis of the left ventricle with minor regional variations.  4. Spectral Doppler shows an impaired relaxation pattern of left ventricular diastolic filling.  5. The left atrium is moderately dilated.  6. The right atrium is mild to moderately dilated.  7. Moderate tricuspid regurgitation visualized.  8. Compared with study from 2024, there is an improvement in calculated LVEF. QUANTITATIVE DATA SUMMARY: 2D MEASUREMENTS:                           Normal Ranges: IVSd:          1.00 cm    (0.6-1.1cm) LVPWd:         1.10 cm    (0.6-1.1cm) LVIDd:         5.75 cm    (3.9-5.9cm) LVIDs:         4.50 cm LV Mass Index: 124.8 g/m2 LV % FS        21.8 % LV SYSTOLIC FUNCTION BY 2D PLANIMETRY (MOD):                                         Normal Ranges: EF-A4C View:                      34 %  (>=55%) EF-A2C View:                      38 % EF-Biplane:                       36 % Global Longitudinal Strain (GLS): 8.7 % LV DIASTOLIC FUNCTION:                               Normal Ranges: MV lateral e'     0.11 m/s MV medial e'      0.03 m/s PulmV Sys Shayne:    26.28 cm/s PulmV Cohen Shayne:   29.43 cm/s PulmV S/D Shayne:    0.89 PulmV A Revs Shayne: 29.33 cm/s PulmV A Revs Dur: 119.89 msec  RIGHT VENTRICLE: TAPSE: 13.0 mm TRICUSPID VALVE/RVSP:                             Normal Ranges: Peak TR Velocity: 2.83 m/s RV Syst Pressure: 35.1 mmHg (< 30mmHg) IVC Diam:         1.68 cm PULMONIC VALVE:                         Normal Ranges: PV Accel Time: 80 msec  (>120ms) PV Max Shayne:    1.3 m/s  (0.6-0.9m/s) PV Max P.1 mmHg Pulmonary Veins: PulmV A Revs Dur: 119.89 msec PulmV A Revs Shayne: 29.33 cm/s PulmV Cohen Shayne:   29.43 cm/s PulmV S/D Shayne:    0.89 PulmV Sys Shayne:    26.28 cm/s  [id number] Jarett Walsh MD Electronically signed on 2024 at 4:42:36 PM  ** Final **      Transesophageal Echo (MARION)     Result Date:  6/7/2024   [facility], [address] [place] [address]              Tel [phone] and Fax [phone] TRANSESOPHAGEAL ECHOCARDIOGRAM REPORT  Patient Name:      LUNA Lowe Physician:    [id number] Ignacio Ibrahim MD Study Date:        6/7/2024            Ordering Provider:    [id number] STANLEY GLORIA MRN/PID:           [id number]            Fellow: Accession#:        [id number]        Nurse:                Feng Whalen RN Date of Birth/Age: 1970 / 53      Sonographer:          Shama Montes RDCS                    years Gender:            M                   Additional Staff:     Melissa Ann CRNA Height:            193.04 cm           Admit Date:           5/31/2024 Weight:            68.04 kg            Admission Status:     Inpatient -                                                              Routine BSA / BMI:         1.96 m2 / 18.26     Encounter#:           [id number]                    kg/m2                                        Department Location:  [facility] Cath                                                              Lab Blood Pressure: 133 /62 mmHg Study Type:    TRANSESOPHAGEAL ECHO (MARION) Diagnosis/ICD: Bacteremia-R78.81 Indication:    R/o Endocarditis CPT Code:      MARION Complete-89838; Doppler Limited-01882; Color Doppler-08693 Patient History: Allergies:         Iodinated contrast media, Ampicillin-Sulbactam, [place]. Smoker:            Former. Diabetes:          Yes Insulin Pertinent History: CAD, CHF, HTN and Cardiomyopathy. 53 y.o. male presents for                    MARION for bacteremia. Study Detail: The following Echo studies were performed: 2D. Agitated saline               used as a contrast agent for intraseptal flow evaluation. Total               contrast  used for this procedure was 18 mL via IV push. Patient's               heart rhythm is normal sinus rhythm. The patient was sedated.  PHYSICIAN INTERPRETATION: MARION Details: The MARION probe used was 5177. Technically adequate omniplane transesophageal echocardiogram performed. MARION Medication: The pharynx was anesthetized with Cetacaine spray and viscous lidocaine. The patient was sedated by Anesthesia; please refer to anesthesia flow sheet for medications used. MARION Procedure: The probe was passed without difficulty. Left Ventricle: The left ventricular systolic function is moderately decreased, with an estimated ejection fraction of 35-40%. There is global hypokinesis of the left ventricle with minor regional variations. The left ventricular cavity size was not assessed. Left ventricular diastolic filling was not assessed. Left Atrium: The left atrium is severely dilated. A bubble study using agitated saline was performed. Bubble study is negative. There is evidence of an atrial septal aneurysm. The left atrial appendage Doppler velocities are reduced and there is no thrombus visualized in the left atrial appendage. Right Ventricle: The right ventricle was not assessed. There is reduced right ventricular systolic function. Right Atrium: The right atrium was not assessed. Aortic Valve: The aortic valve is trileaflet. There is There is reduced systolic aortic valve leaflet excursion. There is mild aortic valve regurgitation. The degree of aortic stenosis was not fully assessed (HUMPHREY >2 cm^2 by planimetry). Mitral Valve: The mitral valve is normal in structure. There is mild mitral valve regurgitation. Tricuspid Valve: The tricuspid valve is structurally normal. There is mild tricuspid regurgitation. Pulmonic Valve: The pulmonic valve is structurally normal. There is no indication of pulmonic valve regurgitation. Pericardium: There is no pericardial effusion noted. Aorta: The aortic root is normal. In comparison to the  previous echocardiogram(s): Compared with study from 6/5/2024, the ejection fraction appears somewhat improved presently.  CONCLUSIONS:  1. Left ventricular systolic function is moderately decreased with a 35-40% estimated ejection fraction.  2. No definite valvular vegetations were visualized.  3. There is reduced right ventricular systolic function.  4. The left atrium is severely dilated.  5. Mild aortic valve regurgitation.  6. Atrial septal aneurysm present.  7. There is global hypokinesis of the left ventricle with minor regional variations. QUANTITATIVE DATA SUMMARY:  [id number] Ignacio Ibrahim MD Electronically signed on 6/7/2024 at 5:19:53 PM  ** Final **      Cardiac Catheterization Procedure     Result Date: 6/7/2024   [facility], Cath Lab, [address] [place] [address] Cardiovascular Catheterization Report Patient Name:     LUNA FINCH        Performing Physician:  [id number] Horner MD Study Date:       6/7/2024            Verifying Physician:   Kaity Horner MD MRN/PID:          [id number]            Cardiologist/Co-Scrub: Accession#:       [id number]        Ordering Provider:     [id number] STANLEY GLORIA Date of           1970 / 53      Cardiologist: Birth/Age:        years Gender:           M                   Fellow: Encounter#:       [id number]          Surgeon:  Study: Left Heart Cath  Indications: LUNA FINCH is a 54 year old male who presents with end stage renal disease on dialysis, prior coronary artery bypass graft surgery, prior percutaneous coronary intervention and an asymptomatic chest pain assessment. Cardiomyopathy.  Procedure Description: After infiltration with 2% Lidocaine, the right femoral artery was cannulated with a modified Seldinger technique. Subsequently a 5 Yemeni sheath  was placed in the right femoral artery. Selective coronary catheterization was performed using a 5 Fr catheter(s) exchanged over a guide wire to cannulate the coronary arteries. A JL 4 tip catheter was used for left coronary injections. A JR 4 tip catheter was used for right coronary injections. Additional catheter(s) used to visualize the coronary arteries were: IM. Multiple injections of contrast were made into the left and right coronary arteries with angiograms recorded in multiple projections. After completion of the procedure, femoral artery angiography was performed. This demonstrated a common femoral artery puncture appropriate for closure. A Vascade 5F vascular closure device was placed per protocol.  Coronary Angiography: The coronary circulation is co-dominant.  Left Main Coronary Artery: The left main coronary artery is a normal caliber vessel. The left main arises normally from the left coronary sinus of Valsalva and bifurcates into the LAD and circumflex coronary arteries. The mid to distal left main coronary artery showed 95%.  Left Anterior Descending Coronary Artery Distribution: The left anterior descending coronary artery is a normal caliber vessel. The LAD arises normally from the left main coronary artery. The proximal left anterior descending coronary artery showed .  Circumflex Coronary Artery Distribution: The circumflex coronary artery is a normal caliber vessel. The circumflex arises normally from the left main coronary artery and terminates in the AV groove. The proximal circumflex coronary artery showed .  Right Coronary Artery Distribution: The right coronary artery is a normal caliber vessel. The RCA arises normally from the right sinus of Valsalva. The entire right coronary artery showed diffuse moderate disease.  Coronary Grafts:  LIMA Graft: Left internal mammary artery graft conduit, originating in situ and attached to the mid left anterior descending, is patent. Saphaneous  Vein Graft(s): The saphenous vein graft, originating from the aorta and attached to the 1st obtuse marginal, is patent. The 2nd saphenous vein graft, originating from the aorta and attached to the right posterior descending artery, is patent.  Coronary Lesion Summary: Vessel             Stenosis         Vessel Segment Left Main            95%            mid to distal LAD                                 proximal Circumflex                          proximal RCA        diffuse moderate disease     entire  Graft Stenosis Summary: Graft       Destination of Graft LIMA  mid left anterior descending SVG 1 1st obtuse marginal SVG 2 right posterior descending artery  Complications: No in-lab complications observed.  Cardiac Cath Post Procedure Notes: Post Procedure Diagnosis: See below. Blood Loss:               Estimated blood loss during the procedure was 5 mls. Specimens Removed:        Number of specimen(s) removed: none. ____________________________________________________________________________________ CONCLUSIONS:  1. Native coronaries: Critical distal left main disease, proximal LCx , proximal LAD , and moderate RCA disease in co-dominant system.  2. Bypasses: Patent LIMA to LAD which fills back the whole LAD system including both diagonals, patent SVG to OM1 which fills back the whole codominant LCx system, and patent SVG to right PDA.  3. Further management as per inpatient cardiology team. ICD 10 Codes: Other cardiomyopathies-I42.8  CPT Codes: Left Heart Cath Bypass Graft w ventriculography and coronary angio(LHC)-32758; Moderate Sedation Services 1st additional 15 minutes patient >5 years-75488; Ultrasound guidance for vascular access-17938  [id number] Valerie Horner MD Performing Physician Electronically signed by [id number] Valerie Horner MD on 6/7/2024 at 4:22:01 PM  ** Final **      Transthoracic Echo (TTE) Complete     Result Date: 6/5/2024   [facility], [address] [place] [address]               Tel [phone] and Fax [phone] TRANSTHORACIC ECHOCARDIOGRAM REPORT  Patient Name:      LUNA FINCH         Reading Physician:    [id number] Ignacio Ibrahim MD Study Date:        6/5/2024             Ordering Provider:    [id number] LIZZHARDIK KNOTT MRN/PID:           [id number]             Fellow: Accession#:        [id number]         Nurse: Date of Birth/Age: 1970 / 53 years Sonographer:          Shama Montes RDCS Gender:            M                    Additional Staff: Height:            193.00 cm            Admit Date:           5/31/2024 Weight:            68.00 kg             Admission Status:     Inpatient -                                                               Routine BSA / BMI:         1.96 m2 / 18.26      Encounter#:           [id number]                    kg/m2                                         Department Location:  [facility] Non                                                               Invasive Blood Pressure: 141 /67 mmHg Study Type:    TRANSTHORACIC ECHO (TTE) COMPLETE Diagnosis/ICD: Bacteremia-R78.81 Indication:    Bacteremia CPT Code:      Echo Complete w Full Doppler-81429 Patient History: Diabetes:          Yes Pertinent History: CAD, Chest Pain, HTN and CHF. ESRD, Hypoxia. Study Detail: The following Echo studies were performed: 2D, M-Mode, Doppler and               color flow.  PHYSICIAN INTERPRETATION: Left Ventricle: The left ventricular systolic function is severely decreased, with an estimated ejection fraction of 20%. There is global hypokinesis of the left ventricle with minor regional variations. The left ventricular cavity size is upper limits of normal. There is eccentric left ventricular hypertrophy. Left Ventricular Global Longitudinal Strain - 6.3 %. Spectral Doppler shows an abnormal pattern of left ventricular diastolic filling. Left  Atrium: The left atrium is severely dilated. Right Ventricle: The right ventricle is moderately enlarged. There is reduced right ventricular systolic function. Right Atrium: The right atrium is normal in size. Aortic Valve: The aortic valve is trileaflet. There is moderate aortic valve cusp calcification. There is reduced aortic valve non coronary cusp excursion. There is evidence of moderate aortic valve stenosis. There is mild aortic valve regurgitation. The peak instantaneous gradient of the aortic valve is 37.2 mmHg. The mean gradient of the aortic valve is 19.7 mmHg. Mitral Valve: The mitral valve is normal in structure. The mitral valve spectral Doppler A-wave is absent, consistent with atrial fibrillation. There is mild to moderate mitral annular calcification. There is mild to moderate mitral valve regurgitation. Tricuspid Valve: The tricuspid valve is structurally normal. There is mild tricuspid regurgitation. The Doppler estimated RVSP is mildly elevated at 44.5 mmHg. Pulmonic Valve: The pulmonic valve is structurally normal. There is mild pulmonic valve regurgitation. Pericardium: There is a trivial pericardial effusion. Aorta: The aortic root is normal. Systemic Veins: The inferior vena cava appears dilated. There is IVC inspiratory collapse greater than 50%. In comparison to the previous echocardiogram(s): Compared with study from 3/6/2024, the ejection fraction is comparatively reduced but was somewhat overestimated previously. There is a greater degree of aortic stenosis presently but this was underestimated previously.  CONCLUSIONS:  1. Left ventricular systolic function is severely decreased with a 20% estimated ejection fraction.  2. No definite valvular vegetations were visualized.  3. Spectral Doppler shows an abnormal pattern of left ventricular diastolic filling.  4. There is eccentric left ventricular hypertrophy.  5. Moderately enlarged right ventricle.  6. There is reduced right ventricular  systolic function.  7. The left atrium is severely dilated.  8. Absent A-wave on MV spectral Doppler tracing, consistent with atrial fibrillation.  9. Mild to moderate mitral valve regurgitation. 10. Mildly elevated RVSP. 11. Moderate aortic valve stenosis. 12. There is moderate aortic valve cusp calcification. 13. Mild aortic valve regurgitation. 14. There is global hypokinesis of the left ventricle with minor regional variations. QUANTITATIVE DATA SUMMARY: 2D MEASUREMENTS:                           Normal Ranges: LAs:           4.98 cm    (2.7-4.0cm) IVSd:          1.08 cm    (0.6-1.1cm) LVPWd:         1.06 cm    (0.6-1.1cm) LVIDd:         5.73 cm    (3.9-5.9cm) LVIDs:         4.81 cm LV Mass Index: 127.2 g/m2 LV % FS        16.1 % LA VOLUME:                               Normal Ranges: LA Vol A4C:        115.9 ml   (22+/-6mL/m2) LA Vol A2C:        131.8 ml LA Vol BP:         129.3 ml LA Vol Index A4C:  59.2ml/m2 LA Vol Index A2C:  67.2 ml/m2 LA Vol Index BP:   66.0 ml/m2 LA Area A4C:       30.9 cm2 LA Area A2C:       31.5 cm2 LA Major Axis A4C: 7.0 cm LA Major Axis A2C: 6.4 cm LA Volume Index:   65.3 ml/m2 LA Vol A4C:        111.1 ml LA Vol A2C:        127.7 ml RA VOLUME BY A/L METHOD:                               Normal Ranges: RA Vol A4C:        85.8 ml    (8.3-19.5ml) RA Vol Index A4C:  43.8 ml/m2 RA Area A4C:       26.2 cm2 RA Major Axis A4C: 6.8 cm M-MODE MEASUREMENTS:                  Normal Ranges: Ao Root: 3.10 cm (2.0-3.7cm) LAs:     4.84 cm (2.7-4.0cm) AORTA MEASUREMENTS:                      Normal Ranges: Ao Sinus, d: 2.90 cm (2.1-3.5cm) Ao STJ, d:   2.20 cm (1.7-3.4cm) Asc Ao, d:   3.00 cm (2.1-3.4cm) LV SYSTOLIC FUNCTION BY 2D PLANIMETRY (MOD):                                          Normal Ranges: EF-A4C View:                      29.0 % (>=55%) EF-A2C View:                      43.8 % EF-Biplane:                       39.1 % Global Longitudinal Strain (GLS): 6.3 % LV DIASTOLIC FUNCTION:                         Normal Ranges: MV Peak E:    1.37 m/s (0.7-1.2 m/s) MV e'         0.17 m/s (>8.0) MV lateral e' 0.17 m/s MV medial e'  0.10 m/s E/e' Ratio:   8.06     (<8.0) MITRAL VALVE:                      Normal Ranges: MV Vmax:    1.52 m/s (<=1.3m/s) MV peak P.2 mmHg (<5mmHg) MV mean P.1 mmHg (<2mmHg) MV VTI:     23.83 cm (10-13cm) MITRAL INSUFFICIENCY:                         Normal Ranges: MR VTI:     130.99 cm MR Vmax:    486.88 cm/s MR Volume:  13.47 ml MR Flow Rt: 50.06 ml/s MR EROA:    0.10 cm2 AORTIC VALVE:                                    Normal Ranges: AoV Vmax:                3.05 m/s  (<=1.7m/s) AoV Peak P.2 mmHg (<20mmHg) AoV Mean P.7 mmHg (1.7-11.5mmHg) LVOT Max Shayne:            1.20 m/s  (<=1.1m/s) AoV VTI:                 51.47 cm  (18-25cm) LVOT VTI:                21.65 cm LVOT Diameter:           2.01 cm   (1.8-2.4cm) AoV Area, VTI:           1.34 cm2  (2.5-5.5cm2) AoV Area,Vmax:           1.25 cm2  (2.5-4.5cm2) AoV Dimensionless Index: 0.42 AORTIC INSUFFICIENCY: AI Vmax:       3.21 m/s AI Half-time:  349 msec AI Decel Time: 1203 msec AI Decel Rate: 266.64 cm/s2  RIGHT VENTRICLE: RV Basal 4.80 cm RV Mid   3.00 cm RV Major 9.9 cm TAPSE:   12.0 mm RV s'    0.09 m/s TRICUSPID VALVE/RVSP:                             Normal Ranges: Peak TR Velocity: 3.02 m/s Est. RA Pressure: 8 mmHg RV Syst Pressure: 44.5 mmHg (< 30mmHg) IVC Diam:         2.23 cm PULMONIC VALVE:                      Normal Ranges: RVOT Vmax:  0.51 m/s (0.6-0.9m/s) PV Max Shayne: 1.2 m/s  (0.6-0.9m/s) PV Max P.6 mmHg AORTA: Asc Ao Diam 2.99 cm  [id number] Ignacio Ibrahim MD Electronically signed on 2024 at 4:50:52 PM  ** Final **      CT abdomen pelvis wo IV contrast     Result Date: 2024  Interpreted By:  Ketty Ramirez, STUDY: CT ABDOMEN PELVIS WO IV CONTRAST;  2024 4:08 am   INDICATION: Signs/Symptoms:bacteremia  source?.   COMPARISON: 2024   ACCESSION NUMBER(S): [id number]    ORDERING CLINICIAN: GREG CALDERA   TECHNIQUE: CT of the abdomen and pelvis was performed. Sagittal and coronal reconstructions were generated.  No intravenous contrast given for the exam.   FINDINGS: Solid organ and vessel evaluation limited without IV contrast.   ABDOMINAL ORGANS:   LIVER: No focal lesion within limits of unenhanced exam prominent vascular calcifications near the hilum.   GALL BLADDER AND BILIARY TREE: No calcified gallstone. Gallbladder is distended. Questionable trace pericholecystic fluid.   SPLEEN: Stable isodense presumed splenule adjacent to the anterior pole.   PANCREAS: No focal lesion within limits of unenhanced exam   ADRENALS: No adrenal mass   KIDNEYS AND URETERS: Atrophic with prominent vascular calcifications and small rounded hypodensities in each kidney similar to the prior exam.   BOWEL: Questionable distal esophageal wall thickening. Stomach is mildly distended with debris and air. No abnormally dilated small bowel loops. Moderate fecal residue in the proximal colon. Rectum is distended with gas and fecal debris.   PERITONEUM, RETROPERITONEUM, NODES: No significant free fluid. No free air. Slight increased density in the mesentery. No significant retroperitoneal adenopathy within limits of unenhanced exam.   VESSELS: Relative decreased density of cardiac lumen suggesting anemia. Extensive vascular calcifications. No abdominal aortic aneurysm. Lack of IV contrast precludes vascular luminal assessment.   PELVIS: Urinary bladder is partially distended with diffusely thickened wall. Vas deferens and faint possible seminal vesicle calcifications bilaterally similar to the prior exam. No gross prostate enlargement.   ABDOMINAL WALL: Focal density in the right lower quadrant abdominal wall subcutaneous fat similar to the prior exam. No sizable abdominal wall hernia. Mild soft tissue edema.   BONES: Generalized increased osseous density consistent with metabolic bone disease similar to  the prior exam. Focal smooth superior L2 endplate depression consistent with Schmorl's node with adjacent tiny vacuum disc is more conspicuous.   LOWER CHEST: Patchy/nodular left lower lobe infiltrate. Septal thickening in both lung bases. Small bilateral pleural effusions. Cardiomegaly. Surgical material along the margins of the heart again seen.        Left lower lobe infiltrate consistent with pneumonia.   Mild soft tissue and mesenteric edema with septal thickening in the lung bases and small pleural effusions consistent with CHF/fluid overload.   Distended gallbladder with questionable trace pericholecystic fluid.   Questionable distal esophageal wall thickening. Correlate with possible esophagitis.   Diffuse bladder wall thickening which could be related to suboptimal distention, cystitis and/or trabeculation.   Numerous additional findings as described above.   MACRO: None.   Signed by: Ketty aRmirez 6/4/2024 8:48 AM Dictation workstation:   [id number]     XR chest 2 views     Result Date: 5/31/2024  Interpreted By:  Aaron Jeffers, STUDY: XR CHEST 2 VIEWS;  5/31/2024 9:24 pm   INDICATION: Signs/Symptoms:sob.   COMPARISON: None.   ACCESSION NUMBER(S): [id number]   ORDERING CLINICIAN: KAMALA FRIEDMAN   FINDINGS: Median sternal wires present. Some vascular stent over the right axilla.     CARDIOMEDIASTINAL SILHOUETTE: There is enlargement of the cardiac silhouette. Median sternotomy wires present.   LUNGS: There is pulmonary congestion. There is no consolidation or effusion.   ABDOMEN: No remarkable upper abdominal findings.   BONES: No acute osseous changes.        1.  Enlarged cardiac silhouette with vascular congestion. No consolidation seen       MACRO: None   Signed by: Aaron Jeffers 5/31/2024 9:28 PM Dictation workstation:   [id number]             Outpatient Follow-Up  Future Appointments   Date Time Provider Department Center   7/23/2024  2:45 PM Tala Bird MD [id number] [facility]   7/29/2024   9:15 AM Laureen Perry DPM [id number] [facility]   8/8/2024  2:40 PM Ten Lance DO [id number] [facility]   12/5/2024  9:30 AM Lore Hull MD [id number] [facility]         Osmar Callaway MD

## 2024-06-11 NOTE — PROGRESS NOTES
"Kentrell Carreon is a 53 y.o. male on day 10 of admission presenting with Hypoxia.    Subjective   No new complaints     Scheduled medications  aspirin, 81 mg, oral, Daily  atorvastatin, 80 mg, oral, Daily  carvedilol, 25 mg, oral, BID  dextrose, 25 g, intravenous, Once  epoetin annita or biosimilar, 150 Units/kg, subcutaneous, Every Mon/Wed/Fri  heparin (porcine), 5,000 Units, subcutaneous, q8h SHANA  insulin glargine, 10 Units, subcutaneous, q12h  insulin lispro, 0-10 Units, subcutaneous, TID  insulin lispro, 4 Units, subcutaneous, TID  multivitamin with minerals, 1 tablet, oral, Daily  oxygen, , inhalation, Continuous - 02/gases  polyethylene glycol, 17 g, oral, Daily  sacubitriL-valsartan, 1 tablet, oral, BID  sevelamer carbonate, 800 mg, oral, TID  SITagliptin phosphate, 25 mg, oral, Daily  sodium bicarbonate, 50 mEq, intravenous, Once      Objective   Physical Exam  Constitutional:       General: He is not in acute distress.  Neurological:      Mental Status: He is alert.         Last Recorded Vitals  Blood pressure 105/65, pulse 80, temperature 36.6 °C (97.9 °F), temperature source Oral, resp. rate 18, height 1.93 m (6' 4\"), weight 68 kg (150 lb), SpO2 97%.  Intake/Output last 3 Shifts:  I/O last 3 completed shifts:  In: 1560 (22.9 mL/kg) [P.O.:360; I.V.:800 (11.8 mL/kg); Other:400]  Out: 2400 (35.3 mL/kg) [Other:2400]  Weight: 68 kg     Relevant Results  Results from last 7 days   Lab Units 06/10/24  2211 06/10/24  1544 06/10/24  1300 06/10/24  0803 06/10/24  0722 06/09/24  2121 06/09/24  0801 06/09/24  0704 06/08/24  1144 06/08/24  1044 06/08/24  0807 06/08/24  0623 06/07/24  1754 06/07/24  1354   POCT GLUCOSE mg/dL 195* 150* 172* 214*  --  233*   < >  --    < >  --    < >  --    < >  --    GLUCOSE mg/dL  --   --   --   --  218*  --   --  159*  --  766*  --  799*  --  192*    < > = values in this interval not displayed.       Assessment/Plan   Principal Problem:    Hypoxia  Active Problems:    Ischemic " cardiomyopathy      TYPE 2 DIABETES MELLITUS   LONG TERM CURRENT INSULIN USE   Glucose control adequate  Lantus at home dose    RECOMMENDATIONS  Continue Lantus 10 units BID        Guy Burton MD

## 2024-06-12 ENCOUNTER — PATIENT OUTREACH (OUTPATIENT)
Dept: PRIMARY CARE | Facility: CLINIC | Age: 54
End: 2024-06-12
Payer: COMMERCIAL

## 2024-06-12 NOTE — PROGRESS NOTES
Discharge Facility:[facility]  Discharge Diagnosis:Enterococcus faecalis bacteremia  End-stage renal disease on hemodialysis  Coronary artery disease status post CABG  Chronic systolic heart failure with LV ejection fraction of 35 to 40%  Diabetes mellitus type 2 insulin-dependent, hemoglobin A1c of 8.0  Admission Date:6/1/24  Discharge Date: 6/11/24    PCP Appointment Date:6/26/24  Specialist Appointment Date: Cardiology  Hospital Encounter and Summary: Linked   See discharge assessment below for further details  Engagement  Call Start Time: 0831 (6/12/2024  8:31 AM)    Medications  Medications reviewed with patient/caregiver?: Yes (6/12/2024  8:31 AM)  Is the patient having any side effects they believe may be caused by any medication additions or changes?: No (6/12/2024  8:31 AM)  Does the patient have all medications ordered at discharge?: Yes (6/12/2024  8:31 AM)  Prescription Comments: START taking: vancomycin in 0.9 % sodium (6/12/2024  8:31 AM)  Is the patient taking all medications as directed (includes completed medication regime)?: Yes (6/12/2024  8:31 AM)  Medication Comments: see med lisr (6/12/2024  8:31 AM)    Appointments  Does the patient have a primary care provider?: Yes (6/12/2024  8:31 AM)  Care Management Interventions: Verified appointment date/time/provider (fuv 6/26/24) (6/12/2024  8:31 AM)  Has the patient kept scheduled appointments due by today?: Yes (6/12/2024  8:31 AM)    Patient Teaching  Does the patient have access to their discharge instructions?: Yes (6/12/2024  8:31 AM)  Care Management Interventions: Reviewed instructions with patient (6/12/2024  8:31 AM)  What is the patient's perception of their health status since discharge?: Improving (6/12/2024  8:31 AM)  Is the patient/caregiver able to teach back the hierarchy of who to call/visit for symptoms/problems? PCP, Specialist, Home Health nurse, Urgent Care, ED, 911: Yes (6/12/2024  8:31 AM)    Wrap Up  Wrap Up Additional Comments:  This CM spoke with pt via phone. Pt reports doing well at home since discharge. New meds reviewed. Pt denies CP and SOB. Pt aware of my availability for non-emergent concerns. Contact info provided to patient . (6/12/2024  8:31 AM)    Issues Requiring Follow-Up  Discharge home with  home health care, patient to continue with IV vancomycin on dialysis days 3 times a week as advised by infectious disease for next 2 weeks.  Follow-up with PCP and nephrology in 1 week     Penny Alvarez LPN

## 2024-06-18 ENCOUNTER — APPOINTMENT (OUTPATIENT)
Dept: PRIMARY CARE | Facility: CLINIC | Age: 54
End: 2024-06-18
Payer: COMMERCIAL

## 2024-06-19 ENCOUNTER — HOME INFUSION (OUTPATIENT)
Dept: INFUSION THERAPY | Age: 54
End: 2024-06-19
Payer: COMMERCIAL

## 2024-06-19 NOTE — PROGRESS NOTES
Received orders from Dr. Vizcarra via tel call to office  Stop antibiotic as ordered after dose on 6/19. Pt has hemodialysis line. Discharge from RX services    Order entered into Epic. Gold copy to intake.    Pt care rep to discharge pt from [facility] and discharge chart

## 2024-06-24 ENCOUNTER — PATIENT OUTREACH (OUTPATIENT)
Dept: PRIMARY CARE | Facility: CLINIC | Age: 54
End: 2024-06-24
Payer: COMMERCIAL

## 2024-06-24 NOTE — PROGRESS NOTES
Call regarding appt. with PCP on 6/26/24 after hospitalization.  At time of outreach call the patient feels as if their condition has improved  since last visit.  Reviewed with patient the PCP appointment and any questions or concerns regarding the appt.

## 2024-06-26 ENCOUNTER — APPOINTMENT (OUTPATIENT)
Dept: PRIMARY CARE | Facility: CLINIC | Age: 54
End: 2024-06-26
Payer: COMMERCIAL

## 2024-06-26 VITALS
WEIGHT: 163 LBS | SYSTOLIC BLOOD PRESSURE: 133 MMHG | HEART RATE: 83 BPM | BODY MASS INDEX: 19.84 KG/M2 | DIASTOLIC BLOOD PRESSURE: 74 MMHG

## 2024-06-26 DIAGNOSIS — M32.19 SYSTEMIC LUPUS ERYTHEMATOSUS WITH OTHER ORGAN INVOLVEMENT, UNSPECIFIED SLE TYPE (MULTI): ICD-10-CM

## 2024-06-26 DIAGNOSIS — R78.81 BACTEREMIA: ICD-10-CM

## 2024-06-26 DIAGNOSIS — E10.40 TYPE 1 DIABETES MELLITUS WITH DIABETIC NEUROPATHY (MULTI): ICD-10-CM

## 2024-06-26 DIAGNOSIS — R13.10 DYSPHAGIA, UNSPECIFIED TYPE: Primary | ICD-10-CM

## 2024-06-26 DIAGNOSIS — I25.5 ISCHEMIC CARDIOMYOPATHY: ICD-10-CM

## 2024-06-26 PROCEDURE — 3078F DIAST BP <80 MM HG: CPT | Performed by: INTERNAL MEDICINE

## 2024-06-26 PROCEDURE — 3075F SYST BP GE 130 - 139MM HG: CPT | Performed by: INTERNAL MEDICINE

## 2024-06-26 PROCEDURE — 3051F HG A1C>EQUAL 7.0%<8.0%: CPT | Performed by: INTERNAL MEDICINE

## 2024-06-26 PROCEDURE — 99214 OFFICE O/P EST MOD 30 MIN: CPT | Performed by: INTERNAL MEDICINE

## 2024-06-26 NOTE — PROGRESS NOTES
Subjective   Patient ID: Kentrell Carreon is a 53 y.o. male who presents for Hospital Follow-up.    HPI         Patient is a 53-year-old male with  PMHx sig for CAD s/p PCI (LAD; 6/2017), stage C systolic HF/ICM/HFmrEF, aortic stenosis, HTN, tachycardia s/p ablation, DM and discoid lupus/SLE with ESRD on home HD (M-T-TH-F via RUE AVF since 12/2016) presents for posthospitalization follow-up.    Patient went to the hospital due to ongoing fatigue and tiredness.  Found to have Enterococcus bacteremia.  Treated with extended duration IV antibiotics..  Completed course.  Echocardiogram did not show any evidence of endocarditis.  Heart function was evaluated he had a cardiac catheterization with no further intervention.  He was continued on his current medications.  No increase in weight.  He is otherwise doing well.  He feels deconditioned but he is getting stronger every day.    Patient has type 1 diabetes and has established with endocrinology.  His sugars has improved to less than 8.  He is taking his insulin as prescribed.  Labs are consistent with type 2 diabetes.  He is under the care of endocrinology now and takes his medications as prescribed      Patient has a history of coronary artery disease with reduced ejection fraction.  He is established with cardiology.  He has end-stage renal disease as well as valvular abnormalities with ongoing fatigue but no shortness of breath.  His blood pressure is well-controlled.  He has evidence of moderate aortic stenosis on echocardiogram in March 2023.  No shortness of breath on exertion at this time    End-stage renal disease on dialysis.  Follows with nephrology quite regularly.  Goes to the [facility] 3 times a week.  He takes his end-stage renal disease vitamins as prescribed.     He does report ongoing swallowing issues.  He feels like food gets stuck in his mouth and he has trouble swallowing.  He has had a barium swallow study done 10 years ago which was unremarkable.  No  recent weight loss    Review of Systems  Constitutional: No fever or chills  Cardiovascular: no chest pain, no palpitations and no syncope.   Respiratory: no cough, no shortness of breath during exertion and no shortness of breath at rest.   Gastrointestinal: no abdominal pain, no nausea and no vomiting.  Neuro: No Headache, no dizziness    Objective   /74   Pulse 83   Wt 73.9 kg (163 lb)   BMI 19.84 kg/m²     Physical Exam  Constitutional: Alert and in no acute distress. Well developed, well nourished  Head and Face: Head and face: Normal.    Cardiovascular: Heart rate and rhythm were normal, normal S1 and S2. No peripheral edema.   Pulmonary: No respiratory distress. Clear bilateral breath sounds.  Musculoskeletal: Tenderness and erythema of the right great toe, with overlying skin cracks  Skin: Normal skin color and pigmentation, normal skin turgor, and no rash.    Psychiatric: Judgment and insight: Intact. Mood and affect: Normal.        Lab Results   Component Value Date    WBC 6.6 06/11/2024    HGB 8.6 (L) 06/11/2024    HCT 27.7 (L) 06/11/2024     06/11/2024    CHOL 102 03/10/2022    TRIG 112 03/10/2022    HDL 33.4 (A) 03/10/2022    ALT 17 06/02/2024    AST 21 06/02/2024     (L) 06/11/2024    K 4.0 06/11/2024    CL 96 (L) 06/11/2024    CREATININE 7.33 (H) 06/11/2024    BUN 59 (H) 06/11/2024    CO2 25 06/11/2024    TSH 3.60 02/27/2024    INR 1.1 03/25/2024    HGBA1C 7.5 (H) 06/01/2024       Cardiac Catheterization Procedure     [facility], Cath Lab, [address] [place] [address]    Cardiovascular Catheterization Report    Patient Name:     LUNA FINCH        Performing Physician:  [id number] Horner MD  Study Date:       6/7/2024            Verifying Physician:   Kaity Horner MD  MRN/PID:          [id number]             Cardiologist/Co-Scrub:  Accession#:       [id number]        Ordering Provider:     [id number] STANLEY ANDERSON                                                               JUANI  Date of           1970 / 53      Cardiologist:  Birth/Age:        years  Gender:           M                   Fellow:  Encounter#:       [id number]          Surgeon:       Study: Left Heart Cath       Indications:  LUNA FINCH is a 54 year old male who presents with end stage renal disease on dialysis, prior coronary artery bypass graft surgery, prior percutaneous coronary intervention and an asymptomatic chest pain assessment. Cardiomyopathy.     Procedure Description:  After infiltration with 2% Lidocaine, the right femoral artery was cannulated with a modified Seldinger technique. Subsequently a 5 [place] citizen sheath was placed in the right femoral artery. Selective coronary catheterization was performed using a 5 Fr catheter(s) exchanged over a guide wire to cannulate the coronary arteries. A JL 4 tip catheter was used for left coronary injections. A JR 4 tip catheter was used for right coronary injections.  Additional catheter(s) used to visualize the coronary arteries were: IM. Multiple injections of contrast were made into the left and right coronary arteries with angiograms recorded in multiple projections. After completion of the procedure, femoral artery angiography was performed. This demonstrated a common femoral artery puncture appropriate for closure. A Vascade 5F vascular closure device was placed per protocol.     Coronary Angiography:  The coronary circulation is co-dominant.     Left Main Coronary Artery:  The left main coronary artery is a normal caliber vessel. The left main arises normally from the left coronary sinus of Valsalva and bifurcates into the LAD and circumflex coronary arteries. The mid to distal left main coronary artery showed 95%.     Left Anterior Descending Coronary Artery Distribution:  The left anterior  descending coronary artery is a normal caliber vessel. The LAD arises normally from the left main coronary artery. The proximal left anterior descending coronary artery showed .     Circumflex Coronary Artery Distribution:  The circumflex coronary artery is a normal caliber vessel. The circumflex arises normally from the left main coronary artery and terminates in the AV groove. The proximal circumflex coronary artery showed .     Right Coronary Artery Distribution:    The right coronary artery is a normal caliber vessel. The RCA arises normally from the right sinus of Valsalva. The entire right coronary artery showed diffuse moderate disease.     Coronary Grafts:     LIMA Graft:  Left internal mammary artery graft conduit, originating in situ and attached to the mid left anterior descending, is patent.  Saphaneous Vein Graft(s):  The saphenous vein graft, originating from the aorta and attached to the 1st obtuse marginal, is patent.  The 2nd saphenous vein graft, originating from the aorta and attached to the right posterior descending artery, is patent.     Coronary Lesion Summary:  Vessel             Stenosis         Vessel Segment  Left Main            95%            mid to distal  LAD                                 proximal  Circumflex                          proximal  RCA        diffuse moderate disease     entire       Graft Stenosis Summary:  Graft       Destination of Graft  LIMA  mid left anterior descending  SVG 1 1st obtuse marginal  SVG 2 right posterior descending artery       Complications:  No in-lab complications observed.     Cardiac Cath Post Procedure Notes:  Post Procedure Diagnosis: See below.  Blood Loss:               Estimated blood loss during the procedure was 5 mls.  Specimens Removed:        Number of specimen(s) removed: none.    ____________________________________________________________________________________  CONCLUSIONS:   1. Native coronaries: Critical distal left  main disease, proximal LCx , proximal LAD , and moderate RCA disease in co-dominant system.   2. Bypasses: Patent LIMA to LAD which fills back the whole LAD system including both diagonals, patent SVG to OM1 which fills back the whole codominant LCx system, and patent SVG to right PDA.   3. Further management as per inpatient cardiology team.    ICD 10 Codes:  Other cardiomyopathies-I42.8     CPT Codes:  Left Heart Cath Bypass Graft w ventriculography and coronary angio([facility])-[id number]; Moderate Sedation Services 1st additional 15 minutes patient >5 years-[id number]; Ultrasound guidance for vascular access-[id number]     [id number] Valerie Horner MD  Performing Physician  Electronically signed by [id number] Valerie Horner MD on 6/7/2024 at 4:22:01 PM         ** Final **            Assessment/Plan   Problem List Items Addressed This Visit       SLE (systemic lupus erythematosus) (Multi)     Continue following with rheumatology.         DM (diabetes mellitus), type 1 with neurological complications (Multi)     Improved with an A1c less than 8.  Continue following with endocrinology.  Advise low carbohydrate diet.  Check A1c next visit.         Ischemic cardiomyopathy     Following with cardiology.  Continue statin beta-blocker and Entresto.  No longer on amlodipine in order to allow for Entresto use.  No recent weight gain.  Call if your weight increases by 3 pounds over 24 hours.             Bacteremia     Status post extended vancomycin treatment.          Other Visit Diagnoses       Dysphagia, unspecified type    -  Primary    Relevant Orders    FL modified barium swallow study    Follow Up In [facility] - PCP - Medicare Annual

## 2024-06-26 NOTE — ASSESSMENT & PLAN NOTE
Improved with an A1c less than 8.  Continue following with endocrinology.  Advise low carbohydrate diet.  Check A1c next visit.

## 2024-06-26 NOTE — ASSESSMENT & PLAN NOTE
Following with cardiology.  Continue statin beta-blocker and Entresto.  No longer on amlodipine in order to allow for Entresto use.  No recent weight gain.  Call if your weight increases by 3 pounds over 24 hours.

## 2024-06-28 ENCOUNTER — APPOINTMENT (OUTPATIENT)
Dept: GENERAL RADIOLOGY | Age: 54
DRG: 720 | End: 2024-06-28
Attending: EMERGENCY MEDICINE

## 2024-06-28 ENCOUNTER — HOSPITAL ENCOUNTER (INPATIENT)
Age: 54
DRG: 720 | End: 2024-06-28
Attending: EMERGENCY MEDICINE | Admitting: HOSPITALIST

## 2024-06-28 DIAGNOSIS — J81.0 ACUTE PULMONARY EDEMA  (CMD): ICD-10-CM

## 2024-06-28 DIAGNOSIS — J96.01 ACUTE RESPIRATORY FAILURE WITH HYPOXIA  (CMD): Primary | ICD-10-CM

## 2024-06-28 DIAGNOSIS — R79.89 ELEVATED TROPONIN: ICD-10-CM

## 2024-06-28 DIAGNOSIS — J18.9 PNEUMONIA DUE TO INFECTIOUS ORGANISM, UNSPECIFIED LATERALITY, UNSPECIFIED PART OF LUNG: ICD-10-CM

## 2024-06-28 LAB
ALBUMIN SERPL-MCNC: 4 G/DL (ref 3.6–5.1)
ALP SERPL-CCNC: 103 UNITS/L (ref 45–117)
ALT SERPL-CCNC: 24 UNITS/L
ANION GAP SERPL CALC-SCNC: 18 MMOL/L (ref 7–19)
AST SERPL-CCNC: 20 UNITS/L
ATRIAL RATE (BPM): 104
ATRIAL RATE (BPM): 98
BASOPHILS # BLD: 0.1 K/MCL (ref 0–0.3)
BASOPHILS NFR BLD: 1 %
BILIRUB CONJ SERPL-MCNC: 0.2 MG/DL (ref 0–0.2)
BILIRUB SERPL-MCNC: 0.8 MG/DL (ref 0.2–1)
BUN SERPL-MCNC: 49 MG/DL (ref 6–20)
BUN/CREAT SERPL: 6 (ref 7–25)
CALCIUM SERPL-MCNC: 9.9 MG/DL (ref 8.4–10.2)
CHLORIDE SERPL-SCNC: 95 MMOL/L (ref 97–110)
CO2 SERPL-SCNC: 29 MMOL/L (ref 21–32)
CREAT SERPL-MCNC: 7.76 MG/DL (ref 0.67–1.17)
DEPRECATED RDW RBC: 54.9 FL (ref 39–50)
DIASTOLIC BLOOD PRESSURE: 94
EGFRCR SERPLBLD CKD-EPI 2021: 8 ML/MIN/{1.73_M2}
EOSINOPHIL # BLD: 0.1 K/MCL (ref 0–0.5)
EOSINOPHIL NFR BLD: 1 %
ERYTHROCYTE [DISTWIDTH] IN BLOOD: 15.2 % (ref 11–15)
FASTING DURATION TIME PATIENT: ABNORMAL H
FLUAV RNA RESP QL NAA+PROBE: NOT DETECTED
FLUBV RNA RESP QL NAA+PROBE: NOT DETECTED
GLUCOSE BLDC GLUCOMTR-MCNC: 165 MG/DL (ref 70–99)
GLUCOSE SERPL-MCNC: 175 MG/DL (ref 70–99)
HCT VFR BLD CALC: 27.9 % (ref 39–51)
HGB BLD-MCNC: 9.3 G/DL (ref 13–17)
IMM GRANULOCYTES # BLD AUTO: 0 K/MCL (ref 0–0.2)
IMM GRANULOCYTES # BLD: 0 %
LACTATE BLDV-SCNC: 1.9 MMOL/L (ref 0–2)
LYMPHOCYTES # BLD: 0.5 K/MCL (ref 1–4)
LYMPHOCYTES NFR BLD: 10 %
MAGNESIUM SERPL-MCNC: 2 MG/DL (ref 1.7–2.4)
MCH RBC QN AUTO: 32.9 PG (ref 26–34)
MCHC RBC AUTO-ENTMCNC: 33.3 G/DL (ref 32–36.5)
MCV RBC AUTO: 98.6 FL (ref 78–100)
MONOCYTES # BLD: 0.7 K/MCL (ref 0.3–0.9)
MONOCYTES NFR BLD: 13 %
NEUTROPHILS # BLD: 3.8 K/MCL (ref 1.8–7.7)
NEUTROPHILS NFR BLD: 75 %
NRBC BLD MANUAL-RTO: 0 /100 WBC
NT-PROBNP SERPL-MCNC: ABNORMAL PG/ML
P AXIS (DEGREES): 42
P AXIS (DEGREES): 62
PLATELET # BLD AUTO: 151 K/MCL (ref 140–450)
POTASSIUM SERPL-SCNC: 4.5 MMOL/L (ref 3.4–5.1)
PR-INTERVAL (MSEC): 194
PR-INTERVAL (MSEC): 194
PROCALCITONIN SERPL IA-MCNC: 0.15 NG/ML
PROCALCITONIN SERPL IA-MCNC: 0.18 NG/ML
PROT SERPL-MCNC: 7.8 G/DL (ref 6.4–8.2)
QRS-INTERVAL (MSEC): 126
QRS-INTERVAL (MSEC): 128
QT-INTERVAL (MSEC): 382
QT-INTERVAL (MSEC): 396
QTC: 502
QTC: 506
R AXIS (DEGREES): 22
R AXIS (DEGREES): 9
RBC # BLD: 2.83 MIL/MCL (ref 4.5–5.9)
REPORT TEXT: NORMAL
REPORT TEXT: NORMAL
RSV AG NPH QL IA.RAPID: NOT DETECTED
SARS-COV-2 RNA RESP QL NAA+PROBE: NOT DETECTED
SERVICE CMNT-IMP: NORMAL
SERVICE CMNT-IMP: NORMAL
SODIUM SERPL-SCNC: 137 MMOL/L (ref 135–145)
SYSTOLIC BLOOD PRESSURE: 170
T AXIS (DEGREES): 132
T AXIS (DEGREES): 147
TROPONIN I SERPL DL<=0.01 NG/ML-MCNC: 583 NG/L
TROPONIN I SERPL DL<=0.01 NG/ML-MCNC: 589 NG/L
TROPONIN I SERPL DL<=0.01 NG/ML-MCNC: 643 NG/L
VENTRICULAR RATE EKG/MIN (BPM): 104
VENTRICULAR RATE EKG/MIN (BPM): 98
WBC # BLD: 5.1 K/MCL (ref 4.2–11)

## 2024-06-28 PROCEDURE — 82728 ASSAY OF FERRITIN: CPT | Performed by: INTERNAL MEDICINE

## 2024-06-28 PROCEDURE — 10002801 HB RX 250 W/O HCPCS: Performed by: HOSPITALIST

## 2024-06-28 PROCEDURE — 87340 HEPATITIS B SURFACE AG IA: CPT | Performed by: INTERNAL MEDICINE

## 2024-06-28 PROCEDURE — 71045 X-RAY EXAM CHEST 1 VIEW: CPT

## 2024-06-28 PROCEDURE — 10002807 HB RX 258: Performed by: HOSPITALIST

## 2024-06-28 PROCEDURE — 10002800 HB RX 250 W HCPCS: Performed by: EMERGENCY MEDICINE

## 2024-06-28 PROCEDURE — 84484 ASSAY OF TROPONIN QUANT: CPT | Performed by: HOSPITALIST

## 2024-06-28 PROCEDURE — 80076 HEPATIC FUNCTION PANEL: CPT | Performed by: HOSPITALIST

## 2024-06-28 PROCEDURE — 96374 THER/PROPH/DIAG INJ IV PUSH: CPT

## 2024-06-28 PROCEDURE — 10002800 HB RX 250 W HCPCS: Performed by: HOSPITALIST

## 2024-06-28 PROCEDURE — 10002807 HB RX 258: Performed by: EMERGENCY MEDICINE

## 2024-06-28 PROCEDURE — 10004651 HB RX, NO CHARGE ITEM: Performed by: HOSPITALIST

## 2024-06-28 PROCEDURE — 87641 MR-STAPH DNA AMP PROBE: CPT | Performed by: HOSPITALIST

## 2024-06-28 PROCEDURE — 80048 BASIC METABOLIC PNL TOTAL CA: CPT | Performed by: EMERGENCY MEDICINE

## 2024-06-28 PROCEDURE — 10000002 HB ROOM CHARGE MED SURG

## 2024-06-28 PROCEDURE — 82962 GLUCOSE BLOOD TEST: CPT

## 2024-06-28 PROCEDURE — 96368 THER/DIAG CONCURRENT INF: CPT

## 2024-06-28 PROCEDURE — 84484 ASSAY OF TROPONIN QUANT: CPT | Performed by: EMERGENCY MEDICINE

## 2024-06-28 PROCEDURE — 83550 IRON BINDING TEST: CPT | Performed by: INTERNAL MEDICINE

## 2024-06-28 PROCEDURE — 86704 HEP B CORE ANTIBODY TOTAL: CPT | Performed by: INTERNAL MEDICINE

## 2024-06-28 PROCEDURE — 36415 COLL VENOUS BLD VENIPUNCTURE: CPT

## 2024-06-28 PROCEDURE — 85025 COMPLETE CBC W/AUTO DIFF WBC: CPT | Performed by: EMERGENCY MEDICINE

## 2024-06-28 PROCEDURE — 86706 HEP B SURFACE ANTIBODY: CPT | Performed by: INTERNAL MEDICINE

## 2024-06-28 PROCEDURE — 82607 VITAMIN B-12: CPT | Performed by: INTERNAL MEDICINE

## 2024-06-28 PROCEDURE — 87040 BLOOD CULTURE FOR BACTERIA: CPT | Performed by: EMERGENCY MEDICINE

## 2024-06-28 PROCEDURE — 83735 ASSAY OF MAGNESIUM: CPT | Performed by: EMERGENCY MEDICINE

## 2024-06-28 PROCEDURE — 0241U COVID/FLU/RSV PANEL: CPT | Performed by: EMERGENCY MEDICINE

## 2024-06-28 PROCEDURE — 84145 PROCALCITONIN (PCT): CPT | Performed by: EMERGENCY MEDICINE

## 2024-06-28 PROCEDURE — 96375 TX/PRO/DX INJ NEW DRUG ADDON: CPT

## 2024-06-28 PROCEDURE — 99285 EMERGENCY DEPT VISIT HI MDM: CPT | Performed by: EMERGENCY MEDICINE

## 2024-06-28 PROCEDURE — 99285 EMERGENCY DEPT VISIT HI MDM: CPT

## 2024-06-28 PROCEDURE — 99223 1ST HOSP IP/OBS HIGH 75: CPT | Performed by: HOSPITALIST

## 2024-06-28 PROCEDURE — 96372 THER/PROPH/DIAG INJ SC/IM: CPT | Performed by: HOSPITALIST

## 2024-06-28 PROCEDURE — 71045 X-RAY EXAM CHEST 1 VIEW: CPT | Performed by: RADIOLOGY

## 2024-06-28 PROCEDURE — 83605 ASSAY OF LACTIC ACID: CPT | Performed by: EMERGENCY MEDICINE

## 2024-06-28 PROCEDURE — 96361 HYDRATE IV INFUSION ADD-ON: CPT

## 2024-06-28 PROCEDURE — 93005 ELECTROCARDIOGRAM TRACING: CPT | Performed by: EMERGENCY MEDICINE

## 2024-06-28 PROCEDURE — 83880 ASSAY OF NATRIURETIC PEPTIDE: CPT | Performed by: EMERGENCY MEDICINE

## 2024-06-28 PROCEDURE — 93010 ELECTROCARDIOGRAM REPORT: CPT | Performed by: EMERGENCY MEDICINE

## 2024-06-28 PROCEDURE — 84145 PROCALCITONIN (PCT): CPT | Performed by: HOSPITALIST

## 2024-06-28 RX ORDER — ASPIRIN 81 MG/1
81 TABLET ORAL DAILY
COMMUNITY

## 2024-06-28 RX ORDER — HEPARIN SODIUM 5000 [USP'U]/ML
5000 INJECTION, SOLUTION INTRAVENOUS; SUBCUTANEOUS EVERY 8 HOURS SCHEDULED
Status: DISCONTINUED | OUTPATIENT
Start: 2024-06-28 | End: 2024-06-30 | Stop reason: HOSPADM

## 2024-06-28 RX ORDER — NICOTINE POLACRILEX 4 MG
30 LOZENGE BUCCAL PRN
Status: DISCONTINUED | OUTPATIENT
Start: 2024-06-28 | End: 2024-06-30 | Stop reason: HOSPADM

## 2024-06-28 RX ORDER — ATORVASTATIN CALCIUM 80 MG/1
1 TABLET, FILM COATED ORAL NIGHTLY
COMMUNITY
Start: 2024-06-19

## 2024-06-28 RX ORDER — ACETAMINOPHEN 325 MG/1
650 TABLET ORAL EVERY 6 HOURS PRN
Status: DISCONTINUED | OUTPATIENT
Start: 2024-06-28 | End: 2024-06-30 | Stop reason: HOSPADM

## 2024-06-28 RX ORDER — CEFAZOLIN SODIUM/WATER 2 G/20 ML
2000 SYRINGE (ML) INTRAVENOUS ONCE
Status: DISCONTINUED | OUTPATIENT
Start: 2024-06-28 | End: 2024-06-28

## 2024-06-28 RX ORDER — SEVELAMER CARBONATE 800 MG/1
4 TABLET, FILM COATED ORAL
COMMUNITY
Start: 2024-06-18

## 2024-06-28 RX ORDER — INSULIN GLARGINE 100 [IU]/ML
15 INJECTION, SOLUTION SUBCUTANEOUS NIGHTLY
COMMUNITY
Start: 2024-04-15

## 2024-06-28 RX ORDER — DEXTROSE MONOHYDRATE 25 G/50ML
12.5 INJECTION, SOLUTION INTRAVENOUS PRN
Status: DISCONTINUED | OUTPATIENT
Start: 2024-06-28 | End: 2024-06-30 | Stop reason: HOSPADM

## 2024-06-28 RX ORDER — ACETAMINOPHEN 650 MG/1
650 SUPPOSITORY RECTAL EVERY 4 HOURS PRN
Status: DISCONTINUED | OUTPATIENT
Start: 2024-06-28 | End: 2024-06-30 | Stop reason: HOSPADM

## 2024-06-28 RX ORDER — DEXTROSE MONOHYDRATE 25 G/50ML
25 INJECTION, SOLUTION INTRAVENOUS PRN
Status: DISCONTINUED | OUTPATIENT
Start: 2024-06-28 | End: 2024-06-30 | Stop reason: HOSPADM

## 2024-06-28 RX ORDER — NICOTINE POLACRILEX 4 MG
15 LOZENGE BUCCAL PRN
Status: DISCONTINUED | OUTPATIENT
Start: 2024-06-28 | End: 2024-06-30 | Stop reason: HOSPADM

## 2024-06-28 RX ORDER — SITAGLIPTIN 25 MG/1
1 TABLET, FILM COATED ORAL DAILY
Status: ON HOLD | COMMUNITY
Start: 2024-06-22 | End: 2024-06-30 | Stop reason: HOSPADM

## 2024-06-28 RX ORDER — CARVEDILOL 25 MG/1
25 TABLET ORAL 2 TIMES DAILY WITH MEALS
COMMUNITY

## 2024-06-28 RX ORDER — SACUBITRIL AND VALSARTAN 24; 26 MG/1; MG/1
1 TABLET, FILM COATED ORAL 2 TIMES DAILY
COMMUNITY
Start: 2024-05-03

## 2024-06-28 RX ORDER — 0.9 % SODIUM CHLORIDE 0.9 %
2 VIAL (ML) INJECTION EVERY 12 HOURS SCHEDULED
Status: DISCONTINUED | OUTPATIENT
Start: 2024-06-28 | End: 2024-06-30 | Stop reason: HOSPADM

## 2024-06-28 RX ADMIN — AZITHROMYCIN 500 MG: 500 INJECTION, POWDER, LYOPHILIZED, FOR SOLUTION INTRAVENOUS at 20:16

## 2024-06-28 RX ADMIN — HEPARIN SODIUM 5000 UNITS: 5000 INJECTION INTRAVENOUS; SUBCUTANEOUS at 23:05

## 2024-06-28 RX ADMIN — SODIUM CHLORIDE, PRESERVATIVE FREE 2 ML: 5 INJECTION INTRAVENOUS at 23:05

## 2024-06-28 RX ADMIN — CEFEPIME 1000 MG: 1 INJECTION, POWDER, FOR SOLUTION INTRAMUSCULAR; INTRAVENOUS at 20:13

## 2024-06-28 RX ADMIN — VANCOMYCIN HYDROCHLORIDE 1500 MG: 10 INJECTION, POWDER, LYOPHILIZED, FOR SOLUTION INTRAVENOUS at 23:05

## 2024-06-28 RX ADMIN — ACETAMINOPHEN 650 MG: 325 TABLET ORAL at 23:13

## 2024-06-28 SDOH — ECONOMIC STABILITY: HOUSING INSECURITY: WHAT IS YOUR LIVING SITUATION TODAY?: APARTMENT

## 2024-06-28 SDOH — HEALTH STABILITY: GENERAL
BECAUSE OF A PHYSICAL, MENTAL, OR EMOTIONAL CONDITION, DO YOU HAVE SERIOUS DIFFICULTY CONCENTRATING, REMEMBERING OR MAKING DECISIONS?: NO

## 2024-06-28 SDOH — ECONOMIC STABILITY: FOOD INSECURITY: WITHIN THE PAST 12 MONTHS, THE FOOD YOU BOUGHT JUST DIDN'T LAST AND YOU DIDN'T HAVE MONEY TO GET MORE.: NEVER TRUE

## 2024-06-28 SDOH — ECONOMIC STABILITY: INCOME INSECURITY: IN THE PAST 12 MONTHS, HAS THE ELECTRIC, GAS, OIL, OR WATER COMPANY THREATENED TO SHUT OFF SERVICE IN YOUR HOME?: NO

## 2024-06-28 SDOH — SOCIAL STABILITY: SOCIAL INSECURITY: HOW OFTEN DOES ANYONE, INCLUDING FAMILY AND FRIENDS, THREATEN YOU WITH HARM?: NEVER

## 2024-06-28 SDOH — SOCIAL STABILITY: SOCIAL INSECURITY: HOW OFTEN DOES ANYONE, INCLUDING FAMILY AND FRIENDS, PHYSICALLY HURT YOU?: NEVER

## 2024-06-28 SDOH — ECONOMIC STABILITY: HOUSING INSECURITY: WHAT IS YOUR LIVING SITUATION TODAY?: I HAVE A STEADY PLACE TO LIVE

## 2024-06-28 SDOH — ECONOMIC STABILITY: TRANSPORTATION INSECURITY
IN THE PAST 12 MONTHS, HAS LACK OF RELIABLE TRANSPORTATION KEPT YOU FROM MEDICAL APPOINTMENTS, MEETINGS, WORK OR FROM GETTING THINGS NEEDED FOR DAILY LIVING?: NO

## 2024-06-28 SDOH — ECONOMIC STABILITY: GENERAL

## 2024-06-28 SDOH — ECONOMIC STABILITY: HOUSING INSECURITY: DO YOU HAVE PROBLEMS WITH ANY OF THE FOLLOWING?: NONE OF THE ABOVE

## 2024-06-28 SDOH — SOCIAL STABILITY: SOCIAL NETWORK
HOW OFTEN DO YOU SEE OR TALK TO PEOPLE THAT YOU CARE ABOUT AND FEEL CLOSE TO? (FOR EXAMPLE: TALKING TO FRIENDS ON THE PHONE, VISITING FRIENDS OR FAMILY, GOING TO CHURCH OR CLUB MEETINGS): 5 OR MORE TIMES A WEEK

## 2024-06-28 SDOH — HEALTH STABILITY: PHYSICAL HEALTH: DO YOU HAVE DIFFICULTY DRESSING OR BATHING?: NO

## 2024-06-28 SDOH — SOCIAL STABILITY: SOCIAL INSECURITY: HOW OFTEN DOES ANYONE, INCLUDING FAMILY AND FRIENDS, INSULT OR TALK DOWN TO YOU?: NEVER

## 2024-06-28 SDOH — SOCIAL STABILITY: SOCIAL INSECURITY: HOW OFTEN DOES ANYONE, INCLUDING FAMILY AND FRIENDS, SCREAM OR CURSE AT YOU?: NEVER

## 2024-06-28 SDOH — SOCIAL STABILITY: SOCIAL NETWORK: SUPPORT SYSTEMS: CHILDREN;FAMILY MEMBERS;FRIENDS

## 2024-06-28 SDOH — ECONOMIC STABILITY: GENERAL: WOULD YOU LIKE HELP WITH ANY OF THE FOLLOWING NEEDS?: I DON'T WANT HELP WITH ANY OF THESE

## 2024-06-28 SDOH — HEALTH STABILITY: PHYSICAL HEALTH: DO YOU HAVE SERIOUS DIFFICULTY WALKING OR CLIMBING STAIRS?: YES

## 2024-06-28 SDOH — ECONOMIC STABILITY: HOUSING INSECURITY: WHAT IS YOUR LIVING SITUATION TODAY?: ALONE

## 2024-06-28 SDOH — HEALTH STABILITY: GENERAL: BECAUSE OF A PHYSICAL, MENTAL, OR EMOTIONAL CONDITION, DO YOU HAVE DIFFICULTY DOING ERRANDS ALONE?: NO

## 2024-06-28 ASSESSMENT — PATIENT HEALTH QUESTIONNAIRE - PHQ9
SUM OF ALL RESPONSES TO PHQ9 QUESTIONS 1 AND 2: 1
2. FEELING DOWN, DEPRESSED OR HOPELESS: NOT AT ALL
CLINICAL INTERPRETATION OF PHQ2 SCORE: NO FURTHER SCREENING NEEDED
1. LITTLE INTEREST OR PLEASURE IN DOING THINGS: SEVERAL DAYS
IS PATIENT ABLE TO COMPLETE PHQ2 OR PHQ9: YES
SUM OF ALL RESPONSES TO PHQ9 QUESTIONS 1 AND 2: 1

## 2024-06-28 ASSESSMENT — COLUMBIA-SUICIDE SEVERITY RATING SCALE - C-SSRS
6. HAVE YOU EVER DONE ANYTHING, STARTED TO DO ANYTHING, OR PREPARED TO DO ANYTHING TO END YOUR LIFE?: NO
4. HAVE YOU HAD THESE THOUGHTS AND HAD SOME INTENTION OF ACTING ON THEM?: NO
2. HAVE YOU ACTUALLY HAD ANY THOUGHTS OF KILLING YOURSELF?: NO
1. WITHIN THE PAST MONTH, HAVE YOU WISHED YOU WERE DEAD OR WISHED YOU COULD GO TO SLEEP AND NOT WAKE UP?: NO
3. HAVE YOU BEEN THINKING ABOUT HOW YOU MIGHT KILL YOURSELF?: NO
5. HAVE YOU STARTED TO WORK OUT OR WORKED OUT THE DETAILS OF HOW TO KILL YOURSELF? DO YOU INTEND TO CARRY OUT THIS PLAN?: NO
IS THE PATIENT ABLE TO COMPLETE C-SSRS: YES

## 2024-06-28 ASSESSMENT — ACTIVITIES OF DAILY LIVING (ADL)
ADL_BEFORE_ADMISSION: INDEPENDENT
RECENT_DECLINE_ADL: NO
BATHING: INDEPENDENT
TOILETING: INDEPENDENT
ADL_SCORE: 24
ADL_SHORT_OF_BREATH: YES
FEEDING: INDEPENDENT
DRESSING: INDEPENDENT

## 2024-06-28 ASSESSMENT — PAIN SCALES - GENERAL
PAINLEVEL_OUTOF10: 3
PAINLEVEL_OUTOF10: 0
PAINLEVEL_OUTOF10: 3

## 2024-06-28 ASSESSMENT — LIFESTYLE VARIABLES
HOW OFTEN DO YOU HAVE 6 OR MORE DRINKS ON ONE OCCASION: NEVER
HOW OFTEN DO YOU HAVE A DRINK CONTAINING ALCOHOL: MONTHLY OR LESS
HOW MANY STANDARD DRINKS CONTAINING ALCOHOL DO YOU HAVE ON A TYPICAL DAY: 0,1 OR 2
AUDIT-C TOTAL SCORE: 1
ALCOHOL_USE_STATUS: NO OR LOW RISK WITH VALIDATED TOOL

## 2024-06-28 ASSESSMENT — PAIN DESCRIPTION - PAIN TYPE: TYPE: CHEST PAIN

## 2024-06-29 ENCOUNTER — APPOINTMENT (OUTPATIENT)
Dept: UROLOGY | Age: 54
DRG: 720 | End: 2024-06-29
Attending: INTERNAL MEDICINE

## 2024-06-29 ENCOUNTER — APPOINTMENT (OUTPATIENT)
Dept: CV DIAGNOSTICS | Age: 54
DRG: 720 | End: 2024-06-29
Attending: HOSPITALIST

## 2024-06-29 VITALS
HEIGHT: 76 IN | RESPIRATION RATE: 16 BRPM | SYSTOLIC BLOOD PRESSURE: 132 MMHG | TEMPERATURE: 99 F | DIASTOLIC BLOOD PRESSURE: 72 MMHG | WEIGHT: 154.32 LBS | OXYGEN SATURATION: 92 % | HEART RATE: 79 BPM | BODY MASS INDEX: 18.79 KG/M2

## 2024-06-29 LAB
ALBUMIN SERPL-MCNC: 3.4 G/DL (ref 3.6–5.1)
ALBUMIN/GLOB SERPL: 1 {RATIO} (ref 1–2.4)
ALP SERPL-CCNC: 95 UNITS/L (ref 45–117)
ALT SERPL-CCNC: 16 UNITS/L
ANION GAP SERPL CALC-SCNC: 17 MMOL/L (ref 7–19)
AORTIC VALVE AREA: 1.37 CM²
ASCENDING AORTA (AAD): 3.23 CM
AST SERPL-CCNC: 15 UNITS/L
AV MEAN GRADIENT (AVMG): 11.32 MMHG
AV PEAK GRADIENT (AVPG): 20.18 MMHG
AV PEAK VELOCITY (AVPV): 2.1 M/S
AVI LVOT PEAK GRADIENT (LVOTMG): 2.04 MMHG
B PARAPERT DNA SPEC QL NAA+PROBE: NOT DETECTED
B PERT.PT PRMT NPH QL NAA+NON-PROBE: NOT DETECTED
BACTERIA BLD CULT: NORMAL
BACTERIA BLD CULT: NORMAL
BASOPHILS # BLD: 0.1 K/MCL (ref 0–0.3)
BASOPHILS NFR BLD: 1 %
BILIRUB SERPL-MCNC: 0.8 MG/DL (ref 0.2–1)
BUN SERPL-MCNC: 56 MG/DL (ref 6–20)
BUN/CREAT SERPL: 6 (ref 7–25)
C PNEUM DNA NPH QL NAA+NON-PROBE: NOT DETECTED
CALCIUM SERPL-MCNC: 9.6 MG/DL (ref 8.4–10.2)
CHLORIDE SERPL-SCNC: 98 MMOL/L (ref 97–110)
CO2 SERPL-SCNC: 27 MMOL/L (ref 21–32)
CREAT SERPL-MCNC: 8.71 MG/DL (ref 0.67–1.17)
DEPRECATED RDW RBC: 55.1 FL (ref 39–50)
DOP CALC LVOT PEAK VEL (LVOTPV): 0.9 M/S
E WAVE DECELARATION TIME (MDT): 0.16 S
EGFRCR SERPLBLD CKD-EPI 2021: 7 ML/MIN/{1.73_M2}
EOSINOPHIL # BLD: 0 K/MCL (ref 0–0.5)
EOSINOPHIL NFR BLD: 1 %
ERYTHROCYTE [DISTWIDTH] IN BLOOD: 15.2 % (ref 11–15)
EST RIGHT VENT SYSTOLIC PRESSURE BY TRICUSPID REGURGITATION JET (RVSP): 36.51 MMHG
FASTING DURATION TIME PATIENT: ABNORMAL H
FERRITIN SERPL-MCNC: 717 NG/ML (ref 26–388)
FLUAV RNA NPH QL NAA+NON-PROBE: NOT DETECTED
FLUBV RNA NPH QL NAA+NON-PROBE: NOT DETECTED
GLOBULIN SER-MCNC: 3.5 G/DL (ref 2–4)
GLUCOSE BLDC GLUCOMTR-MCNC: 184 MG/DL (ref 70–99)
GLUCOSE BLDC GLUCOMTR-MCNC: 199 MG/DL (ref 70–99)
GLUCOSE BLDC GLUCOMTR-MCNC: 231 MG/DL (ref 70–99)
GLUCOSE BLDC GLUCOMTR-MCNC: 237 MG/DL (ref 70–99)
GLUCOSE BLDC GLUCOMTR-MCNC: 310 MG/DL (ref 70–99)
GLUCOSE SERPL-MCNC: 243 MG/DL (ref 70–99)
HADV DNA NPH QL NAA+NON-PROBE: NOT DETECTED
HBV CORE IGG+IGM SER QL: NEGATIVE
HBV SURFACE AB SER-ACNC: 15.51 MUNITS/ML
HBV SURFACE AG SER QL: NEGATIVE
HCOV 229E RNA NPH QL NAA+NON-PROBE: NOT DETECTED
HCOV HKU1 RNA NPH QL NAA+NON-PROBE: NOT DETECTED
HCOV NL63 RNA NPH QL NAA+NON-PROBE: NOT DETECTED
HCOV OC43 RNA NPH QL NAA+NON-PROBE: NOT DETECTED
HCT VFR BLD CALC: 26.2 % (ref 39–51)
HGB BLD-MCNC: 8.5 G/DL (ref 13–17)
HMPV RNA NPH QL NAA+NON-PROBE: NOT DETECTED
HPIV1 RNA NPH QL NAA+NON-PROBE: NOT DETECTED
HPIV2 RNA NPH QL NAA+NON-PROBE: NOT DETECTED
HPIV3 RNA NPH QL NAA+NON-PROBE: NOT DETECTED
HPIV4 RNA NPH QL NAA+NON-PROBE: NOT DETECTED
IMM GRANULOCYTES # BLD AUTO: 0 K/MCL (ref 0–0.2)
IMM GRANULOCYTES # BLD: 0 %
INTERVENTRICULAR SEPTUM IN END DIASTOLE (IVSD): 1.68 CM
IRON SATN MFR SERPL: 17 % (ref 15–45)
IRON SERPL-MCNC: 40 MCG/DL (ref 65–175)
LEFT INTERNAL DIMENSION IN SYSTOLE (LVSD): 4.82 CM
LEFT VENTRICULAR INTERNAL DIMENSION IN DIASTOLE (LVDD): 5.51 CM
LEFT VENTRICULAR POSTERIOR WALL IN END DIASTOLE (LVPW): 1.39 CM
LV EF: 28 %
LV END SYSTOLIC LONGITUDINAL STRAIN GLOBAL (LVGS): -6 %
LVOT 2D (LVOTD): 2.06 CM
LVOT VTI (LVOTVTI): 17.73 CM
LYMPHOCYTES # BLD: 0.5 K/MCL (ref 1–4)
LYMPHOCYTES NFR BLD: 11 %
M PNEUMO DNA NPH QL NAA+NON-PROBE: NOT DETECTED
MAGNESIUM SERPL-MCNC: 2 MG/DL (ref 1.7–2.4)
MCH RBC QN AUTO: 32.3 PG (ref 26–34)
MCHC RBC AUTO-ENTMCNC: 32.4 G/DL (ref 32–36.5)
MCV RBC AUTO: 99.6 FL (ref 78–100)
MONOCYTES # BLD: 0.6 K/MCL (ref 0.3–0.9)
MONOCYTES NFR BLD: 14 %
MRSA DNA SPEC QL NAA+PROBE: NOT DETECTED
MV E TISSUE VEL LAT (MELV): 0 M/S
MV E TISSUE VEL MED (MESV): 0 M/S
MV E WAVE VEL/E TISSUE VEL MED(MSR): 51.94 UNITLESS
MV PEAK A VELOCITY (MVPAV): 0.3 M/S
MV PEAK E VELOCITY (MVPEV): 1 M/S
NEUTROPHILS # BLD: 3.5 K/MCL (ref 1.8–7.7)
NEUTROPHILS NFR BLD: 73 %
NRBC BLD MANUAL-RTO: 0 /100 WBC
PLATELET # BLD AUTO: 126 K/MCL (ref 140–450)
POTASSIUM SERPL-SCNC: 4.8 MMOL/L (ref 3.4–5.1)
PROT SERPL-MCNC: 6.9 G/DL (ref 6.4–8.2)
PV PEAK VELOCITY (PVPV): 0.8 M/S
RAINBOW EXTRA TUBES HOLD SPECIMEN: NORMAL
RBC # BLD: 2.63 MIL/MCL (ref 4.5–5.9)
RIGHT VENTRICULAR AREA CHANGE (APICAL 4-CHAMBER VIEW) (RVFAC): 46.12 %
RSV RNA NPH QL NAA+NON-PROBE: NOT DETECTED
RV+EV RNA NPH QL NAA+NON-PROBE: NOT DETECTED
SARS-COV-2 RNA RESP QL NAA+PROBE: NOT DETECTED
SINUSES OF VALSALVA (SVD): 3.34 CM
SODIUM SERPL-SCNC: 137 MMOL/L (ref 135–145)
TIBC SERPL-MCNC: 239 MCG/DL (ref 250–450)
TRICUSPID VALVE PEAK REGURGITATION VELOCITY (TRPV): 1.9 M/S
TROPONIN I SERPL DL<=0.01 NG/ML-MCNC: 649 NG/L
TROPONIN I SERPL DL<=0.01 NG/ML-MCNC: 665 NG/L
TV ESTIMATED RIGHT ARTERIAL PRESSURE (RAP): 8 MMHG
VIT B12 SERPL-MCNC: 635 PG/ML (ref 211–911)
WBC # BLD: 4.8 K/MCL (ref 4.2–11)

## 2024-06-29 PROCEDURE — 80053 COMPREHEN METABOLIC PANEL: CPT | Performed by: HOSPITALIST

## 2024-06-29 PROCEDURE — 5A1D70Z PERFORMANCE OF URINARY FILTRATION, INTERMITTENT, LESS THAN 6 HOURS PER DAY: ICD-10-PCS | Performed by: INTERNAL MEDICINE

## 2024-06-29 PROCEDURE — 97165 OT EVAL LOW COMPLEX 30 MIN: CPT | Performed by: OCCUPATIONAL THERAPIST

## 2024-06-29 PROCEDURE — 93356 MYOCRD STRAIN IMG SPCKL TRCK: CPT

## 2024-06-29 PROCEDURE — 93356 MYOCRD STRAIN IMG SPCKL TRCK: CPT | Performed by: INTERNAL MEDICINE

## 2024-06-29 PROCEDURE — 90935 HEMODIALYSIS ONE EVALUATION: CPT

## 2024-06-29 PROCEDURE — 10002800 HB RX 250 W HCPCS: Performed by: HOSPITALIST

## 2024-06-29 PROCEDURE — 96372 THER/PROPH/DIAG INJ SC/IM: CPT | Performed by: HOSPITALIST

## 2024-06-29 PROCEDURE — 10002803 HB RX 637: Performed by: STUDENT IN AN ORGANIZED HEALTH CARE EDUCATION/TRAINING PROGRAM

## 2024-06-29 PROCEDURE — 10004651 HB RX, NO CHARGE ITEM: Performed by: HOSPITALIST

## 2024-06-29 PROCEDURE — 10002801 HB RX 250 W/O HCPCS: Performed by: HOSPITALIST

## 2024-06-29 PROCEDURE — 10002800 HB RX 250 W HCPCS: Performed by: INTERNAL MEDICINE

## 2024-06-29 PROCEDURE — 99223 1ST HOSP IP/OBS HIGH 75: CPT | Performed by: INTERNAL MEDICINE

## 2024-06-29 PROCEDURE — 85025 COMPLETE CBC W/AUTO DIFF WBC: CPT | Performed by: HOSPITALIST

## 2024-06-29 PROCEDURE — 10002800 HB RX 250 W HCPCS: Performed by: STUDENT IN AN ORGANIZED HEALTH CARE EDUCATION/TRAINING PROGRAM

## 2024-06-29 PROCEDURE — 10000002 HB ROOM CHARGE MED SURG

## 2024-06-29 PROCEDURE — 96372 THER/PROPH/DIAG INJ SC/IM: CPT | Performed by: STUDENT IN AN ORGANIZED HEALTH CARE EDUCATION/TRAINING PROGRAM

## 2024-06-29 PROCEDURE — 10003445 HB TELEMETRY PER DAY

## 2024-06-29 PROCEDURE — 10004172 HB COUNTER-THERAPY VISIT OT: Performed by: OCCUPATIONAL THERAPIST

## 2024-06-29 PROCEDURE — 93306 TTE W/DOPPLER COMPLETE: CPT | Performed by: INTERNAL MEDICINE

## 2024-06-29 PROCEDURE — 82962 GLUCOSE BLOOD TEST: CPT

## 2024-06-29 PROCEDURE — 10004651 HB RX, NO CHARGE ITEM: Performed by: STUDENT IN AN ORGANIZED HEALTH CARE EDUCATION/TRAINING PROGRAM

## 2024-06-29 PROCEDURE — 83735 ASSAY OF MAGNESIUM: CPT | Performed by: HOSPITALIST

## 2024-06-29 PROCEDURE — 99233 SBSQ HOSP IP/OBS HIGH 50: CPT | Performed by: STUDENT IN AN ORGANIZED HEALTH CARE EDUCATION/TRAINING PROGRAM

## 2024-06-29 PROCEDURE — 10002807 HB RX 258: Performed by: HOSPITALIST

## 2024-06-29 PROCEDURE — 84484 ASSAY OF TROPONIN QUANT: CPT | Performed by: HOSPITALIST

## 2024-06-29 PROCEDURE — 0202U NFCT DS 22 TRGT SARS-COV-2: CPT | Performed by: STUDENT IN AN ORGANIZED HEALTH CARE EDUCATION/TRAINING PROGRAM

## 2024-06-29 PROCEDURE — 36415 COLL VENOUS BLD VENIPUNCTURE: CPT | Performed by: HOSPITALIST

## 2024-06-29 RX ORDER — ATORVASTATIN CALCIUM 40 MG/1
80 TABLET, FILM COATED ORAL NIGHTLY
Status: DISCONTINUED | OUTPATIENT
Start: 2024-06-29 | End: 2024-06-30 | Stop reason: HOSPADM

## 2024-06-29 RX ORDER — ASPIRIN 81 MG/1
81 TABLET ORAL DAILY
Status: DISCONTINUED | OUTPATIENT
Start: 2024-06-29 | End: 2024-06-30 | Stop reason: HOSPADM

## 2024-06-29 RX ORDER — ALBUMIN (HUMAN) 12.5 G/50ML
12.5 SOLUTION INTRAVENOUS PRN
Status: DISCONTINUED | OUTPATIENT
Start: 2024-06-29 | End: 2024-06-30 | Stop reason: HOSPADM

## 2024-06-29 RX ORDER — SEVELAMER CARBONATE 800 MG/1
3200 TABLET, FILM COATED ORAL
Status: DISCONTINUED | OUTPATIENT
Start: 2024-06-29 | End: 2024-06-30 | Stop reason: HOSPADM

## 2024-06-29 RX ORDER — CARVEDILOL 12.5 MG/1
25 TABLET ORAL 2 TIMES DAILY WITH MEALS
Status: DISCONTINUED | OUTPATIENT
Start: 2024-06-29 | End: 2024-06-30 | Stop reason: HOSPADM

## 2024-06-29 RX ORDER — INSULIN GLARGINE 100 [IU]/ML
10 INJECTION, SOLUTION SUBCUTANEOUS NIGHTLY
Status: DISCONTINUED | OUTPATIENT
Start: 2024-06-29 | End: 2024-06-30 | Stop reason: HOSPADM

## 2024-06-29 RX ADMIN — INSULIN LISPRO 8 UNITS: 100 INJECTION, SOLUTION INTRAVENOUS; SUBCUTANEOUS at 09:10

## 2024-06-29 RX ADMIN — CARVEDILOL 25 MG: 12.5 TABLET, FILM COATED ORAL at 09:10

## 2024-06-29 RX ADMIN — DOXYCYCLINE 100 MG: 100 INJECTION, POWDER, LYOPHILIZED, FOR SOLUTION INTRAVENOUS at 21:29

## 2024-06-29 RX ADMIN — CEFEPIME 1000 MG: 1 INJECTION, POWDER, FOR SOLUTION INTRAMUSCULAR; INTRAVENOUS at 18:28

## 2024-06-29 RX ADMIN — INSULIN GLARGINE 10 UNITS: 100 INJECTION, SOLUTION SUBCUTANEOUS at 21:44

## 2024-06-29 RX ADMIN — ASPIRIN 81 MG: 81 TABLET, COATED ORAL at 09:10

## 2024-06-29 RX ADMIN — HEPARIN SODIUM 5000 UNITS: 5000 INJECTION INTRAVENOUS; SUBCUTANEOUS at 21:29

## 2024-06-29 RX ADMIN — ERYTHROPOIETIN 20000 UNITS: 20000 INJECTION, SOLUTION INTRAVENOUS; SUBCUTANEOUS at 16:12

## 2024-06-29 RX ADMIN — INSULIN LISPRO 2 UNITS: 100 INJECTION, SOLUTION INTRAVENOUS; SUBCUTANEOUS at 16:42

## 2024-06-29 RX ADMIN — SACUBITRIL AND VALSARTAN 1 TABLET: 24; 26 TABLET, FILM COATED ORAL at 21:29

## 2024-06-29 RX ADMIN — CARVEDILOL 25 MG: 12.5 TABLET, FILM COATED ORAL at 16:42

## 2024-06-29 RX ADMIN — SODIUM CHLORIDE, PRESERVATIVE FREE 2 ML: 5 INJECTION INTRAVENOUS at 21:29

## 2024-06-29 RX ADMIN — SODIUM CHLORIDE, PRESERVATIVE FREE 2 ML: 5 INJECTION INTRAVENOUS at 09:11

## 2024-06-29 RX ADMIN — ATORVASTATIN CALCIUM 80 MG: 40 TABLET, FILM COATED ORAL at 21:29

## 2024-06-29 RX ADMIN — SEVELAMER CARBONATE 3200 MG: 800 TABLET, FILM COATED ORAL at 16:42

## 2024-06-29 RX ADMIN — HEPARIN SODIUM 5000 UNITS: 5000 INJECTION INTRAVENOUS; SUBCUTANEOUS at 05:03

## 2024-06-29 RX ADMIN — HEPARIN SODIUM 1750 UNITS: 1000 INJECTION INTRAVENOUS; SUBCUTANEOUS at 16:20

## 2024-06-29 ASSESSMENT — COGNITIVE AND FUNCTIONAL STATUS - GENERAL
DAILY_ACTIVITY_RAW_SCORE: 24
DAILY_ACTIVITY_CONVERTED_SCORE: 57.54

## 2024-06-29 ASSESSMENT — PAIN SCALES - GENERAL
PAINLEVEL_OUTOF10: 0

## 2024-06-29 ASSESSMENT — ACTIVITIES OF DAILY LIVING (ADL)
GROOMING: INDEPENDENT
PRIOR_ADL_BATHING: INDEPENDENT
PRIOR_ADL: INDEPENDENT
PRIOR_ADL_TOILETING: INDEPENDENT

## 2024-06-30 VITALS
HEIGHT: 76 IN | RESPIRATION RATE: 16 BRPM | OXYGEN SATURATION: 92 % | WEIGHT: 152.12 LBS | HEART RATE: 73 BPM | DIASTOLIC BLOOD PRESSURE: 66 MMHG | SYSTOLIC BLOOD PRESSURE: 125 MMHG | TEMPERATURE: 98.2 F | BODY MASS INDEX: 18.52 KG/M2

## 2024-06-30 LAB
ANION GAP SERPL CALC-SCNC: 15 MMOL/L (ref 7–19)
ATRIAL RATE (BPM): 99
BUN SERPL-MCNC: 41 MG/DL (ref 6–20)
BUN/CREAT SERPL: 6 (ref 7–25)
CALCIUM SERPL-MCNC: 9.9 MG/DL (ref 8.4–10.2)
CHLORIDE SERPL-SCNC: 98 MMOL/L (ref 97–110)
CO2 SERPL-SCNC: 29 MMOL/L (ref 21–32)
CREAT SERPL-MCNC: 7.06 MG/DL (ref 0.67–1.17)
DEPRECATED RDW RBC: 55.3 FL (ref 39–50)
DIASTOLIC BLOOD PRESSURE: 79
EGFRCR SERPLBLD CKD-EPI 2021: 9 ML/MIN/{1.73_M2}
ERYTHROCYTE [DISTWIDTH] IN BLOOD: 15.2 % (ref 11–15)
FASTING DURATION TIME PATIENT: ABNORMAL H
GLUCOSE BLDC GLUCOMTR-MCNC: 186 MG/DL (ref 70–99)
GLUCOSE BLDC GLUCOMTR-MCNC: 213 MG/DL (ref 70–99)
GLUCOSE SERPL-MCNC: 185 MG/DL (ref 70–99)
HBA1C MFR BLD: 7.6 % (ref 4.5–5.6)
HCT VFR BLD CALC: 25.9 % (ref 39–51)
HGB BLD-MCNC: 8.4 G/DL (ref 13–17)
MCH RBC QN AUTO: 32.6 PG (ref 26–34)
MCHC RBC AUTO-ENTMCNC: 32.4 G/DL (ref 32–36.5)
MCV RBC AUTO: 100.4 FL (ref 78–100)
NRBC BLD MANUAL-RTO: 0 /100 WBC
P AXIS (DEGREES): 68
PLATELET # BLD AUTO: 121 K/MCL (ref 140–450)
POTASSIUM SERPL-SCNC: 4.5 MMOL/L (ref 3.4–5.1)
PR-INTERVAL (MSEC): 204
QRS-INTERVAL (MSEC): 124
QT-INTERVAL (MSEC): 392
QTC: 503
R AXIS (DEGREES): 14
RBC # BLD: 2.58 MIL/MCL (ref 4.5–5.9)
REPORT TEXT: NORMAL
SODIUM SERPL-SCNC: 137 MMOL/L (ref 135–145)
SYSTOLIC BLOOD PRESSURE: 150
T AXIS (DEGREES): 123
VENTRICULAR RATE EKG/MIN (BPM): 99
WBC # BLD: 3.4 K/MCL (ref 4.2–11)

## 2024-06-30 PROCEDURE — 93005 ELECTROCARDIOGRAM TRACING: CPT | Performed by: STUDENT IN AN ORGANIZED HEALTH CARE EDUCATION/TRAINING PROGRAM

## 2024-06-30 PROCEDURE — 85027 COMPLETE CBC AUTOMATED: CPT | Performed by: STUDENT IN AN ORGANIZED HEALTH CARE EDUCATION/TRAINING PROGRAM

## 2024-06-30 PROCEDURE — 80048 BASIC METABOLIC PNL TOTAL CA: CPT | Performed by: STUDENT IN AN ORGANIZED HEALTH CARE EDUCATION/TRAINING PROGRAM

## 2024-06-30 PROCEDURE — 10004651 HB RX, NO CHARGE ITEM: Performed by: HOSPITALIST

## 2024-06-30 PROCEDURE — 96372 THER/PROPH/DIAG INJ SC/IM: CPT | Performed by: STUDENT IN AN ORGANIZED HEALTH CARE EDUCATION/TRAINING PROGRAM

## 2024-06-30 PROCEDURE — 10002801 HB RX 250 W/O HCPCS: Performed by: HOSPITALIST

## 2024-06-30 PROCEDURE — 10002807 HB RX 258: Performed by: HOSPITALIST

## 2024-06-30 PROCEDURE — 83036 HEMOGLOBIN GLYCOSYLATED A1C: CPT | Performed by: HOSPITALIST

## 2024-06-30 PROCEDURE — 36415 COLL VENOUS BLD VENIPUNCTURE: CPT | Performed by: HOSPITALIST

## 2024-06-30 PROCEDURE — 10004651 HB RX, NO CHARGE ITEM: Performed by: STUDENT IN AN ORGANIZED HEALTH CARE EDUCATION/TRAINING PROGRAM

## 2024-06-30 PROCEDURE — 10002803 HB RX 637: Performed by: STUDENT IN AN ORGANIZED HEALTH CARE EDUCATION/TRAINING PROGRAM

## 2024-06-30 PROCEDURE — 99233 SBSQ HOSP IP/OBS HIGH 50: CPT | Performed by: INTERNAL MEDICINE

## 2024-06-30 PROCEDURE — 96372 THER/PROPH/DIAG INJ SC/IM: CPT | Performed by: HOSPITALIST

## 2024-06-30 PROCEDURE — 82962 GLUCOSE BLOOD TEST: CPT

## 2024-06-30 PROCEDURE — 10002800 HB RX 250 W HCPCS: Performed by: HOSPITALIST

## 2024-06-30 PROCEDURE — 10002800 HB RX 250 W HCPCS: Performed by: STUDENT IN AN ORGANIZED HEALTH CARE EDUCATION/TRAINING PROGRAM

## 2024-06-30 RX ORDER — LEVOFLOXACIN 250 MG/1
250 TABLET, FILM COATED ORAL DAILY
Qty: 5 TABLET | Refills: 0 | Status: SHIPPED | OUTPATIENT
Start: 2024-06-30 | End: 2024-07-05

## 2024-06-30 RX ADMIN — HEPARIN SODIUM 5000 UNITS: 5000 INJECTION INTRAVENOUS; SUBCUTANEOUS at 05:49

## 2024-06-30 RX ADMIN — INSULIN LISPRO 4 UNITS: 100 INJECTION, SOLUTION INTRAVENOUS; SUBCUTANEOUS at 08:39

## 2024-06-30 RX ADMIN — CARVEDILOL 25 MG: 12.5 TABLET, FILM COATED ORAL at 08:38

## 2024-06-30 RX ADMIN — SACUBITRIL AND VALSARTAN 1 TABLET: 24; 26 TABLET, FILM COATED ORAL at 08:39

## 2024-06-30 RX ADMIN — SEVELAMER CARBONATE 3200 MG: 800 TABLET, FILM COATED ORAL at 12:25

## 2024-06-30 RX ADMIN — SEVELAMER CARBONATE 3200 MG: 800 TABLET, FILM COATED ORAL at 08:39

## 2024-06-30 RX ADMIN — HEPARIN SODIUM 5000 UNITS: 5000 INJECTION INTRAVENOUS; SUBCUTANEOUS at 13:50

## 2024-06-30 RX ADMIN — DOXYCYCLINE 100 MG: 100 INJECTION, POWDER, LYOPHILIZED, FOR SOLUTION INTRAVENOUS at 08:39

## 2024-06-30 RX ADMIN — ASPIRIN 81 MG: 81 TABLET, COATED ORAL at 08:39

## 2024-06-30 RX ADMIN — INSULIN LISPRO 2 UNITS: 100 INJECTION, SOLUTION INTRAVENOUS; SUBCUTANEOUS at 12:25

## 2024-06-30 RX ADMIN — SODIUM CHLORIDE, PRESERVATIVE FREE 2 ML: 5 INJECTION INTRAVENOUS at 08:40

## 2024-06-30 ASSESSMENT — PAIN SCALES - GENERAL: PAINLEVEL_OUTOF10: 0

## 2024-07-03 LAB
BACTERIA BLD CULT: NORMAL
BACTERIA BLD CULT: NORMAL

## 2024-07-23 ENCOUNTER — APPOINTMENT (OUTPATIENT)
Dept: ENDOCRINOLOGY | Facility: CLINIC | Age: 54
End: 2024-07-23
Payer: COMMERCIAL

## 2024-07-29 ENCOUNTER — APPOINTMENT (OUTPATIENT)
Dept: PODIATRY | Facility: CLINIC | Age: 54
End: 2024-07-29
Payer: COMMERCIAL

## 2024-07-30 LAB
ATRIAL RATE (BPM): 99
DIASTOLIC BLOOD PRESSURE: 79
P AXIS (DEGREES): 68
PR-INTERVAL (MSEC): 204
QRS-INTERVAL (MSEC): 124
QT-INTERVAL (MSEC): 392
QTC: 503
R AXIS (DEGREES): 14
REPORT TEXT: NORMAL
SYSTOLIC BLOOD PRESSURE: 150
T AXIS (DEGREES): 123
VENTRICULAR RATE EKG/MIN (BPM): 99

## 2024-07-31 ENCOUNTER — HOSPITAL ENCOUNTER (INPATIENT)
Facility: HOSPITAL | Age: 54
DRG: 273 | End: 2024-07-31
Attending: STUDENT IN AN ORGANIZED HEALTH CARE EDUCATION/TRAINING PROGRAM | Admitting: INTERNAL MEDICINE
Payer: COMMERCIAL

## 2024-07-31 ENCOUNTER — APPOINTMENT (OUTPATIENT)
Dept: CARDIOLOGY | Facility: HOSPITAL | Age: 54
End: 2024-07-31
Payer: COMMERCIAL

## 2024-07-31 DIAGNOSIS — I47.10 SVT (SUPRAVENTRICULAR TACHYCARDIA) (CMS-HCC): ICD-10-CM

## 2024-07-31 DIAGNOSIS — I48.19 PERSISTENT ATRIAL FIBRILLATION (MULTI): ICD-10-CM

## 2024-07-31 DIAGNOSIS — I48.4 ATYPICAL ATRIAL FLUTTER (MULTI): ICD-10-CM

## 2024-07-31 DIAGNOSIS — I21.4 NSTEMI (NON-ST ELEVATED MYOCARDIAL INFARCTION) (MULTI): ICD-10-CM

## 2024-07-31 DIAGNOSIS — I48.3 TYPICAL ATRIAL FLUTTER (MULTI): ICD-10-CM

## 2024-07-31 DIAGNOSIS — I11.0 HYPERTENSIVE HEART DISEASE WITH CONGESTIVE HEART FAILURE, UNSPECIFIED HEART FAILURE TYPE (MULTI): Primary | ICD-10-CM

## 2024-07-31 PROCEDURE — 99291 CRITICAL CARE FIRST HOUR: CPT | Performed by: STUDENT IN AN ORGANIZED HEALTH CARE EDUCATION/TRAINING PROGRAM

## 2024-07-31 PROCEDURE — 93010 ELECTROCARDIOGRAM REPORT: CPT | Performed by: STUDENT IN AN ORGANIZED HEALTH CARE EDUCATION/TRAINING PROGRAM

## 2024-07-31 PROCEDURE — 99285 EMERGENCY DEPT VISIT HI MDM: CPT

## 2024-07-31 PROCEDURE — 93005 ELECTROCARDIOGRAM TRACING: CPT

## 2024-08-01 ENCOUNTER — CLINICAL SUPPORT (OUTPATIENT)
Dept: EMERGENCY MEDICINE | Facility: HOSPITAL | Age: 54
DRG: 273 | End: 2024-08-01
Payer: COMMERCIAL

## 2024-08-01 ENCOUNTER — APPOINTMENT (OUTPATIENT)
Dept: CARDIOLOGY | Facility: HOSPITAL | Age: 54
DRG: 273 | End: 2024-08-01
Payer: COMMERCIAL

## 2024-08-01 PROBLEM — I21.4 NSTEMI (NON-ST ELEVATED MYOCARDIAL INFARCTION) (MULTI): Status: ACTIVE | Noted: 2024-08-01

## 2024-08-01 PROBLEM — I48.91 A-FIB (MULTI): Status: ACTIVE | Noted: 2024-08-01

## 2024-08-01 LAB
ABO GROUP (TYPE) IN BLOOD: NORMAL
ALBUMIN SERPL BCP-MCNC: 4.1 G/DL (ref 3.4–5)
ALBUMIN SERPL BCP-MCNC: 4.5 G/DL (ref 3.4–5)
ALP SERPL-CCNC: 103 U/L (ref 33–120)
ALT SERPL W P-5'-P-CCNC: 19 U/L (ref 10–52)
ANION GAP BLDV CALCULATED.4IONS-SCNC: 14 MMOL/L (ref 10–25)
ANION GAP BLDV CALCULATED.4IONS-SCNC: 16 MMOL/L (ref 10–25)
ANION GAP SERPL CALC-SCNC: 18 MMOL/L (ref 10–20)
ANION GAP SERPL CALC-SCNC: 22 MMOL/L (ref 10–20)
ANTIBODY SCREEN: NORMAL
AORTIC VALVE MEAN GRADIENT: 20.3 MMHG
AORTIC VALVE PEAK VELOCITY: 3.06 M/S
AST SERPL W P-5'-P-CCNC: 20 U/L (ref 9–39)
ATRIAL RATE: 148 BPM
ATRIAL RATE: 75 BPM
ATRIAL RATE: 78 BPM
ATRIAL RATE: 81 BPM
AV PEAK GRADIENT: 37.4 MMHG
AVA (PEAK VEL): 1 CM2
AVA (VTI): 0.96 CM2
BASE EXCESS BLDV CALC-SCNC: 4.5 MMOL/L (ref -2–3)
BASE EXCESS BLDV CALC-SCNC: 6.6 MMOL/L (ref -2–3)
BASOPHILS # BLD AUTO: 0.01 X10*3/UL (ref 0–0.1)
BASOPHILS # BLD AUTO: 0.02 X10*3/UL (ref 0–0.1)
BASOPHILS NFR BLD AUTO: 0.3 %
BASOPHILS NFR BLD AUTO: 0.5 %
BILIRUB SERPL-MCNC: 0.5 MG/DL (ref 0–1.2)
BODY TEMPERATURE: 37 DEGREES CELSIUS
BODY TEMPERATURE: 37 DEGREES CELSIUS
BUN SERPL-MCNC: 47 MG/DL (ref 6–23)
BUN SERPL-MCNC: 49 MG/DL (ref 6–23)
CA-I BLDV-SCNC: 1.1 MMOL/L (ref 1.1–1.33)
CA-I BLDV-SCNC: 1.13 MMOL/L (ref 1.1–1.33)
CALCIUM SERPL-MCNC: 10.1 MG/DL (ref 8.6–10.6)
CALCIUM SERPL-MCNC: 10.4 MG/DL (ref 8.6–10.6)
CARDIAC TROPONIN I PNL SERPL HS: 1084 NG/L (ref 0–53)
CARDIAC TROPONIN I PNL SERPL HS: 1443 NG/L (ref 0–53)
CARDIAC TROPONIN I PNL SERPL HS: ABNORMAL NG/L (ref 0–53)
CHLORIDE BLDV-SCNC: 92 MMOL/L (ref 98–107)
CHLORIDE BLDV-SCNC: 92 MMOL/L (ref 98–107)
CHLORIDE SERPL-SCNC: 89 MMOL/L (ref 98–107)
CHLORIDE SERPL-SCNC: 90 MMOL/L (ref 98–107)
CO2 SERPL-SCNC: 28 MMOL/L (ref 21–32)
CO2 SERPL-SCNC: 30 MMOL/L (ref 21–32)
CREAT SERPL-MCNC: 8.04 MG/DL (ref 0.5–1.3)
CREAT SERPL-MCNC: 8.42 MG/DL (ref 0.5–1.3)
EGFRCR SERPLBLD CKD-EPI 2021: 7 ML/MIN/1.73M*2
EGFRCR SERPLBLD CKD-EPI 2021: 7 ML/MIN/1.73M*2
EJECTION FRACTION APICAL 4 CHAMBER: 24
EJECTION FRACTION: 38 %
EOSINOPHIL # BLD AUTO: 0.09 X10*3/UL (ref 0–0.7)
EOSINOPHIL # BLD AUTO: 0.09 X10*3/UL (ref 0–0.7)
EOSINOPHIL NFR BLD AUTO: 2.3 %
EOSINOPHIL NFR BLD AUTO: 2.7 %
ERYTHROCYTE [DISTWIDTH] IN BLOOD BY AUTOMATED COUNT: 15.2 % (ref 11.5–14.5)
ERYTHROCYTE [DISTWIDTH] IN BLOOD BY AUTOMATED COUNT: 15.2 % (ref 11.5–14.5)
ERYTHROCYTE [DISTWIDTH] IN BLOOD BY AUTOMATED COUNT: 15.3 % (ref 11.5–14.5)
EST. AVERAGE GLUCOSE BLD GHB EST-MCNC: 180 MG/DL
GLUCOSE BLD MANUAL STRIP-MCNC: 123 MG/DL (ref 74–99)
GLUCOSE BLD MANUAL STRIP-MCNC: 137 MG/DL (ref 74–99)
GLUCOSE BLD MANUAL STRIP-MCNC: 240 MG/DL (ref 74–99)
GLUCOSE BLD MANUAL STRIP-MCNC: 247 MG/DL (ref 74–99)
GLUCOSE BLDV-MCNC: 252 MG/DL (ref 74–99)
GLUCOSE BLDV-MCNC: 322 MG/DL (ref 74–99)
GLUCOSE SERPL-MCNC: 263 MG/DL (ref 74–99)
GLUCOSE SERPL-MCNC: 306 MG/DL (ref 74–99)
HBA1C MFR BLD: 7.9 %
HCO3 BLDV-SCNC: 28.3 MMOL/L (ref 22–26)
HCO3 BLDV-SCNC: 30.5 MMOL/L (ref 22–26)
HCT VFR BLD AUTO: 29 % (ref 41–52)
HCT VFR BLD AUTO: 29.6 % (ref 41–52)
HCT VFR BLD AUTO: 30.1 % (ref 41–52)
HCT VFR BLD EST: 29 % (ref 41–52)
HCT VFR BLD EST: 31 % (ref 41–52)
HGB BLD-MCNC: 9.2 G/DL (ref 13.5–17.5)
HGB BLD-MCNC: 9.5 G/DL (ref 13.5–17.5)
HGB BLD-MCNC: 9.8 G/DL (ref 13.5–17.5)
HGB BLDV-MCNC: 10.3 G/DL (ref 13.5–17.5)
HGB BLDV-MCNC: 9.8 G/DL (ref 13.5–17.5)
IMM GRANULOCYTES # BLD AUTO: 0.01 X10*3/UL (ref 0–0.7)
IMM GRANULOCYTES # BLD AUTO: 0.01 X10*3/UL (ref 0–0.7)
IMM GRANULOCYTES NFR BLD AUTO: 0.3 % (ref 0–0.9)
IMM GRANULOCYTES NFR BLD AUTO: 0.3 % (ref 0–0.9)
INHALED O2 CONCENTRATION: 21 %
INR PPP: 1.1 (ref 0.9–1.1)
LACTATE BLDV-SCNC: 1.3 MMOL/L (ref 0.4–2)
LACTATE BLDV-SCNC: 3 MMOL/L (ref 0.4–2)
LEFT ATRIUM VOLUME AREA LENGTH INDEX BSA: 59.7 ML/M2
LEFT VENTRICLE INTERNAL DIMENSION DIASTOLE: 5.48 CM (ref 3.5–6)
LEFT VENTRICULAR OUTFLOW TRACT DIAMETER: 2.17 CM
LYMPHOCYTES # BLD AUTO: 0.64 X10*3/UL (ref 1.2–4.8)
LYMPHOCYTES # BLD AUTO: 0.95 X10*3/UL (ref 1.2–4.8)
LYMPHOCYTES NFR BLD AUTO: 18.9 %
LYMPHOCYTES NFR BLD AUTO: 24.6 %
MAGNESIUM SERPL-MCNC: 2.04 MG/DL (ref 1.6–2.4)
MAGNESIUM SERPL-MCNC: 2.17 MG/DL (ref 1.6–2.4)
MCH RBC QN AUTO: 31 PG (ref 26–34)
MCH RBC QN AUTO: 31.3 PG (ref 26–34)
MCH RBC QN AUTO: 31.6 PG (ref 26–34)
MCHC RBC AUTO-ENTMCNC: 31.7 G/DL (ref 32–36)
MCHC RBC AUTO-ENTMCNC: 32.1 G/DL (ref 32–36)
MCHC RBC AUTO-ENTMCNC: 32.6 G/DL (ref 32–36)
MCV RBC AUTO: 97 FL (ref 80–100)
MCV RBC AUTO: 97 FL (ref 80–100)
MCV RBC AUTO: 98 FL (ref 80–100)
MITRAL VALVE E/A RATIO: 2.59
MONOCYTES # BLD AUTO: 0.38 X10*3/UL (ref 0.1–1)
MONOCYTES # BLD AUTO: 0.56 X10*3/UL (ref 0.1–1)
MONOCYTES NFR BLD AUTO: 11.2 %
MONOCYTES NFR BLD AUTO: 14.5 %
NEUTROPHILS # BLD AUTO: 2.23 X10*3/UL (ref 1.2–7.7)
NEUTROPHILS # BLD AUTO: 2.26 X10*3/UL (ref 1.2–7.7)
NEUTROPHILS NFR BLD AUTO: 57.8 %
NEUTROPHILS NFR BLD AUTO: 66.6 %
NRBC BLD-RTO: 0 /100 WBCS (ref 0–0)
OXYHGB MFR BLDV: 59.3 % (ref 45–75)
OXYHGB MFR BLDV: 82.1 % (ref 45–75)
P AXIS: 32 DEGREES
P AXIS: 34 DEGREES
P AXIS: 49 DEGREES
P OFFSET: 164 MS
P OFFSET: 166 MS
P OFFSET: 174 MS
P ONSET: 85 MS
P ONSET: 94 MS
P ONSET: 99 MS
PCO2 BLDV: 38 MM HG (ref 41–51)
PCO2 BLDV: 40 MM HG (ref 41–51)
PH BLDV: 7.48 PH (ref 7.33–7.43)
PH BLDV: 7.49 PH (ref 7.33–7.43)
PHOSPHATE SERPL-MCNC: 5.9 MG/DL (ref 2.5–4.9)
PLATELET # BLD AUTO: 150 X10*3/UL (ref 150–450)
PLATELET # BLD AUTO: 150 X10*3/UL (ref 150–450)
PLATELET # BLD AUTO: 159 X10*3/UL (ref 150–450)
PO2 BLDV: 38 MM HG (ref 35–45)
PO2 BLDV: 52 MM HG (ref 35–45)
POTASSIUM BLDV-SCNC: 4.7 MMOL/L (ref 3.5–5.3)
POTASSIUM BLDV-SCNC: 5.3 MMOL/L (ref 3.5–5.3)
POTASSIUM SERPL-SCNC: 4.4 MMOL/L (ref 3.5–5.3)
POTASSIUM SERPL-SCNC: 4.8 MMOL/L (ref 3.5–5.3)
PR INTERVAL: 228 MS
PR INTERVAL: 236 MS
PR INTERVAL: 252 MS
PROT SERPL-MCNC: 7.6 G/DL (ref 6.4–8.2)
PROTHROMBIN TIME: 12.2 SECONDS (ref 9.8–12.8)
Q ONSET: 210 MS
Q ONSET: 211 MS
Q ONSET: 212 MS
Q ONSET: 213 MS
QRS COUNT: 12 BEATS
QRS COUNT: 13 BEATS
QRS COUNT: 14 BEATS
QRS COUNT: 24 BEATS
QRS DURATION: 116 MS
QRS DURATION: 120 MS
QRS DURATION: 130 MS
QRS DURATION: 148 MS
QT INTERVAL: 358 MS
QT INTERVAL: 432 MS
QT INTERVAL: 442 MS
QT INTERVAL: 448 MS
QTC CALCULATION(BAZETT): 492 MS
QTC CALCULATION(BAZETT): 500 MS
QTC CALCULATION(BAZETT): 513 MS
QTC CALCULATION(BAZETT): 562 MS
QTC FREDERICIA: 471 MS
QTC FREDERICIA: 482 MS
QTC FREDERICIA: 483 MS
QTC FREDERICIA: 488 MS
R AXIS: -12 DEGREES
R AXIS: -2 DEGREES
R AXIS: 17 DEGREES
R AXIS: 24 DEGREES
RBC # BLD AUTO: 2.97 X10*6/UL (ref 4.5–5.9)
RBC # BLD AUTO: 3.04 X10*6/UL (ref 4.5–5.9)
RBC # BLD AUTO: 3.1 X10*6/UL (ref 4.5–5.9)
RH FACTOR (ANTIGEN D): NORMAL
RIGHT VENTRICLE FREE WALL PEAK S': 6 CM/S
RIGHT VENTRICLE PEAK SYSTOLIC PRESSURE: 31.6 MMHG
SAO2 % BLDV: 60 % (ref 45–75)
SAO2 % BLDV: 83 % (ref 45–75)
SODIUM BLDV-SCNC: 131 MMOL/L (ref 136–145)
SODIUM BLDV-SCNC: 132 MMOL/L (ref 136–145)
SODIUM SERPL-SCNC: 133 MMOL/L (ref 136–145)
SODIUM SERPL-SCNC: 135 MMOL/L (ref 136–145)
T AXIS: 127 DEGREES
T AXIS: 159 DEGREES
T AXIS: 204 DEGREES
T AXIS: 206 DEGREES
T OFFSET: 389 MS
T OFFSET: 427 MS
T OFFSET: 433 MS
T OFFSET: 437 MS
TRICUSPID ANNULAR PLANE SYSTOLIC EXCURSION: 1.7 CM
TSH SERPL-ACNC: 1.04 MIU/L (ref 0.44–3.98)
UFH PPP CHRO-ACNC: 0.2 IU/ML
UFH PPP CHRO-ACNC: 0.3 IU/ML
VENTRICULAR RATE: 148 BPM
VENTRICULAR RATE: 75 BPM
VENTRICULAR RATE: 78 BPM
VENTRICULAR RATE: 81 BPM
WBC # BLD AUTO: 3.4 X10*3/UL (ref 4.4–11.3)
WBC # BLD AUTO: 3.9 X10*3/UL (ref 4.4–11.3)
WBC # BLD AUTO: 4.5 X10*3/UL (ref 4.4–11.3)

## 2024-08-01 PROCEDURE — 2500000001 HC RX 250 WO HCPCS SELF ADMINISTERED DRUGS (ALT 637 FOR MEDICARE OP): Performed by: STUDENT IN AN ORGANIZED HEALTH CARE EDUCATION/TRAINING PROGRAM

## 2024-08-01 PROCEDURE — 86901 BLOOD TYPING SEROLOGIC RH(D): CPT

## 2024-08-01 PROCEDURE — 84484 ASSAY OF TROPONIN QUANT: CPT

## 2024-08-01 PROCEDURE — 83735 ASSAY OF MAGNESIUM: CPT

## 2024-08-01 PROCEDURE — 96374 THER/PROPH/DIAG INJ IV PUSH: CPT

## 2024-08-01 PROCEDURE — 36415 COLL VENOUS BLD VENIPUNCTURE: CPT | Performed by: STUDENT IN AN ORGANIZED HEALTH CARE EDUCATION/TRAINING PROGRAM

## 2024-08-01 PROCEDURE — 2500000004 HC RX 250 GENERAL PHARMACY W/ HCPCS (ALT 636 FOR OP/ED)

## 2024-08-01 PROCEDURE — 84132 ASSAY OF SERUM POTASSIUM: CPT

## 2024-08-01 PROCEDURE — 93005 ELECTROCARDIOGRAM TRACING: CPT

## 2024-08-01 PROCEDURE — 2500000001 HC RX 250 WO HCPCS SELF ADMINISTERED DRUGS (ALT 637 FOR MEDICARE OP)

## 2024-08-01 PROCEDURE — 83036 HEMOGLOBIN GLYCOSYLATED A1C: CPT

## 2024-08-01 PROCEDURE — 84443 ASSAY THYROID STIM HORMONE: CPT

## 2024-08-01 PROCEDURE — 85025 COMPLETE CBC W/AUTO DIFF WBC: CPT

## 2024-08-01 PROCEDURE — 93306 TTE W/DOPPLER COMPLETE: CPT

## 2024-08-01 PROCEDURE — 84484 ASSAY OF TROPONIN QUANT: CPT | Performed by: STUDENT IN AN ORGANIZED HEALTH CARE EDUCATION/TRAINING PROGRAM

## 2024-08-01 PROCEDURE — 1200000002 HC GENERAL ROOM WITH TELEMETRY DAILY

## 2024-08-01 PROCEDURE — 93010 ELECTROCARDIOGRAM REPORT: CPT | Performed by: INTERNAL MEDICINE

## 2024-08-01 PROCEDURE — 99223 1ST HOSP IP/OBS HIGH 75: CPT

## 2024-08-01 PROCEDURE — 85520 HEPARIN ASSAY: CPT

## 2024-08-01 PROCEDURE — 82947 ASSAY GLUCOSE BLOOD QUANT: CPT

## 2024-08-01 PROCEDURE — 85610 PROTHROMBIN TIME: CPT

## 2024-08-01 PROCEDURE — 84132 ASSAY OF SERUM POTASSIUM: CPT | Performed by: STUDENT IN AN ORGANIZED HEALTH CARE EDUCATION/TRAINING PROGRAM

## 2024-08-01 PROCEDURE — 5A1D70Z PERFORMANCE OF URINARY FILTRATION, INTERMITTENT, LESS THAN 6 HOURS PER DAY: ICD-10-PCS | Performed by: INTERNAL MEDICINE

## 2024-08-01 PROCEDURE — 85025 COMPLETE CBC W/AUTO DIFF WBC: CPT | Performed by: STUDENT IN AN ORGANIZED HEALTH CARE EDUCATION/TRAINING PROGRAM

## 2024-08-01 PROCEDURE — 2500000002 HC RX 250 W HCPCS SELF ADMINISTERED DRUGS (ALT 637 FOR MEDICARE OP, ALT 636 FOR OP/ED)

## 2024-08-01 PROCEDURE — 93306 TTE W/DOPPLER COMPLETE: CPT | Performed by: STUDENT IN AN ORGANIZED HEALTH CARE EDUCATION/TRAINING PROGRAM

## 2024-08-01 PROCEDURE — 85027 COMPLETE CBC AUTOMATED: CPT

## 2024-08-01 PROCEDURE — 83735 ASSAY OF MAGNESIUM: CPT | Performed by: STUDENT IN AN ORGANIZED HEALTH CARE EDUCATION/TRAINING PROGRAM

## 2024-08-01 PROCEDURE — 2500000005 HC RX 250 GENERAL PHARMACY W/O HCPCS: Performed by: STUDENT IN AN ORGANIZED HEALTH CARE EDUCATION/TRAINING PROGRAM

## 2024-08-01 RX ORDER — METOPROLOL TARTRATE 1 MG/ML
5 INJECTION, SOLUTION INTRAVENOUS ONCE
Status: DISCONTINUED | OUTPATIENT
Start: 2024-08-01 | End: 2024-08-01

## 2024-08-01 RX ORDER — NAPROXEN SODIUM 220 MG/1
324 TABLET, FILM COATED ORAL ONCE
Status: COMPLETED | OUTPATIENT
Start: 2024-08-01 | End: 2024-08-01

## 2024-08-01 RX ORDER — METHYLPREDNISOLONE 4 MG/1
32 TABLET ORAL
Status: COMPLETED | OUTPATIENT
Start: 2024-08-01 | End: 2024-08-01

## 2024-08-01 RX ORDER — HEPARIN SODIUM 10000 [USP'U]/100ML
0-4000 INJECTION, SOLUTION INTRAVENOUS CONTINUOUS
Status: DISCONTINUED | OUTPATIENT
Start: 2024-08-01 | End: 2024-08-02

## 2024-08-01 RX ORDER — METHYLPREDNISOLONE 4 MG/1
32 TABLET ORAL
Status: DISCONTINUED | OUTPATIENT
Start: 2024-08-01 | End: 2024-08-01

## 2024-08-01 RX ORDER — DEXTROSE 50 % IN WATER (D50W) INTRAVENOUS SYRINGE
25
Status: DISCONTINUED | OUTPATIENT
Start: 2024-08-01 | End: 2024-08-06 | Stop reason: HOSPADM

## 2024-08-01 RX ORDER — METHYLPREDNISOLONE 4 MG/1
32 TABLET ORAL ONCE
Status: COMPLETED | OUTPATIENT
Start: 2024-08-02 | End: 2024-08-02

## 2024-08-01 RX ORDER — ACETAMINOPHEN 325 MG/1
650 TABLET ORAL EVERY 6 HOURS PRN
Status: DISCONTINUED | OUTPATIENT
Start: 2024-08-01 | End: 2024-08-06 | Stop reason: HOSPADM

## 2024-08-01 RX ORDER — HEPARIN SODIUM 5000 [USP'U]/ML
5000 INJECTION, SOLUTION INTRAVENOUS; SUBCUTANEOUS EVERY 8 HOURS
Status: DISCONTINUED | OUTPATIENT
Start: 2024-08-01 | End: 2024-08-01

## 2024-08-01 RX ORDER — DEXTROSE 50 % IN WATER (D50W) INTRAVENOUS SYRINGE
12.5
Status: DISCONTINUED | OUTPATIENT
Start: 2024-08-01 | End: 2024-08-06 | Stop reason: HOSPADM

## 2024-08-01 RX ORDER — ASPIRIN 81 MG/1
81 TABLET ORAL DAILY
Status: DISCONTINUED | OUTPATIENT
Start: 2024-08-01 | End: 2024-08-06 | Stop reason: HOSPADM

## 2024-08-01 RX ORDER — INSULIN GLARGINE 100 [IU]/ML
5 INJECTION, SOLUTION SUBCUTANEOUS 2 TIMES DAILY
Status: DISCONTINUED | OUTPATIENT
Start: 2024-08-01 | End: 2024-08-02

## 2024-08-01 RX ORDER — METHYLPREDNISOLONE 4 MG/1
32 TABLET ORAL ONCE
Status: DISCONTINUED | OUTPATIENT
Start: 2024-08-02 | End: 2024-08-01

## 2024-08-01 RX ORDER — SEVELAMER CARBONATE 800 MG/1
800 TABLET, FILM COATED ORAL
Status: DISCONTINUED | OUTPATIENT
Start: 2024-08-01 | End: 2024-08-05

## 2024-08-01 RX ORDER — CARVEDILOL 25 MG/1
25 TABLET ORAL
Status: DISCONTINUED | OUTPATIENT
Start: 2024-08-01 | End: 2024-08-06 | Stop reason: HOSPADM

## 2024-08-01 RX ORDER — METOPROLOL TARTRATE 1 MG/ML
5 INJECTION, SOLUTION INTRAVENOUS ONCE
Status: COMPLETED | OUTPATIENT
Start: 2024-08-01 | End: 2024-08-01

## 2024-08-01 RX ORDER — ATORVASTATIN CALCIUM 80 MG/1
80 TABLET, FILM COATED ORAL DAILY
Status: DISCONTINUED | OUTPATIENT
Start: 2024-08-01 | End: 2024-08-06 | Stop reason: HOSPADM

## 2024-08-01 RX ORDER — INSULIN LISPRO 100 [IU]/ML
0-5 INJECTION, SOLUTION INTRAVENOUS; SUBCUTANEOUS
Status: DISCONTINUED | OUTPATIENT
Start: 2024-08-01 | End: 2024-08-02

## 2024-08-01 SDOH — SOCIAL STABILITY: SOCIAL INSECURITY: DOES ANYONE TRY TO KEEP YOU FROM HAVING/CONTACTING OTHER FRIENDS OR DOING THINGS OUTSIDE YOUR HOME?: NO

## 2024-08-01 SDOH — SOCIAL STABILITY: SOCIAL INSECURITY: ARE YOU OR HAVE YOU BEEN THREATENED OR ABUSED PHYSICALLY, EMOTIONALLY, OR SEXUALLY BY ANYONE?: NO

## 2024-08-01 SDOH — SOCIAL STABILITY: SOCIAL INSECURITY: DO YOU FEEL ANYONE HAS EXPLOITED OR TAKEN ADVANTAGE OF YOU FINANCIALLY OR OF YOUR PERSONAL PROPERTY?: NO

## 2024-08-01 SDOH — SOCIAL STABILITY: SOCIAL INSECURITY: HAS ANYONE EVER THREATENED TO HURT YOUR FAMILY OR YOUR PETS?: NO

## 2024-08-01 SDOH — SOCIAL STABILITY: SOCIAL INSECURITY: ARE THERE ANY APPARENT SIGNS OF INJURIES/BEHAVIORS THAT COULD BE RELATED TO ABUSE/NEGLECT?: NO

## 2024-08-01 SDOH — SOCIAL STABILITY: SOCIAL INSECURITY: WERE YOU ABLE TO COMPLETE ALL THE BEHAVIORAL HEALTH SCREENINGS?: YES

## 2024-08-01 SDOH — SOCIAL STABILITY: SOCIAL INSECURITY: HAVE YOU HAD THOUGHTS OF HARMING ANYONE ELSE?: NO

## 2024-08-01 SDOH — SOCIAL STABILITY: SOCIAL INSECURITY: HAVE YOU HAD ANY THOUGHTS OF HARMING ANYONE ELSE?: NO

## 2024-08-01 SDOH — SOCIAL STABILITY: SOCIAL INSECURITY: ABUSE: ADULT

## 2024-08-01 SDOH — SOCIAL STABILITY: SOCIAL INSECURITY: DO YOU FEEL UNSAFE GOING BACK TO THE PLACE WHERE YOU ARE LIVING?: NO

## 2024-08-01 ASSESSMENT — PATIENT HEALTH QUESTIONNAIRE - PHQ9
1. LITTLE INTEREST OR PLEASURE IN DOING THINGS: NOT AT ALL
SUM OF ALL RESPONSES TO PHQ9 QUESTIONS 1 & 2: 0
2. FEELING DOWN, DEPRESSED OR HOPELESS: NOT AT ALL

## 2024-08-01 ASSESSMENT — PAIN DESCRIPTION - PAIN TYPE: TYPE: ACUTE PAIN

## 2024-08-01 ASSESSMENT — LIFESTYLE VARIABLES
HOW OFTEN DO YOU HAVE A DRINK CONTAINING ALCOHOL: NEVER
EVER HAD A DRINK FIRST THING IN THE MORNING TO STEADY YOUR NERVES TO GET RID OF A HANGOVER: NO
HOW OFTEN DO YOU HAVE 6 OR MORE DRINKS ON ONE OCCASION: NEVER
HAVE YOU EVER FELT YOU SHOULD CUT DOWN ON YOUR DRINKING: NO
EVER FELT BAD OR GUILTY ABOUT YOUR DRINKING: NO
TOTAL SCORE: 0
HAVE PEOPLE ANNOYED YOU BY CRITICIZING YOUR DRINKING: NO
AUDIT-C TOTAL SCORE: 0
SUBSTANCE_ABUSE_PAST_12_MONTHS: NO
HOW MANY STANDARD DRINKS CONTAINING ALCOHOL DO YOU HAVE ON A TYPICAL DAY: PATIENT DOES NOT DRINK
AUDIT-C TOTAL SCORE: 0
SKIP TO QUESTIONS 9-10: 1
PRESCIPTION_ABUSE_PAST_12_MONTHS: NO

## 2024-08-01 ASSESSMENT — COGNITIVE AND FUNCTIONAL STATUS - GENERAL
MOBILITY SCORE: 24
PATIENT BASELINE BEDBOUND: NO
MOBILITY SCORE: 24
DAILY ACTIVITIY SCORE: 24
DAILY ACTIVITIY SCORE: 24
MOBILITY SCORE: 24
DAILY ACTIVITIY SCORE: 24

## 2024-08-01 ASSESSMENT — ACTIVITIES OF DAILY LIVING (ADL)
DRESSING YOURSELF: INDEPENDENT
FEEDING YOURSELF: INDEPENDENT
ADEQUATE_TO_COMPLETE_ADL: YES
WALKS IN HOME: INDEPENDENT
TOILETING: INDEPENDENT
JUDGMENT_ADEQUATE_SAFELY_COMPLETE_DAILY_ACTIVITIES: YES
GROOMING: INDEPENDENT
PATIENT'S MEMORY ADEQUATE TO SAFELY COMPLETE DAILY ACTIVITIES?: YES
HEARING - LEFT EAR: FUNCTIONAL
HEARING - RIGHT EAR: FUNCTIONAL
BATHING: INDEPENDENT

## 2024-08-01 ASSESSMENT — PAIN SCALES - GENERAL
PAINLEVEL_OUTOF10: 0 - NO PAIN
PAINLEVEL_OUTOF10: 4
PAINLEVEL_OUTOF10: 0 - NO PAIN

## 2024-08-01 ASSESSMENT — PAIN DESCRIPTION - LOCATION: LOCATION: CHEST

## 2024-08-01 ASSESSMENT — PAIN - FUNCTIONAL ASSESSMENT
PAIN_FUNCTIONAL_ASSESSMENT: 0-10
PAIN_FUNCTIONAL_ASSESSMENT: 0-10

## 2024-08-01 NOTE — H&P
Subjective    History Of Present Illness  Kentrell Carreon is a 54 y.o. male with a past history of HTN, HLD, CAD s/p PCI (LAD; 6/2017) and s/p CABG (2022), stage C systolic HF/ICM/HFmrEF, aortic stenosis, HTN, tachycardia s/p ablation, DM and discoid lupus/SLE with ESRD on home HD (M-T-TH-F via RUE AVF since 12/2016) presenting with SVT.    Patient had finished a dialysis session today when he had sudden onset of palpitations associated with chest discomfort, nausea, diaphoresis. He measured his heart rate and it was around 160. He tried to wait it out despite the discomfort and after around an hour he decided to come to the ED. He also endorsed a swollen and painful gland in the right side of his neck during this time.    Upon arrival to the ED, patient found to be in SVT on EKG. Patient received one dose of IV metoprolol 5mg with termination back to his normal heart rate. Currently, patient states that he still feels a bit of soreness in his chest when he takes a deep breath but is otherwise asymptomatic. When pressing on the right side of neck he states he can still feel a little soreness of the gland. He denies dyspnea, orthopnea, lightheadedness, nausea, diaphoresis, abdominal discomfort currently.     Of note, patient has had recent hospitalizations where TTE's were performed showing newly reduced EF from around 40% to around 20%. He had LHC in June showing patent grafts from his CABG. He was recently hospitalized at [facility] where he was again found to have an EF of 28% and was recommended to have a Life Vest but patient refused and left the hospital. He states that since then he has had no complaints and felt great.    Past Medical History   has a past medical history of Aortic stenosis, CAD (coronary artery disease), CHF (congestive heart failure) (Multi), ESRD (end stage renal disease) (Multi), HTN (hypertension), Systemic lupus erythematosus, unspecified (Multi), and Type 1 diabetes mellitus with other  "diabetic kidney complication (Multi).    Home Medications  Current Outpatient Medications   Medication Instructions    acetaminophen (TYLENOL) 650 mg, oral, Every 6 hours PRN    aspirin 81 mg EC tablet 1 tablet, oral, Daily    atorvastatin (LIPITOR) 80 mg, oral, Daily    blood-glucose sensor (FreeStyle José Luis 3 Sensor) device Change sensor every 14 days    carvedilol (COREG) 25 mg, oral, 2 times daily (morning and late afternoon)    EPOETIN DEIRDRE INJ 8,000 Units, subcutaneous, With every dialysis tx    FreeStyle José Luis reader (FreeStyle José Luis 2 Matheny) misc Use to check blood sugars at least every 8 hours    FreeStyle José Luis sensor system (FreeStyle José Luis 2 Sensor) kit Change sensor every 14 days. Check blood sugar at least every 8 hours    insulin glargine (Lantus) 100 unit/mL (3 mL) pen Inject 10 units twice a day    insulin lispro (HumaLOG KwikPen Insulin) 100 unit/mL injection Inject before every meal using carb ratio and correction scale. MDD 50 units.    multivitamin (Daily Multi-Vitamin) tablet 1 tablet, oral, Daily    pen needle, diabetic (BD Ultra-Fine Agueda Pen Needle) 32 gauge x 5/32\" needle Use to inject insulin 5 times per day    sacubitriL-valsartan (Entresto) 24-26 mg tablet 1 tablet, oral, 2 times daily    sevelamer carbonate (RENVELA) 800 mg, oral, 3 times daily (morning, midday, late afternoon)    SITagliptin phosphate (JANUVIA) 25 mg, oral, Daily        Surgical History   has a past surgical history that includes Coronary angioplasty with stent (06/20/2017); Bunionectomy (Right, 08/2023); AV fistula placement; Colonoscopy; Lung surgery (Left, 05/26/2022); Coronary artery bypass graft (03/26/2022); Toe amputation; and Cardiac catheterization (N/A, 6/7/2024).     Social History   reports that he has quit smoking. His smoking use included cigarettes. He has been exposed to tobacco smoke. He has never used smokeless tobacco. He reports current alcohol use. He reports that he does not use drugs.    Family " "History  Family History   Problem Relation Name Age of Onset    Heart failure Mother      Heart attack Father          Allergies  Iodinated contrast media, Ampicillin-sulbactam, and Strawberry          Objective     Physical Exam  Constitutional:       Appearance: Normal appearance.   HENT:      Head: Normocephalic and atraumatic.      Mouth/Throat:      Mouth: Mucous membranes are moist.      Pharynx: No posterior oropharyngeal erythema.   Eyes:      Extraocular Movements: Extraocular movements intact.      Pupils: Pupils are equal, round, and reactive to light.   Neck:      Comments: Very slight tenderness of right neck  Cardiovascular:      Rate and Rhythm: Normal rate and regular rhythm.      Pulses: Normal pulses.      Comments: Systolic murmur  Pulmonary:      Effort: Pulmonary effort is normal.      Breath sounds: Normal breath sounds.   Abdominal:      General: Abdomen is flat.      Palpations: Abdomen is soft.      Tenderness: There is no abdominal tenderness. There is no guarding.   Musculoskeletal:      Right lower leg: No edema.      Left lower leg: No edema.   Skin:     General: Skin is warm and dry.   Neurological:      General: No focal deficit present.      Mental Status: He is alert and oriented to person, place, and time.          Last Recorded Vitals  Blood pressure 125/72, pulse 81, temperature 37.1 °C (98.8 °F), temperature source Temporal, resp. rate 12, height 1.93 m (6' 4\"), weight 68 kg (149 lb 14.6 oz), SpO2 97%.    Relevant Results  Results from last 7 days   Lab Units 08/01/24  0011   WBC AUTO x10*3/uL 3.4*   HEMOGLOBIN g/dL 9.8*   HEMATOCRIT % 30.1*   PLATELETS AUTO x10*3/uL 159   NEUTROS PCT AUTO % 66.6   LYMPHS PCT AUTO % 18.9   MONOS PCT AUTO % 11.2   EOS PCT AUTO % 2.7      Results from last 7 days   Lab Units 08/01/24  0011   SODIUM mmol/L 135*   POTASSIUM mmol/L 4.4   CHLORIDE mmol/L 89*   CO2 mmol/L 28   BUN mg/dL 47*   CREATININE mg/dL 8.04*   CALCIUM mg/dL 10.4   PROTEIN TOTAL " g/dL 7.6   BILIRUBIN TOTAL mg/dL 0.5   ALK PHOS U/L 103   ALT U/L 19   AST U/L 20   GLUCOSE mg/dL 306*      TTE (6/7/2024):  CONCLUSIONS:   1. Left ventricular systolic function is mildly to moderately decreased with a 35-40% estimated ejection fraction.   2. Left Ventricular Global Longitudinal Strain - 8.7 % with more pronounced abnormalities involving the basal and mid segments with preservation of the apex more consistent with a possible infiltrative cardiomyopathy.   3. There is global hypokinesis of the left ventricle with minor regional variations.   4. Spectral Doppler shows an impaired relaxation pattern of left ventricular diastolic filling.   5. The left atrium is moderately dilated.   6. The right atrium is mild to moderately dilated.   7. Moderate tricuspid regurgitation visualized.   8. Compared with study from 6/5/2024, there is an improvement in calculated LVEF.    TTE (6/29/2024):  Final Impressions     * Normal left ventricular chamber size.     * Mild-to-moderate increased left ventricle wall thickness.     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     * Severely decreased left ventricular systolic function.     * Left ventricular ejection fraction; 28 %.     * Global left ventricular hypokinesis.     * Grade III diastolic dysfunction of the left ventricle (restrictive filling pattern).    * Severely elevated LV filling pressure septal E to E prime 30, lateral E to E prime 19.     * Normal right ventricular size and systolic function.     * Mildly decreased right ventricular systolic function.     * Right ventricular systolic pressure; 37 mmHg.     * Severely increased left atrial chamber size.     * Left atrial volume index of 96 ml/m2.     * Severely increased right atrial chamber size.     * Moderate aortic valve calcification. The non coronary cusp is severely   calcified.    * Mild aortic valve stenosis; Velocity 2.2 m/s, mean  gradient 11 mmHg, HUMPHREY 1.4 cm2, DVI 0.43.     * Trivial aortic valve regurgitation.     * Mild-moderate mitral valve regurgitation.     * Mild-moderate tricuspid valve regurgitation.     * Dilated IVC with normal respiratory variation.     * No pericardial effusion.     [facility] (6/6/2024):  CONCLUSIONS:   1. Native coronaries: Critical distal left main disease, proximal LCx , proximal LAD , and moderate RCA disease in co-dominant system.   2. Bypasses: Patent LIMA to LAD which fills back the whole LAD system including both diagonals, patent SVG to OM1 which fills back the whole codominant LCx system, and patent SVG to right PDA.   3. Further management as per inpatient cardiology team.        Assessment/Plan   Kentrell Carreon is a 54 y.o. male with a past history of CAD s/p PCI (LAD; 6/2017) and s/p CABG (2022), stage C systolic HF/ICM/HFmrEF, aortic stenosis, HTN, tachycardia s/p ablation presenting with an episode of SVT today which terminated following IV metoprolol. Following this he has had troponinemia, ST depressions in II and aVF and some pleuritic chest soreness. Troponin elevation could be secondary to stress from the SVT in combination with his ESRD, especially given the recent LHC showing patent grafts.     #Supraventricular tachycardia  ::Heart rates in the 160s today associated with palpitations, nausea, terminated after IV metoprolol 5mg  ::EKG showing 1st degree AV block, LBBB, prolonged QT as well  ::Had an ablation for an unspecified tachyarrhythmia about 20 years ago  ::Has not had any similar episodes to his memory  -C/w Coreg 25mg BID  -TSH added to labs  -Given that this was an isolated incident would likely not require further intervention    #NSTEMI  #CAD s/p CABG (LIMA- LAD; SVG-OM, SVG- PDA)   #HTN  #HLD  ::EKG following SVT showing ST depressions in II and aVF new from prior baseline  ::Trop 1084 -> 1443  ::Complaint of mild pleuritic chest soreness following SVT  ::Last [facility] showing critical  distal left main disease, proximal LCx , proximal LAD , and moderate RCA disease with patency of the grafts  ::Likely type II NSTEMI  -Patient given ASA 325mg load in ED  -Given patency of grafts on CABG and run of SVT to explain trop elevation with atypical description of chest pain, will hold off on starting heparin  -Continue to trend troponin and EKGs  -C/w ASA 81mg, atorvastatin 80mg  -C/w Coreg 25mg BID  -C/w Entresto 24-26mg BID  -TTE ordered    #Heart failure with reduced ejection fraction  #Ischemic cardiomyopathy  ::EF has fluctuated on TTE's, most recent EF of 28% on 6/29 compared to 35-40% on 6/7  -TTE ordered as above  -C/w Entresto 24-26mg BID, Coreg 25mg BID  -GDMT limited by ESRD    #ESRD on iHD  ::LUE AV fistula, [facility] schedule  -Nephrology consult in AM  -C/w sevelamer 800mg TID    #Insulin dependent type 2 DM  ::Most recent A1c of 7.5  ::Home regimen of sitagliptin 25mg, Lantus 10u BID, Lispro sliding scale  -Lantus 5u BID, mild ISS, titrate up as needed    Code status: FULL CODE  DVT ppx: subcutaneous heparin  GI ppx: None  Oxygen: RA  Access: PIV, LUE fistula  NOK: Tegan Carreon [phone]          Nabeel Mcbride MD  PGY-2 Internal Medicine     Patient to be staffed with the attending in the morning

## 2024-08-01 NOTE — ED TRIAGE NOTES
"Pt BIB EMS with c/o rapid heart rate, weakness and CP. Pt reports that he was \"doing his at home dialysis and began to get weak, diaphoretic and SOB\". Pt presented to EMS with HR in the 150's and associated CP. Pt also reports recent (07/21/24) positive covid test. Pt reports a swollen gland in the R side of his neck.  "

## 2024-08-01 NOTE — ED PROVIDER NOTES
Emergency Department Provider Note        History of Present Illness     History provided by: Patient  Limitations to History: None  External Records Reviewed with Brief Summary: Discharge summary from 6/30/24 which showed admission for acute respiratory failure with hypoxia and Recent E.Faecalis blood stream infection. Heart Failure exacerbation, acute, reduced EF 28%    HPI:  Kentrell Carreon is a 54 y.o. male medical history of ESRD on hemodialysis (Monday Tuesday Thursday Saturday), heart failure EF 28%, CAD post CABG in 2022, ablation in 2006, SLE, insulin-dependent diabetes, chronic complication, GERD, HTN, presenting with chest pain and palpitations for the past 2 hours. Patient says that he got his full round of dialysis today, after dialysis he had chest pain that lasted briefly.  He has not taken his carvedilol for the past few weeks. This evening he had chest pain for the past 2 hours prior to presentation to the ED. He vomited twice after drinking water. Additionally he is concerned about a lymph node on his upper right neck. Patient states that he has noticed this before and it seems to get bigger whenever he is sick. Patient states he tested positive for COVID on 7/21.  Patient was discharged on 6/30 from [facility] [place] for pneumonia and heart failure exacerbation, his echo reported as EF of 28% with grade III diastolic dysfunction at that time. Per chart review he was instructed to stay in the hospital for a Life Vest due to Low EF, however he decided not to stay in the hospital and left. Denies any shortness of breath, has an occasional cough.  No abdominal pain.       Physical Exam   Triage vitals:  T 37.1 °C (98.8 °F)  HR (!) 151  /86  RR 13  O2 100 % None (Room air)    General: Awake, alert, in no acute distress  Eyes: Gaze conjugate.  No scleral icterus or injection  HENT: Normo-cephalic, atraumatic. No stridor  CV: Tachycardic rate, supraventricular tachycardia rhythm. Radial pulses  2+ bilaterally  Resp: Breathing non-labored, speaking in full sentences.  Clear to auscultation bilaterally  GI: Soft, non-distended, non-tender. No rebound or guarding.  MSK/Extremities: No gross bony deformities. Moving all extremities  Skin: Warm. Appropriate color  Neuro: Alert. Oriented. Face symmetric. Speech is fluent.  Gross strength and sensation intact in b/l UE and LEs  Psych: Appropriate mood and affect    Medical Decision Making & ED Course   Medical Decision Makin y.o. male presenting with chest pain, palpitations, enlarged lymph node right neck. Medical history of ESRD on hemodialysis (), heart failure EF 28%, CAD post CABG, SLE, insulin-dependent diabetes, chronic complication, GERD, HTN. Differentials include but are not limited to tachyarrhythmias, SVT, cardiac ischemia, PE, carotid artery dissection, lymphadenitis. Labs, EKG ordered.  Initial EKG showed left bundle branch block with supraventricular tachycardia, rate 148 bpm. Patient is hemodynamically stable and resting in bed with no signs of distress. Patient given metoprolol 5 mg and rate improved to 78 bpm, repeat EKG shows sinus rhythm with first-degree AV block ventricular rate 78 AK interval 252 ms. this is similar to his prior EKGs on chart review.  On reexamining the patient his chest pain has improved. Trops resulted 1,084 and 1,443. Asprin given and troponin trended.          ----      Differential diagnoses considered include but are not limited to: Tachyarrhythmias, SVT, NSTEMI, MI PE, carotid artery dissection, lymphadenitis,     Social Determinants of Health which Significantly Impact Care: None identified     EKG Independent Interpretation: The EKG obtained at 2344 was independently interpreted by myself. It demonstrates SVT rhythm with left bundle branch block with a ventricular rate of 148.  Normal axis. Intervals . ST segments showed depressions.  Similar compared to prior  EKG.    The EKG obtained at 0053 was independently interpreted by myself. It demonstrates sinus  rhythm with first-degree AV block a ventricular rate of 78.  Normal axis. Intervals , . ST segments showed depression.  Similar compared to prior EKG.    Independent Result Review and Interpretation: Relevant laboratory and radiographic results were reviewed and independently interpreted by myself.  As necessary, they are commented on in the ED Course.    Chronic conditions affecting the patient's care: As documented above in [facility]    The patient was discussed with the following consultants/services: None    Care Considerations: As documented above in [facility]    ED Course:  Diagnoses as of 08/01/24 0251   NSTEMI (non-ST elevated myocardial infarction) (Multi)     Disposition   As a result of their workup, the patient will require admission to the hospital.  The patient was informed of his diagnosis.  The patient was given the opportunity to ask questions and I answered them. The patient agreed to be admitted to the hospital.    Procedures   Procedures    Patient seen and discussed with ED attending physician.    Kimmy Rios DO  Emergency Medicine     Kimmy Rios DO  Resident  08/01/24 1890

## 2024-08-01 NOTE — PROGRESS NOTES
"  Internal Medicine Progress Note     Kentrell Carreon is a 54 y.o. 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    Presented to ED 7/31 PM with chest pain and palpitations. Onset of symptoms after completing his full round of dialysis, which he does at home. Checked his pulse, saw it was 160s. Waited for an hour but symptoms didn't resolve and pulse still 160s so called EMS. Had 2 episodes of non-bloody emesis while waiting for EMS.     In ED found to be in SVT on EKG. He received 1 x metop 5 mg IV with termination back to normal HR. Admitted to cardiology for further management.      Of note, he was recently hospitalized in [place]. Discharge summary from 6/30/24 showed admission for acute respiratory failure with hypoxia and recent E.Faecalis blood stream infection. Heart Failure exacerbation, acute, reduced EF 28%.     Subjective     No acute events overnight and no concerns this morning. Patient is no longer having chest pain or palpitations.     Denies dizziness, lightheadedness, SOB, cough, chest pain, chest pressure, palpitations, nausea, vomiting, abd pain.    Medications     Medications:   Scheduled medications  aspirin, 81 mg, oral, Daily  atorvastatin, 80 mg, oral, Daily  carvedilol, 25 mg, oral, BID  heparin (porcine), 5,000 Units, subcutaneous, q8h  insulin glargine, 5 Units, subcutaneous, BID  insulin lispro, 0-5 Units, subcutaneous, TID  sacubitriL-valsartan, 1 tablet, oral, BID  sevelamer carbonate, 800 mg, oral, TID      Continuous medications     PRN medications  PRN medications: acetaminophen, dextrose, dextrose, glucagon, glucagon     Objective     Vitals  Visit Vitals  /66   Pulse 81   Temp 36.6 °C (97.9 °F)   Resp 16   Ht 1.93 m (6' 4\")   Wt 68 kg (149 lb 14.6 oz)   SpO2 96%   BMI " 18.25 kg/m²   Smoking Status Former   BSA 1.91 m²     No intake or output data in the 24 hours ending 08/01/24 0750    Physical Exam  General: A&Ox3, NAD.  HEENT: Normocephalic, atraumatic. EOMI. MMM.   Respiratory: CTA bilaterally. No wheezes, rales, or rhonchi. Normal respiratory effort.  Cardiovascular: RRR. No murmurs, gallops, or rubs. Radial pulses 2+.  Abdominal: Soft, nondistended, nontender to palpation. Bowel sounds present. No hepatosplenomegaly or masses. No CVA tenderness.  Neuro: No focal neuro deficits.  MSK: No edema bilateral LE.   Skin: Warm, dry. No rashes or wounds.  Psych: Appropriate mood and affect.    Labs  Lab Results   Component Value Date    WBC 3.4 (L) 08/01/2024    HGB 9.8 (L) 08/01/2024    HCT 30.1 (L) 08/01/2024    MCV 97 08/01/2024     08/01/2024      Lab Results   Component Value Date    GLUCOSE 306 (H) 08/01/2024    CALCIUM 10.4 08/01/2024     (L) 08/01/2024    K 4.4 08/01/2024    CO2 28 08/01/2024    CL 89 (L) 08/01/2024    BUN 47 (H) 08/01/2024    CREATININE 8.04 (H) 08/01/2024      Lab Results   Component Value Date    ALT 19 08/01/2024    AST 20 08/01/2024    ALKPHOS 103 08/01/2024    BILITOT 0.5 08/01/2024      Cardiac procedures:    === 03/09/2022 ===  CATH    Coronary Angiography:  The coronary circulation is right dominant.     Left Main Coronary Artery:  The left main coronary artery is a normal caliber vessel. The left main arises normally from the left coronary sinus of Valsalva and bifurcates into the LAD and circumflex coronary arteries. The ostial left main coronary artery showed 90% stenosis.     Left Anterior Descending Coronary Artery Distribution:  The left anterior descending coronary artery is a normal caliber vessel. The LAD arises normally from the left main coronary artery. The ostial left anterior descending coronary artery showed 80% stenosis. An additional lesion, located at the proximal to mid left anterior descending coronary artery, revealed  70% in-stent restenosis.     Circumflex Coronary Artery Distribution:  The circumflex coronary artery is a normal caliber vessel. The circumflex arises normally from the left main coronary artery and terminates in the AV groove. The ostial circumflex coronary artery showed 80% stenosis. An additional lesion, located at the proximal circumflex coronary artery, demonstrated 60% stenosis.     Right Coronary Artery Distribution:     The right coronary artery is a normal caliber vessel. The RCA arises normally from the right sinus of Valsalva. The mid and distal right coronary artery showed 50% stenosis.     Coronary Interventions:     Coronary Lesion Summary:  Vessel            Stenosis         Vessel Segment  Left Main       90% stenosis           ostial  LAD             80% stenosis           ostial  LAD        70% in-stent restenosis proximal to mid  Circumflex      80% stenosis           ostial  Circumflex      60% stenosis          proximal  RCA             50% stenosis       mid and distal      === 02/08/2021 ===   NUCLEAR STRESS TEST  IMPRESSION:  1.  Normal myocardial perfusion study without evidence of ischemia or  prior infarction.  2. The left ventricle is severely dilated in size.  3. Globally reduced LV wall motion with an LV EF estimated at 40-46%.  4. Since 1/3/2019, ejection fraction appears improved and the LV is  less dilated (205-206 ml on today's exam, vs 261 ml on 1/3/2019).  There still remains no definitive signs of ischemia or prior infarct.     Imaging  === 05/31/24 ===    XR CHEST 2 VIEWS    - Impression -  1.  Enlarged cardiac silhouette with vascular congestion. No  consolidation seen    MACRO:  None    === 05/31/24 ===    CT ABDOMEN PELVIS WO IV CONTRAST    - Impression -  Left lower lobe infiltrate consistent with pneumonia.    Mild soft tissue and mesenteric edema with septal thickening in the  lung bases and small pleural effusions consistent with CHF/fluid  overload.    Distended  gallbladder with questionable trace pericholecystic fluid.    Questionable distal esophageal wall thickening. Correlate with  possible esophagitis.    Diffuse bladder wall thickening which could be related to suboptimal  distention, cystitis and/or trabeculation.    Numerous additional findings as described above.    MACRO:  None.         Assessment/Plan     Kentrell Carreon is a 54 y.o. 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    8/1 Updates:  Trop this AM >110,000   STAT echo pending   Planning for cath tomorrow  Pt has contrast allergy so will pre treat with steroids     #NSTEMI   #CAD s/p CABG (2022)  EKG following SVT showing ST depressions in II and aVF new from prior baseline   Mild pleuritic chest pain on presentation, trop 1084 -> 1443  Last cath showed critical L main disease, prox LCx ,  prox LAD   Likely type II NSTEMI   Patient given  mg in ED, initially decided to hold off heparin given patency of grafts & run of SVT to explain elevated trops    Plan:   Continue ASA 81, atorvastatin 80 mg    Continue carvedilol 25 mg BID  Continue entresto 24-62 mg BID     #Supraventricular tachycardia    History of ablation for tachycardia in 2006   First episode of palpitations since prior ablation   Initial EKG (7/31) showed 1st degree AV block, LBBB  TSH wnl     Plan:  Continue coreg  EP consult for possible ablation, appreciate recs         #HFmrEF vs HFrEF  #Ischemic cardiomyopathy    EF has fluctuated on TTE's, most recent EF of 28% on 6/29 compared to 35-40% on 6/7    Plan:   TTE ordered  Continue GDMT as above (limited by ESRD)    #ESRD on HD   LUE AV fistula, [facility] schedule    Plan:  Nephrology following for dialysis needs  Continue sevelamer 800 TID   Nephrocap per protocol     #IDT2DM    Most recent HbA1c 7.5%  Home regimen: sitagliptin 25 mg, Lantus 10u BID, Lispro sliding scale    Plan:  Lantus 5u BID  Mild SSI  Titrate as needed       Diet: NPO for cath  DVT Prophylaxis: heparin gtt  Code Status: Full code  NOK: Tegan Carreon [phone]    Patient and plan discussed with attending physician Dr. Bravo.          Destiny Jiménez MD  Department of Internal Medicine, PGY-1

## 2024-08-01 NOTE — CONSULTS
Inpatient consult to Electrophysiology  Consult performed by: Wale King MD  Consult ordered by: Collin Bravo MD    Inpatient Electrophysiology Consult Note  Reason for consult: SVT    HPI:   Kentrell Carreon 54M PMHx CAD s/p PCI LAD 6/2017 and 3v CABG 2022, HFrEF (EF 35-40% 6/24), prior ablation 2006 likely SVT), ESRD on HD MTTThF via RUE AVF, DM2, SLE. P/w SVT for which EP is consulted.     Patient states after HD he felt his heart beating fast, he tried to hydrate but he continued to feel the same, he checked his HR it was in the 160s, after about an hour he called EMS and was brought to the ED, where he was given IV metop and that aborted the rhythm. Electrolytes were within normal range. Pt states he had an ablation under twilight sedation in [facility] for a tachycardia.     All system reviewed and negative unless mentioned above.     Cardiac studies:   EKG 8/1/24 - SR, TWI  STD. LVH  EKG 7/31/24 - Long RP tachycardia     TTE 6/29/24:   * Normal left ventricular chamber size.     * Mild-to-moderate increased left ventricle wall thickness.     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     * Severely decreased left ventricular systolic function.     * Left ventricular ejection fraction; 28 %.     * Global left ventricular hypokinesis.     * Grade III diastolic dysfunction of the left ventricle (restrictive filling   pattern).    * Severely elevated LV filling pressure septal E to E prime 30, lateral E to   E prime 19.     * Normal right ventricular size and systolic function.     * Mildly decreased right ventricular systolic function.     * Right ventricular systolic pressure; 37 mmHg.     * Severely increased left atrial chamber size.     * Left atrial volume index of 96 ml/m2.     * Severely increased right atrial chamber size.     * Moderate aortic valve calcification. The non coronary cusp is severely    calcified.    * Mild aortic valve stenosis; Velocity 2.2 m/s, mean gradient 11 mmHg, HUMPHREY   1.4 cm2, DVI 0.43.     * Trivial aortic valve regurgitation.     * Mild-moderate mitral valve regurgitation.     * Mild-moderate tricuspid valve regurgitation.     * Dilated IVC with normal respiratory variation.     * No pericardial effusion.     TTE 6/8/24:    1. Left ventricular systolic function is mildly to moderately decreased with a 35-40% estimated ejection fraction.   2. Left Ventricular Global Longitudinal Strain - 8.7 % with more pronounced abnormalities involving the basal and mid segments with preservation of the apex more consistent with a possible infiltrative cardiomyopathy.   3. There is global hypokinesis of the left ventricle with minor regional variations.   4. Spectral Doppler shows an impaired relaxation pattern of left ventricular diastolic filling.   5. The left atrium is moderately dilated.   6. The right atrium is mild to moderately dilated.   7. Moderate tricuspid regurgitation visualized.   8. Compared with study from 6/5/2024, there is an improvement in calculated LVEF    Cath:    1. Native coronaries: Critical distal left main disease, proximal LCx , proximal LAD , and moderate RCA disease in co-dominant system.   2. Bypasses: Patent LIMA to LAD which fills back the whole LAD system including both diagonals, patent SVG to OM1 which fills back the whole codominant LCx system, and patent SVG to right PDA.      Current Facility-Administered Medications:     acetaminophen (Tylenol) tablet 650 mg, 650 mg, oral, q6h PRN, Nabeel Mcbride MD    aspirin EC tablet 81 mg, 81 mg, oral, Daily, Nabeel Mcbride MD, 81 mg at 08/01/24 0843    atorvastatin (Lipitor) tablet 80 mg, 80 mg, oral, Daily, Nabeel Mcbride MD, 80 mg at 08/01/24 0843    carvedilol (Coreg) tablet 25 mg, 25 mg, oral, BID, Nabeel Mcbride MD, 25 mg at 08/01/24 0843    dextrose 50 % injection 12.5 g, 12.5 g, intravenous,  q15 min PRN, Nabeel Mcbride MD    dextrose 50 % injection 25 g, 25 g, intravenous, q15 min PRN, Nabeel Mcbride MD    glucagon (Glucagen) injection 1 mg, 1 mg, intramuscular, q15 min PRN, Nabeel Mcbride MD    glucagon (Glucagen) injection 1 mg, 1 mg, intramuscular, q15 min PRN, Nabeel Mcbride MD    heparin (porcine) injection 5,000 Units, 5,000 Units, subcutaneous, q8h, Nabeel Mcbride MD, 5,000 Units at 08/01/24 0455    insulin glargine (Lantus) injection 5 Units, 5 Units, subcutaneous, BID, Nabeel Mcbride MD, 5 Units at 08/01/24 0844    insulin lispro (HumaLOG) injection 0-5 Units, 0-5 Units, subcutaneous, TID, Nabeel Mcbride MD, 2 Units at 08/01/24 0844    sacubitriL-valsartan (Entresto) 24-26 mg per tablet 1 tablet, 1 tablet, oral, BID, Nabeel Mcbride MD, 1 tablet at 08/01/24 0843    sevelamer carbonate (Renvela) tablet 800 mg, 800 mg, oral, TID, Nabeel Mcbride MD, 800 mg at 08/01/24 0843    Objective:    Vitals:    08/01/24 1134   BP: 94/57   Pulse: 69   Resp: 17   Temp: 36.5 °C (97.7 °F)   SpO2: 92%       Exam:  General - well appearing   Neck - no JVD   Chest - CTAB   Cardiac - S1, S2, no murmur heard   Lower ext - No LE edema     Labs/Imaging:     Results from last 72 hours   Lab Units 08/01/24  0851   TROPHSCMC ng/L 110,157*   SODIUM mmol/L 133*   POTASSIUM mmol/L 4.8   CO2 mmol/L 30   BUN mg/dL 49*   CREATININE mg/dL 8.42*   MAGNESIUM mg/dL 2.17   PHOSPHORUS mg/dL 5.9*      Results from last 72 hours   Lab Units 08/01/24  0851   WBC AUTO x10*3/uL 3.9*   HEMOGLOBIN g/dL 9.2*   PLATELETS AUTO x10*3/uL 150      Assessment and Plan:   54M PMHx CAD s/p PCI LAD 6/2017 and 3v CABG 2022, HFrEF (EF 35-40% 6/24), prior ablation 2006 likely SVT), ESRD on HD MTTThF via RUE AVF, DM2, SLE. P/w SVT for which EP is consulted.     #SVT   Patient with long RP SVT, likely a-tach. Given his decreased EF, elevated troponin and ESRD with limited options for anti-arrhythmics, reasonable  to pursue ablation for this patient.   - Please make NPO midnight for ablation tomorrow   - Please order pre-procedure labs: CBC, BMP, PT/INR   - If SVT recurs, please trial IV BB to abort the rhythm if BP tolerates, can do this up to 3x, if that is unsuccessful can trial adenosine 6mg, patient should have continuous EKG monitoring during adenosine administration. If patient is HDUS in SVT will need emergent DCCV    Recommendations are preliminary until note is co-signed by an attending.     Christie King  Cardiology Fellow   PGY-5

## 2024-08-01 NOTE — CARE PLAN
The patient's goals for the shift include      The clinical goals for the shift include      Over the shift, the patient did not make progress toward the following goals. Barriers to progression include . Recommendations to address these barriers include   Problem: Fall/Injury  Goal: Not fall by end of shift  Outcome: Progressing   .

## 2024-08-01 NOTE — PROGRESS NOTES
Occupational Therapy                 Therapy Communication Note    Patient Name: Kentrell Carreon  MRN: [id number]  Today's Date: 8/1/2024     Discipline: Occupational Therapy    Missed Visit Reason: Missed Visit Reason: Other (Comment) (Pt off floor at this time. Will reattempt as schedule permits.)    Missed Time: Attempt    Comment: Off floor

## 2024-08-02 PROBLEM — I47.10 SVT (SUPRAVENTRICULAR TACHYCARDIA) (CMS-HCC): Status: ACTIVE | Noted: 2024-07-31

## 2024-08-02 LAB
ALBUMIN SERPL BCP-MCNC: 4.1 G/DL (ref 3.4–5)
ALBUMIN SERPL BCP-MCNC: 4.2 G/DL (ref 3.4–5)
ANION GAP SERPL CALC-SCNC: 24 MMOL/L (ref 10–20)
ANION GAP SERPL CALC-SCNC: 26 MMOL/L (ref 10–20)
BUN SERPL-MCNC: 66 MG/DL (ref 6–23)
BUN SERPL-MCNC: 71 MG/DL (ref 6–23)
CALCIUM SERPL-MCNC: 10 MG/DL (ref 8.6–10.6)
CALCIUM SERPL-MCNC: 10.3 MG/DL (ref 8.6–10.6)
CHLORIDE SERPL-SCNC: 86 MMOL/L (ref 98–107)
CHLORIDE SERPL-SCNC: 86 MMOL/L (ref 98–107)
CO2 SERPL-SCNC: 23 MMOL/L (ref 21–32)
CO2 SERPL-SCNC: 26 MMOL/L (ref 21–32)
CREAT SERPL-MCNC: 10.97 MG/DL (ref 0.5–1.3)
CREAT SERPL-MCNC: 11.01 MG/DL (ref 0.5–1.3)
EGFRCR SERPLBLD CKD-EPI 2021: 5 ML/MIN/1.73M*2
EGFRCR SERPLBLD CKD-EPI 2021: 5 ML/MIN/1.73M*2
GLUCOSE BLD MANUAL STRIP-MCNC: 292 MG/DL (ref 74–99)
GLUCOSE BLD MANUAL STRIP-MCNC: 295 MG/DL (ref 74–99)
GLUCOSE BLD MANUAL STRIP-MCNC: 386 MG/DL (ref 74–99)
GLUCOSE BLD MANUAL STRIP-MCNC: 427 MG/DL (ref 74–99)
GLUCOSE BLD MANUAL STRIP-MCNC: 450 MG/DL (ref 74–99)
GLUCOSE SERPL-MCNC: 394 MG/DL (ref 74–99)
GLUCOSE SERPL-MCNC: 405 MG/DL (ref 74–99)
HBV SURFACE AB SER-ACNC: 13.2 MIU/ML
HBV SURFACE AG SERPL QL IA: NONREACTIVE
MAGNESIUM SERPL-MCNC: 2.21 MG/DL (ref 1.6–2.4)
MAGNESIUM SERPL-MCNC: 2.24 MG/DL (ref 1.6–2.4)
PHOSPHATE SERPL-MCNC: 6.4 MG/DL (ref 2.5–4.9)
PHOSPHATE SERPL-MCNC: 6.6 MG/DL (ref 2.5–4.9)
POTASSIUM SERPL-SCNC: 6 MMOL/L (ref 3.5–5.3)
POTASSIUM SERPL-SCNC: 6.6 MMOL/L (ref 3.5–5.3)
SODIUM SERPL-SCNC: 129 MMOL/L (ref 136–145)
SODIUM SERPL-SCNC: 129 MMOL/L (ref 136–145)
UFH PPP CHRO-ACNC: 0.2 IU/ML
UFH PPP CHRO-ACNC: 0.3 IU/ML

## 2024-08-02 PROCEDURE — 36415 COLL VENOUS BLD VENIPUNCTURE: CPT

## 2024-08-02 PROCEDURE — 83735 ASSAY OF MAGNESIUM: CPT

## 2024-08-02 PROCEDURE — 36415 COLL VENOUS BLD VENIPUNCTURE: CPT | Performed by: INTERNAL MEDICINE

## 2024-08-02 PROCEDURE — 2500000004 HC RX 250 GENERAL PHARMACY W/ HCPCS (ALT 636 FOR OP/ED)

## 2024-08-02 PROCEDURE — 2500000001 HC RX 250 WO HCPCS SELF ADMINISTERED DRUGS (ALT 637 FOR MEDICARE OP)

## 2024-08-02 PROCEDURE — 2500000002 HC RX 250 W HCPCS SELF ADMINISTERED DRUGS (ALT 637 FOR MEDICARE OP, ALT 636 FOR OP/ED)

## 2024-08-02 PROCEDURE — 97161 PT EVAL LOW COMPLEX 20 MIN: CPT | Mod: GP

## 2024-08-02 PROCEDURE — 80069 RENAL FUNCTION PANEL: CPT

## 2024-08-02 PROCEDURE — 99233 SBSQ HOSP IP/OBS HIGH 50: CPT

## 2024-08-02 PROCEDURE — 6350000001 HC RX 635 EPOETIN >10,000 UNITS: Mod: JZ | Performed by: INTERNAL MEDICINE

## 2024-08-02 PROCEDURE — 87340 HEPATITIS B SURFACE AG IA: CPT | Performed by: INTERNAL MEDICINE

## 2024-08-02 PROCEDURE — 86706 HEP B SURFACE ANTIBODY: CPT | Performed by: INTERNAL MEDICINE

## 2024-08-02 PROCEDURE — 2500000004 HC RX 250 GENERAL PHARMACY W/ HCPCS (ALT 636 FOR OP/ED): Mod: JZ

## 2024-08-02 PROCEDURE — 1200000002 HC GENERAL ROOM WITH TELEMETRY DAILY

## 2024-08-02 PROCEDURE — 93010 ELECTROCARDIOGRAM REPORT: CPT | Performed by: INTERNAL MEDICINE

## 2024-08-02 PROCEDURE — 82947 ASSAY GLUCOSE BLOOD QUANT: CPT

## 2024-08-02 PROCEDURE — 85520 HEPARIN ASSAY: CPT

## 2024-08-02 RX ORDER — INSULIN LISPRO 100 [IU]/ML
0-10 INJECTION, SOLUTION INTRAVENOUS; SUBCUTANEOUS
Status: DISCONTINUED | OUTPATIENT
Start: 2024-08-02 | End: 2024-08-03

## 2024-08-02 RX ORDER — CALCIUM GLUCONATE 20 MG/ML
1000 INJECTION, SOLUTION INTRAVENOUS ONCE
Status: COMPLETED | OUTPATIENT
Start: 2024-08-02 | End: 2024-08-02

## 2024-08-02 RX ORDER — INSULIN GLARGINE 100 [IU]/ML
10 INJECTION, SOLUTION SUBCUTANEOUS 2 TIMES DAILY
Status: DISCONTINUED | OUTPATIENT
Start: 2024-08-02 | End: 2024-08-03

## 2024-08-02 ASSESSMENT — COGNITIVE AND FUNCTIONAL STATUS - GENERAL
WALKING IN HOSPITAL ROOM: A LITTLE
STANDING UP FROM CHAIR USING ARMS: A LITTLE
CLIMB 3 TO 5 STEPS WITH RAILING: A LITTLE
MOVING TO AND FROM BED TO CHAIR: A LITTLE
STANDING UP FROM CHAIR USING ARMS: A LITTLE
CLIMB 3 TO 5 STEPS WITH RAILING: A LITTLE
MOBILITY SCORE: 20
WALKING IN HOSPITAL ROOM: A LITTLE
MOVING TO AND FROM BED TO CHAIR: A LITTLE
DAILY ACTIVITIY SCORE: 24
MOBILITY SCORE: 20

## 2024-08-02 ASSESSMENT — PAIN SCALES - GENERAL
PAINLEVEL_OUTOF10: 0 - NO PAIN
PAINLEVEL_OUTOF10: 0 - NO PAIN

## 2024-08-02 ASSESSMENT — PAIN - FUNCTIONAL ASSESSMENT: PAIN_FUNCTIONAL_ASSESSMENT: 0-10

## 2024-08-02 NOTE — NURSING NOTE
Report from Sending RN:    Report From: Ronnell ( RN)  Recent Surgery of Procedure: No  Baseline Level of Consciousness (LOC): none  Oxygen Use: No  Type: none  Diabetic: Yes, 292  Last BP Med Given Day of Dialysis: none  Last Pain Med Given: none  Lab Tests to be Obtained with Dialysis: No  Blood Transfusion to be Given During Dialysis: No  Available IV Access: Yes  Medications to be Administered During Dialysis: No  Continuous IV Infusion Running: No  Restraints on Currently or in the Last 24 Hours: No  Hand-Off Communication: No acute overnight or morning events; vss; Pt did take morning medications; Pt will not need labs; Pt is a full code; Fe Peralta RN.  Dialysis Catheter Dressing: fistula right arm  Last Dressing Change: n/a

## 2024-08-02 NOTE — SIGNIFICANT EVENT
Received call from RN regarding patient refusing dialysis this evening. Upon interview with patient and thoroughly explaining risks vs benefits (given the potassium of 6.6), patient is still refusing dialysis. Patient received insulin , calcium gluconate and lokelma 10. Dialysis to be done  first thing in the morning.

## 2024-08-02 NOTE — PROGRESS NOTES
Electrophysiology Progress Note    Updates:  No further SVT episodes.  No plan for cor angio, troponin peaked >125k, hep gtt discontinued given likely type II 2/2 SVT  This AM frustrated about not being able to get ablation 2/2 hyperK    All system reviewed and negative unless mentioned above.       Current Facility-Administered Medications:     acetaminophen (Tylenol) tablet 650 mg, 650 mg, oral, q6h PRN, Nabeel Mcbride MD    aspirin EC tablet 81 mg, 81 mg, oral, Daily, Nabeel Mcbride MD, 81 mg at 08/02/24 0840    atorvastatin (Lipitor) tablet 80 mg, 80 mg, oral, Daily, Nabeel Mcbride MD, 80 mg at 08/02/24 0840    B complex-vitamin C-folic acid (Nephrocaps) capsule 1 capsule, 1 capsule, oral, Daily, Destiny Jiménez MD, 1 capsule at 08/02/24 0840    carvedilol (Coreg) tablet 25 mg, 25 mg, oral, BID, Nabeel Mcbride MD, 25 mg at 08/02/24 0840    dextrose 50 % injection 12.5 g, 12.5 g, intravenous, q15 min PRN, Nabeel Mcbride MD    dextrose 50 % injection 25 g, 25 g, intravenous, q15 min PRN, Nabeel Mcbride MD    epoetin annita (Epogen,Procrit) injection 10,000 Units, 10,000 Units, intravenous, Once per day on Monday Wednesday Friday, Vandana Peacock MD    glucagon (Glucagen) injection 1 mg, 1 mg, intramuscular, q15 min PRN, Nabeel Mcbride MD    glucagon (Glucagen) injection 1 mg, 1 mg, intramuscular, q15 min PRN, Nabeel Mcbride MD    heparin bolus from bag 1,500-3,000 Units, 1,500-3,000 Units, intravenous, q4h PRN, Leland Ochoa MD, 1,500 Units at 08/02/24 0239    insulin glargine (Lantus) injection 10 Units, 10 Units, subcutaneous, BID, Destiny Jiménez MD, 10 Units at 08/02/24 0839    insulin lispro (HumaLOG) injection 0-5 Units, 0-5 Units, subcutaneous, TID, Thierry Willams MD, 5 Units at 08/02/24 0838    sacubitriL-valsartan (Entresto) 24-26 mg per tablet 1 tablet, 1 tablet, oral, BID, Nabeel Mcbride MD, 1 tablet at 08/02/24 0840    sevelamer  carbonate (Renvela) tablet 800 mg, 800 mg, oral, TID, Nabeel Mcbride MD, 800 mg at 08/01/24 1629    sodium zirconium cyclosilicate (Lokelma) packet 10 g, 10 g, oral, TID, Thierry Willams MD, 10 g at 08/02/24 1331    Objective:    Vitals:    08/02/24 1202   BP: 110/61   Pulse: 71   Resp: 17   Temp: 36.3 °C (97.3 °F)   SpO2: 98%       Exam:  General - well appearing   Neck - no JVD   Chest - CTAB   Cardiac - S1, S2, no murmur heard   Lower ext - No LE edema     Labs/Imaging:     Results from last 72 hours   Lab Units 08/02/24  0815 08/01/24  2142   TROPHSCMC ng/L  --  113,690*   SODIUM mmol/L 129*  --    POTASSIUM mmol/L 6.6*  --    CO2 mmol/L 26  --    BUN mg/dL 66*  --    CREATININE mg/dL 11.01*  --    MAGNESIUM mg/dL 2.24  --    PHOSPHORUS mg/dL 6.6*  --       Results from last 72 hours   Lab Units 08/01/24  2142   WBC AUTO x10*3/uL 4.5   HEMOGLOBIN g/dL 9.5*   PLATELETS AUTO x10*3/uL 150      Assessment and Plan:   54M PMHx CAD s/p PCI LAD 6/2017 and 3v CABG 2022, HFrEF (EF 35-40% 6/24), prior ablation 2006 likely SVT), ESRD on HD MTTThF via RUE AVF, DM2, SLE. P/w SVT for which EP is consulted.      #SVT   Patient with long RP SVT, likely a-tach. Given his decreased EF, elevated troponin and ESRD with limited options for anti-arrhythmics, reasonable to pursue ablation for this patient. Unable to perform ablation today given hyperkalemia  - Plan for SVT ablation Monday 8/5/24  - Please arrange for patient to get HD first thing Monday morning, so we can proceed with ablation safely in the afternoon   - On Brian night, please order pre-procedure labs: CBC, BMP, PT/INR   - Please make NPO Brian night   - Please hold AC including DVT ppx from Brian night   - If SVT recurs, please trial IV BB to abort the rhythm if BP tolerates, can do this up to 3x, if that is unsuccessful can trial adenosine 6mg, patient should have continuous EKG monitoring during adenosine administration. If patient is HDUS in SVT will need  emergent DCCV    EP will follow.     Recommendations are preliminary until note is co-signed by an attending.     Christie King  Cardiology Fellow   PGY-5

## 2024-08-02 NOTE — CARE PLAN
The patient's goals for the shift include to get rest    The clinical goals for the shift include vitals will remain stable overnight and no new cardiac symptoms      Problem: Pain  Goal: Takes deep breaths with improved pain control throughout the shift  Outcome: Progressing  Goal: Turns in bed with improved pain control throughout the shift  Outcome: Progressing  Goal: Walks with improved pain control throughout the shift  Outcome: Progressing  Goal: Performs ADL's with improved pain control throughout shift  Outcome: Progressing  Goal: Participates in PT with improved pain control throughout the shift  Outcome: Progressing  Goal: Free from opioid side effects throughout the shift  Outcome: Progressing  Goal: Free from acute confusion related to pain meds throughout the shift  Outcome: Progressing     Problem: Fall/Injury  Goal: Not fall by end of shift  Outcome: Progressing  Goal: Be free from injury by end of the shift  Outcome: Progressing  Goal: Verbalize understanding of personal risk factors for fall in the hospital  Outcome: Progressing  Goal: Verbalize understanding of risk factor reduction measures to prevent injury from fall in the home  Outcome: Progressing  Goal: Use assistive devices by end of the shift  Outcome: Progressing  Goal: Pace activities to prevent fatigue by end of the shift  Outcome: Progressing

## 2024-08-02 NOTE — DISCHARGE INSTRUCTIONS
Dear Kentrell Carreon,     It was a pleasure caring for you! You were admitted to [facility] ([facility]) on 7/31/2024 for tachycardia.  While you were here, you underwent an ablation procedure to prevent recurrence of this arrhythmia. You will need to go home with a 30-day event monitor that will detect any new arrhythmias. This information will be assessed at your follow up appointment. You will also be going home on two new medications. One is a blood thinner called Eliquis. This is to prevent blood clots from developing in your heart, which can sometimes happen with arrhythmias. The second medication is called Jardiance, which is for your heart failure. Please come to the emergency department if you develop any worsening symptoms.     For you to do:  Please take all of your medications as prescribed.  Please follow up with your primary care doctor within 1-2 weeks of discharge.   Please follow up with your specialists: EP Cardiologist, Endocrinologist, Nephrologist      Thank you for letting us take part in your care.        INSTRUCTIONS AFTER ABLATION PROCEDURE:    * In the first week after ablation you should take it easy. No heavy lifting or heavy exertion, no treadmill.  You can use the stairs if needed but go slowly and minimize the number of times up and down.    * Some minor bruising is common at each groin access site with minor soreness as if you had banged the area. Bruising may occasionally be seen to extend down the leg. This is normal as is an occasional small quarter sized bump in the area. If larger swelling or more significant pain occurs at the area, please contact the office or go the nearest Emergency Room.    * All medications will generally remain the same unless you are told otherwise.  Resume taking your home medications today as listed on the discharge instructions.    *Continue to follow up with your primary cardiologist, primary care physician, and any other  specialists you normally see.    *No driving for 2 days post procedure (IF you were driving prior to procedure).    *Diet: Heart healthy    Call Provider If:  Breathing faster than normal.     Fever of 100.4 F (38 C) or higher.     Chills.     Any new concerning symptoms.     Passing out.     Patient Instructions, Next 24 hours:  DO NOT drive a car, operate machinery or power tools.  It is recommended that a responsible adult be with you for the first 24 hours.     DO NOT drink any alcoholic drinks or take any non-prescriptive medications that contain alcohol for the first 24 hours.     DO NOT make any important decisions for the first 24 hours.    Activity:  You are advised to go directly home from the hospital.     DO NOT lift anything heavier than 10 pounds for one week, this allows for proper healing of the groin.     No excessive exercise or treadmill use for one week. You may walk and do stairs, slowly.     No sexual activities for 24 hours after you arrive home.    Wound Care:  If slight bleeding should occur at site, lie down and have someone apply firm pressure just above the puncture site for 5 minutes.  If it continues or is profuse, call 911. Always notify your doctor if bleeding occurs.     Keep site clean and dry. Let air dry or you may use a simple bandaid.     Gently cleanse the puncture site in your groin with soap and water only.     You may experience some tenderness, bruising or minimal inflammation.  If you have any concerns, you may contact the Cath Lab or if any of these symptoms become excessive, contact your cardiologist or go to the emergency room.     No tub baths, soaking, or swimming for one week.     May shower the next day after your procedure.    If you have any questions about the effects of the sedative drugs or groin care, call the physician who performed your procedure.    FOLLOW UP:   1) Ava Shetty CNP ( Electrophysiology) 1 month after ablation as scheduled

## 2024-08-02 NOTE — PROGRESS NOTES
SW met with pt at bedside to offer support and consult social work issues. Pt is alert and fully oriented.   Pt reported he does home HD, receiving support from Katie at [facility] and tries to keep HD schedule M/T/Th/ S as possible. Pt lives alone and take care of his HD himself. Pt's brothers live nearby and are able to assist pt as needed. Pt confirmed hit primary contact on Epic. Pt denied any further issues or questions on social work at the moment and SW will continue to follow and assist.     Jamilah Isabel, SUKUMARA, LSW

## 2024-08-02 NOTE — PROGRESS NOTES
Physical Therapy    Physical Therapy Evaluation    Patient Name: Kentrell Carreon  MRN: [id number]  Today's Date: 8/2/2024   Time Calculation  Start Time: 0935  Stop Time: 0947  Time Calculation (min): 12 min    Assessment/Plan   PT Assessment  PT Assessment Results: Decreased strength, Decreased range of motion, Decreased endurance, Decreased mobility  Rehab Prognosis: Excellent  Barriers to Discharge: Medical acuity  Evaluation/Treatment Tolerance: Patient tolerated treatment well  End of Session Communication: Bedside nurse  Assessment Comment: Previously independent 54 y.o. male present s/p NSTEMI. Pt able to ambulate 150ft this session with slight unsteadiness. Pt would benefit from Low Intensity Rehab after D/C.  End of Session Patient Position: Bed, 3 rail up, Alarm off, not on at start of session  IP OR SWING BED PT PLAN  Inpatient or Swing Bed: Inpatient  PT Plan  Treatment/Interventions: Bed mobility, Transfer training, Gait training, Stair training, Strengthening, Endurance training, Range of motion, Therapeutic exercise, Therapeutic activity  PT Plan: Ongoing PT  PT Frequency: 3 times per week  PT Discharge Recommendations: Low intensity level of continued care  Equipment Recommended upon Discharge:  (None)  PT Recommended Transfer Status: Stand by assist  PT - OK to Discharge: Yes      Subjective   General Visit Information:  General  Reason for Referral: Chest pain and palpitations, NSTEMI  Past Medical History Relevant to Rehab: ESRD on hemodialysis, heart failure EF 28%, CAD post CABG in 2022, ablation in 2006, SLE, insulin-dependent diabetes, chronic complication, GERD, HTN, CHF, lupus  Missed Visit: No  Missed Visit Reason:  (--)  Family/Caregiver Present: No  Prior to Session Communication: Bedside nurse  Patient Position Received: Bed, 3 rail up, Alarm off, not on at start of session  Preferred Learning Style: auditory, visual, verbal  General Comment: Pt pleasant and agreeable to PT session  Home  Living:  Home Living  Type of Home: Apartment ([place])  Lives With: Alone  Home Adaptive Equipment: None  Home Layout: One level  Home Access: Stairs to enter with rails  Entrance Stairs-Rails: Right  Entrance Stairs-Number of Steps: 3 flights of stairs (~30 steps)  Bathroom Shower/Tub: Tub/shower unit  Bathroom Toilet: Standard  Bathroom Equipment: None  Prior Level of Function:  Prior Function Per Pt/Caregiver Report  Level of [place]: Independent with ADLs and functional transfers  Ambulatory Assistance: Independent  Precautions:  Precautions  Medical Precautions: Cardiac precautions, Fall precautions    Objective   Pain:  Pain Assessment  Pain Assessment: 0-10  0-10 (Numeric) Pain Score: 0 - No pain  Cognition:  Cognition  Orientation Level: Oriented X4    General Assessments:    Activity Tolerance  Endurance: Tolerates 10 - 20 min exercise with multiple rests    Sensation  Light Touch: No apparent deficits    Strength  Strength Comments: BLE grossly 4-/5  Strength  Strength Comments: BLE grossly 4-/5    Coordination  Movements are Fluid and Coordinated: Yes    Postural Control  Postural Control: Within Functional Limits    Static Sitting Balance  Static Sitting-Level of Assistance: Distant supervision    Static Standing Balance  Static Standing-Level of Assistance: Close supervision  Functional Assessments:       Bed Mobility  Bed Mobility: Yes  Bed Mobility 1  Bed Mobility 1: Supine to sitting  Level of Assistance 1: Close supervision, Minimal verbal cues  Bed Mobility 2  Bed Mobility  2: Sitting to supine  Level of Assistance 2: Close supervision, Minimal verbal cues    Transfers  Transfer: Yes  Transfer 1  Transfer From 1: Bed to  Transfer to 1: Stand  Technique 1: Sit to stand  Transfer Level of Assistance 1: Close supervision, Minimal verbal cues  Transfers 2  Transfer From 2: Stand to  Transfer to 2: Bed  Technique 2: Stand to sit  Transfer Level of Assistance 2: Close supervision, Minimal verbal  cues    Ambulation/Gait Training  Ambulation/Gait Training Performed: Yes  Ambulation/Gait Training 1  Surface 1: Level tile, Carpet  Device 1: No device  Assistance 1: Close supervision, Minimal verbal cues  Quality of Gait 1: Narrow base of support, Inconsistent stride length, Decreased step length (Slight unsteadiness)  Comments/Distance (ft) 1: 150ft (Pt reporting some fatigue in his legs after ambulation)    Stairs  Stairs: No    Outcome Measures:  [facility] Basic Mobility  Turning from your back to your side while in a flat bed without using bedrails: None  Moving from lying on your back to sitting on the side of a flat bed without using bedrails: None  Moving to and from bed to chair (including a wheelchair): A little  Standing up from a chair using your arms (e.g. wheelchair or bedside chair): A little  To walk in hospital room: A little  Climbing 3-5 steps with railing: A little  Basic Mobility - Total Score: 20    Encounter Problems       Encounter Problems (Active)       Mobility       LTG - Patient will ambulate community distance indep with LRD without reports of fatigue (Progressing)       Start:  08/02/24    Expected End:  08/16/24            LTG - Patient will navigate 8+ steps indep with rails/device (Progressing)       Start:  08/02/24    Expected End:  08/16/24            Patient will demonstrate 4+/5 strength in bilateral Les (Progressing)       Start:  08/02/24    Expected End:  08/16/24               PT Transfers       STG - Transfer from bed to chair indep with LRD (Progressing)       Start:  08/02/24    Expected End:  08/16/24                   Education Documentation  Precautions, taught by Cathie Pickering PT at 8/2/2024 10:02 AM.  Learner: Patient  Readiness: Acceptance  Method: Explanation  Response: Verbalizes Understanding, Demonstrated Understanding    Body Mechanics, taught by Cathie Pickering, PT at 8/2/2024 10:02 AM.  Learner: Patient  Readiness: Acceptance  Method:  Explanation  Response: Verbalizes Understanding, Demonstrated Understanding    Mobility Training, taught by Cathie Pickering PT at 8/2/2024 10:02 AM.  Learner: Patient  Readiness: Acceptance  Method: Explanation  Response: Verbalizes Understanding, Demonstrated Understanding    Education Comments  No comments found.

## 2024-08-02 NOTE — PROGRESS NOTES
"  Internal Medicine Progress Note     Kentrell Carreon is a 54 y.o. 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    Subjective     No acute events overnight and no concerns this morning.     Denies dizziness, lightheadedness, SOB, cough, chest pain, chest pressure, palpitations, nausea, vomiting, abd pain.    Medications     Medications:   Scheduled medications  aspirin, 81 mg, oral, Daily  atorvastatin, 80 mg, oral, Daily  B complex-vitamin C-folic acid, 1 capsule, oral, Daily  carvedilol, 25 mg, oral, BID  epoetin annita or biosimilar, 10,000 Units, intravenous, Once per day on Monday Wednesday Friday  epoetin annita or biosimilar, 8,000 Units, intravenous, Every Mon/Wed/Fri  insulin glargine, 10 Units, subcutaneous, BID  insulin lispro, 0-5 Units, subcutaneous, TID  sacubitriL-valsartan, 1 tablet, oral, BID  sevelamer carbonate, 800 mg, oral, TID      Continuous medications     PRN medications  PRN medications: acetaminophen, dextrose, dextrose, glucagon, glucagon, heparin     Objective     Vitals  Visit Vitals  /74 (BP Location: Left arm, Patient Position: Lying)   Pulse 70   Temp 36.3 °C (97.3 °F) (Temporal)   Resp 18   Ht 1.93 m (6' 4\")   Wt 68 kg (149 lb 14.6 oz)   SpO2 98%   BMI 18.25 kg/m²   Smoking Status Former   BSA 1.91 m²       Intake/Output Summary (Last 24 hours) at 8/2/2024 1132  Last data filed at 8/1/2024 1602  Gross per 24 hour   Intake 240 ml   Output --   Net 240 ml       Physical Exam  General: A&Ox3, NAD.  HEENT: Normocephalic, atraumatic. EOMI. MMM.   Respiratory: CTA bilaterally. No wheezes, rales, or rhonchi. Normal respiratory effort.  Cardiovascular: RRR. Holosystolic murmur. No gallops or rubs. Radial pulses 2+.  Abdominal: Soft, nondistended, nontender to palpation. Bowel sounds " present. No hepatosplenomegaly or masses. No CVA tenderness.  Neuro: No focal neuro deficits.  MSK: No edema bilateral LE.   Skin: Warm, dry. No rashes or wounds.  Psych: Appropriate mood and affect.     Labs  Lab Results   Component Value Date    WBC 4.5 08/01/2024    HGB 9.5 (L) 08/01/2024    HCT 29.6 (L) 08/01/2024    MCV 97 08/01/2024     08/01/2024      Lab Results   Component Value Date    GLUCOSE 263 (H) 08/01/2024    CALCIUM 10.1 08/01/2024     (L) 08/01/2024    K 4.8 08/01/2024    CO2 30 08/01/2024    CL 90 (L) 08/01/2024    BUN 49 (H) 08/01/2024    CREATININE 8.42 (H) 08/01/2024      Lab Results   Component Value Date    ALT 19 08/01/2024    AST 20 08/01/2024    ALKPHOS 103 08/01/2024    BILITOT 0.5 08/01/2024      Cardiac workup:    === 08/01/24 ===  ECHOCARDIOGRAM   CONCLUSIONS:   1. Left ventricular ejection fraction is moderately decreased, calculated by Gaspar's biplane at 38%.   2. Multiple segmental abnormalities exist. See findings.   3. Spectral Doppler shows a pseudonormal pattern of left ventricular diastolic filling.   4. There is an elevated mean left atrial pressure.   5. Left ventricular cavity size is severely dilated.   6. There is mildly reduced right ventricular systolic function.   7. Mildly enlarged right ventricle.   8. Moderate to severe aortic valve stenosis.   9. Mild aortic valve regurgitation.  10. Stroke volume index is decreased (33 ml/m2).  11. The aortic valve appears bicuspid with a thickened median raphe with fusion between the right and non coronary cusps.  12. There is moderate aortic valve cusp calcification.  13. There is moderate to severe aortic valve thickening.  14. Slightly elevated RVSP.  15. The left atrium is severely dilated.  16. The right atrium is mild to moderately dilated.  17. Compared with study dated 6/8/2024, there is no change in LVEF. The regional wall motion abnormalities were better visualized today. The LV was already moderate to  severely dilated in the prior echocardiogram (98 ml/m2). The aortic valve gradients were not assessed in the limited study from June.    === 03/09/2022 ===  CATH    Coronary Angiography:  The coronary circulation is right dominant.     Left Main Coronary Artery:  The left main coronary artery is a normal caliber vessel. The left main arises normally from the left coronary sinus of Valsalva and bifurcates into the LAD and circumflex coronary arteries. The ostial left main coronary artery showed 90% stenosis.     Left Anterior Descending Coronary Artery Distribution:  The left anterior descending coronary artery is a normal caliber vessel. The LAD arises normally from the left main coronary artery. The ostial left anterior descending coronary artery showed 80% stenosis. An additional lesion, located at the proximal to mid left anterior descending coronary artery, revealed 70% in-stent restenosis.     Circumflex Coronary Artery Distribution:  The circumflex coronary artery is a normal caliber vessel. The circumflex arises normally from the left main coronary artery and terminates in the AV groove. The ostial circumflex coronary artery showed 80% stenosis. An additional lesion, located at the proximal circumflex coronary artery, demonstrated 60% stenosis.     Right Coronary Artery Distribution:     The right coronary artery is a normal caliber vessel. The RCA arises normally from the right sinus of Valsalva. The mid and distal right coronary artery showed 50% stenosis.     Coronary Interventions:     Coronary Lesion Summary:  Vessel            Stenosis         Vessel Segment  Left Main       90% stenosis           ostial  LAD             80% stenosis           ostial  LAD        70% in-stent restenosis proximal to mid  Circumflex      80% stenosis           ostial  Circumflex      60% stenosis          proximal  RCA             50% stenosis       mid and distal      === 02/08/2021 ===   NUCLEAR STRESS  TEST  IMPRESSION:  1.  Normal myocardial perfusion study without evidence of ischemia or  prior infarction.  2. The left ventricle is severely dilated in size.  3. Globally reduced LV wall motion with an LV EF estimated at 40-46%.  4. Since 1/3/2019, ejection fraction appears improved and the LV is  less dilated (205-206 ml on today's exam, vs 261 ml on 1/3/2019).  There still remains no definitive signs of ischemia or prior infarct.     Imaging  === 05/31/24 ===    XR CHEST 2 VIEWS    - Impression -  1.  Enlarged cardiac silhouette with vascular congestion. No  consolidation seen    MACRO:  None    === 05/31/24 ===    CT ABDOMEN PELVIS WO IV CONTRAST    - Impression -  Left lower lobe infiltrate consistent with pneumonia.    Mild soft tissue and mesenteric edema with septal thickening in the  lung bases and small pleural effusions consistent with CHF/fluid  overload.    Distended gallbladder with questionable trace pericholecystic fluid.    Questionable distal esophageal wall thickening. Correlate with  possible esophagitis.    Diffuse bladder wall thickening which could be related to suboptimal  distention, cystitis and/or trabeculation.    Numerous additional findings as described above.    MACRO:  None.         Assessment/Plan     Kentrell Carreon is a 54 y.o. 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    8/2 Updates:  Ablation today    #NSTEMI   #CAD s/p CABG (2022)  EKG following SVT showing ST depressions in II and aVF new from prior baseline   Mild pleuritic chest pain on presentation, trop 1084 -> 1443  Last cath showed critical L main disease, prox LCx ,  prox LAD   Likely type II NSTEMI   Patient given  mg in ED, initially decided to hold off heparin given patency of grafts & run of  SVT to explain elevated trops  8/1 trop elevated to peak 125,000, likely due to SVT iso severe ischemic disease    Plan:   Continue ASA 81, atorvastatin 80 mg    Continue carvedilol 25 mg BID  Continue entresto 24-62 mg BID     #Supraventricular tachycardia    History of ablation for tachycardia in 2006   First episode of palpitations since prior ablation   Initial EKG (7/31) showed 1st degree AV block, LBBB  TSH wnl     Plan:  Continue coreg  EP consult for possible ablation, appreciate recs     #HFmrEF vs HFrEF  #Ischemic cardiomyopathy    EF has fluctuated on TTE's, most recent EF of 28% on 6/29 compared to 35-40% on 6/7    Plan:   TTE ordered  Continue GDMT as above (limited by ESRD)    #ESRD on HD   LUE AV fistula, [facility] schedule    Plan:  Nephrology following for dialysis needs  Continue sevelamer 800 TID   Nephrocap per protocol     #IDT2DM   Most recent HbA1c 7.5%  Home regimen: sitagliptin 25 mg, Lantus 10u BID, Lispro sliding scale    Plan:  Lantus 10u BID  Mild SSI  Titrate as needed       Diet: NPO for cath  DVT Prophylaxis: heparin gtt  Code Status: Full code  NOK: Tegan Carreon [phone]    Patient and plan discussed with attending physician Dr. Bravo.          Destiny Jiménez MD  Department of Internal Medicine, PGY-1

## 2024-08-02 NOTE — PROGRESS NOTES
Occupational Therapy                 Therapy Communication Note    Patient Name: Kentrell Carreon  MRN: [id number]  Today's Date: 8/2/2024     Discipline: Occupational Therapy    Missed Visit Reason: Missed Visit Reason: Patient placed on medical hold (RN requesting OT to hold at this time due to elevated potassium levels. Will reattempt as schedule permits.)    Missed Time: Attempt

## 2024-08-02 NOTE — PROGRESS NOTES
Occupational Therapy                    Therapy Communication Note    Patient Name: Kentrell Carreon  MRN: [id number]  Today's Date: 8/2/2024     Discipline: Occupational Therapy    Missed Visit Reason: Missed Visit Reason: Patient refused (Pt pleasantly refusing at this time. Reporting he is upset about his procedure being cancelled and would like to defer. Will reattempt as schedule permits.)    Missed Time: Attempt

## 2024-08-02 NOTE — SIGNIFICANT EVENT
Patient was ordered dialysis today, to be performed after SVT ablation. However ablation was canceled due to hyperkalemia (K 6.6 mEq/L). Per primary team, patient refused to come for dialysis today, he has been explained risks of high potassium and refusing dialysis by them. He has also been treated with Lokelma by primary team. We will call him again for dialysis early AM tomorrow. Dialysis RNs aware of plan.

## 2024-08-02 NOTE — INTERVAL H&P NOTE
H&P reviewed. The patient was examined and there are no changes to the H&P.   ASA PS 1: A normal healthy patient

## 2024-08-03 ENCOUNTER — APPOINTMENT (OUTPATIENT)
Dept: DIALYSIS | Facility: HOSPITAL | Age: 54
End: 2024-08-03
Payer: COMMERCIAL

## 2024-08-03 LAB
ALBUMIN SERPL BCP-MCNC: 3.6 G/DL (ref 3.4–5)
ALBUMIN SERPL BCP-MCNC: 3.8 G/DL (ref 3.4–5)
ALBUMIN SERPL BCP-MCNC: 3.8 G/DL (ref 3.4–5)
ALBUMIN SERPL BCP-MCNC: 3.9 G/DL (ref 3.4–5)
ALP SERPL-CCNC: 112 U/L (ref 33–120)
ALT SERPL W P-5'-P-CCNC: 13 U/L (ref 10–52)
ANION GAP BLDV CALCULATED.4IONS-SCNC: 17 MMOL/L (ref 10–25)
ANION GAP BLDV CALCULATED.4IONS-SCNC: 21 MMOL/L (ref 10–25)
ANION GAP SERPL CALC-SCNC: 16 MMOL/L (ref 10–20)
ANION GAP SERPL CALC-SCNC: 23 MMOL/L (ref 10–20)
ANION GAP SERPL CALC-SCNC: 24 MMOL/L (ref 10–20)
ANION GAP SERPL CALC-SCNC: 24 MMOL/L (ref 10–20)
AST SERPL W P-5'-P-CCNC: 29 U/L (ref 9–39)
B-OH-BUTYR SERPL-SCNC: 0.1 MMOL/L (ref 0.02–0.27)
BASE EXCESS BLDV CALC-SCNC: -0.2 MMOL/L (ref -2–3)
BASE EXCESS BLDV CALC-SCNC: -1.7 MMOL/L (ref -2–3)
BILIRUB SERPL-MCNC: 0.4 MG/DL (ref 0–1.2)
BODY TEMPERATURE: 37 DEGREES CELSIUS
BODY TEMPERATURE: ABNORMAL
BUN SERPL-MCNC: 33 MG/DL (ref 6–23)
BUN SERPL-MCNC: 85 MG/DL (ref 6–23)
BUN SERPL-MCNC: 85 MG/DL (ref 6–23)
BUN SERPL-MCNC: 91 MG/DL (ref 6–23)
CA-I BLDV-SCNC: 1.08 MMOL/L (ref 1.1–1.33)
CA-I BLDV-SCNC: ABNORMAL MMOL/L
CALCIUM SERPL-MCNC: 8.5 MG/DL (ref 8.6–10.6)
CALCIUM SERPL-MCNC: 9.4 MG/DL (ref 8.6–10.6)
CALCIUM SERPL-MCNC: 9.4 MG/DL (ref 8.6–10.6)
CALCIUM SERPL-MCNC: 9.8 MG/DL (ref 8.6–10.6)
CHLORIDE BLDV-SCNC: 86 MMOL/L (ref 98–107)
CHLORIDE BLDV-SCNC: 86 MMOL/L (ref 98–107)
CHLORIDE SERPL-SCNC: 82 MMOL/L (ref 98–107)
CHLORIDE SERPL-SCNC: 83 MMOL/L (ref 98–107)
CHLORIDE SERPL-SCNC: 86 MMOL/L (ref 98–107)
CHLORIDE SERPL-SCNC: 99 MMOL/L (ref 98–107)
CO2 SERPL-SCNC: 23 MMOL/L (ref 21–32)
CO2 SERPL-SCNC: 24 MMOL/L (ref 21–32)
CO2 SERPL-SCNC: 25 MMOL/L (ref 21–32)
CO2 SERPL-SCNC: 26 MMOL/L (ref 21–32)
CREAT SERPL-MCNC: 11.67 MG/DL (ref 0.5–1.3)
CREAT SERPL-MCNC: 11.88 MG/DL (ref 0.5–1.3)
CREAT SERPL-MCNC: 11.96 MG/DL (ref 0.5–1.3)
CREAT SERPL-MCNC: 6.09 MG/DL (ref 0.5–1.3)
EGFRCR SERPLBLD CKD-EPI 2021: 10 ML/MIN/1.73M*2
EGFRCR SERPLBLD CKD-EPI 2021: 5 ML/MIN/1.73M*2
ERYTHROCYTE [DISTWIDTH] IN BLOOD BY AUTOMATED COUNT: 15.1 % (ref 11.5–14.5)
ERYTHROCYTE [DISTWIDTH] IN BLOOD BY AUTOMATED COUNT: 15.2 % (ref 11.5–14.5)
GLUCOSE BLD MANUAL STRIP-MCNC: 105 MG/DL (ref 74–99)
GLUCOSE BLD MANUAL STRIP-MCNC: 111 MG/DL (ref 74–99)
GLUCOSE BLD MANUAL STRIP-MCNC: 112 MG/DL (ref 74–99)
GLUCOSE BLD MANUAL STRIP-MCNC: 116 MG/DL (ref 74–99)
GLUCOSE BLD MANUAL STRIP-MCNC: 278 MG/DL (ref 74–99)
GLUCOSE BLD MANUAL STRIP-MCNC: 351 MG/DL (ref 74–99)
GLUCOSE BLD MANUAL STRIP-MCNC: 525 MG/DL (ref 74–99)
GLUCOSE BLD MANUAL STRIP-MCNC: >600 MG/DL (ref 74–99)
GLUCOSE BLDV-MCNC: >685 MG/DL (ref 74–99)
GLUCOSE BLDV-MCNC: >685 MG/DL (ref 74–99)
GLUCOSE SERPL-MCNC: 1069 MG/DL (ref 74–99)
GLUCOSE SERPL-MCNC: 118 MG/DL (ref 74–99)
GLUCOSE SERPL-MCNC: 659 MG/DL (ref 74–99)
GLUCOSE SERPL-MCNC: 99 MG/DL (ref 74–99)
HCO3 BLDV-SCNC: 22.1 MMOL/L (ref 22–26)
HCO3 BLDV-SCNC: 24.8 MMOL/L (ref 22–26)
HCT VFR BLD AUTO: 25.1 % (ref 41–52)
HCT VFR BLD AUTO: 25.3 % (ref 41–52)
HCT VFR BLD EST: 26 % (ref 41–52)
HCT VFR BLD EST: ABNORMAL %
HGB BLD-MCNC: 7.9 G/DL (ref 13.5–17.5)
HGB BLD-MCNC: 8.3 G/DL (ref 13.5–17.5)
HGB BLDV-MCNC: 8.5 G/DL (ref 13.5–17.5)
HGB BLDV-MCNC: <3 G/DL (ref 13.5–17.5)
INHALED O2 CONCENTRATION: 21 %
INHALED O2 CONCENTRATION: 21 %
LACTATE BLDV-SCNC: 1.4 MMOL/L (ref 0.4–2)
LACTATE BLDV-SCNC: 2.2 MMOL/L (ref 0.4–2)
LACTATE BLDV-SCNC: 2.7 MMOL/L (ref 0.4–2)
MAGNESIUM SERPL-MCNC: 1.9 MG/DL (ref 1.6–2.4)
MAGNESIUM SERPL-MCNC: 2.17 MG/DL (ref 1.6–2.4)
MAGNESIUM SERPL-MCNC: 2.25 MG/DL (ref 1.6–2.4)
MCH RBC QN AUTO: 30.9 PG (ref 26–34)
MCH RBC QN AUTO: 31.7 PG (ref 26–34)
MCHC RBC AUTO-ENTMCNC: 31.5 G/DL (ref 32–36)
MCHC RBC AUTO-ENTMCNC: 32.8 G/DL (ref 32–36)
MCV RBC AUTO: 97 FL (ref 80–100)
MCV RBC AUTO: 98 FL (ref 80–100)
NRBC BLD-RTO: 0 /100 WBCS (ref 0–0)
NRBC BLD-RTO: 0 /100 WBCS (ref 0–0)
OSMOLALITY SERPL: 341 MOSM/KG (ref 280–300)
OXYHGB MFR BLDV: 86.3 % (ref 45–75)
OXYHGB MFR BLDV: ABNORMAL %
PCO2 BLDV: 34 MM HG (ref 41–51)
PCO2 BLDV: 41 MM HG (ref 41–51)
PH BLDV: 7.39 PH (ref 7.33–7.43)
PH BLDV: 7.42 PH (ref 7.33–7.43)
PHOSPHATE SERPL-MCNC: 3.2 MG/DL (ref 2.5–4.9)
PHOSPHATE SERPL-MCNC: 6.3 MG/DL (ref 2.5–4.9)
PHOSPHATE SERPL-MCNC: 6.3 MG/DL (ref 2.5–4.9)
PHOSPHATE SERPL-MCNC: 6.5 MG/DL (ref 2.5–4.9)
PLATELET # BLD AUTO: 169 X10*3/UL (ref 150–450)
PLATELET # BLD AUTO: 173 X10*3/UL (ref 150–450)
PO2 BLDV: 47 MM HG (ref 35–45)
PO2 BLDV: 56 MM HG (ref 35–45)
POTASSIUM BLDV-SCNC: 5.8 MMOL/L (ref 3.5–5.3)
POTASSIUM BLDV-SCNC: 5.8 MMOL/L (ref 3.5–5.3)
POTASSIUM SERPL-SCNC: 4.1 MMOL/L (ref 3.5–5.3)
POTASSIUM SERPL-SCNC: 4.9 MMOL/L (ref 3.5–5.3)
POTASSIUM SERPL-SCNC: 5.6 MMOL/L (ref 3.5–5.3)
POTASSIUM SERPL-SCNC: 6 MMOL/L (ref 3.5–5.3)
PROT SERPL-MCNC: 6.1 G/DL (ref 6.4–8.2)
RBC # BLD AUTO: 2.56 X10*6/UL (ref 4.5–5.9)
RBC # BLD AUTO: 2.62 X10*6/UL (ref 4.5–5.9)
SAO2 % BLDV: 87 % (ref 45–75)
SAO2 % BLDV: ABNORMAL %
SODIUM BLDV-SCNC: 122 MMOL/L (ref 136–145)
SODIUM BLDV-SCNC: 123 MMOL/L (ref 136–145)
SODIUM SERPL-SCNC: 123 MMOL/L (ref 136–145)
SODIUM SERPL-SCNC: 126 MMOL/L (ref 136–145)
SODIUM SERPL-SCNC: 130 MMOL/L (ref 136–145)
SODIUM SERPL-SCNC: 135 MMOL/L (ref 136–145)
WBC # BLD AUTO: 5.5 X10*3/UL (ref 4.4–11.3)
WBC # BLD AUTO: 5.6 X10*3/UL (ref 4.4–11.3)

## 2024-08-03 PROCEDURE — 82435 ASSAY OF BLOOD CHLORIDE: CPT

## 2024-08-03 PROCEDURE — 2500000002 HC RX 250 W HCPCS SELF ADMINISTERED DRUGS (ALT 637 FOR MEDICARE OP, ALT 636 FOR OP/ED)

## 2024-08-03 PROCEDURE — 85027 COMPLETE CBC AUTOMATED: CPT

## 2024-08-03 PROCEDURE — 84132 ASSAY OF SERUM POTASSIUM: CPT

## 2024-08-03 PROCEDURE — 1200000002 HC GENERAL ROOM WITH TELEMETRY DAILY

## 2024-08-03 PROCEDURE — 2500000004 HC RX 250 GENERAL PHARMACY W/ HCPCS (ALT 636 FOR OP/ED)

## 2024-08-03 PROCEDURE — 83930 ASSAY OF BLOOD OSMOLALITY: CPT

## 2024-08-03 PROCEDURE — 99291 CRITICAL CARE FIRST HOUR: CPT

## 2024-08-03 PROCEDURE — 8010000001 HC DIALYSIS - HEMODIALYSIS PER DAY

## 2024-08-03 PROCEDURE — 82010 KETONE BODYS QUAN: CPT

## 2024-08-03 PROCEDURE — 36415 COLL VENOUS BLD VENIPUNCTURE: CPT

## 2024-08-03 PROCEDURE — 82565 ASSAY OF CREATININE: CPT

## 2024-08-03 PROCEDURE — 83735 ASSAY OF MAGNESIUM: CPT

## 2024-08-03 PROCEDURE — 82947 ASSAY GLUCOSE BLOOD QUANT: CPT

## 2024-08-03 PROCEDURE — 83605 ASSAY OF LACTIC ACID: CPT

## 2024-08-03 PROCEDURE — 2500000001 HC RX 250 WO HCPCS SELF ADMINISTERED DRUGS (ALT 637 FOR MEDICARE OP)

## 2024-08-03 PROCEDURE — 99233 SBSQ HOSP IP/OBS HIGH 50: CPT | Performed by: INTERNAL MEDICINE

## 2024-08-03 PROCEDURE — 84100 ASSAY OF PHOSPHORUS: CPT

## 2024-08-03 RX ORDER — INSULIN LISPRO 100 [IU]/ML
0-15 INJECTION, SOLUTION INTRAVENOUS; SUBCUTANEOUS EVERY 4 HOURS
Status: DISCONTINUED | OUTPATIENT
Start: 2024-08-03 | End: 2024-08-03

## 2024-08-03 RX ORDER — INSULIN GLARGINE 100 [IU]/ML
15 INJECTION, SOLUTION SUBCUTANEOUS 2 TIMES DAILY
Status: DISCONTINUED | OUTPATIENT
Start: 2024-08-03 | End: 2024-08-03

## 2024-08-03 RX ORDER — INSULIN LISPRO 100 [IU]/ML
10 INJECTION, SOLUTION INTRAVENOUS; SUBCUTANEOUS ONCE
Status: COMPLETED | OUTPATIENT
Start: 2024-08-03 | End: 2024-08-03

## 2024-08-03 RX ORDER — HEPARIN SODIUM 5000 [USP'U]/ML
5000 INJECTION, SOLUTION INTRAVENOUS; SUBCUTANEOUS EVERY 8 HOURS
Status: DISPENSED | OUTPATIENT
Start: 2024-08-03 | End: 2024-08-04

## 2024-08-03 RX ORDER — INSULIN GLARGINE 100 [IU]/ML
15 INJECTION, SOLUTION SUBCUTANEOUS EVERY 24 HOURS
Status: DISCONTINUED | OUTPATIENT
Start: 2024-08-04 | End: 2024-08-05

## 2024-08-03 RX ORDER — DEXTROSE 50 % IN WATER (D50W) INTRAVENOUS SYRINGE
12.5
Status: DISCONTINUED | OUTPATIENT
Start: 2024-08-03 | End: 2024-08-03 | Stop reason: SDUPTHER

## 2024-08-03 RX ORDER — INSULIN LISPRO 100 [IU]/ML
0-20 INJECTION, SOLUTION INTRAVENOUS; SUBCUTANEOUS EVERY 4 HOURS
Status: DISCONTINUED | OUTPATIENT
Start: 2024-08-03 | End: 2024-08-03

## 2024-08-03 RX ORDER — INSULIN LISPRO 100 [IU]/ML
0-5 INJECTION, SOLUTION INTRAVENOUS; SUBCUTANEOUS
Status: DISCONTINUED | OUTPATIENT
Start: 2024-08-03 | End: 2024-08-06 | Stop reason: HOSPADM

## 2024-08-03 ASSESSMENT — PAIN SCALES - GENERAL
PAINLEVEL_OUTOF10: 0 - NO PAIN

## 2024-08-03 ASSESSMENT — PAIN - FUNCTIONAL ASSESSMENT
PAIN_FUNCTIONAL_ASSESSMENT: 0-10
PAIN_FUNCTIONAL_ASSESSMENT: NO/DENIES PAIN

## 2024-08-03 NOTE — PROGRESS NOTES
Kentrell Carreon   54 y.o.    @@  N/Room: [id number]/23/23-A    Subjective:  - No complains, but he developed hyperglycemia.    Objective:   - Developed Hyperglycemia, possibly HHS.  - Missed morning dialysis session, for HD.    Meds:   aspirin, 81 mg, Daily  atorvastatin, 80 mg, Daily  B complex-vitamin C-folic acid, 1 capsule, Daily  carvedilol, 25 mg, BID  epoetin annita or biosimilar, 10,000 Units, Once per day on Monday Wednesday Friday  heparin (porcine), 5,000 Units, q8h  insulin glargine, 15 Units, BID  insulin lispro, 0-5 Units, TID  sacubitriL-valsartan, 1 tablet, BID  sevelamer carbonate, 800 mg, TID  sodium zirconium cyclosilicate, 10 g, TID      insulin regular infusion, Last Rate: Stopped (08/03/24 1115)      acetaminophen, 650 mg, q6h PRN  dextrose, 12.5 g, q15 min PRN  dextrose, 25 g, q15 min PRN  glucagon, 1 mg, q15 min PRN  insulin regular, 0.1 Units/kg, PRN        Vitals:    08/03/24 1500   BP: 102/58   Pulse: 71   Resp: 20   Temp:    SpO2: 98%          Intake/Output Summary (Last 24 hours) at 8/3/2024 1622  Last data filed at 8/3/2024 1530  Gross per 24 hour   Intake 200 ml   Output --   Net 200 ml       General appearance: no distress  Eyes: non-icteric  Skin: no apparent rash  Heart: normal s1s2 regular  Lungs: CTA bilat no wheezing/crackles  Abdomen: soft, nt/nd  Extremities: non edema bilat  [place]  Access right AVF    Blood Labs:  Results for orders placed or performed during the hospital encounter of 07/31/24 (from the past 24 hour(s))   POCT GLUCOSE   Result Value Ref Range    POCT Glucose 427 (H) 74 - 99 mg/dL   POCT GLUCOSE   Result Value Ref Range    POCT Glucose 450 (H) 74 - 99 mg/dL   POCT GLUCOSE   Result Value Ref Range    POCT Glucose >600 (H) 74 - 99 mg/dL   CBC   Result Value Ref Range    WBC 5.6 4.4 - 11.3 x10*3/uL    nRBC 0.0 0.0 - 0.0 /100 WBCs    RBC 2.56 (L) 4.50 - 5.90 x10*6/uL    Hemoglobin 7.9 (L) 13.5 - 17.5 g/dL    Hematocrit 25.1 (L) 41.0 - 52.0 %    MCV 98 80 - 100 fL     MCH 30.9 26.0 - 34.0 pg    MCHC 31.5 (L) 32.0 - 36.0 g/dL    RDW 15.2 (H) 11.5 - 14.5 %    Platelets 173 150 - 450 x10*3/uL   Magnesium   Result Value Ref Range    Magnesium 2.17 1.60 - 2.40 mg/dL   Renal function panel   Result Value Ref Range    Glucose 1,069 (HH) 74 - 99 mg/dL    Sodium 123 (L) 136 - 145 mmol/L    Potassium 6.0 (H) 3.5 - 5.3 mmol/L    Chloride 82 (L) 98 - 107 mmol/L    Bicarbonate 23 21 - 32 mmol/L    Anion Gap 24 (H) 10 - 20 mmol/L    Urea Nitrogen 85 (H) 6 - 23 mg/dL    Creatinine 11.96 (H) 0.50 - 1.30 mg/dL    eGFR 5 (L) >60 mL/min/1.73m*2    Calcium 9.4 8.6 - 10.6 mg/dL    Phosphorus 6.3 (H) 2.5 - 4.9 mg/dL    Albumin 3.9 3.4 - 5.0 g/dL   Osmolality   Result Value Ref Range    Osmolality, Serum 341 (H) 280 - 300 mOsm/kg   POCT GLUCOSE   Result Value Ref Range    POCT Glucose >600 (H) 74 - 99 mg/dL   BLOOD GAS VENOUS FULL PANEL   Result Value Ref Range    POCT pH, Venous 7.42 7.33 - 7.43 pH    POCT pCO2, Venous 34 (L) 41 - 51 mm Hg    POCT pO2, Venous 47 (H) 35 - 45 mm Hg    POCT SO2, Venous      POCT Oxy Hemoglobin, Venous      POCT Hematocrit Calculated, Venous      POCT Sodium, Venous 123 (L) 136 - 145 mmol/L    POCT Potassium, Venous 5.8 (H) 3.5 - 5.3 mmol/L    POCT Chloride, Venous 86 (L) 98 - 107 mmol/L    POCT Ionized Calicum, Venous 1.08 (L) 1.10 - 1.33 mmol/L    POCT Glucose, Venous >685 (HH) 74 - 99 mg/dL    POCT Lactate, Venous 2.7 (H) 0.4 - 2.0 mmol/L    POCT Base Excess, Venous -1.7 -2.0 - 3.0 mmol/L    POCT HCO3 Calculated, Venous 22.1 22.0 - 26.0 mmol/L    POCT Hemoglobin, Venous <3.0 (LL) 13.5 - 17.5 g/dL    POCT Anion Gap, Venous 21.0 10.0 - 25.0 mmol/L    Patient Temperature 37.0 degrees Celsius    FiO2 21 %   POCT GLUCOSE   Result Value Ref Range    POCT Glucose >600 (H) 74 - 99 mg/dL   Phosphorus   Result Value Ref Range    Phosphorus 6.3 (H) 2.5 - 4.9 mg/dL   CBC   Result Value Ref Range    WBC 5.5 4.4 - 11.3 x10*3/uL    nRBC 0.0 0.0 - 0.0 /100 WBCs    RBC 2.62 (L) 4.50 -  5.90 x10*6/uL    Hemoglobin 8.3 (L) 13.5 - 17.5 g/dL    Hematocrit 25.3 (L) 41.0 - 52.0 %    MCV 97 80 - 100 fL    MCH 31.7 26.0 - 34.0 pg    MCHC 32.8 32.0 - 36.0 g/dL    RDW 15.1 (H) 11.5 - 14.5 %    Platelets 169 150 - 450 x10*3/uL   BLOOD GAS VENOUS FULL PANEL   Result Value Ref Range    POCT pH, Venous 7.39 7.33 - 7.43 pH    POCT pCO2, Venous 41 41 - 51 mm Hg    POCT pO2, Venous 56 (H) 35 - 45 mm Hg    POCT SO2, Venous 87 (H) 45 - 75 %    POCT Oxy Hemoglobin, Venous 86.3 (H) 45.0 - 75.0 %    POCT Hematocrit Calculated, Venous 26.0 (L) 41.0 - 52.0 %    POCT Sodium, Venous 122 (L) 136 - 145 mmol/L    POCT Potassium, Venous 5.8 (H) 3.5 - 5.3 mmol/L    POCT Chloride, Venous 86 (L) 98 - 107 mmol/L    POCT Ionized Calicum, Venous      POCT Glucose, Venous >685 (HH) 74 - 99 mg/dL    POCT Lactate, Venous 2.2 (H) 0.4 - 2.0 mmol/L    POCT Base Excess, Venous -0.2 -2.0 - 3.0 mmol/L    POCT HCO3 Calculated, Venous 24.8 22.0 - 26.0 mmol/L    POCT Hemoglobin, Venous 8.5 (L) 13.5 - 17.5 g/dL    POCT Anion Gap, Venous 17.0 10.0 - 25.0 mmol/L    Patient Temperature      FiO2 21 %   Comprehensive Metabolic Panel   Result Value Ref Range    Glucose 659 (HH) 74 - 99 mg/dL    Sodium 126 (L) 136 - 145 mmol/L    Potassium 5.6 (H) 3.5 - 5.3 mmol/L    Chloride 83 (L) 98 - 107 mmol/L    Bicarbonate 25 21 - 32 mmol/L    Anion Gap 24 (H) 10 - 20 mmol/L    Urea Nitrogen 85 (H) 6 - 23 mg/dL    Creatinine 11.67 (H) 0.50 - 1.30 mg/dL    eGFR 5 (L) >60 mL/min/1.73m*2    Calcium 9.4 8.6 - 10.6 mg/dL    Albumin 3.8 3.4 - 5.0 g/dL    Alkaline Phosphatase 112 33 - 120 U/L    Total Protein 6.1 (L) 6.4 - 8.2 g/dL    AST 29 9 - 39 U/L    Bilirubin, Total 0.4 0.0 - 1.2 mg/dL    ALT 13 10 - 52 U/L   Magnesium   Result Value Ref Range    Magnesium 2.25 1.60 - 2.40 mg/dL   Beta Hydroxybutyrate   Result Value Ref Range    Beta-Hydroxybutyrate 0.10 0.02 - 0.27 mmol/L   POCT GLUCOSE   Result Value Ref Range    POCT Glucose >600 (H) 74 - 99 mg/dL   POCT  GLUCOSE   Result Value Ref Range    POCT Glucose 525 (H) 74 - 99 mg/dL   POCT GLUCOSE   Result Value Ref Range    POCT Glucose 351 (H) 74 - 99 mg/dL   POCT GLUCOSE   Result Value Ref Range    POCT Glucose 278 (H) 74 - 99 mg/dL   POCT GLUCOSE   Result Value Ref Range    POCT Glucose 116 (H) 74 - 99 mg/dL   Blood Gas Lactic Acid, Venous   Result Value Ref Range    POCT Lactate, Venous 1.4 0.4 - 2.0 mmol/L   Renal function panel   Result Value Ref Range    Glucose 99 74 - 99 mg/dL    Sodium 130 (L) 136 - 145 mmol/L    Potassium 4.9 3.5 - 5.3 mmol/L    Chloride 86 (L) 98 - 107 mmol/L    Bicarbonate 26 21 - 32 mmol/L    Anion Gap 23 (H) 10 - 20 mmol/L    Urea Nitrogen 91 (HH) 6 - 23 mg/dL    Creatinine 11.88 (H) 0.50 - 1.30 mg/dL    eGFR 5 (L) >60 mL/min/1.73m*2    Calcium 9.8 8.6 - 10.6 mg/dL    Phosphorus 6.5 (H) 2.5 - 4.9 mg/dL    Albumin 3.8 3.4 - 5.0 g/dL   POCT GLUCOSE   Result Value Ref Range    POCT Glucose 111 (H) 74 - 99 mg/dL   POCT GLUCOSE   Result Value Ref Range    POCT Glucose 105 (H) 74 - 99 mg/dL              ASSESSMENT:  Kentrell Carreon is a 54 y.o. 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     Patient was planned for Regular HD but developed hyperglycemia and refused dialysis. He was admitted to MICU for management of Hyperglycemia which has improved and he is now willing to accept dialysis.    - ESRD: on Home HD through Right AVF,  69    RECOMMENDATIONS:  - Proceed with HD as per Order please.  - Nephrology follow up.      Anish Curry MD  Nephrology Fellow   Daytime / Weekend Renal Pager [phone]  After 7 pm Emergencies [phone] Pager [phone]

## 2024-08-03 NOTE — SIGNIFICANT EVENT
Kentrell Carreon is a 54 y.o. 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     Patient had finished a dialysis session on 8/1 when he had sudden onset of palpitations associated with chest discomfort, nausea, diaphoresis. He presented to the ED and was found to be in SVT on EKG. He received one dose of IV metoprolol with subsequent termination. He was admitted for further management.     Following admission, he was noted to have an elevated troponin which peaked at 125,000. This was thought to be secondary to his run of SVT prior to admission. He was evaluated by cardiology who recommended repeated ablation for SVT. Given anaphylactic reaction to contrast previously, was loaded with two doses of methylprednisolone 32 mg PO, one the day before the procedure was scheduled and one the morning of the procedure. However, his potassium was noted to be elevated to 6.6 on 8/2 and the ablation was subsequently cancelled. He received two doses of IV regular insulin (5 units) for management of his hyperkalemia on 8/2 in addition to his regularly scheduled insulin. Mr. Carreon refused his regularly scheduled dialysis on 8/2, which was rescheduled for early morning 8/3.     As his blood sugars had been elevated the previous day, a random POCG was obtained overnight 8/3 which revealed a reading > 600. Stat RFP was ordered. 10 units of subcutaneous lispro was administered. Blood sugar reading one hour later remained > 600. RFP results revealed blood glucose level of 1069 with an anion gap of 18.  Patient endorsing increased thirst but was otherwise asymptomatic. Vital signs remained stable. An additional 10 units of IV insulin were given. POCG readings remained > 600. Patient was discussed with MICU fellow and ultimately a  rosa cody was called for ongoing management of patient's hyperglycemia. A MICU transfer was initiated at that time. VBG results were pending at the time of transfer.        Rianna Mccabe, PGY1

## 2024-08-03 NOTE — HOSPITAL COURSE
Kentrell Carreon is a 54 y.o. male with a past history of HTN, HLD, CAD s/p PCI (LAD; 6/2017) and s/p CABG (2022), stage C systolic HF/ICM/HFmrEF, aortic stenosis, HTN, tachycardia s/p ablation, DM and discoid lupus/SLE with ESRD on home HD (M-T-TH-F via RUE AVF since 12/2016) who presented to the ED on 8/1/24 with SVT.    Patient had finished a dialysis session when he had sudden onset of palpitations associated with chest discomfort, nausea, diaphoresis. Upon arrival to the ED, patient found to be in SVT on EKG. Patient received one dose of IV metoprolol 5mg with termination back to his normal heart rate. He was admitted to the floors for further management. Following admission, he was noted to have an elevated troponin which peaked at 125,000. He was evaluated by cardiology, and his troponin elevation was thought to be secondary to his run of SVT prior to admission and also contributed to by his ESRD. He was recommended repeated ablation for SVT for 8/2. He was loaded with methylprednisolone 32 mg PO on the evening of 8/1 and the morning of 8/2 due to prior anaphylactic reaction to contrast. His potassium was noted to be elevated to 6.6 on 8/2 and the ablation was subsequently cancelled. He received two doses of IV regular insulin (5 units) for management of his hyperkalemia on 8/2 in addition to his regularly scheduled insulin. Patient refused his regularly scheduled dialysis on 8/2, which was rescheduled for early morning 8/3.    Blood sugars the same evening were noted to be 1069 with an anion gap of 18. Patient endorsed thirst at the time but was otherwise asymptomatic and hemodynamically stable. An additional 10u IV insulin were given. A rapid response was called and the patient was ultimately transferred to the MICU on 8/3 in the AM for further management of hyperglycemia.    In the MICU, patient was placed on an insulin drip and his sugars normalized within several hours. Insulin drip was stopped and  transitioned to subcutaneous insulin. He also received his regular dialysis.     Patient was stable for transfer back to [facility] on 8/4. He underwent ablation with EP on 8/5. Started on Eliquis 5 BID for AC, which he will continue for 3 months duration. Will also need a 30-day event monitor on discharge. Is to follow up with EP within 10-12 weeks. Also started him on Jardiance during this admission for GDMT optimization. Will need regular outpatient follow up with cardiologist. Regarding his T2DM, his A1c is 7.5%, will need follow up with endocrinology and PCP.

## 2024-08-03 NOTE — ED PROCEDURE NOTE
Procedure  Critical Care    Performed by: Nette Frazier MD  Authorized by: Nette Frazier MD    Critical care provider statement:     Critical care time (minutes):  35    Critical care time was exclusive of:  Separately billable procedures and treating other patients and teaching time    Critical care was necessary to treat or prevent imminent or life-threatening deterioration of the following conditions: arrythmia management.    Critical care was time spent personally by me on the following activities:  Blood draw for specimens, development of treatment plan with patient or surrogate, discussions with consultants, evaluation of patient's response to treatment, examination of patient, obtaining history from patient or surrogate, ordering and review of radiographic studies, ordering and review of laboratory studies, ordering and performing treatments and interventions, re-evaluation of patient's condition, pulse oximetry and review of old charts               Nette Frazier MD  08/03/24 1117

## 2024-08-03 NOTE — SIGNIFICANT EVENT
Rapid Response Team    Called to bedside for Hyperglycemia.  Glucose greater than 600.  Pt alert and oriented x 4.  Dr King (NACR) at bedside.  Stat labs obtained. Dr De Santiago (MICU fellow) aware of events.  Full panel VBG obtained and reviewed with MD.  Pt accepted to MICU.  Transferred to MICU on monitored bed. Bedside report given.      David Roger RN

## 2024-08-03 NOTE — NURSING NOTE
Patients blood sugar was found to be critical high at 1069. Team aware and multiple new orders added. Most recent blood sugar >600. Dr. Mccabe aware and awaiting new orders per her request.

## 2024-08-03 NOTE — NURSING NOTE
Report from Sending RN:    Report From: Ethan ( RN)  Recent Surgery of Procedure: No  Baseline Level of Consciousness (LOC): a/o x 4  Oxygen Use: No  Type: none  Diabetic: Yes, 600   Last BP Med Given Day of Dialysis: none  Last Pain Med Given: none  Lab Tests to be Obtained with Dialysis: No  Blood Transfusion to be Given During Dialysis: No  Available IV Access: Yes  Medications to be Administered During Dialysis: No  Continuous IV Infusion Running: No  Restraints on Currently or in the Last 24 Hours: No  Hand-Off Communication: Pt blood glucose < 500; Pt was given 10 units of insulin coverage; Pt is currently is on q 2 hours blood glucose checks per floor nurse; vs; Pt may go off unit without telemetry; Pt will not need labs; Pt is a full code; Fe Peralta RN.  Dialysis Catheter Dressing: right arm fistula  Last Dressing Change: none

## 2024-08-03 NOTE — CARE PLAN
The patient's goals for the shift include to get rest    The clinical goals for the shift include vitals will remain stable overnight

## 2024-08-03 NOTE — H&P
Medical Intensive Care - History and Physical   Subjective    Kentrell Carreon is a 54 y.o. year old male patient admitted on 7/31/2024 with following ICU needs: Insulin drip    Hospital Course:  Kentrell Carreon is a 54 y.o. male with a past history of HTN, HLD, CAD s/p PCI (LAD; 6/2017) and s/p CABG (2022), stage C systolic HF/ICM/HFmrEF, aortic stenosis, HTN, tachycardia s/p ablation, DM and discoid lupus/SLE with ESRD on home HD (M-T-TH-F via RUE AVF since 12/2016) who presented to the ED on 8/1/24 with SVT.    Patient had finished a dialysis session when he had sudden onset of palpitations associated with chest discomfort, nausea, diaphoresis. Upon arrival to the ED, patient found to be in SVT on EKG. Patient received one dose of IV metoprolol 5mg with termination back to his normal heart rate. He was admitted to the floors for further management. Following admission, he was noted to have an elevated troponin which peaked at 125,000. He was evaluated by cardiology, and his troponin elevation was thought to be secondary to his run of SVT prior to admission and also contributed to by his ESRD. He was recommended repeated ablation for SVT for 8/2. He was loaded with methylprednisolone 32 mg PO on the evening of 8/1 and the morning of 8/2 due to prior anaphylactic reaction to contrast. His potassium was noted to be elevated to 6.6 on 8/2 and the ablation was subsequently cancelled. He received two doses of IV regular insulin (5 units) for management of his hyperkalemia on 8/2 in addition to his regularly scheduled insulin. Patient refused his regularly scheduled dialysis on 8/2, which was rescheduled for early morning 8/3.    Blood sugars the same evening were noted to be 1069 with an anion gap of 18. Patient endorsed thirst at the time but was otherwise asymptomatic and hemodynamically stable. An additional 10u IV insulin were given. A rapid response was called and the patient was ultimately transferred to the MICU for  "further management of hyperglycemia.      HPI:  Patient was started on an insulin drip upon arrival to the MICU. Upon evaluation, patient states that he is asymptomatic and is AOx4. He typically manages his own insulin at home with a continuous glucose monitor and states that has had previous episodes of significant hyperglycemia after steroids, but not to this extent. Denies fevers, chills, chest pain, palpitations, lightheadedness, abdominal pain, N/V/C/D, and leg swelling.        Meds    Home medications:  Current Outpatient Medications   Medication Instructions   • acetaminophen (TYLENOL) 650 mg, oral, Every 6 hours PRN   • aspirin 81 mg EC tablet 1 tablet, oral, Daily   • atorvastatin (LIPITOR) 80 mg, oral, Daily   • blood-glucose sensor (FreeStyle José Luis 3 Sensor) device Change sensor every 14 days   • carvedilol (COREG) 25 mg, oral, 2 times daily (morning and late afternoon)   • EPOETIN DEIRDRE INJ 8,000 Units, subcutaneous, With every dialysis tx   • FreeStyle José Luis reader (FreeStyle José Luis 2 [place]) misc Use to check blood sugars at least every 8 hours   • FreeStyle José Luis sensor system (FreeStyle José Luis 2 Sensor) kit Change sensor every 14 days. Check blood sugar at least every 8 hours   • insulin glargine (Lantus) 100 unit/mL (3 mL) pen Inject 10 units twice a day   • insulin lispro (HumaLOG KwikPen Insulin) 100 unit/mL injection Inject before every meal using carb ratio and correction scale. MDD 50 units.   • multivitamin (Daily Multi-Vitamin) tablet 1 tablet, oral, Daily   • pen needle, diabetic (BD Ultra-Fine Agueda Pen Needle) 32 gauge x 5/32\" needle Use to inject insulin 5 times per day   • sacubitriL-valsartan (Entresto) 24-26 mg tablet 1 tablet, oral, 2 times daily   • sevelamer carbonate (RENVELA) 800 mg, oral, 3 times daily (morning, midday, late afternoon)   • SITagliptin phosphate (JANUVIA) 25 mg, oral, Daily        Inpatient medications:  Scheduled medications  aspirin, 81 mg, oral, " "Daily  atorvastatin, 80 mg, oral, Daily  B complex-vitamin C-folic acid, 1 capsule, oral, Daily  carvedilol, 25 mg, oral, BID  epoetin annita or biosimilar, 10,000 Units, intravenous, Once per day on Monday Wednesday Friday  heparin (porcine), 5,000 Units, subcutaneous, q8h  insulin glargine, 15 Units, subcutaneous, BID  insulin lispro, 0-5 Units, subcutaneous, TID  sacubitriL-valsartan, 1 tablet, oral, BID  sevelamer carbonate, 800 mg, oral, TID  sodium zirconium cyclosilicate, 10 g, oral, TID      Continuous medications  insulin regular infusion, 0-20 Units/hr, Last Rate: Stopped (08/03/24 1115)      PRN medications  PRN medications: acetaminophen, dextrose, dextrose, glucagon, insulin regular     Objective    Blood pressure 111/60, pulse 73, temperature 36.4 °C (97.5 °F), temperature source Temporal, resp. rate 17, height 1.93 m (6' 4\"), weight 72.4 kg (159 lb 9.8 oz), SpO2 98%.     Physical Exam  Constitutional:       General: He is not in acute distress.     Appearance: Normal appearance.   HENT:      Head: Normocephalic and atraumatic.   Cardiovascular:      Rate and Rhythm: Normal rate and regular rhythm.      Heart sounds: No murmur heard.     No friction rub. No gallop.   Pulmonary:      Effort: Pulmonary effort is normal. No respiratory distress.      Breath sounds: Normal breath sounds. No wheezing.   Abdominal:      General: Abdomen is flat. There is no distension.      Palpations: Abdomen is soft.      Tenderness: There is no abdominal tenderness.   Musculoskeletal:         General: No swelling.   Skin:     General: Skin is warm and dry.   Neurological:      General: No focal deficit present.      Mental Status: He is alert and oriented to person, place, and time.   Psychiatric:         Mood and Affect: Mood normal.         Behavior: Behavior normal.            Intake/Output Summary (Last 24 hours) at 8/3/2024 1415  Last data filed at 8/2/2024 1542  Gross per 24 hour   Intake 240 ml   Output --   Net 240 " ml     Labs:   Results from last 72 hours   Lab Units 08/03/24  0644 08/03/24  0325 08/02/24  1601   SODIUM mmol/L 126* 123* 129*   POTASSIUM mmol/L 5.6* 6.0* 6.0*   CHLORIDE mmol/L 83* 82* 86*   CO2 mmol/L 25 23 23   BUN mg/dL 85* 85* 71*   CREATININE mg/dL 11.67* 11.96* 10.97*   GLUCOSE mg/dL 659* 1,069* 405*   CALCIUM mg/dL 9.4 9.4 10.0   ANION GAP mmol/L 24* 24* 26*   EGFR mL/min/1.73m*2 5* 5* 5*   PHOSPHORUS mg/dL 6.3* 6.3* 6.4*      Results from last 72 hours   Lab Units 08/03/24  0644 08/03/24 0325 08/01/24  2142 08/01/24  0851 08/01/24  0011   WBC AUTO x10*3/uL 5.5 5.6 4.5 3.9* 3.4*   HEMOGLOBIN g/dL 8.3* 7.9* 9.5* 9.2* 9.8*   HEMATOCRIT % 25.3* 25.1* 29.6* 29.0* 30.1*   PLATELETS AUTO x10*3/uL 169 173 150 150 159   NEUTROS PCT AUTO %  --   --   --  57.8 66.6   LYMPHS PCT AUTO %  --   --   --  24.6 18.9   MONOS PCT AUTO %  --   --   --  14.5 11.2   EOS PCT AUTO %  --   --   --  2.3 2.7        Micro/ID:     Lab Results   Component Value Date    BLOODCULT No growth at 4 days -  FINAL REPORT 06/06/2024    BLOODCULT No growth at 4 days -  FINAL REPORT 06/06/2024       Summary of Key Imaging Results  No results found.      Assessment and Plan     Assessment:  Kentrell Carreon is a 54 y.o. year old male patient admitted to the MICU on 08/03/24 for hyperglycemia requiring an insulin drip.    Patient has a hx of ESRD (on dialysis), CAD s/p PCI (LAD; 6/2017) and s/p CABG (2022), stage C systolic HF/ICM/HFmrEF, tachycardia (s/p ablation in 2006), T2DM (home insulin) who initially presented to the ED on 8/1/24 for SVT which resolved after 5mg IV Metoprolol tartrate. He did have elevated troponin up to 150,000 after admission, which was thought to the be type II troponin elevation 2/2 SVT and ESRD after evaluation by cardiology. Patient received two doses of methylprednisolone prior to the scheduled ablation (subsequently cancelled and rescheduled d/t hyperkalemia) d/t hx anaphylaxis to contrast. After which, patient  became hyperglycemic to glucose 1069 most likely 2/2 steroid use. Patient was subsequently admitted to the MICU for further management. Insulin drip was started on transfer to MICU and eventually stopped after sugars decreased. Dialysis scheduled for today.    Mechanical Ventilation: none  Sedation/Analgesia:  none  Restraints: no    Updates 8/3/24:  - Insulin drip started and stopped after sugars decreased to normal ranges  - Dialysis planned for today  - ECG ordered  - Q4 RFP and hourly POCT glucose    Plan:  NEUROLOGY/PSYCH:  RHYS    CARDIOVASCULAR:  ##SVT  ::Hx ablation for tachycardia in 2006  ::First episode of palpitations since prior ablation  - EP following. Ablation originally scheduled for 8/2 but cancelled d/t hyperkalemia (K 6.6)  - Ablation re-scheduled for Monday afternoon after Monday AM dialysis.  - Cardiac tele  - Continue to monitor    ##Chronic HFrEF  ##Ischemic cardiomyopathy  ::Last echo 8/1/24 EF 38%, pesudonormal pattern of LV diastolic filling, elevated mean left atrial pressure, severe dilation of LV cavity size, moderate-severe aortic stenosis, bicuspid aortic valve, and severe LA dilation  - Continue GDMT (limited by ESRD): Coreg 25mg BID, enstresto 24-62 mg BID    ##NSTEMI  ##CAD s/p CABG (2022)  ##HTN  ##HLD  ::EKG following SVT showing ST depressions in II and aVF new from prior baseline  ::Last LHC (6/25/2024) showing critical distal left main disease, proximal LCx , proximal LAD , and moderate RCA disease with patency of the grafts  - Continue ASA 81, Lipitor 80  - Repeat ECG ordered  - Patient asymptomatic today  - CTM    ##Elevated troponin  ::Elevated troponin (up to 150,000) now  - Cardiology consulted. Suspected type II elevation d/t SVT and ESRD    PULMONARY:  [facility]    RENAL/GENITOURINARY:  ##ESRD on dialysis  ::[facility] AV fistula, [facility] schedule  ::Patient manages dialysis himself at home  - Nephrology aware  - Continue home sevelamer 800mg TID  - Continue home epoetin annita  infection 3x weekly (M/W/F)    ##Hyperkalemia  ::K 6.6 on 8.2. Received insulin, calcium gluconate, and lokelma 10  ::Patient initially refused dialysis on 8/2. Planing for dialysis in AM on 8/3  - K down to 4.9  - Repeat ECG ordered  - CTM      GASTROENTEROLOGY:  RHYS    ENDOCRINOLOGY:  ##Type 2 DM  ::On home insulin 15u basal + Lispro sliding scale with meals. Also Sitagliptin 25mg  ::Patient has CGM and manages his insulin regimen himself at home  - Holding home regimen d/t hyperglycemia  - Was placed on insulin drip this AM then transitioned to subcutaneous insulin after sugars normalized    ##Hyperglycemia  ::Sugars overnight 8/2 up to 1069  ::Likely 2/2 methylprednisolone administration earlier that morning  ::Patient remained asymptomatic with pH on VBG WNL  ::Beta-hydroxy negative  - Transferred to MICU on 8/3/24 for further management  - Started on insulin drip upon transfer to MICU, which was turned off later that day after sugars normalized  - Q4H RFPs with Q1H POCT glucose  - CTM     HEMATOLOGY:  RHYS    MUSCULOSKELETAL/ SKIN:  RHYS    INFECTIOUS DISEASE:  RHYS    ICU Check List     FEN  Fluids: PRN  Electrolytes: PRN  Nutrition: Consistent carb diet  Prophylaxis:  DVT ppx: subcutaneous heparin  GI ppx: None  Bowel care: None  Hardware:  Catheter: None  Access: PIV, R dialysis fistula  Drains: None  Lines: None  Social:  Code: Full Code    HPOA: Tegan Carreon [phone]   Disposition: Pending sugar control    Enrique Louis MD   Internal Medicine, PGY-1

## 2024-08-04 VITALS
SYSTOLIC BLOOD PRESSURE: 114 MMHG | WEIGHT: 155.87 LBS | RESPIRATION RATE: 19 BRPM | HEART RATE: 71 BPM | HEIGHT: 76 IN | TEMPERATURE: 98.1 F | DIASTOLIC BLOOD PRESSURE: 67 MMHG | OXYGEN SATURATION: 98 % | BODY MASS INDEX: 18.98 KG/M2

## 2024-08-04 LAB
ALBUMIN SERPL BCP-MCNC: 3.5 G/DL (ref 3.4–5)
ANION GAP SERPL CALC-SCNC: 15 MMOL/L (ref 10–20)
BUN SERPL-MCNC: 49 MG/DL (ref 6–23)
CALCIUM SERPL-MCNC: 9.6 MG/DL (ref 8.6–10.6)
CHLORIDE SERPL-SCNC: 94 MMOL/L (ref 98–107)
CO2 SERPL-SCNC: 31 MMOL/L (ref 21–32)
CREAT SERPL-MCNC: 8.12 MG/DL (ref 0.5–1.3)
EGFRCR SERPLBLD CKD-EPI 2021: 7 ML/MIN/1.73M*2
ERYTHROCYTE [DISTWIDTH] IN BLOOD BY AUTOMATED COUNT: 15.2 % (ref 11.5–14.5)
ERYTHROCYTE [DISTWIDTH] IN BLOOD BY AUTOMATED COUNT: 15.3 % (ref 11.5–14.5)
GLUCOSE BLD MANUAL STRIP-MCNC: 110 MG/DL (ref 74–99)
GLUCOSE BLD MANUAL STRIP-MCNC: 127 MG/DL (ref 74–99)
GLUCOSE BLD MANUAL STRIP-MCNC: 146 MG/DL (ref 74–99)
GLUCOSE BLD MANUAL STRIP-MCNC: 227 MG/DL (ref 74–99)
GLUCOSE SERPL-MCNC: 125 MG/DL (ref 74–99)
HCT VFR BLD AUTO: 25.5 % (ref 41–52)
HCT VFR BLD AUTO: 28.2 % (ref 41–52)
HGB BLD-MCNC: 8.2 G/DL (ref 13.5–17.5)
HGB BLD-MCNC: 8.7 G/DL (ref 13.5–17.5)
MAGNESIUM SERPL-MCNC: 2.13 MG/DL (ref 1.6–2.4)
MCH RBC QN AUTO: 30.9 PG (ref 26–34)
MCH RBC QN AUTO: 31.1 PG (ref 26–34)
MCHC RBC AUTO-ENTMCNC: 30.9 G/DL (ref 32–36)
MCHC RBC AUTO-ENTMCNC: 32.2 G/DL (ref 32–36)
MCV RBC AUTO: 101 FL (ref 80–100)
MCV RBC AUTO: 96 FL (ref 80–100)
NRBC BLD-RTO: 0 /100 WBCS (ref 0–0)
NRBC BLD-RTO: 0 /100 WBCS (ref 0–0)
PHOSPHATE SERPL-MCNC: 5.1 MG/DL (ref 2.5–4.9)
PLATELET # BLD AUTO: 177 X10*3/UL (ref 150–450)
PLATELET # BLD AUTO: 200 X10*3/UL (ref 150–450)
POTASSIUM SERPL-SCNC: 4.9 MMOL/L (ref 3.5–5.3)
RBC # BLD AUTO: 2.65 X10*6/UL (ref 4.5–5.9)
RBC # BLD AUTO: 2.8 X10*6/UL (ref 4.5–5.9)
SODIUM SERPL-SCNC: 135 MMOL/L (ref 136–145)
WBC # BLD AUTO: 5.6 X10*3/UL (ref 4.4–11.3)
WBC # BLD AUTO: 5.8 X10*3/UL (ref 4.4–11.3)

## 2024-08-04 PROCEDURE — 2500000002 HC RX 250 W HCPCS SELF ADMINISTERED DRUGS (ALT 637 FOR MEDICARE OP, ALT 636 FOR OP/ED)

## 2024-08-04 PROCEDURE — 85027 COMPLETE CBC AUTOMATED: CPT

## 2024-08-04 PROCEDURE — 82947 ASSAY GLUCOSE BLOOD QUANT: CPT

## 2024-08-04 PROCEDURE — 2500000004 HC RX 250 GENERAL PHARMACY W/ HCPCS (ALT 636 FOR OP/ED)

## 2024-08-04 PROCEDURE — 80069 RENAL FUNCTION PANEL: CPT

## 2024-08-04 PROCEDURE — 36415 COLL VENOUS BLD VENIPUNCTURE: CPT

## 2024-08-04 PROCEDURE — 99233 SBSQ HOSP IP/OBS HIGH 50: CPT

## 2024-08-04 PROCEDURE — 2500000001 HC RX 250 WO HCPCS SELF ADMINISTERED DRUGS (ALT 637 FOR MEDICARE OP)

## 2024-08-04 PROCEDURE — 83735 ASSAY OF MAGNESIUM: CPT

## 2024-08-04 PROCEDURE — 1200000002 HC GENERAL ROOM WITH TELEMETRY DAILY

## 2024-08-04 ASSESSMENT — COGNITIVE AND FUNCTIONAL STATUS - GENERAL
STANDING UP FROM CHAIR USING ARMS: A LITTLE
CLIMB 3 TO 5 STEPS WITH RAILING: A LITTLE
MOVING TO AND FROM BED TO CHAIR: A LITTLE
DAILY ACTIVITIY SCORE: 24
DAILY ACTIVITIY SCORE: 24
WALKING IN HOSPITAL ROOM: A LITTLE
MOBILITY SCORE: 20
MOBILITY SCORE: 20
CLIMB 3 TO 5 STEPS WITH RAILING: A LITTLE
MOVING TO AND FROM BED TO CHAIR: A LITTLE
WALKING IN HOSPITAL ROOM: A LITTLE
STANDING UP FROM CHAIR USING ARMS: A LITTLE

## 2024-08-04 ASSESSMENT — PAIN - FUNCTIONAL ASSESSMENT: PAIN_FUNCTIONAL_ASSESSMENT: 0-10

## 2024-08-04 ASSESSMENT — PAIN SCALES - GENERAL
PAINLEVEL_OUTOF10: 0 - NO PAIN
PAINLEVEL_OUTOF10: 0 - NO PAIN

## 2024-08-04 ASSESSMENT — ACTIVITIES OF DAILY LIVING (ADL): LACK_OF_TRANSPORTATION: NO

## 2024-08-04 NOTE — SIGNIFICANT EVENT
ICU to [facility] Transfer Summary     I:  ICU Admission Reason & Brief ICU Course:    Kentrell Carreon is a 54 y.o. male with a past history of HTN, HLD, CAD s/p PCI (LAD; 6/2017) and s/p CABG (2022), stage C systolic HF/ICM/HFmrEF, aortic stenosis, HTN, tachycardia s/p ablation, DM and discoid lupus/SLE with ESRD on home HD (M-T-TH-F via RUE AVF since 12/2016) who presented to the ED on 8/1/24 with SVT, c/f NSTEMI. He needed cath but has contrast allergy so was given steroids, developed steroid induced hyperglycemia requiring insulin gtt for which he was transferred to MICU. Managed with gtt, no transitioned back to subcutaneous insulin.       C: Code Status/DPOA Info/Goals of Care/ACP Note    Full Code  DPOA/Contact Number: Tegan Carreon [phone]     U: Unprescribing & Pertinent High-Risk Medications    Changes to home meds: None     Anticoagulation: Yes - SQH    Antibiotics:   [x] N/A - no current planned antimicrobioals      P: Pending Tests at the Time of Transfer   NA      A: Active consultants, including Rehab:   [x]  Subspecialty Consultants:  cardiology, nephrology  []  PT  []  OT  []  SLP  []  Wound Care    U: Uncertainty Measure/Diagnostic Pause:    Working diagnosis at the time of transfer Steroid induced hyperglycemia     Diagnosis Degree of Certainty: 1. High degree of certainty about the clinical diagnosis.     S: Summary of Major Problems and To-Dos:   NEUROLOGY/PSYCH:  RHYS     CARDIOVASCULAR:  ##SVT  ::Hx ablation for tachycardia in 2006  ::First episode of palpitations since prior ablation  - EP following. Ablation originally scheduled for 8/2 but cancelled d/t hyperkalemia (K 6.6)  - Ablation re-scheduled for Monday afternoon after Monday AM dialysis.  - Cardiac tele  - Continue to monitor     ##Chronic HFrEF  ##Ischemic cardiomyopathy  ::Last echo 8/1/24 EF 38%, pesudonormal pattern of LV diastolic filling, elevated mean left atrial pressure, severe dilation of LV cavity size, moderate-severe aortic  stenosis, bicuspid aortic valve, and severe LA dilation  - Continue GDMT (limited by ESRD): Coreg 25mg BID, enstresto 24-62 mg BID     ##NSTEMI  ##CAD s/p CABG (2022)  ##HTN  ##HLD  ::EKG following SVT showing ST depressions in II and aVF new from prior baseline  ::Last LHC (6/25/2024) showing critical distal left main disease, proximal LCx , proximal LAD , and moderate RCA disease with patency of the grafts  - Continue ASA 81, Lipitor 80  - Repeat ECG ordered  - Patient asymptomatic today  - CTM     ##Elevated troponin  ::Elevated troponin (up to 150,000) now  - Cardiology consulted. Suspected type II elevation d/t SVT and ESRD     PULMONARY:  RHYS     RENAL/GENITOURINARY:  ##ESRD on dialysis  ::LUE AV fistula, [facility] schedule  ::Patient manages dialysis himself at home  - Nephrology aware  - Continue home sevelamer 800mg TID  - Continue home epoetin annita infection 3x weekly (M/W/F)     ##Hyperkalemia (improved)  ::K 6.6 on 8.2. Received insulin, calcium gluconate, and lokelma 10  ::Patient initially refused dialysis on 8/2. Planing for dialysis in AM on 8/3  - K down to 4.9  - CTM        GASTROENTEROLOGY:  RHYS     ENDOCRINOLOGY:  ##Type 2 DM  ::On home insulin 15u basal + Lispro sliding scale with meals. Also Sitagliptin 25mg  ::Patient has CGM and manages his insulin regimen himself at home  - Was placed on insulin drip 8/3 then transitioned to subcutaneous insulin after sugars normalized     ##Hyperglycemia  ::Sugars overnight 8/2 up to 1069  ::Likely 2/2 methylprednisolone administration earlier that morning  ::Patient remained asymptomatic with pH on VBG WNL  ::Beta-hydroxy negative  - Transferred to MICU on 8/3/24 for further management  - Started on insulin drip upon transfer to MICU, which was turned off later that day after sugars normalized  - now back on home regimen  - CTM      HEMATOLOGY:  RHYS     MUSCULOSKELETAL/ SKIN:  RHYS     INFECTIOUS DISEASE:  RHYS    To-do list prior to transfer:  []Checking  post dialysis electrolytes     E: Exam, including Lines/Drains/Airways & Data Review:   Gen: NAD, resting in bed  CV: RRR, no murmurs appreciated, fistula present in RUE  Resp: CTAB, no increased WOB  Abd: NT, ND  Neuro: Alert and interactive, spontaneously moves all extremities.    Difficult airway? No  Lines/drains assessed for removal? No    Within 30 minutes of the patient physically leaving the floor, a Floor Readiness Note needs to be placed with updated vitals.

## 2024-08-04 NOTE — PROGRESS NOTES
08/04/24 0842   Discharge Planning   Living Arrangements Alone   Support Systems Family members   Assistance Needed none   Type of Residence Private residence   Number of Stairs to Enter Residence 0   Number of Stairs Within Residence 21   Do you have animals or pets at home? No   Who is requesting discharge planning? Provider   Home or Post Acute Services None   Expected Discharge Disposition Home   Does the patient need discharge transport arranged? No   Financial Resource Strain   How hard is it for you to pay for the very basics like food, housing, medical care, and heating? Not very   Housing Stability   In the last 12 months, was there a time when you were not able to pay the mortgage or rent on time? N   In the past 12 months, how many times have you moved where you were living? 1   At any time in the past 12 months, were you homeless or living in a shelter (including now)? N   Transportation Needs   In the past 12 months, has lack of transportation kept you from medical appointments or from getting medications? no   In the past 12 months, has lack of transportation kept you from meetings, work, or from getting things needed for daily living? No   Patient Choice   Provider Choice list and CMS website (https://medicare.gov/care-compare#search) for post-acute Quality and Resource Measure Data were provided and reviewed with: Patient   Patient / Family choosing to utilize agency / facility established prior to hospitalization No     Transitional Care Coordinator Note:  8/4/2024@8:00 Met with patient to introduce myself, role and discuss discharge planning s/p admission.  Patient lives with alone  Independent in all ADL's. Does not   require assist devices for ambulation. Demographics and contact information confirmed.  Will continue to monitor patient for all home going needs.  Jt Martinez RN [facility] [phone]              Previous Home Care  None   DME None   Pharmacy [facility]   Falls: none   PCP  Dr Lester    O2/Cpap-None   Dialysis [facility]  Transportation at discharge- Has transport

## 2024-08-04 NOTE — PROGRESS NOTES
Electrophysiology Progress Note    Updates:  No further SVT episodes.  Brief MICU stay for hyperglycemia.     All system reviewed and negative unless mentioned above.       Current Facility-Administered Medications:     acetaminophen (Tylenol) tablet 650 mg, 650 mg, oral, q6h PRN, Melvi Rand MD    aspirin EC tablet 81 mg, 81 mg, oral, Daily, Melvi Rand MD, 81 mg at 08/04/24 0817    atorvastatin (Lipitor) tablet 80 mg, 80 mg, oral, Daily, Melvi Rand MD, 80 mg at 08/04/24 0817    B complex-vitamin C-folic acid (Nephrocaps) capsule 1 capsule, 1 capsule, oral, Daily, Melvi Rand MD, 1 capsule at 08/04/24 0817    carvedilol (Coreg) tablet 25 mg, 25 mg, oral, BID, Melvi Rand MD, 25 mg at 08/04/24 0817    dextrose 50 % injection 12.5 g, 12.5 g, intravenous, q15 min PRN, Melvi Rand MD    dextrose 50 % injection 25 g, 25 g, intravenous, q15 min PRN, Melvi Rand MD    epoetin annita (Epogen,Procrit) injection 10,000 Units, 10,000 Units, intravenous, Once per day on Monday Wednesday Friday, Melvi Rand MD, 10,000 Units at 08/02/24 1857    glucagon (Glucagen) injection 1 mg, 1 mg, intramuscular, q15 min PRN, Melvi Rand MD    heparin (porcine) injection 5,000 Units, 5,000 Units, subcutaneous, q8h, Melvi Rand MD, 5,000 Units at 08/04/24 0817    insulin glargine (Lantus) injection 15 Units, 15 Units, subcutaneous, q24h, Melvi Rand MD, 15 Units at 08/04/24 0817    insulin lispro (HumaLOG) injection 0-5 Units, 0-5 Units, subcutaneous, TID, Melvi Rand MD    [Held by provider] sacubitriL-valsartan (Entresto) 24-26 mg per tablet 1 tablet, 1 tablet, oral, BID, Melvi Rand MD, 1 tablet at 08/03/24 0923    sevelamer carbonate (Renvela) tablet 800 mg, 800 mg, oral, TID, Melvi Rand MD, 800 mg at 08/04/24 0824    Objective:    Vitals:    08/04/24 0809   BP: 106/65   Pulse: 65   Resp: 17   Temp: 36.4 °C (97.5 °F)   SpO2: 98%       Exam:  General - well appearing   Neck - no JVD   Chest - CTAB    Cardiac - S1, S2, no murmur heard   Lower ext - No LE edema     Labs/Imaging:     Results from last 72 hours   Lab Units 08/04/24  0729 08/02/24  0815 08/01/24  2142   [facility] ng/L  --   --  113,690*   SODIUM mmol/L 135*   < >  --    POTASSIUM mmol/L 4.9   < >  --    CO2 mmol/L 31   < >  --    BUN mg/dL 49*   < >  --    CREATININE mg/dL 8.12*   < >  --    MAGNESIUM mg/dL 2.13   < >  --    PHOSPHORUS mg/dL 5.1*   < >  --     < > = values in this interval not displayed.      Results from last 72 hours   Lab Units 08/04/24  0729   WBC AUTO x10*3/uL 5.6   HEMOGLOBIN g/dL 8.2*   PLATELETS AUTO x10*3/uL 177      Assessment and Plan:   54M PMHx CAD s/p PCI LAD 6/2017 and 3v CABG 2022, HFrEF (EF 35-40% 6/24), prior ablation 2006 likely SVT), ESRD on HD MTTThF via RUE AVF, DM2, SLE. P/w SVT for which EP is consulted.      #SVT   Patient with long RP SVT, likely a-tach. Given his decreased EF, elevated troponin and ESRD with limited options for anti-arrhythmics, reasonable to pursue ablation for this patient. Unable to perform ablation today given hyperkalemia  - Plan for SVT ablation Monday 8/5/24, per team pt planned for HD 6am Monday morning  - Please order pre-procedure labs: CBC, BMP, PT/INR   - Please make NPO at midnight   - Please hold AC including DVT ppx from tonight  - If SVT recurs, please trial IV BB to abort the rhythm if BP tolerates, can do this up to 3x, if that is unsuccessful can trial adenosine 6mg, patient should have continuous EKG monitoring during adenosine administration. If patient is HDUS in SVT will need emergent DCCV    EP will follow.     Recommendations are preliminary until note is co-signed by an attending.     Christie King  Cardiology Fellow   PGY-5

## 2024-08-04 NOTE — PROGRESS NOTES
Kentrell Carreon is a 54 y.o. male on day 3 of admission presenting with NSTEMI (non-ST elevated myocardial infarction) (Multi).    Subjective   Kentrell Carreon is a 54 y.o. male with a past history of HTN, HLD, CAD s/p PCI (LAD; 6/2017) and s/p CABG (2022), stage C systolic HF/ICM/HFmrEF, aortic stenosis, HTN, tachycardia s/p ablation, DM and discoid lupus/SLE with ESRD on home HD (M-T-TH-F via RUE AVF since 12/2016) who presented to the ED on 8/1/24 with SVT.  Patient had finished a dialysis session when he had sudden onset of palpitations associated with chest discomfort, nausea, diaphoresis. Upon arrival to the ED, patient found to be in SVT on EKG. Patient received one dose of IV metoprolol 5mg with termination back to his normal heart rate. He was admitted to the floors for further management. Following admission, he was noted to have an elevated troponin which peaked at 125,000. He was evaluated by cardiology, and his troponin elevation was thought to be secondary to his run of SVT prior to admission and also contributed to by his ESRD. He was recommended repeated ablation for SVT for 8/2. He was loaded with methylprednisolone 32 mg PO on the evening of 8/1 and the morning of 8/2 due to prior anaphylactic reaction to contrast. His potassium was noted to be elevated to 6.6 on 8/2 and the ablation was subsequently cancelled. He received two doses of IV regular insulin (5 units) for management of his hyperkalemia on 8/2 in addition to his regularly scheduled insulin. Patient refused his regularly scheduled dialysis on 8/2, which was rescheduled for early morning 8/3.  Blood sugars the same evening were noted to be 1069 with an anion gap of 18. Patient endorsed thirst at the time but was otherwise asymptomatic and hemodynamically stable. An additional 10u IV insulin were given. A rapid response was called and the patient was ultimately transferred to the MICU on 8/3 in the AM for further management of hyperglycemia.  In  "the MICU, patient was placed on an insulin drip and his sugars normalized within several hours. Insulin drip was stopped and transitioned to subcutaneous insulin. He also received dialysis 8/3.   Upon arrival to the floor, pt was comfortable and had no complaints. Denies headaches, fever, chills, chest pain, sob, abdominal pain, n/v, LE edema.     Objective   Last Recorded Vitals  Blood pressure 100/58, pulse 71, temperature 36.5 °C (97.7 °F), temperature source Temporal, resp. rate 16, height 1.93 m (6' 4\"), weight 72.4 kg (159 lb 9.8 oz), SpO2 98%.  Intake/Output last 3 Shifts:  I/O last 3 completed shifts:  In: 1461.8 (20.2 mL/kg) [P.O.:240; I.V.:421.8 (5.8 mL/kg); Other:800]  Out: 6000 (82.9 mL/kg) [Other:6000]  Weight: 72.4 kg     Physical Exam:   General: NAD, appears comfortable  HEENT: NCAT, no scleral icterus, EOMI  Heart: RRR, 3/6 systolic murmur  Lungs: CTAB, no rales/ronchi/wheezing  Abdomen: soft, NT, ND, +BS  Ext: no bl LE edema  Neuro: Aox4, NFND  Psych: mood and affect appropriate     Scheduled medications  aspirin, 81 mg, oral, Daily  atorvastatin, 80 mg, oral, Daily  B complex-vitamin C-folic acid, 1 capsule, oral, Daily  carvedilol, 25 mg, oral, BID  epoetin annita or biosimilar, 10,000 Units, intravenous, Once per day on Monday Wednesday Friday  heparin (porcine), 5,000 Units, subcutaneous, q8h  insulin glargine, 15 Units, subcutaneous, q24h  insulin lispro, 0-5 Units, subcutaneous, TID  [Held by provider] sacubitriL-valsartan, 1 tablet, oral, BID  sevelamer carbonate, 800 mg, oral, TID      PRN medications  PRN medications: acetaminophen, dextrose, dextrose, glucagon     Assessment/Plan   Principal Problem:    NSTEMI (non-ST elevated myocardial infarction) (Multi)  Active Problems:    A-fib (Multi)    SVT (supraventricular tachycardia) (CMS-HCC)  Kentrell Carreon is a 54 y.o. year old male patien with PMHx of ESRD (on dialysis), CAD s/p PCI (LAD; 6/2017) and s/p CABG (2022), stage C systolic " HF/ICM/HFmrEF, tachycardia (s/p ablation in 2006), T2DM (home insulin) who initially presented to the ED on 8/1/24 for SVT which resolved after 5mg IV Metoprolol tartrate. He did have elevated troponin up to 150,000 after admission, which was thought to the be type II troponin elevation 2/2 SVT and ESRD after evaluation by cardiology. EP following and patient planned for SVT ablation 8/5. Patient received two doses of methylprednisolone prior to the scheduled ablation (subsequently cancelled and rescheduled d/t hyperkalemia) d/t hx anaphylaxis to contrast. After which, patient became hyperglycemic to glucose 1069 2/2 steroid use. Patient was subsequently admitted to the MICU and beifly on insulin gtt, now transitioned to subQ.      ##SVT  ::Hx ablation for tachycardia in 2006  ::First episode of palpitations since prior ablation  ::TSH wnl   - EP following, plan for SVT ablation 8/5  - Cardiac tele  - Coreg 25mg BID    ##Chronic HFrEF  ##Ischemic cardiomyopathy  ::Last echo 8/1/24 EF 38%, pesudonormal pattern of LV diastolic filling, elevated mean left atrial pressure, severe dilation of LV cavity size, moderate-severe aortic stenosis, bicuspid aortic valve, and severe LA dilation  - Continue Coreg 25mg BID  - Enstresto 24-62 mg BID HELD iso hypotension in MICU, can resume in the AM      ##NSTEMI  ##CAD s/p CABG (2022)  ##HTN  ##HLD  ::EKG following SVT showing ST depressions in II and aVF new from prior baseline  ::Troponin 1,084<1,443<110,157<125k<113k  ::Last [facility] (6/25/2024) showing critical distal left main disease, proximal LCx , proximal LAD , and moderate RCA disease with patency of the grafts  - Continue ASA 81, Lipitor 80  - Troponemia thought to be type 2 NSTEMI iso SVT    ##ESRD on dialysis  ::[facility] AV fistula, [facility] schedule  - Nephrology following   - Continue home sevelamer 800mg TID  - Continue home epoetin annita infection 3x weekly (M/W/F)     ##Type 2 DM  ##Steroid induced hyperglycemia, resolved    ::On home insulin 15u basal + Lispro sliding scale with meals. Also Sitagliptin 25mg  ::s/p insulin gtt for BG>1000, BHB wnl, pH wnl on VBG  -BG ACHS  -Lantus 15U daily + mild SSI  -Recommend endocrine consult if patient will need steroids again     F: Caution ESRD  E: PRN  N: Renal and diabetic  G: none  A: PIV  DVT: subQ heparin   CODE: FULL  NOK: Tegan Carreon [phone]       Melvi Rand MD  Internal Medicine, PGY3

## 2024-08-04 NOTE — SIGNIFICANT EVENT
Floor Readiness Note       I, personally, evaluated Kentrell Carreon prior to transfer to the floor, including reviewing all current laboratory and imaging studies. The patient remains appropriate for transfer to the floor. Bedside nurse and respiratory therapy are also in agreement of patient's readiness for the floor.     Brief summary:  Kentrell Carreon is a 54 y.o. male who was admitted to the MICU on 8/3 for hyperglycemia. They have been treated with insulin gtt.     Updated focused Physical Exam:  Gen: NAD, resting in bed  CV: RRR, no murmurs appreciated, fistula present in RUE  Resp: CTAB, no increased WOB  Abd: NT, ND  Neuro: Alert and interactive, spontaneously moves all extremities.    Current Vital Signs:  Heart Rate: 71 (08/03/24 2300 : Sharon Koo RN)  BP: 100/58 (08/03/24 2300 : Sharon Koo RN)  Temp: 36.4 °C (97.5 °F) (08/03/24 2000 : [place])  Resp: 16 (08/03/24 2300 : Sharon Koo RN)  SpO2: 98 % (08/03/24 2300 : Sharon Koo RN)    Relevant updates since rounds:  Holding home entresto due to relative hypotension, will need monitoring with reinitiation    Accepting team, Rufus, received verbal sign out and the Provider Care team/Attending has been updated. Bedside nurse will now call accepting nurse for report and patient will be transferred to [place] [id number].    David Lovell MD PhD

## 2024-08-04 NOTE — PROGRESS NOTES
"  Cardiology Progress Note     Kentrell Carreon is a 54 y.o. 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    Subjective     No acute events overnight and no concerns this morning.     Denies dizziness, lightheadedness, SOB, cough, chest pain, chest pressure, palpitations, nausea, vomiting, abd pain.    Medications     Medications:   Scheduled medications  aspirin, 81 mg, oral, Daily  atorvastatin, 80 mg, oral, Daily  B complex-vitamin C-folic acid, 1 capsule, oral, Daily  carvedilol, 25 mg, oral, BID  epoetin annita or biosimilar, 10,000 Units, intravenous, Once per day on Monday Wednesday Friday  heparin (porcine), 5,000 Units, subcutaneous, q8h  insulin glargine, 15 Units, subcutaneous, q24h  insulin lispro, 0-5 Units, subcutaneous, TID  [Held by provider] sacubitriL-valsartan, 1 tablet, oral, BID  sevelamer carbonate, 800 mg, oral, TID      Continuous medications     PRN medications  PRN medications: acetaminophen, dextrose, dextrose, glucagon     Objective     Vitals  Visit Vitals  /63 (BP Location: Left arm, Patient Position: Lying)   Pulse 67   Temp 36.9 °C (98.4 °F) (Temporal)   Resp 16   Ht 1.93 m (6' 4\")   Wt 70.7 kg (155 lb 13.8 oz)   SpO2 99%   BMI 18.97 kg/m²   Smoking Status Former   BSA 1.95 m²       Intake/Output Summary (Last 24 hours) at 8/4/2024 0745  Last data filed at 8/3/2024 1848  Gross per 24 hour   Intake 1221.75 ml   Output 6000 ml   Net -4778.25 ml       Physical Exam  General: A&Ox3, NAD.  HEENT: Normocephalic, atraumatic. EOMI. MMM.   Respiratory: CTA bilaterally. No wheezes, rales, or rhonchi. Normal respiratory effort.  Cardiovascular: RRR. Holosystolic murmur. No gallops or rubs. Radial pulses 2+.  Abdominal: Soft, nondistended, nontender to palpation. Bowel sounds present. No " hepatosplenomegaly or masses. No CVA tenderness.  Neuro: No focal neuro deficits.  MSK: No edema bilateral LE.   Skin: Warm, dry. No rashes or wounds.  Psych: Appropriate mood and affect.     Labs  Lab Results   Component Value Date    WBC 5.5 08/03/2024    HGB 8.3 (L) 08/03/2024    HCT 25.3 (L) 08/03/2024    MCV 97 08/03/2024     08/03/2024      Lab Results   Component Value Date    GLUCOSE 118 (H) 08/03/2024    CALCIUM 8.5 (L) 08/03/2024     (L) 08/03/2024    K 4.1 08/03/2024    CO2 24 08/03/2024    CL 99 08/03/2024    BUN 33 (H) 08/03/2024    CREATININE 6.09 (H) 08/03/2024      Lab Results   Component Value Date    ALT 13 08/03/2024    AST 29 08/03/2024    ALKPHOS 112 08/03/2024    BILITOT 0.4 08/03/2024      Cardiac workup:    === 08/01/24 ===  ECHOCARDIOGRAM   CONCLUSIONS:   1. Left ventricular ejection fraction is moderately decreased, calculated by Gaspar's biplane at 38%.   2. Multiple segmental abnormalities exist. See findings.   3. Spectral Doppler shows a pseudonormal pattern of left ventricular diastolic filling.   4. There is an elevated mean left atrial pressure.   5. Left ventricular cavity size is severely dilated.   6. There is mildly reduced right ventricular systolic function.   7. Mildly enlarged right ventricle.   8. Moderate to severe aortic valve stenosis.   9. Mild aortic valve regurgitation.  10. Stroke volume index is decreased (33 ml/m2).  11. The aortic valve appears bicuspid with a thickened median raphe with fusion between the right and non coronary cusps.  12. There is moderate aortic valve cusp calcification.  13. There is moderate to severe aortic valve thickening.  14. Slightly elevated RVSP.  15. The left atrium is severely dilated.  16. The right atrium is mild to moderately dilated.  17. Compared with study dated 6/8/2024, there is no change in LVEF. The regional wall motion abnormalities were better visualized today. The LV was already moderate to severely dilated  in the prior echocardiogram (98 ml/m2). The aortic valve gradients were not assessed in the limited study from June.    === 03/09/2022 ===  CATH    Coronary Angiography:  The coronary circulation is right dominant.     Left Main Coronary Artery:  The left main coronary artery is a normal caliber vessel. The left main arises normally from the left coronary sinus of Valsalva and bifurcates into the LAD and circumflex coronary arteries. The ostial left main coronary artery showed 90% stenosis.     Left Anterior Descending Coronary Artery Distribution:  The left anterior descending coronary artery is a normal caliber vessel. The LAD arises normally from the left main coronary artery. The ostial left anterior descending coronary artery showed 80% stenosis. An additional lesion, located at the proximal to mid left anterior descending coronary artery, revealed 70% in-stent restenosis.     Circumflex Coronary Artery Distribution:  The circumflex coronary artery is a normal caliber vessel. The circumflex arises normally from the left main coronary artery and terminates in the AV groove. The ostial circumflex coronary artery showed 80% stenosis. An additional lesion, located at the proximal circumflex coronary artery, demonstrated 60% stenosis.     Right Coronary Artery Distribution:     The right coronary artery is a normal caliber vessel. The RCA arises normally from the right sinus of Valsalva. The mid and distal right coronary artery showed 50% stenosis.     Coronary Interventions:     Coronary Lesion Summary:  Vessel            Stenosis         Vessel Segment  Left Main       90% stenosis           ostial  LAD             80% stenosis           ostial  LAD        70% in-stent restenosis proximal to mid  Circumflex      80% stenosis           ostial  Circumflex      60% stenosis          proximal  RCA             50% stenosis       mid and distal      === 02/08/2021 ===   NUCLEAR STRESS TEST  IMPRESSION:  1.  Normal  myocardial perfusion study without evidence of ischemia or  prior infarction.  2. The left ventricle is severely dilated in size.  3. Globally reduced LV wall motion with an LV EF estimated at 40-46%.  4. Since 1/3/2019, ejection fraction appears improved and the LV is  less dilated (205-206 ml on today's exam, vs 261 ml on 1/3/2019).  There still remains no definitive signs of ischemia or prior infarct.     Imaging  === 05/31/24 ===    XR CHEST 2 VIEWS    - Impression -  1.  Enlarged cardiac silhouette with vascular congestion. No  consolidation seen    MACRO:  None    === 05/31/24 ===    CT ABDOMEN PELVIS WO IV CONTRAST    - Impression -  Left lower lobe infiltrate consistent with pneumonia.    Mild soft tissue and mesenteric edema with septal thickening in the  lung bases and small pleural effusions consistent with CHF/fluid  overload.    Distended gallbladder with questionable trace pericholecystic fluid.    Questionable distal esophageal wall thickening. Correlate with  possible esophagitis.    Diffuse bladder wall thickening which could be related to suboptimal  distention, cystitis and/or trabeculation.    Numerous additional findings as described above.    MACRO:  None.         Assessment/Plan     Kentrell Carreon is a 54 y.o. 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      8/4 Updates:  No longer requiring insulin drip - transferred from ICU ovn   Continuing Lantus 15u daily     #NSTEMI   #CAD s/p CABG (2022)  EKG following SVT showing ST depressions in II and aVF new from prior baseline   Mild pleuritic chest pain on presentation, trop 1084 -> 1443  Last cath showed critical L main disease, prox LCx ,  prox LAD   Likely type II NSTEMI   Patient given  mg in ED, initially decided to hold  off heparin given patency of grafts & run of SVT to explain elevated trops  8/1 trop elevated to peak 125,000, likely due to SVT iso severe ischemic disease    Plan:   Continue ASA 81, atorvastatin 80 mg    Continue carvedilol 25 mg BID  Continue entresto 24-62 mg BID (was stopped in MICU iso hypotension)    #Supraventricular tachycardia    History of ablation for tachycardia in 2006   First episode of palpitations since prior ablation   Initial EKG (7/31) showed 1st degree AV block, LBBB  TSH wnl     Plan:  Continue coreg  EP consult for possible ablation, appreciate recs     #HFmrEF vs HFrEF  #Ischemic cardiomyopathy    EF has fluctuated on TTE's, most recent EF of 28% on 6/29 compared to 35-40% on 6/7    Plan:   TTE ordered  Continue GDMT as above (limited by ESRD)    #ESRD on HD   LUE AV fistula, MTThS schedule    Plan:  Nephrology following for dialysis needs  Continue sevelamer 800 TID   Nephrocap per protocol     #IDT2DM   Most recent HbA1c 7.5%  Home regimen: sitagliptin 25 mg, Lantus 10u BID, Lispro sliding scale    Plan:  Lantus 10u BID at home  Mild SSI  Titrate as needed - Lantus 15u daily      Diet: NPO for cath  DVT Prophylaxis: heparin gtt  Code Status: Full code  NOK: Tegan Carreon [phone]    Patient and plan discussed with attending physician Dr. Bravo.          Destiny Jiménez MD  Department of Internal Medicine, PGY-1

## 2024-08-04 NOTE — CARE PLAN
The patient's goals for the shift include rest    The clinical goals for the shift include rest    Problem: Pain  Goal: Takes deep breaths with improved pain control throughout the shift  Outcome: Progressing  Goal: Turns in bed with improved pain control throughout the shift  Outcome: Progressing  Goal: Walks with improved pain control throughout the shift  Outcome: Progressing  Goal: Performs ADL's with improved pain control throughout shift  Outcome: Progressing  Goal: Participates in PT with improved pain control throughout the shift  Outcome: Progressing  Goal: Free from opioid side effects throughout the shift  Outcome: Progressing  Goal: Free from acute confusion related to pain meds throughout the shift  Outcome: Progressing     Problem: Fall/Injury  Goal: Not fall by end of shift  Outcome: Progressing  Goal: Be free from injury by end of the shift  Outcome: Progressing  Goal: Verbalize understanding of personal risk factors for fall in the hospital  Outcome: Progressing  Goal: Verbalize understanding of risk factor reduction measures to prevent injury from fall in the home  Outcome: Progressing  Goal: Use assistive devices by end of the shift  Outcome: Progressing  Goal: Pace activities to prevent fatigue by end of the shift  Outcome: Progressing

## 2024-08-05 ENCOUNTER — APPOINTMENT (OUTPATIENT)
Dept: DIALYSIS | Facility: HOSPITAL | Age: 54
End: 2024-08-05
Payer: COMMERCIAL

## 2024-08-05 PROBLEM — I48.92 ATRIAL FLUTTER (MULTI): Status: ACTIVE | Noted: 2024-08-05

## 2024-08-05 LAB
ALBUMIN SERPL BCP-MCNC: 3.4 G/DL (ref 3.4–5)
ANION GAP SERPL CALC-SCNC: 21 MMOL/L (ref 10–20)
APTT PPP: 29 SECONDS (ref 27–38)
BUN SERPL-MCNC: 71 MG/DL (ref 6–23)
CALCIUM SERPL-MCNC: 9.1 MG/DL (ref 8.6–10.6)
CHLORIDE SERPL-SCNC: 95 MMOL/L (ref 98–107)
CO2 SERPL-SCNC: 24 MMOL/L (ref 21–32)
CREAT SERPL-MCNC: 10.08 MG/DL (ref 0.5–1.3)
EGFRCR SERPLBLD CKD-EPI 2021: 6 ML/MIN/1.73M*2
ERYTHROCYTE [DISTWIDTH] IN BLOOD BY AUTOMATED COUNT: 15.1 % (ref 11.5–14.5)
GLUCOSE BLD MANUAL STRIP-MCNC: 119 MG/DL (ref 74–99)
GLUCOSE BLD MANUAL STRIP-MCNC: 403 MG/DL (ref 74–99)
GLUCOSE BLD MANUAL STRIP-MCNC: 85 MG/DL (ref 74–99)
GLUCOSE BLD MANUAL STRIP-MCNC: 96 MG/DL (ref 74–99)
GLUCOSE SERPL-MCNC: 155 MG/DL (ref 74–99)
HCT VFR BLD AUTO: 25.7 % (ref 41–52)
HGB BLD-MCNC: 8.3 G/DL (ref 13.5–17.5)
INR PPP: 1 (ref 0.9–1.1)
MAGNESIUM SERPL-MCNC: 2.25 MG/DL (ref 1.6–2.4)
MCH RBC QN AUTO: 31.4 PG (ref 26–34)
MCHC RBC AUTO-ENTMCNC: 32.3 G/DL (ref 32–36)
MCV RBC AUTO: 97 FL (ref 80–100)
NRBC BLD-RTO: 0 /100 WBCS (ref 0–0)
PHOSPHATE SERPL-MCNC: 6.5 MG/DL (ref 2.5–4.9)
PLATELET # BLD AUTO: 202 X10*3/UL (ref 150–450)
POTASSIUM SERPL-SCNC: 4.7 MMOL/L (ref 3.5–5.3)
PROTHROMBIN TIME: 11 SECONDS (ref 9.8–12.8)
RBC # BLD AUTO: 2.64 X10*6/UL (ref 4.5–5.9)
SODIUM SERPL-SCNC: 135 MMOL/L (ref 136–145)
WBC # BLD AUTO: 5.9 X10*3/UL (ref 4.4–11.3)

## 2024-08-05 PROCEDURE — 2780000003 HC OR 278 NO HCPCS: Performed by: INTERNAL MEDICINE

## 2024-08-05 PROCEDURE — 93653 COMPRE EP EVAL TX SVT: CPT | Performed by: INTERNAL MEDICINE

## 2024-08-05 PROCEDURE — 2500000002 HC RX 250 W HCPCS SELF ADMINISTERED DRUGS (ALT 637 FOR MEDICARE OP, ALT 636 FOR OP/ED)

## 2024-08-05 PROCEDURE — C1887 CATHETER, GUIDING: HCPCS | Performed by: INTERNAL MEDICINE

## 2024-08-05 PROCEDURE — 99153 MOD SED SAME PHYS/QHP EA: CPT | Performed by: INTERNAL MEDICINE

## 2024-08-05 PROCEDURE — 2500000004 HC RX 250 GENERAL PHARMACY W/ HCPCS (ALT 636 FOR OP/ED): Performed by: INTERNAL MEDICINE

## 2024-08-05 PROCEDURE — 02583ZZ DESTRUCTION OF CONDUCTION MECHANISM, PERCUTANEOUS APPROACH: ICD-10-PCS | Performed by: INTERNAL MEDICINE

## 2024-08-05 PROCEDURE — 85610 PROTHROMBIN TIME: CPT

## 2024-08-05 PROCEDURE — 93623 PRGRMD STIMJ&PACG IV RX NFS: CPT | Performed by: INTERNAL MEDICINE

## 2024-08-05 PROCEDURE — 1200000002 HC GENERAL ROOM WITH TELEMETRY DAILY

## 2024-08-05 PROCEDURE — 80069 RENAL FUNCTION PANEL: CPT

## 2024-08-05 PROCEDURE — 85347 COAGULATION TIME ACTIVATED: CPT | Performed by: INTERNAL MEDICINE

## 2024-08-05 PROCEDURE — 85027 COMPLETE CBC AUTOMATED: CPT

## 2024-08-05 PROCEDURE — 2500000001 HC RX 250 WO HCPCS SELF ADMINISTERED DRUGS (ALT 637 FOR MEDICARE OP)

## 2024-08-05 PROCEDURE — C1730 CATH, EP, 19 OR FEW ELECT: HCPCS | Performed by: INTERNAL MEDICINE

## 2024-08-05 PROCEDURE — 8010000001 HC DIALYSIS - HEMODIALYSIS PER DAY

## 2024-08-05 PROCEDURE — G0269 OCCLUSIVE DEVICE IN VEIN ART: HCPCS | Mod: 59 | Performed by: INTERNAL MEDICINE

## 2024-08-05 PROCEDURE — 2720000007 HC OR 272 NO HCPCS: Performed by: INTERNAL MEDICINE

## 2024-08-05 PROCEDURE — 99152 MOD SED SAME PHYS/QHP 5/>YRS: CPT | Performed by: INTERNAL MEDICINE

## 2024-08-05 PROCEDURE — 99233 SBSQ HOSP IP/OBS HIGH 50: CPT

## 2024-08-05 PROCEDURE — 93620 COMP EP EVL R AT VEN PAC&REC: CPT | Mod: 59 | Performed by: INTERNAL MEDICINE

## 2024-08-05 PROCEDURE — 82947 ASSAY GLUCOSE BLOOD QUANT: CPT

## 2024-08-05 PROCEDURE — 6350000001 HC RX 635 EPOETIN >10,000 UNITS: Mod: JZ

## 2024-08-05 PROCEDURE — 83735 ASSAY OF MAGNESIUM: CPT

## 2024-08-05 PROCEDURE — 90935 HEMODIALYSIS ONE EVALUATION: CPT | Performed by: INTERNAL MEDICINE

## 2024-08-05 PROCEDURE — 2500000005 HC RX 250 GENERAL PHARMACY W/O HCPCS: Performed by: INTERNAL MEDICINE

## 2024-08-05 PROCEDURE — 2500000004 HC RX 250 GENERAL PHARMACY W/ HCPCS (ALT 636 FOR OP/ED)

## 2024-08-05 PROCEDURE — 36415 COLL VENOUS BLD VENIPUNCTURE: CPT

## 2024-08-05 PROCEDURE — C1760 CLOSURE DEV, VASC: HCPCS | Performed by: INTERNAL MEDICINE

## 2024-08-05 PROCEDURE — C1732 CATH, EP, DIAG/ABL, 3D/VECT: HCPCS | Performed by: INTERNAL MEDICINE

## 2024-08-05 RX ORDER — FENTANYL CITRATE 50 UG/ML
INJECTION, SOLUTION INTRAMUSCULAR; INTRAVENOUS AS NEEDED
Status: DISCONTINUED | OUTPATIENT
Start: 2024-08-05 | End: 2024-08-05 | Stop reason: HOSPADM

## 2024-08-05 RX ORDER — INSULIN GLARGINE 100 [IU]/ML
10 INJECTION, SOLUTION SUBCUTANEOUS 2 TIMES DAILY
Status: DISCONTINUED | OUTPATIENT
Start: 2024-08-05 | End: 2024-08-06 | Stop reason: HOSPADM

## 2024-08-05 RX ORDER — HEPARIN SODIUM 10000 [USP'U]/100ML
0-4000 INJECTION, SOLUTION INTRAVENOUS CONTINUOUS
Status: DISCONTINUED | OUTPATIENT
Start: 2024-08-05 | End: 2024-08-06

## 2024-08-05 RX ORDER — MIDAZOLAM HYDROCHLORIDE 1 MG/ML
INJECTION, SOLUTION INTRAMUSCULAR; INTRAVENOUS AS NEEDED
Status: DISCONTINUED | OUTPATIENT
Start: 2024-08-05 | End: 2024-08-05 | Stop reason: HOSPADM

## 2024-08-05 RX ORDER — DEXTROSE 50 % IN WATER (D50W) INTRAVENOUS SYRINGE
12.5
Status: DISCONTINUED | OUTPATIENT
Start: 2024-08-05 | End: 2024-08-06 | Stop reason: HOSPADM

## 2024-08-05 RX ORDER — DEXTROSE 50 % IN WATER (D50W) INTRAVENOUS SYRINGE
25
Status: DISCONTINUED | OUTPATIENT
Start: 2024-08-05 | End: 2024-08-06 | Stop reason: HOSPADM

## 2024-08-05 RX ORDER — INSULIN LISPRO 100 [IU]/ML
5 INJECTION, SOLUTION INTRAVENOUS; SUBCUTANEOUS ONCE
Status: COMPLETED | OUTPATIENT
Start: 2024-08-05 | End: 2024-08-05

## 2024-08-05 RX ORDER — SODIUM CHLORIDE 9 MG/ML
INJECTION, SOLUTION INTRAVENOUS CONTINUOUS PRN
Status: COMPLETED | OUTPATIENT
Start: 2024-08-05 | End: 2024-08-05

## 2024-08-05 RX ORDER — SEVELAMER CARBONATE 800 MG/1
1600 TABLET, FILM COATED ORAL
Status: DISCONTINUED | OUTPATIENT
Start: 2024-08-05 | End: 2024-08-06 | Stop reason: HOSPADM

## 2024-08-05 RX ORDER — BUPIVACAINE HYDROCHLORIDE 5 MG/ML
INJECTION, SOLUTION EPIDURAL; INTRACAUDAL AS NEEDED
Status: DISCONTINUED | OUTPATIENT
Start: 2024-08-05 | End: 2024-08-05 | Stop reason: HOSPADM

## 2024-08-05 RX ORDER — HEPARIN SODIUM 1000 [USP'U]/ML
INJECTION, SOLUTION INTRAVENOUS; SUBCUTANEOUS AS NEEDED
Status: DISCONTINUED | OUTPATIENT
Start: 2024-08-05 | End: 2024-08-05 | Stop reason: HOSPADM

## 2024-08-05 SDOH — HEALTH STABILITY: MENTAL HEALTH: CURRENT SMOKER: 0

## 2024-08-05 ASSESSMENT — COGNITIVE AND FUNCTIONAL STATUS - GENERAL
MOVING TO AND FROM BED TO CHAIR: A LITTLE
WALKING IN HOSPITAL ROOM: A LITTLE
MOBILITY SCORE: 20
STANDING UP FROM CHAIR USING ARMS: A LITTLE
CLIMB 3 TO 5 STEPS WITH RAILING: A LITTLE
DAILY ACTIVITIY SCORE: 24

## 2024-08-05 ASSESSMENT — PAIN - FUNCTIONAL ASSESSMENT
PAIN_FUNCTIONAL_ASSESSMENT: 0-10
PAIN_FUNCTIONAL_ASSESSMENT: NO/DENIES PAIN

## 2024-08-05 ASSESSMENT — PAIN SCALES - GENERAL
PAINLEVEL_OUTOF10: 0 - NO PAIN
PAINLEVEL_OUTOF10: 0 - NO PAIN

## 2024-08-05 NOTE — PROGRESS NOTES
Kentrell Carreon is a 54 y.o. male on day 4 of admission presenting with NSTEMI (non-ST elevated myocardial infarction) (Multi).      Subjective   Seen on HD, no complaints     Objective   Vitals 24HR  Heart Rate:  [65-81]   Temp:  [35.3 °C (95.5 °F)-37.2 °C (99 °F)]   Resp:  [17-20]   BP: (106-122)/(60-74)   SpO2:  [96 %-99 %]     Intake/Output last 3 Shifts:    Intake/Output Summary (Last 24 hours) at 8/5/2024 1051  Last data filed at 8/5/2024 1037  Gross per 24 hour   Intake 1280 ml   Output 1500 ml   Net -220 ml       Physical Exam  No distress  HEENT:  moist, no pallor  No edema ariane LE  Breath sounds ariane equal, clear  S1 S2 regular, normal, no rub or murmur  Abdomen soft  AAO x3, non focal    aspirin, 81 mg, oral, Daily  atorvastatin, 80 mg, oral, Daily  B complex-vitamin C-folic acid, 1 capsule, oral, Daily  carvedilol, 25 mg, oral, BID  epoetin annita or biosimilar, 10,000 Units, intravenous, Once per day on Monday Wednesday Friday  insulin glargine, 15 Units, subcutaneous, q24h  insulin lispro, 0-5 Units, subcutaneous, TID  [Held by provider] sacubitriL-valsartan, 1 tablet, oral, BID  sevelamer carbonate, 800 mg, oral, TID      Assessment/Plan      54 Year old with h/o ESRD on HD admitted with SVT planned for ablation today post-HD  Nephrology following for ESRD    #ESRD: Tolerating HD per submitted orders, 2 K, 2.5 Ca, UF goal 1.5 L    # Anemia: HgB   Hemoglobin   Date Value Ref Range Status   08/05/2024 8.3 (L) 13.5 - 17.5 g/dL Final   continue Epo 10,000 U IV QHD    # MBD: Ca   Calcium   Date Value Ref Range Status   08/05/2024 9.1 8.6 - 10.6 mg/dL Final     Phosphorus   Date Value Ref Range Status   08/05/2024 6.5 (H) 2.5 - 4.9 mg/dL Final     Comment:     The performance characteristics of phosphorus testing in heparinized plasma have been validated by the individual  laboratory site where testing is performed. Testing on heparinized plasma is not approved by the FDA; however, such approval is not  necessary.   continue Sevelamer carbonate, increase to 2 tab PO TIDWM, continue daily renal MVI.     #Hypertension: Bp control acceptable continue current anti-hypertensive medications    #Volume status: Clinically euvolemic continue UF as tolerated    Will continue to follow

## 2024-08-05 NOTE — PROGRESS NOTES
"  Cardiology Progress Note     Kentrell Carreon is a 54 y.o. 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    Subjective     Patient at dialysis this AM, ablation scheduled for later today. Evaluated at bedside on return from HD. Patient feeling well. No acute events overnight and no concerns.     Denies dizziness, lightheadedness, SOB, cough, chest pain, chest pressure, palpitations, nausea, vomiting, abd pain.    Medications     Medications:   Scheduled medications  aspirin, 81 mg, oral, Daily  atorvastatin, 80 mg, oral, Daily  B complex-vitamin C-folic acid, 1 capsule, oral, Daily  carvedilol, 25 mg, oral, BID  epoetin annita or biosimilar, 10,000 Units, intravenous, Once per day on Monday Wednesday Friday  insulin glargine, 15 Units, subcutaneous, q24h  insulin lispro, 0-5 Units, subcutaneous, TID  [Held by provider] sacubitriL-valsartan, 1 tablet, oral, BID  sevelamer carbonate, 800 mg, oral, TID      Continuous medications     PRN medications  PRN medications: acetaminophen, dextrose, dextrose, glucagon     Objective     Vitals  Visit Vitals  /74 (BP Location: Right arm, Patient Position: Lying)   Pulse 65   Temp 36.3 °C (97.3 °F) (Temporal)   Resp 20   Ht 1.93 m (6' 4\")   Wt 70.7 kg (155 lb 13.8 oz)   SpO2 99%   BMI 18.97 kg/m²   Smoking Status Former   BSA 1.95 m²       Intake/Output Summary (Last 24 hours) at 8/5/2024 0742  Last data filed at 8/4/2024 1200  Gross per 24 hour   Intake 960 ml   Output 0 ml   Net 960 ml       Physical Exam  General: A&Ox3, NAD.  HEENT: Normocephalic, atraumatic. EOMI. MMM.   Respiratory: CTA bilaterally. No wheezes, rales, or rhonchi. Normal respiratory effort.  Cardiovascular: RRR. Holosystolic murmur. No gallops or rubs. Radial pulses 2+.  Abdominal: Soft, " nondistended, nontender to palpation. Bowel sounds present. No hepatosplenomegaly or masses. No CVA tenderness.  Neuro: No focal neuro deficits.  MSK: No edema bilateral LE.   Skin: Warm, dry. No rashes or wounds.  Psych: Appropriate mood and affect.     Labs  Lab Results   Component Value Date    WBC 5.8 08/04/2024    HGB 8.7 (L) 08/04/2024    HCT 28.2 (L) 08/04/2024     (H) 08/04/2024     08/04/2024      Lab Results   Component Value Date    GLUCOSE 125 (H) 08/04/2024    CALCIUM 9.6 08/04/2024     (L) 08/04/2024    K 4.9 08/04/2024    CO2 31 08/04/2024    CL 94 (L) 08/04/2024    BUN 49 (H) 08/04/2024    CREATININE 8.12 (H) 08/04/2024      Lab Results   Component Value Date    ALT 13 08/03/2024    AST 29 08/03/2024    ALKPHOS 112 08/03/2024    BILITOT 0.4 08/03/2024      Cardiac workup:    === 08/01/24 ===  ECHOCARDIOGRAM   CONCLUSIONS:   1. Left ventricular ejection fraction is moderately decreased, calculated by Gaspar's biplane at 38%.   2. Multiple segmental abnormalities exist. See findings.   3. Spectral Doppler shows a pseudonormal pattern of left ventricular diastolic filling.   4. There is an elevated mean left atrial pressure.   5. Left ventricular cavity size is severely dilated.   6. There is mildly reduced right ventricular systolic function.   7. Mildly enlarged right ventricle.   8. Moderate to severe aortic valve stenosis.   9. Mild aortic valve regurgitation.  10. Stroke volume index is decreased (33 ml/m2).  11. The aortic valve appears bicuspid with a thickened median raphe with fusion between the right and non coronary cusps.  12. There is moderate aortic valve cusp calcification.  13. There is moderate to severe aortic valve thickening.  14. Slightly elevated RVSP.  15. The left atrium is severely dilated.  16. The right atrium is mild to moderately dilated.  17. Compared with study dated 6/8/2024, there is no change in LVEF. The regional wall motion abnormalities were  better visualized today. The LV was already moderate to severely dilated in the prior echocardiogram (98 ml/m2). The aortic valve gradients were not assessed in the limited study from June.    === 03/09/2022 ===  CATH    Coronary Angiography:  The coronary circulation is right dominant.     Left Main Coronary Artery:  The left main coronary artery is a normal caliber vessel. The left main arises normally from the left coronary sinus of Valsalva and bifurcates into the LAD and circumflex coronary arteries. The ostial left main coronary artery showed 90% stenosis.     Left Anterior Descending Coronary Artery Distribution:  The left anterior descending coronary artery is a normal caliber vessel. The LAD arises normally from the left main coronary artery. The ostial left anterior descending coronary artery showed 80% stenosis. An additional lesion, located at the proximal to mid left anterior descending coronary artery, revealed 70% in-stent restenosis.     Circumflex Coronary Artery Distribution:  The circumflex coronary artery is a normal caliber vessel. The circumflex arises normally from the left main coronary artery and terminates in the AV groove. The ostial circumflex coronary artery showed 80% stenosis. An additional lesion, located at the proximal circumflex coronary artery, demonstrated 60% stenosis.     Right Coronary Artery Distribution:     The right coronary artery is a normal caliber vessel. The RCA arises normally from the right sinus of Valsalva. The mid and distal right coronary artery showed 50% stenosis.     Coronary Interventions:     Coronary Lesion Summary:  Vessel            Stenosis         Vessel Segment  Left Main       90% stenosis           ostial  LAD             80% stenosis           ostial  LAD        70% in-stent restenosis proximal to mid  Circumflex      80% stenosis           ostial  Circumflex      60% stenosis          proximal  RCA             50% stenosis       mid and  distal      === 02/08/2021 ===   NUCLEAR STRESS TEST  IMPRESSION:  1.  Normal myocardial perfusion study without evidence of ischemia or  prior infarction.  2. The left ventricle is severely dilated in size.  3. Globally reduced LV wall motion with an LV EF estimated at 40-46%.  4. Since 1/3/2019, ejection fraction appears improved and the LV is  less dilated (205-206 ml on today's exam, vs 261 ml on 1/3/2019).  There still remains no definitive signs of ischemia or prior infarct.     Imaging  === 05/31/24 ===    XR CHEST 2 VIEWS    - Impression -  1.  Enlarged cardiac silhouette with vascular congestion. No  consolidation seen    MACRO:  None    === 05/31/24 ===    CT ABDOMEN PELVIS WO IV CONTRAST    - Impression -  Left lower lobe infiltrate consistent with pneumonia.    Mild soft tissue and mesenteric edema with septal thickening in the  lung bases and small pleural effusions consistent with CHF/fluid  overload.    Distended gallbladder with questionable trace pericholecystic fluid.    Questionable distal esophageal wall thickening. Correlate with  possible esophagitis.    Diffuse bladder wall thickening which could be related to suboptimal  distention, cystitis and/or trabeculation.    Numerous additional findings as described above.    MACRO:  None.         Assessment/Plan     Kentrell Carreon is a 54 y.o. 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      8/5 Updates:  Dialysis today  Ablation today    #NSTEMI   #CAD s/p CABG (2022)  EKG following SVT showing ST depressions in II and aVF new from prior baseline   Mild pleuritic chest pain on presentation, trop 1084 -> 1443  Last cath showed critical L main disease, prox LCx ,  prox LAD   Likely type II NSTEMI   Patient given  mg in ED,  initially decided to hold off heparin given patency of grafts & run of SVT to explain elevated trops  8/1 trop elevated to peak 125,000, likely due to SVT iso severe ischemic disease    Plan:   Continue ASA 81, atorvastatin 80 mg    Continue carvedilol 25 mg BID  Continue entresto 24-62 mg BID (was stopped in MICU iso hypotension)    #Supraventricular tachycardia    History of ablation for tachycardia in 2006   First episode of palpitations since prior ablation   Initial EKG (7/31) showed 1st degree AV block, LBBB  TSH wnl     Plan:  Continue coreg  EP consult for possible ablation, appreciate recs     #HFmrEF vs HFrEF  #Ischemic cardiomyopathy    EF has fluctuated on TTE's, most recent EF of 28% on 6/29 compared to 35-40% on 6/7    Plan:   TTE ordered  Continue GDMT as above (limited by ESRD)    #ESRD on HD   LUE AV fistula, [facility] schedule    Plan:  Nephrology following for dialysis needs  Increase to sevelamer 1600 TID (home dose)  Nephrocap per protocol     #IDT2DM   Most recent HbA1c 7.5%  Home regimen: sitagliptin 25 mg, Lantus 10u BID, Lispro sliding scale    Plan:  Lantus 10u BID at home  Mild SSI  Titrate as needed       Diet: NPO for cath  DVT Prophylaxis: heparin gtt  Code Status: Full code  NOK: Tegan Carreon [phone]    Patient and plan discussed with attending physician Dr. Thomas.        Destiny Jiménez MD  Department of Internal Medicine, PGY-1

## 2024-08-05 NOTE — PROGRESS NOTES
"Occupational Therapy                 Therapy Communication Note    Patient Name: Kentrell Carreon  MRN: [id number]  Today's Date: 8/5/2024     Discipline: Occupational Therapy    Missed Visit Reason: Missed Visit Reason:  (Pt adamitly refuses OT stating, \"No, I don't need it.\" When OTR asks pt to demonstrate I w/ ADLs pt states \"I'm not doing that. Just leave me along.\" Plan to d/c OT orders d/t pt refusal.)    Missed Time: Attempt  "

## 2024-08-05 NOTE — NURSING NOTE
..Report from Sending RN:    Report From: BRIAN Tomlin  Recent Surgery of Procedure: No  Baseline Level of Consciousness (LOC): A&O X3  Oxygen Use: No  Type: Room air  Diabetic: yes  Last BP Med Given Day of Dialysis: none  Last Pain Med Given: none  Lab Tests to be Obtained with Dialysis: yes, cbc, rfp, mg  Blood Transfusion to be Given During Dialysis: No  Available IV Access: Yes  Medications to be Administered During Dialysis: No  Continuous IV Infusion Running: No  Restraints on Currently or in the Last 24 Hours: No  Hand-Off Communication: Pt had no acute event overnight, vital signs stable. Not on precaution, no morning medication given.  Dialysis Catheter Dressing: AVF  Last Dressing Change: N/A

## 2024-08-05 NOTE — CARE PLAN
The patient's goals for the shift include to get rest    The clinical goals for the shift include Pt will have no c/o chest pain through shift    Patient remained safe and free from injury during the shift. Patient to have dialysis and go to an ablation after.    Problem: Fall/Injury  Goal: Not fall by end of shift  Outcome: Progressing  Goal: Be free from injury by end of the shift  Outcome: Progressing  Goal: Verbalize understanding of personal risk factors for fall in the hospital  Outcome: Progressing

## 2024-08-05 NOTE — CONSULTS
Consults  Patient: Kentrell Carreon    Procedure Information       Date/Time: 08/05/24 1400    Procedure: Ablation SVT    [place] [facility] [place] [facility] [place] [facility] [place] [facility] Cardiac Cath Lab    Providers: Nik Vizcarra MD          Relevant Problems   Cardiac   (+) Accelerated hypertension   (+) Chest pain   (+) Congestive heart failure, hypertensive (Multi)   (+) Coronary artery disease with other form of angina pectoris, unspecified vessel or lesion type, unspecified whether native or transplanted heart (CMS-HCC)       Neuro   (+) Carotid stenosis       /Renal   (+) ESRD on dialysis (Multi), last dialyzed 8/5/24 via RUE   (+) End-stage renal disease on hemodialysis (Multi)   (+) Hyponatremia       Endocrine   (+) DM (diabetes mellitus), type 1 with neurological complications (Multi)       Musculoskeletal   (+) SLE (systemic lupus erythematosus) (Multi)       ID   (+) Acute osteomyelitis of phalanx of foot (Multi)   (+) Onychomycosis   (+) Osteomyelitis of forefoot (Multi)         Clinical information reviewed:    Allergies  Meds           Contrast Media, Ampicillin-Sulbactam, Strawberry     Physical Exam    Airway  Mallampati: III  TM distance: >3 FB  Neck ROM: full     Cardiovascular   Rhythm: irregular  Rate: abnormal  Comments: Pt. Here for SVT ablation   Dental   (+) upper dentures, lower dentures     Pulmonary - normal exam  Comments: Pt. Recovered from COVID 2 weeks ago   Abdominal - normal exam           Vitals:    08/05/24 1138   BP: 148/72   Pulse: 70   Resp: 20   Temp: 36.2 °C (97.2 °F)   SpO2: 100%       Anesthesia Plan    History of general anesthesia?: yes  History of complications of general anesthesia?: no    ASA 3     MAC     The patient is not a current smoker.  Patient was not previously instructed to abstain from smoking on day of procedure.  Patient did not smoke on day of procedure.    intravenous induction   Anesthetic plan and risks discussed with patient.    Plan discussed with CAA.

## 2024-08-05 NOTE — PROGRESS NOTES
Electrophysiology Progress Note    Updates:  Ablation complete today showing typical atrial flutter.     All system reviewed and negative unless mentioned above.       Current Facility-Administered Medications:     acetaminophen (Tylenol) tablet 650 mg, 650 mg, oral, q6h PRN, Melvi Rand MD    aspirin EC tablet 81 mg, 81 mg, oral, Daily, Melvi Rand MD, 81 mg at 08/05/24 1144    atorvastatin (Lipitor) tablet 80 mg, 80 mg, oral, Daily, Melvi Rand MD, 80 mg at 08/05/24 1144    B complex-vitamin C-folic acid (Nephrocaps) capsule 1 capsule, 1 capsule, oral, Daily, Melvi Rand MD, 1 capsule at 08/05/24 1144    bupivacaine PF (Marcaine) 0.5 % (5 mg/mL) injection, , , PRN, Nik Vizcarra MD, 10 mL at 08/05/24 1452    carvedilol (Coreg) tablet 25 mg, 25 mg, oral, BID, Melvi Rand MD, 25 mg at 08/05/24 1132    dextrose 50 % injection 12.5 g, 12.5 g, intravenous, q15 min PRN, Melvi Rand MD    dextrose 50 % injection 25 g, 25 g, intravenous, q15 min PRN, Melvi Rnad MD    epoetin annita (Epogen,Procrit) injection 10,000 Units, 10,000 Units, intravenous, Once per day on Monday Wednesday Friday, Melvi Rand MD, 10,000 Units at 08/02/24 1857    fentaNYL PF (Sublimaze) injection, , , PRN, Nik Vizcarra MD, 25 mcg at 08/05/24 1604    glucagon (Glucagen) injection 1 mg, 1 mg, intramuscular, q15 min PRN, Melvi Rand MD    heparin 1,000 unit/mL injection, , , PRN, Nik Vizcarra MD, 2,000 Units at 08/05/24 1529    insulin glargine (Lantus) injection 10 Units, 10 Units, subcutaneous, BID, Destiny Jiménez MD    insulin lispro (HumaLOG) injection 0-5 Units, 0-5 Units, subcutaneous, TID, Melvi Rand MD    isoproterenol (Isuprel) 0.2 mg in sodium chloride 0.9% 200 mL (0.001 mg/mL) infusion, , , Continuous PRN, Nik Vizcarra MD, Last Rate: 120 mL/hr at 08/05/24 1523, 2 mcg/min at 08/05/24 1523    midazolam (Versed) injection, , , PRN, Nik Vizcarra MD, 0.5 mg at 08/05/24 1605    oxygen (O2) therapy, ,  , Continuous PRN, Nik Vizcarra MD, Last Rate: 180,000 mL/hr at 08/05/24 1420, 3 L/min at 08/05/24 1420    sacubitriL-valsartan (Entresto) 24-26 mg per tablet 1 tablet, 1 tablet, oral, BID, Thierry Willams MD, 1 tablet at 08/03/24 0923    sevelamer carbonate (Renvela) tablet 1,600 mg, 1,600 mg, oral, TID, Destiny Jiménez MD    sodium chloride 0.9% infusion, , , Continuous PRN, Nik Vizcarra MD, Last Rate: 50 mL/hr at 08/05/24 1419, 50 mL/hr at 08/05/24 1419    Objective:    Vitals:    08/05/24 1138   BP: 148/72   Pulse: 70   Resp: 20   Temp: 36.2 °C (97.2 °F)   SpO2: 100%       Exam:  General - well appearing   Neck - no JVD   Chest - CTAB   Cardiac - S1, S2, no murmur heard   Lower ext - No LE edema     Labs/Imaging:     Results from last 72 hours   Lab Units 08/05/24  0654   SODIUM mmol/L 135*   POTASSIUM mmol/L 4.7   CO2 mmol/L 24   BUN mg/dL 71*   CREATININE mg/dL 10.08*   MAGNESIUM mg/dL 2.25   PHOSPHORUS mg/dL 6.5*      Results from last 72 hours   Lab Units 08/05/24  0654   WBC AUTO x10*3/uL 5.9   HEMOGLOBIN g/dL 8.3*   PLATELETS AUTO x10*3/uL 202      Assessment and Plan:   54M PMHx CAD s/p PCI LAD 6/2017 and 3v CABG 2022, HFrEF (EF 35-40% 6/24), prior ablation 2006 likely SVT), ESRD on HD MTTThF via RUE AVF, DM2, SLE. P/w SVT for which EP is consulted.      #SVT   #Typical atrial flutter s/p ablation   [id number] = 3.   Patient with long RP SVT. Given his decreased EF, elevated troponin and ESRD with limited options for anti-arrhythmics, patient underwent typical flutter ablation 8/5/24.   - Post-procedure after 3 hours, please start patient on AC for stroke prevention given he had aflutter  - Patient will need to continue AC for 3 months   - Please discharge patient with 30d event monitor to rule out occult AF  - Please arrange for EP follow up with Dr. Vizcarra in 10-12 weeks     Recommendations are preliminary until note is co-signed by an attending.     Christie King  Cardiology Fellow    PGY-5

## 2024-08-05 NOTE — CARE PLAN
Problem: Pain  Goal: Takes deep breaths with improved pain control throughout the shift  Outcome: Progressing     Problem: Fall/Injury  Goal: Not fall by end of shift  Outcome: Progressing     Problem: Safety - Adult  Goal: Free from fall injury  Outcome: Progressing     Problem: Diabetes  Goal: Achieve decreasing blood glucose levels by end of shift  Outcome: Progressing     Patient remained stable. Had ablation done. No s/s of bleeding of bilateral groins at this time. Plan to start heparin drip at 2000 if no bleeding.

## 2024-08-05 NOTE — NURSING NOTE
Report to Receiving RN:    Report To:  Humaira ( RN)  Time Report Called: 1051 am  Hand-Off Communication: post vs   Complications During Treatment: No  Ultrafiltration Treatment: No  Medications Administered During Dialysis: No  Blood Products Administered During Dialysis: No  Labs Sent During Dialysis: Yes, CBC, Magnesium, Coagulation, CBC  Heparin Drip Rate Changes: No  Dialysis Catheter Dressing: right arm fistula  Last Dressing Change: yes    Electronic Signatures:   (Signed Fe Reid RN)   Authored:    (Signed )   Authored:     Last Updated: 10:51 AM by FE REID

## 2024-08-05 NOTE — PROGRESS NOTES
Occupational Therapy                 Therapy Communication Note    Patient Name: Kentrell Carreon  MRN: [id number]  Today's Date: 8/5/2024     Discipline: Occupational Therapy    Missed Visit Reason: Missed Visit Reason: Patient in a medical procedure (At HD. Will re-attempt as able)    Missed Time: Attempt

## 2024-08-06 ENCOUNTER — PHARMACY VISIT (OUTPATIENT)
Dept: PHARMACY | Facility: CLINIC | Age: 54
End: 2024-08-06
Payer: COMMERCIAL

## 2024-08-06 ENCOUNTER — APPOINTMENT (OUTPATIENT)
Dept: CARDIOLOGY | Facility: HOSPITAL | Age: 54
DRG: 273 | End: 2024-08-06
Payer: COMMERCIAL

## 2024-08-06 VITALS
DIASTOLIC BLOOD PRESSURE: 67 MMHG | RESPIRATION RATE: 18 BRPM | HEIGHT: 76 IN | TEMPERATURE: 98.4 F | SYSTOLIC BLOOD PRESSURE: 129 MMHG | WEIGHT: 155.87 LBS | BODY MASS INDEX: 18.98 KG/M2 | OXYGEN SATURATION: 96 % | HEART RATE: 71 BPM

## 2024-08-06 LAB
BODY SURFACE AREA: 1.95 M2
GLUCOSE BLD MANUAL STRIP-MCNC: 184 MG/DL (ref 74–99)
GLUCOSE BLD MANUAL STRIP-MCNC: 279 MG/DL (ref 74–99)
GLUCOSE BLD MANUAL STRIP-MCNC: 282 MG/DL (ref 74–99)
GLUCOSE BLD MANUAL STRIP-MCNC: 375 MG/DL (ref 74–99)
UFH PPP CHRO-ACNC: 0.2 IU/ML
UFH PPP CHRO-ACNC: 0.2 IU/ML

## 2024-08-06 PROCEDURE — RXMED WILLOW AMBULATORY MEDICATION CHARGE

## 2024-08-06 PROCEDURE — 85520 HEPARIN ASSAY: CPT

## 2024-08-06 PROCEDURE — 93270 REMOTE 30 DAY ECG REV/REPORT: CPT

## 2024-08-06 PROCEDURE — 2500000002 HC RX 250 W HCPCS SELF ADMINISTERED DRUGS (ALT 637 FOR MEDICARE OP, ALT 636 FOR OP/ED)

## 2024-08-06 PROCEDURE — 2500000001 HC RX 250 WO HCPCS SELF ADMINISTERED DRUGS (ALT 637 FOR MEDICARE OP)

## 2024-08-06 PROCEDURE — 2500000004 HC RX 250 GENERAL PHARMACY W/ HCPCS (ALT 636 FOR OP/ED)

## 2024-08-06 PROCEDURE — 36415 COLL VENOUS BLD VENIPUNCTURE: CPT

## 2024-08-06 PROCEDURE — 99233 SBSQ HOSP IP/OBS HIGH 50: CPT | Performed by: NURSE PRACTITIONER

## 2024-08-06 PROCEDURE — 99232 SBSQ HOSP IP/OBS MODERATE 35: CPT

## 2024-08-06 PROCEDURE — 82947 ASSAY GLUCOSE BLOOD QUANT: CPT

## 2024-08-06 RX ORDER — CARVEDILOL 25 MG/1
25 TABLET ORAL
Qty: 60 TABLET | Refills: 0 | Status: SHIPPED | OUTPATIENT
Start: 2024-08-06

## 2024-08-06 RX ORDER — INSULIN LISPRO 100 [IU]/ML
5 INJECTION, SOLUTION INTRAVENOUS; SUBCUTANEOUS ONCE
Status: DISCONTINUED | OUTPATIENT
Start: 2024-08-06 | End: 2024-08-06 | Stop reason: HOSPADM

## 2024-08-06 ASSESSMENT — COGNITIVE AND FUNCTIONAL STATUS - GENERAL
MOBILITY SCORE: 20
STANDING UP FROM CHAIR USING ARMS: A LITTLE
WALKING IN HOSPITAL ROOM: A LITTLE
DAILY ACTIVITIY SCORE: 24
MOVING TO AND FROM BED TO CHAIR: A LITTLE
CLIMB 3 TO 5 STEPS WITH RAILING: A LITTLE

## 2024-08-06 ASSESSMENT — PAIN SCALES - GENERAL: PAINLEVEL_OUTOF10: 0 - NO PAIN

## 2024-08-06 NOTE — PROGRESS NOTES
"  Cardiology Progress Note     Kentrell Carreon is a 54 y.o. 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    Subjective     No acute events overnight and no concerns this AM. Underwent ablation yesterday.     Denies dizziness, lightheadedness, SOB, cough, chest pain, chest pressure, palpitations, nausea, vomiting, abd pain.    Medications     Medications:   Scheduled medications  aspirin, 81 mg, oral, Daily  atorvastatin, 80 mg, oral, Daily  B complex-vitamin C-folic acid, 1 capsule, oral, Daily  carvedilol, 25 mg, oral, BID  epoetin annita or biosimilar, 10,000 Units, intravenous, Once per day on Monday Wednesday Friday  insulin glargine, 10 Units, subcutaneous, BID  insulin lispro, 0-5 Units, subcutaneous, TID  insulin lispro, 5 Units, subcutaneous, Once  sacubitriL-valsartan, 1 tablet, oral, BID  sevelamer carbonate, 1,600 mg, oral, TID      Continuous medications  heparin, 0-4,000 Units/hr, Last Rate: 1,000 Units/hr (08/06/24 0220)      PRN medications  PRN medications: acetaminophen, dextrose, dextrose, dextrose, dextrose, glucagon, glucagon, glucagon, heparin     Objective     Vitals  Visit Vitals  /70   Pulse 75   Temp 36.1 °C (97 °F) (Temporal)   Resp 16   Ht 1.93 m (6' 4\")   Wt 70.7 kg (155 lb 13.8 oz)   SpO2 99%   BMI 18.97 kg/m²   Smoking Status Former   BSA 1.95 m²       Intake/Output Summary (Last 24 hours) at 8/6/2024 0656  Last data filed at 8/5/2024 1800  Gross per 24 hour   Intake 1080 ml   Output 1502 ml   Net -422 ml       Physical Exam  General: A&Ox3, NAD.  HEENT: Normocephalic, atraumatic. EOMI. MMM.   Respiratory: CTA bilaterally. No wheezes, rales, or rhonchi. Normal respiratory effort.  Cardiovascular: RRR. Holosystolic murmur. No gallops or rubs. Radial pulses 2+.  Abdominal: Soft, " nondistended, nontender to palpation. Bowel sounds present. No hepatosplenomegaly or masses. No CVA tenderness.  Neuro: No focal neuro deficits.  MSK: No edema bilateral LE.   Skin: Warm, dry. No rashes or wounds.  Psych: Appropriate mood and affect.     Labs  Lab Results   Component Value Date    WBC 5.9 08/05/2024    HGB 8.3 (L) 08/05/2024    HCT 25.7 (L) 08/05/2024    MCV 97 08/05/2024     08/05/2024      Lab Results   Component Value Date    GLUCOSE 155 (H) 08/05/2024    CALCIUM 9.1 08/05/2024     (L) 08/05/2024    K 4.7 08/05/2024    CO2 24 08/05/2024    CL 95 (L) 08/05/2024    BUN 71 (H) 08/05/2024    CREATININE 10.08 (H) 08/05/2024      Lab Results   Component Value Date    ALT 13 08/03/2024    AST 29 08/03/2024    ALKPHOS 112 08/03/2024    BILITOT 0.4 08/03/2024      Cardiac workup:    === 08/01/24 ===  ECHOCARDIOGRAM   CONCLUSIONS:   1. Left ventricular ejection fraction is moderately decreased, calculated by Gaspar's biplane at 38%.   2. Multiple segmental abnormalities exist. See findings.   3. Spectral Doppler shows a pseudonormal pattern of left ventricular diastolic filling.   4. There is an elevated mean left atrial pressure.   5. Left ventricular cavity size is severely dilated.   6. There is mildly reduced right ventricular systolic function.   7. Mildly enlarged right ventricle.   8. Moderate to severe aortic valve stenosis.   9. Mild aortic valve regurgitation.  10. Stroke volume index is decreased (33 ml/m2).  11. The aortic valve appears bicuspid with a thickened median raphe with fusion between the right and non coronary cusps.  12. There is moderate aortic valve cusp calcification.  13. There is moderate to severe aortic valve thickening.  14. Slightly elevated RVSP.  15. The left atrium is severely dilated.  16. The right atrium is mild to moderately dilated.  17. Compared with study dated 6/8/2024, there is no change in LVEF. The regional wall motion abnormalities were better  visualized today. The LV was already moderate to severely dilated in the prior echocardiogram (98 ml/m2). The aortic valve gradients were not assessed in the limited study from June.    === 03/09/2022 ===  CATH    Coronary Angiography:  The coronary circulation is right dominant.     Left Main Coronary Artery:  The left main coronary artery is a normal caliber vessel. The left main arises normally from the left coronary sinus of Valsalva and bifurcates into the LAD and circumflex coronary arteries. The ostial left main coronary artery showed 90% stenosis.     Left Anterior Descending Coronary Artery Distribution:  The left anterior descending coronary artery is a normal caliber vessel. The LAD arises normally from the left main coronary artery. The ostial left anterior descending coronary artery showed 80% stenosis. An additional lesion, located at the proximal to mid left anterior descending coronary artery, revealed 70% in-stent restenosis.     Circumflex Coronary Artery Distribution:  The circumflex coronary artery is a normal caliber vessel. The circumflex arises normally from the left main coronary artery and terminates in the AV groove. The ostial circumflex coronary artery showed 80% stenosis. An additional lesion, located at the proximal circumflex coronary artery, demonstrated 60% stenosis.     Right Coronary Artery Distribution:     The right coronary artery is a normal caliber vessel. The RCA arises normally from the right sinus of Valsalva. The mid and distal right coronary artery showed 50% stenosis.     Coronary Interventions:     Coronary Lesion Summary:  Vessel            Stenosis         Vessel Segment  Left Main       90% stenosis           ostial  LAD             80% stenosis           ostial  LAD        70% in-stent restenosis proximal to mid  Circumflex      80% stenosis           ostial  Circumflex      60% stenosis          proximal  RCA             50% stenosis       mid and distal      ===  02/08/2021 ===   NUCLEAR STRESS TEST  IMPRESSION:  1.  Normal myocardial perfusion study without evidence of ischemia or  prior infarction.  2. The left ventricle is severely dilated in size.  3. Globally reduced LV wall motion with an LV EF estimated at 40-46%.  4. Since 1/3/2019, ejection fraction appears improved and the LV is  less dilated (205-206 ml on today's exam, vs 261 ml on 1/3/2019).  There still remains no definitive signs of ischemia or prior infarct.     Imaging  === 05/31/24 ===    XR CHEST 2 VIEWS    - Impression -  1.  Enlarged cardiac silhouette with vascular congestion. No  consolidation seen    MACRO:  None    === 05/31/24 ===    CT ABDOMEN PELVIS WO IV CONTRAST    - Impression -  Left lower lobe infiltrate consistent with pneumonia.    Mild soft tissue and mesenteric edema with septal thickening in the  lung bases and small pleural effusions consistent with CHF/fluid  overload.    Distended gallbladder with questionable trace pericholecystic fluid.    Questionable distal esophageal wall thickening. Correlate with  possible esophagitis.    Diffuse bladder wall thickening which could be related to suboptimal  distention, cystitis and/or trabeculation.    Numerous additional findings as described above.    MACRO:  None.         Assessment/Plan     Kentrell Carreon is a 54 y.o. 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      8/6 Updates:  8/5 underwent typical flutter ablation   Needs to be on AC for 3 months (Eliquis 5 BID)  Needs 30-day event monitor on discharge     #NSTEMI   #CAD s/p CABG (2022)  EKG following SVT showing ST depressions in II and aVF new from prior baseline   Mild pleuritic chest pain on presentation, trop 1084 -> 1443  Last cath showed critical L main disease, prox LCx  ,  prox LAD   Likely type II NSTEMI   Patient given  mg in ED, initially decided to hold off heparin given patency of grafts & run of SVT to explain elevated trops  8/1 trop elevated to peak 125,000, likely due to SVT iso severe ischemic disease    Plan:   Continue ASA 81, atorvastatin 80 mg    Continue carvedilol 25 mg BID  Continue entresto 24-62 mg BID (was stopped in MICU iso hypotension)    #Supraventricular tachycardia    History of ablation for tachycardia in 2006   First episode of palpitations since prior ablation   Initial EKG (7/31) showed 1st degree AV block, LBBB  TSH wnl     Plan:  Continue coreg  EP consult for possible ablation, appreciate recs     #HFmrEF vs HFrEF  #Ischemic cardiomyopathy    EF has fluctuated on TTE's, most recent EF of 28% on 6/29 compared to 35-40% on 6/7    Plan:   TTE ordered  Continue GDMT as above (limited by ESRD)    #ESRD on HD   LUE AV fistula, MTThS schedule    Plan:  Nephrology following for dialysis needs  Increase to sevelamer 1600 TID (home dose)  Nephrocap per protocol     #IDT2DM   Most recent HbA1c 7.5%  Home regimen: sitagliptin 25 mg, Lantus 10u BID, Lispro sliding scale    Plan:  Lantus 10u BID at home  Mild SSI  Titrate as needed       Diet: NPO for cath  DVT Prophylaxis: heparin gtt  Code Status: Full code  NOK: Tegan Carreon [phone]    Patient and plan discussed with attending physician Dr. Thomas.        Destiny Jiménez MD  Department of Internal Medicine, PGY-1

## 2024-08-06 NOTE — PROGRESS NOTES
"Kentrell Carreon is a 54 y.o. male on day 5 of admission presenting with NSTEMI (non-ST elevated myocardial infarction) (Multi).    Subjective   Pt resting in bed. Denies sob, n/v/d, fever, cough, chills, pain, chest pains, light head, dizziness, constipation, headache       Objective     Physical Exam  Vitals and nursing note reviewed.   Constitutional:       Appearance: Normal appearance.   Cardiovascular:      Rate and Rhythm: Normal rate and regular rhythm.      Comments: SR 72  Pulmonary:      Comments: Michele lung sounds slightly dim more noted to lt middle lobe  Abdominal:      General: There is distension.      Comments: Non-tender to palpation   Genitourinary:     Comments: States make little urine  Musculoskeletal:      Comments: Ble without edema   Skin:     General: Skin is warm and dry.   Neurological:      Mental Status: He is alert and oriented to person, place, and time.   Psychiatric:         Mood and Affect: Mood normal.         Behavior: Behavior normal.         Last Recorded Vitals  Blood pressure 129/67, pulse 71, temperature 36.9 °C (98.4 °F), temperature source Temporal, resp. rate 18, height 1.93 m (6' 4\"), weight 70.7 kg (155 lb 13.8 oz), SpO2 96%.  Intake/Output last 3 Shifts:  I/O last 3 completed shifts:  In: 1280 (18.1 mL/kg) [P.O.:480; I.V.:400 (5.7 mL/kg); Other:400]  Out: 1502 (21.2 mL/kg) [Other:1500; Blood:2]  Weight: 70.7 kg     Relevant Results  Scheduled medications  apixaban, 5 mg, oral, q12h  aspirin, 81 mg, oral, Daily  atorvastatin, 80 mg, oral, Daily  B complex-vitamin C-folic acid, 1 capsule, oral, Daily  carvedilol, 25 mg, oral, BID  empagliflozin, 10 mg, oral, Daily  epoetin annita or biosimilar, 10,000 Units, intravenous, Once per day on Monday Wednesday Friday  insulin glargine, 10 Units, subcutaneous, BID  insulin lispro, 0-5 Units, subcutaneous, TID  insulin lispro, 5 Units, subcutaneous, Once  sacubitriL-valsartan, 1 tablet, oral, BID  sevelamer carbonate, 1,600 mg, oral, " TID      Continuous medications     PRN medications  PRN medications: acetaminophen, dextrose, dextrose, dextrose, dextrose, glucagon, glucagon, glucagon   Results for orders placed or performed during the hospital encounter of 07/31/24 (from the past 96 hour(s))   POCT GLUCOSE   Result Value Ref Range    POCT Glucose 292 (H) 74 - 99 mg/dL   Renal function panel   Result Value Ref Range    Glucose 405 (H) 74 - 99 mg/dL    Sodium 129 (L) 136 - 145 mmol/L    Potassium 6.0 (H) 3.5 - 5.3 mmol/L    Chloride 86 (L) 98 - 107 mmol/L    Bicarbonate 23 21 - 32 mmol/L    Anion Gap 26 (H) 10 - 20 mmol/L    Urea Nitrogen 71 (H) 6 - 23 mg/dL    Creatinine 10.97 (H) 0.50 - 1.30 mg/dL    eGFR 5 (L) >60 mL/min/1.73m*2    Calcium 10.0 8.6 - 10.6 mg/dL    Phosphorus 6.4 (H) 2.5 - 4.9 mg/dL    Albumin 4.1 3.4 - 5.0 g/dL   Magnesium   Result Value Ref Range    Magnesium 2.21 1.60 - 2.40 mg/dL   POCT GLUCOSE   Result Value Ref Range    POCT Glucose 427 (H) 74 - 99 mg/dL   POCT GLUCOSE   Result Value Ref Range    POCT Glucose 450 (H) 74 - 99 mg/dL   POCT GLUCOSE   Result Value Ref Range    POCT Glucose >600 (H) 74 - 99 mg/dL   CBC   Result Value Ref Range    WBC 5.6 4.4 - 11.3 x10*3/uL    nRBC 0.0 0.0 - 0.0 /100 WBCs    RBC 2.56 (L) 4.50 - 5.90 x10*6/uL    Hemoglobin 7.9 (L) 13.5 - 17.5 g/dL    Hematocrit 25.1 (L) 41.0 - 52.0 %    MCV 98 80 - 100 fL    MCH 30.9 26.0 - 34.0 pg    MCHC 31.5 (L) 32.0 - 36.0 g/dL    RDW 15.2 (H) 11.5 - 14.5 %    Platelets 173 150 - 450 x10*3/uL   Magnesium   Result Value Ref Range    Magnesium 2.17 1.60 - 2.40 mg/dL   Renal function panel   Result Value Ref Range    Glucose 1,069 (HH) 74 - 99 mg/dL    Sodium 123 (L) 136 - 145 mmol/L    Potassium 6.0 (H) 3.5 - 5.3 mmol/L    Chloride 82 (L) 98 - 107 mmol/L    Bicarbonate 23 21 - 32 mmol/L    Anion Gap 24 (H) 10 - 20 mmol/L    Urea Nitrogen 85 (H) 6 - 23 mg/dL    Creatinine 11.96 (H) 0.50 - 1.30 mg/dL    eGFR 5 (L) >60 mL/min/1.73m*2    Calcium 9.4 8.6 - 10.6  mg/dL    Phosphorus 6.3 (H) 2.5 - 4.9 mg/dL    Albumin 3.9 3.4 - 5.0 g/dL   Osmolality   Result Value Ref Range    Osmolality, Serum 341 (H) 280 - 300 mOsm/kg   POCT GLUCOSE   Result Value Ref Range    POCT Glucose >600 (H) 74 - 99 mg/dL   BLOOD GAS VENOUS FULL PANEL   Result Value Ref Range    POCT pH, Venous 7.42 7.33 - 7.43 pH    POCT pCO2, Venous 34 (L) 41 - 51 mm Hg    POCT pO2, Venous 47 (H) 35 - 45 mm Hg    POCT SO2, Venous      POCT Oxy Hemoglobin, Venous      POCT Hematocrit Calculated, Venous      POCT Sodium, Venous 123 (L) 136 - 145 mmol/L    POCT Potassium, Venous 5.8 (H) 3.5 - 5.3 mmol/L    POCT Chloride, Venous 86 (L) 98 - 107 mmol/L    POCT Ionized Calicum, Venous 1.08 (L) 1.10 - 1.33 mmol/L    POCT Glucose, Venous >685 (HH) 74 - 99 mg/dL    POCT Lactate, Venous 2.7 (H) 0.4 - 2.0 mmol/L    POCT Base Excess, Venous -1.7 -2.0 - 3.0 mmol/L    POCT HCO3 Calculated, Venous 22.1 22.0 - 26.0 mmol/L    POCT Hemoglobin, Venous <3.0 (LL) 13.5 - 17.5 g/dL    POCT Anion Gap, Venous 21.0 10.0 - 25.0 mmol/L    Patient Temperature 37.0 degrees Celsius    FiO2 21 %   POCT GLUCOSE   Result Value Ref Range    POCT Glucose >600 (H) 74 - 99 mg/dL   Phosphorus   Result Value Ref Range    Phosphorus 6.3 (H) 2.5 - 4.9 mg/dL   CBC   Result Value Ref Range    WBC 5.5 4.4 - 11.3 x10*3/uL    nRBC 0.0 0.0 - 0.0 /100 WBCs    RBC 2.62 (L) 4.50 - 5.90 x10*6/uL    Hemoglobin 8.3 (L) 13.5 - 17.5 g/dL    Hematocrit 25.3 (L) 41.0 - 52.0 %    MCV 97 80 - 100 fL    MCH 31.7 26.0 - 34.0 pg    MCHC 32.8 32.0 - 36.0 g/dL    RDW 15.1 (H) 11.5 - 14.5 %    Platelets 169 150 - 450 x10*3/uL   BLOOD GAS VENOUS FULL PANEL   Result Value Ref Range    POCT pH, Venous 7.39 7.33 - 7.43 pH    POCT pCO2, Venous 41 41 - 51 mm Hg    POCT pO2, Venous 56 (H) 35 - 45 mm Hg    POCT SO2, Venous 87 (H) 45 - 75 %    POCT Oxy Hemoglobin, Venous 86.3 (H) 45.0 - 75.0 %    POCT Hematocrit Calculated, Venous 26.0 (L) 41.0 - 52.0 %    POCT Sodium, Venous 122 (L) 136  - 145 mmol/L    POCT Potassium, Venous 5.8 (H) 3.5 - 5.3 mmol/L    POCT Chloride, Venous 86 (L) 98 - 107 mmol/L    POCT Ionized Calicum, Venous      POCT Glucose, Venous >685 (HH) 74 - 99 mg/dL    POCT Lactate, Venous 2.2 (H) 0.4 - 2.0 mmol/L    POCT Base Excess, Venous -0.2 -2.0 - 3.0 mmol/L    POCT HCO3 Calculated, Venous 24.8 22.0 - 26.0 mmol/L    POCT Hemoglobin, Venous 8.5 (L) 13.5 - 17.5 g/dL    POCT Anion Gap, Venous 17.0 10.0 - 25.0 mmol/L    Patient Temperature      FiO2 21 %   Comprehensive Metabolic Panel   Result Value Ref Range    Glucose 659 (HH) 74 - 99 mg/dL    Sodium 126 (L) 136 - 145 mmol/L    Potassium 5.6 (H) 3.5 - 5.3 mmol/L    Chloride 83 (L) 98 - 107 mmol/L    Bicarbonate 25 21 - 32 mmol/L    Anion Gap 24 (H) 10 - 20 mmol/L    Urea Nitrogen 85 (H) 6 - 23 mg/dL    Creatinine 11.67 (H) 0.50 - 1.30 mg/dL    eGFR 5 (L) >60 mL/min/1.73m*2    Calcium 9.4 8.6 - 10.6 mg/dL    Albumin 3.8 3.4 - 5.0 g/dL    Alkaline Phosphatase 112 33 - 120 U/L    Total Protein 6.1 (L) 6.4 - 8.2 g/dL    AST 29 9 - 39 U/L    Bilirubin, Total 0.4 0.0 - 1.2 mg/dL    ALT 13 10 - 52 U/L   Magnesium   Result Value Ref Range    Magnesium 2.25 1.60 - 2.40 mg/dL   Beta Hydroxybutyrate   Result Value Ref Range    Beta-Hydroxybutyrate 0.10 0.02 - 0.27 mmol/L   POCT GLUCOSE   Result Value Ref Range    POCT Glucose >600 (H) 74 - 99 mg/dL   POCT GLUCOSE   Result Value Ref Range    POCT Glucose 525 (H) 74 - 99 mg/dL   POCT GLUCOSE   Result Value Ref Range    POCT Glucose 351 (H) 74 - 99 mg/dL   POCT GLUCOSE   Result Value Ref Range    POCT Glucose 278 (H) 74 - 99 mg/dL   POCT GLUCOSE   Result Value Ref Range    POCT Glucose 116 (H) 74 - 99 mg/dL   Blood Gas Lactic Acid, Venous   Result Value Ref Range    POCT Lactate, Venous 1.4 0.4 - 2.0 mmol/L   Renal function panel   Result Value Ref Range    Glucose 99 74 - 99 mg/dL    Sodium 130 (L) 136 - 145 mmol/L    Potassium 4.9 3.5 - 5.3 mmol/L    Chloride 86 (L) 98 - 107 mmol/L     Bicarbonate 26 21 - 32 mmol/L    Anion Gap 23 (H) 10 - 20 mmol/L    Urea Nitrogen 91 (HH) 6 - 23 mg/dL    Creatinine 11.88 (H) 0.50 - 1.30 mg/dL    eGFR 5 (L) >60 mL/min/1.73m*2    Calcium 9.8 8.6 - 10.6 mg/dL    Phosphorus 6.5 (H) 2.5 - 4.9 mg/dL    Albumin 3.8 3.4 - 5.0 g/dL   POCT GLUCOSE   Result Value Ref Range    POCT Glucose 111 (H) 74 - 99 mg/dL   POCT GLUCOSE   Result Value Ref Range    POCT Glucose 105 (H) 74 - 99 mg/dL   POCT GLUCOSE   Result Value Ref Range    POCT Glucose 112 (H) 74 - 99 mg/dL   Renal function panel   Result Value Ref Range    Glucose 118 (H) 74 - 99 mg/dL    Sodium 135 (L) 136 - 145 mmol/L    Potassium 4.1 3.5 - 5.3 mmol/L    Chloride 99 98 - 107 mmol/L    Bicarbonate 24 21 - 32 mmol/L    Anion Gap 16 10 - 20 mmol/L    Urea Nitrogen 33 (H) 6 - 23 mg/dL    Creatinine 6.09 (H) 0.50 - 1.30 mg/dL    eGFR 10 (L) >60 mL/min/1.73m*2    Calcium 8.5 (L) 8.6 - 10.6 mg/dL    Phosphorus 3.2 2.5 - 4.9 mg/dL    Albumin 3.6 3.4 - 5.0 g/dL   Magnesium   Result Value Ref Range    Magnesium 1.90 1.60 - 2.40 mg/dL   CBC   Result Value Ref Range    WBC 5.6 4.4 - 11.3 x10*3/uL    nRBC 0.0 0.0 - 0.0 /100 WBCs    RBC 2.65 (L) 4.50 - 5.90 x10*6/uL    Hemoglobin 8.2 (L) 13.5 - 17.5 g/dL    Hematocrit 25.5 (L) 41.0 - 52.0 %    MCV 96 80 - 100 fL    MCH 30.9 26.0 - 34.0 pg    MCHC 32.2 32.0 - 36.0 g/dL    RDW 15.3 (H) 11.5 - 14.5 %    Platelets 177 150 - 450 x10*3/uL   Renal Function Panel   Result Value Ref Range    Glucose 125 (H) 74 - 99 mg/dL    Sodium 135 (L) 136 - 145 mmol/L    Potassium 4.9 3.5 - 5.3 mmol/L    Chloride 94 (L) 98 - 107 mmol/L    Bicarbonate 31 21 - 32 mmol/L    Anion Gap 15 10 - 20 mmol/L    Urea Nitrogen 49 (H) 6 - 23 mg/dL    Creatinine 8.12 (H) 0.50 - 1.30 mg/dL    eGFR 7 (L) >60 mL/min/1.73m*2    Calcium 9.6 8.6 - 10.6 mg/dL    Phosphorus 5.1 (H) 2.5 - 4.9 mg/dL    Albumin 3.5 3.4 - 5.0 g/dL   Magnesium   Result Value Ref Range    Magnesium 2.13 1.60 - 2.40 mg/dL   POCT GLUCOSE    Result Value Ref Range    POCT Glucose 127 (H) 74 - 99 mg/dL   POCT GLUCOSE   Result Value Ref Range    POCT Glucose 110 (H) 74 - 99 mg/dL   POCT GLUCOSE   Result Value Ref Range    POCT Glucose 146 (H) 74 - 99 mg/dL   CBC   Result Value Ref Range    WBC 5.8 4.4 - 11.3 x10*3/uL    nRBC 0.0 0.0 - 0.0 /100 WBCs    RBC 2.80 (L) 4.50 - 5.90 x10*6/uL    Hemoglobin 8.7 (L) 13.5 - 17.5 g/dL    Hematocrit 28.2 (L) 41.0 - 52.0 %     (H) 80 - 100 fL    MCH 31.1 26.0 - 34.0 pg    MCHC 30.9 (L) 32.0 - 36.0 g/dL    RDW 15.2 (H) 11.5 - 14.5 %    Platelets 200 150 - 450 x10*3/uL   POCT GLUCOSE   Result Value Ref Range    POCT Glucose 227 (H) 74 - 99 mg/dL   CBC   Result Value Ref Range    WBC 5.9 4.4 - 11.3 x10*3/uL    nRBC 0.0 0.0 - 0.0 /100 WBCs    RBC 2.64 (L) 4.50 - 5.90 x10*6/uL    Hemoglobin 8.3 (L) 13.5 - 17.5 g/dL    Hematocrit 25.7 (L) 41.0 - 52.0 %    MCV 97 80 - 100 fL    MCH 31.4 26.0 - 34.0 pg    MCHC 32.3 32.0 - 36.0 g/dL    RDW 15.1 (H) 11.5 - 14.5 %    Platelets 202 150 - 450 x10*3/uL   Renal Function Panel   Result Value Ref Range    Glucose 155 (H) 74 - 99 mg/dL    Sodium 135 (L) 136 - 145 mmol/L    Potassium 4.7 3.5 - 5.3 mmol/L    Chloride 95 (L) 98 - 107 mmol/L    Bicarbonate 24 21 - 32 mmol/L    Anion Gap 21 (H) 10 - 20 mmol/L    Urea Nitrogen 71 (H) 6 - 23 mg/dL    Creatinine 10.08 (H) 0.50 - 1.30 mg/dL    eGFR 6 (L) >60 mL/min/1.73m*2    Calcium 9.1 8.6 - 10.6 mg/dL    Phosphorus 6.5 (H) 2.5 - 4.9 mg/dL    Albumin 3.4 3.4 - 5.0 g/dL   Magnesium   Result Value Ref Range    Magnesium 2.25 1.60 - 2.40 mg/dL   Coagulation Screen   Result Value Ref Range    Protime 11.0 9.8 - 12.8 seconds    INR 1.0 0.9 - 1.1    aPTT 29 27 - 38 seconds   POCT GLUCOSE   Result Value Ref Range    POCT Glucose 119 (H) 74 - 99 mg/dL   POCT GLUCOSE   Result Value Ref Range    POCT Glucose 85 74 - 99 mg/dL   POCT GLUCOSE   Result Value Ref Range    POCT Glucose 96 74 - 99 mg/dL   POCT GLUCOSE   Result Value Ref Range    POCT  Glucose 403 (H) 74 - 99 mg/dL   POCT GLUCOSE   Result Value Ref Range    POCT Glucose 375 (H) 74 - 99 mg/dL   Heparin Assay, UFH   Result Value Ref Range    Heparin Unfractionated 0.2 See Comment Below for Therapeutic Ranges IU/mL   POCT GLUCOSE   Result Value Ref Range    POCT Glucose 279 (H) 74 - 99 mg/dL   Heparin Assay, UFH   Result Value Ref Range    Heparin Unfractionated 0.2 See Comment Below for Therapeutic Ranges IU/mL   POCT GLUCOSE   Result Value Ref Range    POCT Glucose 282 (H) 74 - 99 mg/dL   POCT GLUCOSE   Result Value Ref Range    POCT Glucose 184 (H) 74 - 99 mg/dL                     Assessment/Plan   Principal Problem:    NSTEMI (non-ST elevated myocardial infarction) (Multi)  Active Problems:    A-fib (Multi)    Atrial flutter (Multi)    SVT (supraventricular tachycardia) (CMS-HCC)    Tolerated hemodialysis yesterday with net fluid loss 1.5L    Bp stable with tx, euvolemic on exam and has stable electrolytes . K+=4.7     Outpatient Dialysis schedule:   [facility]Dr Epperson; will be MWF while inpatient     Access: lt AVF with +thrill/bruit- no issues - able to achieve      Anemia of ESRD: epoetin annita (Epogen,Procrit) injection 10,000 Units on dialysis days.. current hgb 8.3.. will cont to monitor... (Mircera 200mcq q2wks op)       CKD-MBD Phosphate Binder:B complex-vitamin C-folic acid (Nephrocaps) capsule 1 capsule      Plan HD tomorrow with UF as tolerated     Renal diet      Please obtain daily standing wt (if possible)     Medication to be adjusted for ESRD      Patient to continue regular HD schedule while inpatient and to follow with the outpatient nephrologist at discharge        SOTERO Amezquita-CNP

## 2024-08-06 NOTE — POST-PROCEDURE NOTE
Physician Transition of Care Summary  Invasive Cardiovascular Lab    Procedure Date: 8/5/2024  Attending:    Danielito Vizcarra - Primary  Resident/Fellow/Other Assistant: Surgeons and Role:  * No surgeons found with a matching role *    Indications:   Pre-op Diagnosis      * SVT (supraventricular tachycardia) (CMS-HCC) [I47.10]    Post-procedure diagnosis:   Post-op Diagnosis     * SVT (supraventricular tachycardia) (CMS-HCC) [I47.10]    Procedure(s):   Ablation SVT  65428 - GA COMPRE EP EVAL ABLTJ 3D MAPG TX SVT        Procedure Findings:   Typical atrial flutter    Description of the Procedure:   EPS  RFVx2, LFVx2 access  Typical flutter induced  CTI ablation  Vascade x 4 to venous access sites    Complications:   none    Stents/Implants:   Implants       No implant documentation for this case.            Anticoagulation/Antiplatelet Plan:   Apixaban 5mg BID x 3 months    Estimated Blood Loss:   2 mL    Anesthesia: Consult Anesthesia Staff: No anesthesia staff entered.    Any Specimen(s) Removed:   No specimens collected during this procedure.    Disposition:   Back to inpatient bed      Electronically signed by: Nik Vizcarra MD, 8/5/2024 10:35 PM

## 2024-08-06 NOTE — NURSING NOTE
Patient discharge at 1530. Pt educated on discharge papers and home medications. Pt IV removed. Holter monitor placed prior to discharge

## 2024-08-07 ENCOUNTER — PATIENT OUTREACH (OUTPATIENT)
Dept: PRIMARY CARE | Facility: CLINIC | Age: 54
End: 2024-08-07
Payer: COMMERCIAL

## 2024-08-07 NOTE — PROGRESS NOTES
Discharge Facility: [facility]   Discharge Diagnosis:NSTEMI  Admission Date:8/1/24  Discharge Date: 8/6/24    PCP Appointment Date:8/28/24  Specialist Appointment Date: Cardiology 8/8/24  Hospital Encounter and Summary Linked: Yes      Two attempts were made to reach patient within two business days after discharge. Voicemail left with contact information for patient to call back with any non-emergent questions or concerns.        Penny Alvarez LPN

## 2024-08-07 NOTE — DISCHARGE SUMMARY
Discharge Diagnosis  NSTEMI (non-ST elevated myocardial infarction) (Multi)    Issues Requiring Follow-Up  SVT s/p ablation   Eliquis 5 BID x 3 month duration   30-day event monitor  EP follow up     T2DM, Hba1c 7.5%  Follow up with PCP    HFrEF  Adding Jardiance to home GDMT  Follow up with cardiologist outpatient     Discharge Meds     Your medication list        START taking these medications        Instructions Last Dose Given Next Dose Due   Eliquis 5 mg tablet  Generic drug: apixaban      Take 1 tablet (5 mg) by mouth every 12 hours for 180 doses.       Jardiance 10 mg  Generic drug: empagliflozin  Start taking on: August 7, 2024      Take 1 tablet (10 mg) by mouth once daily.       Renal Caps 1 mg capsule  Generic drug: B complex-vitamin C-folic acid  Start taking on: August 7, 2024      Take 1 capsule by mouth once daily.              CONTINUE taking these medications        Instructions Last Dose Given Next Dose Due   acetaminophen 325 mg tablet  Commonly known as: Tylenol           aspirin 81 mg EC tablet           atorvastatin 80 mg tablet  Commonly known as: Lipitor      Take 1 tablet (80 mg) by mouth once daily.       carvedilol 25 mg tablet  Commonly known as: Coreg      Take 1 tablet (25 mg) by mouth 2 times daily (morning and late afternoon).       Daily Multi-Vitamin tablet  Generic drug: multivitamin           Entresto 24-26 mg tablet  Generic drug: sacubitriL-valsartan      Take 1 tablet by mouth 2 times a day.       EPOETIN DEIRDRE INJ           FreeStyle José Luis 2 [facility]  Generic drug: flash glucose scanning reader      Use to check blood sugars at least every 8 hours       FreeStyle José Luis 2 Sensor kit  Generic drug: flash glucose sensor kit      Change sensor every 14 days. Check blood sugar at least every 8 hours       FreeStyle José Luis 3 Sensor device  Generic drug: blood-glucose sensor      Change sensor every 14 days       insulin glargine 100 unit/mL (3 mL) pen  Commonly known as: Lantus    "   Inject 10 units twice a day       insulin lispro 100 unit/mL injection  Commonly known as: HumaLOG KwikPen Insulin      Inject before every meal using carb ratio and correction scale. MDD 50 units.       pen needle, diabetic 32 gauge x 5/32\" needle  Commonly known as: BD Ultra-Fine Agueda Pen Needle      Use to inject insulin 5 times per day       sevelamer carbonate 800 mg tablet  Commonly known as: Renvela           SITagliptin phosphate 25 mg tablet  Commonly known as: Januvia      Take 1 tablet (25 mg) by mouth once daily.                 Where to Get Your Medications        These medications were sent to [facility]  [address], [address], [facility] [address]      Hours: 8AM to 6PM Mon-Fri, 8AM to 4PM Sat, 9AM to 1PM Sun Phone: [phone]   carvedilol 25 mg tablet  Eliquis 5 mg tablet  Jardiance 10 mg  Renal Caps 1 mg capsule         Test Results Pending At Discharge  Pending Labs       Order Current Status    Troponin I, High Sensitivity Collected (08/01/24 0232)            Hospital Course  Kentrell Carreon is a 54 y.o. male with a past history of HTN, HLD, CAD s/p PCI (LAD; 6/2017) and s/p CABG (2022), stage C systolic HF/ICM/HFmrEF, aortic stenosis, HTN, tachycardia s/p ablation, DM and discoid lupus/SLE with ESRD on home HD (M-T-TH-F via RUE AVF since 12/2016) who presented to the ED on 8/1/24 with SVT.    Patient had finished a dialysis session when he had sudden onset of palpitations associated with chest discomfort, nausea, diaphoresis. Upon arrival to the ED, patient found to be in SVT on EKG. Patient received one dose of IV metoprolol 5mg with termination back to his normal heart rate. He was admitted to the floors for further management. Following admission, he was noted to have an elevated troponin which peaked at 125,000. He was evaluated by cardiology, and his troponin elevation was thought to be secondary to his run of SVT prior to admission and also contributed to by his " ESRD. He was recommended repeated ablation for SVT for 8/2. He was loaded with methylprednisolone 32 mg PO on the evening of 8/1 and the morning of 8/2 due to prior anaphylactic reaction to contrast. His potassium was noted to be elevated to 6.6 on 8/2 and the ablation was subsequently cancelled. He received two doses of IV regular insulin (5 units) for management of his hyperkalemia on 8/2 in addition to his regularly scheduled insulin. Patient refused his regularly scheduled dialysis on 8/2, which was rescheduled for early morning 8/3.    Blood sugars the same evening were noted to be 1069 with an anion gap of 18. Patient endorsed thirst at the time but was otherwise asymptomatic and hemodynamically stable. An additional 10u IV insulin were given. A rapid response was called and the patient was ultimately transferred to the MICU on 8/3 in the AM for further management of hyperglycemia.    In the MICU, patient was placed on an insulin drip and his sugars normalized within several hours. Insulin drip was stopped and transitioned to subcutaneous insulin. He also received his regular dialysis.     Patient was stable for transfer back to [facility] on 8/4. He underwent ablation with EP on 8/5. Started on Eliquis 5 BID for AC, which he will continue for 3 months duration. Will also need a 30-day event monitor on discharge. Is to follow up with EP within 10-12 weeks. Also started him on Jardiance during this admission for GDMT optimization. Will need regular outpatient follow up with cardiologist. Regarding his T2DM, his A1c is 7.5%, will need follow up with endocrinology and PCP.       Pertinent Physical Exam At Time of Discharge  Physical Exam  General: A&Ox3, NAD.  HEENT: Normocephalic, atraumatic. EOMI. MMM.   Respiratory: CTA bilaterally. No wheezes, rales, or rhonchi. Normal respiratory effort.  Cardiovascular: RRR. Holosystolic murmur. No gallops or rubs. Radial pulses 2+.  Abdominal: Soft, nondistended, nontender to  palpation. Bowel sounds present. No hepatosplenomegaly or masses. No CVA tenderness.  Neuro: No focal neuro deficits.  MSK: No edema bilateral LE.   Skin: Warm, dry. No rashes or wounds.  Psych: Appropriate mood and affect.     Outpatient Follow-Up  Future Appointments   Date Time Provider Department Center   8/8/2024  2:40 PM Ten Lance DO [id number] [facility]   8/15/2024  8:00 AM Tala Bird MD [id number] [facility]   8/21/2024  9:00 AM Jasmeet Goetz MD [id number] [facility]   8/21/2024  9:30 AM Davidson Rutherford MD [id number] [facility]   8/28/2024  9:30 AM German Pfeiffer DO [id number] [facility]   9/9/2024  1:30 PM Ava Shetty, APRN-CNP [id number] [facility]   12/5/2024  9:30 AM Lore Hull MD [id number] [facility]   12/9/2024  4:00 PM Laureen Perry DPM [id number] [facility]         Patient seen and discussed with Dr. William Jiménez MD  PGY1, Internal Medicine

## 2024-08-08 ENCOUNTER — OFFICE VISIT (OUTPATIENT)
Dept: CARDIOLOGY | Facility: HOSPITAL | Age: 54
End: 2024-08-08
Payer: COMMERCIAL

## 2024-08-08 ENCOUNTER — TELEPHONE (OUTPATIENT)
Dept: CARDIOLOGY | Facility: HOSPITAL | Age: 54
End: 2024-08-08
Payer: COMMERCIAL

## 2024-08-08 VITALS
HEIGHT: 76 IN | SYSTOLIC BLOOD PRESSURE: 120 MMHG | WEIGHT: 160 LBS | DIASTOLIC BLOOD PRESSURE: 67 MMHG | BODY MASS INDEX: 19.48 KG/M2 | HEART RATE: 77 BPM | OXYGEN SATURATION: 94 %

## 2024-08-08 DIAGNOSIS — I50.20 ACC/AHA STAGE C SYSTOLIC HEART FAILURE (MULTI): Primary | ICD-10-CM

## 2024-08-08 DIAGNOSIS — I35.0 NONRHEUMATIC AORTIC VALVE STENOSIS: ICD-10-CM

## 2024-08-08 DIAGNOSIS — I25.5 ISCHEMIC CARDIOMYOPATHY: ICD-10-CM

## 2024-08-08 DIAGNOSIS — I47.10 SVT (SUPRAVENTRICULAR TACHYCARDIA) (CMS-HCC): ICD-10-CM

## 2024-08-08 LAB
ATRIAL RATE: 68 BPM
ATRIAL RATE: 74 BPM
ATRIAL RATE: 88 BPM
P AXIS: 44 DEGREES
P AXIS: 52 DEGREES
P AXIS: 59 DEGREES
P OFFSET: 162 MS
P OFFSET: 163 MS
P OFFSET: 171 MS
P ONSET: 91 MS
P ONSET: 99 MS
P ONSET: 99 MS
PR INTERVAL: 222 MS
PR INTERVAL: 226 MS
PR INTERVAL: 234 MS
Q ONSET: 208 MS
Q ONSET: 210 MS
Q ONSET: 212 MS
QRS COUNT: 11 BEATS
QRS COUNT: 12 BEATS
QRS COUNT: 15 BEATS
QRS DURATION: 120 MS
QRS DURATION: 124 MS
QRS DURATION: 134 MS
QT INTERVAL: 430 MS
QT INTERVAL: 460 MS
QT INTERVAL: 472 MS
QTC CALCULATION(BAZETT): 501 MS
QTC CALCULATION(BAZETT): 510 MS
QTC CALCULATION(BAZETT): 520 MS
QTC FREDERICIA: 488 MS
QTC FREDERICIA: 492 MS
QTC FREDERICIA: 493 MS
R AXIS: -13 DEGREES
R AXIS: -22 DEGREES
R AXIS: 1 DEGREES
T AXIS: 141 DEGREES
T AXIS: 145 DEGREES
T AXIS: 147 DEGREES
T OFFSET: 423 MS
T OFFSET: 440 MS
T OFFSET: 448 MS
VENTRICULAR RATE: 68 BPM
VENTRICULAR RATE: 74 BPM
VENTRICULAR RATE: 88 BPM

## 2024-08-08 PROCEDURE — 4004F PT TOBACCO SCREEN RCVD TLK: CPT | Performed by: INTERNAL MEDICINE

## 2024-08-08 PROCEDURE — 3078F DIAST BP <80 MM HG: CPT | Performed by: INTERNAL MEDICINE

## 2024-08-08 PROCEDURE — 99214 OFFICE O/P EST MOD 30 MIN: CPT | Performed by: INTERNAL MEDICINE

## 2024-08-08 PROCEDURE — 3051F HG A1C>EQUAL 7.0%<8.0%: CPT | Performed by: INTERNAL MEDICINE

## 2024-08-08 PROCEDURE — 3074F SYST BP LT 130 MM HG: CPT | Performed by: INTERNAL MEDICINE

## 2024-08-08 PROCEDURE — 3008F BODY MASS INDEX DOCD: CPT | Performed by: INTERNAL MEDICINE

## 2024-08-08 ASSESSMENT — PAIN SCALES - GENERAL: PAINLEVEL: 0-NO PAIN

## 2024-08-08 ASSESSMENT — ENCOUNTER SYMPTOMS
DEPRESSION: 0
OCCASIONAL FEELINGS OF UNSTEADINESS: 0
LOSS OF SENSATION IN FEET: 0

## 2024-08-08 NOTE — PATIENT INSTRUCTIONS
It was a pleasure seeing you today. Please contact myself or my team with any questions.     To reach Dr. Lance' office please call [phone] (Anna).   Fax: [phone]   To schedule an appointment call [phone]     If you have any questions or need cardiac medication refills, please call the Heart Failure office at [phone], option 6. You may also contact the  Heart Failure Nursing team via email at [email or web address] (Please include your name and date of birth).         1) Continue your current medications  2) Follow up in 6 months at /[facility]

## 2024-08-08 NOTE — PROGRESS NOTES
[facility] Failure [facility]  Primary Care Physician: German Pfeiffer DO  Referring Provider/Cardiologist: Larry     Date of Visit: 08/08/2024  2:40 PM EDT  Location of visit: [facility]     HPI:   Mr. Carreon is a 54M with a PMHx sig for CAD s/p PCI (LAD; 6/2017), stage C systolic HF/ICM/HFrEF, aortic stenosis, HTN, tachycardia s/p ablation, DM and discoid lupus/SLE with ESRD on home HD (M-T-TH-F via RUE AVF since 12/2016) who returns to the Advanced Heart Failure clinic for ongoing evaluation and management.       Interval Hx:   Recently hospitalized for SVT (8/1-8/6). He underwent SVT RFA (8/5/24) and was placed on DOAC x3 months. His hospital course was complicated by hyperkalemia. In addition a repeat echo now showed severe aortic stenosis.     At the current time he denies chest pain, pressure, SOB/GILBERT, PND, orthopnea, LE edema, palpitations, light headedness, dizziness, or syncope. He is currently wearing a holter monitor.         Social History     Tobacco Use    Smoking status: Former     Types: Cigarettes     Passive exposure: Current    Smokeless tobacco: Never   Substance Use Topics    Alcohol use: Yes    Drug use: Never       Family History   Problem Relation Name Age of Onset    Heart failure Mother      Heart attack Father           Current Outpatient Medications   Medication Sig Dispense Refill    acetaminophen (Tylenol) 325 mg tablet Take 2 tablets (650 mg) by mouth every 6 hours if needed.      apixaban (Eliquis) 5 mg tablet Take 1 tablet (5 mg) by mouth every 12 hours for 180 doses. 60 tablet 2    aspirin 81 mg EC tablet Take 1 tablet (81 mg) by mouth once daily.      atorvastatin (Lipitor) 80 mg tablet Take 1 tablet (80 mg) by mouth once daily. 90 tablet 3    B complex-vitamin C-folic acid (Nephrocaps) 1 mg capsule Take 1 capsule by mouth once daily. 90 capsule 3    blood-glucose sensor (FreeStyle José Luis 3 Sensor) device Change sensor every 14 days 2 each 5     "carvedilol (Coreg) 25 mg tablet Take 1 tablet (25 mg) by mouth 2 times daily (morning and late afternoon). 60 tablet 0    empagliflozin (Jardiance) 10 mg Take 1 tablet (10 mg) by mouth once daily. 90 tablet 0    EPOETIN DEIRDRE INJ Inject 8,000 Units under the skin. With every dialysis tx      FreeStyle José Luis reader (FreeStyle José Luis 2 [place]) misc Use to check blood sugars at least every 8 hours 1 each 0    FreeStyle José Luis sensor system (FreeStyle José Luis 2 Sensor) kit Change sensor every 14 days. Check blood sugar at least every 8 hours 6 each 3    insulin glargine (Lantus) 100 unit/mL (3 mL) pen Inject 10 units twice a day 15 mL 5    insulin lispro (HumaLOG KwikPen Insulin) 100 unit/mL injection Inject before every meal using carb ratio and correction scale. MDD 50 units. 15 mL 5    multivitamin (Daily Multi-Vitamin) tablet Take 1 tablet by mouth once daily.      pen needle, diabetic (BD Ultra-Fine Agueda Pen Needle) 32 gauge x 5/32\" needle Use to inject insulin 5 times per day 450 each 1    sacubitriL-valsartan (Entresto) 24-26 mg tablet Take 1 tablet by mouth 2 times a day. 180 tablet 2    sevelamer carbonate (Renvela) 800 mg tablet Take 1 tablet (800 mg) by mouth 3 times daily (morning, midday, late afternoon).      SITagliptin phosphate (Januvia) 25 mg tablet Take 1 tablet (25 mg) by mouth once daily. 30 tablet 5     No current facility-administered medications for this visit.       Allergies   Allergen Reactions    Iodinated Contrast Media Anaphylaxis    Ampicillin-Sulbactam Other     Renal Failure. AIN (INSTERSTITIAL NEPHRITIS))    Strawberry Rash, Other and Unknown         Visit Vitals  /67 (BP Location: Left arm, Patient Position: Sitting, BP Cuff Size: Adult)   Pulse 77   Ht 1.93 m (6' 4\")   Wt 72.6 kg (160 lb)   SpO2 94% Comment: room air   BMI 19.48 kg/m²   Smoking Status Former   BSA 1.97 m²        Physical Exam:  On exam Mr. Carreon appears his stated age, is alert and oriented x3, and in no acute " distress. His sclera are anicteric and his oropharynx has moist mucous membranes. His neck is supple and without thyromegaly. The JVP is ~6 cm of water above the right atrium. His cardiac exam has regular rhythm, normal S1, S2. No S3/4. There is a III/VI ALFA at the base His lungs are clear to auscultation bilaterally and there is no dullness to percussion. His abdomen is soft, nontender with normoactive bowel sounds. There is no HJR. The extremities are warm and without edema. There is a RUE AVF present. The skin is dry. There is no rash present. The distal pulses are 2+ in all four extremities. His mood and affect are appropriate for todays encounter.       Cardiac Labs/Diagnostics:    Lab Results   Component Value Date    CREATININE 10.08 (H) 08/05/2024    BUN 71 (H) 08/05/2024     (L) 08/05/2024    K 4.7 08/05/2024    CL 95 (L) 08/05/2024    CO2 24 08/05/2024        Recent Labs     03/10/22  0702   CHOL 102   LDLF 46   HDL 33.4*   TRIG 112       Recent Labs     05/31/24  2200 03/25/24  2108 03/16/23  0153 03/05/23  0652 03/07/22  0702   BNP >4,700* >4,700* 2,657* 2,168* 2,324*       Echo (8/1/24):  1. Left ventricular ejection fraction is moderately decreased, calculated by Gaspar's biplane at 38%.  2. Multiple segmental abnormalities exist. See findings.  3. Spectral Doppler shows a pseudonormal pattern of left ventricular diastolic filling.  4. There is an elevated mean left atrial pressure.  5. Left ventricular cavity size is severely dilated.  6. There is mildly reduced right ventricular systolic function.  7. Mildly enlarged right ventricle.  8. Moderate to severe aortic valve stenosis.  9. Mild aortic valve regurgitation.  10. Stroke volume index is decreased (33 ml/m2).  11. The aortic valve appears bicuspid with a thickened median raphe with fusion between the right and non coronary cusps.  12. There is moderate aortic valve cusp calcification.  13. There is moderate to severe aortic valve  thickening.  14. Slightly elevated RVSP.  15. The left atrium is severely dilated.  16. The right atrium is mild to moderately dilated.  17. Compared with study dated 6/8/2024, there is no change in LVEF. The regional wall motion abnormalities were better visualized today. The LV was already moderate to severely dilated in the prior echocardiogram (98 ml/m2). The aortic valve gradients were not assessed in the limited study from June.    Echo (6/8/24):  1. Left ventricular systolic function is mildly to moderately decreased with a 35-40% estimated ejection fraction.  2. Left Ventricular Global Longitudinal Strain - 8.7 % with more pronounced abnormalities involving the basal and mid segments with preservation of the apex more consistent with a possible infiltrative cardiomyopathy.  3. There is global hypokinesis of the left ventricle with minor regional variations.  4. Spectral Doppler shows an impaired relaxation pattern of left ventricular diastolic filling.  5. The left atrium is moderately dilated.  6. The right atrium is mild to moderately dilated.  7. Moderate tricuspid regurgitation visualized.  8. Compared with study from 6/5/2024, there is an improvement in calculated LVEF.    Cardiac cath (6/7/24):  1. Native coronaries: Critical distal left main disease, proximal LCx , proximal LAD , and moderate RCA disease in co-dominant system.  2. Bypasses: Patent LIMA to LAD which fills back the whole LAD system including both diagonals, patent SVG to OM1 which fills back the whole codominant LCx system, and patent SVG to right PDA.    Echo (3/6/23):  1. Left ventricular systolic function is mildly decreased with a 40-45% estimated ejection fraction.  2. Abnormal septal motion consistent with post-operative status.  3. Spectral Doppler shows a pseudonormal pattern of left ventricular diastolic filling.  4. The left atrium is severely dilated.  5. The right atrium is severely dilated.  6. Mild to moderate mitral  valve regurgitation.  7. Mild to moderate tricuspid regurgitation visualized.  8. Moderate aortic valve stenosis.  9. Moderately elevated pulmonary artery pressure, estimated RVSP 52mmHg.    Cardiac cath (3/10/22):  1. Significant left main coronary artery disease and double vessel coronary artery disease with proximal left anterior descending involvement.  2. Left Ventricular end-diastolic pressure = 28.    Echo (3/8/22):  1. The left ventricular systolic function is moderately decreased with a 35-40% estimated ejection fraction.  2. There is moderate to severe eccentric left ventricular hypertrophy.  3. Mild to moderate aortic valve stenosis.  4. There is moderate aortic valve cusp calcification.  5. The left ventricular cavity size is moderate to severely dilated.  6. There is global hypokinesis of the left ventricle with minor regional variations.    Nuclear stress (2/8/21):  1. Normal myocardial perfusion study without evidence of ischemia or prior infarction.  2. The left ventricle is severely dilated in size.  3. Globally reduced LV wall motion with an LV EF estimated at 40-46%.  4. Since 1/3/2019, ejection fraction appears improved and the LV is less dilated (205-206 ml on today's exam, vs 261 ml on 1/3/2019). There still remains no definitive signs of ischemia or prior infarct.    Echo (2/4/21):  1. The left ventricular systolic function is normal with a 55-60% estimated ejection fraction.  2. Spectral Doppler shows an impaired relaxation pattern of left ventricular diastolic filling.  3. There is moderate concentric left ventricular hypertrophy.  4. Mild aortic valve stenosis.    Echo (2/3/20):  1. The left ventricular systolic function is low normal with a 50-55% estimated ejection fraction.  2. Poorly visualized anatomical structures due to suboptimal image quality.  3. Limited and incomplete echo by cardiology fellow.  4. Recommend complete study by echo lab.  5. LVH appears slightly worse on this exam,  however maybe secondary to off axis window.    Nuclear stress (1/3/19):  1. Fixed decrease in counts involving the apex, thought to relate to myocardial thinning in the setting of left ventricular dilatation. The remainder of the left ventricle demonstrates normal perfusion without evidence of stress-induced ischemia or prior infarction.  2. Decreased left ventricular function with an ejection fraction of 34%.    ECG (12/13/18):  Sinus rhythm (HR 86), LVH with repol    Coronary angiography (6/20/17):  The coronary circulation is co-dominant.  LM: No CAD. The left main coronary artery showed very short, almost dual ostia.  LAD: Proximal 90%, mid 60% with small poststenotic aneurysmal segment inbetween, LADnwraps far around apex with only mild irregularities.  LCx: Codominant, mild irregularities.  RCA: The RCA showed codominant vessel but smaller caliber than the LCX. Mild-moderate diffuse disease with normal rPDA.  PCI:  1. EBU 3.75 was selective in the LCX, EBU 3.5 was better suited. LAD wired with some difficulty from the aorta, and a second wire placed into D1. The LAD was predilated with 3.0NC and stented with a 3.0x38mm RESOLUTE ALEXEY, postdil 3.0NC distally, 3.5NC proximally, and a 3.75NC was used prox-mid to appose the stent in the poststenotic segment.    ECG (5/18/17):  Sinus rhythm (HR 77), LVH, possible septal infarct    Echo (11/1/16 at [facility])  Normal LV function (LVEF 65%), normal RV size/function. No hemodynamically significant valvular disease.       Impression/Plan:  Mr. Carreon is a 54M with a PMHx sig for CAD s/p PCI (LAD; 6/2017), stage C systolic HF/ICM/HFrEF, aortic stenosis, HTN, SVT s/p RFA x2 (most recently 8/2024), DM and discoid lupus/SLE with ESRD on home HD (M-T-TH-F via RUE AVF since 12/2016) who returns to the Advanced Heart Failure clinic for ongoing evaluation and management. At the current time he has functional class II symptoms and appears euvolemic on exam.     1) CAD s/p PCI to LAD  (6/2017) s/p 3V CABG (LIMA to LAD, SVG to OM, SVG to PDA; 3/16/22)   Lipids (3/10/22): tChol 102, HDL 33, LDL 46, Trigs 112  Denies angina. Recent cath (6/2024) with patent grafts.   -c/w ASA 81 mg daily,  atorvastatin 80 mg daily, beta blocker    2) Stage C systolic HF/ICM/HFrEF with moderate LV dysfunction (LVEF 38%; 8/2024)  Recently started on SGLT2 in the hospital, but on HD; will discontinue. Defer MRA for same reason.   -c/w carvedilol 25 mg bid, entresto 24/26 mg bid    3) HTN  -as per #2    4) Aortic stenosis  Severe on the most recent echo. Referral to Structural has been placed; has an appt on 8/21.   -f/u with Structural re: TAVR    5) PAD (carotid stenosis)  50-69% stenosis bilaterally (3/10/22)  -c/w ASA/statin/BB    6) SVT s/p RFA x2 (most recently CTI RFA; 8/2024)  Currently on DOAC therapy and wearing a holter monitor.  -c/w eliquis x3 months  -f/u with EP      F/U: 6 months at /[place] 1800    Addendum (9/4/24):  TAVR CT (9/4/24):  1. There is extensive atherosclerotic calcification of the aortoiliac vessels with moderate to severe proximal iliac narrowing.  2. Severe calcifications of the aortic valve correlating with history of aortic stenosis. There is moderate left ventricular chamber enlargement.  3. Postoperative changes consistent with median sternotomy and CABG.  4. Small left-sided pleural effusion with basilar atelectasis and pleural nodularity most likely inflammatory in nature.  5. Note is made of a isolated segmental right lower lobe pulmonary embolism within the anterior segment of the right lower lobe.  6. Bilateral renal atrophy    RLL PE noted incidentally on CT today. Already on DOAC for recent SVT. Will maintain for at least 3 months. Called and discuss with the pt.   ____________________________________________________________  Ten Lance,   Section of Advanced Heart Failure and Cardiac Transplantation  Division of Cardiovascular Medicine  [facility] and  [facility] [place]  [facility]

## 2024-08-08 NOTE — TELEPHONE ENCOUNTER
Called regarding upcoming appts. with Structural Heart on 8-21-24. Left message to call office back

## 2024-08-13 LAB
ATRIAL RATE: 75 BPM
ATRIAL RATE: 81 BPM
P AXIS: 32 DEGREES
P AXIS: 34 DEGREES
P OFFSET: 164 MS
P OFFSET: 166 MS
P ONSET: 94 MS
P ONSET: 99 MS
PR INTERVAL: 228 MS
PR INTERVAL: 236 MS
Q ONSET: 212 MS
Q ONSET: 213 MS
QRS COUNT: 12 BEATS
QRS COUNT: 14 BEATS
QRS DURATION: 116 MS
QRS DURATION: 120 MS
QT INTERVAL: 442 MS
QT INTERVAL: 448 MS
QTC CALCULATION(BAZETT): 500 MS
QTC CALCULATION(BAZETT): 513 MS
QTC FREDERICIA: 482 MS
QTC FREDERICIA: 488 MS
R AXIS: -12 DEGREES
R AXIS: -2 DEGREES
T AXIS: 127 DEGREES
T AXIS: 159 DEGREES
T OFFSET: 433 MS
T OFFSET: 437 MS
VENTRICULAR RATE: 75 BPM
VENTRICULAR RATE: 81 BPM

## 2024-08-14 DIAGNOSIS — E11.59 TYPE 2 DIABETES MELLITUS WITH OTHER CIRCULATORY COMPLICATION, WITH LONG-TERM CURRENT USE OF INSULIN (MULTI): Primary | ICD-10-CM

## 2024-08-14 DIAGNOSIS — I50.20 ACC/AHA STAGE C SYSTOLIC HEART FAILURE (MULTI): ICD-10-CM

## 2024-08-14 DIAGNOSIS — Z79.4 TYPE 2 DIABETES MELLITUS WITH OTHER CIRCULATORY COMPLICATION, WITH LONG-TERM CURRENT USE OF INSULIN (MULTI): Primary | ICD-10-CM

## 2024-08-15 ENCOUNTER — APPOINTMENT (OUTPATIENT)
Dept: ENDOCRINOLOGY | Facility: CLINIC | Age: 54
End: 2024-08-15
Payer: COMMERCIAL

## 2024-08-19 ENCOUNTER — PATIENT OUTREACH (OUTPATIENT)
Dept: PRIMARY CARE | Facility: CLINIC | Age: 54
End: 2024-08-19
Payer: COMMERCIAL

## 2024-08-19 NOTE — PROGRESS NOTES
Unable to reach patient for call back after patient's follow up appointment with PCP.  LVM  with call back number for patient to call if needed.

## 2024-08-20 ENCOUNTER — TELEPHONE (OUTPATIENT)
Dept: CARDIOLOGY | Facility: HOSPITAL | Age: 54
End: 2024-08-20
Payer: COMMERCIAL

## 2024-08-20 NOTE — TELEPHONE ENCOUNTER
Re-attempted to contact regarding appointments with Structural Heart on 8-21-24. Unable to leave message mailbox full.

## 2024-08-20 NOTE — PROGRESS NOTES
Referral from Dr. Lance. Kentrell comes to discuss treatment options for aortic valve stenosis. Echo showing bicuspid aortic valve dustin 0.96 pk gradient 37.4/20.3 DI 0.26 lae lvef 38%/ History of cad s/p cabg x 3. ESRD on dialysis, anemia, htn, dm, carotid aso, a fib/flutter, svt, systemic lupus, johnston's esophagus. Abbey Mcdonald RN

## 2024-08-21 ENCOUNTER — APPOINTMENT (OUTPATIENT)
Dept: CARDIAC SURGERY | Facility: HOSPITAL | Age: 54
End: 2024-08-21
Payer: COMMERCIAL

## 2024-08-21 ENCOUNTER — APPOINTMENT (OUTPATIENT)
Dept: CARDIOLOGY | Facility: HOSPITAL | Age: 54
End: 2024-08-21
Payer: COMMERCIAL

## 2024-08-21 ENCOUNTER — TELEMEDICINE (OUTPATIENT)
Dept: CARDIAC SURGERY | Facility: HOSPITAL | Age: 54
End: 2024-08-21
Payer: COMMERCIAL

## 2024-08-21 ENCOUNTER — TELEMEDICINE (OUTPATIENT)
Dept: CARDIOLOGY | Facility: HOSPITAL | Age: 54
End: 2024-08-21
Payer: COMMERCIAL

## 2024-08-21 DIAGNOSIS — I35.9 AORTIC VALVE DISEASE: Primary | ICD-10-CM

## 2024-08-21 DIAGNOSIS — I35.0 NONRHEUMATIC AORTIC VALVE STENOSIS: Primary | ICD-10-CM

## 2024-08-21 DIAGNOSIS — Z91.041 ALLERGY TO INTRAVENOUS CONTRAST: Primary | ICD-10-CM

## 2024-08-21 DIAGNOSIS — I35.0 NONRHEUMATIC AORTIC VALVE STENOSIS: ICD-10-CM

## 2024-08-21 PROCEDURE — 3051F HG A1C>EQUAL 7.0%<8.0%: CPT | Performed by: INTERNAL MEDICINE

## 2024-08-21 PROCEDURE — 99214 OFFICE O/P EST MOD 30 MIN: CPT | Mod: 95 | Performed by: INTERNAL MEDICINE

## 2024-08-21 PROCEDURE — 99214 OFFICE O/P EST MOD 30 MIN: CPT | Performed by: INTERNAL MEDICINE

## 2024-08-21 PROCEDURE — 99215 OFFICE O/P EST HI 40 MIN: CPT | Mod: 95 | Performed by: THORACIC SURGERY (CARDIOTHORACIC VASCULAR SURGERY)

## 2024-08-21 RX ORDER — PREDNISONE 50 MG/1
50 TABLET ORAL SEE ADMIN INSTRUCTIONS
Qty: 3 TABLET | Refills: 3 | Status: SHIPPED | OUTPATIENT
Start: 2024-08-21

## 2024-08-21 RX ORDER — DIPHENHYDRAMINE HCL 50 MG
50 CAPSULE ORAL SEE ADMIN INSTRUCTIONS
Qty: 3 CAPSULE | Refills: 3 | Status: SHIPPED | OUTPATIENT
Start: 2024-08-21

## 2024-08-23 ENCOUNTER — TELEPHONE (OUTPATIENT)
Dept: CARDIOLOGY | Facility: HOSPITAL | Age: 54
End: 2024-08-23
Payer: COMMERCIAL

## 2024-08-23 DIAGNOSIS — I35.0 NONRHEUMATIC AORTIC VALVE STENOSIS: Primary | ICD-10-CM

## 2024-08-23 NOTE — PROGRESS NOTES
PCP: Dr. Pfeiffer  Cards: Dr. Juarez/ Dr. Lance    PMH:   Specialty Problems          Cardiology Problems    Carotid stenosis        Chest pain        Coronary artery disease with other form of angina pectoris, unspecified vessel or lesion type, unspecified whether native or transplanted heart (CMS-HCC)        Accelerated hypertension        Congestive heart failure, hypertensive (Multi)        A-fib (Multi)        NSTEMI (non-ST elevated myocardial infarction) (Multi)        Atrial flutter (Multi)        ACC/AHA stage C systolic heart failure (Multi)        Nonrheumatic aortic valve stenosis        Ischemic cardiomyopathy        SVT (supraventricular tachycardia) (CMS-HCC)            HPI    54 y.o. y/o male with PMH of CAD s/p PCI (LAD; 6/2017), CABG 2022, stage C systolic HFrEF, aortic stenosis, HTN, tachycardia s/p ablation, DM and discoid lupus/SLE with ESRD on home HD and Advanced Heart Failure clinic. Presents for evaluation of severe, symptomatic aortic stenosis.  Patient relates heart failure symptoms worsening fatigue, SOB, GILBERT. Can't climb one flight of stair or walk on plain without stopping to recover.  Refers overt orthopnea, PND.  Has occasional dizziness specially after dialysis.  Refers palpitations associated with chest pain. Denies syncope, presyncope and exertional CP.  Denies increased abdominal girth, edema.  Gradually doing less and less activity secondary to symptoms.   Refers flutter ablation 3 weeks ago.  Full 14 point ROS complete and negative except as noted above.     Echo: 38 EF , mod aortic stenosis, AV mean gradient 20 , AV Vmax 3.0, HUMPHREY 0.96   EKG: NSR, LBBB, 226 SD, 120 QRS    TAVR Workup:   - NYHA: 3  - LHC: JULY/2024- GRAFTS OK   - CT TAVR: pend  - dental clearance: Needs to schedule clearance     STS Risk of Mortality: 1.947%    Social History     Tobacco Use    Smoking status: Former     Types: Cigarettes     Passive exposure: Current    Smokeless tobacco: Never   Substance  "Use Topics    Alcohol use: Yes        Family History   Problem Relation Name Age of Onset    Heart failure Mother      Heart attack Father          Allergies   Allergen Reactions    Iodinated Contrast Media Anaphylaxis    Ampicillin-Sulbactam Other     Renal Failure. AIN (INSTERSTITIAL NEPHRITIS))    Strawberry Rash, Other and Unknown        Current Outpatient Medications   Medication Instructions    acetaminophen (TYLENOL) 650 mg, oral, Every 6 hours PRN    aspirin 81 mg EC tablet 1 tablet, oral, Daily    atorvastatin (LIPITOR) 80 mg, oral, Daily    B complex-vitamin C-folic acid (Nephrocaps) 1 mg capsule 1 capsule, oral, Daily    blood-glucose sensor (FreeStyle José Luis 3 Sensor) device Change sensor every 14 days    carvedilol (COREG) 25 mg, oral, 2 times daily (morning and late afternoon)    diphenhydrAMINE (BENADRYL) 50 mg, oral, See admin instructions, TAKE THIS MEDICATION ONE BEFORE PROCEDURE  that requires IV contrast.    Eliquis 5 mg, oral, Every 12 hours    EPOETIN DEIRDRE INJ 8,000 Units, subcutaneous, With every dialysis tx    FreeStyle José Luis reader (FreeStyle José Luis 2 [place]) misc Use to check blood sugars at least every 8 hours    FreeStyle José Luis sensor system (FreeStyle José Luis 2 Sensor) kit Change sensor every 14 days. Check blood sugar at least every 8 hours    insulin glargine (Lantus) 100 unit/mL (3 mL) pen Inject 10 units twice a day    insulin lispro (HumaLOG KwikPen Insulin) 100 unit/mL injection Inject before every meal using carb ratio and correction scale. MDD 50 units.    multivitamin (Daily Multi-Vitamin) tablet 1 tablet, oral, Daily    pen needle, diabetic (BD Ultra-Fine Agueda Pen Needle) 32 gauge x 5/32\" needle Use to inject insulin 5 times per day    predniSONE (DELTASONE) 50 mg, oral, See admin instructions, Please take this medication 13 hrs before procedure. Followed by 7 hrs before and 1 hr before    sacubitriL-valsartan (Entresto) 24-26 mg tablet 1 tablet, oral, 2 times daily    sevelamer " carbonate (RENVELA) 800 mg, oral, 3 times daily (morning, midday, late afternoon)    SITagliptin phosphate (JANUVIA) 25 mg, oral, Daily        There were no vitals filed for this visit.     Physical Exam: Virtual encounter       Lab Results   Component Value    CR 10.08     HGB 8.3     ALBUMIN 3.4    BNP >4,700           Impression:   54 y.o. y/o male with Patient relates heart failure symptoms worsening fatigue, SOB, GILBERT. Can't climb one flight of stair or walk on plain without stopping to recover.  Refers overt orthopnea, PND.  Has occasional dizziness specially after dialysis.  Refers palpitations associated with chest pain. Denies syncope, presyncope and exertional CP. We need to better understand the importance of A.S. on his symptoms to further proceed with TAVR procedure.    Plan:   We will need a stress echo    We will get CT TAVR protocol     The overall decision regarding the best treatment for this condition is complex.  We discussed options of both surgical aortic valve replacement and transcatheter aortic valve replacement along with risks and benefits involved with both of them in detail.    We discussed all the risks associated with the procedure, including but not limited to stroke, MI, pericardial tamponade, vascular complications, infection and death were discussed with the patient. The risk of needing a permament pacemaker was also discussed in detail. The patient verbalized understanding and decided to proceed with the procedure.     We will discuss this patient's case at our Valve Team meeting with representatives from [facility]. Our nurse navigators will contact patient with further diagnostic needs and formal plan.

## 2024-08-23 NOTE — PROGRESS NOTES
PCP: Dr. Pfeiffer  Cards: Dr. Juarez/ Dr. Lance    PMH:   Specialty Problems          Cardiology Problems    Carotid stenosis        Chest pain        Coronary artery disease with other form of angina pectoris, unspecified vessel or lesion type, unspecified whether native or transplanted heart (CMS-HCC)        Accelerated hypertension        Congestive heart failure, hypertensive (Multi)        A-fib (Multi)        NSTEMI (non-ST elevated myocardial infarction) (Multi)        Atrial flutter (Multi)        ACC/AHA stage C systolic heart failure (Multi)        Nonrheumatic aortic valve stenosis        Ischemic cardiomyopathy        SVT (supraventricular tachycardia) (CMS-HCC)            HPI    54 y.o. y/o male with PMH of CAD s/p PCI (LAD; 6/2017), CABG 2022, stage C systolic HFrEF, aortic stenosis, HTN, tachycardia s/p ablation, DM and discoid lupus/SLE with ESRD on home HD and Advanced Heart Failure clinic. Presents for evaluation of severe, symptomatic aortic stenosis.  Patient relates heart failure symptoms worsening fatigue, SOB, GILBERT. Can't climb one flight of stair or walk on plain without stopping to recover.  Refers overt orthopnea, PND.  Has occasional dizziness specially after dialysis.  Refers palpitations associated with chest pain. Denies syncope, presyncope and exertional CP.  Denies increased abdominal girth, edema.  Gradually doing less and less activity secondary to symptoms.   Refers flutter ablation 3 weeks ago.  Full 14 point ROS complete and negative except as noted above.     Echo: 38 EF , mod aortic stenosis, AV mean gradient 20 , AV Vmax 3.0, HUMPHREY 0.96   EKG: NSR, LBBB, 226 DC, 120 QRS    TAVR Workup:   - NYHA: 3  - LHC: JULY/2024- GRAFTS OK   - CT TAVR: pend  - dental clearance: Needs to schedule clearance     STS Risk of Mortality: 1.947%    Social History     Tobacco Use    Smoking status: Former     Types: Cigarettes     Passive exposure: Current    Smokeless tobacco: Never   Substance  "Use Topics    Alcohol use: Yes        Family History   Problem Relation Name Age of Onset    Heart failure Mother      Heart attack Father          Allergies   Allergen Reactions    Iodinated Contrast Media Anaphylaxis    Ampicillin-Sulbactam Other     Renal Failure. AIN (INSTERSTITIAL NEPHRITIS))    Strawberry Rash, Other and Unknown        Current Outpatient Medications   Medication Instructions    acetaminophen (TYLENOL) 650 mg, oral, Every 6 hours PRN    aspirin 81 mg EC tablet 1 tablet, oral, Daily    atorvastatin (LIPITOR) 80 mg, oral, Daily    B complex-vitamin C-folic acid (Nephrocaps) 1 mg capsule 1 capsule, oral, Daily    blood-glucose sensor (FreeStyle José Luis 3 Sensor) device Change sensor every 14 days    carvedilol (COREG) 25 mg, oral, 2 times daily (morning and late afternoon)    diphenhydrAMINE (BENADRYL) 50 mg, oral, See admin instructions, TAKE THIS MEDICATION ONE BEFORE PROCEDURE  that requires IV contrast.    Eliquis 5 mg, oral, Every 12 hours    EPOETIN DEIRDRE INJ 8,000 Units, subcutaneous, With every dialysis tx    FreeStyle José Luis reader (FreeStyle José Luis 2 [place]) misc Use to check blood sugars at least every 8 hours    FreeStyle José Luis sensor system (FreeStyle José Luis 2 Sensor) kit Change sensor every 14 days. Check blood sugar at least every 8 hours    insulin glargine (Lantus) 100 unit/mL (3 mL) pen Inject 10 units twice a day    insulin lispro (HumaLOG KwikPen Insulin) 100 unit/mL injection Inject before every meal using carb ratio and correction scale. MDD 50 units.    multivitamin (Daily Multi-Vitamin) tablet 1 tablet, oral, Daily    pen needle, diabetic (BD Ultra-Fine Agueda Pen Needle) 32 gauge x 5/32\" needle Use to inject insulin 5 times per day    predniSONE (DELTASONE) 50 mg, oral, See admin instructions, Please take this medication 13 hrs before procedure. Followed by 7 hrs before and 1 hr before    sacubitriL-valsartan (Entresto) 24-26 mg tablet 1 tablet, oral, 2 times daily    sevelamer " carbonate (RENVELA) 800 mg, oral, 3 times daily (morning, midday, late afternoon)    SITagliptin phosphate (JANUVIA) 25 mg, oral, Daily        There were no vitals filed for this visit.     Physical Exam: Virtual encounter       Lab Results   Component Value    CR 10.08     HGB 8.3     ALBUMIN 3.4    BNP >4,700           Impression:   54 y.o. y/o male with Patient relates heart failure symptoms worsening fatigue, SOB, GILBERT. Can't climb one flight of stair or walk on plain without stopping to recover.  Refers overt orthopnea, PND.  Has occasional dizziness specially after dialysis.  Refers palpitations associated with chest pain. Denies syncope, presyncope and exertional CP. We need to better understand the importance of A.S. on his symptoms to further proceed with TAVR procedure.    Plan:   We will need a stress echo    We will get CT TAVR protocol     The overall decision regarding the best treatment for this condition is complex.  We discussed options of both surgical aortic valve replacement and transcatheter aortic valve replacement along with risks and benefits involved with both of them in detail.    We discussed all the risks associated with the procedure, including but not limited to stroke, MI, pericardial tamponade, vascular complications, infection and death were discussed with the patient. The risk of needing a permament pacemaker was also discussed in detail. The patient verbalized understanding and decided to proceed with the procedure.     We will discuss this patient's case at our Valve Team meeting with representatives from [facility]. Our nurse navigators will contact patient with further diagnostic needs and formal plan.

## 2024-08-26 ENCOUNTER — APPOINTMENT (OUTPATIENT)
Dept: PRIMARY CARE | Facility: CLINIC | Age: 54
End: 2024-08-26
Payer: COMMERCIAL

## 2024-08-27 NOTE — PROGRESS NOTES
PCP: Dr. Pfeiffer  Cards: Dr. Juarez/ Dr. Lance     PMH:   Specialty Problems                  Cardiology Problems     Carotid stenosis           Chest pain           Coronary artery disease with other form of angina pectoris, unspecified vessel or lesion type, unspecified whether native or transplanted heart (CMS-HCC)           Accelerated hypertension           Congestive heart failure, hypertensive (Multi)           A-fib (Multi)           NSTEMI (non-ST elevated myocardial infarction) (Multi)           Atrial flutter (Multi)           ACC/AHA stage C systolic heart failure (Multi)           Nonrheumatic aortic valve stenosis           Ischemic cardiomyopathy           SVT (supraventricular tachycardia) (CMS-HCC)               HPI     54 y.o. y/o male with PMH of CAD s/p PCI (LAD; 6/2017), CABG 2022, stage C systolic HFrEF, aortic stenosis, HTN, tachycardia s/p ablation, DM and discoid lupus/SLE with ESRD on home HD and Advanced Heart Failure clinic. Presents for evaluation of severe, symptomatic aortic stenosis.  Patient relates heart failure symptoms worsening fatigue, SOB, GILBERT. Can't climb one flight of stair or walk on plain without stopping to recover.  Refers overt orthopnea, PND.  Has occasional dizziness specially after dialysis.  Refers palpitations associated with chest pain. Denies syncope, presyncope and exertional CP.  Denies increased abdominal girth, edema.  Gradually doing less and less activity secondary to symptoms.   Refers flutter ablation 3 weeks ago.  Full 14 point ROS complete and negative except as noted above.      Echo: 38 EF , mod aortic stenosis, AV mean gradient 20 , AV Vmax 3.0, HUMPHREY 0.96   EKG: NSR, LBBB, 226 GA, 120 QRS     TAVR Workup:   - NYHA: 3  - LHC: JULY/2024- GRAFTS OK              - CT TAVR: pend  - dental clearance: Needs to schedule clearance      STS Risk of Mortality: 1.947%     Social History            Tobacco Use    Smoking status: Former       Types: Cigarettes  "      Passive exposure: Current    Smokeless tobacco: Never   Substance Use Topics    Alcohol use: Yes         Family History          Family History   Problem Relation Name Age of Onset    Heart failure Mother        Heart attack Father                RX Allergies         Allergies   Allergen Reactions    Iodinated Contrast Media Anaphylaxis    Ampicillin-Sulbactam Other       Renal Failure. AIN (INSTERSTITIAL NEPHRITIS))    Strawberry Rash, Other and Unknown                 Current Outpatient Medications   Medication Instructions    acetaminophen (TYLENOL) 650 mg, oral, Every 6 hours PRN    aspirin 81 mg EC tablet 1 tablet, oral, Daily    atorvastatin (LIPITOR) 80 mg, oral, Daily    B complex-vitamin C-folic acid (Nephrocaps) 1 mg capsule 1 capsule, oral, Daily    blood-glucose sensor (FreeStyle José Luis 3 Sensor) device Change sensor every 14 days    carvedilol (COREG) 25 mg, oral, 2 times daily (morning and late afternoon)    diphenhydrAMINE (BENADRYL) 50 mg, oral, See admin instructions, TAKE THIS MEDICATION ONE BEFORE PROCEDURE  that requires IV contrast.    Eliquis 5 mg, oral, Every 12 hours    EPOETIN DEIRDRE INJ 8,000 Units, subcutaneous, With every dialysis tx    FreeStyle José Luis reader (FreeStyle José Luis 2 [place]) misc Use to check blood sugars at least every 8 hours    FreeStyle José Luis sensor system (FreeStyle José Luis 2 Sensor) kit Change sensor every 14 days. Check blood sugar at least every 8 hours    insulin glargine (Lantus) 100 unit/mL (3 mL) pen Inject 10 units twice a day    insulin lispro (HumaLOG KwikPen Insulin) 100 unit/mL injection Inject before every meal using carb ratio and correction scale. MDD 50 units.    multivitamin (Daily Multi-Vitamin) tablet 1 tablet, oral, Daily    pen needle, diabetic (BD Ultra-Fine Agueda Pen Needle) 32 gauge x 5/32\" needle Use to inject insulin 5 times per day    predniSONE (DELTASONE) 50 mg, oral, See admin instructions, Please take this medication 13 hrs before procedure. " Followed by 7 hrs before and 1 hr before    sacubitriL-valsartan (Entresto) 24-26 mg tablet 1 tablet, oral, 2 times daily    sevelamer carbonate (RENVELA) 800 mg, oral, 3 times daily (morning, midday, late afternoon)    SITagliptin phosphate (JANUVIA) 25 mg, oral, Daily         There were no vitals filed for this visit.      Physical Exam: Virtual encounter              Lab Results   Component Value     CR 10.08      HGB 8.3      ALBUMIN 3.4     BNP >4,700             Impression:   54 y.o. y/o male with Patient relates heart failure symptoms worsening fatigue, SOB, GILBERT. Can't climb one flight of stair or walk on plain without stopping to recover.  Refers overt orthopnea, PND.  Has occasional dizziness specially after dialysis.  Refers palpitations associated with chest pain. Denies syncope, presyncope and exertional CP. We need to better understand the importance of A.S. on his symptoms to further proceed with TAVR procedure.     Plan:   We will need a stress echo     We will get CT TAVR protocol      The overall decision regarding the best treatment for this condition is complex.  We discussed options of both surgical aortic valve replacement and transcatheter aortic valve replacement along with risks and benefits involved with both of them in detail.     We discussed all the risks associated with the procedure, including but not limited to stroke, MI, pericardial tamponade, vascular complications, infection and death were discussed with the patient. The risk of needing a permament pacemaker was also discussed in detail. The patient verbalized understanding and decided to proceed with the procedure.      We will discuss this patient's case at our Valve Team meeting with representatives from [facility]. Our nurse navigators will contact patient with further diagnostic needs and formal plan.

## 2024-08-28 ENCOUNTER — APPOINTMENT (OUTPATIENT)
Dept: PRIMARY CARE | Facility: CLINIC | Age: 54
End: 2024-08-28
Payer: COMMERCIAL

## 2024-08-28 VITALS
HEIGHT: 76 IN | HEART RATE: 83 BPM | BODY MASS INDEX: 18.63 KG/M2 | SYSTOLIC BLOOD PRESSURE: 112 MMHG | OXYGEN SATURATION: 94 % | DIASTOLIC BLOOD PRESSURE: 50 MMHG | WEIGHT: 153 LBS

## 2024-08-28 DIAGNOSIS — M85.871 OSTEOPENIA OF ANKLE, BILATERAL: ICD-10-CM

## 2024-08-28 DIAGNOSIS — Z12.5 SCREENING FOR PROSTATE CANCER: ICD-10-CM

## 2024-08-28 DIAGNOSIS — Z11.59 NEED FOR HEPATITIS C SCREENING TEST: ICD-10-CM

## 2024-08-28 DIAGNOSIS — Z78.0 ASYMPTOMATIC MENOPAUSAL STATE: ICD-10-CM

## 2024-08-28 DIAGNOSIS — M85.872 OSTEOPENIA OF ANKLE, BILATERAL: ICD-10-CM

## 2024-08-28 DIAGNOSIS — R13.10 DYSPHAGIA, UNSPECIFIED TYPE: ICD-10-CM

## 2024-08-28 DIAGNOSIS — Z23 NEED FOR PROPHYLACTIC VACCINATION AGAINST DIPHTHERIA AND TETANUS: ICD-10-CM

## 2024-08-28 DIAGNOSIS — Z00.00 ROUTINE GENERAL MEDICAL EXAMINATION AT HEALTH CARE FACILITY: Primary | ICD-10-CM

## 2024-08-28 DIAGNOSIS — N18.6 ESRD (END STAGE RENAL DISEASE) (MULTI): ICD-10-CM

## 2024-08-28 PROCEDURE — 3008F BODY MASS INDEX DOCD: CPT | Performed by: INTERNAL MEDICINE

## 2024-08-28 PROCEDURE — 3078F DIAST BP <80 MM HG: CPT | Performed by: INTERNAL MEDICINE

## 2024-08-28 PROCEDURE — 4004F PT TOBACCO SCREEN RCVD TLK: CPT | Performed by: INTERNAL MEDICINE

## 2024-08-28 PROCEDURE — 3074F SYST BP LT 130 MM HG: CPT | Performed by: INTERNAL MEDICINE

## 2024-08-28 PROCEDURE — 3051F HG A1C>EQUAL 7.0%<8.0%: CPT | Performed by: INTERNAL MEDICINE

## 2024-08-28 PROCEDURE — 90471 IMMUNIZATION ADMIN: CPT | Performed by: INTERNAL MEDICINE

## 2024-08-28 PROCEDURE — G0439 PPPS, SUBSEQ VISIT: HCPCS | Performed by: INTERNAL MEDICINE

## 2024-08-28 PROCEDURE — 90715 TDAP VACCINE 7 YRS/> IM: CPT | Performed by: INTERNAL MEDICINE

## 2024-08-28 ASSESSMENT — ACTIVITIES OF DAILY LIVING (ADL)
GROCERY_SHOPPING: INDEPENDENT
TAKING_MEDICATION: INDEPENDENT
MANAGING_FINANCES: INDEPENDENT
BATHING: INDEPENDENT
DOING_HOUSEWORK: INDEPENDENT
DRESSING: INDEPENDENT

## 2024-08-28 NOTE — PROGRESS NOTES
Subjective   Patient ID: Kentrell Carreon is a 54 y.o. male who presents for Medicare Annual Wellness Visit Subsequent.    Patient Care Team:  German Pfeiffer DO as PCP - General (Internal Medicine)  German Pfeiffer DO as PCP - [facility] PCP  Penny Alvarez LPN as Care Manager (Case Management)       HPI     Patient is a 54-year-old male with  PMHx sig for CAD s/p PCI (LAD; 6/2017), stage C systolic HF/ICM/HFmrEF, aortic stenosis, HTN, tachycardia s/p ablation, DM and discoid lupus/SLE with ESRD on home HD (M-T-TH-F via RUE AVF since 12/2016) presents for Medicare wellness.    Over the last month patient has been to the hospital several times.  He recently had a cardiac ablation which he states was successful.  He still on the anticoagulation follows with cardiology.  Is an upcoming appoint with cardiology.    Heart function was evaluated he had a cardiac catheterization with no further intervention.  He was continued on his current medications.  No increase in weight.  He is otherwise doing well.  He feels deconditioned but he is getting stronger every day.  Ejection fraction of 35 to 40%.  He is on cardiac regimen including aspirin statin beta-blocker Entresto.     Patient has type 1 diabetes and follows with endocrinology.  His sugars has improved to less than 8.  He is taking his insulin as prescribed.  Labs are consistent with type 2 diabetes.  He is under the care of endocrinology now and takes his medications as prescribed        Patient has a history of coronary artery disease with reduced ejection fraction.  He is established with cardiology.  He has end-stage renal disease as well as valvular abnormalities with ongoing fatigue but no shortness of breath.  His blood pressure is well-controlled.  He has evidence of moderate aortic stenosis on echocardiogram in March 2023.  No shortness of breath on exertion at this time     End-stage renal disease on dialysis.  Follows with nephrology quite  "regularly.  Goes to the [facility] 3 times a week.  He takes his end-stage renal disease vitamins as prescribed.      He does report ongoing swallowing issues.  He feels like food gets stuck in his mouth and he has trouble swallowing.  He has had a barium swallow study done 10 years ago which was unremarkable.  No recent weight loss.  He did not complete the barium swallow.    Review of Systems  Constitutional: No fever or chills, No Night Sweats  Eyes: No Blurry Vision or Eye sight problems  ENT: No Nasal Discharge, Hoarseness, sore throat  Cardiovascular: no chest pain, no palpitations and no syncope.   Respiratory: no cough, no shortness of breath during exertion and no shortness of breath at rest.   Gastrointestinal: no abdominal pain, no nausea and no vomiting.   : No issues with urinary stream, burning with urination, no blood in urine or stools  Skin: No Skin rashes or Lesions  Neuro: No Headache, no dizziness or Numbness or tingling  Psych: No Anxiety, depression or sleeping problems  Heme: No Easy bleeding or brusing.     Objective   /50   Pulse 83   Ht 1.93 m (6' 4\")   Wt 69.4 kg (153 lb)   SpO2 94%   BMI 18.62 kg/m²     Physical Exam  Constitutional: Alert and in no acute distress. Well developed, well nourished.   Head and Face: Head and face: Normal.    Eyes: Normal external exam. Pupils were equal in size, round, reactive to light (PERRL) with normal accommodation and extraocular movements intact (EOMI).   Ears, Nose, Mouth, and Throat: External inspection of ears and nose: Normal.  Hearing: Normal.  Nasal mucosa, septum, and turbinates: Normal.  Lips, teeth, and gums: Normal.  Oropharynx: Normal.   Neck: No neck mass was observed. Supple. Thyroid not enlarged and there were no palpable thyroid nodules.   Cardiovascular: Heart rate and rhythm were normal, normal S1 and S2. Pedal pulses: Normal. No peripheral edema.   Pulmonary: No respiratory distress. Clear bilateral breath sounds.   Abdomen: " Soft nontender; no abdominal mass palpated. Normal bowel sounds. No organomegaly.   : Deferred  Musculoskeletal: No joint swelling seen, normal movements of all extremities. Range of motion: Normal.  Muscle strength/tone: Normal.    Skin: Normal skin color and pigmentation, normal skin turgor, and no rash.   Neurologic: Deep tendon reflexes were 2+ and symmetric.   Psychiatric: Judgment and insight: Intact. Mood and affect: Normal.  Lymphatic: No cervical lymphadenopathy. Palpation of lymph nodes in axillae: Normal.  Palpation of lymph nodes in groin: Normal.    Lab Results   Component Value Date    WBC 5.9 08/05/2024    HGB 8.3 (L) 08/05/2024    HCT 25.7 (L) 08/05/2024     08/05/2024    CHOL 102 03/10/2022    TRIG 112 03/10/2022    HDL 33.4 (A) 03/10/2022    ALT 13 08/03/2024    AST 29 08/03/2024     (L) 08/05/2024    K 4.7 08/05/2024    CL 95 (L) 08/05/2024    CREATININE 10.08 (H) 08/05/2024    BUN 71 (H) 08/05/2024    CO2 24 08/05/2024    TSH 1.04 08/01/2024    INR 1.0 08/05/2024    HGBA1C 7.9 (H) 08/01/2024       ECG 12 Lead  Sinus rhythm with 1st degree AV block  Possible Left atrial enlargement  Left ventricular hypertrophy with QRS widening and repolarization abnormality  Prolonged QT  Abnormal ECG  When compared with ECG of 01-AUG-2024 05:14,  No significant change was found  Confirmed by Ciro Hightower ([id number]) on 8/13/2024 11:26:05 AM  ECG 12 Lead  Sinus rhythm with 1st degree AV block  Left ventricular hypertrophy with QRS widening and repolarization abnormality  Abnormal ECG  When compared with ECG of 01-AUG-2024 00:53,  ST no longer depressed in Inferior leads  T wave inversion no longer evident in Inferior leads  Confirmed by Ciro Hightower ([id number]) on 8/13/2024 11:24:18 AM      Assessment/Plan   Problem List Items Addressed This Visit    None  Visit Diagnoses         Codes    Routine general medical examination at health care facility    -  Primary Z00.00    Dysphagia, unspecified type      R13.10    Need for prophylactic vaccination against diphtheria and tetanus     Z23    Relevant Orders    Tdap vaccine, age 7 years and older    Need for hepatitis C screening test     Z11.59    Relevant Orders    Hepatitis C antibody    ESRD (end stage renal disease) (Multi)     N18.6    Relevant Orders    XR DEXA bone density    Osteopenia of ankle, bilateral     M85.871, M85.872    Relevant Orders    XR DEXA bone density    Asymptomatic menopausal state     Z78.0    Screening for prostate cancer     Z12.5    Relevant Orders    Prostate Specific Antigen, Screen              Dear Kentrell Carreon     It was my pleasure to take care of you today in the office. Below are the things we discussed today:    1. Immunizations: Yearly Flu shot is recommended.          a: COVID: Up-to-date         b: Tetanus: Up-to-date         c: Shingrix: Up-to-date         d: RSV: Up-to-date         e: Prevnar: Up-to-date    2. Blood Work: Up-to-date  3. Seen your dentist twice a year  4. Yearly Eye exam is recommended    5. BMI: 18.6  6: Diet recommendations:   Eat Clean, Try to have as many home cooked meals as possible  Avoid processed foods which contain excess calories, sugar, and sodium.    7. Exercise recommendations:   150 minutes a week to maintain your weight     If you have to loose weight, you need a better diet and exercise plan.     8. Please get your Living will / Advance directive completed if you do not have one already. Please make sure our office has a copy of the latest one.     9. Colonoscopy please complete the Cologuard testing   10. PSA: Ordered    11.  Follow-up 6-month    Follow up in one year for a Physical. Please call the office before your Physical to see if you need blood work completed prior to your physical.     Please call me if any questions arise from now until your next visit. I will call you after I am done seeing patients. A Doctor is always available by phone when the office is closed. Please feel free  to call for help with any problem that you feel shouldn't wait until the office re-opens.     German Pfeiffer, DO

## 2024-08-30 LAB — BODY SURFACE AREA: 1.95 M2

## 2024-09-04 ENCOUNTER — HOSPITAL ENCOUNTER (OUTPATIENT)
Dept: RADIOLOGY | Facility: HOSPITAL | Age: 54
Discharge: HOME | End: 2024-09-04
Payer: COMMERCIAL

## 2024-09-04 ENCOUNTER — PATIENT OUTREACH (OUTPATIENT)
Dept: PRIMARY CARE | Facility: CLINIC | Age: 54
End: 2024-09-04
Payer: COMMERCIAL

## 2024-09-04 DIAGNOSIS — I35.0 NONRHEUMATIC AORTIC VALVE STENOSIS: ICD-10-CM

## 2024-09-04 PROCEDURE — 2550000001 HC RX 255 CONTRASTS: Performed by: INTERNAL MEDICINE

## 2024-09-04 PROCEDURE — 71275 CT ANGIOGRAPHY CHEST: CPT | Performed by: RADIOLOGY

## 2024-09-04 PROCEDURE — 74174 CTA ABD&PLVS W/CONTRAST: CPT | Performed by: RADIOLOGY

## 2024-09-04 PROCEDURE — 74174 CTA ABD&PLVS W/CONTRAST: CPT

## 2024-09-05 PROBLEM — R09.02 HYPOXEMIA: Status: ACTIVE | Noted: 2022-05-27

## 2024-09-05 PROBLEM — J98.4 PNEUMONITIS: Status: ACTIVE | Noted: 2024-09-05

## 2024-09-05 PROBLEM — G89.18 ACUTE POSTOPERATIVE PAIN: Status: ACTIVE | Noted: 2024-09-05

## 2024-09-05 PROBLEM — J69.0 ASPIRATION PNEUMONITIS (MULTI): Status: ACTIVE | Noted: 2024-09-05

## 2024-09-05 PROBLEM — R06.82 TACHYPNEA: Status: ACTIVE | Noted: 2022-05-27

## 2024-09-05 PROBLEM — I13.10 HEART DISEASE AND KIDNEY FAILURE DUE TO HIGH BLOOD PRESSURE: Status: ACTIVE | Noted: 2022-03-09

## 2024-09-05 PROBLEM — Z20.822 EXPOSURE TO SEVERE ACUTE RESPIRATORY SYNDROME CORONAVIRUS 2 (SARS-COV-2): Status: ACTIVE | Noted: 2022-05-27

## 2024-09-05 PROBLEM — Z86.79 HISTORY OF HYPERTENSION: Status: ACTIVE | Noted: 2024-09-05

## 2024-09-05 RX ORDER — SUCROFERRIC OXYHYDROXIDE 500 MG/1
1000 TABLET, CHEWABLE ORAL
COMMUNITY
Start: 2024-05-01

## 2024-09-06 ENCOUNTER — APPOINTMENT (OUTPATIENT)
Dept: CARDIOLOGY | Facility: HOSPITAL | Age: 54
End: 2024-09-06
Payer: COMMERCIAL

## 2024-09-09 ENCOUNTER — OFFICE VISIT (OUTPATIENT)
Dept: CARDIOLOGY | Facility: HOSPITAL | Age: 54
End: 2024-09-09
Payer: COMMERCIAL

## 2024-09-09 VITALS
DIASTOLIC BLOOD PRESSURE: 70 MMHG | HEART RATE: 83 BPM | WEIGHT: 162.4 LBS | OXYGEN SATURATION: 94 % | HEIGHT: 76 IN | BODY MASS INDEX: 19.78 KG/M2 | SYSTOLIC BLOOD PRESSURE: 142 MMHG

## 2024-09-09 DIAGNOSIS — I48.3 TYPICAL ATRIAL FLUTTER (MULTI): Primary | ICD-10-CM

## 2024-09-09 DIAGNOSIS — I47.10 PAROXYSMAL SUPRAVENTRICULAR TACHYCARDIA (CMS-HCC): ICD-10-CM

## 2024-09-09 PROCEDURE — 93010 ELECTROCARDIOGRAM REPORT: CPT | Performed by: INTERNAL MEDICINE

## 2024-09-09 PROCEDURE — 3051F HG A1C>EQUAL 7.0%<8.0%: CPT | Performed by: NURSE PRACTITIONER

## 2024-09-09 PROCEDURE — 3078F DIAST BP <80 MM HG: CPT | Performed by: NURSE PRACTITIONER

## 2024-09-09 PROCEDURE — 99214 OFFICE O/P EST MOD 30 MIN: CPT | Performed by: NURSE PRACTITIONER

## 2024-09-09 PROCEDURE — 3077F SYST BP >= 140 MM HG: CPT | Performed by: NURSE PRACTITIONER

## 2024-09-09 PROCEDURE — 4004F PT TOBACCO SCREEN RCVD TLK: CPT | Performed by: NURSE PRACTITIONER

## 2024-09-09 PROCEDURE — 93005 ELECTROCARDIOGRAM TRACING: CPT | Performed by: NURSE PRACTITIONER

## 2024-09-09 PROCEDURE — 3008F BODY MASS INDEX DOCD: CPT | Performed by: NURSE PRACTITIONER

## 2024-09-09 ASSESSMENT — PAIN SCALES - GENERAL: PAINLEVEL: 0-NO PAIN

## 2024-09-09 NOTE — PROGRESS NOTES
"Subjective   Kentrell Carreon is a 54 y.o. male.    Chief Complaint:  Atrial Flutter    54 year old male presents today for 1 month follow up post Atrial Flutter ablation.  PMHx CAD s/p PCI LAD 6/2017 and 3v CABG 2022, HFrEF (EF 35-40% 6/24), prior ablation 2006 likely SVT under twilight sedation, ESRD on HD MTTThF via RUE AVF, DM2, SLE.     After HD 7/31/24 he felt his heart beating fast, he tried to hydrate but he continued to feel the same, he checked his HR it was in the 160s, after about an hour he called EMS and was brought to the ED, where he was given IV metop and that aborted the rhythm. Electrolytes were within normal range.     Now s/p CTI RFA 8/5/2024 with Dr. Vizcarra.  Holter monitor 8/6-8/25/24: Predominant rhythm is normal sinus rhythm.  1 episodes of NSVT for 4 beats.  Less than 1% PVC burden, less than 1% PAC burden.  0 patient triggers no atrial fibrillation or atrial flutter.  ECG 9/9/2024 NSR with 1st degree AVB HR 83 bpm, T wave abnormality seen on previous ECGs    TODAY Patient presents for follow up of CTI RFA.  He denies any further episodes of a racing heart or palpitations, however it only happened to him once right after dialysis.  He is also meeting with cardiac surgery to discuss possible TAVR.  He may also be a candidate for kidney transplant.    /70 (BP Location: Left arm, Patient Position: Sitting)   Pulse 83   Ht 1.93 m (6' 4\")   Wt 73.7 kg (162 lb 6.4 oz)   SpO2 94%   BMI 19.77 kg/m²   Current Outpatient Medications on File Prior to Visit   Medication Sig Dispense Refill    acetaminophen (Tylenol) 325 mg tablet Take 2 tablets (650 mg) by mouth every 6 hours if needed.      aspirin 81 mg EC tablet Take 1 tablet (81 mg) by mouth once daily.      atorvastatin (Lipitor) 80 mg tablet Take 1 tablet (80 mg) by mouth once daily. 90 tablet 3    B complex-vitamin C-folic acid (Nephrocaps) 1 mg capsule Take 1 capsule by mouth once daily. 90 capsule 3    blood-glucose sensor (FreeStyle " "José Luis 3 Sensor) device Change sensor every 14 days 2 each 5    carvedilol (Coreg) 25 mg tablet Take 1 tablet (25 mg) by mouth 2 times daily (morning and late afternoon). (Patient not taking: Reported on 9/11/2024) 60 tablet 0    diphenhydrAMINE (BENADryl) 50 mg capsule Take 1 capsule (50 mg) by mouth see administration instructions. TAKE THIS MEDICATION ONE BEFORE PROCEDURE  that requires IV contrast. 3 capsule 3    EPOETIN DEIRDRE INJ Inject 8,000 Units under the skin. With every dialysis tx      FreeStyle José Luis reader (FreeStyle José Luis 2 [place]) misc Use to check blood sugars at least every 8 hours 1 each 0    FreeStyle José Luis sensor system (FreeStyle José Luis 2 Sensor) kit Change sensor every 14 days. Check blood sugar at least every 8 hours 6 each 3    insulin glargine (Lantus) 100 unit/mL (3 mL) pen Inject 10 units twice a day 15 mL 5    insulin lispro (HumaLOG KwikPen Insulin) 100 unit/mL injection Inject before every meal using carb ratio and correction scale. MDD 50 units. 15 mL 5    multivitamin (Daily Multi-Vitamin) tablet Take 1 tablet by mouth once daily.      pen needle, diabetic (BD Ultra-Fine Agueda Pen Needle) 32 gauge x 5/32\" needle Use to inject insulin 5 times per day 450 each 1    predniSONE (Deltasone) 50 mg tablet Take 1 tablet (50 mg) by mouth see administration instructions. Please take this medication 13 hrs before procedure. Followed by 7 hrs before and 1 hr before 3 tablet 3    sacubitriL-valsartan (Entresto) 24-26 mg tablet Take 1 tablet by mouth 2 times a day. (Patient not taking: Reported on 9/11/2024) 180 tablet 2    sevelamer carbonate (Renvela) 800 mg tablet Take 1 tablet (800 mg) by mouth 3 times daily (morning, midday, late afternoon).      SITagliptin phosphate (Januvia) 25 mg tablet Take 1 tablet (25 mg) by mouth once daily. 30 tablet 5    Velphoro 500 mg tablet,chewable chewable tablet Chew 2 tablets (1,000 mg) 3 times a day before meals.      [DISCONTINUED] apixaban (Eliquis) 5 mg tablet " Take 1 tablet (5 mg) by mouth every 12 hours for 180 doses. 60 tablet 2     No current facility-administered medications on file prior to visit.         Review of Systems   Constitutional: Negative for diaphoresis, fever and malaise/fatigue.   HENT:  Negative for congestion and sore throat.    Eyes:  Negative for blurred vision and double vision.   Cardiovascular:  Negative for chest pain, dyspnea on exertion, irregular heartbeat, leg swelling, near-syncope, orthopnea, palpitations, paroxysmal nocturnal dyspnea and syncope.   Respiratory:  Negative for cough, hemoptysis, shortness of breath, snoring and sputum production.    Hematologic/Lymphatic: Negative for bleeding problem.   Skin:  Negative for rash.   Musculoskeletal:  Negative for falls, joint pain and myalgias.   Gastrointestinal:  Negative for abdominal pain, diarrhea, nausea and vomiting.   Neurological:  Negative for dizziness, headaches, light-headedness and weakness.   All other systems reviewed and are negative.      Objective   Constitutional:       Appearance: Healthy appearance. Not in distress.   Eyes:      Conjunctiva/sclera: Conjunctivae normal.   HENT:      Nose: Nose normal.    Mouth/Throat:      Pharynx: Oropharynx is clear.   Neck:      Vascular: No JVR. JVD normal.   Pulmonary:      Effort: Pulmonary effort is normal.      Breath sounds: Normal breath sounds. No wheezing. No rhonchi.   Chest:      Chest wall: Not tender to palpatation.   Cardiovascular:      Normal rate. Regular rhythm.      Murmurs: There is no murmur.      No rub.      Comments: RUE fistula +bruit/+thrill  Pulses:     Intact distal pulses.   Edema:     Peripheral edema absent.   Abdominal:      General: Bowel sounds are normal.      Palpations: Abdomen is soft.   Musculoskeletal: Normal range of motion.      Cervical back: Neck supple. Skin:     General: Skin is warm and dry.      Comments: Right groin site well approximated, no bruising/bleeding/swelling/redness/pain    Neurological:      Mental Status: Alert and oriented to person, place and time.      Motor: Motor function is intact.         Lab Review:   Lab Results   Component Value Date     (L) 08/05/2024    K 4.7 08/05/2024    CL 95 (L) 08/05/2024    CO2 24 08/05/2024    BUN 71 (H) 08/05/2024    CREATININE 10.08 (H) 08/05/2024    GLUCOSE 155 (H) 08/05/2024    CALCIUM 9.1 08/05/2024     Lab Results   Component Value Date    WBC 5.9 08/05/2024    HGB 8.3 (L) 08/05/2024    HCT 25.7 (L) 08/05/2024    MCV 97 08/05/2024     08/05/2024       Assessment/Plan   The primary encounter diagnosis was Typical atrial flutter (Multi). A diagnosis of Paroxysmal supraventricular tachycardia (CMS-HCC) was also pertinent to this visit.  Patient presents today for 1 month follow-up post atrial flutter ablation with Dr. Vizcarra.  10-day Holter monitor postprocedure shows no recurrent atrial flutter and no atrial fibrillation.  Patient remains on Eliquis 5 mg twice a day, his [id number] score is 3, so he would likely benefit from long-term anticoagulation.  We discussed normal symptoms that can happen post ablation and when he should go to the ED or have an office visit.  Patient expresses understanding, all questions asked and answered.    Continue current medications  Follow-up with Dr. Vizcarra 3 months postprocedure

## 2024-09-10 LAB
ATRIAL RATE: 83 BPM
P AXIS: 63 DEGREES
P OFFSET: 174 MS
P ONSET: 99 MS
PR INTERVAL: 224 MS
Q ONSET: 211 MS
QRS COUNT: 14 BEATS
QRS DURATION: 126 MS
QT INTERVAL: 416 MS
QTC CALCULATION(BAZETT): 488 MS
QTC FREDERICIA: 463 MS
R AXIS: 60 DEGREES
T AXIS: 210 DEGREES
T OFFSET: 419 MS
VENTRICULAR RATE: 83 BPM

## 2024-09-10 NOTE — PROGRESS NOTES
Kentrell comes to discuss treatment recommendations for aortic valve stenosis. Echo showing dustin 0.96 pk gradient 37.4/20.3 DI 0.26 lvef 38%. Status post cabg x 3. Experiencing avila with stairs and fatigue. Additional history systemic lupus, Dialysis, Chew's esophagus, carotid aso a fib/flutter. Abbey Mcdonald RN    This is a 54 years old male patient with a very long history of many comorbidities and diseases.  He has history of lupus and secondary end-stage kidney failure on dialysis.  He also has a very significant coronary artery disease with a previous open heart coronary artery revascularization with 3 graft back in 2022 with Dr. Shahla Stevens.  The patient recovered from that operation relatively well however he has experiencing progressive heart failure symptoms and he is very limited today.  He cannot climb 1 flight of stairs.  He had a new coronary angiogram recently in June this year showing patent graft to the circumflex and PDA and also LIMA to the LAD.  The last echo shows a decreased LV function with a 35 to 40% with a aortic valve stenosis with low gradients.  We discussed his situation and I decided to really interview him and try to explain the options among the Ross procedure.  Even though I think this is a higher risk operation for him I think is worth a discussion since he is so young and with the no many options for the long term solution.  He also states that he might be a candidate for kidney transplant standpoint which also encouraged me to suggest a Ross procedure operation.  I have explained to him about this operation and the risk related to it.  I also explained that he because of his comorbidities entitles a higher risk of any kind of open heart surgery.  I really think that the TAVR would be very easy to do/recover procedure however in the mid long-term I think it would just impose more difficult in his long-term management.

## 2024-09-11 ENCOUNTER — OFFICE VISIT (OUTPATIENT)
Dept: CARDIAC SURGERY | Facility: HOSPITAL | Age: 54
End: 2024-09-11
Payer: COMMERCIAL

## 2024-09-11 VITALS
HEIGHT: 76 IN | OXYGEN SATURATION: 97 % | BODY MASS INDEX: 19.61 KG/M2 | SYSTOLIC BLOOD PRESSURE: 118 MMHG | HEART RATE: 83 BPM | WEIGHT: 161 LBS | DIASTOLIC BLOOD PRESSURE: 70 MMHG

## 2024-09-11 DIAGNOSIS — I35.0 AORTIC VALVE STENOSIS, ETIOLOGY OF CARDIAC VALVE DISEASE UNSPECIFIED: ICD-10-CM

## 2024-09-11 PROCEDURE — 4004F PT TOBACCO SCREEN RCVD TLK: CPT | Performed by: THORACIC SURGERY (CARDIOTHORACIC VASCULAR SURGERY)

## 2024-09-11 PROCEDURE — 3074F SYST BP LT 130 MM HG: CPT | Performed by: THORACIC SURGERY (CARDIOTHORACIC VASCULAR SURGERY)

## 2024-09-11 PROCEDURE — 99215 OFFICE O/P EST HI 40 MIN: CPT | Performed by: THORACIC SURGERY (CARDIOTHORACIC VASCULAR SURGERY)

## 2024-09-11 PROCEDURE — 3078F DIAST BP <80 MM HG: CPT | Performed by: THORACIC SURGERY (CARDIOTHORACIC VASCULAR SURGERY)

## 2024-09-11 PROCEDURE — 3051F HG A1C>EQUAL 7.0%<8.0%: CPT | Performed by: THORACIC SURGERY (CARDIOTHORACIC VASCULAR SURGERY)

## 2024-09-11 PROCEDURE — 3008F BODY MASS INDEX DOCD: CPT | Performed by: THORACIC SURGERY (CARDIOTHORACIC VASCULAR SURGERY)

## 2024-09-11 ASSESSMENT — ENCOUNTER SYMPTOMS
SYNCOPE: 0
VOMITING: 0
PND: 0
LIGHT-HEADEDNESS: 0
FALLS: 0
SNORING: 0
LOSS OF SENSATION IN FEET: 0
HEADACHES: 0
ABDOMINAL PAIN: 0
DOUBLE VISION: 0
SPUTUM PRODUCTION: 0
COUGH: 0
ORTHOPNEA: 0
WEAKNESS: 0
BLURRED VISION: 0
DIAPHORESIS: 0
PALPITATIONS: 0
HEMOPTYSIS: 0
FEVER: 0
IRREGULAR HEARTBEAT: 0
NAUSEA: 0
SHORTNESS OF BREATH: 0
SORE THROAT: 0
OCCASIONAL FEELINGS OF UNSTEADINESS: 0
DIZZINESS: 0
DYSPNEA ON EXERTION: 0
MYALGIAS: 0
NEAR-SYNCOPE: 0
DIARRHEA: 0
DEPRESSION: 0

## 2024-09-11 ASSESSMENT — PATIENT HEALTH QUESTIONNAIRE - PHQ9
2. FEELING DOWN, DEPRESSED OR HOPELESS: NOT AT ALL
1. LITTLE INTEREST OR PLEASURE IN DOING THINGS: NOT AT ALL
SUM OF ALL RESPONSES TO PHQ9 QUESTIONS 1 AND 2: 0

## 2024-09-11 ASSESSMENT — PAIN SCALES - GENERAL: PAINLEVEL: 0-NO PAIN

## 2024-10-15 NOTE — PROGRESS NOTES
Contacting Kentrell to discuss treatment options for aortic valve stenosis. Abbey Mcdonald RN    Is a 54 years old male patient with history of end-stage kidney failure on dialysis.  He underwent previous open heart coronary revascularization with Dr. Shahla Stevens about 2 years ago.  He recovered from that but he developed severe and progressive aortic valve stenosis with heavy calcification of the valve.  This is most likely also related to his kidney dysfunction.  He is a 54 otherwise healthy person.  We discussed his situation extensively.  I do not think this that there is a good option for him besides either a mechanical valve or something more definitive like of the Ross procedure.  Even though he is not the best candidate for the procedure I still think that it is worth to discuss.  We had a phone conversation with the patient today, he already was presented with this option a long time ago and we are calling him back today since he expressed the interest of talking more about the surgery and eventually go forward.  We had a phone conversation today the patient is willing to proceed so we will complete the preoperative testing and then we will schedule him for surgery.

## 2024-10-16 ENCOUNTER — APPOINTMENT (OUTPATIENT)
Dept: CARDIAC SURGERY | Facility: HOSPITAL | Age: 54
End: 2024-10-16
Payer: COMMERCIAL

## 2024-10-16 DIAGNOSIS — I35.0 NONRHEUMATIC AORTIC VALVE STENOSIS: ICD-10-CM

## 2024-10-16 PROCEDURE — 3051F HG A1C>EQUAL 7.0%<8.0%: CPT | Performed by: THORACIC SURGERY (CARDIOTHORACIC VASCULAR SURGERY)

## 2024-10-16 PROCEDURE — 99213 OFFICE O/P EST LOW 20 MIN: CPT | Performed by: THORACIC SURGERY (CARDIOTHORACIC VASCULAR SURGERY)

## 2024-10-21 ENCOUNTER — HOSPITAL ENCOUNTER (OUTPATIENT)
Facility: HOSPITAL | Age: 54
Setting detail: SURGERY ADMIT
End: 2024-10-21
Attending: THORACIC SURGERY (CARDIOTHORACIC VASCULAR SURGERY) | Admitting: THORACIC SURGERY (CARDIOTHORACIC VASCULAR SURGERY)
Payer: COMMERCIAL

## 2024-10-21 DIAGNOSIS — I35.0 NONRHEUMATIC AORTIC VALVE STENOSIS: Primary | ICD-10-CM

## 2024-10-23 ENCOUNTER — OFFICE VISIT (OUTPATIENT)
Dept: CARDIAC SURGERY | Facility: HOSPITAL | Age: 54
End: 2024-10-23
Payer: COMMERCIAL

## 2024-10-23 VITALS
DIASTOLIC BLOOD PRESSURE: 66 MMHG | SYSTOLIC BLOOD PRESSURE: 118 MMHG | BODY MASS INDEX: 19 KG/M2 | OXYGEN SATURATION: 93 % | HEIGHT: 76 IN | HEART RATE: 83 BPM | WEIGHT: 156 LBS

## 2024-10-23 DIAGNOSIS — I35.0 AORTIC VALVE STENOSIS, ETIOLOGY OF CARDIAC VALVE DISEASE UNSPECIFIED: Primary | ICD-10-CM

## 2024-10-23 DIAGNOSIS — E11.59 TYPE 2 DIABETES MELLITUS WITH OTHER CIRCULATORY COMPLICATION, WITH LONG-TERM CURRENT USE OF INSULIN: ICD-10-CM

## 2024-10-23 DIAGNOSIS — Z79.4 TYPE 2 DIABETES MELLITUS WITH OTHER CIRCULATORY COMPLICATION, WITH LONG-TERM CURRENT USE OF INSULIN: ICD-10-CM

## 2024-10-23 PROCEDURE — 99215 OFFICE O/P EST HI 40 MIN: CPT | Performed by: STUDENT IN AN ORGANIZED HEALTH CARE EDUCATION/TRAINING PROGRAM

## 2024-10-23 PROCEDURE — 3078F DIAST BP <80 MM HG: CPT | Performed by: STUDENT IN AN ORGANIZED HEALTH CARE EDUCATION/TRAINING PROGRAM

## 2024-10-23 PROCEDURE — 3074F SYST BP LT 130 MM HG: CPT | Performed by: STUDENT IN AN ORGANIZED HEALTH CARE EDUCATION/TRAINING PROGRAM

## 2024-10-23 PROCEDURE — 3008F BODY MASS INDEX DOCD: CPT | Performed by: STUDENT IN AN ORGANIZED HEALTH CARE EDUCATION/TRAINING PROGRAM

## 2024-10-23 RX ORDER — PEN NEEDLE, DIABETIC 30 GX3/16"
NEEDLE, DISPOSABLE MISCELLANEOUS
Qty: 450 EACH | Refills: 0 | Status: SHIPPED | OUTPATIENT
Start: 2024-10-23

## 2024-10-23 ASSESSMENT — PAIN SCALES - GENERAL: PAINLEVEL_OUTOF10: 0-NO PAIN

## 2024-10-28 ENCOUNTER — PRE-ADMISSION TESTING (OUTPATIENT)
Dept: PREADMISSION TESTING | Facility: HOSPITAL | Age: 54
End: 2024-10-28
Payer: COMMERCIAL

## 2024-10-28 VITALS
TEMPERATURE: 97.7 F | HEIGHT: 75 IN | DIASTOLIC BLOOD PRESSURE: 63 MMHG | HEART RATE: 75 BPM | BODY MASS INDEX: 20.17 KG/M2 | SYSTOLIC BLOOD PRESSURE: 132 MMHG | WEIGHT: 162.2 LBS | OXYGEN SATURATION: 98 %

## 2024-10-28 DIAGNOSIS — I35.0 NONRHEUMATIC AORTIC VALVE STENOSIS: ICD-10-CM

## 2024-10-28 DIAGNOSIS — Z01.818 PREOPERATIVE TESTING: Primary | ICD-10-CM

## 2024-10-28 LAB
ABO GROUP (TYPE) IN BLOOD: NORMAL
ANION GAP SERPL CALC-SCNC: 23 MMOL/L (ref 10–20)
ANTIBODY SCREEN: NORMAL
BUN SERPL-MCNC: 78 MG/DL (ref 6–23)
CALCIUM SERPL-MCNC: 10.3 MG/DL (ref 8.6–10.6)
CHLORIDE SERPL-SCNC: 95 MMOL/L (ref 98–107)
CO2 SERPL-SCNC: 25 MMOL/L (ref 21–32)
CREAT SERPL-MCNC: 11.02 MG/DL (ref 0.5–1.3)
EGFRCR SERPLBLD CKD-EPI 2021: 5 ML/MIN/1.73M*2
ERYTHROCYTE [DISTWIDTH] IN BLOOD BY AUTOMATED COUNT: 14.9 % (ref 11.5–14.5)
GLUCOSE SERPL-MCNC: 190 MG/DL (ref 74–99)
HCT VFR BLD AUTO: 26.5 % (ref 41–52)
HGB BLD-MCNC: 8.4 G/DL (ref 13.5–17.5)
MCH RBC QN AUTO: 30.8 PG (ref 26–34)
MCHC RBC AUTO-ENTMCNC: 31.7 G/DL (ref 32–36)
MCV RBC AUTO: 97 FL (ref 80–100)
NRBC BLD-RTO: 0 /100 WBCS (ref 0–0)
PLATELET # BLD AUTO: 318 X10*3/UL (ref 150–450)
POTASSIUM SERPL-SCNC: 4.6 MMOL/L (ref 3.5–5.3)
RBC # BLD AUTO: 2.73 X10*6/UL (ref 4.5–5.9)
RH FACTOR (ANTIGEN D): NORMAL
SODIUM SERPL-SCNC: 138 MMOL/L (ref 136–145)
WBC # BLD AUTO: 8.1 X10*3/UL (ref 4.4–11.3)

## 2024-10-28 PROCEDURE — 80048 BASIC METABOLIC PNL TOTAL CA: CPT

## 2024-10-28 PROCEDURE — 86850 RBC ANTIBODY SCREEN: CPT

## 2024-10-28 PROCEDURE — 87081 CULTURE SCREEN ONLY: CPT

## 2024-10-28 PROCEDURE — 99204 OFFICE O/P NEW MOD 45 MIN: CPT | Performed by: ANESTHESIOLOGY

## 2024-10-28 PROCEDURE — 85027 COMPLETE CBC AUTOMATED: CPT

## 2024-10-28 PROCEDURE — 36415 COLL VENOUS BLD VENIPUNCTURE: CPT

## 2024-10-28 RX ORDER — FAMOTIDINE 10 MG/1
10 TABLET ORAL NIGHTLY PRN
COMMUNITY

## 2024-10-28 RX ORDER — CARVEDILOL 25 MG/1
25 TABLET ORAL 2 TIMES DAILY
COMMUNITY

## 2024-10-28 RX ORDER — CHLORHEXIDINE GLUCONATE 40 MG/ML
1 SOLUTION TOPICAL 2 TIMES DAILY
Qty: 473 ML | Refills: 0 | Status: SHIPPED | OUTPATIENT
Start: 2024-10-28 | End: 2024-11-02

## 2024-10-28 RX ORDER — CHLORHEXIDINE GLUCONATE ORAL RINSE 1.2 MG/ML
15 SOLUTION DENTAL DAILY
Qty: 120 ML | Refills: 0 | Status: SHIPPED | OUTPATIENT
Start: 2024-10-28 | End: 2024-10-30

## 2024-10-28 ASSESSMENT — DUKE ACTIVITY SCORE INDEX (DASI)
CAN YOU DO YARD WORK LIKE RAKING LEAVES, WEEDING OR PUSHING A MOWER: YES
CAN YOU DO LIGHT WORK AROUND THE HOUSE LIKE DUSTING OR WASHING DISHES: YES
CAN YOU DO MODERATE WORK AROUND THE HOUSE LIKE VACUUMING, SWEEPING FLOORS OR CARRYING GROCERIES: YES
CAN YOU PARTICIPATE IN STRENOUS SPORTS LIKE SWIMMING, SINGLES TENNIS, FOOTBALL, BASKETBALL, OR SKIING: NO
CAN YOU PARTICIPATE IN MODERATE RECREATIONAL ACTIVITIES LIKE GOLF, BOWLING, DANCING, DOUBLES TENNIS OR THROWING A BASEBALL OR FOOTBALL: YES
CAN YOU RUN A SHORT DISTANCE: YES
CAN YOU CLIMB A FLIGHT OF STAIRS OR WALK UP A HILL: YES
TOTAL_SCORE: 42.7
CAN YOU WALK A BLOCK OR TWO ON LEVEL GROUND: YES
CAN YOU HAVE SEXUAL RELATIONS: YES
CAN YOU TAKE CARE OF YOURSELF (EAT, DRESS, BATHE, OR USE TOILET): YES
CAN YOU WALK INDOORS, SUCH AS AROUND YOUR HOUSE: YES
DASI METS SCORE: 8
CAN YOU DO HEAVY WORK AROUND THE HOUSE LIKE SCRUBBING FLOORS OR LIFTING AND MOVING HEAVY FURNITURE: NO

## 2024-10-28 ASSESSMENT — ENCOUNTER SYMPTOMS
GASTROINTESTINAL NEGATIVE: 1
CONSTITUTIONAL NEGATIVE: 1
ENDOCRINE NEGATIVE: 1
NECK NEGATIVE: 1
RESPIRATORY NEGATIVE: 1
CARDIOVASCULAR NEGATIVE: 1
EYES NEGATIVE: 1
NEUROLOGICAL NEGATIVE: 1
MUSCULOSKELETAL NEGATIVE: 1

## 2024-10-28 ASSESSMENT — LIFESTYLE VARIABLES: SMOKING_STATUS: NONSMOKER

## 2024-10-30 LAB — STAPHYLOCOCCUS SPEC CULT: NORMAL

## 2024-10-31 ENCOUNTER — OFFICE VISIT (OUTPATIENT)
Dept: TRANSPLANT | Facility: HOSPITAL | Age: 54
End: 2024-10-31
Payer: COMMERCIAL

## 2024-10-31 VITALS
BODY MASS INDEX: 20.09 KG/M2 | OXYGEN SATURATION: 94 % | WEIGHT: 156.5 LBS | DIASTOLIC BLOOD PRESSURE: 65 MMHG | HEART RATE: 73 BPM | TEMPERATURE: 97.5 F | SYSTOLIC BLOOD PRESSURE: 119 MMHG | HEIGHT: 74 IN

## 2024-10-31 VITALS
DIASTOLIC BLOOD PRESSURE: 65 MMHG | SYSTOLIC BLOOD PRESSURE: 119 MMHG | OXYGEN SATURATION: 94 % | BODY MASS INDEX: 20.09 KG/M2 | TEMPERATURE: 97.5 F | WEIGHT: 156.5 LBS | HEART RATE: 73 BPM | HEIGHT: 74 IN

## 2024-10-31 DIAGNOSIS — Z48.22 ENCOUNTER FOR AFTERCARE FOLLOWING KIDNEY TRANSPLANT: Primary | ICD-10-CM

## 2024-10-31 DIAGNOSIS — I25.810 ARTERIOSCLEROSIS OF ARTERIAL CORONARY ARTERY BYPASS GRAFT: ICD-10-CM

## 2024-10-31 DIAGNOSIS — I10 ESSENTIAL HYPERTENSION: ICD-10-CM

## 2024-10-31 DIAGNOSIS — N18.6 ESRD (END STAGE RENAL DISEASE) (MULTI): ICD-10-CM

## 2024-10-31 DIAGNOSIS — E11.21 DIABETES MELLITUS WITH KIDNEY DISEASE (MULTI): ICD-10-CM

## 2024-10-31 DIAGNOSIS — Z99.2 END-STAGE RENAL DISEASE ON HEMODIALYSIS (MULTI): Primary | ICD-10-CM

## 2024-10-31 DIAGNOSIS — Z01.818 PRE-TRANSPLANT EVALUATION FOR KIDNEY TRANSPLANT: ICD-10-CM

## 2024-10-31 DIAGNOSIS — N18.6 END-STAGE RENAL DISEASE ON HEMODIALYSIS (MULTI): Primary | ICD-10-CM

## 2024-10-31 PROCEDURE — 99215 OFFICE O/P EST HI 40 MIN: CPT | Performed by: INTERNAL MEDICINE

## 2024-10-31 PROCEDURE — 3008F BODY MASS INDEX DOCD: CPT | Performed by: INTERNAL MEDICINE

## 2024-10-31 PROCEDURE — 3074F SYST BP LT 130 MM HG: CPT | Performed by: INTERNAL MEDICINE

## 2024-10-31 PROCEDURE — 3078F DIAST BP <80 MM HG: CPT | Performed by: TRANSPLANT SURGERY

## 2024-10-31 PROCEDURE — 3078F DIAST BP <80 MM HG: CPT | Performed by: INTERNAL MEDICINE

## 2024-10-31 PROCEDURE — 3074F SYST BP LT 130 MM HG: CPT | Performed by: TRANSPLANT SURGERY

## 2024-10-31 PROCEDURE — 99214 OFFICE O/P EST MOD 30 MIN: CPT | Performed by: TRANSPLANT SURGERY

## 2024-10-31 PROCEDURE — 3008F BODY MASS INDEX DOCD: CPT | Performed by: TRANSPLANT SURGERY

## 2024-10-31 PROCEDURE — 3051F HG A1C>EQUAL 7.0%<8.0%: CPT | Performed by: TRANSPLANT SURGERY

## 2024-10-31 PROCEDURE — 3051F HG A1C>EQUAL 7.0%<8.0%: CPT | Performed by: INTERNAL MEDICINE

## 2024-10-31 ASSESSMENT — PAIN SCALES - GENERAL
PAINLEVEL_OUTOF10: 0-NO PAIN
PAINLEVEL_OUTOF10: 0-NO PAIN

## 2024-11-04 ASSESSMENT — ENCOUNTER SYMPTOMS
NEUROLOGICAL NEGATIVE: 1
SHORTNESS OF BREATH: 0
FATIGUE: 1
GASTROINTESTINAL NEGATIVE: 1
HEMATURIA: 0

## 2024-11-04 NOTE — PROGRESS NOTES
"Subjective   Kentrell Carreon is a 54 y.o. male who presents for kidney transplant evaluation visit. He is on home HD dialysis.  He has significant vascular disease through out his aorta and iliac arteries.  He has significant valvular disease and has been offered the choice of Ross procedure vs. TAVR.  One of the issues is his kidney transplant candidacy.      Review of Systems   Constitutional:  Positive for fatigue.   Respiratory:  Negative for shortness of breath.    Cardiovascular:  Negative for chest pain.   Gastrointestinal: Negative.    Genitourinary:  Negative for hematuria.   Neurological: Negative.         Objective   Physical Exam  Constitutional:       Appearance: Normal appearance.   HENT:      Head: Atraumatic.   Cardiovascular:      Pulses:           Femoral pulses are 1+ on the right side and 1+ on the left side.  Pulmonary:      Effort: Pulmonary effort is normal.   Abdominal:      Palpations: Abdomen is soft. There is no mass.      Hernia: No hernia is present.   Skin:     General: Skin is warm.   Neurological:      General: No focal deficit present.      Mental Status: He is alert.        Lab Review    Blood Type: No results found for: \"ABORH\"  Lab Results   Component Value Date    CREATININE 11.02 (H) 10/28/2024    K 4.6 10/28/2024    GLUCOSE 190 (H) 10/28/2024    HCT 26.5 (L) 10/28/2024    WBC 8.1 10/28/2024     10/28/2024    CALCIUM 10.3 10/28/2024       Cardiographics  CONCLUSIONS:   1. Left ventricular systolic function is mildly to moderately decreased with a 35-40% estimated ejection fraction.   2. Left Ventricular Global Longitudinal Strain - 8.7 % with more pronounced abnormalities involving the basal and mid segments with preservation of the apex more consistent with a possible infiltrative cardiomyopathy.   3. There is global hypokinesis of the left ventricle with minor regional variations.   4. Spectral Doppler shows an impaired relaxation pattern of left ventricular diastolic " filling.   5. The left atrium is moderately dilated.   6. The right atrium is mild to moderately dilated.   7. Moderate tricuspid regurgitation visualized.   8. Compared with study from 6/5/2024, there is an improvement in calculated LVEF.            Assessment/Plan    Diagnoses: ESRD  Kentrell Carreon is a  unacceptable  candidate for kidney transplantation.   He has no vascular targets given his extensive iliac artery disease.  He has dense calcification through out his aortoiliac tree.    Advantages and disadvantages of transplantation were reviewed. Discussed the operative procedure and potential complications, particularly in regard to the need for re-operation. I addressed issues of post-operative recovery and follow-up. Lastly, I discussed the need for immunosuppressive therapy and the risk associated with this treatment.  He appears to have good understanding of his medical condition and the transplant process.    We have communicated this to his cardiac surgery team to inform their procedure for his valvular heart disease.     Kentrell Jaramillo MD    I spent 40 minutes in face to face consultation and coordination of care.

## 2024-11-06 ENCOUNTER — TELEPHONE (OUTPATIENT)
Dept: CARDIOLOGY | Facility: HOSPITAL | Age: 54
End: 2024-11-06
Payer: COMMERCIAL

## 2024-11-06 DIAGNOSIS — I35.0 NONRHEUMATIC AORTIC VALVE STENOSIS: Primary | ICD-10-CM

## 2024-11-06 DIAGNOSIS — R09.89 OTHER SPECIFIED SYMPTOMS AND SIGNS INVOLVING THE CIRCULATORY AND RESPIRATORY SYSTEMS: ICD-10-CM

## 2024-11-06 NOTE — TELEPHONE ENCOUNTER
Called & notified per Structural meeting dental clearance needed & carotid ultrasound prior to scheduling inominate access TAVR. He plans to call [facility] to help expedite dental clearance.

## 2024-11-08 ENCOUNTER — ANCILLARY PROCEDURE (OUTPATIENT)
Dept: VASCULAR MEDICINE | Facility: CLINIC | Age: 54
End: 2024-11-08
Payer: COMMERCIAL

## 2024-11-08 DIAGNOSIS — R09.89 OTHER SPECIFIED SYMPTOMS AND SIGNS INVOLVING THE CIRCULATORY AND RESPIRATORY SYSTEMS: ICD-10-CM

## 2024-11-08 DIAGNOSIS — I35.0 NONRHEUMATIC AORTIC VALVE STENOSIS: ICD-10-CM

## 2024-11-08 DIAGNOSIS — Z01.810 ENCOUNTER FOR PREPROCEDURAL CARDIOVASCULAR EXAMINATION: ICD-10-CM

## 2024-11-08 PROCEDURE — 93880 EXTRACRANIAL BILAT STUDY: CPT | Performed by: STUDENT IN AN ORGANIZED HEALTH CARE EDUCATION/TRAINING PROGRAM

## 2024-11-08 PROCEDURE — 93880 EXTRACRANIAL BILAT STUDY: CPT

## 2024-11-13 ENCOUNTER — CLINICAL SUPPORT (OUTPATIENT)
Dept: EMERGENCY MEDICINE | Facility: HOSPITAL | Age: 54
DRG: 640 | End: 2024-11-13
Payer: COMMERCIAL

## 2024-11-13 ENCOUNTER — APPOINTMENT (OUTPATIENT)
Dept: RADIOLOGY | Facility: HOSPITAL | Age: 54
DRG: 640 | End: 2024-11-13
Payer: COMMERCIAL

## 2024-11-13 ENCOUNTER — HOSPITAL ENCOUNTER (INPATIENT)
Facility: HOSPITAL | Age: 54
LOS: 2 days | Discharge: HOME | DRG: 640 | End: 2024-11-16
Attending: EMERGENCY MEDICINE | Admitting: NURSE PRACTITIONER
Payer: COMMERCIAL

## 2024-11-13 DIAGNOSIS — R06.02 SHORTNESS OF BREATH: Primary | ICD-10-CM

## 2024-11-13 DIAGNOSIS — Z99.2 ESRD ON DIALYSIS (MULTI): ICD-10-CM

## 2024-11-13 DIAGNOSIS — N18.6 ESRD ON DIALYSIS (MULTI): ICD-10-CM

## 2024-11-13 DIAGNOSIS — J18.9 PNEUMONIA OF LEFT LOWER LOBE DUE TO INFECTIOUS ORGANISM: ICD-10-CM

## 2024-11-13 DIAGNOSIS — G47.00 INSOMNIA, UNSPECIFIED TYPE: ICD-10-CM

## 2024-11-13 LAB
ALBUMIN SERPL BCP-MCNC: 4.1 G/DL (ref 3.4–5)
ALP SERPL-CCNC: 106 U/L (ref 33–120)
ALT SERPL W P-5'-P-CCNC: 482 U/L (ref 10–52)
ANION GAP SERPL CALC-SCNC: 21 MMOL/L (ref 10–20)
AST SERPL W P-5'-P-CCNC: 179 U/L (ref 9–39)
BASOPHILS # BLD AUTO: 0.06 X10*3/UL (ref 0–0.1)
BASOPHILS NFR BLD AUTO: 0.7 %
BILIRUB SERPL-MCNC: 0.5 MG/DL (ref 0–1.2)
BNP SERPL-MCNC: >5000 PG/ML (ref 0–99)
BUN SERPL-MCNC: 82 MG/DL (ref 6–23)
CALCIUM SERPL-MCNC: 10.3 MG/DL (ref 8.6–10.6)
CARDIAC TROPONIN I PNL SERPL HS: 382 NG/L (ref 0–53)
CARDIAC TROPONIN I PNL SERPL HS: 411 NG/L (ref 0–53)
CHLORIDE SERPL-SCNC: 90 MMOL/L (ref 98–107)
CO2 SERPL-SCNC: 28 MMOL/L (ref 21–32)
CREAT SERPL-MCNC: 11.51 MG/DL (ref 0.5–1.3)
EGFRCR SERPLBLD CKD-EPI 2021: 5 ML/MIN/1.73M*2
EOSINOPHIL # BLD AUTO: 0.13 X10*3/UL (ref 0–0.7)
EOSINOPHIL NFR BLD AUTO: 1.6 %
ERYTHROCYTE [DISTWIDTH] IN BLOOD BY AUTOMATED COUNT: 15.6 % (ref 11.5–14.5)
GLUCOSE SERPL-MCNC: 264 MG/DL (ref 74–99)
HCT VFR BLD AUTO: 26.5 % (ref 41–52)
HGB BLD-MCNC: 8.8 G/DL (ref 13.5–17.5)
IMM GRANULOCYTES # BLD AUTO: 0.03 X10*3/UL (ref 0–0.7)
IMM GRANULOCYTES NFR BLD AUTO: 0.4 % (ref 0–0.9)
LYMPHOCYTES # BLD AUTO: 0.65 X10*3/UL (ref 1.2–4.8)
LYMPHOCYTES NFR BLD AUTO: 7.8 %
MCH RBC QN AUTO: 30.3 PG (ref 26–34)
MCHC RBC AUTO-ENTMCNC: 33.2 G/DL (ref 32–36)
MCV RBC AUTO: 91 FL (ref 80–100)
MONOCYTES # BLD AUTO: 0.91 X10*3/UL (ref 0.1–1)
MONOCYTES NFR BLD AUTO: 10.9 %
NEUTROPHILS # BLD AUTO: 6.55 X10*3/UL (ref 1.2–7.7)
NEUTROPHILS NFR BLD AUTO: 78.6 %
NRBC BLD-RTO: 0 /100 WBCS (ref 0–0)
PLATELET # BLD AUTO: 235 X10*3/UL (ref 150–450)
POTASSIUM SERPL-SCNC: 4.2 MMOL/L (ref 3.5–5.3)
PROT SERPL-MCNC: 8.4 G/DL (ref 6.4–8.2)
RBC # BLD AUTO: 2.9 X10*6/UL (ref 4.5–5.9)
SODIUM SERPL-SCNC: 135 MMOL/L (ref 136–145)
WBC # BLD AUTO: 8.3 X10*3/UL (ref 4.4–11.3)

## 2024-11-13 PROCEDURE — 84484 ASSAY OF TROPONIN QUANT: CPT | Performed by: EMERGENCY MEDICINE

## 2024-11-13 PROCEDURE — 83880 ASSAY OF NATRIURETIC PEPTIDE: CPT | Performed by: EMERGENCY MEDICINE

## 2024-11-13 PROCEDURE — 83880 ASSAY OF NATRIURETIC PEPTIDE: CPT | Performed by: STUDENT IN AN ORGANIZED HEALTH CARE EDUCATION/TRAINING PROGRAM

## 2024-11-13 PROCEDURE — 99285 EMERGENCY DEPT VISIT HI MDM: CPT | Mod: 25

## 2024-11-13 PROCEDURE — 76604 US EXAM CHEST: CPT | Performed by: EMERGENCY MEDICINE

## 2024-11-13 PROCEDURE — 71046 X-RAY EXAM CHEST 2 VIEWS: CPT

## 2024-11-13 PROCEDURE — 99285 EMERGENCY DEPT VISIT HI MDM: CPT | Performed by: EMERGENCY MEDICINE

## 2024-11-13 PROCEDURE — 36415 COLL VENOUS BLD VENIPUNCTURE: CPT | Performed by: STUDENT IN AN ORGANIZED HEALTH CARE EDUCATION/TRAINING PROGRAM

## 2024-11-13 PROCEDURE — 86923 COMPATIBILITY TEST ELECTRIC: CPT

## 2024-11-13 PROCEDURE — 84484 ASSAY OF TROPONIN QUANT: CPT | Performed by: STUDENT IN AN ORGANIZED HEALTH CARE EDUCATION/TRAINING PROGRAM

## 2024-11-13 PROCEDURE — 93005 ELECTROCARDIOGRAM TRACING: CPT

## 2024-11-13 PROCEDURE — 93010 ELECTROCARDIOGRAM REPORT: CPT | Performed by: EMERGENCY MEDICINE

## 2024-11-13 PROCEDURE — 85025 COMPLETE CBC W/AUTO DIFF WBC: CPT | Performed by: EMERGENCY MEDICINE

## 2024-11-13 PROCEDURE — 85025 COMPLETE CBC W/AUTO DIFF WBC: CPT | Performed by: STUDENT IN AN ORGANIZED HEALTH CARE EDUCATION/TRAINING PROGRAM

## 2024-11-13 PROCEDURE — 96365 THER/PROPH/DIAG IV INF INIT: CPT

## 2024-11-13 PROCEDURE — 80053 COMPREHEN METABOLIC PANEL: CPT | Performed by: STUDENT IN AN ORGANIZED HEALTH CARE EDUCATION/TRAINING PROGRAM

## 2024-11-13 PROCEDURE — 86706 HEP B SURFACE ANTIBODY: CPT | Performed by: INTERNAL MEDICINE

## 2024-11-13 PROCEDURE — 71046 X-RAY EXAM CHEST 2 VIEWS: CPT | Mod: FOREIGN READ | Performed by: RADIOLOGY

## 2024-11-13 PROCEDURE — 93308 TTE F-UP OR LMTD: CPT | Performed by: EMERGENCY MEDICINE

## 2024-11-13 PROCEDURE — 80053 COMPREHEN METABOLIC PANEL: CPT | Performed by: EMERGENCY MEDICINE

## 2024-11-13 PROCEDURE — 76604 US EXAM CHEST: CPT

## 2024-11-13 RX ORDER — AZITHROMYCIN 500 MG/1
500 TABLET, FILM COATED ORAL ONCE
Status: COMPLETED | OUTPATIENT
Start: 2024-11-14 | End: 2024-11-14

## 2024-11-13 RX ORDER — CEFTRIAXONE 1 G/50ML
1 INJECTION, SOLUTION INTRAVENOUS ONCE
Status: COMPLETED | OUTPATIENT
Start: 2024-11-13 | End: 2024-11-14

## 2024-11-13 ASSESSMENT — PAIN SCALES - GENERAL: PAINLEVEL_OUTOF10: 0 - NO PAIN

## 2024-11-13 ASSESSMENT — LIFESTYLE VARIABLES
EVER HAD A DRINK FIRST THING IN THE MORNING TO STEADY YOUR NERVES TO GET RID OF A HANGOVER: NO
TOTAL SCORE: 0
EVER FELT BAD OR GUILTY ABOUT YOUR DRINKING: NO
HAVE YOU EVER FELT YOU SHOULD CUT DOWN ON YOUR DRINKING: NO
HAVE PEOPLE ANNOYED YOU BY CRITICIZING YOUR DRINKING: NO

## 2024-11-13 ASSESSMENT — PAIN - FUNCTIONAL ASSESSMENT: PAIN_FUNCTIONAL_ASSESSMENT: 0-10

## 2024-11-14 ENCOUNTER — CLINICAL SUPPORT (OUTPATIENT)
Dept: EMERGENCY MEDICINE | Facility: HOSPITAL | Age: 54
DRG: 640 | End: 2024-11-14
Payer: COMMERCIAL

## 2024-11-14 ENCOUNTER — APPOINTMENT (OUTPATIENT)
Dept: DIALYSIS | Facility: HOSPITAL | Age: 54
End: 2024-11-14
Payer: COMMERCIAL

## 2024-11-14 PROBLEM — R06.02 SHORTNESS OF BREATH: Status: ACTIVE | Noted: 2024-11-14

## 2024-11-14 LAB
ANION GAP SERPL CALC-SCNC: 21 MMOL/L (ref 10–20)
BUN SERPL-MCNC: 87 MG/DL (ref 6–23)
CALCIUM SERPL-MCNC: 9.6 MG/DL (ref 8.6–10.6)
CHLORIDE SERPL-SCNC: 93 MMOL/L (ref 98–107)
CO2 SERPL-SCNC: 27 MMOL/L (ref 21–32)
CREAT SERPL-MCNC: 12.12 MG/DL (ref 0.5–1.3)
EGFRCR SERPLBLD CKD-EPI 2021: 5 ML/MIN/1.73M*2
ERYTHROCYTE [DISTWIDTH] IN BLOOD BY AUTOMATED COUNT: 15.7 % (ref 11.5–14.5)
GLUCOSE BLD MANUAL STRIP-MCNC: 285 MG/DL (ref 74–99)
GLUCOSE BLD MANUAL STRIP-MCNC: 309 MG/DL (ref 74–99)
GLUCOSE BLD MANUAL STRIP-MCNC: 311 MG/DL (ref 74–99)
GLUCOSE BLD MANUAL STRIP-MCNC: 313 MG/DL (ref 74–99)
GLUCOSE BLD MANUAL STRIP-MCNC: 374 MG/DL (ref 74–99)
GLUCOSE SERPL-MCNC: 299 MG/DL (ref 74–99)
HBV SURFACE AB SER-ACNC: 200.1 MIU/ML
HBV SURFACE AG SERPL QL IA: NONREACTIVE
HCT VFR BLD AUTO: 21.9 % (ref 41–52)
HGB BLD-MCNC: 7.4 G/DL (ref 13.5–17.5)
MCH RBC QN AUTO: 30.6 PG (ref 26–34)
MCHC RBC AUTO-ENTMCNC: 33.8 G/DL (ref 32–36)
MCV RBC AUTO: 91 FL (ref 80–100)
NRBC BLD-RTO: 0 /100 WBCS (ref 0–0)
PLATELET # BLD AUTO: 203 X10*3/UL (ref 150–450)
POTASSIUM SERPL-SCNC: 4.5 MMOL/L (ref 3.5–5.3)
PROCALCITONIN SERPL-MCNC: 1.14 NG/ML
RBC # BLD AUTO: 2.42 X10*6/UL (ref 4.5–5.9)
SODIUM SERPL-SCNC: 136 MMOL/L (ref 136–145)
WBC # BLD AUTO: 6.8 X10*3/UL (ref 4.4–11.3)

## 2024-11-14 PROCEDURE — 2500000004 HC RX 250 GENERAL PHARMACY W/ HCPCS (ALT 636 FOR OP/ED): Performed by: NURSE PRACTITIONER

## 2024-11-14 PROCEDURE — 2500000002 HC RX 250 W HCPCS SELF ADMINISTERED DRUGS (ALT 637 FOR MEDICARE OP, ALT 636 FOR OP/ED): Performed by: NURSE PRACTITIONER

## 2024-11-14 PROCEDURE — 36415 COLL VENOUS BLD VENIPUNCTURE: CPT | Performed by: INTERNAL MEDICINE

## 2024-11-14 PROCEDURE — 2500000004 HC RX 250 GENERAL PHARMACY W/ HCPCS (ALT 636 FOR OP/ED)

## 2024-11-14 PROCEDURE — 82374 ASSAY BLOOD CARBON DIOXIDE: CPT | Performed by: NURSE PRACTITIONER

## 2024-11-14 PROCEDURE — 99223 1ST HOSP IP/OBS HIGH 75: CPT | Performed by: INTERNAL MEDICINE

## 2024-11-14 PROCEDURE — 93005 ELECTROCARDIOGRAM TRACING: CPT

## 2024-11-14 PROCEDURE — G0378 HOSPITAL OBSERVATION PER HR: HCPCS

## 2024-11-14 PROCEDURE — 2500000001 HC RX 250 WO HCPCS SELF ADMINISTERED DRUGS (ALT 637 FOR MEDICARE OP): Performed by: NURSE PRACTITIONER

## 2024-11-14 PROCEDURE — 82947 ASSAY GLUCOSE BLOOD QUANT: CPT

## 2024-11-14 PROCEDURE — 2500000001 HC RX 250 WO HCPCS SELF ADMINISTERED DRUGS (ALT 637 FOR MEDICARE OP)

## 2024-11-14 PROCEDURE — 84145 PROCALCITONIN (PCT): CPT | Performed by: NURSE PRACTITIONER

## 2024-11-14 PROCEDURE — 99223 1ST HOSP IP/OBS HIGH 75: CPT | Performed by: NURSE PRACTITIONER

## 2024-11-14 PROCEDURE — 36415 COLL VENOUS BLD VENIPUNCTURE: CPT | Performed by: NURSE PRACTITIONER

## 2024-11-14 PROCEDURE — 85027 COMPLETE CBC AUTOMATED: CPT | Performed by: NURSE PRACTITIONER

## 2024-11-14 PROCEDURE — 87340 HEPATITIS B SURFACE AG IA: CPT | Performed by: INTERNAL MEDICINE

## 2024-11-14 PROCEDURE — 2500000004 HC RX 250 GENERAL PHARMACY W/ HCPCS (ALT 636 FOR OP/ED): Performed by: INTERNAL MEDICINE

## 2024-11-14 PROCEDURE — 1200000002 HC GENERAL ROOM WITH TELEMETRY DAILY

## 2024-11-14 RX ORDER — ACETAMINOPHEN 650 MG/1
650 SUPPOSITORY RECTAL EVERY 4 HOURS PRN
Status: DISCONTINUED | OUTPATIENT
Start: 2024-11-14 | End: 2024-11-16 | Stop reason: HOSPADM

## 2024-11-14 RX ORDER — INSULIN LISPRO 100 [IU]/ML
0-5 INJECTION, SOLUTION INTRAVENOUS; SUBCUTANEOUS
Status: DISCONTINUED | OUTPATIENT
Start: 2024-11-14 | End: 2024-11-15

## 2024-11-14 RX ORDER — ACETAMINOPHEN 325 MG/1
650 TABLET ORAL EVERY 4 HOURS PRN
Status: DISCONTINUED | OUTPATIENT
Start: 2024-11-14 | End: 2024-11-16 | Stop reason: HOSPADM

## 2024-11-14 RX ORDER — INSULIN GLARGINE 100 [IU]/ML
10 INJECTION, SOLUTION SUBCUTANEOUS 2 TIMES DAILY
Status: DISCONTINUED | OUTPATIENT
Start: 2024-11-14 | End: 2024-11-15

## 2024-11-14 RX ORDER — POLYETHYLENE GLYCOL 3350 17 G/17G
17 POWDER, FOR SOLUTION ORAL DAILY PRN
Status: DISCONTINUED | OUTPATIENT
Start: 2024-11-14 | End: 2024-11-16 | Stop reason: HOSPADM

## 2024-11-14 RX ORDER — CARVEDILOL 25 MG/1
25 TABLET ORAL 2 TIMES DAILY
Status: DISCONTINUED | OUTPATIENT
Start: 2024-11-14 | End: 2024-11-16 | Stop reason: HOSPADM

## 2024-11-14 RX ORDER — DIPHENHYDRAMINE HCL 25 MG
50 CAPSULE ORAL ONCE
Status: COMPLETED | OUTPATIENT
Start: 2024-11-15 | End: 2024-11-15

## 2024-11-14 RX ORDER — ACETAMINOPHEN 160 MG/5ML
650 SOLUTION ORAL EVERY 4 HOURS PRN
Status: DISCONTINUED | OUTPATIENT
Start: 2024-11-14 | End: 2024-11-16 | Stop reason: HOSPADM

## 2024-11-14 RX ORDER — SEVELAMER CARBONATE 800 MG/1
800 TABLET, FILM COATED ORAL
Status: DISCONTINUED | OUTPATIENT
Start: 2024-11-14 | End: 2024-11-16

## 2024-11-14 RX ORDER — DEXTROSE 50 % IN WATER (D50W) INTRAVENOUS SYRINGE
25
Status: DISCONTINUED | OUTPATIENT
Start: 2024-11-14 | End: 2024-11-16 | Stop reason: HOSPADM

## 2024-11-14 RX ORDER — ACETAMINOPHEN 500 MG
10 TABLET ORAL NIGHTLY PRN
Status: DISCONTINUED | OUTPATIENT
Start: 2024-11-14 | End: 2024-11-16 | Stop reason: HOSPADM

## 2024-11-14 RX ORDER — FAMOTIDINE 20 MG/1
10 TABLET, FILM COATED ORAL NIGHTLY PRN
Status: DISCONTINUED | OUTPATIENT
Start: 2024-11-14 | End: 2024-11-16 | Stop reason: HOSPADM

## 2024-11-14 RX ORDER — ASPIRIN 81 MG/1
81 TABLET ORAL DAILY
Status: DISCONTINUED | OUTPATIENT
Start: 2024-11-14 | End: 2024-11-16 | Stop reason: HOSPADM

## 2024-11-14 RX ORDER — SEVELAMER CARBONATE 800 MG/1
800 TABLET, FILM COATED ORAL
Status: ON HOLD | COMMUNITY
End: 2024-11-16

## 2024-11-14 RX ORDER — DEXTROSE 50 % IN WATER (D50W) INTRAVENOUS SYRINGE
12.5
Status: DISCONTINUED | OUTPATIENT
Start: 2024-11-14 | End: 2024-11-16 | Stop reason: HOSPADM

## 2024-11-14 RX ORDER — ACETAMINOPHEN 500 MG
500 TABLET ORAL EVERY 6 HOURS PRN
COMMUNITY

## 2024-11-14 RX ORDER — FUROSEMIDE 10 MG/ML
80 INJECTION INTRAMUSCULAR; INTRAVENOUS ONCE
Status: COMPLETED | OUTPATIENT
Start: 2024-11-14 | End: 2024-11-14

## 2024-11-14 RX ADMIN — SEVELAMER CARBONATE 800 MG: 800 TABLET, FILM COATED ORAL at 23:51

## 2024-11-14 RX ADMIN — ASPIRIN 81 MG: 81 TABLET, COATED ORAL at 08:16

## 2024-11-14 RX ADMIN — PREDNISONE 50 MG: 10 TABLET ORAL at 16:45

## 2024-11-14 RX ADMIN — INSULIN LISPRO 3 UNITS: 100 INJECTION, SOLUTION INTRAVENOUS; SUBCUTANEOUS at 06:33

## 2024-11-14 RX ADMIN — SEVELAMER CARBONATE 800 MG: 800 TABLET, FILM COATED ORAL at 11:13

## 2024-11-14 RX ADMIN — FUROSEMIDE 80 MG: 10 INJECTION, SOLUTION INTRAMUSCULAR; INTRAVENOUS at 02:39

## 2024-11-14 RX ADMIN — SACUBITRIL AND VALSARTAN 1 TABLET: 24; 26 TABLET, FILM COATED ORAL at 02:39

## 2024-11-14 RX ADMIN — INSULIN GLARGINE 10 UNITS: 100 INJECTION, SOLUTION SUBCUTANEOUS at 23:52

## 2024-11-14 RX ADMIN — CARVEDILOL 25 MG: 12.5 TABLET, FILM COATED ORAL at 02:52

## 2024-11-14 RX ADMIN — AZITHROMYCIN DIHYDRATE 500 MG: 500 TABLET ORAL at 00:17

## 2024-11-14 RX ADMIN — ASCORBIC ACID, THIAMINE MONONITRATE,RIBOFLAVIN, NIACINAMIDE, PYRIDOXINE HYDROCHLORIDE, FOLIC ACID, CYANOCOBALAMIN, BIOTIN, CALCIUM PANTOTHENATE, 1 CAPSULE: 100; 1.5; 1.7; 20; 10; 1; 6000; 150000; 5 CAPSULE, LIQUID FILLED ORAL at 08:18

## 2024-11-14 RX ADMIN — SEVELAMER CARBONATE 800 MG: 800 TABLET, FILM COATED ORAL at 08:16

## 2024-11-14 RX ADMIN — INSULIN LISPRO 4 UNITS: 100 INJECTION, SOLUTION INTRAVENOUS; SUBCUTANEOUS at 15:59

## 2024-11-14 RX ADMIN — INSULIN GLARGINE 10 UNITS: 100 INJECTION, SOLUTION SUBCUTANEOUS at 02:39

## 2024-11-14 RX ADMIN — PREDNISONE 50 MG: 10 TABLET ORAL at 12:50

## 2024-11-14 RX ADMIN — CEFTRIAXONE SODIUM 1 G: 1 INJECTION, SOLUTION INTRAVENOUS at 00:17

## 2024-11-14 RX ADMIN — INSULIN LISPRO 4 UNITS: 100 INJECTION, SOLUTION INTRAVENOUS; SUBCUTANEOUS at 11:02

## 2024-11-14 RX ADMIN — SACUBITRIL AND VALSARTAN 1 TABLET: 24; 26 TABLET, FILM COATED ORAL at 23:51

## 2024-11-14 RX ADMIN — APIXABAN 2.5 MG: 2.5 TABLET, FILM COATED ORAL at 02:39

## 2024-11-14 ASSESSMENT — ENCOUNTER SYMPTOMS
SHORTNESS OF BREATH: 1
VOMITING: 0
PALPITATIONS: 0
FREQUENCY: 0
ABDOMINAL PAIN: 0
CHILLS: 0
CONSTIPATION: 0
FLANK PAIN: 0
DYSURIA: 0
DIARRHEA: 0
HEMATURIA: 0
NAUSEA: 0
FEVER: 0

## 2024-11-14 ASSESSMENT — PAIN SCALES - GENERAL: PAINLEVEL_OUTOF10: 0 - NO PAIN

## 2024-11-14 ASSESSMENT — PAIN - FUNCTIONAL ASSESSMENT
PAIN_FUNCTIONAL_ASSESSMENT: 0-10
PAIN_FUNCTIONAL_ASSESSMENT: NO/DENIES PAIN

## 2024-11-14 NOTE — H&P
History Of Present Illness  Kentrell Carreon is a 54 y.o. male with a past medical history of discoid lupus/SLE with ESRD on HD (M/T/Th/F), severe aortic stenosis, CAD s/p PCI, systolic heart failure, remote PE (not taking Eliquis), DM and HTN who presented to the ED with chest pain, shortness of breath, and fatigue. His symptoms are worse with exertion. He does endorse some orthopnea. He notes a few episodes of night sweats, but denies any fever, chills, palpitations, or swelling. He notes typically fluid overloaded, he does not develop edema. He reports his last HD last night. He stopped taking his Eliquis due to bleeding issues after HD. He did undergo a CT with contrast for TAVR on 9/4/2024 and at that time an isolated right segmental PE was found. He is unsure whether he was given steroids prior to the CT, and believes he has previously tolerated contrast with only benadryl.      Past Medical History  Past Medical History:   Diagnosis Date    Aortic stenosis     CAD (coronary artery disease)     CHF (congestive heart failure) (Multi)     ESRD (end stage renal disease) (Multi)     HTN (hypertension)     Systemic lupus erythematosus, unspecified (Multi)     Type 1 diabetes mellitus with other diabetic kidney complication (Multi)        Surgical History  Past Surgical History:   Procedure Laterality Date    AV FISTULA PLACEMENT      BUNIONECTOMY Right 08/2023    CARDIAC CATHETERIZATION N/A 6/7/2024    Procedure: Left Heart Cath with Coronary Angiography and LV;  Surgeon: Valerie Horner MD;  Location: [facility] Cardiac Cath Lab;  Service: Cardiovascular;  Laterality: N/A;  MARION needed prior to cath (to be scheduled in the morning).    CARDIAC ELECTROPHYSIOLOGY PROCEDURE N/A 8/5/2024    Procedure: Ablation SVT;  Surgeon: Nik Vizcarra MD;  Location: [place] [address] Cardiac Cath Lab;  Service: Electrophysiology;  Laterality: N/A;    COLONOSCOPY      CORONARY ANGIOPLASTY WITH STENT PLACEMENT  06/20/2017    LAD ALEXEY    CORONARY  ARTERY BYPASS GRAFT  03/26/2022    HENDERSON to LAD, saphenous vein graft to the obtuse marginal, and saphenous vein graft to PDA.    LUNG SURGERY Left 05/26/2022    Emergent VATS evacuation of hemothorax    TOE AMPUTATION          Social History  He reports that he has quit smoking. His smoking use included cigarettes. He has been exposed to tobacco smoke. He has never used smokeless tobacco. He reports current alcohol use. He reports that he does not use drugs.    Family History  Family History   Problem Relation Name Age of Onset    Heart failure Mother      Heart attack Father          Allergies  Iodinated contrast media, Ampicillin-sulbactam, and [place]    Review of Systems   Constitutional:  Negative for chills and fever.   Respiratory:  Positive for shortness of breath.    Cardiovascular:  Positive for chest pain. Negative for palpitations.   Gastrointestinal:  Negative for abdominal pain, constipation, diarrhea, nausea and vomiting.   Genitourinary:  Negative for dysuria, flank pain, frequency, hematuria and urgency.   All other systems reviewed and are negative.       Physical Exam  Vitals reviewed.   Constitutional:       Appearance: He is underweight. He is not ill-appearing or diaphoretic.   HENT:      Head: Normocephalic and atraumatic.   Cardiovascular:      Rate and Rhythm: Normal rate and regular rhythm.      Heart sounds: Normal heart sounds.   Pulmonary:      Effort: Pulmonary effort is normal.      Breath sounds: Normal air entry. Rhonchi present.   Abdominal:      General: Bowel sounds are normal.      Palpations: Abdomen is soft.      Tenderness: There is no abdominal tenderness.   Musculoskeletal:         General: No deformity.   Skin:     General: Skin is warm and dry.   Neurological:      General: No focal deficit present.      Mental Status: He is alert and oriented to person, place, and time.   Psychiatric:         Mood and Affect: Mood normal.         Behavior: Behavior normal.         "  Last Recorded Vitals  Blood pressure 154/81, pulse 75, temperature 36.7 °C (98.1 °F), temperature source Temporal, resp. rate 16, height 2.032 m (6' 8\"), weight 72.6 kg (160 lb), SpO2 97%.    Relevant Results      Lab Results   Component Value Date    WBC 8.3 11/13/2024    HGB 8.8 (L) 11/13/2024    HCT 26.5 (L) 11/13/2024    MCV 91 11/13/2024     11/13/2024     Lab Results   Component Value Date    GLUCOSE 264 (H) 11/13/2024    CALCIUM 10.3 11/13/2024     (L) 11/13/2024    K 4.2 11/13/2024    CO2 28 11/13/2024    CL 90 (L) 11/13/2024    BUN 82 (H) 11/13/2024    CREATININE 11.51 (H) 11/13/2024     Lab Results   Component Value Date    HGBA1C 7.9 (H) 08/01/2024     XR chest 2 views  Narrative: STUDY:  Chest Radiographs;  11/13/2024, 11:10 PM  INDICATION:  Chest pain and shortness of breath.  COMPARISON:  CXR 03/25/2024, 02/06/2024, and 03/18/2023.  ACCESSION NUMBER(S):  [id number]  ORDERING CLINICIAN:  BEVERLEY MERIDA  TECHNIQUE:  Frontal and lateral chest.   FINDINGS:  CARDIOMEDIASTINAL SILHOUETTE:  Cardiomediastinal silhouette is enlarged.  This is unchanged.  There  is development of early infiltrate left lower lobe.  Pulmonary  vasculature is borderline prominent.  There is suggestion of tiny  effusions on the lateral view..        ABDOMEN:  No remarkable upper abdominal findings.     BONES:  No acute osseous changes.  Impression: Development of early infiltrate left lower lobe.  Suggestion of tiny  effusions lateral view.  Signed by Ramsey Voss DO  Point of Care Ultrasound  Parish Callahan DO     11/13/2024 11:05 PM    Performed by: Parish Callahan DO  Authorized by: Beverley Merida MD  Cardiac Indications: chest pain    Procedure: Cardiac Ultrasound    Findings:   Views: parasternal long, parasternal short, apical four and subxiphoid  The pericardial space was visualized and was NEGATIVE for a significant   pericardial effusion.  Activity: Ventricular contractions were " visualized.  LV: LV systolic function was DECREASED.  RV: RV size was NORMAL.    Impression:  Cardiac: The focused cardiac ultrasound exam had ABNORMAL findings as   specified. and Aortic valve stenosis noted  Procedure: Thoracic Ultrasound    Findings:  R Lung Sliding: The RIGHT chest was evaluated and there was NO lung   sliding.  L Lung Sliding: The LEFT chest was evaluated and there was NO lung   sliding.  R Effusion: The RIGHT chest was evaluated and there was NO PLEURAL   EFFUSION.  L Effusion: The LEFT chest was evaluated and there was NO PLEURAL   EFFUSION.  A-lines: The RIGHT chest was evaluated and there were NO A-LINES   visualized  B-lines: The RIGHT chest was evaluated and multiple B-LINES were   visualized  R Consolidation: The RIGHT chest was evaluated and there was NO RIGHT   CONSOLIDATION.  L Consolidation: The LEFT chest was evaluated and there was NO LEFT   CONSOLIDATION.    Impression:  Thorax: The focused thoracic ultrasound exam had ABNORMAL findings as   specified.    ED Medication Administration from 11/13/2024 1933 to 11/14/2024 0032         Date/Time Order Dose Route Action Action by     11/14/2024 0017 EST azithromycin (Zithromax) tablet 500 mg 500 mg oral Given Isaías, O     11/14/2024 0017 EST cefTRIAXone (Rocephin) 1 g in dextrose (iso) IV 50 mL 1 g intravenous New Bag Isaías, O               Assessment/Plan   Assessment & Plan      #Shortness of breath  #r/o Pneumonia, LLL  #History of PE  -CXR with earlier LLL infiltrate  -Rocephin, Zithromax started in ED  -POCUS +ve for diffuse B lines, hypervolemia should be considered  -Will check procal  -PE from CT 8/2024  -PE worsening could explain symptoms, will resume Eliquis, first dose tonight    #ESRD  #Uremia  #HFmrEF  -EF 38% 8/2024  -RFP seems to suggest missed/ineffective dialysis  -BNP >5000  -Consult nephrology  -Continue GDMT    #Elevated troponin  -Mildly elevated, NSTEMI from 08/2024 noted  -Downtrending  -Doubt ACS at this time,  favor demand mismatch 2/2 #1 in ESRD patient  -Telemetry    #T2DM  -Continue home Lantus 10 units BID  -Low SSI  -Hypoglycemic protocol             Michael Wallace, SOTERO-CNP

## 2024-11-14 NOTE — CONSULTS
Inpatient consult to Cardiology  Consult performed by: Alin Aponte MD  Consult ordered by: Angel Merlos MD        History Of Present Illness:    Kentrell Carreon is a 54 y.o. male with a past medical history of discoid lupus/SLE with ESRD on HD (M/T/Th/F), severe aortic stenosis, CAD s/p PCI, systolic heart failure, remote PE (not taking Eliquis), DM and HTN who presented to the ED with chest pain, shortness of breath, and fatigue.      The patient reports that his symptoms were ongoing for 3 weeks and reports a sharp chest pain that's worse with cough or inspiration. Of note, the patient is seeing cardiology as outpatient for severe aortic stenosis and was noted to have an isolated right segmental PE on CT TAVR for which he was placed on eliquis but it was stopped due to bleeding issues after HD. In the ED, he was noted to have a troponin of 400 that down trended to 382 (in the setting of ESRD) along with elevated liver enzymes.      Last Recorded Vitals:  Vitals:    11/14/24 0654 11/14/24 1150 11/14/24 1154 11/14/24 1615   BP: 137/62  117/79 139/75   BP Location: Left arm   Left arm   Patient Position: Sitting   Lying   Pulse: 71 72  70   Resp: 18 12 18 13   Temp:    36.6 °C (97.9 °F)   TempSrc:    Temporal   SpO2: 97% 95%  96%   Weight:       Height:           Last Labs:  CBC - 11/14/2024:  4:39 AM  6.8 7.4 203    21.9      CMP - 11/14/2024:  4:39 AM  9.6 8.4 179 --- 0.5   6.5 4.1 482 106      PTT - 8/5/2024:  6:54 AM  1.0   11.0 29     Troponin I, High Sensitivity   Date/Time Value Ref Range Status   05/31/2024 11:57  (HH) 0 - 20 ng/L Final     Comment:     Previous result verified on 5/31/2024 2240 on specimen/case [id number] called with component [facility] for procedure Troponin I, High Sensitivity, Initial with value 851 ng/L.   05/31/2024 10:00  (HH) 0 - 20 ng/L Final   03/26/2024 03:14  () 0 - 20 ng/L Final     Comment:     Previous result verified on 3/25/2024 3626 on  specimen/case [id number] called with component [facility] for procedure Troponin I, High Sensitivity, Initial with value 391 ng/L.     Troponin I, High Sensitivity (CMC)   Date/Time Value Ref Range Status   11/13/2024 09:57  (HH) 0 - 53 ng/L Final     Comment:     Previous result verified on 11/13/2024 2117 on specimen/case [id number] called with component [facility]PH[facility] for procedure Troponin I, High Sensitivity, Initial with value 411 ng/L.   11/13/2024 08:22  (HH) 0 - 53 ng/L Final   08/01/2024 09:42 ,690 (HH) 0 - 53 ng/L Final     Comment:     Previous result verified on 8/1/2024 0209 on specimen/case [id number] called with component [facility] for procedure Troponin, High Sensitivity, 1 Hour with value 1,443 ng/L.     BNP   Date/Time Value Ref Range Status   11/13/2024 08:22 PM >5,000 (H) 0 - 99 pg/mL Final   05/31/2024 10:00 PM >4,700 (H) 0 - 99 pg/mL Final     POC HEMOGLOBIN A1c   Date/Time Value Ref Range Status   02/27/2024 02:04 PM 7.0 (A) 4.2 - 6.5 % Final   10/17/2023 10:39 AM 8.3 (A) 4.2 - 6.5 % Final     Hemoglobin A1C   Date/Time Value Ref Range Status   08/01/2024 12:11 AM 7.9 (H) see below % Final   06/01/2024 08:05 AM 7.5 (H) see below % Final     VLDL   Date/Time Value Ref Range Status   03/10/2022 07:02 AM 22 0 - 40 mg/dL Final      Last I/O:  I/O last 3 completed shifts:  In: 50 (0.7 mL/kg) [IV Piggyback:50]  Out: - (0 mL/kg)   Weight: 72.6 kg     Past Cardiology Tests (Last 3 Years):  EKG:  ECG 12 lead 11/13/2024 (Preliminary)      ECG 12 Lead 09/09/2024      ECG 12 Lead       ECG 12 Lead 08/01/2024      ECG 12 Lead 08/01/2024      ECG 12 lead 08/01/2024      ECG 12 lead 08/01/2024      Electrocardiogram, 12-lead PRN ACS symptoms 07/31/2024      Electrocardiogram 12 Lead 06/07/2024      Electrocardiogram, 12-lead PRN ACS symptoms 05/31/2024      ECG 12 lead 03/26/2024      ECG 12 lead 03/25/2024      ECG 12 lead 03/25/2024      ECG 12 lead 02/06/2024    Echo:  Transthoracic Echo  (TTE) Complete 08/01/2024      Transthoracic Echo (TTE) Limited 06/08/2024      Transesophageal Echo (MARION) 06/07/2024      Transthoracic Echo (TTE) Complete 06/05/2024      Transthoracic Echo (TTE) Complete 03/06/2024    Ejection Fractions:  EF   Date/Time Value Ref Range Status   08/01/2024 03:17 PM 38 %    03/06/2024 02:34 PM 44 %      Cath:  Cardiac Catheterization Procedure 06/07/2024    Stress Test:  No results found for this or any previous visit from the past 1095 days.    Cardiac Imaging:  No results found for this or any previous visit from the past 1095 days.      Past Medical History:  He has a past medical history of Aortic stenosis, CAD (coronary artery disease), CHF (congestive heart failure) (Multi), ESRD (end stage renal disease) (Multi), HTN (hypertension), Systemic lupus erythematosus, unspecified (Multi), and Type 1 diabetes mellitus with other diabetic kidney complication (Multi).    Past Surgical History:  He has a past surgical history that includes Coronary angioplasty with stent (06/20/2017); Bunionectomy (Right, 08/2023); AV fistula placement; Colonoscopy; Lung surgery (Left, 05/26/2022); Coronary artery bypass graft (03/26/2022); Toe amputation; Cardiac catheterization (N/A, 6/7/2024); and Cardiac electrophysiology procedure (N/A, 8/5/2024).      Social History:  He reports that he has quit smoking. His smoking use included cigarettes. He has been exposed to tobacco smoke. He has never used smokeless tobacco. He reports current alcohol use. He reports that he does not use drugs.    Family History:  Family History   Problem Relation Name Age of Onset    Heart failure Mother      Heart attack Father          Allergies:  Iodinated contrast media, Ampicillin-sulbactam, and [place]    Inpatient Medications:  Scheduled medications   Medication Dose Route Frequency    apixaban  2.5 mg oral Daily    aspirin  81 mg oral Daily    carvedilol  25 mg oral BID    [START ON 11/15/2024] diphenhydrAMINE   "50 mg oral Once    insulin glargine  10 Units subcutaneous BID    insulin lispro  0-5 Units subcutaneous TID AC    predniSONE  50 mg oral q6h    sacubitriL-valsartan  1 tablet oral BID    sevelamer carbonate  800 mg oral TID    vitamin B complex-vitamin C-folic acid  1 capsule oral Daily     PRN medications   Medication    acetaminophen    Or    acetaminophen    Or    acetaminophen    dextrose    dextrose    famotidine    glucagon    glucagon    melatonin    polyethylene glycol     Continuous Medications   Medication Dose Last Rate     Outpatient Medications:  Current Outpatient Medications   Medication Instructions    acetaminophen (TYLENOL) 500 mg, oral, Every 6 hours PRN    apixaban (ELIQUIS) 2.5 mg, Daily    aspirin 81 mg EC tablet 1 tablet, Daily    atorvastatin (LIPITOR) 80 mg, oral, Daily    B complex-vitamin C-folic acid (Nephrocaps) 1 mg capsule 1 capsule, oral, Daily    blood-glucose sensor (FreeStyle José Luis 3 Sensor) device Change sensor every 14 days    carvedilol (COREG) 25 mg, oral, 2 times daily    EPOETIN DEIRDRE INJ 8,000 Units    famotidine (PEPCID) 10 mg, oral, Nightly PRN    FreeStyle José Luis reader (FreeStyle José Luis 2 [place]) misc Use to check blood sugars at least every 8 hours    FreeStyle José Luis sensor system (FreeStyle José Luis 2 Sensor) kit Change sensor every 14 days. Check blood sugar at least every 8 hours    insulin glargine (Lantus) 100 unit/mL (3 mL) pen Inject 10 units twice a day    insulin lispro (HumaLOG KwikPen Insulin) 100 unit/mL injection Inject before every meal using carb ratio and correction scale. MDD 50 units.    multivitamin (Daily Multi-Vitamin) tablet 1 tablet, Daily    pen needle, diabetic (BD Agueda 2nd Gen Pen Needle) 32 gauge x 5/32\" needle USE TO INJECT INSULIN 5 TIMES PER DAY *Please schedule follow up for further refills*    sacubitriL-valsartan (Entresto) 24-26 mg tablet 1 tablet, oral, 2 times daily    sevelamer carbonate (RENVELA) 800 mg, 3 times daily (morning, midday, " late afternoon)     EKG: Sinus with TWI over V5-V6 that appear unchanged.    Assessment/Plan   Kentrell Carreon is a 54 y.o. male with a past medical history of discoid lupus/SLE with ESRD on HD (M/T/Th/F), moderate-severe aortic stenosis, CAD s/p PCI, systolic heart failure, remote PE (not taking Eliquis), DM and HTN who presented to the ED with chest pain, shortness of breath, and fatigue.      The patient's presented with pleuritic chest pain, SOB and fatigue in the setting of severe aortic stenosis. The Ddx is currently wide and included recurrence of PE, uremia and volume overload. Unlikely to be related to worsening aortic stenosis     Recommendations:  -Agree with repeat CT angiogram of the chest to assess for recurrence of PE.  -Agree with Abx for possible PNA  -Could be related to uremia in the setting of possible underdialysis.  -Cardiology will continue to follow.    Peripheral IV 11/13/24 20 G Left Forearm (Active)   Site Assessment Clean;Dry;Intact 11/13/24 2023   Dressing Type Transparent 11/13/24 2023   Line Status Flushed;Blood return noted 11/13/24 2023   Dressing Status Clean;Dry;Occlusive 11/13/24 2023   Number of days: 1       Code Status:  Full Code    I spent 30 minutes in the professional and overall care of this patient.    Alin Aponte MD  Cardiology Fellow PGY5    Thank you for involving us in this patient care. Recommendations not final until signed by attending.     General Cardiology Consult Pager: [phone] (weekday 7AM-6PM and weekend 7AM-2PM) and other: [phone]  EP Consult Pager: [phone] (weekday 7AM-6PM and weekend 7AM-2PM) and other: [phone]  CICU Fellow Pager: [phone] anytime  EP Device Nurse Pager: [phone] (weekday 7AM-4PM)  Advanced Heart Failure Consult Pager: [phone] anytime

## 2024-11-14 NOTE — ED PROVIDER NOTES
Emergency Department Provider Note        History of Present Illness     History provided by: Patient  Limitations to History: None  External Records Reviewed with Brief Summary:  Note from cardiology conference, reports history of severe aortic stenosis with ESRD, deemed not a candidate for kidney transplant thus favor TAVR.    HPI:  Kentrell Carreon is a 54 y.o. male with PMHx of discoid lupus/SLE with ESRD on HD (M/T/Th/F), severe aortic stenosis, CAD s/p PCI, systolic heart failure, HTN, presents to the ED today with worsening shortness of breath mostly on exertion, or while lying flat over the last 3 weeks.  Patient also endorses intermittent chest pain which changes from sharp and stabbing to dull achy pressure-like pain.  Patient also states that he has been more fatigued than usual and has no interest in doing normal daily activities.  Also reports recent cough, that appears to be worse in the morning upon waking up with some production of sputum.  Denies fever/chills, headache, palpitations, nausea/vomiting, abdominal pain, lower extremity edema.  Patient does report that the chest pain does occasionally feel worse with deep inspiration.  Patient also recently discussed that he is supposed to be on Eliquis and has gone through recent medication changes and discussion with nephrology and cardiology, of note for the last month he decided to stop taking it himself due to significant bleeding with hemodialysis.    Physical Exam   Triage vitals:  T 36.7 °C (98.1 °F)  HR 78  /69  RR 20  O2 98 % None (Room air)    General: Awake, alert, in no acute distress  Eyes: Gaze conjugate.  No scleral icterus or injection  HENT: Normo-cephalic, atraumatic. No stridor  CV: Regular rate, regular rhythm.  Loud holosystolic crescendo decrescendo murmur heard diffusely, loudest at the right secondary costal space. Radial pulses 2+ bilaterally.  Resp: Breathing non-labored, speaking in full sentences.  Crackles and rhonchi  heard in the left lower lung field, otherwise all of the lung fields are clear.  GI: Soft, non-distended, non-tender. No rebound or guarding.  MSK/Extremities: No gross bony deformities. Moving all extremities  Skin: Warm. Appropriate color  Neuro: Alert. Oriented. Face symmetric. Speech is fluent.  Gross strength and sensation intact in b/l UE and LEs  Psych: Appropriate mood and affect    Medical Decision Making & ED Course   Medical Decision Makin y.o. male PMHx of discoid lupus/SLE with ESRD on HD (///), severe aortic stenosis, CAD s/p PCI, systolic heart failure, HTN, presents to the ED today with worsening shortness of breath and chest pain.  As well as generalized fatigue.  On arrival vital signs are unremarkable appear to be baseline.  Physical exam remarkable for significant aortic stenosis murmur, left crackles and rhonchi in the lower lung field.  Given worsening shortness of breath and chest pain, in the setting of patient's history will obtain basic lab workup.  Additionally given recent worsening cough and dyspnea we will get chest x-ray.  On record review, patient had a CT TAVR with full contrast done on  which showed isolated segmental right lower lobe pulmonary embolism within the anterior segment of the right lower lobe.  Given patient had a recently known PE, and stopped anticoagulation, discussed possible need for further CT imaging with contrast to evaluate for PE.  However patient discussed today he has had reactions to the IV contrast in the past necessitating the steroid prep, however patient does not want to receive any steroids at this time as it caused significant hyperglycemia necessitating ICU.  At this time I do not feel strongly about obtaining CT PE as patient had a known PE recently supposed to be on Eliquis, however is not requiring new oxygen, vital signs are stable and patient was not in any respiratory distress, POCUS showed no signs of heart strain concerning for  large pulmonary embolism or saddle PE that would be a mechanical thrombectomy.  Patient was recently discussed on cardiology conference and decision was made for patient to have a TAVR procedure when feasible. Given patient's complex medical history and worsening of symptoms, will admit for further evaluation and management.  ----   Social Determinants of Health which Significantly Impact Care: None identified     EKG Independent Interpretation: EKG interpreted by myself. Please see ED Course for full interpretation.    Independent Result Review and Interpretation: Relevant laboratory and radiographic results were reviewed and independently interpreted by myself.  As necessary, they are commented on in the ED Course.    Chronic conditions affecting the patient's care: As documented above in [facility]    The patient was discussed with the following consultants/services: None    Care Considerations: As documented above in [facility]    ED Course:  ED Course as of 11/14/24 0132   Wed Nov 13, 2024 2204 Troponin elevated at 411 however appears to be chronic, 3 months ago noted to have a troponin greater than 100,000, BNP greater than 5000 also at his baseline.  Hemoglobin appears baseline at 8.8 today.  Of note patient's ALT markedly elevated 482, with AST elevation 179 with prior labs noting those were both within normal limits. [GERARD]   2326 Repeat troponin down trended 382. [GERARD]   2328 Chest x-ray shows small infiltrate within the left lower lobe. [GERARD]      ED Course User Index  [GERARD] Festus SPIVEY Dustin, DO         Diagnoses as of 11/14/24 0132   Pneumonia of left lower lobe due to infectious organism   Shortness of breath     Disposition   As a result of their workup, the patient will require admission to the hospital.  The patient was informed of his diagnosis.  The patient was given the opportunity to ask questions and I answered them. The patient agreed to be admitted to the hospital.    Procedures   Procedures    Patient seen and  discussed with ED attending physician.    Festus Chan,   Emergency Medicine     Festus Chan,   Resident  11/14/24 0136

## 2024-11-14 NOTE — ED PROCEDURE NOTE
Procedure    Performed by: Parish Callahan DO  Authorized by: Beverley Sheehan MD  Cardiac Indications: chest pain                Procedure: Cardiac Ultrasound    Findings:   Views: parasternal long, parasternal short, apical four and subxiphoid  The pericardial space was visualized and was NEGATIVE for a significant pericardial effusion.  Activity: Ventricular contractions were visualized.  LV: LV systolic function was DECREASED.  RV: RV size was NORMAL.    Impression:  Cardiac: The focused cardiac ultrasound exam had ABNORMAL findings as specified. and Aortic valve stenosis noted  Procedure: Thoracic Ultrasound    Findings:  R Lung Sliding: The RIGHT chest was evaluated and there was NO lung sliding.  L Lung Sliding: The LEFT chest was evaluated and there was NO lung sliding.      A-lines: The RIGHT chest was evaluated and multiple A-LINES were visualized  B-lines: The RIGHT chest was evaluated and multiple B-LINES were visualized (present bilaterally)        Impression:  Thorax: The focused thoracic ultrasound exam had ABNORMAL findings as specified.    Comments: Decreased lung sliding in areas of the lungs with +b lines and heart pulsation visualized, indicating apposition of the visceral and parietal pleura.                Parish Callahan DO  Resident  11/13/24 [id number]      Attending Attestation  I was present during all critical and key portions of the procedure(s) and immediately available to furnish services the entire duration. I reviewed images and agree with above interpretation.     MD Beverley Starkey MD  11/15/24 [id number]

## 2024-11-14 NOTE — CARE PLAN
Patient lying in bed.  Discussed with bedside nursing there has been no significant events.  Patient states he has been having chest pain off and on for the last 3 weeks.  Not associated with any activity.  Patient reports he has been having intermittent sweats but does not believe diaphoresis is necessarily associated with chest pain.  No nausea associated.  Reports dyspnea but states that at times the symptoms can be separate and not always together.  He has been having coughing, reports mildly productive and can be discolored.  Denies any fevers at home.  States chest pain is increased with coughing and deep breathing.  On chart review patient noted to have cardiac catheterization June 2024 that showed patent bypass grafts.  This is not likely ischemic heart disease related.  Patient also not have severe aortic stenosis and he has been reporting chest pain, dyspnea, fatigue for 3 weeks.  This could be potentially related.  Echocardiogram had been ordered yesterday.  Patient is requesting cardiology input, cardiology consulted to see if the severe aortic stenosis is causing his symptoms.  Patient also was diagnosed with pulmonary embolism in September based on CT TAVR and had been started on oral Eliquis.  Patient states he stopped the oral Eliquis but cannot remember when was the last time he took it, he states stopping it due to excessive bleeding from his fistula after dialysis session.  Unknown if patient has pulmonary embolism that could be contribute to symptoms.  He has listed contrast allergy causing anaphylaxis, although it should be noted he tolerated CT TAVR in September without issues.  Discussed with patient risk versus benefit, and he is willing to do the CT chest per PE protocol with contrast allergy protocol.  Continue IV antibiotics for possible pneumonia until we get more CT imaging.  Patient claims that he is always told he has pneumonia in the left lung base due to previous history of lung  collapse and it can look like pneumonia.

## 2024-11-14 NOTE — PROGRESS NOTES
Pharmacy Medication History Review    Kentrell Carreon is a 54 y.o. male admitted for Shortness of breath. Pharmacy reviewed the patient's [id number] medications and allergies for accuracy.    Medications ADDED:  Tylenol 500MG  Medications CHANGED:  Sevelamer Carbonate  Medications REMOVED:   None      The list below reflects the updated PTA list.   Prior to Admission Medications   Prescriptions Last Dose Informant   B complex-vitamin C-folic acid (Nephrocaps) 1 mg capsule 11/12/2024 Self   Sig: Take 1 capsule by mouth once daily.   EPOETIN DEIRDRE INJ Past Week Self   Sig: Inject 8,000 Units under the skin. With every dialysis tx   FreeStyle José Luis reader (FreeStyle José Luis 2 [place]) misc  Self   Sig: Use to check blood sugars at least every 8 hours   FreeStyle José Luis sensor system (FreeStyle José Luis 2 Sensor) kit  Self   Sig: Change sensor every 14 days. Check blood sugar at least every 8 hours   acetaminophen (Tylenol) 500 mg tablet Unknown Self   Sig: Take 1 tablet (500 mg) by mouth every 6 hours if needed for mild pain (1 - 3).   apixaban (Eliquis) 2.5 mg tablet Past Month Self   Sig: Take 1 tablet (2.5 mg) by mouth once daily.   aspirin 81 mg EC tablet 11/12/2024 Self   Sig: Take 1 tablet (81 mg) by mouth once daily.   atorvastatin (Lipitor) 80 mg tablet 11/12/2024 Self   Sig: Take 1 tablet (80 mg) by mouth once daily.   blood-glucose sensor (FreeStyle José Luis 3 Sensor) device  Self   Sig: Change sensor every 14 days   carvedilol (Coreg) 25 mg tablet 11/12/2024 Self   Sig: Take 1 tablet (25 mg) by mouth 2 times a day.   famotidine (Pepcid) 10 mg tablet 11/12/2024 Self   Sig: Take 1 tablet (10 mg) by mouth as needed at bedtime for heartburn.   insulin glargine (Lantus) 100 unit/mL (3 mL) pen 11/12/2024 Self   Sig: Inject 10 units twice a day   Patient taking differently: Inject 15 Units under the skin once daily. Inject 10 units twice a day   insulin lispro (HumaLOG KwikPen Insulin) 100 unit/mL injection 11/13/2024  "Self   Sig: Inject before every meal using carb ratio and correction scale. MDD 50 units.   multivitamin (Daily Multi-Vitamin) tablet Past Week Self   Sig: Take 1 tablet by mouth once daily.   pen needle, diabetic (BD Agueda 2nd Gen Pen Needle) 32 gauge x 5/32\" needle  Self   Sig: USE TO INJECT INSULIN 5 TIMES PER DAY *Please schedule follow up for further refills*   sacubitriL-valsartan (Entresto) 24-26 mg tablet Past Month Self   Sig: Take 1 tablet by mouth 2 times a day.   sevelamer carbonate (Renvela) 800 mg tablet  Self   Sig: Take 1 tablet (800 mg) by mouth 3 times daily (morning, midday, late afternoon). Swallow tablet whole; do not crush, break, or chew.   Patient taking differently: Take 2 tablets (1,600 mg) by mouth 3 times daily (morning, midday, late afternoon). Swallow tablet whole; do not crush, break, or chew.      Facility-Administered Medications: None          The list below reflects the updated allergy list. Please review each documented allergy for additional clarification and justification.  Allergies  Reviewed by Edie Martinez on 11/14/2024        Severity Reactions Comments    Iodinated Contrast Media High Anaphylaxis     Ampicillin-sulbactam Not Specified Other Renal Failure. AIN (INSTERSTITIAL NEPHRITIS))    Cameron Low Rash, Other, Unknown             Patient accepts M2B at discharge.     Sources:   Pharmacy dispense history  Patient interview Good historian     Additional Comments:  Patient expressed that he hasn't been taking Entresto recently due to his blood pressure being low, he also expressed that he isn't currently taking Eliquis at home due to excessive bleeding after his dialysis treatments.       EDIE MARTINEZ  Pharmacy Technician  11/14/24     Secure Chat preferred   If no response call [phone] or [facility] \"Med Rec\" e  "

## 2024-11-14 NOTE — NURSING NOTE
Report from Sending RN:    Report From: Madiha  Recent Surgery of Procedure: No  Baseline Level of Consciousness (LOC): A and O x 4  Oxygen Use: No  Type: ra  Diabetic: Yes  Last BP Med Given Day of Dialysis: none  Last Pain Med Given: none  Lab Tests to be Obtained with Dialysis: No  Blood Transfusion to be Given During Dialysis: No  Available IV Access: Yes  Medications to be Administered During Dialysis: No  Continuous IV Infusion Running: Yes  Restraints on Currently or in the Last 24 Hours: No  Hand-Off Communication: Stable to come to dialysis  Dialysis Catheter Dressing: AVF  Last Dressing Change: AVF

## 2024-11-14 NOTE — ED TRIAGE NOTES
Needs Aortic valve replacement. Has been having increased SOB, some chest pain, fatigue especially on exertion. Hx of CABG a few years ago, ESRD on HD (@ home), DM, lupus. Patient can speak in complete sentences and satting well on room air at this time.

## 2024-11-15 ENCOUNTER — APPOINTMENT (OUTPATIENT)
Dept: RADIOLOGY | Facility: HOSPITAL | Age: 54
DRG: 640 | End: 2024-11-15
Payer: COMMERCIAL

## 2024-11-15 LAB
ABO GROUP (TYPE) IN BLOOD: NORMAL
ALBUMIN SERPL BCP-MCNC: 3.2 G/DL (ref 3.4–5)
ANION GAP SERPL CALC-SCNC: 21 MMOL/L (ref 10–20)
ANTIBODY SCREEN: NORMAL
ATRIAL RATE: 78 BPM
BLOOD EXPIRATION DATE: NORMAL
BUN SERPL-MCNC: 57 MG/DL (ref 6–23)
CALCIUM SERPL-MCNC: 9.4 MG/DL (ref 8.6–10.6)
CHLORIDE SERPL-SCNC: 92 MMOL/L (ref 98–107)
CO2 SERPL-SCNC: 25 MMOL/L (ref 21–32)
CREAT SERPL-MCNC: 7.36 MG/DL (ref 0.5–1.3)
DISPENSE STATUS: NORMAL
EGFRCR SERPLBLD CKD-EPI 2021: 8 ML/MIN/1.73M*2
ERYTHROCYTE [DISTWIDTH] IN BLOOD BY AUTOMATED COUNT: 15.5 % (ref 11.5–14.5)
GLUCOSE BLD MANUAL STRIP-MCNC: 313 MG/DL (ref 74–99)
GLUCOSE BLD MANUAL STRIP-MCNC: 353 MG/DL (ref 74–99)
GLUCOSE BLD MANUAL STRIP-MCNC: 399 MG/DL (ref 74–99)
GLUCOSE BLD MANUAL STRIP-MCNC: 430 MG/DL (ref 74–99)
GLUCOSE BLD MANUAL STRIP-MCNC: 434 MG/DL (ref 74–99)
GLUCOSE BLD MANUAL STRIP-MCNC: 509 MG/DL (ref 74–99)
GLUCOSE SERPL-MCNC: 442 MG/DL (ref 74–99)
HCT VFR BLD AUTO: 23 % (ref 41–52)
HGB BLD-MCNC: 7.6 G/DL (ref 13.5–17.5)
MCH RBC QN AUTO: 29.9 PG (ref 26–34)
MCHC RBC AUTO-ENTMCNC: 33 G/DL (ref 32–36)
MCV RBC AUTO: 91 FL (ref 80–100)
NRBC BLD-RTO: 0 /100 WBCS (ref 0–0)
P AXIS: 34 DEGREES
P OFFSET: 177 MS
P ONSET: 102 MS
PHOSPHATE SERPL-MCNC: 5.2 MG/DL (ref 2.5–4.9)
PLATELET # BLD AUTO: 223 X10*3/UL (ref 150–450)
POTASSIUM SERPL-SCNC: 4.5 MMOL/L (ref 3.5–5.3)
PR INTERVAL: 220 MS
PRODUCT BLOOD TYPE: 600
PRODUCT CODE: NORMAL
Q ONSET: 212 MS
QRS COUNT: 13 BEATS
QRS DURATION: 124 MS
QT INTERVAL: 430 MS
QTC CALCULATION(BAZETT): 490 MS
QTC FREDERICIA: 469 MS
R AXIS: 134 DEGREES
RBC # BLD AUTO: 2.54 X10*6/UL (ref 4.5–5.9)
RH FACTOR (ANTIGEN D): NORMAL
SODIUM SERPL-SCNC: 133 MMOL/L (ref 136–145)
T AXIS: -82 DEGREES
T OFFSET: 427 MS
UNIT ABO: NORMAL
UNIT NUMBER: NORMAL
UNIT RH: NORMAL
UNIT VOLUME: 350
VENTRICULAR RATE: 78 BPM
WBC # BLD AUTO: 4.2 X10*3/UL (ref 4.4–11.3)
XM INTEP: NORMAL

## 2024-11-15 PROCEDURE — 82947 ASSAY GLUCOSE BLOOD QUANT: CPT

## 2024-11-15 PROCEDURE — 71275 CT ANGIOGRAPHY CHEST: CPT

## 2024-11-15 PROCEDURE — 2500000002 HC RX 250 W HCPCS SELF ADMINISTERED DRUGS (ALT 637 FOR MEDICARE OP, ALT 636 FOR OP/ED): Performed by: NURSE PRACTITIONER

## 2024-11-15 PROCEDURE — 36415 COLL VENOUS BLD VENIPUNCTURE: CPT | Performed by: INTERNAL MEDICINE

## 2024-11-15 PROCEDURE — 36430 TRANSFUSION BLD/BLD COMPNT: CPT

## 2024-11-15 PROCEDURE — 2500000004 HC RX 250 GENERAL PHARMACY W/ HCPCS (ALT 636 FOR OP/ED): Performed by: INTERNAL MEDICINE

## 2024-11-15 PROCEDURE — 2500000001 HC RX 250 WO HCPCS SELF ADMINISTERED DRUGS (ALT 637 FOR MEDICARE OP): Performed by: NURSE PRACTITIONER

## 2024-11-15 PROCEDURE — G0378 HOSPITAL OBSERVATION PER HR: HCPCS

## 2024-11-15 PROCEDURE — 2550000001 HC RX 255 CONTRASTS: Performed by: INTERNAL MEDICINE

## 2024-11-15 PROCEDURE — 2500000001 HC RX 250 WO HCPCS SELF ADMINISTERED DRUGS (ALT 637 FOR MEDICARE OP): Performed by: INTERNAL MEDICINE

## 2024-11-15 PROCEDURE — P9016 RBC LEUKOCYTES REDUCED: HCPCS

## 2024-11-15 PROCEDURE — 80069 RENAL FUNCTION PANEL: CPT | Performed by: INTERNAL MEDICINE

## 2024-11-15 PROCEDURE — 2500000002 HC RX 250 W HCPCS SELF ADMINISTERED DRUGS (ALT 637 FOR MEDICARE OP, ALT 636 FOR OP/ED): Performed by: INTERNAL MEDICINE

## 2024-11-15 PROCEDURE — 86901 BLOOD TYPING SEROLOGIC RH(D): CPT | Performed by: INTERNAL MEDICINE

## 2024-11-15 PROCEDURE — 71275 CT ANGIOGRAPHY CHEST: CPT | Performed by: RADIOLOGY

## 2024-11-15 PROCEDURE — 1200000002 HC GENERAL ROOM WITH TELEMETRY DAILY

## 2024-11-15 PROCEDURE — 99233 SBSQ HOSP IP/OBS HIGH 50: CPT | Performed by: INTERNAL MEDICINE

## 2024-11-15 PROCEDURE — 85027 COMPLETE CBC AUTOMATED: CPT | Performed by: INTERNAL MEDICINE

## 2024-11-15 RX ORDER — INSULIN LISPRO 100 [IU]/ML
20 INJECTION, SOLUTION INTRAVENOUS; SUBCUTANEOUS ONCE
Status: COMPLETED | OUTPATIENT
Start: 2024-11-15 | End: 2024-11-15

## 2024-11-15 RX ORDER — ACETAMINOPHEN 500 MG
10 TABLET ORAL EVERY EVENING
Status: DISCONTINUED | OUTPATIENT
Start: 2024-11-15 | End: 2024-11-16 | Stop reason: HOSPADM

## 2024-11-15 RX ORDER — INSULIN LISPRO 100 [IU]/ML
0-15 INJECTION, SOLUTION INTRAVENOUS; SUBCUTANEOUS
Status: DISCONTINUED | OUTPATIENT
Start: 2024-11-15 | End: 2024-11-16 | Stop reason: HOSPADM

## 2024-11-15 RX ORDER — INSULIN GLARGINE 100 [IU]/ML
15 INJECTION, SOLUTION SUBCUTANEOUS 2 TIMES DAILY
Status: DISCONTINUED | OUTPATIENT
Start: 2024-11-15 | End: 2024-11-16 | Stop reason: HOSPADM

## 2024-11-15 RX ORDER — INSULIN LISPRO 100 [IU]/ML
12 INJECTION, SOLUTION INTRAVENOUS; SUBCUTANEOUS ONCE
Status: COMPLETED | OUTPATIENT
Start: 2024-11-15 | End: 2024-11-15

## 2024-11-15 RX ADMIN — DIPHENHYDRAMINE HYDROCHLORIDE 50 MG: 25 CAPSULE ORAL at 00:37

## 2024-11-15 RX ADMIN — SEVELAMER CARBONATE 800 MG: 800 TABLET, FILM COATED ORAL at 18:24

## 2024-11-15 RX ADMIN — SACUBITRIL AND VALSARTAN 1 TABLET: 24; 26 TABLET, FILM COATED ORAL at 10:43

## 2024-11-15 RX ADMIN — INSULIN GLARGINE 10 UNITS: 100 INJECTION, SOLUTION SUBCUTANEOUS at 10:44

## 2024-11-15 RX ADMIN — ASCORBIC ACID, THIAMINE MONONITRATE,RIBOFLAVIN, NIACINAMIDE, PYRIDOXINE HYDROCHLORIDE, FOLIC ACID, CYANOCOBALAMIN, BIOTIN, CALCIUM PANTOTHENATE, 1 CAPSULE: 100; 1.5; 1.7; 20; 10; 1; 6000; 150000; 5 CAPSULE, LIQUID FILLED ORAL at 13:52

## 2024-11-15 RX ADMIN — IOHEXOL 80 ML: 350 INJECTION, SOLUTION INTRAVENOUS at 02:07

## 2024-11-15 RX ADMIN — INSULIN LISPRO 5 UNITS: 100 INJECTION, SOLUTION INTRAVENOUS; SUBCUTANEOUS at 11:26

## 2024-11-15 RX ADMIN — SEVELAMER CARBONATE 800 MG: 800 TABLET, FILM COATED ORAL at 10:43

## 2024-11-15 RX ADMIN — INSULIN LISPRO 12 UNITS: 100 INJECTION, SOLUTION INTRAVENOUS; SUBCUTANEOUS at 19:30

## 2024-11-15 RX ADMIN — PREDNISONE 50 MG: 10 TABLET ORAL at 00:38

## 2024-11-15 RX ADMIN — CARVEDILOL 25 MG: 12.5 TABLET, FILM COATED ORAL at 21:54

## 2024-11-15 RX ADMIN — CARVEDILOL 25 MG: 12.5 TABLET, FILM COATED ORAL at 10:43

## 2024-11-15 RX ADMIN — Medication 10 MG: at 21:56

## 2024-11-15 RX ADMIN — INSULIN LISPRO 20 UNITS: 100 INJECTION, SOLUTION INTRAVENOUS; SUBCUTANEOUS at 15:10

## 2024-11-15 RX ADMIN — SACUBITRIL AND VALSARTAN 1 TABLET: 24; 26 TABLET, FILM COATED ORAL at 21:54

## 2024-11-15 RX ADMIN — INSULIN GLARGINE 15 UNITS: 100 INJECTION, SOLUTION SUBCUTANEOUS at 21:55

## 2024-11-15 SDOH — ECONOMIC STABILITY: HOUSING INSECURITY: AT ANY TIME IN THE PAST 12 MONTHS, WERE YOU HOMELESS OR LIVING IN A SHELTER (INCLUDING NOW)?: NO

## 2024-11-15 SDOH — SOCIAL STABILITY: SOCIAL INSECURITY: HAS ANYONE EVER THREATENED TO HURT YOUR FAMILY OR YOUR PETS?: NO

## 2024-11-15 SDOH — SOCIAL STABILITY: SOCIAL INSECURITY
WITHIN THE LAST YEAR, HAVE YOU BEEN RAPED OR FORCED TO HAVE ANY KIND OF SEXUAL ACTIVITY BY YOUR PARTNER OR EX-PARTNER?: NO

## 2024-11-15 SDOH — ECONOMIC STABILITY: TRANSPORTATION INSECURITY: IN THE PAST 12 MONTHS, HAS LACK OF TRANSPORTATION KEPT YOU FROM MEDICAL APPOINTMENTS OR FROM GETTING MEDICATIONS?: NO

## 2024-11-15 SDOH — SOCIAL STABILITY: SOCIAL INSECURITY: WERE YOU ABLE TO COMPLETE ALL THE BEHAVIORAL HEALTH SCREENINGS?: YES

## 2024-11-15 SDOH — ECONOMIC STABILITY: HOUSING INSECURITY: IN THE LAST 12 MONTHS, WAS THERE A TIME WHEN YOU WERE NOT ABLE TO PAY THE MORTGAGE OR RENT ON TIME?: NO

## 2024-11-15 SDOH — SOCIAL STABILITY: SOCIAL INSECURITY
WITHIN THE LAST YEAR, HAVE YOU BEEN KICKED, HIT, SLAPPED, OR OTHERWISE PHYSICALLY HURT BY YOUR PARTNER OR EX-PARTNER?: NO

## 2024-11-15 SDOH — SOCIAL STABILITY: SOCIAL INSECURITY: HAVE YOU HAD THOUGHTS OF HARMING ANYONE ELSE?: NO

## 2024-11-15 SDOH — ECONOMIC STABILITY: FOOD INSECURITY: WITHIN THE PAST 12 MONTHS, THE FOOD YOU BOUGHT JUST DIDN'T LAST AND YOU DIDN'T HAVE MONEY TO GET MORE.: NEVER TRUE

## 2024-11-15 SDOH — ECONOMIC STABILITY: HOUSING INSECURITY: IN THE PAST 12 MONTHS, HOW MANY TIMES HAVE YOU MOVED WHERE YOU WERE LIVING?: 0

## 2024-11-15 SDOH — SOCIAL STABILITY: SOCIAL INSECURITY: WITHIN THE LAST YEAR, HAVE YOU BEEN AFRAID OF YOUR PARTNER OR EX-PARTNER?: NO

## 2024-11-15 SDOH — ECONOMIC STABILITY: FOOD INSECURITY: HOW HARD IS IT FOR YOU TO PAY FOR THE VERY BASICS LIKE FOOD, HOUSING, MEDICAL CARE, AND HEATING?: SOMEWHAT HARD

## 2024-11-15 SDOH — SOCIAL STABILITY: SOCIAL INSECURITY: ARE YOU OR HAVE YOU BEEN THREATENED OR ABUSED PHYSICALLY, EMOTIONALLY, OR SEXUALLY BY ANYONE?: NO

## 2024-11-15 SDOH — SOCIAL STABILITY: SOCIAL INSECURITY: ABUSE: ADULT

## 2024-11-15 SDOH — ECONOMIC STABILITY: INCOME INSECURITY: IN THE PAST 12 MONTHS HAS THE ELECTRIC, GAS, OIL, OR WATER COMPANY THREATENED TO SHUT OFF SERVICES IN YOUR HOME?: NO

## 2024-11-15 SDOH — SOCIAL STABILITY: SOCIAL INSECURITY: HAVE YOU HAD ANY THOUGHTS OF HARMING ANYONE ELSE?: NO

## 2024-11-15 SDOH — ECONOMIC STABILITY: FOOD INSECURITY: WITHIN THE PAST 12 MONTHS, YOU WORRIED THAT YOUR FOOD WOULD RUN OUT BEFORE YOU GOT THE MONEY TO BUY MORE.: NEVER TRUE

## 2024-11-15 SDOH — SOCIAL STABILITY: SOCIAL INSECURITY: WITHIN THE LAST YEAR, HAVE YOU BEEN HUMILIATED OR EMOTIONALLY ABUSED IN OTHER WAYS BY YOUR PARTNER OR EX-PARTNER?: NO

## 2024-11-15 SDOH — SOCIAL STABILITY: SOCIAL INSECURITY: DO YOU FEEL UNSAFE GOING BACK TO THE PLACE WHERE YOU ARE LIVING?: NO

## 2024-11-15 SDOH — SOCIAL STABILITY: SOCIAL INSECURITY: ARE THERE ANY APPARENT SIGNS OF INJURIES/BEHAVIORS THAT COULD BE RELATED TO ABUSE/NEGLECT?: NO

## 2024-11-15 SDOH — SOCIAL STABILITY: SOCIAL INSECURITY: DOES ANYONE TRY TO KEEP YOU FROM HAVING/CONTACTING OTHER FRIENDS OR DOING THINGS OUTSIDE YOUR HOME?: NO

## 2024-11-15 SDOH — SOCIAL STABILITY: SOCIAL INSECURITY: DO YOU FEEL ANYONE HAS EXPLOITED OR TAKEN ADVANTAGE OF YOU FINANCIALLY OR OF YOUR PERSONAL PROPERTY?: NO

## 2024-11-15 ASSESSMENT — ACTIVITIES OF DAILY LIVING (ADL)
ASSISTIVE_DEVICE: EYEGLASSES
GROOMING: INDEPENDENT
HEARING - LEFT EAR: FUNCTIONAL
HEARING - RIGHT EAR: FUNCTIONAL
WALKS IN HOME: INDEPENDENT
LACK_OF_TRANSPORTATION: NO
LACK_OF_TRANSPORTATION: NO
BATHING: INDEPENDENT
JUDGMENT_ADEQUATE_SAFELY_COMPLETE_DAILY_ACTIVITIES: YES
DRESSING YOURSELF: INDEPENDENT
ADEQUATE_TO_COMPLETE_ADL: YES
PATIENT'S MEMORY ADEQUATE TO SAFELY COMPLETE DAILY ACTIVITIES?: YES
FEEDING YOURSELF: INDEPENDENT
TOILETING: INDEPENDENT

## 2024-11-15 ASSESSMENT — COGNITIVE AND FUNCTIONAL STATUS - GENERAL
PATIENT BASELINE BEDBOUND: NO
MOBILITY SCORE: 24
DAILY ACTIVITIY SCORE: 24

## 2024-11-15 ASSESSMENT — LIFESTYLE VARIABLES
HOW OFTEN DO YOU HAVE A DRINK CONTAINING ALCOHOL: MONTHLY OR LESS
HOW OFTEN DO YOU HAVE 6 OR MORE DRINKS ON ONE OCCASION: NEVER
AUDIT-C TOTAL SCORE: 1
SKIP TO QUESTIONS 9-10: 1
HOW MANY STANDARD DRINKS CONTAINING ALCOHOL DO YOU HAVE ON A TYPICAL DAY: 1 OR 2
AUDIT-C TOTAL SCORE: 1

## 2024-11-15 ASSESSMENT — PATIENT HEALTH QUESTIONNAIRE - PHQ9
2. FEELING DOWN, DEPRESSED OR HOPELESS: NOT AT ALL
1. LITTLE INTEREST OR PLEASURE IN DOING THINGS: NOT AT ALL
SUM OF ALL RESPONSES TO PHQ9 QUESTIONS 1 & 2: 0

## 2024-11-15 ASSESSMENT — PAIN SCALES - GENERAL
PAINLEVEL_OUTOF10: 0 - NO PAIN
PAINLEVEL_OUTOF10: 3
PAINLEVEL_OUTOF10: 0 - NO PAIN

## 2024-11-15 ASSESSMENT — PAIN - FUNCTIONAL ASSESSMENT
PAIN_FUNCTIONAL_ASSESSMENT: 0-10

## 2024-11-15 NOTE — NURSING NOTE
Report to Receiving RN:    Report To: Tawana TORRES  Time Report Called: 2100 per secure chat  Hand-Off Communication: tolerated treatment, 2 liters of fluid removed, denies C/O, no meds given, VSS  Complications During Treatment: No  Ultrafiltration Treatment: No  Medications Administered During Dialysis: No  Blood Products Administered During Dialysis: No  Labs Sent During Dialysis: No  Heparin Drip Rate Changes: N/A  Dialysis Catheter Dressing: N/A  Last Dressing Change: N/A

## 2024-11-15 NOTE — PROGRESS NOTES
"Subjective:   SR 70s   2L off with HD yesterday  BUN down to 57  Looks close to euvolemic   Feels close to his dry weight  CT/PE negative  Denies CP/ SOB/dizziness, palpitations   Feels \"really good. The best I have felt for awhile.\"      Objective:  Last Recorded Vitals:  Vitals:    11/15/24 0600 11/15/24 0630 11/15/24 0815 11/15/24 1325   BP: 121/58 128/62 123/60 106/60   BP Location:   Right arm Left arm   Patient Position:   Lying Lying   Pulse: 76 75 72 76   Resp: 10 15 16 17   Temp:       TempSrc:       SpO2: (!) 92% (!) 93% (!) 90% 97%   Weight:       Height:           Last Labs:  CBC - 11/15/2024:  6:53 AM  4.2 7.6 223    23.0      CMP - 11/15/2024:  6:53 AM  9.4 8.4 179 --- 0.5   5.2 3.2 482 106      PTT - 8/5/2024:  6:54 AM  1.0   11.0 29     Troponin I, High Sensitivity   Date/Time Value Ref Range Status   05/31/2024 11:57  (HH) 0 - 20 ng/L Final     Comment:     Previous result verified on 5/31/2024 2240 on specimen/case [id number] called with component TRPHS for procedure Troponin I, High Sensitivity, Initial with value 851 ng/L.   05/31/2024 10:00  (HH) 0 - 20 ng/L Final   03/26/2024 03:14  (HH) 0 - 20 ng/L Final     Comment:     Previous result verified on 3/25/2024 2216 on specimen/case [id number] called with component TRPHS for procedure Troponin I, High Sensitivity, Initial with value 391 ng/L.     Troponin I, High Sensitivity (CMC)   Date/Time Value Ref Range Status   11/13/2024 09:57  (HH) 0 - 53 ng/L Final     Comment:     Previous result verified on 11/13/2024 2117 on specimen/case [id number] called with component TRPHS for procedure Troponin I, High Sensitivity, Initial with value 411 ng/L.   11/13/2024 08:22  (HH) 0 - 53 ng/L Final   08/01/2024 09:42 ,690 (HH) 0 - 53 ng/L Final     Comment:     Previous result verified on 8/1/2024 0209 on specimen/case [id number] called with component [facility] for procedure Troponin, High Sensitivity, 1 " Hour with value 1,443 ng/L.     BNP   Date/Time Value Ref Range Status   11/13/2024 08:22 PM >5,000 (H) 0 - 99 pg/mL Final   05/31/2024 10:00 PM >4,700 (H) 0 - 99 pg/mL Final     POC HEMOGLOBIN A1c   Date/Time Value Ref Range Status   02/27/2024 02:04 PM 7.0 (A) 4.2 - 6.5 % Final   10/17/2023 10:39 AM 8.3 (A) 4.2 - 6.5 % Final     Hemoglobin A1C   Date/Time Value Ref Range Status   08/01/2024 12:11 AM 7.9 (H) see below % Final   06/01/2024 08:05 AM 7.5 (H) see below % Final     VLDL   Date/Time Value Ref Range Status   03/10/2022 07:02 AM 22 0 - 40 mg/dL Final      Last I/O:  I/O last 3 completed shifts:  In: 650 (9 mL/kg) [I.V.:600 (8.3 mL/kg); IV Piggyback:50]  Out: 2000 (27.6 mL/kg) [Other:2000]  Weight: 72.6 kg       11/15/24 CT angio chest for pulmonary embolism   IMPRESSION:  No acute pulmonary embolism to the segmental level.      Scattered pulmonary nodules measuring up to 6 mm bilaterally,  unchanged from 09/04/2024 and likely present on more remote imaging  however obscured by prior pulmonary edema. Clinical correlation and  follow-up as clinically indicated recommended.      Small left pleural effusion and left basilar airspace opacities which  may be chronic, aspiration or infection/inflammation.      Mediastinal and hilar lymphadenopathy which may be reactive.      Cardiomegaly, dense coronary artery calcifications, atrophic kidneys  and additional findings as detailed.    Ejection Fractions:  EF   Date/Time Value Ref Range Status   08/01/2024 03:17 PM 38 %    03/06/2024 02:34 PM 44 %      8/1/24 Transthoracic Echo (TTE) Complete   CONCLUSIONS:   1. Left ventricular ejection fraction is moderately decreased, calculated by Gaspar's biplane at 38%.   2. Multiple segmental abnormalities exist. See findings.   3. Spectral Doppler shows a pseudonormal pattern of left ventricular diastolic filling.   4. There is an elevated mean left atrial pressure.   5. Left ventricular cavity size is severely dilated.    6. There is mildly reduced right ventricular systolic function.   7. Mildly enlarged right ventricle.   8. Moderate to severe aortic valve stenosis.   9. Mild aortic valve regurgitation.  10. Stroke volume index is decreased (33 ml/m2).  11. The aortic valve appears bicuspid with a thickened median raphe with fusion between the right and non coronary cusps.  12. There is moderate aortic valve cusp calcification.  13. There is moderate to severe aortic valve thickening.  14. Slightly elevated RVSP.  15. The left atrium is severely dilated.  16. The right atrium is mild to moderately dilated.  17. Compared with study dated 6/8/2024, there is no change in LVEF. The regional wall motion abnormalities were better visualized today. The LV was already moderate to severely dilated in the prior echocardiogram (98 ml/m2). The aortic valve gradients were not assessed in the limited study from June.    6/7/24 [facility]  CONCLUSIONS:   1. Native coronaries: Critical distal left main disease, proximal LCx , proximal LAD , and moderate RCA disease in co-dominant system.   2. Bypasses: Patent LIMA to LAD which fills back the whole LAD system including both diagonals, patent SVG to OM1 which fills back the whole codominant LCx system, and patent SVG to right PDA.   3. Further management as per inpatient cardiology team.      Inpatient Medications:  Scheduled medications   Medication Dose Route Frequency    [Held by provider] apixaban  2.5 mg oral Daily    aspirin  81 mg oral Daily    carvedilol  25 mg oral BID    insulin glargine  10 Units subcutaneous BID    insulin lispro  0-15 Units subcutaneous TID AC    sacubitriL-valsartan  1 tablet oral BID    sevelamer carbonate  800 mg oral TID    vitamin B complex-vitamin C-folic acid  1 capsule oral Daily     PRN medications   Medication    acetaminophen    Or    acetaminophen    Or    acetaminophen    dextrose    dextrose    famotidine    glucagon    glucagon    melatonin    polyethylene  "glycol     Continuous Medications   Medication Dose Last Rate     Outpatient Medications:  Current Outpatient Medications   Medication Instructions    acetaminophen (TYLENOL) 500 mg, oral, Every 6 hours PRN    apixaban (ELIQUIS) 2.5 mg, Daily    aspirin 81 mg EC tablet 1 tablet, Daily    atorvastatin (LIPITOR) 80 mg, oral, Daily    B complex-vitamin C-folic acid (Nephrocaps) 1 mg capsule 1 capsule, oral, Daily    blood-glucose sensor (FreeStyle José Luis 3 Sensor) device Change sensor every 14 days    carvedilol (COREG) 25 mg, oral, 2 times daily    EPOETIN EDIRDRE INJ 8,000 Units    famotidine (PEPCID) 10 mg, oral, Nightly PRN    FreeStyle José Luis reader (FreeStyle José Luis 2 [place]) misc Use to check blood sugars at least every 8 hours    FreeStyle José Luis sensor system (FreeStyle José Luis 2 Sensor) kit Change sensor every 14 days. Check blood sugar at least every 8 hours    insulin glargine (Lantus) 100 unit/mL (3 mL) pen Inject 10 units twice a day    insulin lispro (HumaLOG KwikPen Insulin) 100 unit/mL injection Inject before every meal using carb ratio and correction scale. MDD 50 units.    multivitamin (Daily Multi-Vitamin) tablet 1 tablet, Daily    pen needle, diabetic (BD Agueda 2nd Gen Pen Needle) 32 gauge x 5/32\" needle USE TO INJECT INSULIN 5 TIMES PER DAY *Please schedule follow up for further refills*    sacubitriL-valsartan (Entresto) 24-26 mg tablet 1 tablet, oral, 2 times daily    sevelamer carbonate (RENVELA) 800 mg, 3 times daily (morning, midday, late afternoon)     EKG: Sinus with TWI over V5-V6 that appear unchanged.    Assessment/Plan   Kentrell Carreon is a 54 y.o. male with a past medical history of discoid lupus/SLE with ESRD on HD (M/T/Th/F), moderate-severe aortic stenosis, CAD s/p PCI, systolic heart failure, remote PE (not taking Eliquis), DM and HTN who presented to the ED with chest pain, shortness of breath, and fatigue.      The patient's presented with pleuritic chest pain, SOB and fatigue in the setting " of severe aortic stenosis. The Ddx is currently wide and included recurrence of PE, uremia and volume overload. Unlikely to be related to worsening aortic stenosis     Feeling better this AM after HD. Volume status and uremia improved  CT negative for PE  Recent cath films reviewed by Dr. Sp Gross  Unfortunately he is not willing to take adequate Eliquis dose 2/2 hx bleeding complications    Recommendations:  - continue his regularly scheduled dialysis   - continue Eliquis at the dose he is willing to take  - Please place order for structural team consult in an effort to continue moving forward with TAVR. They are aware he is here  - Transfuse 1 unti PRBC for goal hgb >8     Cardiology will sign off   Case discussed with Dr. Lis Mccoy, APRN-CNP  Cardiology Consults    Please call with any questions  Pager [phone] M-F 7a-6p; Saturday 7a-2p  Pager [phone] all other times        Code Status:  Full Code

## 2024-11-15 NOTE — PROGRESS NOTES
"Kentrell Carreon is a 54 y.o. male on day 1 of admission presenting with Shortness of breath.    Subjective   Lying in bed. No distress.  Reports that he had a better sleep last night.  Reports that still has some dyspnea and fatigue although feels a little better today.  Still has a mild sensation of the chest pain but not as severe as before.  Patient will walk to the bathroom for shower and did not have any pain.  Reports that has sharp chest pain whenever he takes a deep breath.    Objective     General: Lying in bed without distress.  Cooperative.  Skin: No rashes or ulcerations.  HEENT: Sclera is white.  Mucous membranes moist.  Cardiac: Regular rate and rhythm, S1/S2 normal.  Chest pain not reproducible with palpation to chest wall.  Lungs: Clear to auscultation bilaterally, no wheezing, soft mild crackles at left base, no accessory muscle use at rest.  Abdomen: Soft, nontender, nondistended, BS +  Extremities: No cyanosis.  No lower extremity edema.  Neurologic: Alert and oriented x3.  No focal deficits.  Psychiatric: Appropriate mood and behavior.  Currently no agitation.    Last Recorded Vitals  Blood pressure 112/77, pulse 74, temperature 36.4 °C (97.5 °F), temperature source Temporal, resp. rate 16, height 2.032 m (6' 8\"), weight 72.6 kg (160 lb), SpO2 95%. On room air.    Intake/Output last 3 Shifts:  I/O last 3 completed shifts:  In: 650 (9 mL/kg) [I.V.:600 (8.3 mL/kg); IV Piggyback:50]  Out: 2000 (27.6 mL/kg) [Other:2000]  Weight: 72.6 kg     Relevant Results  [Held by provider] apixaban, 2.5 mg, oral, Daily  aspirin, 81 mg, oral, Daily  carvedilol, 25 mg, oral, BID  epoetin annita or biosimilar, 10,000 Units, intravenous, Every Mon/Wed/Fri  insulin glargine, 10 Units, subcutaneous, BID  insulin lispro, 0-15 Units, subcutaneous, TID AC  sacubitriL-valsartan, 1 tablet, oral, BID  sevelamer carbonate, 800 mg, oral, TID  vitamin B complex-vitamin C-folic acid, 1 capsule, oral, Daily           PRN medications: " acetaminophen **OR** acetaminophen **OR** acetaminophen, dextrose, dextrose, famotidine, glucagon, glucagon, melatonin, polyethylene glycol     Results for orders placed or performed during the hospital encounter of 11/13/24 (from the past 24 hours)   POCT GLUCOSE   Result Value Ref Range    POCT Glucose 374 (H) 74 - 99 mg/dL   Renal function panel   Result Value Ref Range    Glucose 442 (H) 74 - 99 mg/dL    Sodium 133 (L) 136 - 145 mmol/L    Potassium 4.5 3.5 - 5.3 mmol/L    Chloride 92 (L) 98 - 107 mmol/L    Bicarbonate 25 21 - 32 mmol/L    Anion Gap 21 (H) 10 - 20 mmol/L    Urea Nitrogen 57 (H) 6 - 23 mg/dL    Creatinine 7.36 (H) 0.50 - 1.30 mg/dL    eGFR 8 (L) >60 mL/min/1.73m*2    Calcium 9.4 8.6 - 10.6 mg/dL    Phosphorus 5.2 (H) 2.5 - 4.9 mg/dL    Albumin 3.2 (L) 3.4 - 5.0 g/dL   CBC   Result Value Ref Range    WBC 4.2 (L) 4.4 - 11.3 x10*3/uL    nRBC 0.0 0.0 - 0.0 /100 WBCs    RBC 2.54 (L) 4.50 - 5.90 x10*6/uL    Hemoglobin 7.6 (L) 13.5 - 17.5 g/dL    Hematocrit 23.0 (L) 41.0 - 52.0 %    MCV 91 80 - 100 fL    MCH 29.9 26.0 - 34.0 pg    MCHC 33.0 32.0 - 36.0 g/dL    RDW 15.5 (H) 11.5 - 14.5 %    Platelets 223 150 - 450 x10*3/uL   POCT GLUCOSE   Result Value Ref Range    POCT Glucose 434 (H) 74 - 99 mg/dL   POCT GLUCOSE   Result Value Ref Range    POCT Glucose 430 (H) 74 - 99 mg/dL   Type and Screen   Result Value Ref Range    ABO TYPE A     Rh TYPE NEG     ANTIBODY SCREEN NEG    POCT GLUCOSE   Result Value Ref Range    POCT Glucose 313 (H) 74 - 99 mg/dL   POCT GLUCOSE   Result Value Ref Range    POCT Glucose 509 (H) 74 - 99 mg/dL      CT angio chest for pulmonary embolism    Result Date: 11/15/2024  Interpreted By:  Wendi Copeland, STUDY: CT ANGIO CHEST FOR PULMONARY EMBOLISM;  11/15/2024 2:07 am   INDICATION: Signs/Symptoms:chest pain, dyspnea.   COMPARISON: CT 09/04/2024, 03/16/2023   ACCESSION NUMBER(S): [id number]   ORDERING CLINICIAN: ANTWAN LOCKETT   TECHNIQUE: Contiguous axial images of the chest  were obtained after the intravenous administration of contrast using angiographic PE protocol. Coronal and sagittal reformatted images were reconstructed from the axial data. MIP images were created and reviewed.   FINDINGS: MEDIASTINUM AND LYMPH NODES: Prominent mediastinal lymph nodes in size and number as well as enlarged right hilar lymph node measuring up to 14 mm.  No pneumomediastinum.   VESSELS:  Normal caliber aorta without evidence of dissection. Mild aortic atherosclerosis.  No pulmonary embolism to the segmental level.   HEART: Cardiomegaly. Epicardial pacer wires. Significant coronary artery calcifications. No significant pericardial effusion. Atherosclerotic calcifications of the aortic valve.   LUNG, AIRWAYS, AND PLEURA: Central airways are patent. Small left pleural effusion and adjacent patchy left lower lobe ground-glass and interstitial opacities. Scattered pulmonary nodules measuring up to 6 mm in the bilateral lower lobe similar to prior imaging from 09/04/2024 in grossly similar to 03/25/2024. No pneumothorax.   OSSEOUS STRUCTURES/CHEST WALL: Status post median sternotomy. Stent noted in the right chest which is incompletely evaluated for patency) exam. No acute osseous abnormality.   UPPER ABDOMEN/OTHER: Partially imaged upper kidneys demonstrate significant atrophy. Extensive small median vessel calcifications within the upper abdomen.       No acute pulmonary embolism to the segmental level.   Scattered pulmonary nodules measuring up to 6 mm bilaterally, unchanged from 09/04/2024 and likely present on more remote imaging however obscured by prior pulmonary edema. Clinical correlation and follow-up as clinically indicated recommended.   Small left pleural effusion and left basilar airspace opacities which may be chronic, aspiration or infection/inflammation.   Mediastinal and hilar lymphadenopathy which may be reactive.   Cardiomegaly, dense coronary artery calcifications, atrophic kidneys  and additional findings as detailed.   MACRO: None.   Signed by: Wendi Copeland 11/15/2024 2:28 AM Dictation workstation:   [id number]     Hospital Course:  Kentrell Carreon is a 54 y.o. male with a past medical history of discoid lupus/SLE with ESRD on HD (M/T/Th/F), severe aortic stenosis, CAD s/p PCI, systolic heart failure, remote PE (not taking Eliquis), DM and HTN who presented to the ED with chest pain, shortness of breath, and fatigue. His symptoms are worse with exertion.     Assessment/Plan     Chest pain, dyspnea, fatigue  Possible LLL pneumonia  History of PE in September  -CXR with earlier LLL infiltrate.  CT chest shows questionable left lower lobe opacification/infiltrate/scarring.  Difficult to tell.  -Continue Rocephin, Zithromax since patient reports some improvement in symptoms since admission and procalcitonin elevated.  -PE incidental finding from CT TAVR 9/4/2024.  -Repeat CT chest per PE protocol done on 11/15 after contrast allergy protocol done does not show any acute pulmonary embolism.  No need for further anticoagulation.     ESRD  Uremia  Chronic HFmrEF  -EF 38% 8/2024  -RFP demonstrates increased BUN.  -BNP >5000  -Nephrology dialysis team consulted, patient received dialysis 11/14 evening with 2 L fluid removal.     Elevated troponin  Severe aortic stenosis  -Mildly elevated, NSTEMI from 08/2024 noted  -Downtrending  -Telemetry  -Doubt acute coronary syndrome.  -Patient had cardiac catheterization done in June/2024 with patent bypass grafts.  -Cardiology consulted for opinion if symptoms could be related to severe aortic stenosis.  Cardiology recommendations reviewed.  Structural heart team consulted.  Structural heart team has indicated outpatient schedule for TAVR.  -Cardiology has recommended 1 unit PRBC to help keep hemoglobin around 8 or higher.  Patient agreeable to transfusion.     T2DM  -Currently uncontrolled due to use of steroids for contrast allergy protocol.  -Will  temporarily increase home Lantus to 15 units BID  -Changed SSI to #3  -Hypoglycemic protocol.    Disposition: Continue IV antibiotics, transfused 1 unit PRBC per cardiology recommendation.  Repeat CBC in the morning, dialysis in the morning, possible discharge tomorrow.    Angel Merlos MD

## 2024-11-16 ENCOUNTER — APPOINTMENT (OUTPATIENT)
Dept: DIALYSIS | Facility: HOSPITAL | Age: 54
End: 2024-11-16
Payer: COMMERCIAL

## 2024-11-16 VITALS
SYSTOLIC BLOOD PRESSURE: 121 MMHG | RESPIRATION RATE: 16 BRPM | HEIGHT: 78 IN | WEIGHT: 160 LBS | DIASTOLIC BLOOD PRESSURE: 60 MMHG | OXYGEN SATURATION: 98 % | TEMPERATURE: 97 F | BODY MASS INDEX: 18.51 KG/M2 | HEART RATE: 73 BPM

## 2024-11-16 LAB
ATRIAL RATE: 70 BPM
ERYTHROCYTE [DISTWIDTH] IN BLOOD BY AUTOMATED COUNT: 16.9 % (ref 11.5–14.5)
GLUCOSE BLD MANUAL STRIP-MCNC: 127 MG/DL (ref 74–99)
GLUCOSE BLD MANUAL STRIP-MCNC: 137 MG/DL (ref 74–99)
GLUCOSE BLD MANUAL STRIP-MCNC: 211 MG/DL (ref 74–99)
GLUCOSE BLD MANUAL STRIP-MCNC: 338 MG/DL (ref 74–99)
HCT VFR BLD AUTO: 25.6 % (ref 41–52)
HGB BLD-MCNC: 8.4 G/DL (ref 13.5–17.5)
MCH RBC QN AUTO: 30.4 PG (ref 26–34)
MCHC RBC AUTO-ENTMCNC: 32.8 G/DL (ref 32–36)
MCV RBC AUTO: 93 FL (ref 80–100)
NRBC BLD-RTO: 0 /100 WBCS (ref 0–0)
P AXIS: 36 DEGREES
P OFFSET: 175 MS
P ONSET: 107 MS
PLATELET # BLD AUTO: 222 X10*3/UL (ref 150–450)
PR INTERVAL: 210 MS
Q ONSET: 212 MS
QRS COUNT: 11 BEATS
QRS DURATION: 126 MS
QT INTERVAL: 462 MS
QTC CALCULATION(BAZETT): 498 MS
QTC FREDERICIA: 486 MS
R AXIS: 130 DEGREES
RBC # BLD AUTO: 2.76 X10*6/UL (ref 4.5–5.9)
T AXIS: 264 DEGREES
T OFFSET: 443 MS
VENTRICULAR RATE: 70 BPM
WBC # BLD AUTO: 6.6 X10*3/UL (ref 4.4–11.3)

## 2024-11-16 PROCEDURE — RXMED WILLOW AMBULATORY MEDICATION CHARGE

## 2024-11-16 PROCEDURE — 2500000002 HC RX 250 W HCPCS SELF ADMINISTERED DRUGS (ALT 637 FOR MEDICARE OP, ALT 636 FOR OP/ED): Performed by: INTERNAL MEDICINE

## 2024-11-16 PROCEDURE — 99239 HOSP IP/OBS DSCHRG MGMT >30: CPT | Performed by: INTERNAL MEDICINE

## 2024-11-16 PROCEDURE — 82947 ASSAY GLUCOSE BLOOD QUANT: CPT

## 2024-11-16 PROCEDURE — 85027 COMPLETE CBC AUTOMATED: CPT | Performed by: INTERNAL MEDICINE

## 2024-11-16 PROCEDURE — 5A1D70Z PERFORMANCE OF URINARY FILTRATION, INTERMITTENT, LESS THAN 6 HOURS PER DAY: ICD-10-PCS | Performed by: INTERNAL MEDICINE

## 2024-11-16 PROCEDURE — 2500000001 HC RX 250 WO HCPCS SELF ADMINISTERED DRUGS (ALT 637 FOR MEDICARE OP): Performed by: NURSE PRACTITIONER

## 2024-11-16 PROCEDURE — 8010000001 HC DIALYSIS - HEMODIALYSIS PER DAY

## 2024-11-16 PROCEDURE — 2500000002 HC RX 250 W HCPCS SELF ADMINISTERED DRUGS (ALT 637 FOR MEDICARE OP, ALT 636 FOR OP/ED): Performed by: NURSE PRACTITIONER

## 2024-11-16 PROCEDURE — 2500000002 HC RX 250 W HCPCS SELF ADMINISTERED DRUGS (ALT 637 FOR MEDICARE OP, ALT 636 FOR OP/ED): Performed by: STUDENT IN AN ORGANIZED HEALTH CARE EDUCATION/TRAINING PROGRAM

## 2024-11-16 PROCEDURE — G0378 HOSPITAL OBSERVATION PER HR: HCPCS

## 2024-11-16 PROCEDURE — 36415 COLL VENOUS BLD VENIPUNCTURE: CPT | Performed by: INTERNAL MEDICINE

## 2024-11-16 PROCEDURE — 2500000001 HC RX 250 WO HCPCS SELF ADMINISTERED DRUGS (ALT 637 FOR MEDICARE OP): Performed by: INTERNAL MEDICINE

## 2024-11-16 PROCEDURE — 90937 HEMODIALYSIS REPEATED EVAL: CPT

## 2024-11-16 RX ORDER — AZITHROMYCIN 250 MG/1
250 TABLET, FILM COATED ORAL
Qty: 1 TABLET | Refills: 0 | Status: SHIPPED | OUTPATIENT
Start: 2024-11-16

## 2024-11-16 RX ORDER — CEFUROXIME AXETIL 250 MG/1
250 TABLET ORAL EVERY 24 HOURS
Status: DISCONTINUED | OUTPATIENT
Start: 2024-11-16 | End: 2024-11-16 | Stop reason: HOSPADM

## 2024-11-16 RX ORDER — ACETAMINOPHEN, DIPHENHYDRAMINE HCL, PHENYLEPHRINE HCL 325; 25; 5 MG/1; MG/1; MG/1
10 TABLET ORAL EVERY EVENING
Qty: 90 TABLET | Refills: 3 | Status: SHIPPED | OUTPATIENT
Start: 2024-11-16

## 2024-11-16 RX ORDER — SEVELAMER CARBONATE 800 MG/1
2400 TABLET, FILM COATED ORAL
Qty: 63 TABLET | Refills: 0 | Status: SHIPPED | OUTPATIENT
Start: 2024-11-16

## 2024-11-16 RX ORDER — CEFUROXIME AXETIL 250 MG/1
250 TABLET ORAL EVERY 24 HOURS
Qty: 3 TABLET | Refills: 0 | Status: SHIPPED | OUTPATIENT
Start: 2024-11-16

## 2024-11-16 RX ORDER — INSULIN LISPRO 100 [IU]/ML
8 INJECTION, SOLUTION INTRAVENOUS; SUBCUTANEOUS ONCE
Status: COMPLETED | OUTPATIENT
Start: 2024-11-16 | End: 2024-11-16

## 2024-11-16 RX ORDER — AZITHROMYCIN 500 MG/1
250 TABLET, FILM COATED ORAL
Status: DISCONTINUED | OUTPATIENT
Start: 2024-11-16 | End: 2024-11-16 | Stop reason: HOSPADM

## 2024-11-16 RX ORDER — SEVELAMER CARBONATE 800 MG/1
2400 TABLET, FILM COATED ORAL
Status: DISCONTINUED | OUTPATIENT
Start: 2024-11-16 | End: 2024-11-16 | Stop reason: HOSPADM

## 2024-11-16 RX ADMIN — ASPIRIN 81 MG: 81 TABLET, COATED ORAL at 08:46

## 2024-11-16 RX ADMIN — INSULIN LISPRO 6 UNITS: 100 INJECTION, SOLUTION INTRAVENOUS; SUBCUTANEOUS at 08:45

## 2024-11-16 RX ADMIN — INSULIN LISPRO 8 UNITS: 100 INJECTION, SOLUTION INTRAVENOUS; SUBCUTANEOUS at 01:12

## 2024-11-16 RX ADMIN — AZITHROMYCIN DIHYDRATE 250 MG: 500 TABLET ORAL at 12:20

## 2024-11-16 RX ADMIN — CARVEDILOL 25 MG: 12.5 TABLET, FILM COATED ORAL at 08:46

## 2024-11-16 RX ADMIN — SEVELAMER CARBONATE 2400 MG: 800 TABLET, FILM COATED ORAL at 12:20

## 2024-11-16 RX ADMIN — SACUBITRIL AND VALSARTAN 1 TABLET: 24; 26 TABLET, FILM COATED ORAL at 08:45

## 2024-11-16 RX ADMIN — ASCORBIC ACID, THIAMINE MONONITRATE,RIBOFLAVIN, NIACINAMIDE, PYRIDOXINE HYDROCHLORIDE, FOLIC ACID, CYANOCOBALAMIN, BIOTIN, CALCIUM PANTOTHENATE, 1 CAPSULE: 100; 1.5; 1.7; 20; 10; 1; 6000; 150000; 5 CAPSULE, LIQUID FILLED ORAL at 08:46

## 2024-11-16 RX ADMIN — SEVELAMER CARBONATE 2400 MG: 800 TABLET, FILM COATED ORAL at 17:22

## 2024-11-16 RX ADMIN — SEVELAMER CARBONATE 800 MG: 800 TABLET, FILM COATED ORAL at 08:46

## 2024-11-16 RX ADMIN — INSULIN GLARGINE 15 UNITS: 100 INJECTION, SOLUTION SUBCUTANEOUS at 08:45

## 2024-11-16 ASSESSMENT — COGNITIVE AND FUNCTIONAL STATUS - GENERAL
MOBILITY SCORE: 24
DAILY ACTIVITIY SCORE: 24

## 2024-11-16 ASSESSMENT — PAIN - FUNCTIONAL ASSESSMENT
PAIN_FUNCTIONAL_ASSESSMENT: 0-10
PAIN_FUNCTIONAL_ASSESSMENT: 0-10
PAIN_FUNCTIONAL_ASSESSMENT: NO/DENIES PAIN

## 2024-11-16 ASSESSMENT — PAIN SCALES - GENERAL
PAINLEVEL_OUTOF10: 0 - NO PAIN

## 2024-11-16 NOTE — NURSING NOTE
.Report from Sending RN:    Report From: BRIAN Matias  Recent Surgery of Procedure: No  Baseline Level of Consciousness (LOC): A&O X3  Oxygen Use: No  Type: Room air  Diabetic: Yes  Last BP Med Given Day of Dialysis: coreg  Last Pain Med Given: none  Lab Tests to be Obtained with Dialysis: No  Blood Transfusion to be Given During Dialysis: No  Available IV Access: Yes  Medications to be Administered During Dialysis: No  Continuous IV Infusion Running: No  Restraints on Currently or in the Last 24 Hours: No  Hand-Off Communication: Pt had no acute event overnight, vital signs stable. Not on precaution, morning medication given.  Dialysis Catheter Dressing: AVF  Last Dressing Change: AVF

## 2024-11-16 NOTE — DISCHARGE SUMMARY
"Discharge Diagnosis  Chest pain, dyspnea, fatigue  Possible left lower lobe community-acquired pneumonia  Severe aortic stenosis    Issues Requiring Follow-Up      Discharge Meds     Medication List      START taking these medications     azithromycin 250 mg tablet; Commonly known as: Zithromax; Take 1 tablet   (250 mg) by mouth once every 24 hours.   cefuroxime 250 mg tablet; Commonly known as: Ceftin; Take 1 tablet (250   mg) by mouth once every 24 hours.   melatonin 10 mg tablet; Take 1 tablet (10 mg) by mouth once daily in the   evening.     CONTINUE taking these medications     acetaminophen 500 mg tablet; Commonly known as: Tylenol   aspirin 81 mg EC tablet   atorvastatin 80 mg tablet; Commonly known as: Lipitor; Take 1 tablet (80   mg) by mouth once daily.   carvedilol 25 mg tablet; Commonly known as: Coreg   Daily Multi-Vitamin tablet; Generic drug: multivitamin   EPOETIN DEIRDRE INJ   famotidine 10 mg tablet; Commonly known as: Pepcid   FreeStyle José Luis 2 Mastic misc; Generic drug: flash glucose scanning   reader; Use to check blood sugars at least every 8 hours   FreeStyle José Luis 2 Sensor kit; Generic drug: flash glucose sensor kit;   Change sensor every 14 days. Check blood sugar at least every 8 hours   FreeStyle José Luis 3 Sensor device; Generic drug: blood-glucose sensor;   Change sensor every 14 days   insulin glargine 100 unit/mL (3 mL) pen; Commonly known as: Lantus;   Inject 10 units twice a day   insulin lispro 100 unit/mL injection; Commonly known as: HumaLOG KwikPen   Insulin; Inject before every meal using carb ratio and correction scale.   MDD 50 units.   pen needle, diabetic 32 gauge x 5/32\" needle; Commonly known as: BD Agueda   2nd Gen Pen Needle; USE TO INJECT INSULIN 5 TIMES PER DAY *Please schedule   follow up for further refills*   Renal Caps 1 mg capsule; Generic drug: vitamin B complex-vitamin C-folic   acid; Take 1 capsule by mouth once daily.   sacubitriL-valsartan 24-26 mg tablet; Commonly " known as: Entresto   sevelamer carbonate 800 mg tablet; Commonly known as: Renvela     STOP taking these medications     Eliquis 2.5 mg tablet; Generic drug: apixaban       Test Results Pending At Discharge  None.    Hospital Course  Kentrell Carreon is a 54 y.o. male with a past medical history of discoid lupus/SLE with ESRD on HD (M/T/Th/F), severe aortic stenosis, CAD s/p PCI, systolic heart failure, remote PE (not taking Eliquis), DM and HTN who presented to the ED with chest pain, shortness of breath, and fatigue. His symptoms are worse with exertion.  Initial differential was wide.  Patient had questionable left lower lobe pneumonia on imaging.  Patient reported that he had lung collapse in the area before and was not sure if he truly had pneumonia.  Patient also had been diagnosed with pulmonary embolism in September on CT TAVR and had already been on Eliquis after cardiac ablation and was told to take the Eliquis for another month and a half to complete total 3 months treatment.  Patient ended up stopping Eliquis early due to significant bleeding from fistula postdialysis.  Patient could not remember when his last Eliquis dose was.  Patient also significant concern for cardiac being the source of his symptoms.  Patient had cardiac cath that was done in June 2024 that showed patent bypass grafts.  Patient is still concerned about severe aortic stenosis causing his symptoms.  Cardiology consulted.  General cardiology after evaluation not concerned about ischemic issues, recommended discussion with structural heart team about TAVR and aortic stenosis.  Structural heart team reports still planning outpatient procedure, no plans to do any inpatient procedures this admission.  Repeat CT chest with contrast for PE protocol with contrast allergy protocol done, no evidence of PE.  Does not need to restart his Eliquis since no evidence of PE and he reports significant bleeding from his fistula with Eliquis.  Since  admission patient has reported some  improvement in chest pain.  Dyspnea also mildly improved, fatigue has not changed.  Difficult to say what could be all contributing to his symptoms, he does report some improvement with antibiotics and with hemodialysis.  Unknown if symptoms improved because of treatment of pneumonia or treatment of fluid overload.  After 1 dialysis session patient felt like he is close to his dry weight and not fluid overloaded.  Patient also reported significant issues with sleep at home.  This could be contributing to fatigue.  Trialed melatonin while here and he thinks it may have helped some.  Patient overall stable and can be discharged home, will give patient 3 more days of oral cefuroxime and 1 more day of oral azithromycin for total 5 days treatment of pneumonia.  Patient will also be given melatonin for sleep.       Assessment/Plan  Chest pain, dyspnea, fatigue  Possible LLL pneumonia  History of PE in September  -CXR with earlier LLL infiltrate.  CT chest shows questionable left lower lobe opacification/infiltrate/scarring.  Difficult to tell.  -Tolerate Rocephin and azithromycin.  Discharged on oral cefuroxime for 3 more days and azithromycin for 1 more day.  Patient will get total 5 days of cephalosporin renally adjusted and 3 days of azithromycin treatment for pneumonia.  -PE incidental finding from CT TAVR 9/4/2024.  -Repeat CT chest per PE protocol done on 11/15 after contrast allergy protocol done does not show any acute pulmonary embolism.  No need for further anticoagulation.     ESRD  Uremia  Chronic HFmrEF  -EF 38% 8/2024  -RFP demonstrates increased BUN.  -BNP >5000  -Nephrology dialysis team consulted, patient received dialysis 11/14 evening with 2 L fluid removal.  Patient will be given dialysis again on 11/16 prior to discharge.  -At home patient does home dialysis.     Elevated troponin  Severe aortic stenosis  -Mildly elevated, NSTEMI from 08/2024  noted  -Downtrending  -Doubt acute coronary syndrome.  -Patient had cardiac catheterization done in June/2024 with patent bypass grafts.  -Cardiology consulted for opinion if symptoms could be related to severe aortic stenosis.  Cardiology recommendations reviewed.  Structural heart team consulted.  Structural heart team has indicated outpatient schedule for TAVR.  -Cardiology has recommended 1 unit PRBC to help keep hemoglobin around 8 or higher.  Patient agreeable to transfusion.  -Post transfusion hemoglobin 8.4.     T2DM  -Resume home insulin schedule.    Time spent caring for patient and coordinating discharge total 35 minutes.    Pertinent Physical Exam At Time of Discharge  General: Lying in bed without distress.  Cooperative.  Skin: No rashes or ulcerations.  HEENT: Sclera is white.  Mucous membranes moist.  Cardiac: Regular rate and rhythm, S1/S2 normal.  Chest pain not reproducible with palpation to chest wall.  Lungs: Clear to auscultation bilaterally, no wheezing, soft mild crackles at left base, no accessory muscle use at rest.  Abdomen: Soft, nontender, nondistended, BS +  Extremities: No cyanosis.  No lower extremity edema.  Neurologic: Alert and oriented x3.  No focal deficits.  Psychiatric: Appropriate mood and behavior.  Currently no agitation.    Outpatient Follow-Up  Future Appointments   Date Time Provider Department Center   11/16/2024  1:00 PM HD CHAIR 4 CMCDialysis [facility]   12/5/2024  9:30 AM Lore Hull MD [id number] [facility]   12/9/2024  4:00 PM Laureen Perry DPM [id number]   2/13/2025  4:20 PM Ten Lance DO [id number] [facility]         Angel Merlos MD

## 2024-11-16 NOTE — NURSING NOTE
Report to Receiving RN:    Report To: Florencio RN  Time Report Called: 1700  Hand-Off Communication: tolerated treatment, 2.5 liters of fluid removed, denies C/O, VSS  Complications During Treatment: No  Ultrafiltration Treatment: No  Medications Administered During Dialysis: No  Blood Products Administered During Dialysis: No  Labs Sent During Dialysis: No  Heparin Drip Rate Changes: N/A  Dialysis Catheter Dressing: N/A  Last Dressing Change: N/A

## 2024-11-16 NOTE — CARE PLAN
The patient's goals for the shift include    Problem: Diabetes  Goal: Achieve decreasing blood glucose levels by end of shift  Outcome: Progressing  Goal: Increase stability of blood glucose readings by end of shift  Outcome: Progressing  Goal: Maintain glucose levels >70mg/dl to <250mg/dl throughout shift  Outcome: Progressing  Goal: No changes in neurological exam by end of shift  Outcome: Progressing  Goal: Learn about and adhere to nutrition recommendations by end of shift  Outcome: Progressing  Goal: Vital signs within normal range for age by end of shift  Outcome: Progressing  Goal: Increase self care and/or family involovement by end of shift  Outcome: Progressing       The clinical goals for the shift include Patient will remain hemodynamically stable

## 2024-11-17 ENCOUNTER — PHARMACY VISIT (OUTPATIENT)
Dept: PHARMACY | Facility: CLINIC | Age: 54
End: 2024-11-17
Payer: COMMERCIAL

## 2024-11-17 PROCEDURE — RXMED WILLOW AMBULATORY MEDICATION CHARGE

## 2024-11-18 ENCOUNTER — PATIENT OUTREACH (OUTPATIENT)
Dept: PRIMARY CARE | Facility: CLINIC | Age: 54
End: 2024-11-18
Payer: COMMERCIAL

## 2024-11-18 NOTE — PROGRESS NOTES
Discharge Facility:[facility]  Discharge Diagnosis:Chest pain dyspnea possible LLL community acquired pneumonia  Admission Date:11/15/24  Discharge Date: 11/16/24    PCP Appointment Date:none made sent to pcp office  Specialist Appointment Date:   Hospital Encounter and Summary Linked: Yes  See discharge assessment below for further details  Two attempts were made to reach patient within two business days after discharge. Voicemail left with contact information for patient to call back with any non-emergent questions or concerns.

## 2024-11-19 DIAGNOSIS — I35.0 NONRHEUMATIC AORTIC VALVE STENOSIS: Primary | ICD-10-CM

## 2024-11-19 DIAGNOSIS — Z91.041 ALLERGY TO INTRAVENOUS CONTRAST: Primary | ICD-10-CM

## 2024-11-19 RX ORDER — PREDNISONE 50 MG/1
50 TABLET ORAL SEE ADMIN INSTRUCTIONS
Qty: 3 TABLET | Refills: 3 | Status: SHIPPED | OUTPATIENT
Start: 2024-11-19

## 2024-11-19 RX ORDER — DIPHENHYDRAMINE HCL 50 MG
CAPSULE ORAL
Qty: 3 CAPSULE | Refills: 3 | Status: SHIPPED | OUTPATIENT
Start: 2024-11-19

## 2024-11-21 ENCOUNTER — TELEPHONE (OUTPATIENT)
Dept: CARDIOLOGY | Facility: HOSPITAL | Age: 54
End: 2024-11-21
Payer: COMMERCIAL

## 2024-11-25 ENCOUNTER — TELEPHONE (OUTPATIENT)
Dept: CARDIOLOGY | Facility: HOSPITAL | Age: 54
End: 2024-11-25
Payer: COMMERCIAL

## 2024-11-25 NOTE — TELEPHONE ENCOUNTER
[facility] Questionnaire      1  Heart failure affects different people in different ways. Some feel shortness of breath while others feel fatigue. Please indicate how much you are limited by heart failure (shortness of breath or fatigue) in your ability to do the following activities over the past 2 weeks.    A.) Showering/bathing  2. Quite a bit  B.) Walking 1 block on level ground 3. Moderately  C.) Hurrying or Jogging   6. Limited for other reastons    2.  Over the past 2 weeks, how many times did you have swelling in your feet, ankles or legs when you woke up in the morning? 5. Never    3.  Over the past 2 weeks, on average, how many times has fatigue limited your ability to do what you wanted? 1. All the time    4.  Over the past 2 weeks, on average, how many times has shortness of breath limited your ability to do what you wanted? 1. All the time    5.  Over the past 2 weeks, on average, how many times have you been forced to sleep sitting up in a chair or with at least 3 pillows to prop you up because of shortness of breath? 3 or more times a week but not every day    6. Over the past 2 weeks, how much has your heart failure limited your enjoyment of life? It has limited my enjoyment of life quite a bit    7. If you had to spend the rest of your life with your heart failure the way it is right now, how would you feel about this? 1. Not at all     8. How much does your heart failure affect your lifestyle? Please indicate how your heart failure may have limited yourparticipation in the following activities over the past 2 weeks    A.)  Hobbies, recreational activities  1. Severely limited    B.) Working or doing household chores  1. Severely limited    C.) Visiting family or friends out of your home  3. Moderately limited

## 2024-11-26 ENCOUNTER — TELEPHONE (OUTPATIENT)
Dept: CARDIOLOGY | Facility: HOSPITAL | Age: 54
End: 2024-11-26
Payer: COMMERCIAL

## 2024-11-26 DIAGNOSIS — I35.0 NONRHEUMATIC AORTIC VALVE STENOSIS: Primary | ICD-10-CM

## 2024-11-27 ENCOUNTER — PATIENT OUTREACH (OUTPATIENT)
Dept: PRIMARY CARE | Facility: CLINIC | Age: 54
End: 2024-11-27
Payer: COMMERCIAL

## 2024-12-02 ENCOUNTER — LAB (OUTPATIENT)
Dept: LAB | Facility: LAB | Age: 54
End: 2024-12-02
Payer: COMMERCIAL

## 2024-12-02 ENCOUNTER — ANESTHESIA EVENT (OUTPATIENT)
Dept: CARDIOLOGY | Facility: HOSPITAL | Age: 54
End: 2024-12-02
Payer: COMMERCIAL

## 2024-12-02 DIAGNOSIS — I35.0 NONRHEUMATIC AORTIC VALVE STENOSIS: ICD-10-CM

## 2024-12-02 DIAGNOSIS — Z12.5 SCREENING FOR PROSTATE CANCER: ICD-10-CM

## 2024-12-02 DIAGNOSIS — Z11.59 NEED FOR HEPATITIS C SCREENING TEST: ICD-10-CM

## 2024-12-02 PROBLEM — Z95.2 S/P TAVR (TRANSCATHETER AORTIC VALVE REPLACEMENT): Status: ACTIVE | Noted: 2024-12-02

## 2024-12-02 LAB
ABO GROUP (TYPE) IN BLOOD: NORMAL
ANION GAP SERPL CALC-SCNC: 19 MMOL/L (ref 10–20)
ANTIBODY SCREEN: NORMAL
BUN SERPL-MCNC: 78 MG/DL (ref 6–23)
CALCIUM SERPL-MCNC: 10.3 MG/DL (ref 8.6–10.6)
CHLORIDE SERPL-SCNC: 99 MMOL/L (ref 98–107)
CO2 SERPL-SCNC: 26 MMOL/L (ref 21–32)
CREAT SERPL-MCNC: 9.74 MG/DL (ref 0.5–1.3)
EGFRCR SERPLBLD CKD-EPI 2021: 6 ML/MIN/1.73M*2
ERYTHROCYTE [DISTWIDTH] IN BLOOD BY AUTOMATED COUNT: 16.1 % (ref 11.5–14.5)
GLUCOSE SERPL-MCNC: 84 MG/DL (ref 74–99)
HCT VFR BLD AUTO: 25.8 % (ref 41–52)
HCV AB SER QL: NONREACTIVE
HGB BLD-MCNC: 8.1 G/DL (ref 13.5–17.5)
INR PPP: 1.1 (ref 0.9–1.1)
MCH RBC QN AUTO: 30.5 PG (ref 26–34)
MCHC RBC AUTO-ENTMCNC: 31.4 G/DL (ref 32–36)
MCV RBC AUTO: 97 FL (ref 80–100)
NRBC BLD-RTO: 0 /100 WBCS (ref 0–0)
PLATELET # BLD AUTO: 187 X10*3/UL (ref 150–450)
POTASSIUM SERPL-SCNC: 4 MMOL/L (ref 3.5–5.3)
PROTHROMBIN TIME: 12.2 SECONDS (ref 9.8–12.8)
PSA SERPL-MCNC: 0.23 NG/ML
RBC # BLD AUTO: 2.66 X10*6/UL (ref 4.5–5.9)
RH FACTOR (ANTIGEN D): NORMAL
SODIUM SERPL-SCNC: 140 MMOL/L (ref 136–145)
WBC # BLD AUTO: 5.4 X10*3/UL (ref 4.4–11.3)

## 2024-12-02 PROCEDURE — 86901 BLOOD TYPING SEROLOGIC RH(D): CPT

## 2024-12-02 PROCEDURE — 36415 COLL VENOUS BLD VENIPUNCTURE: CPT

## 2024-12-02 PROCEDURE — 85027 COMPLETE CBC AUTOMATED: CPT

## 2024-12-02 PROCEDURE — 85610 PROTHROMBIN TIME: CPT

## 2024-12-02 PROCEDURE — G0103 PSA SCREENING: HCPCS

## 2024-12-02 PROCEDURE — 80048 BASIC METABOLIC PNL TOTAL CA: CPT

## 2024-12-02 PROCEDURE — G0472 HEP C SCREEN HIGH RISK/OTHER: HCPCS

## 2024-12-02 PROCEDURE — 86850 RBC ANTIBODY SCREEN: CPT

## 2024-12-02 PROCEDURE — 86923 COMPATIBILITY TEST ELECTRIC: CPT

## 2024-12-02 PROCEDURE — 86900 BLOOD TYPING SEROLOGIC ABO: CPT

## 2024-12-02 NOTE — DISCHARGE INSTRUCTIONS
####  POST VALVE PROCEDURE DISCHARGE INSTRUCTIONS   ####    - Please weigh yourself daily (first thing in the morning) and record reading  - Please take and record your blood pressure 2 times a day (at least 60-90 mins AFTER you take your meds)  PLEASE HAVE THESE READINGS AVAILABLE DURING YOUR FOLLOW-UP APPOINTMENTS!!    - NO DRIVING OR LIFTING/PULLING/PUSHING GREATER THAN 10 POUNDS FOR 1 WEEK FROM YOUR PROCEDURE DATE.    - A lab requisition has been provided for you to draw a CBC and BMP.  Please take this rec to your local lab to have your labs drawn between 4-7 days post discharge.     - You have been given the order requisition for the 1 month ECHO at the time of your discharge.  Please call and schedule this ECHO at your LOCAL center (no sooner than 23 days after your procedure date).    - You will have 2 (possibly 3 - if 1 week appointment available) appointments that are vital to your post procedure care, these will be scheduled for you IF YOU NEED TO CHANGE YOUR APPOINTMENT TIME/DATE, PLEASE CALL [phone]   - Please follow up with your PCP within 7-14 days of discharge  - You will follow up with your primary cardiologist in 6-10 weeks    **** No elective dental procedures or cleanings for 3 months post procedure - You will need dental prophylaxis (oral antibiotic) prior to dental work/cleanings for life *****    Please call the STRUCTURAL HEART TEAM LINE if you have any questions or concerns - [phone] (M-F 9am-4pm)  On-Call Cardiology Fellow: [phone] (ONLY for urgent issues on nights/weekends/holidays)      ****   CALL PROVIDER IF:   ****  - Breathing faster than normal.   - Breathing harder than normal or having retractions.   - Fever of 100.4 F (38 C) or higher.   - Chills  - Drinking less than normal.   - Urinating less than normal, over 1 day.   - Acting very sleepy and difficult to awaken.   - Vomiting (throwing up) and not able to eat or drink for 12 hours.   - 3 or more loose, watery  bowel movements in 24 hours (diarrhea).    -Any new concerning symptoms.    - If you develop difficulty breathing, rash, hives, severe nausea, vomiting, light-headedness or any signs of infection, immediately contact your doctor and go to the nearest emergency room.      #####   MISC. HOME-GOING INFO   #####  - DO NOT drink any alcoholic drinks or take any non-prescriptive medications that contain alcohol for the first 24 hours.   - DO NOT make any important decisions for the first 24 hours.      ACTIVITY:  - You are advised to go directly home from the hospital.   - DO NOT lift anything heavier than 10 pounds for one week, this allows for proper healing of the groin.   - No excessive exercise or treadmill use for one week. You may walk and do stairs, slowly.   - No sexual activities for 24 hours after you arrive home.      WOUND CARE:  - If slight bleeding should occur at site, lie down and have someone apply firm pressure just above the puncture site for 5 minutes.  If it continues or is profuse, call 911. Always notify your doctor if bleeding occurs.   - Keep site clean and dry. Let air dry or you may use a simple bandaid.   - Gently cleanse the puncture site in your groin with soap and water only.   - You may experience some tenderness, bruising or minimal inflammation.  If you have any concerns, you may contact the Cath Lab or if any of these symptoms become excessive, contact your cardiologist or go to the emergency room.   - No tub baths, soaking, or swimming for one week.   - May shower the next day after your procedure.      DIET:  - You may resume your normal diet. However, be mindful of your sodium intake.  Ideally you should try to limit your daily intake of sodium to 2-3g a day      HEART FAILURE SPECIFIC INSTRUCTIONS:   - CALL 911 IF YOU HAVE ANY OF THE SIGNS AND SYMPTOMS OF HEART FAILURE:   1. Chest pain   2. Significant Shortness of breath   3. Fainting.   - Notify your physician immediately if you  have shortness of breath; weight gain of 3 lbs. or more; fatigue and loss of energy; swelling of lower extremities or abdomen; dizziness or fainting; change of appetite; and frequent coughing.   - Daily weight on the same scale, same time after voiding and before eating.   - Maintain daily weight log.

## 2024-12-03 ENCOUNTER — HOSPITAL ENCOUNTER (OUTPATIENT)
Dept: OPERATING ROOM | Facility: HOSPITAL | Age: 54
Discharge: HOME | End: 2024-12-03
Payer: COMMERCIAL

## 2024-12-03 ENCOUNTER — APPOINTMENT (OUTPATIENT)
Dept: DIALYSIS | Facility: HOSPITAL | Age: 54
End: 2024-12-03
Payer: COMMERCIAL

## 2024-12-03 ENCOUNTER — ANESTHESIA (OUTPATIENT)
Dept: CARDIOLOGY | Facility: HOSPITAL | Age: 54
End: 2024-12-03
Payer: COMMERCIAL

## 2024-12-03 ENCOUNTER — APPOINTMENT (OUTPATIENT)
Dept: RADIOLOGY | Facility: HOSPITAL | Age: 54
End: 2024-12-03
Payer: COMMERCIAL

## 2024-12-03 ENCOUNTER — HOSPITAL ENCOUNTER (INPATIENT)
Facility: HOSPITAL | Age: 54
LOS: 1 days | Discharge: HOME | End: 2024-12-04
Attending: INTERNAL MEDICINE | Admitting: INTERNAL MEDICINE
Payer: COMMERCIAL

## 2024-12-03 ENCOUNTER — APPOINTMENT (OUTPATIENT)
Dept: CARDIOLOGY | Facility: HOSPITAL | Age: 54
End: 2024-12-03
Payer: COMMERCIAL

## 2024-12-03 DIAGNOSIS — I35.0 NONRHEUMATIC AORTIC VALVE STENOSIS: Primary | ICD-10-CM

## 2024-12-03 DIAGNOSIS — Z95.2 S/P TAVR (TRANSCATHETER AORTIC VALVE REPLACEMENT): ICD-10-CM

## 2024-12-03 LAB
ACT BLD: 250 SEC (ref 83–199)
ACT BLD: NORMAL S
ANION GAP BLDA CALCULATED.4IONS-SCNC: 17 MMO/L (ref 10–25)
ANION GAP BLDA CALCULATED.4IONS-SCNC: 20 MMO/L (ref 10–25)
ANION GAP SERPL CALC-SCNC: 23 MMOL/L (ref 10–20)
ATRIAL RATE: 77 BPM
BASE EXCESS BLDA CALC-SCNC: -4.1 MMOL/L (ref -2–3)
BASE EXCESS BLDA CALC-SCNC: -5.6 MMOL/L (ref -2–3)
BASOPHILS # BLD AUTO: 0.01 X10*3/UL (ref 0–0.1)
BASOPHILS NFR BLD AUTO: 0.2 %
BLOOD EXPIRATION DATE: NORMAL
BODY TEMPERATURE: 37 DEGREES CELSIUS
BODY TEMPERATURE: 37 DEGREES CELSIUS
BUN SERPL-MCNC: 81 MG/DL (ref 6–23)
CA-I BLDA-SCNC: 1.19 MMOL/L (ref 1.1–1.33)
CA-I BLDA-SCNC: 1.2 MMOL/L (ref 1.1–1.33)
CALCIUM SERPL-MCNC: 9.1 MG/DL (ref 8.6–10.6)
CHLORIDE BLDA-SCNC: 96 MMOL/L (ref 98–107)
CHLORIDE BLDA-SCNC: 97 MMOL/L (ref 98–107)
CHLORIDE SERPL-SCNC: 95 MMOL/L (ref 98–107)
CO2 SERPL-SCNC: 21 MMOL/L (ref 21–32)
COHGB MFR BLDA: 1 %
CREAT SERPL-MCNC: 8.06 MG/DL (ref 0.5–1.3)
DISPENSE STATUS: NORMAL
DO-HGB MFR BLDA: 1.6 % (ref 0–5)
EGFRCR SERPLBLD CKD-EPI 2021: 7 ML/MIN/1.73M*2
EOSINOPHIL # BLD AUTO: 0.01 X10*3/UL (ref 0–0.7)
EOSINOPHIL NFR BLD AUTO: 0.2 %
ERYTHROCYTE [DISTWIDTH] IN BLOOD BY AUTOMATED COUNT: 15.6 % (ref 11.5–14.5)
GLUCOSE BLD MANUAL STRIP-MCNC: 240 MG/DL (ref 74–99)
GLUCOSE BLDA-MCNC: 344 MG/DL (ref 74–99)
GLUCOSE BLDA-MCNC: 379 MG/DL (ref 74–99)
GLUCOSE SERPL-MCNC: 292 MG/DL (ref 74–99)
HCO3 BLDA-SCNC: 20.7 MMOL/L (ref 22–26)
HCO3 BLDA-SCNC: 21.6 MMOL/L (ref 22–26)
HCT VFR BLD AUTO: 30.1 % (ref 41–52)
HCT VFR BLD EST: 29 % (ref 41–52)
HCT VFR BLD EST: 31 % (ref 41–52)
HGB BLD-MCNC: 9.9 G/DL (ref 13.5–17.5)
HGB BLDA-MCNC: 10.2 G/DL (ref 13.5–17.5)
HGB BLDA-MCNC: 9.5 G/DL (ref 13.5–17.5)
HGB BLDA-MCNC: 9.5 G/DL (ref 13.5–17.5)
IMM GRANULOCYTES # BLD AUTO: 0.04 X10*3/UL (ref 0–0.7)
IMM GRANULOCYTES NFR BLD AUTO: 0.9 % (ref 0–0.9)
INHALED O2 CONCENTRATION: 100 %
INR PPP: 1.3 (ref 0.9–1.1)
LACTATE BLDA-SCNC: 0.9 MMOL/L (ref 0.4–2)
LACTATE BLDA-SCNC: 1.1 MMOL/L (ref 0.4–2)
LYMPHOCYTES # BLD AUTO: 0.36 X10*3/UL (ref 1.2–4.8)
LYMPHOCYTES NFR BLD AUTO: 8.1 %
MAGNESIUM SERPL-MCNC: 2.35 MG/DL (ref 1.6–2.4)
MCH RBC QN AUTO: 30.7 PG (ref 26–34)
MCHC RBC AUTO-ENTMCNC: 32.9 G/DL (ref 32–36)
MCV RBC AUTO: 93 FL (ref 80–100)
METHGB MFR BLDA: 0.8 % (ref 0–1.5)
MONOCYTES # BLD AUTO: 0.21 X10*3/UL (ref 0.1–1)
MONOCYTES NFR BLD AUTO: 4.8 %
NEUTROPHILS # BLD AUTO: 3.79 X10*3/UL (ref 1.2–7.7)
NEUTROPHILS NFR BLD AUTO: 85.8 %
NRBC BLD-RTO: 0 /100 WBCS (ref 0–0)
OXYHGB MFR BLDA: 96.7 % (ref 94–98)
OXYHGB MFR BLDA: 96.7 % (ref 94–98)
OXYHGB MFR BLDA: 97.1 % (ref 94–98)
P AXIS: 58 DEGREES
P OFFSET: 173 MS
P ONSET: 88 MS
PCO2 BLDA: 41 MM HG (ref 38–42)
PCO2 BLDA: 43 MM HG (ref 38–42)
PH BLDA: 7.29 PH (ref 7.38–7.42)
PH BLDA: 7.33 PH (ref 7.38–7.42)
PLATELET # BLD AUTO: 153 X10*3/UL (ref 150–450)
PO2 BLDA: 137 MM HG (ref 85–95)
PO2 BLDA: 140 MM HG (ref 85–95)
POTASSIUM BLDA-SCNC: 4.3 MMOL/L (ref 3.5–5.3)
POTASSIUM BLDA-SCNC: 4.9 MMOL/L (ref 3.5–5.3)
POTASSIUM SERPL-SCNC: 4.3 MMOL/L (ref 3.5–5.3)
PR INTERVAL: 238 MS
PRODUCT BLOOD TYPE: 6200
PRODUCT BLOOD TYPE: 9500
PRODUCT BLOOD TYPE: 9500
PRODUCT CODE: NORMAL
PROTHROMBIN TIME: 14.3 SECONDS (ref 9.8–12.8)
Q ONSET: 207 MS
QRS COUNT: 13 BEATS
QRS DURATION: 132 MS
QT INTERVAL: 462 MS
QTC CALCULATION(BAZETT): 522 MS
QTC FREDERICIA: 502 MS
R AXIS: 15 DEGREES
RBC # BLD AUTO: 3.23 X10*6/UL (ref 4.5–5.9)
SAO2 % BLDA: 98 % (ref 94–100)
SAO2 % BLDA: 99 % (ref 94–100)
SODIUM BLDA-SCNC: 131 MMOL/L (ref 136–145)
SODIUM BLDA-SCNC: 132 MMOL/L (ref 136–145)
SODIUM SERPL-SCNC: 135 MMOL/L (ref 136–145)
T AXIS: 144 DEGREES
T OFFSET: 438 MS
UNIT ABO: NORMAL
UNIT NUMBER: NORMAL
UNIT RH: NORMAL
UNIT VOLUME: 180
UNIT VOLUME: 281
UNIT VOLUME: 301
UNIT VOLUME: 309
UNIT VOLUME: 350
VENTRICULAR RATE: 77 BPM
WBC # BLD AUTO: 4.4 X10*3/UL (ref 4.4–11.3)
XM INTEP: NORMAL
XM INTEP: NORMAL

## 2024-12-03 PROCEDURE — 85347 COAGULATION TIME ACTIVATED: CPT

## 2024-12-03 PROCEDURE — G0269 OCCLUSIVE DEVICE IN VEIN ART: HCPCS | Performed by: INTERNAL MEDICINE

## 2024-12-03 PROCEDURE — 2020000001 HC ICU ROOM DAILY

## 2024-12-03 PROCEDURE — 3600000011 HC PERFUSION TIME - INITIAL BASE CHARGE: Performed by: INTERNAL MEDICINE

## 2024-12-03 PROCEDURE — 99291 CRITICAL CARE FIRST HOUR: CPT

## 2024-12-03 PROCEDURE — 82947 ASSAY GLUCOSE BLOOD QUANT: CPT

## 2024-12-03 PROCEDURE — C1887 CATHETER, GUIDING: HCPCS | Performed by: INTERNAL MEDICINE

## 2024-12-03 PROCEDURE — C1760 CLOSURE DEV, VASC: HCPCS | Performed by: INTERNAL MEDICINE

## 2024-12-03 PROCEDURE — 36620 INSERTION CATHETER ARTERY: CPT

## 2024-12-03 PROCEDURE — 83735 ASSAY OF MAGNESIUM: CPT | Performed by: NURSE PRACTITIONER

## 2024-12-03 PROCEDURE — 2500000004 HC RX 250 GENERAL PHARMACY W/ HCPCS (ALT 636 FOR OP/ED): Mod: JG

## 2024-12-03 PROCEDURE — 84132 ASSAY OF SERUM POTASSIUM: CPT | Performed by: NURSE PRACTITIONER

## 2024-12-03 PROCEDURE — 2780000003 HC OR 278 NO HCPCS: Performed by: INTERNAL MEDICINE

## 2024-12-03 PROCEDURE — 2550000001 HC RX 255 CONTRASTS: Performed by: INTERNAL MEDICINE

## 2024-12-03 PROCEDURE — C1889 IMPLANT/INSERT DEVICE, NOC: HCPCS | Performed by: INTERNAL MEDICINE

## 2024-12-03 PROCEDURE — C1725 CATH, TRANSLUMIN NON-LASER: HCPCS | Performed by: INTERNAL MEDICINE

## 2024-12-03 PROCEDURE — 71045 X-RAY EXAM CHEST 1 VIEW: CPT

## 2024-12-03 PROCEDURE — 6A930ZZ SHOCK WAVE THERAPY, MUSCULOSKELETAL, SINGLE: ICD-10-PCS | Performed by: INTERNAL MEDICINE

## 2024-12-03 PROCEDURE — A33361 PR REPLACE AORTIC VALVE PERQ FEMORAL ARTRY APPROACH: Performed by: ANESTHESIOLOGY

## 2024-12-03 PROCEDURE — 36430 TRANSFUSION BLD/BLD COMPNT: CPT

## 2024-12-03 PROCEDURE — 8010000001 HC DIALYSIS - HEMODIALYSIS PER DAY

## 2024-12-03 PROCEDURE — 30233N1 TRANSFUSION OF NONAUTOLOGOUS RED BLOOD CELLS INTO PERIPHERAL VEIN, PERCUTANEOUS APPROACH: ICD-10-PCS | Performed by: INTERNAL MEDICINE

## 2024-12-03 PROCEDURE — 2720000007 HC OR 272 NO HCPCS: Performed by: INTERNAL MEDICINE

## 2024-12-03 PROCEDURE — 84132 ASSAY OF SERUM POTASSIUM: CPT

## 2024-12-03 PROCEDURE — 3600000012 HC PERFUSION TIME - EACH INCREMENTAL 1 MINUTE: Performed by: INTERNAL MEDICINE

## 2024-12-03 PROCEDURE — 2500000001 HC RX 250 WO HCPCS SELF ADMINISTERED DRUGS (ALT 637 FOR MEDICARE OP): Performed by: NURSE PRACTITIONER

## 2024-12-03 PROCEDURE — 85347 COAGULATION TIME ACTIVATED: CPT | Performed by: INTERNAL MEDICINE

## 2024-12-03 PROCEDURE — 5A1D70Z PERFORMANCE OF URINARY FILTRATION, INTERMITTENT, LESS THAN 6 HOURS PER DAY: ICD-10-PCS | Performed by: INTERNAL MEDICINE

## 2024-12-03 PROCEDURE — 37220 HC REVASCULARIZE ILIAC ARTERY,ANGIOPLASTY, INITIAL VESSEL: CPT | Mod: RT | Performed by: INTERNAL MEDICINE

## 2024-12-03 PROCEDURE — 37799 UNLISTED PX VASCULAR SURGERY: CPT | Performed by: NURSE PRACTITIONER

## 2024-12-03 PROCEDURE — 82375 ASSAY CARBOXYHB QUANT: CPT

## 2024-12-03 PROCEDURE — C1756 CATH, PACING, TRANSESOPH: HCPCS | Performed by: INTERNAL MEDICINE

## 2024-12-03 PROCEDURE — 85610 PROTHROMBIN TIME: CPT | Performed by: NURSE PRACTITIONER

## 2024-12-03 PROCEDURE — P9016 RBC LEUKOCYTES REDUCED: HCPCS

## 2024-12-03 PROCEDURE — 71045 X-RAY EXAM CHEST 1 VIEW: CPT | Performed by: RADIOLOGY

## 2024-12-03 PROCEDURE — 3700000002 HC GENERAL ANESTHESIA TIME - EACH INCREMENTAL 1 MINUTE: Performed by: INTERNAL MEDICINE

## 2024-12-03 PROCEDURE — 93005 ELECTROCARDIOGRAM TRACING: CPT

## 2024-12-03 PROCEDURE — 33361 REPLACE AORTIC VALVE PERQ: CPT | Performed by: INTERNAL MEDICINE

## 2024-12-03 PROCEDURE — C1769 GUIDE WIRE: HCPCS | Performed by: INTERNAL MEDICINE

## 2024-12-03 PROCEDURE — C1894 INTRO/SHEATH, NON-LASER: HCPCS | Performed by: INTERNAL MEDICINE

## 2024-12-03 PROCEDURE — 02RF38Z REPLACEMENT OF AORTIC VALVE WITH ZOOPLASTIC TISSUE, PERCUTANEOUS APPROACH: ICD-10-PCS | Performed by: INTERNAL MEDICINE

## 2024-12-03 PROCEDURE — 76937 US GUIDE VASCULAR ACCESS: CPT

## 2024-12-03 PROCEDURE — 76937 US GUIDE VASCULAR ACCESS: CPT | Performed by: INTERNAL MEDICINE

## 2024-12-03 PROCEDURE — 2500000002 HC RX 250 W HCPCS SELF ADMINISTERED DRUGS (ALT 637 FOR MEDICARE OP, ALT 636 FOR OP/ED)

## 2024-12-03 PROCEDURE — 047C3ZZ DILATION OF RIGHT COMMON ILIAC ARTERY, PERCUTANEOUS APPROACH: ICD-10-PCS | Performed by: INTERNAL MEDICINE

## 2024-12-03 PROCEDURE — 3700000001 HC GENERAL ANESTHESIA TIME - INITIAL BASE CHARGE: Performed by: INTERNAL MEDICINE

## 2024-12-03 PROCEDURE — 85018 HEMOGLOBIN: CPT

## 2024-12-03 PROCEDURE — 85025 COMPLETE CBC W/AUTO DIFF WBC: CPT | Performed by: NURSE PRACTITIONER

## 2024-12-03 PROCEDURE — 93010 ELECTROCARDIOGRAM REPORT: CPT | Performed by: INTERNAL MEDICINE

## 2024-12-03 PROCEDURE — 37220 PR REVASCULARIZATION ILIAC ARTERY ANGIOP 1ST VSL: CPT | Performed by: INTERNAL MEDICINE

## 2024-12-03 PROCEDURE — 2500000004 HC RX 250 GENERAL PHARMACY W/ HCPCS (ALT 636 FOR OP/ED): Mod: JG | Performed by: INTERNAL MEDICINE

## 2024-12-03 PROCEDURE — C9764 REVASC INTRAVASC LITHOTRIPSY: HCPCS | Performed by: INTERNAL MEDICINE

## 2024-12-03 DEVICE — DELIV SYS D-EVOLUTFX-34
Type: IMPLANTABLE DEVICE | Site: HEART | Status: FUNCTIONAL
Brand: EVOLUT™ FX

## 2024-12-03 DEVICE — VALVE, AORTIC, 34MM, EVOLUT FX  TRANSCATHETER: Type: IMPLANTABLE DEVICE | Site: HEART | Status: FUNCTIONAL

## 2024-12-03 RX ORDER — PREDNISONE 50 MG/1
50 TABLET ORAL ONCE
Status: DISCONTINUED | OUTPATIENT
Start: 2024-12-03 | End: 2024-12-03

## 2024-12-03 RX ORDER — PANTOPRAZOLE SODIUM 40 MG/1
40 TABLET, DELAYED RELEASE ORAL
Status: DISCONTINUED | OUTPATIENT
Start: 2024-12-04 | End: 2024-12-04 | Stop reason: HOSPADM

## 2024-12-03 RX ORDER — DIPHENHYDRAMINE HCL 50 MG
50 CAPSULE ORAL ONCE
Status: DISCONTINUED | OUTPATIENT
Start: 2024-12-03 | End: 2024-12-03

## 2024-12-03 RX ORDER — ACETAMINOPHEN 500 MG
10 TABLET ORAL EVERY EVENING
Status: DISCONTINUED | OUTPATIENT
Start: 2024-12-03 | End: 2024-12-04 | Stop reason: HOSPADM

## 2024-12-03 RX ORDER — CEFAZOLIN 1 G/1
INJECTION, POWDER, FOR SOLUTION INTRAVENOUS AS NEEDED
Status: DISCONTINUED | OUTPATIENT
Start: 2024-12-03 | End: 2024-12-03

## 2024-12-03 RX ORDER — HEPARIN SODIUM 5000 [USP'U]/ML
5000 INJECTION, SOLUTION INTRAVENOUS; SUBCUTANEOUS EVERY 8 HOURS
Status: DISCONTINUED | OUTPATIENT
Start: 2024-12-05 | End: 2024-12-04 | Stop reason: HOSPADM

## 2024-12-03 RX ORDER — FENTANYL CITRATE 50 UG/ML
INJECTION, SOLUTION INTRAMUSCULAR; INTRAVENOUS AS NEEDED
Status: DISCONTINUED | OUTPATIENT
Start: 2024-12-03 | End: 2024-12-03

## 2024-12-03 RX ORDER — ONDANSETRON HYDROCHLORIDE 2 MG/ML
4 INJECTION, SOLUTION INTRAVENOUS EVERY 8 HOURS PRN
Status: DISCONTINUED | OUTPATIENT
Start: 2024-12-03 | End: 2024-12-04 | Stop reason: HOSPADM

## 2024-12-03 RX ORDER — VANCOMYCIN HYDROCHLORIDE 1 G/200ML
1000 INJECTION, SOLUTION INTRAVENOUS ONCE
Status: DISCONTINUED | OUTPATIENT
Start: 2024-12-03 | End: 2024-12-03

## 2024-12-03 RX ORDER — LABETALOL HYDROCHLORIDE 5 MG/ML
INJECTION, SOLUTION INTRAVENOUS AS NEEDED
Status: DISCONTINUED | OUTPATIENT
Start: 2024-12-03 | End: 2024-12-03

## 2024-12-03 RX ORDER — BUPIVACAINE HYDROCHLORIDE 2.5 MG/ML
INJECTION, SOLUTION INFILTRATION; PERINEURAL AS NEEDED
Status: DISCONTINUED | OUTPATIENT
Start: 2024-12-03 | End: 2024-12-04 | Stop reason: HOSPADM

## 2024-12-03 RX ORDER — MIDAZOLAM HYDROCHLORIDE 1 MG/ML
INJECTION INTRAMUSCULAR; INTRAVENOUS AS NEEDED
Status: DISCONTINUED | OUTPATIENT
Start: 2024-12-03 | End: 2024-12-03

## 2024-12-03 RX ORDER — NAPROXEN SODIUM 220 MG/1
324 TABLET, FILM COATED ORAL ONCE
Status: DISCONTINUED | OUTPATIENT
Start: 2024-12-03 | End: 2024-12-03

## 2024-12-03 RX ORDER — ACETAMINOPHEN 325 MG/1
650 TABLET ORAL EVERY 6 HOURS PRN
Status: DISCONTINUED | OUTPATIENT
Start: 2024-12-03 | End: 2024-12-04 | Stop reason: HOSPADM

## 2024-12-03 RX ORDER — DEXTROSE 50 % IN WATER (D50W) INTRAVENOUS SYRINGE
12.5
Status: DISCONTINUED | OUTPATIENT
Start: 2024-12-03 | End: 2024-12-04 | Stop reason: HOSPADM

## 2024-12-03 RX ORDER — INSULIN LISPRO 100 [IU]/ML
0-5 INJECTION, SOLUTION INTRAVENOUS; SUBCUTANEOUS
Status: DISCONTINUED | OUTPATIENT
Start: 2024-12-03 | End: 2024-12-04

## 2024-12-03 RX ORDER — SEVELAMER CARBONATE 800 MG/1
2400 TABLET, FILM COATED ORAL
Status: DISCONTINUED | OUTPATIENT
Start: 2024-12-03 | End: 2024-12-04 | Stop reason: HOSPADM

## 2024-12-03 RX ORDER — ROCURONIUM BROMIDE 10 MG/ML
INJECTION, SOLUTION INTRAVENOUS AS NEEDED
Status: DISCONTINUED | OUTPATIENT
Start: 2024-12-03 | End: 2024-12-03

## 2024-12-03 RX ORDER — PROPOFOL 10 MG/ML
INJECTION, EMULSION INTRAVENOUS CONTINUOUS PRN
Status: DISCONTINUED | OUTPATIENT
Start: 2024-12-03 | End: 2024-12-03

## 2024-12-03 RX ORDER — ATORVASTATIN CALCIUM 80 MG/1
80 TABLET, FILM COATED ORAL DAILY
Status: DISCONTINUED | OUTPATIENT
Start: 2024-12-03 | End: 2024-12-04 | Stop reason: HOSPADM

## 2024-12-03 RX ORDER — TRAMADOL HYDROCHLORIDE 50 MG/1
50 TABLET ORAL EVERY 6 HOURS PRN
Status: DISCONTINUED | OUTPATIENT
Start: 2024-12-03 | End: 2024-12-04 | Stop reason: HOSPADM

## 2024-12-03 RX ORDER — HEPARIN SODIUM 1000 [USP'U]/ML
INJECTION, SOLUTION INTRAVENOUS; SUBCUTANEOUS AS NEEDED
Status: DISCONTINUED | OUTPATIENT
Start: 2024-12-03 | End: 2024-12-03

## 2024-12-03 RX ORDER — ASPIRIN 81 MG/1
81 TABLET ORAL DAILY
Status: DISCONTINUED | OUTPATIENT
Start: 2024-12-04 | End: 2024-12-04 | Stop reason: HOSPADM

## 2024-12-03 RX ORDER — ESMOLOL HYDROCHLORIDE 10 MG/ML
INJECTION INTRAVENOUS AS NEEDED
Status: DISCONTINUED | OUTPATIENT
Start: 2024-12-03 | End: 2024-12-03

## 2024-12-03 RX ORDER — ONDANSETRON HYDROCHLORIDE 2 MG/ML
INJECTION, SOLUTION INTRAVENOUS AS NEEDED
Status: DISCONTINUED | OUTPATIENT
Start: 2024-12-03 | End: 2024-12-03

## 2024-12-03 RX ORDER — ONDANSETRON 4 MG/1
4 TABLET, ORALLY DISINTEGRATING ORAL EVERY 8 HOURS PRN
Status: DISCONTINUED | OUTPATIENT
Start: 2024-12-03 | End: 2024-12-04 | Stop reason: HOSPADM

## 2024-12-03 RX ORDER — DEXTROSE 50 % IN WATER (D50W) INTRAVENOUS SYRINGE
25
Status: DISCONTINUED | OUTPATIENT
Start: 2024-12-03 | End: 2024-12-04 | Stop reason: HOSPADM

## 2024-12-03 RX ORDER — DOCUSATE SODIUM 100 MG/1
100 CAPSULE, LIQUID FILLED ORAL 2 TIMES DAILY
Status: DISCONTINUED | OUTPATIENT
Start: 2024-12-03 | End: 2024-12-04 | Stop reason: HOSPADM

## 2024-12-03 RX ORDER — PROTAMINE SULFATE 10 MG/ML
INJECTION, SOLUTION INTRAVENOUS AS NEEDED
Status: DISCONTINUED | OUTPATIENT
Start: 2024-12-03 | End: 2024-12-03

## 2024-12-03 RX ORDER — PANTOPRAZOLE SODIUM 40 MG/10ML
40 INJECTION, POWDER, LYOPHILIZED, FOR SOLUTION INTRAVENOUS
Status: DISCONTINUED | OUTPATIENT
Start: 2024-12-04 | End: 2024-12-04 | Stop reason: HOSPADM

## 2024-12-03 RX ORDER — INSULIN GLARGINE 100 [IU]/ML
10 INJECTION, SOLUTION SUBCUTANEOUS 2 TIMES DAILY
Status: DISCONTINUED | OUTPATIENT
Start: 2024-12-03 | End: 2024-12-04 | Stop reason: HOSPADM

## 2024-12-03 RX ORDER — LIDOCAINE HYDROCHLORIDE AND EPINEPHRINE 10; 20 UG/ML; MG/ML
5 INJECTION, SOLUTION INFILTRATION; PERINEURAL ONCE AS NEEDED
Status: DISCONTINUED | OUTPATIENT
Start: 2024-12-03 | End: 2024-12-04 | Stop reason: HOSPADM

## 2024-12-03 RX ORDER — ACETAMINOPHEN 650 MG/1
650 SUPPOSITORY RECTAL EVERY 6 HOURS PRN
Status: DISCONTINUED | OUTPATIENT
Start: 2024-12-03 | End: 2024-12-04 | Stop reason: HOSPADM

## 2024-12-03 RX ORDER — ACETAMINOPHEN 160 MG/5ML
650 SOLUTION ORAL EVERY 6 HOURS PRN
Status: DISCONTINUED | OUTPATIENT
Start: 2024-12-03 | End: 2024-12-04 | Stop reason: HOSPADM

## 2024-12-03 SDOH — HEALTH STABILITY: MENTAL HEALTH: CURRENT SMOKER: 0

## 2024-12-03 ASSESSMENT — PAIN DESCRIPTION - DESCRIPTORS: DESCRIPTORS: HEADACHE

## 2024-12-03 ASSESSMENT — PAIN SCALES - GENERAL: PAINLEVEL_OUTOF10: 3

## 2024-12-03 ASSESSMENT — PAIN - FUNCTIONAL ASSESSMENT: PAIN_FUNCTIONAL_ASSESSMENT: 0-10

## 2024-12-03 NOTE — ANESTHESIA PREPROCEDURE EVALUATION
Patient: Kentrell Carreon    Procedure Information       Date/Time: 12/03/24 1215    Procedures:       TAVR (Transcatheter AV Replacement) - Right Innominate access TAVR, General anesthesia,OR team, 1st assist.Transfuse same day prior, Arrive at 7;30am, Medtronic Evolut FX+34, Hybrid cath lab      TVP for TAVR      TAVR-OR (Chest)    Location: [facility] / [place] [id number] Cardiac Cath Lab    Providers: Jasmeet Goetz MD; Davidson Rutherford MD            Relevant Problems   Anesthesia  Past Surgical History:  No date: AV FISTULA PLACEMENT  08/2023: BUNIONECTOMY; Right  6/7/2024: CARDIAC CATHETERIZATION; N/A      Comment:  Procedure: Left Heart Cath with Coronary Angiography and               LV;  Surgeon: Valerie Horner MD;  Location: [facility] Cardiac                Cath Lab;  Service: Cardiovascular;  Laterality: N/A;                 MARION needed prior to cath (to be scheduled in the                morning).  8/5/2024: CARDIAC ELECTROPHYSIOLOGY PROCEDURE; N/A      Comment:  Procedure: Ablation SVT;  Surgeon: Nik Vizcarra MD;                 Location: [place] [address] Cardiac Cath Lab;  Service:                Electrophysiology;  Laterality: N/A;  No date: COLONOSCOPY  06/20/2017: CORONARY ANGIOPLASTY WITH STENT PLACEMENT      Comment:  LAD ALEXEY  03/26/2022: CORONARY ARTERY BYPASS GRAFT      Comment:  LIMA to LAD, saphenous vein graft to the obtuse                marginal, and saphenous vein graft to PDA.  05/26/2022: LUNG SURGERY; Left      Comment:  Emergent VATS evacuation of hemothorax  No date: TOE AMPUTATION        Cardiac  TTE 8.1.24:    CONCLUSIONS:   1. Left ventricular ejection fraction is moderately decreased, calculated by Gaspar's biplane at 38%.   2. Multiple segmental abnormalities exist. See findings.   3. Spectral Doppler shows a pseudonormal pattern of left ventricular diastolic filling.   4. There is an elevated mean left atrial pressure.   5. Left ventricular cavity size is severely dilated.   6.  There is mildly reduced right ventricular systolic function.   7. Mildly enlarged right ventricle.   8. Moderate to severe aortic valve stenosis.   9. Mild aortic valve regurgitation.  10. Stroke volume index is decreased (33 ml/m2).  11. The aortic valve appears bicuspid with a thickened median raphe with fusion between the right and non coronary cusps.  12. There is moderate aortic valve cusp calcification.  13. There is moderate to severe aortic valve thickening.  14. Slightly elevated RVSP.  15. The left atrium is severely dilated.  16. The right atrium is mild to moderately dilated.  17. Compared with study dated 6/8/2024, there is no change in LVEF. The regional wall motion abnormalities were better visualized today. The LV was already moderate to severely dilated in the prior echocardiogram (98 ml/m2). The aortic valve gradients were not assessed in the limited study from June.     (+) A-fib (Multi)   (+) Accelerated hypertension   (+) Atrial flutter (Multi)   (+) Chest pain   (+) Congestive heart failure, hypertensive   (+) Coronary artery disease with other form of angina pectoris, unspecified vessel or lesion type, unspecified whether native or transplanted heart   (+) NSTEMI (non-ST elevated myocardial infarction) (Multi)   (+) Nonrheumatic aortic valve stenosis   (+) SVT (supraventricular tachycardia) ([facility])      Pulmonary  Social History    Tobacco Use      Smoking status: Former        Types: Cigarettes        Passive exposure: Current      Smokeless tobacco: Never          Neuro   (+) Carotid stenosis      /Renal   (+) ESRD on dialysis (Multi)   (+) End-stage renal disease on hemodialysis (Multi)   (+) Hyponatremia      ID   (+) Acute osteomyelitis of phalanx of foot (Multi)   (+) Bacteremia   (+) Onychomycosis   (+) Osteomyelitis of forefoot (Multi)       Clinical information reviewed:   Tobacco  Allergies  Meds   Med Hx  Surg Hx   Fam Hx        Past Medical History:   Diagnosis Date     Aortic stenosis     CAD (coronary artery disease)     CHF (congestive heart failure)     ESRD (end stage renal disease) (Multi)     HTN (hypertension)     Systemic lupus erythematosus, unspecified     Type 1 diabetes mellitus with other diabetic kidney complication        NPO Detail:  NPO/Void Status  Date of Last Liquid: 12/03/24  Time of Last Liquid: 0000  Date of Last Solid: 12/03/24  Time of Last Solid: 0000  Last Intake Type: Clear fluids         Physical Exam    Airway  Mallampati: II  TM distance: >3 FB     Cardiovascular   Rhythm: regular  Rate: normal     Dental   Comments: Partial upper dentures   Pulmonary - normal exam  Breath sounds clear to auscultation     Abdominal            Anesthesia Plan    History of general anesthesia?: yes  History of complications of general anesthesia?: no    ASA 4     general     The patient is not a current smoker.    Anesthetic plan and risks discussed with patient.  Use of blood products discussed with patient who consented to blood products.    Plan discussed with resident and CAA.

## 2024-12-03 NOTE — H&P
Cardiology Admission    Chief Complaint: post TAVR    History Of Present Illness  Kentrell Carreon is a 54 y.o. male presenting with  PMH of CAD s/p PCI (LAD; 6/2017), CABG 2022, stage C systolic HFrEF, aortic stenosis, HTN, atrial flutter s/p ablation, DM and discoid lupus/SLE with ESRD on home HD presenting for TAVR.    Patient presented this AM for procedure. Having worsening HF symptoms including fatigue, shortness of breath, orthopnea, PND, and occasional dizziness. He is unable to climb a flight of stairs without becoming SOB. Denied chest pain this morning.    Patient has extensive cardiac history. Follows with Dr. Lance at [facility] failure clinic. Last seen 8/8/24. On coreg and entresto for GDMT. Unable to do SGLT2 or MRA due to ESRD. Last EF 38%.    Underwent successful atrial flutter ablation 8/5/24 with Dr. Vizcarra. Post procedure holter normal but long term anticoagulation is recommended.    Today, he is s/p successful Transfemoral TAVR under general anesthesia with Evolut FX+ 34 mm and successful shockwave to right ostial common iliac artery Access. TVP removed during procedure. Post LVEDP: 28 mmHg. No PVL. No effusion. No conduction abnormalities during the procedure. Nephrology was called urgently on arrival for HD.    Pt denied chest pain, shortness. He just wanted to sleep but was concern that something was wrong because he was in the ICU and being forced to lie flat. He was reassured that the procedure went well. Otherwise, he is asymptomatic      Vital Signs:  BP (!) 174/93   Pulse 75   Temp (S) 36.8 °C (98.2 °F) (Temporal)   Resp 14   Wt 74.8 kg (165 lb)   SpO2 97%        Past Medical History  He has a past medical history of Aortic stenosis, CAD (coronary artery disease), CHF (congestive heart failure), ESRD (end stage renal disease) (Multi), HTN (hypertension), Systemic lupus erythematosus, unspecified, and Type 1 diabetes mellitus with other diabetic kidney complication.    Surgical  "History  He has a past surgical history that includes Coronary angioplasty with stent (06/20/2017); Bunionectomy (Right, 08/2023); AV fistula placement; Colonoscopy; Lung surgery (Left, 05/26/2022); Coronary artery bypass graft (03/26/2022); Toe amputation; Cardiac catheterization (N/A, 6/7/2024); and Cardiac electrophysiology procedure (N/A, 8/5/2024).    Home Medications  Current Outpatient Medications   Medication Instructions    acetaminophen (TYLENOL) 500 mg, Every 6 hours PRN    aspirin 81 mg EC tablet 1 tablet, Daily    atorvastatin (LIPITOR) 80 mg, oral, Daily    B complex-vitamin C-folic acid (Nephrocaps) 1 mg capsule 1 capsule, oral, Daily    blood-glucose sensor (FreeStyle José Luis 3 Sensor) device Change sensor every 14 days    carvedilol (COREG) 25 mg, 2 times daily    EPOETIN DEIRDRE INJ 8,000 Units, subcutaneous, Weekly    famotidine (PEPCID) 10 mg, oral, Nightly PRN    FreeStyle José Luis reader (FreeStyle José Luis 2 [place]) misc Use to check blood sugars at least every 8 hours    FreeStyle José Luis sensor system (FreeStyle José Luis 2 Sensor) kit Change sensor every 14 days. Check blood sugar at least every 8 hours    insulin glargine (Lantus) 100 unit/mL (3 mL) pen Inject 10 units twice a day    insulin lispro (HumaLOG KwikPen Insulin) 100 unit/mL injection Inject before every meal using carb ratio and correction scale. MDD 50 units.    melatonin 10 mg, oral, Every evening    multivitamin (Daily Multi-Vitamin) tablet 1 tablet, Daily    pen needle, diabetic (BD Agueda 2nd Gen Pen Needle) 32 gauge x 5/32\" needle USE TO INJECT INSULIN 5 TIMES PER DAY *Please schedule follow up for further refills*    sevelamer carbonate (RENVELA) 2,400 mg, oral, 3 times daily before meals, Swallow tablet whole; do not crush, break, or chew.       Allergies  Iodinated contrast media and Ampicillin-sulbactam     Social History  He reports that he has quit smoking. His smoking use included cigarettes. He has been exposed to tobacco smoke. He " has never used smokeless tobacco. He reports current alcohol use. He reports that he does not use drugs.  -Functional status: Independent  -Tobacco: former  -Alcohol: occassional  -Drugs: denies      Family History  family history includes Heart attack in his father; Heart failure in his mother.    Cardiac History    Last Echo:  TTE 8/1/24  CONCLUSIONS:   1. Left ventricular ejection fraction is moderately decreased, calculated by Gaspar's biplane at 38%.   2. Multiple segmental abnormalities exist. See findings.   3. Spectral Doppler shows a pseudonormal pattern of left ventricular diastolic filling.   4. There is an elevated mean left atrial pressure.   5. Left ventricular cavity size is severely dilated.   6. There is mildly reduced right ventricular systolic function.   7. Mildly enlarged right ventricle.   8. Moderate to severe aortic valve stenosis.   9. Mild aortic valve regurgitation.  10. Stroke volume index is decreased (33 ml/m2).  11. The aortic valve appears bicuspid with a thickened median raphe with fusion between the right and non coronary cusps.  12. There is moderate aortic valve cusp calcification.  13. There is moderate to severe aortic valve thickening.  14. Slightly elevated RVSP.  15. The left atrium is severely dilated.  16. The right atrium is mild to moderately dilated.  17. Compared with study dated 6/8/2024, there is no change in LVEF. The regional wall motion abnormalities were better visualized today. The LV was already moderate to severely dilated in the prior echocardiogram (98 ml/m2). The aortic valve gradients were not assessed in the limited study from June.      Catheterizations:  Cath 6/7/24  CONCLUSIONS:   1. Native coronaries: Critical distal left main disease, proximal LCx , proximal LAD , and moderate RCA disease in co-dominant system.   2. Bypasses: Patent LIMA to LAD which fills back the whole LAD system including both diagonals, patent SVG to OM1 which fills back  the whole codominant LCx system, and patent SVG to right PDA.   3. Further management as per inpatient cardiology team.    Cath 3/9/22  Coronary Lesion Summary:  Vessel            Stenosis         Vessel Segment  Left Main       90% stenosis           ostial  LAD             80% stenosis           ostial  LAD        70% in-stent restenosis proximal to mid  Circumflex      80% stenosis           ostial  Circumflex      60% stenosis          proximal  RCA             50% stenosis       mid and distal    Recommendations:  Agressive risk factor modification efforts.  Lipid lowering agent or Statin therapy.  Coronary artery bypass graft surgery.  Beta blocker therapy.     CONCLUSIONS:   1. Significant left main coronary artery disease and double vessel coronary artery disease with proximal left anterior descending involvement.   2. Left Ventricular end-diastolic pressure = 28.      Cardioversions/Ablations:  ATRIAL FLUTTER ABLATION 8/5/24  Summary:  Successful cavotricuspid isthmus ablation (aka CTI line) via RF ablation with bidirectional block across the CTI.  Start AC Apixaban BID x 3 months, Please DO NOT hold blood thinner unless emergency without asking your doctor.           Last Recorded Vitals  BP (!) 174/93   Pulse 75   Temp (S) 36.8 °C (98.2 °F) (Temporal)   Resp 14   Wt 74.8 kg (165 lb)   SpO2 97%     I&O 24HR    Intake/Output Summary (Last 24 hours) at 12/3/2024 1508  Last data filed at 12/3/2024 1240  Gross per 24 hour   Intake 630 ml   Output --   Net 630 ml       Review of systems:  Review of Systems   10 point review of system negative other than what is stated in HPI    Physical exam:  Physical Exam  Constitutional:       General: He is not in acute distress.     Appearance: Normal appearance.   Eyes:      General: No scleral icterus.  Cardiovascular:      Rate and Rhythm: Normal rate and regular rhythm.   Pulmonary:      Effort: No respiratory distress.      Breath sounds: Normal breath sounds. No  wheezing or rhonchi.   Abdominal:      General: There is no distension.      Palpations: Abdomen is soft.      Tenderness: There is no abdominal tenderness. There is no guarding.   Musculoskeletal:         General: No swelling or tenderness.   Skin:     Findings: No bruising or rash.      Comments: Rt neck introducer in place  Rt groin site clean, nontender, no bleeding   Neurological:      Mental Status: He is alert.      Cranial Nerves: No cranial nerve deficit.      Motor: No weakness.           Relevant Results  Results for orders placed or performed during the hospital encounter of 12/03/24 (from the past 24 hours)   Prepare RBC: 2 Units, Leukocytes Reduced (CMV reduced risk)   Result Value Ref Range    PRODUCT CODE K6736W59     Unit Number [id number]     Unit ABO O     Unit RH NEG     XM INTEP COMP     Dispense Status TR     Blood Expiration Date 12/10/2024 11:59:00 PM EST     PRODUCT BLOOD TYPE 9500     UNIT VOLUME 350    Prepare RBC: 1 Units, Leukocytes Reduced (CMV reduced risk)   Result Value Ref Range    PRODUCT CODE E1156H85     Unit Number [id number]     Unit ABO O     Unit RH NEG     XM INTEP COMP     Dispense Status TR     Blood Expiration Date 12/10/2024 11:59:00 PM EST     PRODUCT BLOOD TYPE 9500     UNIT VOLUME 281    ECG 12 lead daily   Result Value Ref Range    Ventricular Rate 77 BPM    Atrial Rate 77 BPM    MO Interval 238 ms    QRS Duration 132 ms    QT Interval 462 ms    QTC Calculation(Bazett) 522 ms    P Axis 58 degrees    R Axis 15 degrees    T Axis 144 degrees    QRS Count 13 beats    Q Onset 207 ms    P Onset 88 ms    P Offset 173 ms    T Offset 438 ms    QTC Fredericia 502 ms   Prepare RBC: 4 Units   Result Value Ref Range    PRODUCT CODE M0376T78     Unit Number [id number]     Unit ABO A     Unit RH NEG     XM INTEP COMP     Dispense Status IS     Blood Expiration Date 12/24/2024 11:59:00 PM EST     PRODUCT BLOOD TYPE 0600     UNIT VOLUME 350     PRODUCT CODE C2529S00      Unit Number [id number]     Unit ABO A     Unit RH NEG     XM INTEP COMP     Dispense Status IS     Blood Expiration Date 12/24/2024 11:59:00 PM EST     PRODUCT BLOOD TYPE 0600     UNIT VOLUME 350     PRODUCT CODE Z1153W85     Unit Number [id number]     Unit ABO A     Unit RH NEG     XM INTEP COMP     Dispense Status IS     Blood Expiration Date 12/24/2024 11:59:00 PM EST     PRODUCT BLOOD TYPE 0600     UNIT VOLUME 350     PRODUCT CODE X4445T37     Unit Number [id number]     Unit ABO A     Unit RH NEG     XM INTEP COMP     Dispense Status IS     Blood Expiration Date 1/6/2025 11:59:00 PM EST     PRODUCT BLOOD TYPE 0600     UNIT VOLUME 275    Prepare Platelets: 1 Units   Result Value Ref Range    PRODUCT CODE J6502W21     Unit Number [id number]     Unit ABO A     Unit RH POS     Dispense Status IS     Blood Expiration Date 12/4/2024 11:59:00 PM EST     PRODUCT BLOOD TYPE 6200     UNIT VOLUME 180    Prepare Plasma: 2 Units   Result Value Ref Range    PRODUCT CODE G1088N47     Unit Number [id number]     Unit ABO A     Unit RH POS     Dispense Status IS     Blood Expiration Date 12/6/2024  1:20:00 AM EST     PRODUCT BLOOD TYPE 6200     UNIT VOLUME 301     PRODUCT CODE D9816H45     Unit Number [id number]     Unit ABO A     Unit RH POS     Dispense Status IS     Blood Expiration Date 12/6/2024 11:29:00 AM EST     PRODUCT BLOOD TYPE 6200     UNIT VOLUME 309        ECG 12 lead daily  Sinus rhythm with 1st degree AV block  Possible Left atrial enlargement  Nonspecific intraventricular block  Cannot rule out Anterior infarct , age undetermined  Marked T wave abnormality, consider lateral ischemia  Abnormal ECG  When compared with ECG of 14-NOV-2024 16:12,  QRS axis Shifted left  T wave inversion no longer evident in Inferior leads        Assessment/Plan   Assessment & Plan  S/P TAVR (transcatheter aortic valve replacement)    Nonrheumatic aortic valve stenosis      Kentrell Carreon is a 54 y.o.  male presenting with  PMH of CAD s/p PCI (LAD; 6/2017), CABG 2022, stage C systolic HFrEF, aortic stenosis, HTN, atrial flutter s/p ablation, DM and discoid lupus/SLE with ESRD on home HD. Presenting s/p successful TAVR. LVEDP 28 at the end of the procedure, initiating urgent HD in CICU following procedure.    Neuro  RHYS    Cardio  #Severe Aortic Stenosis s/p TAVR  Plan  -Monitor in CICU overnight  -TTE tomorrow  -ASA 81  -Structural team to follow    #CAD s/p PCI (LAD; 6/2017), CABG 2022  -ASA  -atorvastatin 80    #HFrEF(38%, 8/2024)  #HTN  ::Home meds: coreg 25 BID, Entresto 24-26 BID (no jardiance or MRA given ESRD)  ::Post TAVR LVEDP 30  -urgent dialysis requested for fluid removal tonight    #Hx of Aflutter s/p RFA  ::NSR  -Hold anticoagulation post op    Pulm  On 2L, wean as able    GI  RHYS    Renal  #ESRD  #Hx of SLE not on treatment  ::Home HD every other day  ::Not a transplant candidate given severe arterial disease  -sevelamer  -urgent dialysis requested for fluid removal tonight    #T1DM  ::Home meds: glargine 10 BID, lispro SSI  ::Follows with endocrinology  -Resumed glargine 10u with SSI    Heme/onc  #Anemia  ::Chronic on EPO      F: Cautious  E: PRN  N: Regular    Lines: PIVs, RIJ introducer (TVP removed)  O2: 2L  Abx: none    DVT PPX: Hold for tonight  GI PPX: famotidine    Code Status (confirmed on admission): Full Code (confirmed on admission)  Surrogate Decision Maker: Behnam,Paige (Daughter) [phone]    Ludin Lindquist MD  Internal Medicine PGY2  12/3/2024 3:08 PM

## 2024-12-03 NOTE — ANESTHESIA POSTPROCEDURE EVALUATION
Patient: Kentrell Carreon    Procedure Summary       Date: 12/03/24 [place] [facility] [place] [facility] Card[facility]    Anesthesia Start: 1315 Anesthesia Stop: 1641    Procedures:       TAVR (Transcatheter AV Replacement)      TVP for TAVR      TAVR-OR (Chest) Diagnosis: Nonrheumatic aortic valve stenosis    Providers: Jasmeet Goetz MD; Davidson Rutherford MD Responsible Provider: Mason Handy MD    Anesthesia Type: general ASA Status: 4            Anesthesia Type: general    Vitals Value Taken Time   /89 12/03/24 1645   Temp 36.2 12/03/24 1645   Pulse 71 12/03/24 1645   Resp 18 12/03/24 1645   SpO2 100 % 12/03/24 1645   Vitals shown include unfiled device data.    Anesthesia Post Evaluation    Patient location during evaluation: ICU  Patient participation: complete - patient participated  Level of consciousness: sedated  Pain management: adequate  Airway patency: patent  Cardiovascular status: acceptable  Respiratory status: acceptable  Hydration status: acceptable  Postoperative Nausea and Vomiting: none        No notable events documented.

## 2024-12-03 NOTE — ANESTHESIA PROCEDURE NOTES
Arterial Line:    Date/Time: 12/3/2024 1:32 PM    Staffing  Performed: resident   Authorized by: Mason Handy MD    Performed by: Zack Posadas MD    An arterial line was placed. Procedure performed using ultrasound guidance and surface landmarks.in the OR for the following indication(s): continuous blood pressure monitoring and blood sampling needed.    A 20 gauge (size), 12 cm (length), Arrow (type) catheter was placed into the Left radial artery, secured by Tegaderm,   Seldinger technique used.  Events:  patient tolerated procedure well with no complications.

## 2024-12-03 NOTE — ANESTHESIA PROCEDURE NOTES
Airway  Date/Time: 12/3/2024 1:51 PM  Urgency: elective    Airway not difficult    Staffing  Performed: resident   Authorized by: Mason Handy MD    Performed by: Zack Posadas MD  Patient location during procedure: OR    Indications and Patient Condition  Indications for airway management: anesthesia  Spontaneous Ventilation: absent  Sedation level: deep  Preoxygenated: yes  Patient position: sniffing  Mask difficulty assessment: 1 - vent by mask  Planned trial extubation    Final Airway Details  Final airway type: endotracheal airway      Successful airway: ETT  Cuffed: yes   Successful intubation technique: direct laryngoscopy  Facilitating devices/methods: intubating stylet  Endotracheal tube insertion site: oral  Blade: [place]  Blade size: #4  ETT size (mm): 7.5  Cormack-Lehane Classification: grade IIb - view of arytenoids or posterior of glottis only  Placement verified by: chest auscultation and capnometry   Cuff volume (mL): 8  Measured from: lips  ETT to lips (cm): 22  Number of attempts at approach: 1

## 2024-12-03 NOTE — POST-PROCEDURE NOTE
Indication: Severe Aortic Stenosis  S/p successful Transfemoral TAVR under general anesthesia with Evolut FX+ 34 mm  S/p successful shockwave to right ostial common iliac artery     Access  Primary: right CFA s/p 2 proglide  Secondary: left CFA 6 Serbian s/p 1 proglide     TVP: right IJ vein, 7 Serbian, removed in the cath lab.  Sheath sutured in place for anesthesia use.     Pre LVEDP: 27 mmHg  Post LVEDP:  28 mmHg     Post gradient TTE: 2 mmHg  No PVL  No effusion      No conduction abnormalities during the procedure.     Conclusion  CICU   4h bedrest  Consider IHD tonight due to high LVEDP  Remove RIJ venous sheath in CICU  Monitor tele  Monitor access sites for bleeding  TTE tomorrow  Continue aspirin 81mg  Bed rest  Likely discharge tomorrow.  Structural team will continue to follow.   page structural team for any concerning clinical events.

## 2024-12-03 NOTE — H&P
History Of Present Illness  Kentrell Carreon is a 54 y.o. male presenting with  PMH of CAD s/p PCI (LAD; 6/2017), CABG 2022, stage C systolic HFrEF, aortic stenosis, HTN, tachycardia s/p ablation, DM and discoid lupus/SLE with ESRD on home HD and Advanced Heart Failure clinic. Presents for evaluation of severe, symptomatic aortic stenosis.  Patient relates heart failure symptoms worsening fatigue, SOB, GILBERT. Can't climb one flight of stair or walk on plain without stopping to recover.  Refers overt orthopnea, PND.  Has occasional dizziness specially after dialysis.  Refers palpitations associated with chest pain. Denies syncope, presyncope and exertional CP.  Denies increased abdominal girth, edema.  Gradually doing less and less activity secondary to symptoms.   Refers flutter ablation 3 weeks ago.  Full 14 point ROS complete and negative except as noted above.      Echo: 38 EF , mod aortic stenosis, AV mean gradient 20 , AV Vmax 3.0, HUMPHREY 0.96   EKG: NSR, LBBB, 226 HI, 120 QRS     TAVR Workup:   - NYHA: 3  - LHC: JULY/2024- GRAFTS OK              - CT TAVR: pend  - dental clearance: Needs to schedule clearance      STS Risk of Mortality: 1.947%.     Past Medical History  Past Medical History:   Diagnosis Date    Aortic stenosis     CAD (coronary artery disease)     CHF (congestive heart failure)     ESRD (end stage renal disease) (Multi)     HTN (hypertension)     Systemic lupus erythematosus, unspecified     Type 1 diabetes mellitus with other diabetic kidney complication        Surgical History  Past Surgical History:   Procedure Laterality Date    AV FISTULA PLACEMENT      BUNIONECTOMY Right 08/2023    CARDIAC CATHETERIZATION N/A 6/7/2024    Procedure: Left Heart Cath with Coronary Angiography and LV;  Surgeon: Valerie Horner MD;  Location: [facility] Cardiac Cath Lab;  Service: Cardiovascular;  Laterality: N/A;  MARION needed prior to cath (to be scheduled in the morning).    CARDIAC ELECTROPHYSIOLOGY PROCEDURE N/A 8/5/2024     "Procedure: Ablation SVT;  Surgeon: Nik Vizcarra MD;  Location: [place] [address] Cardiac Cath Lab;  Service: Electrophysiology;  Laterality: N/A;    COLONOSCOPY      CORONARY ANGIOPLASTY WITH STENT PLACEMENT  06/20/2017    LAD ALEXEY    CORONARY ARTERY BYPASS GRAFT  03/26/2022    HENDERSON to LAD, saphenous vein graft to the obtuse marginal, and saphenous vein graft to PDA.    LUNG SURGERY Left 05/26/2022    Emergent VATS evacuation of hemothorax    TOE AMPUTATION          Social History  He reports that he has quit smoking. His smoking use included cigarettes. He has been exposed to tobacco smoke. He has never used smokeless tobacco. He reports current alcohol use. He reports that he does not use drugs.    Family History  Family History   Problem Relation Name Age of Onset    Heart failure Mother      Heart attack Father          Allergies  Iodinated contrast media and Ampicillin-sulbactam         Physical Exam  Constitutional: Well developed, awake/alert/oriented x3, no distress, cooperative  Eyes: PERRL, EOMI, clear sclera  ENMT: mucous membranes moist  Head/Neck: Neck supple, No JVD  Respiratory/Thorax: lung sounds clear  Cardiovascular: Regular rate and rhythm, no murmurs, normal S1 and S2  Gastrointestinal: Nondistended, soft, non-tender, no rebound tenderness or guarding, no masses palpable, no organomegaly, +BS  Extremities: trace BLE edema, no cyanosis  Neurological: alert and oriented x3, intact senses, motor, response and reflexes, normal strength  Psychological: Appropriate mood and behavior   Skin: Warm and dry, intact.      Last Recorded Vitals  Blood pressure (!) 171/94, pulse 74, temperature 36.7 °C (98.1 °F), temperature source Tympanic, resp. rate 14, height 1.905 m (6' 3\"), weight 74.8 kg (165 lb), SpO2 94%.    Relevant Results        Last Labs:  CBC - 12/2/2024:  2:45 PM  5.4 8.1 187    25.8      BMP  140  99  78                  ----------------<84     4.0  26  9.74     CMP - 12/2/2024:  2:45 PM  10.3 " 8.4 179 --- 0.5   5.2 3.2 482 106      PTT - 8/5/2024:  6:54 AM  1.1   12.2 29     Troponin I, High Sensitivity   Date/Time Value Ref Range Status   05/31/2024 11:57  (HH) 0 - 20 ng/L Final     Comment:     Previous result verified on 5/31/2024 2240 on specimen/case [id number] called with component TRP for procedure Troponin I, High Sensitivity, Initial with value 851 ng/L.     Troponin I, High Sensitivity (CMC)   Date/Time Value Ref Range Status   11/13/2024 09:57  (HH) 0 - 53 ng/L Final     Comment:     Previous result verified on 11/13/2024 2117 on specimen/case [id number] called with component TRP for procedure Troponin I, High Sensitivity, Initial with value 411 ng/L.     BNP   Date/Time Value Ref Range Status   11/13/2024 08:22 PM >5,000 (H) 0 - 99 pg/mL Final       Current Outpatient Medications   Medication Instructions    acetaminophen (TYLENOL) 500 mg, Every 6 hours PRN    aspirin 81 mg EC tablet 1 tablet, Daily    atorvastatin (LIPITOR) 80 mg, oral, Daily    B complex-vitamin C-folic acid (Nephrocaps) 1 mg capsule 1 capsule, oral, Daily    blood-glucose sensor (FreeStyle José Luis 3 Sensor) device Change sensor every 14 days    carvedilol (COREG) 25 mg, 2 times daily    EPOETIN DEIRDRE INJ 8,000 Units, subcutaneous, Weekly    famotidine (PEPCID) 10 mg, oral, Nightly PRN    FreeStyle José Luis reader (FreeStyle José Luis 2 [place]) misc Use to check blood sugars at least every 8 hours    FreeStyle José Luis sensor system (FreeStyle José Luis 2 Sensor) kit Change sensor every 14 days. Check blood sugar at least every 8 hours    insulin glargine (Lantus) 100 unit/mL (3 mL) pen Inject 10 units twice a day    insulin lispro (HumaLOG KwikPen Insulin) 100 unit/mL injection Inject before every meal using carb ratio and correction scale. MDD 50 units.    melatonin 10 mg, oral, Every evening    multivitamin (Daily Multi-Vitamin) tablet 1 tablet, Daily    pen needle, diabetic (BD Agueda 2nd Gen Pen Needle) 32 gauge  "x 5/32\" needle USE TO INJECT INSULIN 5 TIMES PER DAY *Please schedule follow up for further refills*    sevelamer carbonate (RENVELA) 2,400 mg, oral, 3 times daily before meals, Swallow tablet whole; do not crush, break, or chew.         Assessment/Plan   Assessment & Plan  Nonrheumatic aortic valve stenosis    S/P TAVR (transcatheter aortic valve replacement)      54 y.o. y/o male with PMH of CAD s/p PCI (LAD; 6/2017), CABG 2022, stage C systolic HFrEF, aortic stenosis, HTN, tachycardia s/p ablation, DM and discoid lupus/SLE with ESRD on home HD. Presents for treatment of severe, symptomatic aortic stenosis with transcarotid TAVR.       Celestine Heredia MD    "

## 2024-12-03 NOTE — ANESTHESIA PROCEDURE NOTES
Peripheral IV  Date/Time: 12/3/2024 1:56 PM      Placement  Needle size: 16 G  Laterality: left  Location: hand  Site prep: alcohol  Technique: anatomical landmarks  Attempts: 1

## 2024-12-03 NOTE — PROGRESS NOTES
Pharmacy Medication History Review    Kentrell Carreon is a 54 y.o. male admitted for Nonrheumatic aortic valve stenosis. Pharmacy reviewed the patient's rgrsj-rr-eevwtesjt medications and allergies for accuracy.    Medications ADDED:  None  Medications CHANGED:  Epoetin annita injection  Medications REMOVED:   Zithromax  Ceftin  Entresto  Benadryl (pre-procedure prep)  Prednisone (pre-procedure prep)    The list below reflects the updated PTA list.   Prior to Admission Medications   Prescriptions Last Dose Informant   B complex-vitamin C-folic acid (Nephrocaps) 1 mg capsule 12/1/2024 Evening Self   Sig: Take 1 capsule by mouth once daily.   EPOETIN ANNITA INJ 11/28/2024 Self   Sig: Inject 8,000 Units under the skin 1 (one) time per week.   FreeStyle José Luis reader (FreeStyle José Luis 2 [place]) misc  Self   Sig: Use to check blood sugars at least every 8 hours   FreeStyle José Luis sensor system (FreeStyle José Luis 2 Sensor) kit  Self   Sig: Change sensor every 14 days. Check blood sugar at least every 8 hours   acetaminophen (Tylenol) 500 mg tablet Past Month Self   Sig: Take 1 tablet (500 mg) by mouth every 6 hours if needed for mild pain (1 - 3).   aspirin 81 mg EC tablet 12/1/2024 Evening Self   Sig: Take 1 tablet (81 mg) by mouth once daily.   atorvastatin (Lipitor) 80 mg tablet 12/1/2024 Evening Self   Sig: Take 1 tablet (80 mg) by mouth once daily.   blood-glucose sensor (FreeStyle José Luis 3 Sensor) device  Self   Sig: Change sensor every 14 days   carvedilol (Coreg) 25 mg tablet 12/2/2024 Evening Self   Sig: Take 1 tablet (25 mg) by mouth 2 times a day.   famotidine (Pepcid) 10 mg tablet 12/1/2024 Evening Self   Sig: Take 1 tablet (10 mg) by mouth as needed at bedtime for heartburn.   insulin glargine (Lantus) 100 unit/mL (3 mL) pen 12/2/2024 Self   Sig: Inject 10 units twice a day   Patient taking differently: Inject 15 Units under the skin once daily.   insulin lispro (HumaLOG KwikPen Insulin) 100 unit/mL injection 12/2/2024  "Self   Sig: Inject before every meal using carb ratio and correction scale. MDD 50 units.   melatonin 10 mg tablet Past Week Self   Sig: Take 1 tablet (10 mg) by mouth once daily in the evening.   multivitamin (Daily Multi-Vitamin) tablet 11/29/2024 Self   Sig: Take 1 tablet by mouth once daily.   pen needle, diabetic (BD Agueda 2nd Gen Pen Needle) 32 gauge x 5/32\" needle  Self   Sig: USE TO INJECT INSULIN 5 TIMES PER DAY *Please schedule follow up for further refills*   sevelamer carbonate (Renvela) 800 mg tablet 12/2/2024 Evening Self   Sig: Take 3 tablets (2,400 mg) by mouth 3 times a day before meals. Swallow tablet whole; do not crush, break, or chew.      Facility-Administered Medications: None        The list below reflects the updated allergy list. Please review each documented allergy for additional clarification and justification.  Allergies  Reviewed by Melani Gayle RN on 12/3/2024        Severity Reactions Comments    Iodinated Contrast Media High Anaphylaxis     Ampicillin-sulbactam Not Specified Other Renal Failure. AIN (INSTERSTITIAL NEPHRITIS))            Patient declines M2B at discharge.     Sources:   [facility]  Pharmacy dispense history  Patient interview Good historian  Chart Review    Additional Comments:  No recent dispense history per [facility] report      Feng Rizo, PharmD  Transitions of Care Pharmacist  12/03/24     Secure Chat preferred   If no response call [phone] or theBench \"Med Rec\"    "

## 2024-12-03 NOTE — OP NOTE
Date of the Operation:24   Pt Name:Kentrell Carreon   MRN:[id number]   Pre Op D. - Severe aortic valve stenosis  Post Op D. - Severe aortic valve stenosis  Operation/Procedure:  1. - TAVR: Evolute Fxmm  2.- Right iliac shockwave  Surgeon: HEIKE Rutherford  Cardiologist: SMOOTH Goetz  Anesthesia Type: General endoracheal  Complications: None  Estimated Blood loss: 150 cc  Operative indications: The patient was well diagnosed with severe aortic valve stenosis. The heart team recommended to perform a TAVR.   Operative Findings: Per fluoroscopy severe calcification of the aortic valve.  Operative procedure: The patient was placed on the surgical table in a supine position. Prepped and draped as usual.  2% lidocaine for all the percutaneous access.  TPMW through the RIJ/FV. Secondary access: R/L femoral artery with a 6Fr introducer sheath. Heparin is given for ACT>300s. Primary access on the R/L femoral artery was prepared with 2 perclose and a 14/18 Fr introducer sheath.  Pigtails are placed in the NCS and the LV [place]. Pre implant hemodynamics are obtained. GW exchange to a Safari preformed wire. The valve previously chosen and prepared is now pushed under fluoroscopy across the aortic arch and then across the valve. Rapid pacing, and the valve is deployed. Delivery system is removed. Post implants hemodynamics are obtained. Post implant surface echo is done showing minimal to no PVL. The procedure is considered successful. The Protamine is given. Perclose are tied down and an Angioseal is applied to the other FA. Pulses are checked. The patient has no new electrical disturbances so the TPMW is pulled out and the patient is transferred for postoperative management.

## 2024-12-03 NOTE — Clinical Note
Prepped with ChloraPrep, a minimum of 3 minute dry time, longer if needed, no pooling noted, patient draped in sterile fashion. Full OR prep

## 2024-12-03 NOTE — Clinical Note
Inflation 1: Pressure = 3 anirudh; Duration = 120 sec. Shocks delivered with shockwave balloon. Multiple inflations

## 2024-12-04 ENCOUNTER — APPOINTMENT (OUTPATIENT)
Dept: CARDIOLOGY | Facility: HOSPITAL | Age: 54
End: 2024-12-04
Payer: COMMERCIAL

## 2024-12-04 ENCOUNTER — APPOINTMENT (OUTPATIENT)
Dept: RADIOLOGY | Facility: HOSPITAL | Age: 54
End: 2024-12-04
Payer: COMMERCIAL

## 2024-12-04 VITALS
OXYGEN SATURATION: 92 % | HEART RATE: 77 BPM | WEIGHT: 160.5 LBS | DIASTOLIC BLOOD PRESSURE: 70 MMHG | TEMPERATURE: 98.1 F | BODY MASS INDEX: 19.96 KG/M2 | RESPIRATION RATE: 23 BRPM | HEIGHT: 75 IN | SYSTOLIC BLOOD PRESSURE: 135 MMHG

## 2024-12-04 PROBLEM — I35.0 NONRHEUMATIC AORTIC VALVE STENOSIS: Status: RESOLVED | Noted: 2024-08-08 | Resolved: 2024-12-04

## 2024-12-04 LAB
ACT BLD: 167 SEC (ref 89–169)
ACT BLD: 175 SEC (ref 89–169)
ACT BLD: 205 SEC (ref 89–169)
ACT BLD: 366 SEC (ref 89–169)
ANION GAP SERPL CALC-SCNC: 18 MMOL/L (ref 10–20)
AORTIC VALVE PEAK VELOCITY: 1.24 M/S
ATRIAL RATE: 71 BPM
ATRIAL RATE: 73 BPM
AV PEAK GRADIENT: 6 MMHG
AVA (PEAK VEL): 2.2 CM2
BASOPHILS # BLD AUTO: 0 X10*3/UL (ref 0–0.1)
BASOPHILS NFR BLD AUTO: 0 %
BUN SERPL-MCNC: 36 MG/DL (ref 6–23)
CALCIUM SERPL-MCNC: 9 MG/DL (ref 8.6–10.6)
CHLORIDE SERPL-SCNC: 95 MMOL/L (ref 98–107)
CO2 SERPL-SCNC: 24 MMOL/L (ref 21–32)
CREAT SERPL-MCNC: 4.5 MG/DL (ref 0.5–1.3)
EGFRCR SERPLBLD CKD-EPI 2021: 15 ML/MIN/1.73M*2
EJECTION FRACTION APICAL 4 CHAMBER: 27.5
EJECTION FRACTION: 27 %
EOSINOPHIL # BLD AUTO: 0 X10*3/UL (ref 0–0.7)
EOSINOPHIL NFR BLD AUTO: 0 %
ERYTHROCYTE [DISTWIDTH] IN BLOOD BY AUTOMATED COUNT: 16.1 % (ref 11.5–14.5)
GLUCOSE BLD MANUAL STRIP-MCNC: 333 MG/DL (ref 74–99)
GLUCOSE BLD MANUAL STRIP-MCNC: 406 MG/DL (ref 74–99)
GLUCOSE BLD MANUAL STRIP-MCNC: 446 MG/DL (ref 74–99)
GLUCOSE SERPL-MCNC: 313 MG/DL (ref 74–99)
HCT VFR BLD AUTO: 30.5 % (ref 41–52)
HGB BLD-MCNC: 9.6 G/DL (ref 13.5–17.5)
IMM GRANULOCYTES # BLD AUTO: 0.01 X10*3/UL (ref 0–0.7)
IMM GRANULOCYTES NFR BLD AUTO: 0.2 % (ref 0–0.9)
INR PPP: 1.3 (ref 0.9–1.1)
LEFT ATRIUM VOLUME AREA LENGTH INDEX BSA: 46.4 ML/M2
LEFT VENTRICLE INTERNAL DIMENSION DIASTOLE: 5.7 CM (ref 3.5–6)
LEFT VENTRICULAR OUTFLOW TRACT DIAMETER: 1.9 CM
LYMPHOCYTES # BLD AUTO: 0.25 X10*3/UL (ref 1.2–4.8)
LYMPHOCYTES NFR BLD AUTO: 4.5 %
MAGNESIUM SERPL-MCNC: 2.14 MG/DL (ref 1.6–2.4)
MCH RBC QN AUTO: 30.2 PG (ref 26–34)
MCHC RBC AUTO-ENTMCNC: 31.5 G/DL (ref 32–36)
MCV RBC AUTO: 96 FL (ref 80–100)
MITRAL VALVE E/A RATIO: 1.86
MONOCYTES # BLD AUTO: 0.33 X10*3/UL (ref 0.1–1)
MONOCYTES NFR BLD AUTO: 6 %
NEUTROPHILS # BLD AUTO: 4.95 X10*3/UL (ref 1.2–7.7)
NEUTROPHILS NFR BLD AUTO: 89.3 %
NRBC BLD-RTO: 0 /100 WBCS (ref 0–0)
P AXIS: 39 DEGREES
P AXIS: 67 DEGREES
P OFFSET: 169 MS
P OFFSET: 174 MS
P ONSET: 109 MS
P ONSET: 86 MS
PLATELET # BLD AUTO: 162 X10*3/UL (ref 150–450)
POTASSIUM SERPL-SCNC: 4.3 MMOL/L (ref 3.5–5.3)
PR INTERVAL: 208 MS
PR INTERVAL: 250 MS
PROTHROMBIN TIME: 14.3 SECONDS (ref 9.8–12.8)
Q ONSET: 211 MS
Q ONSET: 213 MS
QRS COUNT: 12 BEATS
QRS COUNT: 12 BEATS
QRS DURATION: 136 MS
QRS DURATION: 192 MS
QT INTERVAL: 482 MS
QT INTERVAL: 522 MS
QTC CALCULATION(BAZETT): 531 MS
QTC CALCULATION(BAZETT): 567 MS
QTC FREDERICIA: 514 MS
QTC FREDERICIA: 552 MS
R AXIS: -50 DEGREES
R AXIS: 8 DEGREES
RBC # BLD AUTO: 3.18 X10*6/UL (ref 4.5–5.9)
RIGHT VENTRICLE FREE WALL PEAK S': 7.22 CM/S
RIGHT VENTRICLE PEAK SYSTOLIC PRESSURE: 49.9 MMHG
SODIUM SERPL-SCNC: 133 MMOL/L (ref 136–145)
T AXIS: 116 DEGREES
T AXIS: 169 DEGREES
T OFFSET: 454 MS
T OFFSET: 472 MS
TRICUSPID ANNULAR PLANE SYSTOLIC EXCURSION: 0.9 CM
VENTRICULAR RATE: 71 BPM
VENTRICULAR RATE: 73 BPM
WBC # BLD AUTO: 5.5 X10*3/UL (ref 4.4–11.3)

## 2024-12-04 PROCEDURE — 93308 TTE F-UP OR LMTD: CPT | Performed by: INTERNAL MEDICINE

## 2024-12-04 PROCEDURE — 99291 CRITICAL CARE FIRST HOUR: CPT

## 2024-12-04 PROCEDURE — 2500000004 HC RX 250 GENERAL PHARMACY W/ HCPCS (ALT 636 FOR OP/ED): Mod: JZ

## 2024-12-04 PROCEDURE — 37799 UNLISTED PX VASCULAR SURGERY: CPT | Performed by: NURSE PRACTITIONER

## 2024-12-04 PROCEDURE — 83735 ASSAY OF MAGNESIUM: CPT | Performed by: NURSE PRACTITIONER

## 2024-12-04 PROCEDURE — 2500000001 HC RX 250 WO HCPCS SELF ADMINISTERED DRUGS (ALT 637 FOR MEDICARE OP): Performed by: NURSE PRACTITIONER

## 2024-12-04 PROCEDURE — 82947 ASSAY GLUCOSE BLOOD QUANT: CPT

## 2024-12-04 PROCEDURE — 85025 COMPLETE CBC W/AUTO DIFF WBC: CPT | Performed by: NURSE PRACTITIONER

## 2024-12-04 PROCEDURE — 2500000002 HC RX 250 W HCPCS SELF ADMINISTERED DRUGS (ALT 637 FOR MEDICARE OP, ALT 636 FOR OP/ED)

## 2024-12-04 PROCEDURE — 99223 1ST HOSP IP/OBS HIGH 75: CPT

## 2024-12-04 PROCEDURE — 93321 DOPPLER ECHO F-UP/LMTD STD: CPT | Performed by: INTERNAL MEDICINE

## 2024-12-04 PROCEDURE — 93325 DOPPLER ECHO COLOR FLOW MAPG: CPT | Performed by: INTERNAL MEDICINE

## 2024-12-04 PROCEDURE — 99238 HOSP IP/OBS DSCHRG MGMT 30/<: CPT | Performed by: NURSE PRACTITIONER

## 2024-12-04 PROCEDURE — 93005 ELECTROCARDIOGRAM TRACING: CPT

## 2024-12-04 PROCEDURE — 71045 X-RAY EXAM CHEST 1 VIEW: CPT

## 2024-12-04 PROCEDURE — 80048 BASIC METABOLIC PNL TOTAL CA: CPT | Performed by: NURSE PRACTITIONER

## 2024-12-04 PROCEDURE — XXE2X19 MEASUREMENT OF CARDIAC OUTPUT, COMPUTER-AIDED ASSESSMENT, NEW TECHNOLOGY GROUP 9: ICD-10-PCS | Performed by: INTERNAL MEDICINE

## 2024-12-04 PROCEDURE — 85610 PROTHROMBIN TIME: CPT | Performed by: NURSE PRACTITIONER

## 2024-12-04 PROCEDURE — 71045 X-RAY EXAM CHEST 1 VIEW: CPT | Performed by: RADIOLOGY

## 2024-12-04 PROCEDURE — 93325 DOPPLER ECHO COLOR FLOW MAPG: CPT

## 2024-12-04 PROCEDURE — 2500000004 HC RX 250 GENERAL PHARMACY W/ HCPCS (ALT 636 FOR OP/ED): Performed by: NURSE PRACTITIONER

## 2024-12-04 PROCEDURE — 2500000002 HC RX 250 W HCPCS SELF ADMINISTERED DRUGS (ALT 637 FOR MEDICARE OP, ALT 636 FOR OP/ED): Performed by: NURSE PRACTITIONER

## 2024-12-04 RX ORDER — CARVEDILOL 25 MG/1
12.5 TABLET ORAL 2 TIMES DAILY
Start: 2024-12-04

## 2024-12-04 RX ORDER — DICLOFENAC SODIUM 10 MG/G
4 GEL TOPICAL 4 TIMES DAILY PRN
Status: DISCONTINUED | OUTPATIENT
Start: 2024-12-04 | End: 2024-12-04 | Stop reason: HOSPADM

## 2024-12-04 RX ORDER — ACETAMINOPHEN 10 MG/ML
1000 INJECTION, SOLUTION INTRAVENOUS ONCE
Status: COMPLETED | OUTPATIENT
Start: 2024-12-04 | End: 2024-12-04

## 2024-12-04 RX ORDER — INSULIN LISPRO 100 [IU]/ML
0-10 INJECTION, SOLUTION INTRAVENOUS; SUBCUTANEOUS
Status: DISCONTINUED | OUTPATIENT
Start: 2024-12-04 | End: 2024-12-04 | Stop reason: HOSPADM

## 2024-12-04 SDOH — ECONOMIC STABILITY: TRANSPORTATION INSECURITY: IN THE PAST 12 MONTHS, HAS LACK OF TRANSPORTATION KEPT YOU FROM MEDICAL APPOINTMENTS OR FROM GETTING MEDICATIONS?: NO

## 2024-12-04 SDOH — SOCIAL STABILITY: SOCIAL INSECURITY: DO YOU FEEL ANYONE HAS EXPLOITED OR TAKEN ADVANTAGE OF YOU FINANCIALLY OR OF YOUR PERSONAL PROPERTY?: NO

## 2024-12-04 SDOH — SOCIAL STABILITY: SOCIAL INSECURITY: DOES ANYONE TRY TO KEEP YOU FROM HAVING/CONTACTING OTHER FRIENDS OR DOING THINGS OUTSIDE YOUR HOME?: NO

## 2024-12-04 SDOH — ECONOMIC STABILITY: FOOD INSECURITY: WITHIN THE PAST 12 MONTHS, THE FOOD YOU BOUGHT JUST DIDN'T LAST AND YOU DIDN'T HAVE MONEY TO GET MORE.: NEVER TRUE

## 2024-12-04 SDOH — ECONOMIC STABILITY: FOOD INSECURITY: WITHIN THE PAST 12 MONTHS, YOU WORRIED THAT YOUR FOOD WOULD RUN OUT BEFORE YOU GOT THE MONEY TO BUY MORE.: NEVER TRUE

## 2024-12-04 SDOH — SOCIAL STABILITY: SOCIAL INSECURITY: DO YOU FEEL UNSAFE GOING BACK TO THE PLACE WHERE YOU ARE LIVING?: NO

## 2024-12-04 SDOH — SOCIAL STABILITY: SOCIAL INSECURITY: WITHIN THE LAST YEAR, HAVE YOU BEEN AFRAID OF YOUR PARTNER OR EX-PARTNER?: NO

## 2024-12-04 SDOH — ECONOMIC STABILITY: INCOME INSECURITY: IN THE PAST 12 MONTHS HAS THE ELECTRIC, GAS, OIL, OR WATER COMPANY THREATENED TO SHUT OFF SERVICES IN YOUR HOME?: NO

## 2024-12-04 SDOH — ECONOMIC STABILITY: FOOD INSECURITY: HOW HARD IS IT FOR YOU TO PAY FOR THE VERY BASICS LIKE FOOD, HOUSING, MEDICAL CARE, AND HEATING?: NOT VERY HARD

## 2024-12-04 SDOH — SOCIAL STABILITY: SOCIAL INSECURITY: HAVE YOU HAD THOUGHTS OF HARMING ANYONE ELSE?: NO

## 2024-12-04 SDOH — ECONOMIC STABILITY: HOUSING INSECURITY: IN THE LAST 12 MONTHS, WAS THERE A TIME WHEN YOU WERE NOT ABLE TO PAY THE MORTGAGE OR RENT ON TIME?: NO

## 2024-12-04 SDOH — SOCIAL STABILITY: SOCIAL INSECURITY: WITHIN THE LAST YEAR, HAVE YOU BEEN HUMILIATED OR EMOTIONALLY ABUSED IN OTHER WAYS BY YOUR PARTNER OR EX-PARTNER?: NO

## 2024-12-04 SDOH — ECONOMIC STABILITY: HOUSING INSECURITY: AT ANY TIME IN THE PAST 12 MONTHS, WERE YOU HOMELESS OR LIVING IN A SHELTER (INCLUDING NOW)?: NO

## 2024-12-04 SDOH — SOCIAL STABILITY: SOCIAL INSECURITY: HAVE YOU HAD ANY THOUGHTS OF HARMING ANYONE ELSE?: NO

## 2024-12-04 SDOH — ECONOMIC STABILITY: HOUSING INSECURITY: IN THE PAST 12 MONTHS, HOW MANY TIMES HAVE YOU MOVED WHERE YOU WERE LIVING?: 0

## 2024-12-04 SDOH — SOCIAL STABILITY: SOCIAL INSECURITY: ABUSE: ADULT

## 2024-12-04 SDOH — SOCIAL STABILITY: SOCIAL INSECURITY: ARE YOU OR HAVE YOU BEEN THREATENED OR ABUSED PHYSICALLY, EMOTIONALLY, OR SEXUALLY BY ANYONE?: NO

## 2024-12-04 SDOH — SOCIAL STABILITY: SOCIAL INSECURITY: ARE THERE ANY APPARENT SIGNS OF INJURIES/BEHAVIORS THAT COULD BE RELATED TO ABUSE/NEGLECT?: NO

## 2024-12-04 SDOH — SOCIAL STABILITY: SOCIAL INSECURITY: HAS ANYONE EVER THREATENED TO HURT YOUR FAMILY OR YOUR PETS?: NO

## 2024-12-04 SDOH — SOCIAL STABILITY: SOCIAL INSECURITY: WERE YOU ABLE TO COMPLETE ALL THE BEHAVIORAL HEALTH SCREENINGS?: YES

## 2024-12-04 ASSESSMENT — COGNITIVE AND FUNCTIONAL STATUS - GENERAL
DAILY ACTIVITIY SCORE: 24
PATIENT BASELINE BEDBOUND: NO
MOBILITY SCORE: 24

## 2024-12-04 ASSESSMENT — ACTIVITIES OF DAILY LIVING (ADL)
LACK_OF_TRANSPORTATION: NO
GROOMING: INDEPENDENT
BATHING: INDEPENDENT
WALKS IN HOME: INDEPENDENT
JUDGMENT_ADEQUATE_SAFELY_COMPLETE_DAILY_ACTIVITIES: YES
HEARING - LEFT EAR: FUNCTIONAL
DRESSING YOURSELF: INDEPENDENT
PATIENT'S MEMORY ADEQUATE TO SAFELY COMPLETE DAILY ACTIVITIES?: YES
HEARING - RIGHT EAR: FUNCTIONAL
FEEDING YOURSELF: INDEPENDENT
LACK_OF_TRANSPORTATION: NO
TOILETING: INDEPENDENT
ADEQUATE_TO_COMPLETE_ADL: YES

## 2024-12-04 ASSESSMENT — LIFESTYLE VARIABLES
SKIP TO QUESTIONS 9-10: 1
HOW OFTEN DO YOU HAVE 6 OR MORE DRINKS ON ONE OCCASION: NEVER
HOW MANY STANDARD DRINKS CONTAINING ALCOHOL DO YOU HAVE ON A TYPICAL DAY: 1 OR 2
AUDIT-C TOTAL SCORE: 1
AUDIT-C TOTAL SCORE: 1
HOW OFTEN DO YOU HAVE A DRINK CONTAINING ALCOHOL: MONTHLY OR LESS

## 2024-12-04 ASSESSMENT — PATIENT HEALTH QUESTIONNAIRE - PHQ9
1. LITTLE INTEREST OR PLEASURE IN DOING THINGS: NOT AT ALL
2. FEELING DOWN, DEPRESSED OR HOPELESS: NOT AT ALL
SUM OF ALL RESPONSES TO PHQ9 QUESTIONS 1 & 2: 0

## 2024-12-04 ASSESSMENT — PAIN - FUNCTIONAL ASSESSMENT
PAIN_FUNCTIONAL_ASSESSMENT: 0-10

## 2024-12-04 ASSESSMENT — PAIN SCALES - GENERAL
PAINLEVEL_OUTOF10: 0 - NO PAIN
PAINLEVEL_OUTOF10: 3
PAINLEVEL_OUTOF10: 0 - NO PAIN
PAINLEVEL_OUTOF10: 5 - MODERATE PAIN

## 2024-12-04 ASSESSMENT — PAIN DESCRIPTION - LOCATION: LOCATION: NECK

## 2024-12-04 NOTE — CONSULTS
"Kentrell Carreon   54 y.o.    @WT@  MRN/Room: [id number]/05/05-A  DOA: 12/3/2024    REASON FOR CONSULT: ESKD Mx    REQUESTING PHYSICIAN: Louann Potter MD M*  PRIMARY CARE PHYSICIAN: German Pfeiffer DO    ADMISSION DIAGNOSIS:   1. Nonrheumatic aortic valve stenosis    2. S/P TAVR (transcatheter aortic valve replacement)            HPI:  Kentrell Carreon is a 54 y.o. male presenting with  PMH of ESKD on home HD, CAD s/p PCI (LAD; 6/2017), CABG 2022, stage C systolic HFrEF, aortic stenosis, HTN, atrial flutter s/p ablation, DM and discoid lupus/SLE presenting for TAVR which is done on 12/3. Nephrology was consulted for ESRD management.     Patient presented 12/3 AM for procedure. Having worsening HF symptoms including fatigue, shortness of breath, orthopnea, PND, and occasional dizziness     ROS: systems reviewed and negative except mentioned in HPI    In the ER: /59   Pulse 72   Temp 36.4 °C (97.5 °F) (Temporal)   Resp 16   Ht 1.905 m (6' 3\")   Wt 72.8 kg (160 lb 9.6 oz)   SpO2 97%   BMI 20.07 kg/m²      Past Medical History  He has a past medical history of Aortic stenosis, CAD (coronary artery disease), CHF (congestive heart failure), ESRD (end stage renal disease) (Multi), HTN (hypertension), Systemic lupus erythematosus, unspecified, and Type 1 diabetes mellitus with other diabetic kidney complication.    Surgical History  He has a past surgical history that includes Coronary angioplasty with stent (06/20/2017); Bunionectomy (Right, 08/2023); AV fistula placement; Colonoscopy; Lung surgery (Left, 05/26/2022); Coronary artery bypass graft (03/26/2022); Toe amputation; Cardiac catheterization (N/A, 6/7/2024); and Cardiac electrophysiology procedure (N/A, 8/5/2024).     Social History  He reports that he has quit smoking. His smoking use included cigarettes. He has been exposed to tobacco smoke. He has never used smokeless tobacco. He reports current alcohol use. He reports that he does not use " drugs.    Family History  Family History   Problem Relation Name Age of Onset    Heart failure Mother      Heart attack Father         Meds:   aspirin, 81 mg, Daily  atorvastatin, 80 mg, Daily  docusate sodium, 100 mg, BID  [START ON 12/5/2024] heparin (porcine), 5,000 Units, q8h  insulin glargine, 10 Units, BID  insulin lispro, 0-5 Units, TID AC  melatonin, 10 mg, q PM  pantoprazole, 40 mg, Daily before breakfast   Or  pantoprazole, 40 mg, Daily before breakfast  perflutren lipid microspheres, 0.5-10 mL of dilution, Once in imaging  perflutren lipid microspheres, 0.5-10 mL of dilution, Once in imaging  sevelamer carbonate, 2,400 mg, TID AC         [Held by provider] acetaminophen, 650 mg, q6h PRN   Or  [Held by provider] acetaminophen, 650 mg, q6h PRN   Or  [Held by provider] acetaminophen, 650 mg, q6h PRN  BUPivacaine HCl, , PRN  dextrose, 12.5 g, q15 min PRN  dextrose, 12.5 g, q15 min PRN  dextrose, 25 g, q15 min PRN  dextrose, 25 g, q15 min PRN  diclofenac sodium, 4 g, 4x daily PRN  glucagon, 1 mg, q15 min PRN  glucagon, 1 mg, q15 min PRN  glucagon, 1 mg, q15 min PRN  glucagon, 1 mg, q15 min PRN  lidocaine-epinephrine, 5 mL, Once PRN  ondansetron ODT, 4 mg, q8h PRN   Or  ondansetron, 4 mg, q8h PRN  oxygen, , Continuous PRN - O2/gases  traMADol, 50 mg, q6h PRN      Current Outpatient Medications   Medication Instructions    acetaminophen (TYLENOL) 500 mg, Every 6 hours PRN    aspirin 81 mg EC tablet 1 tablet, Daily    atorvastatin (LIPITOR) 80 mg, oral, Daily    B complex-vitamin C-folic acid (Nephrocaps) 1 mg capsule 1 capsule, oral, Daily    blood-glucose sensor (FreeStyle José Luis 3 Sensor) device Change sensor every 14 days    carvedilol (COREG) 25 mg, 2 times daily    EPOETIN DEIRDRE INJ 8,000 Units, subcutaneous, Weekly    famotidine (PEPCID) 10 mg, oral, Nightly PRN    FreeStyle José Luis reader (FreeStyle José Luis 2 [place]) misc Use to check blood sugars at least every 8 hours    FreeStyle José Luis sensor system  "(FreeStyle José Luis 2 Sensor) kit Change sensor every 14 days. Check blood sugar at least every 8 hours    insulin glargine (Lantus) 100 unit/mL (3 mL) pen Inject 10 units twice a day    insulin lispro (HumaLOG KwikPen Insulin) 100 unit/mL injection Inject before every meal using carb ratio and correction scale. MDD 50 units.    melatonin 10 mg, oral, Every evening    multivitamin (Daily Multi-Vitamin) tablet 1 tablet, Daily    pen needle, diabetic (BD Agueda 2nd Gen Pen Needle) 32 gauge x 5/32\" needle USE TO INJECT INSULIN 5 TIMES PER DAY *Please schedule follow up for further refills*    sevelamer carbonate (RENVELA) 2,400 mg, oral, 3 times daily before meals, Swallow tablet whole; do not crush, break, or chew.          VITALS:  Temp:  [36.3 °C (97.3 °F)-37.1 °C (98.8 °F)] 36.4 °C (97.5 °F)  Heart Rate:  [69-79] 72  Resp:  [9-22] 16  BP: (150-174)/(59-94) 151/59  Arterial Line BP 1: (135-176)/(41-65) 137/51     Intake/Output Summary (Last 24 hours) at 12/4/2024 0616  Last data filed at 12/3/2024 2201  Gross per 24 hour   Intake 1856.07 ml   Output 3410 ml   Net -1553.93 ml      I/O last 3 completed shifts:  In: 1056.1 (14.1 mL/kg) [I.V.:426.1 (5.7 mL/kg); Blood:630]  Out: 10 (0.1 mL/kg) [Blood:10]  Weight: 74.8 kg   12/02 0700 - 12/03 1859  In: 1056.1 [I.V.:426.1]  Out: 10    [unfilled]     PHYSICAL EXAMINATION:  General appearance: no distress  Eyes: non-icteric  Skin: no apparent rash  Heart: regular  Lungs: NVB B/L with basilar crackles  Abdomen: soft, nt/nd  Extremities: no edema B/L  Access: RUE AVF      INVESTIGATIONS:  Results from last 7 days   Lab Units 12/04/24  0218   WBC AUTO x10*3/uL 5.5   RBC AUTO x10*6/uL 3.18*   HEMOGLOBIN g/dL 9.6*   HEMATOCRIT % 30.5*     Results from last 7 days   Lab Units 12/04/24  0218   SODIUM mmol/L 133*   POTASSIUM mmol/L 4.3   CHLORIDE mmol/L 95*   CO2 mmol/L 24   BUN mg/dL 36*   CREATININE mg/dL 4.50*   CALCIUM mg/dL 9.0   MAGNESIUM mg/dL 2.14         No results found for: " "\"ALBUR\", \"[id number]\"   No results found for the last 90 days.      IMAGING:  Cardiac Catheterization Procedure    Result Date: 12/3/2024  This study is a surgery completed in an invasive cardiovascular space. Please see OpNote on Notes tab for findings.    ECG 12 lead daily    Result Date: 12/3/2024  Sinus rhythm with 1st degree AV block Possible Left atrial enlargement Nonspecific intraventricular block Cannot rule out Anterior infarct , age undetermined Marked T wave abnormality, consider lateral ischemia Abnormal ECG When compared with ECG of 14-NOV-2024 16:12, QRS axis Shifted left T wave inversion no longer evident in Inferior leads       ASSESSMENT:  Kentrell Carreon is a 54 y.o. male presenting with  PMH of ESKD on home HD, CAD s/p PCI (LAD; 6/2017), CABG 2022, stage C systolic HFrEF, aortic stenosis, HTN, atrial flutter s/p ablation, DM and discoid lupus/SLE presenting for TAVR which is done on 12/3. Nephrology was consulted for ESRD management.         #ESKD on Home HD 4x a week:  Nephrologist: Dr Epperson  Access: RUE AVF  Last HD before admission: 12/2/2024  EDW: 69    #Electrolytes  - K wnl, mild hypoNa    #Acid-Base  - HCO3 acceptable    #Anemia  - Hb 9-10    #MBD  - acceptable    #Hemodynamics  - -170 SBP, at RA      RECOMMENDATIONS:  - iHD done 12/3 post surgery  - Renal diet  - Renal MVI  - Keep MAP >65 or SBP >90  - Strict I/O monitoring, daily weights, daily BMP  - likely to be discharged today    Patient is discussed with the attending.    Sharmila Sunshine MD  Nephrology Fellow   Daytime / Weekend Renal Pager [phone]  After 7 pm Emergencies [phone] Pager [phone]   "

## 2024-12-04 NOTE — PROGRESS NOTES
Subjective   Overnight had some blood soaking into his dressing but no tona oozing. Patient wasn't keeping his neck straight due to discomfort. He got a dose of IV Tylenol which improved his pain overnight. Some oozing from groin site, resolved with lido/epi.    This morning, reports neck pain is only 4/10 (it is annoying, but not painful). He denies any shortness of breath, chest pain, or fever/chills. He denies any abdominal pain, nausea/vomiting. Reports last bowel movement was Monday.    Meds  Scheduled medications  aspirin, 81 mg, oral, Daily  atorvastatin, 80 mg, oral, Daily  docusate sodium, 100 mg, oral, BID  [START ON 12/5/2024] heparin (porcine), 5,000 Units, subcutaneous, q8h  insulin glargine, 10 Units, subcutaneous, BID  insulin lispro, 0-5 Units, subcutaneous, TID AC  melatonin, 10 mg, oral, q PM  pantoprazole, 40 mg, oral, Daily before breakfast   Or  pantoprazole, 40 mg, intravenous, Daily before breakfast  perflutren lipid microspheres, 0.5-10 mL of dilution, intravenous, Once in imaging  perflutren lipid microspheres, 0.5-10 mL of dilution, intravenous, Once in imaging  sevelamer carbonate, 2,400 mg, oral, TID AC      Continuous medications     PRN medications  PRN medications: [Held by provider] acetaminophen **OR** [Held by provider] acetaminophen **OR** [Held by provider] acetaminophen, BUPivacaine HCl, dextrose, dextrose, dextrose, dextrose, diclofenac sodium, glucagon, glucagon, glucagon, glucagon, lidocaine-epinephrine, ondansetron ODT **OR** ondansetron, oxygen, traMADol      Objective   Vital signs in last 24 hours:  Temp:  [36.3 °C (97.3 °F)-37.1 °C (98.8 °F)] 36.4 °C (97.5 °F)  Heart Rate:  [69-79] 72  Resp:  [9-22] 16  BP: (150-174)/(59-94) 151/59  Arterial Line BP 1: (135-176)/(41-65) 137/51    Intake/Output this shift:    Intake/Output Summary (Last 24 hours) at 12/4/2024 0555  Last data filed at 12/3/2024 2201  Gross per 24 hour   Intake 1856.07 ml   Output 3410 ml   Net -1553.93 ml        Physical  General: Patient is awake, non-toxic appearing, thin body habitus  HEENT: No bleeding around carotid sheath site  Pulm: Normal WOB at rest and when sitting up, no crackles or rhonchi  Cardiac: Regular rate and rhythm, normal S1/S2, extremities warm and well perfused, peripheral pulses 2+  Abdomen: Non-tender to palpation, non-distended  Extremities: No peripheral edema  Neuro: Patient alert and oriented, cranial nerves grossly intact, normal strength and sensation.    Results  Results for orders placed or performed during the hospital encounter of 12/03/24 (from the past 24 hours)   Prepare RBC: 2 Units, Leukocytes Reduced (CMV reduced risk)   Result Value Ref Range    PRODUCT CODE O2064U44     Unit Number [id number]     Unit ABO O     Unit RH NEG     XM INTEP COMP     Dispense Status TR     Blood Expiration Date 12/10/2024 11:59:00 PM EST     PRODUCT BLOOD TYPE 9500     UNIT VOLUME 350    Prepare RBC: 1 Units, Leukocytes Reduced (CMV reduced risk)   Result Value Ref Range    PRODUCT CODE D6689I62     Unit Number [id number]     Unit ABO O     Unit RH NEG     XM INTEP COMP     Dispense Status TR     Blood Expiration Date 12/10/2024 11:59:00 PM EST     PRODUCT BLOOD TYPE 9500     UNIT VOLUME 281    ECG 12 lead daily   Result Value Ref Range    Ventricular Rate 77 BPM    Atrial Rate 77 BPM    OK Interval 238 ms    QRS Duration 132 ms    QT Interval 462 ms    QTC Calculation(Bazett) 522 ms    P Axis 58 degrees    R Axis 15 degrees    T Axis 144 degrees    QRS Count 13 beats    Q Onset 207 ms    P Onset 88 ms    P Offset 173 ms    T Offset 438 ms    QTC Fredericia 502 ms   Prepare RBC: 4 Units   Result Value Ref Range    PRODUCT CODE V3457O46     Unit Number [id number]     Unit ABO A     Unit RH NEG     XM INTEP COMP     Dispense Status      Blood Expiration Date 12/24/2024 11:59:00 PM EST     PRODUCT BLOOD TYPE 0600     UNIT VOLUME 350     PRODUCT CODE T9552T85     Unit Number  [id number]     Unit ABO A     Unit RH NEG     XM INTEP COMP     Dispense Status XM     Blood Expiration Date 12/24/2024 11:59:00 PM EST     PRODUCT BLOOD TYPE 0600     UNIT VOLUME 350     PRODUCT CODE Y2717S27     Unit Number [id number]     Unit ABO A     Unit RH NEG     XM INTEP COMP     Dispense Status XM     Blood Expiration Date 12/24/2024 11:59:00 PM EST     PRODUCT BLOOD TYPE 0600     UNIT VOLUME 350     PRODUCT CODE C2537M90     Unit Number [id number]     Unit ABO A     Unit RH NEG     XM INTEP COMP     Dispense Status XM     Blood Expiration Date 1/6/2025 11:59:00 PM EST     PRODUCT BLOOD TYPE 0600     UNIT VOLUME 275    Prepare Platelets: 1 Units   Result Value Ref Range    PRODUCT CODE O0054U90     Unit Number [id number]     Unit ABO A     Unit RH POS     Dispense Status RE     Blood Expiration Date 12/4/2024 11:59:00 PM EST     PRODUCT BLOOD TYPE 6200     UNIT VOLUME 180    Prepare Plasma: 2 Units   Result Value Ref Range    PRODUCT CODE V9559E12     Unit Number [id number]     Unit ABO A     Unit RH POS     Dispense Status RE     Blood Expiration Date 12/6/2024  1:20:00 AM EST     PRODUCT BLOOD TYPE 6200     UNIT VOLUME 301     PRODUCT CODE X5135M68     Unit Number [id number]     Unit ABO A     Unit RH POS     Dispense Status RE     Blood Expiration Date 12/6/2024 11:29:00 AM EST     PRODUCT BLOOD TYPE 6200     UNIT VOLUME 309    Basic metabolic panel   Result Value Ref Range    Glucose 292 (H) 74 - 99 mg/dL    Sodium 135 (L) 136 - 145 mmol/L    Potassium 4.3 3.5 - 5.3 mmol/L    Chloride 95 (L) 98 - 107 mmol/L    Bicarbonate 21 21 - 32 mmol/L    Anion Gap 23 (H) 10 - 20 mmol/L    Urea Nitrogen 81 (H) 6 - 23 mg/dL    Creatinine 8.06 (H) 0.50 - 1.30 mg/dL    eGFR 7 (L) >60 mL/min/1.73m*2    Calcium 9.1 8.6 - 10.6 mg/dL   Magnesium   Result Value Ref Range    Magnesium 2.35 1.60 - 2.40 mg/dL   CBC and Auto Differential   Result Value Ref Range    WBC 4.4 4.4 - 11.3 x10*3/uL     nRBC 0.0 0.0 - 0.0 /100 WBCs    RBC 3.23 (L) 4.50 - 5.90 x10*6/uL    Hemoglobin 9.9 (L) 13.5 - 17.5 g/dL    Hematocrit 30.1 (L) 41.0 - 52.0 %    MCV 93 80 - 100 fL    MCH 30.7 26.0 - 34.0 pg    MCHC 32.9 32.0 - 36.0 g/dL    RDW 15.6 (H) 11.5 - 14.5 %    Platelets 153 150 - 450 x10*3/uL    Neutrophils % 85.8 40.0 - 80.0 %    Immature Granulocytes %, Automated 0.9 0.0 - 0.9 %    Lymphocytes % 8.1 13.0 - 44.0 %    Monocytes % 4.8 2.0 - 10.0 %    Eosinophils % 0.2 0.0 - 6.0 %    Basophils % 0.2 0.0 - 2.0 %    Neutrophils Absolute 3.79 1.20 - 7.70 x10*3/uL    Immature Granulocytes Absolute, Automated 0.04 0.00 - 0.70 x10*3/uL    Lymphocytes Absolute 0.36 (L) 1.20 - 4.80 x10*3/uL    Monocytes Absolute 0.21 0.10 - 1.00 x10*3/uL    Eosinophils Absolute 0.01 0.00 - 0.70 x10*3/uL    Basophils Absolute 0.01 0.00 - 0.10 x10*3/uL   Protime-INR   Result Value Ref Range    Protime 14.3 (H) 9.8 - 12.8 seconds    INR 1.3 (H) 0.9 - 1.1   POCT GLUCOSE   Result Value Ref Range    POCT Glucose 240 (H) 74 - 99 mg/dL   CBC and Auto Differential   Result Value Ref Range    WBC 5.5 4.4 - 11.3 x10*3/uL    nRBC 0.0 0.0 - 0.0 /100 WBCs    RBC 3.18 (L) 4.50 - 5.90 x10*6/uL    Hemoglobin 9.6 (L) 13.5 - 17.5 g/dL    Hematocrit 30.5 (L) 41.0 - 52.0 %    MCV 96 80 - 100 fL    MCH 30.2 26.0 - 34.0 pg    MCHC 31.5 (L) 32.0 - 36.0 g/dL    RDW 16.1 (H) 11.5 - 14.5 %    Platelets 162 150 - 450 x10*3/uL    Neutrophils % 89.3 40.0 - 80.0 %    Immature Granulocytes %, Automated 0.2 0.0 - 0.9 %    Lymphocytes % 4.5 13.0 - 44.0 %    Monocytes % 6.0 2.0 - 10.0 %    Eosinophils % 0.0 0.0 - 6.0 %    Basophils % 0.0 0.0 - 2.0 %    Neutrophils Absolute 4.95 1.20 - 7.70 x10*3/uL    Immature Granulocytes Absolute, Automated 0.01 0.00 - 0.70 x10*3/uL    Lymphocytes Absolute 0.25 (L) 1.20 - 4.80 x10*3/uL    Monocytes Absolute 0.33 0.10 - 1.00 x10*3/uL    Eosinophils Absolute 0.00 0.00 - 0.70 x10*3/uL    Basophils Absolute 0.00 0.00 - 0.10 x10*3/uL   Basic Metabolic  Panel   Result Value Ref Range    Glucose 313 (H) 74 - 99 mg/dL    Sodium 133 (L) 136 - 145 mmol/L    Potassium 4.3 3.5 - 5.3 mmol/L    Chloride 95 (L) 98 - 107 mmol/L    Bicarbonate 24 21 - 32 mmol/L    Anion Gap 18 10 - 20 mmol/L    Urea Nitrogen 36 (H) 6 - 23 mg/dL    Creatinine 4.50 (H) 0.50 - 1.30 mg/dL    eGFR 15 (L) >60 mL/min/1.73m*2    Calcium 9.0 8.6 - 10.6 mg/dL   Magnesium   Result Value Ref Range    Magnesium 2.14 1.60 - 2.40 mg/dL   Protime-INR   Result Value Ref Range    Protime 14.3 (H) 9.8 - 12.8 seconds    INR 1.3 (H) 0.9 - 1.1     *Note: Due to a large number of results and/or encounters for the requested time period, some results have not been displayed. A complete set of results can be found in Results Review.       Imaging  Cardiac Catheterization Procedure    Result Date: 12/3/2024  This study is a surgery completed in an invasive cardiovascular space. Please see OpNote on Notes tab for findings.    ECG 12 lead daily    Result Date: 12/3/2024  Sinus rhythm with 1st degree AV block Possible Left atrial enlargement Nonspecific intraventricular block Cannot rule out Anterior infarct , age undetermined Marked T wave abnormality, consider lateral ischemia Abnormal ECG When compared with ECG of 14-NOV-2024 16:12, QRS axis Shifted left T wave inversion no longer evident in Inferior leads        Assessment/Plan   Principal Problem:    S/P TAVR (transcatheter aortic valve replacement)  Active Problems:    Nonrheumatic aortic valve stenosis    Kentrell Carreon is a 54 y.o. male presenting with  PMH of CAD s/p PCI (LAD; 6/2017), CABG 2022, stage C systolic HFrEF, HTN, atrial flutter s/p ablation, DM and discoid lupus/SLE with ESRD on home HD presenting to the CICU for management of severe aortic stenosis s/p TAVR on 12/3.     Updates  -Structural heart states it is ok to send him home if oozing stops  -Will try to walk. If tolerates well, can go home, if not then will stay another day  -Remove a line and  neck access     Neuro  RHYS     Cardio  #Severe Aortic Stenosis s/p TAVR  Plan  -TTE today  -ASA 81  -Structural team to follow     #CAD s/p PCI (LAD; 6/2017), CABG 2022  -ASA  -atorvastatin 80     #HFrEF(38%, 8/2024)  #HTN  ::Home meds: coreg 25 BID, Entresto 24-26 BID (no jardiance or MRA given ESRD)  ::Post TAVR LVEDP 30     #Hx of Aflutter s/p RFA  ::NSR  -Not on any anticoagulation at home     Pulm  -Now down to 1L, 98% sats     GI  RHYS     Renal  #ESRD  #Hx of SLE not on treatment  ::Home HD every other day  ::Not a transplant candidate given severe arterial disease  -sevelamer  -Dialyzed yesterday       #T1DM  ::Home meds: glargine 10 BID, lispro SSI  ::Follows with endocrinology  -Resumed glargine 10u with SSI2     Heme/onc  #Anemia,stable  -Stable Hgb of 9.6  ::Chronic on EPO       F: Cautious  E: PRN  N: Regular     Lines: None  O2: 2L  Abx: none     DVT PPX: Hold for tonight  GI PPX: famotidine         LOS: 1 day     Dago Mott MD/PhD   PGY-2

## 2024-12-04 NOTE — PROGRESS NOTES
12/04/24 1314   Discharge Planning   Living Arrangements Alone   Support Systems Children   Assistance Needed n/a   Type of Residence Private residence   Do you have animals or pets at home? No   Who is requesting discharge planning? Provider   Does the patient need discharge transport arranged? No     Late note from 12/04 08:30 am:  - ICU TREATMENT PLAN: Patient presented to the CICU for management of severe aortic stenosis s/p TAVR on 12/3.  - Payer: [facility]  -Support System: Daughter  - Planned Disposition: Patient is scheduled to be discharged home today. Family will provide transportation.   - Additional Information: SDOH and Social Work Discharge Planning assessments were completed with the patient. There were no SDOH issues identified.  - Barriers to discharge: None.

## 2024-12-04 NOTE — CARE PLAN
Problem: Skin  Goal: Decreased wound size/increased tissue granulation at next dressing change  Outcome: Met  Flowsheets (Taken 12/4/2024 1010)  Decreased wound size/increased tissue granulation at next dressing change: Promote sleep for wound healing  Goal: Participates in plan/prevention/treatment measures  Outcome: Met  Flowsheets (Taken 12/4/2024 1010)  Participates in plan/prevention/treatment measures: Elevate heels  Goal: Prevent/manage excess moisture  Outcome: Met  Flowsheets (Taken 12/4/2024 1010)  Prevent/manage excess moisture: Follow provider orders for dressing changes  Goal: Prevent/minimize sheer/friction injuries  Outcome: Met  Flowsheets (Taken 12/4/2024 1010)  Prevent/minimize sheer/friction injuries: Increase activity/out of bed for meals  Goal: Promote/optimize nutrition  Outcome: Met  Flowsheets (Taken 12/4/2024 1010)  Promote/optimize nutrition: Monitor/record intake including meals  Goal: Promote skin healing  Outcome: Met  Flowsheets (Taken 12/4/2024 1010)  Promote skin healing: Turn/reposition every 2 hours/use positioning/transfer devices     Problem: Fall/Injury  Goal: Not fall by end of shift  Outcome: Met  Goal: Be free from injury by end of the shift  Outcome: Met  Goal: Verbalize understanding of personal risk factors for fall in the hospital  Outcome: Met  Goal: Verbalize understanding of risk factor reduction measures to prevent injury from fall in the home  Outcome: Met  Goal: Use assistive devices by end of the shift  Outcome: Met  Goal: Pace activities to prevent fatigue by end of the shift  Outcome: Met     Problem: Pain - Adult  Goal: Verbalizes/displays adequate comfort level or baseline comfort level  Outcome: Met     Problem: Safety - Adult  Goal: Free from fall injury  Outcome: Met     Problem: Discharge Planning  Goal: Discharge to home or other facility with appropriate resources  Outcome: Met     Problem: Chronic Conditions and Co-morbidities  Goal: Patient's chronic  conditions and co-morbidity symptoms are monitored and maintained or improved  Outcome: Met

## 2024-12-04 NOTE — DISCHARGE SUMMARY
Discharge Diagnosis  S/P TAVR (transcatheter aortic valve replacement)    Issues Requiring Follow-Up  None    Test Results Pending At Discharge  Pending Labs       Order Current Status    Blood Gas Arterial Full Panel In process            Hospital Course   Patient Vitals for the past 24 hrs:   BP Temp Temp src Pulse Resp SpO2 Weight   12/04/24 0900 no documentation no documentation no documentation 76 20 94 % no documentation   12/04/24 0800 no documentation 36.1 °C (97 °F) Temporal 75 (Abnormal) 27 99 % no documentation   12/04/24 0729 no documentation no documentation no documentation no documentation no documentation no documentation 72.8 kg (160 lb 7.9 oz)   12/04/24 0700 no documentation no documentation no documentation 73 20 93 % no documentation   12/04/24 0600 no documentation no documentation no documentation 75 21 98 % no documentation   12/04/24 0500 no documentation no documentation no documentation 72 16 97 % no documentation   12/04/24 0400 no documentation 36.4 °C (97.5 °F) Temporal 72 13 97 % no documentation   12/04/24 0300 no documentation no documentation no documentation 76 22 98 % no documentation   12/04/24 0200 no documentation no documentation no documentation 75 22 96 % no documentation   12/04/24 0100 no documentation no documentation no documentation 73 (Abnormal) 9 98 % no documentation   12/04/24 0000 no documentation 36.4 °C (97.5 °F) Temporal 76 16 98 % 72.8 kg (160 lb 9.6 oz)   12/03/24 2300 no documentation no documentation no documentation 75 13 97 % no documentation   12/03/24 2201 no documentation 36.7 °C (98.1 °F) Temporal 75 no documentation no documentation no documentation   12/03/24 2200 no documentation no documentation no documentation 79 21 92 % no documentation   12/03/24 2100 no documentation no documentation no documentation 77 19 98 % no documentation   12/03/24 2000 no documentation 36.9 °C (98.4 °F) Temporal 75 19 97 % no documentation   12/03/24 1900 no  documentation no documentation no documentation 73 21 96 % no documentation   12/03/24 1830 no documentation no documentation no documentation 71 19 93 % no documentation   12/03/24 1829 no documentation 36.5 °C (97.7 °F) Temporal 73 no documentation no documentation no documentation   12/03/24 1800 no documentation no documentation no documentation 72 21 95 % no documentation   12/03/24 1745 no documentation no documentation no documentation 70 19 94 % no documentation   12/03/24 1730 no documentation no documentation no documentation 69 15 95 % no documentation   12/03/24 1715 no documentation no documentation no documentation 69 17 97 % no documentation   12/03/24 1700 no documentation no documentation no documentation 69 16 95 % no documentation   12/03/24 1645 no documentation no documentation no documentation 71 18 100 % no documentation   12/03/24 1630 151/59 36.3 °C (97.3 °F) Temporal 69 16 96 % no documentation   12/03/24 1240 (Abnormal) 174/93 36.8 °C (98.2 °F) Temporal 75 14 97 % no documentation   12/03/24 1230 167/90 37 °C (98.6 °F) Temporal 75 14 no documentation no documentation   12/03/24 1200 171/89 37 °C (98.6 °F) Temporal 75 14 no documentation no documentation   12/03/24 1130 164/90 36.8 °C (98.2 °F) Temporal 73 14 no documentation no documentation   12/03/24 1115 162/89 36.5 °C (97.7 °F) Temporal 72 14 no documentation no documentation   12/03/24 1100 (Abnormal) 171/94 36.7 °C (98.1 °F) Temporal 74 14 94 % no documentation   12/03/24 1055 168/89 36.6 °C (97.9 °F) Tympanic 75 14 94 % no documentation   12/03/24 1030 162/83 36.6 °C (97.9 °F) Tympanic 75 14 no documentation no documentation   12/03/24 1015 156/83 36.8 °C (98.2 °F) Tympanic 75 14 95 % no documentation   12/03/24 1000 159/89 36.3 °C (97.3 °F) Tympanic 76 14 95 % no documentation   12/03/24 0945 150/86 37.1 °C (98.8 °F) Tympanic 74 16 93 % no documentation       Results for orders placed or performed during the hospital encounter of  12/03/24 (from the past 96 hours)   Prepare RBC: 2 Units, Leukocytes Reduced (CMV reduced risk)   Result Value Ref Range    PRODUCT CODE R2162F52     Unit Number [id number]     Unit ABO O     Unit RH NEG     XM INTEP COMP     Dispense Status TR     Blood Expiration Date 12/10/2024 11:59:00 PM EST     PRODUCT BLOOD TYPE 9500     UNIT VOLUME 350    Prepare RBC: 1 Units, Leukocytes Reduced (CMV reduced risk)   Result Value Ref Range    PRODUCT CODE J3969B27     Unit Number [id number]     Unit ABO O     Unit RH NEG     XM INTEP COMP     Dispense Status TR     Blood Expiration Date 12/10/2024 11:59:00 PM EST     PRODUCT BLOOD TYPE 9500     UNIT VOLUME 281    ECG 12 lead daily   Result Value Ref Range    Ventricular Rate 77 BPM    Atrial Rate 77 BPM    AR Interval 238 ms    QRS Duration 132 ms    QT Interval 462 ms    QTC Calculation(Bazett) 522 ms    P Axis 58 degrees    R Axis 15 degrees    T Axis 144 degrees    QRS Count 13 beats    Q Onset 207 ms    P Onset 88 ms    P Offset 173 ms    T Offset 438 ms    QTC Fredericia 502 ms   Prepare RBC: 4 Units   Result Value Ref Range    PRODUCT CODE B6946A88     Unit Number [id number]     Unit ABO A     Unit RH NEG     XM INTEP COMP     Dispense Status XM     Blood Expiration Date 12/24/2024 11:59:00 PM EST     PRODUCT BLOOD TYPE 0600     UNIT VOLUME 350     PRODUCT CODE W3539L16     Unit Number [id number]     Unit ABO A     Unit RH NEG     XM INTEP COMP     Dispense Status XM     Blood Expiration Date 12/24/2024 11:59:00 PM EST     PRODUCT BLOOD TYPE 0600     UNIT VOLUME 350     PRODUCT CODE H6039Q58     Unit Number [id number]     Unit ABO A     Unit RH NEG     XM INTEP COMP     Dispense Status XM     Blood Expiration Date 12/24/2024 11:59:00 PM EST     PRODUCT BLOOD TYPE 0600     UNIT VOLUME 350     PRODUCT CODE N6756T27     Unit Number [id number]     Unit ABO A     Unit RH NEG     XM INTEP COMP     Dispense Status XM     Blood Expiration Date  1/6/2025 11:59:00 PM EST     PRODUCT BLOOD TYPE 0600     UNIT VOLUME 275    Prepare Platelets: 1 Units   Result Value Ref Range    PRODUCT CODE M4741H95     Unit Number [id number]     Unit ABO A     Unit RH POS     Dispense Status RE     Blood Expiration Date 12/4/2024 11:59:00 PM EST     PRODUCT BLOOD TYPE 6200     UNIT VOLUME 180    Prepare Plasma: 2 Units   Result Value Ref Range    PRODUCT CODE M1625L31     Unit Number [id number]     Unit ABO A     Unit RH POS     Dispense Status RE     Blood Expiration Date 12/6/2024  1:20:00 AM EST     PRODUCT BLOOD TYPE 6200     UNIT VOLUME 301     PRODUCT CODE A0618G22     Unit Number [id number]     Unit ABO A     Unit RH POS     Dispense Status RE     Blood Expiration Date 12/6/2024 11:29:00 AM EST     PRODUCT BLOOD TYPE 6200     UNIT VOLUME 309    Basic metabolic panel   Result Value Ref Range    Glucose 292 (H) 74 - 99 mg/dL    Sodium 135 (L) 136 - 145 mmol/L    Potassium 4.3 3.5 - 5.3 mmol/L    Chloride 95 (L) 98 - 107 mmol/L    Bicarbonate 21 21 - 32 mmol/L    Anion Gap 23 (H) 10 - 20 mmol/L    Urea Nitrogen 81 (H) 6 - 23 mg/dL    Creatinine 8.06 (H) 0.50 - 1.30 mg/dL    eGFR 7 (L) >60 mL/min/1.73m*2    Calcium 9.1 8.6 - 10.6 mg/dL   Magnesium   Result Value Ref Range    Magnesium 2.35 1.60 - 2.40 mg/dL   CBC and Auto Differential   Result Value Ref Range    WBC 4.4 4.4 - 11.3 x10*3/uL    nRBC 0.0 0.0 - 0.0 /100 WBCs    RBC 3.23 (L) 4.50 - 5.90 x10*6/uL    Hemoglobin 9.9 (L) 13.5 - 17.5 g/dL    Hematocrit 30.1 (L) 41.0 - 52.0 %    MCV 93 80 - 100 fL    MCH 30.7 26.0 - 34.0 pg    MCHC 32.9 32.0 - 36.0 g/dL    RDW 15.6 (H) 11.5 - 14.5 %    Platelets 153 150 - 450 x10*3/uL    Neutrophils % 85.8 40.0 - 80.0 %    Immature Granulocytes %, Automated 0.9 0.0 - 0.9 %    Lymphocytes % 8.1 13.0 - 44.0 %    Monocytes % 4.8 2.0 - 10.0 %    Eosinophils % 0.2 0.0 - 6.0 %    Basophils % 0.2 0.0 - 2.0 %    Neutrophils Absolute 3.79 1.20 - 7.70 x10*3/uL    Immature  Granulocytes Absolute, Automated 0.04 0.00 - 0.70 x10*3/uL    Lymphocytes Absolute 0.36 (L) 1.20 - 4.80 x10*3/uL    Monocytes Absolute 0.21 0.10 - 1.00 x10*3/uL    Eosinophils Absolute 0.01 0.00 - 0.70 x10*3/uL    Basophils Absolute 0.01 0.00 - 0.10 x10*3/uL   Protime-INR   Result Value Ref Range    Protime 14.3 (H) 9.8 - 12.8 seconds    INR 1.3 (H) 0.9 - 1.1   POCT GLUCOSE   Result Value Ref Range    POCT Glucose 240 (H) 74 - 99 mg/dL   CBC and Auto Differential   Result Value Ref Range    WBC 5.5 4.4 - 11.3 x10*3/uL    nRBC 0.0 0.0 - 0.0 /100 WBCs    RBC 3.18 (L) 4.50 - 5.90 x10*6/uL    Hemoglobin 9.6 (L) 13.5 - 17.5 g/dL    Hematocrit 30.5 (L) 41.0 - 52.0 %    MCV 96 80 - 100 fL    MCH 30.2 26.0 - 34.0 pg    MCHC 31.5 (L) 32.0 - 36.0 g/dL    RDW 16.1 (H) 11.5 - 14.5 %    Platelets 162 150 - 450 x10*3/uL    Neutrophils % 89.3 40.0 - 80.0 %    Immature Granulocytes %, Automated 0.2 0.0 - 0.9 %    Lymphocytes % 4.5 13.0 - 44.0 %    Monocytes % 6.0 2.0 - 10.0 %    Eosinophils % 0.0 0.0 - 6.0 %    Basophils % 0.0 0.0 - 2.0 %    Neutrophils Absolute 4.95 1.20 - 7.70 x10*3/uL    Immature Granulocytes Absolute, Automated 0.01 0.00 - 0.70 x10*3/uL    Lymphocytes Absolute 0.25 (L) 1.20 - 4.80 x10*3/uL    Monocytes Absolute 0.33 0.10 - 1.00 x10*3/uL    Eosinophils Absolute 0.00 0.00 - 0.70 x10*3/uL    Basophils Absolute 0.00 0.00 - 0.10 x10*3/uL   Basic Metabolic Panel   Result Value Ref Range    Glucose 313 (H) 74 - 99 mg/dL    Sodium 133 (L) 136 - 145 mmol/L    Potassium 4.3 3.5 - 5.3 mmol/L    Chloride 95 (L) 98 - 107 mmol/L    Bicarbonate 24 21 - 32 mmol/L    Anion Gap 18 10 - 20 mmol/L    Urea Nitrogen 36 (H) 6 - 23 mg/dL    Creatinine 4.50 (H) 0.50 - 1.30 mg/dL    eGFR 15 (L) >60 mL/min/1.73m*2    Calcium 9.0 8.6 - 10.6 mg/dL   Magnesium   Result Value Ref Range    Magnesium 2.14 1.60 - 2.40 mg/dL   Protime-INR   Result Value Ref Range    Protime 14.3 (H) 9.8 - 12.8 seconds    INR 1.3 (H) 0.9 - 1.1   POCT GLUCOSE    Result Value Ref Range    POCT Glucose 446 (H) 74 - 99 mg/dL   POCT GLUCOSE   Result Value Ref Range    POCT Glucose 406 (H) 74 - 99 mg/dL     *Note: Due to a large number of results and/or encounters for the requested time period, some results have not been displayed. A complete set of results can be found in Results Review.       Kentrell Carreon is a 54 y.o. male presenting with  PMH of CAD s/p PCI (LAD; 6/2017), CABG 2022, stage C systolic HFrEF, aortic stenosis, HTN, tachycardia s/p ablation, DM and discoid lupus/SLE with ESRD on home HD and Advanced Heart Failure clinic. S/p Evolut FX+ 34 mm via R ostial common iliac artery with shockwave on 12/3/24 with Al Goetz and Krish    EKG pre shows 1st degree AVB Jesenia 238   EKG post shows NSR Jesenia 208     Doing well clinically, euvolemic on exam. Bilat groins with DSD no evidence of with slight ooze overnight, no hematoma or ecchymosis.  Bilat groins injected with Epi/Lido re-evaluated and bleeding has stopped.  Will reduce Coreg at DC and will follow up with blood pressures as outpatient.     Plan  -Ambulate and reassess groins  -Echo per protocol     -ASA 81mg as monotherapy  -reduce Coreg to 6.25mg bid  -Cont home medications   -DC A-line, Central line  -Echo per protocol (no PC effusion, reduced EF)   -D/C home   -Follow up with PCP in 1-2 weeks  -Follow up with Primary cards in 6-10 weeks  -Follow up with Structural NP in virtual clinic at 1 week, 1 month and 1 year with echo at 1 mo and year    Post procedure echo requirements per TV[employer] registry    -1 month echo to be done 23-75 days post implant  -1 year echo to be done 305-425 days post implant   - Annually after implant    D/w Dr. Goetz    I personally spent 55 minutes with this patient, of which >50% of time was spent counseling and coordination of care            Pertinent Physical Exam At Time of Discharge  Physical Exam  Constitutional: Well developed, awake/alert/oriented x3, no  "distress,  cooperative  Eyes: PERRL, EOMI, clear sclera  ENMT: mucous membranes moist, no apparent injury, no lesions seen  Head/Neck: Neck supple, no apparent injury, thyroid without mass or tenderness, No JVD, trachea midline, no bruits  Respiratory/Thorax: Patent airways,  good chest expansion, thorax symmetric, fine bibasilar crackles  Cardiovascular: Regular rate and rhythm, no murmurs, normal S 1and S 2  Gastrointestinal: Nondistended, soft, non-tender, no rebound tenderness or guarding, no masses palpable, no organomegaly, +BS, no bruits  Extremities: normal extremities, no cyanosis,  bilat groins c/d/i with dsd no s/s of hematoma  Neurological: alert and oriented x3, intact senses, motor, response and reflexes, normal strength  Psychological: Appropriate mood and behavior   Skin: Warm and dry, intact   Home Medications     Medication List      Continue taking these medications     FreeStyle José Luis 2 [place] misc; Generic drug: flash glucose scanning   reader; Use to check blood sugars at least every 8 hours   FreeStyle José Luis 2 Sensor kit; Generic drug: flash glucose sensor kit;   Change sensor every 14 days. Check blood sugar at least every 8 hours   FreeStyle José Luis 3 Sensor device; Generic drug: blood-glucose sensor;   Change sensor every 14 days   pen needle, diabetic 32 gauge x 5/32\" needle; Commonly known as: BD Agueda   2nd Gen Pen Needle; USE TO INJECT INSULIN 5 TIMES PER DAY *Please schedule   follow up for further refills*     Ask your doctor about these medications     acetaminophen 500 mg tablet; Commonly known as: Tylenol   aspirin 81 mg EC tablet   atorvastatin 80 mg tablet; Commonly known as: Lipitor; Take 1 tablet (80   mg) by mouth once daily.   carvedilol 25 mg tablet; Commonly known as: Coreg   Daily Multi-Vitamin tablet; Generic drug: multivitamin   EPOETIN DEIRDRE INJ   famotidine 10 mg tablet; Commonly known as: Pepcid   insulin glargine 100 unit/mL (3 mL) pen; Commonly known as: Lantus; "   Inject 10 units twice a day   insulin lispro 100 unit/mL injection; Commonly known as: HumaLOG KwikPen   Insulin; Inject before every meal using carb ratio and correction scale.   MDD 50 units.   melatonin 10 mg tablet; Take 1 tablet (10 mg) by mouth once daily in the   evening.   Renal Caps 1 mg capsule; Generic drug: vitamin B complex-vitamin C-folic   acid; Take 1 capsule by mouth once daily.   sevelamer carbonate 800 mg tablet; Commonly known as: Renvela; Take 3   tablets (2,400 mg) by mouth 3 times a day before meals. Swallow tablet   whole; do not crush, break, or chew.       Outpatient Follow-Up  Future Appointments   Date Time Provider Department Center   12/9/2024  4:00 PM Laureen Perry DPM [id number] [facility]   1/3/2025 11:30 AM  TRE [id number] [id number] [facility]   2/13/2025  4:20 PM Ten Lance DO [id number] [facility]   2/18/2025 10:45 AM Lore Hull MD [id number] [facility]   12/4/2025 10:30 AM  TRE [id number] CARD1 [id number] [facility]       Michael Love, APRN-CNP

## 2024-12-05 ENCOUNTER — PATIENT OUTREACH (OUTPATIENT)
Dept: PRIMARY CARE | Facility: CLINIC | Age: 54
End: 2024-12-05

## 2024-12-05 ENCOUNTER — APPOINTMENT (OUTPATIENT)
Dept: OPHTHALMOLOGY | Facility: CLINIC | Age: 54
End: 2024-12-05
Payer: COMMERCIAL

## 2024-12-05 LAB
BLOOD EXPIRATION DATE: NORMAL
DISPENSE STATUS: NORMAL
PRODUCT BLOOD TYPE: 600
PRODUCT CODE: NORMAL
UNIT ABO: NORMAL
UNIT NUMBER: NORMAL
UNIT RH: NORMAL
UNIT VOLUME: 275
UNIT VOLUME: 350
XM INTEP: NORMAL

## 2024-12-05 NOTE — PROGRESS NOTES
Discharge Facility:[facility]  Discharge Diagnosis:Non Rheumatic aortic stenosis    S/p TAVR  Admission Date:12/03/24  Discharge Date: 12/04/24    PCP Appointment Date:none made sent to pcp office  Specialist Appointment Date:   Hospital Encounter and Summary Linked: No  See discharge assessment below for further details  Two attempts were made to reach patient within two business days after discharge. Voicemail left with contact information for patient to call back with any non-emergent questions or concerns.

## 2024-12-09 ENCOUNTER — APPOINTMENT (OUTPATIENT)
Dept: PODIATRY | Facility: CLINIC | Age: 54
End: 2024-12-09
Payer: COMMERCIAL

## 2024-12-09 LAB
ATRIAL RATE: 77 BPM
P AXIS: 58 DEGREES
P OFFSET: 173 MS
P ONSET: 88 MS
PR INTERVAL: 238 MS
Q ONSET: 207 MS
QRS COUNT: 13 BEATS
QRS DURATION: 132 MS
QT INTERVAL: 462 MS
QTC CALCULATION(BAZETT): 522 MS
QTC FREDERICIA: 502 MS
R AXIS: 15 DEGREES
T AXIS: 144 DEGREES
T OFFSET: 438 MS
VENTRICULAR RATE: 77 BPM

## 2024-12-09 NOTE — ADDENDUM NOTE
Addendum  created 12/09/24 0737 by Mason Everett    Attestation recorded in Intraprocedure (Perfusion), Intraprocedure Attestations filed (Perfusion)

## 2024-12-12 LAB — EJECTION FRACTION: 48 %

## 2024-12-16 ENCOUNTER — PATIENT OUTREACH (OUTPATIENT)
Dept: PRIMARY CARE | Facility: CLINIC | Age: 54
End: 2024-12-16
Payer: COMMERCIAL

## 2024-12-17 LAB
ATRIAL RATE: 71 BPM
ATRIAL RATE: 73 BPM
P AXIS: 39 DEGREES
P AXIS: 67 DEGREES
P OFFSET: 169 MS
P OFFSET: 174 MS
P ONSET: 109 MS
P ONSET: 86 MS
PR INTERVAL: 208 MS
PR INTERVAL: 250 MS
Q ONSET: 211 MS
Q ONSET: 213 MS
QRS COUNT: 12 BEATS
QRS COUNT: 12 BEATS
QRS DURATION: 136 MS
QRS DURATION: 192 MS
QT INTERVAL: 482 MS
QT INTERVAL: 522 MS
QTC CALCULATION(BAZETT): 531 MS
QTC CALCULATION(BAZETT): 567 MS
QTC FREDERICIA: 514 MS
QTC FREDERICIA: 552 MS
R AXIS: -50 DEGREES
R AXIS: 8 DEGREES
T AXIS: 116 DEGREES
T AXIS: 169 DEGREES
T OFFSET: 454 MS
T OFFSET: 472 MS
VENTRICULAR RATE: 71 BPM
VENTRICULAR RATE: 73 BPM

## 2024-12-30 ENCOUNTER — PATIENT OUTREACH (OUTPATIENT)
Dept: PRIMARY CARE | Facility: CLINIC | Age: 54
End: 2024-12-30
Payer: COMMERCIAL

## 2025-01-27 ENCOUNTER — TELEPHONE (OUTPATIENT)
Dept: CARDIOLOGY | Facility: HOSPITAL | Age: 55
End: 2025-01-27
Payer: COMMERCIAL

## 2025-01-27 DIAGNOSIS — Z79.4 TYPE 2 DIABETES MELLITUS WITH OTHER CIRCULATORY COMPLICATION, WITH LONG-TERM CURRENT USE OF INSULIN: ICD-10-CM

## 2025-01-27 DIAGNOSIS — E11.59 TYPE 2 DIABETES MELLITUS WITH OTHER CIRCULATORY COMPLICATION, WITH LONG-TERM CURRENT USE OF INSULIN: ICD-10-CM

## 2025-01-27 RX ORDER — PEN NEEDLE, DIABETIC 30 GX3/16"
NEEDLE, DISPOSABLE MISCELLANEOUS
Qty: 450 EACH | Refills: 0 | Status: CANCELLED | OUTPATIENT
Start: 2025-01-27

## 2025-01-29 DIAGNOSIS — E11.59 TYPE 2 DIABETES MELLITUS WITH OTHER CIRCULATORY COMPLICATION, WITH LONG-TERM CURRENT USE OF INSULIN: ICD-10-CM

## 2025-01-29 DIAGNOSIS — Z79.4 TYPE 2 DIABETES MELLITUS WITH OTHER CIRCULATORY COMPLICATION, WITH LONG-TERM CURRENT USE OF INSULIN: ICD-10-CM

## 2025-01-29 RX ORDER — BLOOD SUGAR DIAGNOSTIC
STRIP MISCELLANEOUS
Qty: 500 EACH | Refills: 0 | Status: SHIPPED | OUTPATIENT
Start: 2025-01-29

## 2025-02-04 RX ORDER — INSULIN GLARGINE 100 [IU]/ML
INJECTION, SOLUTION SUBCUTANEOUS
Qty: 15 ML | Refills: 0 | Status: SHIPPED | OUTPATIENT
Start: 2025-02-04

## 2025-02-04 RX ORDER — INSULIN GLARGINE-YFGN 100 [IU]/ML
10 INJECTION, SOLUTION SUBCUTANEOUS 2 TIMES DAILY
Qty: 15 ML | Refills: 0 | OUTPATIENT
Start: 2025-02-04

## 2025-02-13 ENCOUNTER — OFFICE VISIT (OUTPATIENT)
Dept: CARDIOLOGY | Facility: HOSPITAL | Age: 55
End: 2025-02-13
Payer: COMMERCIAL

## 2025-02-13 VITALS
DIASTOLIC BLOOD PRESSURE: 89 MMHG | HEART RATE: 82 BPM | SYSTOLIC BLOOD PRESSURE: 168 MMHG | HEIGHT: 76 IN | BODY MASS INDEX: 20.19 KG/M2 | WEIGHT: 165.8 LBS | OXYGEN SATURATION: 94 %

## 2025-02-13 DIAGNOSIS — I10 ESSENTIAL HYPERTENSION: ICD-10-CM

## 2025-02-13 DIAGNOSIS — Z95.2 S/P TAVR (TRANSCATHETER AORTIC VALVE REPLACEMENT): ICD-10-CM

## 2025-02-13 DIAGNOSIS — I50.20 ACC/AHA STAGE C SYSTOLIC HEART FAILURE: Primary | ICD-10-CM

## 2025-02-13 DIAGNOSIS — I25.5 ISCHEMIC CARDIOMYOPATHY: ICD-10-CM

## 2025-02-13 PROCEDURE — 3079F DIAST BP 80-89 MM HG: CPT | Performed by: INTERNAL MEDICINE

## 2025-02-13 PROCEDURE — 3008F BODY MASS INDEX DOCD: CPT | Performed by: INTERNAL MEDICINE

## 2025-02-13 PROCEDURE — 1036F TOBACCO NON-USER: CPT | Performed by: INTERNAL MEDICINE

## 2025-02-13 PROCEDURE — 3077F SYST BP >= 140 MM HG: CPT | Performed by: INTERNAL MEDICINE

## 2025-02-13 PROCEDURE — 99214 OFFICE O/P EST MOD 30 MIN: CPT | Performed by: INTERNAL MEDICINE

## 2025-02-13 ASSESSMENT — PAIN SCALES - GENERAL: PAINLEVEL_OUTOF10: 0-NO PAIN

## 2025-02-13 NOTE — PATIENT INSTRUCTIONS
It was a pleasure seeing you today. Please contact myself or my team with any questions.     To reach Dr. Lance' office please call [phone] (Anna).   Fax: [phone]   To schedule an appointment call [phone]     If you have any questions or need cardiac medication refills, please call the Heart Failure office at [phone], option 6. You may also contact the  Heart Failure Nursing team via email at [email or web address] (Please include your name and date of birth).        1) Stop amlodipine  2) Start entresto 24/26 mg twice a day  3) Report your blood pressure 2 weeks   4) Follow up in 6 months at /[facility]

## 2025-02-13 NOTE — PROGRESS NOTES
[facility] Advanced Heart Failure Clinic  Primary Care Physician: German Pfeiffer DO  Referring Provider/Cardiologist: Larry     Date of Visit: 02/13/2025  4:20 PM EST  Location of visit: [facility]     HPI:   Mr. Carreon is a 54M with a PMHx sig for CAD s/p PCI (LAD; 6/2017), stage C systolic HF/ICM/HFrEF, aortic stenosis s/p TAVR (#34 Evolut; 12/5/24), HTN, tachycardia s/p ablation, DM and discoid lupus/SLE with ESRD on home HD (M-T-TH-F via RUE AVF since 12/2016) who returns to the Advanced Heart Failure clinic for ongoing evaluation and management.     Interval Hx:   He underwent TAVR in December. He notes significantly improved exercise tolerance. In fact, he was able to walk to clinic today without stopping which had not previously been able to do.     At the current time he denies chest pain, pressure, SOB/GILBERT, PND, orthopnea, LE edema, palpitations, light headedness, dizziness, or syncope.     Hospitalizations: 12/3/24 TAVR; 11/13-11/16/24 SOB      Social History     Tobacco Use    Smoking status: Former     Types: Cigarettes     Passive exposure: Current    Smokeless tobacco: Never   Substance Use Topics    Alcohol use: Yes    Drug use: Never       Family History   Problem Relation Name Age of Onset    Heart failure Mother      Heart attack Father           Current Outpatient Medications   Medication Sig Dispense Refill    acetaminophen (Tylenol) 500 mg tablet Take 1 tablet (500 mg) by mouth every 6 hours if needed for mild pain (1 - 3).      aspirin 81 mg EC tablet Take 1 tablet (81 mg) by mouth once daily.      atorvastatin (Lipitor) 80 mg tablet Take 1 tablet (80 mg) by mouth once daily. 90 tablet 3    B complex-vitamin C-folic acid (Nephrocaps) 1 mg capsule Take 1 capsule by mouth once daily. 90 capsule 3    blood-glucose sensor (FreeStyle José Luis 3 Sensor) device Change sensor every 14 days 2 each 5    carvedilol (Coreg) 25 mg tablet Take 0.5 tablets (12.5 mg) by mouth 2 times a  "day.      EPOETIN DEIRDRE INJ Inject 8,000 Units under the skin 1 (one) time per week.      famotidine (Pepcid) 10 mg tablet Take 1 tablet (10 mg) by mouth as needed at bedtime for heartburn.      FreeStyle José Luis reader (FreeStyle José Luis 2 [place]) misc Use to check blood sugars at least every 8 hours 1 each 0    FreeStyle José Luis sensor system (FreeStyle José Luis 2 Sensor) kit Change sensor every 14 days. Check blood sugar at least every 8 hours 6 each 3    insulin glargine (Lantus) 100 unit/mL (3 mL) pen Inject 15 units once a day at bedtime *No further refills can be provided until seen for follow up* 15 mL 0    insulin lispro (HumaLOG KwikPen Insulin) 100 unit/mL injection Inject before every meal using carb ratio and correction scale. MDD 50 units. 15 mL 5    melatonin 10 mg tablet Take 1 tablet (10 mg) by mouth once daily in the evening. 90 tablet 3    multivitamin (Daily Multi-Vitamin) tablet Take 1 tablet by mouth once daily.      pen needle, diabetic (BD Ultra-Fine Micro Pen Needle) 32 gauge x 1/4\" needle USE TO INJECT INSULIN 5 TIMES PER DAY *Need follow up appointment for further refills* 500 each 0    sevelamer carbonate (Renvela) 800 mg tablet Take 3 tablets (2,400 mg) by mouth 3 times a day before meals. Swallow tablet whole; do not crush, break, or chew. 63 tablet 0     No current facility-administered medications for this visit.       Allergies   Allergen Reactions    Iodinated Contrast Media Anaphylaxis    Ampicillin-Sulbactam Other     Renal Failure. AIN (INSTERSTITIAL NEPHRITIS))         Visit Vitals  /89 (BP Location: Left arm, Patient Position: Sitting)   Pulse 82   Ht 1.93 m (6' 4\")   Wt 75.2 kg (165 lb 12.8 oz)   SpO2 94%   BMI 20.18 kg/m²   Smoking Status Former   BSA 2.01 m²        Physical Exam:  On exam Mr. Carreon appears his stated age, is alert and oriented x3, and in no acute distress. His sclera are anicteric and his oropharynx has moist mucous membranes. His neck is supple and without " thyromegaly. The JVP is ~6 cm of water above the right atrium. His cardiac exam has regular rhythm, normal S1, S2. No S3/4. There is a II/VI ALFA at the base His lungs are clear to auscultation bilaterally and there is no dullness to percussion. His abdomen is soft, nontender with normoactive bowel sounds. There is no HJR. The extremities are warm and without edema. There is a RUE AVF present. The skin is dry. There is no rash present. The distal pulses are 2+ in all four extremities. His mood and affect are appropriate for todays encounter.       Cardiac Labs/Diagnostics:    Lab Results   Component Value Date    CREATININE 4.50 (H) 12/04/2024    BUN 36 (H) 12/04/2024     (L) 12/04/2024    K 4.3 12/04/2024    CL 95 (L) 12/04/2024    CO2 24 12/04/2024        Recent Labs     03/10/22  0702   CHOL 102   LDLF 46   HDL 33.4*   TRIG 112       Recent Labs     11/13/24 2022 05/31/24  2200 03/25/24  2108 03/16/23  0153 03/05/23  0652   BNP >5,000* >4,700* >4,700* 2,657* 2,168*       Echo (12/4/24):  1. Left ventricular cavity size is moderate to severely dilated.  2. The left ventricular systolic function is severely decreased, with a Gaspar's biplane calculated ejection fraction of 27%.  3. There is global hypokinesis of the left ventricle with minor regional variations.  4. Abnormal septal motion consistent with post-operative status.  5. Spectral Doppler shows a Grade II (pseudonormal pattern) of left ventricular diastolic filling with an elevated left atrial pressure.  6. Mildly enlarged right ventricle.  7. There is mild to moderately reduced right ventricular systolic function.  8. The left atrium is moderately dilated.  9. The right atrium is moderately dilated.  10. There is Evolut FX transcatheter aortic valve bioprosthesis with a 34 reported size.  11. Mild to moderate tricuspid regurgitation visualized.  12. Moderately elevated pulmonary artery pressure.  13. The inferior vena cava appears mildly dilated,  with IVC inspiratory collapse less than 50%.    Echo (8/1/24):  1. Left ventricular ejection fraction is moderately decreased, calculated by Gaspar's biplane at 38%.  2. Multiple segmental abnormalities exist. See findings.  3. Spectral Doppler shows a pseudonormal pattern of left ventricular diastolic filling.  4. There is an elevated mean left atrial pressure.  5. Left ventricular cavity size is severely dilated.  6. There is mildly reduced right ventricular systolic function.  7. Mildly enlarged right ventricle.  8. Moderate to severe aortic valve stenosis.  9. Mild aortic valve regurgitation.  10. Stroke volume index is decreased (33 ml/m2).  11. The aortic valve appears bicuspid with a thickened median raphe with fusion between the right and non coronary cusps.  12. There is moderate aortic valve cusp calcification.  13. There is moderate to severe aortic valve thickening.  14. Slightly elevated RVSP.  15. The left atrium is severely dilated.  16. The right atrium is mild to moderately dilated.  17. Compared with study dated 6/8/2024, there is no change in LVEF. The regional wall motion abnormalities were better visualized today. The LV was already moderate to severely dilated in the prior echocardiogram (98 ml/m2). The aortic valve gradients were not assessed in the limited study from June.    Echo (6/8/24):  1. Left ventricular systolic function is mildly to moderately decreased with a 35-40% estimated ejection fraction.  2. Left Ventricular Global Longitudinal Strain - 8.7 % with more pronounced abnormalities involving the basal and mid segments with preservation of the apex more consistent with a possible infiltrative cardiomyopathy.  3. There is global hypokinesis of the left ventricle with minor regional variations.  4. Spectral Doppler shows an impaired relaxation pattern of left ventricular diastolic filling.  5. The left atrium is moderately dilated.  6. The right atrium is mild to moderately  dilated.  7. Moderate tricuspid regurgitation visualized.  8. Compared with study from 6/5/2024, there is an improvement in calculated LVEF.    Cardiac cath (6/7/24):  1. Native coronaries: Critical distal left main disease, proximal LCx , proximal LAD , and moderate RCA disease in co-dominant system.  2. Bypasses: Patent LIMA to LAD which fills back the whole LAD system including both diagonals, patent SVG to OM1 which fills back the whole codominant LCx system, and patent SVG to right PDA.    Echo (3/6/23):  1. Left ventricular systolic function is mildly decreased with a 40-45% estimated ejection fraction.  2. Abnormal septal motion consistent with post-operative status.  3. Spectral Doppler shows a pseudonormal pattern of left ventricular diastolic filling.  4. The left atrium is severely dilated.  5. The right atrium is severely dilated.  6. Mild to moderate mitral valve regurgitation.  7. Mild to moderate tricuspid regurgitation visualized.  8. Moderate aortic valve stenosis.  9. Moderately elevated pulmonary artery pressure, estimated RVSP 52mmHg.    Cardiac cath (3/10/22):  1. Significant left main coronary artery disease and double vessel coronary artery disease with proximal left anterior descending involvement.  2. Left Ventricular end-diastolic pressure = 28.    Echo (3/8/22):  1. The left ventricular systolic function is moderately decreased with a 35-40% estimated ejection fraction.  2. There is moderate to severe eccentric left ventricular hypertrophy.  3. Mild to moderate aortic valve stenosis.  4. There is moderate aortic valve cusp calcification.  5. The left ventricular cavity size is moderate to severely dilated.  6. There is global hypokinesis of the left ventricle with minor regional variations.    Nuclear stress (2/8/21):  1. Normal myocardial perfusion study without evidence of ischemia or prior infarction.  2. The left ventricle is severely dilated in size.  3. Globally reduced LV wall  motion with an LV EF estimated at 40-46%.  4. Since 1/3/2019, ejection fraction appears improved and the LV is less dilated (205-206 ml on today's exam, vs 261 ml on 1/3/2019). There still remains no definitive signs of ischemia or prior infarct.    Echo (2/4/21):  1. The left ventricular systolic function is normal with a 55-60% estimated ejection fraction.  2. Spectral Doppler shows an impaired relaxation pattern of left ventricular diastolic filling.  3. There is moderate concentric left ventricular hypertrophy.  4. Mild aortic valve stenosis.    Echo (2/3/20):  1. The left ventricular systolic function is low normal with a 50-55% estimated ejection fraction.  2. Poorly visualized anatomical structures due to suboptimal image quality.  3. Limited and incomplete echo by cardiology fellow.  4. Recommend complete study by echo lab.  5. LVH appears slightly worse on this exam, however maybe secondary to off axis window.    Nuclear stress (1/3/19):  1. Fixed decrease in counts involving the apex, thought to relate to myocardial thinning in the setting of left ventricular dilatation. The remainder of the left ventricle demonstrates normal perfusion without evidence of stress-induced ischemia or prior infarction.  2. Decreased left ventricular function with an ejection fraction of 34%.    ECG (12/13/18):  Sinus rhythm (HR 86), LVH with repol    Coronary angiography (6/20/17):  The coronary circulation is co-dominant.  LM: No CAD. The left main coronary artery showed very short, almost dual ostia.  LAD: Proximal 90%, mid 60% with small poststenotic aneurysmal segment inbetween, LADnwraps far around apex with only mild irregularities.  LCx: Codominant, mild irregularities.  RCA: The RCA showed codominant vessel but smaller caliber than the LCX. Mild-moderate diffuse disease with normal rPDA.  PCI:  1. EBU 3.75 was selective in the LCX, EBU 3.5 was better suited. LAD wired with some difficulty from the aorta, and a second  wire placed into D1. The LAD was predilated with 3.0NC and stented with a 3.0x38mm RESOLUTE ALEXEY, postdil 3.0NC distally, 3.5NC proximally, and a 3.75NC was used prox-mid to appose the stent in the poststenotic segment.    ECG (5/18/17):  Sinus rhythm (HR 77), LVH, possible septal infarct    Echo (11/1/[facility] at [facility])  Normal LV function (LVEF 65%), normal RV size/function. No hemodynamically significant valvular disease.       Impression/Plan:  Mr. Carreon is a 54M with a PMHx sig for CAD s/p PCI (LAD; 6/2017), stage C systolic HF/ICM/HFrEF, aortic stenosis s/p TAVR (#34 Evolut; 12/5/24), HTN, tachycardia s/p ablation, DM and discoid lupus/SLE with ESRD on home HD (M-T-TH-F via RUE AVF since 12/2016) who returns to the Advanced Heart Failure clinic for ongoing evaluation and management. At the current time he has functional class II symptoms and appears euvolemic on exam.     1) CAD s/p PCI to LAD (6/2017) s/p 3V CABG (LIMA to LAD, SVG to OM, SVG to PDA; 3/16/22)   Lipids (3/10/22): tChol 102, HDL 33, LDL 46, Trigs 112  Denies angina. Recent cath (6/2024) with patent grafts.   -c/w ASA 81 mg daily,  atorvastatin 80 mg daily, beta blocker    2) Stage C systolic HF/ICM/HFrEF with severe LV dysfunction (LVEF 27%; 12/2024)  Defer MRA and SGLT2 given ESRD. ARNi stopped during his hospital stay due to low BP; BP has since improved, so will resume.   -c/w carvedilol 25 mg bid  -resume entresto 24/26 mg bid  -will monitor EF; if no improvement post-TAVR, will discuss ICD    3) HTN  -as per #2    4) Aortic stenosis s/p TAVR (#34 Evolut; 12/5/24)  Stable on post-procedure echo.   -f/u with Structural    5) PAD (carotid stenosis)  <50% stenosis bilaterally (11/8/24)  -c/w ASA/statin/BB    6) SVT s/p RFA x2 (most recently CTI RFA; 8/2024)  He completed 3 months of DOAC therapy.       F/U: 6 months at /Juan 1800      ____________________________________________________________  Ten Lance DO  Section of Advanced Heart  Failure and Cardiac Transplantation  Division of Cardiovascular Medicine  [facility]  [facility]

## 2025-02-18 ENCOUNTER — APPOINTMENT (OUTPATIENT)
Dept: OPHTHALMOLOGY | Facility: CLINIC | Age: 55
End: 2025-02-18
Payer: COMMERCIAL

## 2025-02-18 DIAGNOSIS — H52.13 MYOPIA, BILATERAL: ICD-10-CM

## 2025-02-18 DIAGNOSIS — H52.4 PRESBYOPIA: ICD-10-CM

## 2025-02-18 DIAGNOSIS — H25.813 COMBINED FORMS OF AGE-RELATED CATARACT OF BOTH EYES: ICD-10-CM

## 2025-02-18 DIAGNOSIS — Z79.4 TYPE 2 DIABETES MELLITUS WITHOUT COMPLICATION, WITH LONG-TERM CURRENT USE OF INSULIN (MULTI): Primary | ICD-10-CM

## 2025-02-18 DIAGNOSIS — H52.223 REGULAR ASTIGMATISM OF BOTH EYES: ICD-10-CM

## 2025-02-18 DIAGNOSIS — H31.093: ICD-10-CM

## 2025-02-18 DIAGNOSIS — E11.9 TYPE 2 DIABETES MELLITUS WITHOUT COMPLICATION, WITH LONG-TERM CURRENT USE OF INSULIN (MULTI): Primary | ICD-10-CM

## 2025-02-18 PROCEDURE — 92015 DETERMINE REFRACTIVE STATE: CPT | Performed by: OPHTHALMOLOGY

## 2025-02-18 PROCEDURE — 92014 COMPRE OPH EXAM EST PT 1/>: CPT | Performed by: OPHTHALMOLOGY

## 2025-02-18 ASSESSMENT — CUP TO DISC RATIO
OD_RATIO: 0.2
OS_RATIO: 0.2

## 2025-02-18 ASSESSMENT — EXTERNAL EXAM - RIGHT EYE: OD_EXAM: NORMAL

## 2025-02-18 ASSESSMENT — VISUAL ACUITY
METHOD: SNELLEN - LINEAR
OD_CC: 20/25
OS_CC+: -2
OS_CC: 20/25

## 2025-02-18 ASSESSMENT — REFRACTION_WEARINGRX
OD_SPHERE: -1.75
OS_CYLINDER: -0.50
SPECS_TYPE: SINGLE LENS
OD_AXIS: 080
OS_AXIS: 095
OD_CYLINDER: -1.75
OS_SPHERE: -2.75

## 2025-02-18 ASSESSMENT — TONOMETRY
IOP_METHOD: GOLDMANN APPLANATION
OD_IOP_MMHG: 18
OS_IOP_MMHG: 16

## 2025-02-18 ASSESSMENT — SLIT LAMP EXAM - LIDS
COMMENTS: NORMAL
COMMENTS: NORMAL

## 2025-02-18 ASSESSMENT — REFRACTION_MANIFEST
OS_SPHERE: -2.25
OD_ADD: +2.25
OD_AXIS: 080
OD_CYLINDER: -2.00
OS_ADD: +2.25
OD_SPHERE: -1.75
OS_AXIS: 095
OS_CYLINDER: -1.00

## 2025-02-18 ASSESSMENT — EXTERNAL EXAM - LEFT EYE: OS_EXAM: NORMAL

## 2025-02-18 ASSESSMENT — ENCOUNTER SYMPTOMS: EYES NEGATIVE: 1

## 2025-02-18 NOTE — PROGRESS NOTES
Assessment/Plan   Diagnoses and all orders for this visit:  Type 2 diabetes mellitus without complication, with long-term current use of insulin (Multi)  -no evidence of diabetic retinopathy at the present time  -pt was advised of the importance of good diabetes control and the importance of a yearly dilated diabetic exam    Combined forms of age-related cataract of both eyes  Not visually significant at the present time  continue to monitor    Peripheral retinal scars, bilateral  continue to monitor  -status post (s/p) laser treatments both eyes for retinal holes about 10 yrs ago  -pt was advised to call stat if experiencing increased floaters, flashes, curtains in front of eyes, decreased vision, blurry vision    Myopia, bilateral  Regular astigmatism of both eyes  Presbyopia  Refractive error  -give Rx for new glasses    Return for a dilated exam in    12 months or sooner if having any problems

## 2025-02-21 DIAGNOSIS — E11.59 TYPE 2 DIABETES MELLITUS WITH OTHER CIRCULATORY COMPLICATION, WITH LONG-TERM CURRENT USE OF INSULIN: ICD-10-CM

## 2025-02-21 DIAGNOSIS — Z79.4 TYPE 2 DIABETES MELLITUS WITH OTHER CIRCULATORY COMPLICATION, WITH LONG-TERM CURRENT USE OF INSULIN: ICD-10-CM

## 2025-02-24 DIAGNOSIS — Z79.4 TYPE 2 DIABETES MELLITUS WITH OTHER CIRCULATORY COMPLICATION, WITH LONG-TERM CURRENT USE OF INSULIN: ICD-10-CM

## 2025-02-24 DIAGNOSIS — E11.59 TYPE 2 DIABETES MELLITUS WITH OTHER CIRCULATORY COMPLICATION, WITH LONG-TERM CURRENT USE OF INSULIN: ICD-10-CM

## 2025-02-24 RX ORDER — FLASH GLUCOSE SENSOR
KIT MISCELLANEOUS
Qty: 6 EACH | Refills: 0 | OUTPATIENT
Start: 2025-02-24

## 2025-02-24 RX ORDER — FLASH GLUCOSE SENSOR
KIT MISCELLANEOUS
Qty: 6 EACH | Refills: 0 | Status: SHIPPED | OUTPATIENT
Start: 2025-02-24

## 2025-03-27 DIAGNOSIS — Z95.1 S/P CABG X 3: ICD-10-CM

## 2025-03-27 DIAGNOSIS — I25.118 CORONARY ARTERY DISEASE WITH OTHER FORM OF ANGINA PECTORIS, UNSPECIFIED VESSEL OR LESION TYPE, UNSPECIFIED WHETHER NATIVE OR TRANSPLANTED HEART: Primary | ICD-10-CM

## 2025-03-27 RX ORDER — ATORVASTATIN CALCIUM 80 MG/1
80 TABLET, FILM COATED ORAL DAILY
Qty: 90 TABLET | Refills: 3 | Status: SHIPPED | OUTPATIENT
Start: 2025-03-27

## 2025-03-27 NOTE — PROGRESS NOTES
Chief Complaint      HPI:   Kentrell Carreon 54M PMHx of ESRD on Dialysis, heart failure with reduced ejection fraction for which I performed a 3v CABG in 2022.  He has done well sine the CABG but now has severe aortic stenosis and is being evaluated by the structural heart team for his aortic valve disease.  He was quite upset that he had not been referred back to me for consultation and so comes in today for evaluation and recommendations on how best to proceed with management of his aortic valve.     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    CARDIAC CATHETERIZATION N/A 12/3/2024    Procedure: TAVR (Transcatheter AV Replacement);  Surgeon: Jasmeet Goetz MD;  Location: [place] [address] Cardiac Cath Lab;  Service: Cardiovascular;  Laterality: N/A;  Right Innominate access TAVR, General anesthesia,OR team, 1st assist.Transfuse same day prior, Arrive at 7;30am, Medtronic Evolut FX+34, Hybrid cath lab    CARDIAC CATHETERIZATION N/A 12/3/2024    Procedure: TVP for TAVR;  Surgeon: Jasmeet Goetz MD;  Location: [place] [address] Cardiac Cath Lab;  Service: Cardiovascular;   Laterality: N/A;    CARDIAC ELECTROPHYSIOLOGY PROCEDURE N/A 8/5/2024    Procedure: Ablation SVT;  Surgeon: Nik Vizcarra MD;  Location: [place] [address] Cardiac Cath Lab;  Service: Electrophysiology;  Laterality: N/A;    COLONOSCOPY      CORONARY ANGIOPLASTY WITH STENT PLACEMENT  06/20/2017    LAD ALEXEY    CORONARY ARTERY BYPASS GRAFT  03/26/2022    HENDERSON to LAD, saphenous vein graft to the obtuse marginal, and saphenous vein graft to PDA.    LUNG SURGERY Left 05/26/2022    Emergent VATS evacuation of hemothorax    TOE AMPUTATION         Family History   Problem Relation Name Age of Onset    Heart failure Mother      Heart attack Father         Social History     Socioeconomic History    Marital status:      Spouse name: Not on file    Number of children: Not on file    Years of education: Not on file    Highest education level: Not on file   Occupational History    Not on file   Tobacco Use    Smoking status: Former     Types: Cigarettes     Passive exposure: Current    Smokeless tobacco: Never   Substance and Sexual Activity    Alcohol use: Yes    Drug use: Never    Sexual activity: Not on file   Other Topics Concern    Not on file   Social History Narrative    Not on file     Social Drivers of Health     Financial Resource Strain: Low Risk  (12/4/2024)    Overall Financial Resource Strain (CARDIA)     Difficulty of Paying Living Expenses: Not very hard   Recent Concern: Financial Resource Strain - Medium Risk (12/4/2024)    Overall Financial Resource Strain (CARDIA)     Difficulty of Paying Living Expenses: Somewhat hard   Food Insecurity: No Food Insecurity (12/4/2024)    Hunger Vital Sign     Worried About Running Out of Food in the Last Year: Never true     Ran Out of Food in the Last Year: Never true   Transportation Needs: No Transportation Needs (12/4/2024)    PRAPARE - Transportation     Lack of Transportation (Medical): No     Lack of Transportation (Non-Medical): No   Physical Activity: Not on file    Stress: Not on file   Social Connections: Low Risk  (6/28/2024)    [facility]    Social Connections     How often do you see or talk to people that you care about and feel close to? (For example: talking to friends on the phone, visiting friends or family, going to [employer] or club meetings): 5 or more times a week   Intimate Partner Violence: Not At Risk (12/4/2024)    Humiliation, Afraid, Rape, and Kick questionnaire     Fear of Current or Ex-Partner: No     Emotionally Abused: No     Physically Abused: No     Sexually Abused: No   Housing Stability: Low Risk  (12/4/2024)    Housing Stability Vital Sign     Unable to Pay for Housing in the Last Year: No     Number of Times Moved in the Last Year: 0     Homeless in the Last Year: No       Allergies   Allergen Reactions    Iodinated Contrast Media Anaphylaxis    Ampicillin-Sulbactam Other     Renal Failure. AIN (INSTERSTITIAL NEPHRITIS))       Outpatient Encounter Medications as of 10/23/2024   Medication Sig Dispense Refill    aspirin 81 mg EC tablet Take 1 tablet (81 mg) by mouth once daily.      atorvastatin (Lipitor) 80 mg tablet Take 1 tablet (80 mg) by mouth once daily. 90 tablet 3    B complex-vitamin C-folic acid (Nephrocaps) 1 mg capsule Take 1 capsule by mouth once daily. 90 capsule 3    blood-glucose sensor (FreeStyle José Luis 3 Sensor) device Change sensor every 14 days 2 each 5    EPOETIN DEIRDRE INJ Inject 8,000 Units under the skin 1 (one) time per week.      FreeStyle José Luis reader (FreeStyle José Luis 2 [place]) misc Use to check blood sugars at least every 8 hours 1 each 0    multivitamin (Daily Multi-Vitamin) tablet Take 1 tablet by mouth once daily.      [DISCONTINUED] acetaminophen (Tylenol) 325 mg tablet Take 2 tablets (650 mg) by mouth every 6 hours if needed.      [DISCONTINUED] apixaban (Eliquis) 5 mg tablet Take 1 tablet (5 mg) by mouth every 12 hours. 180 tablet 3    [DISCONTINUED] diphenhydrAMINE  "(BENADryl) 50 mg capsule Take 1 capsule (50 mg) by mouth see administration instructions. TAKE THIS MEDICATION ONE BEFORE PROCEDURE  that requires IV contrast. 3 capsule 3    [DISCONTINUED] FreeStyle José Luis sensor system (FreeStyle José Luis 2 Sensor) kit Change sensor every 14 days. Check blood sugar at least every 8 hours 6 each 3    [DISCONTINUED] insulin glargine (Lantus) 100 unit/mL (3 mL) pen Inject 10 units twice a day (Patient taking differently: Inject 15 Units under the skin once daily.) 15 mL 5    [DISCONTINUED] insulin lispro (HumaLOG KwikPen Insulin) 100 unit/mL injection Inject before every meal using carb ratio and correction scale. MDD 50 units. 15 mL 5    [DISCONTINUED] pen needle, diabetic (BD Ultra-Fine [place] Pen Needle) 32 gauge x 5/32\" needle Use to inject insulin 5 times per day 450 each 1    [DISCONTINUED] predniSONE (Deltasone) 50 mg tablet Take 1 tablet (50 mg) by mouth see administration instructions. Please take this medication 13 hrs before procedure. Followed by 7 hrs before and 1 hr before 3 tablet 3    [DISCONTINUED] sevelamer carbonate (Renvela) 800 mg tablet Take 1 tablet (800 mg) by mouth 3 times daily (morning, midday, late afternoon).      [DISCONTINUED] SITagliptin phosphate (Januvia) 25 mg tablet Take 1 tablet (25 mg) by mouth once daily. 30 tablet 5    [DISCONTINUED] Velphoro 500 mg tablet,chewable chewable tablet Chew 2 tablets (1,000 mg) 3 times a day before meals.       No facility-administered encounter medications on file as of 10/23/2024.       Physical Exam   General: no acute distress  Cardiovascular: Regular rate and rhythm  Respiratory: symmetrical chest rise and fall, no increased work of breathing  Wound: incisions well healed  Extremities: no edema       Results: Cath demonstrates patent grafts.  Echo with severe aortic stenosis and reduced ejection fraction of 35%    Assessment and Plan:    Mr. Kentrell Carreon is a 54 y.o. male, who is here for evaluation and " recommendations for management of his aortic valve disease.      He has been offered a redo operation for his aortic valve with plan for a Ross procedure.  This was offered by Dr. Rutherford who believes this is a good option for him given the limited life of a bioprosthetic valve in a patient with ESRD.    This is a significantly high risk operation for him due to his reduced ejection fraction, patent grafts and the redo nature of the surgery.    I do feel that this is not an unreasonable option IF Mr Carreon would qualify for a renal transplant.  In order for him to have the mortality benefit associated with the Ross procedure, he would need to have a renal transplant.    If he is not a candidate for renal transplant, which he may not be given his severe peripheral vascular disease and heart failure, then I do not think that the risk of the ross operation is worth it for a patient whose life expectancy is less than 10 years.    Will refer Mr Carreon to the high risk renal transplant clinic for evaluation.   If he is not a candidate for transplant, then would recommend TAVR    I spent greater than 60 minutes in care coordination and counseling of the patient.       Shahla Stevens MD  03/27/25  12:59 PM

## 2025-05-12 ENCOUNTER — TELEPHONE (OUTPATIENT)
Dept: ENDOCRINOLOGY | Facility: CLINIC | Age: 55
End: 2025-05-12
Payer: COMMERCIAL

## 2025-05-12 NOTE — TELEPHONE ENCOUNTER
Due to vomiting, associated with sepsis and hypoxia.   O2, Ceftriaxone / azithro, NPO, NGT, RT protocol.     Pt called for appointment with Dr. Pelon Bird hasn't seen pt since 2023. I placed him on your schedule as a new patient. Appointment date and time is 05/16/2025 at 830 am.     Marti MCDANIEL

## 2025-05-15 NOTE — PROGRESS NOTES
FUV for diabetes. LV with Dr Bird 02/2024.    Subjective   Kentrell Carreon is a 54 y.o. male with a hx of type 2 diabetes mellitus, ESRD on HD, CHF, CAD s/p PCI (2017), s/p CABG (03/2022), HLD, osteomyeleitis, who presents for diabetes management.    Dx: 22 yo   HbA1c: 7.0% (2/22/2024), 8.3% (10/17/2023), 9.0% (07/202023), 9.8% (03/2023)  Current regimen: Lantus 15 units at bedtime, Humalog ICR 1: 15g (breakfast and lunch), 1:13 g (dinner) ISF 1:50 (>180). Januvia 25mg PO every day.  Past medications: oral agents at time of diagnosis.  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    Interval Hx:  Was hospitalized for TAVR discharged Dec 2024; done at [facility]. Since then has been doing well at home, feels better than in the last few years.  - Lantus 15 unist at bedtime not missing dosed  - Humalog ICR 1:15. (Not covering meals consistently, claims he does not know how much she will end up eating and is hesitant to take bolus, then he just forgets)    Discharge DM regimen:  Off Januvia since hospitalization, due to HF.    SMBG: José Luis CGM    <CGM>  Average blood glucose: 233mg/dl.    0 % of time worn spend below 70mg/dL.    24 % of time worn spent at target 70-180mg/dL.    76 % of time worn spent above 180mg/dL.   CGM data was used to influence glucose control treatment plan.    Minimum of 72 hours of data were reviewed.     Hypoglycemia:No episodes in last week  Hypoglycemia awareness: yes, 50s, sweating, lightheadedness     Diet: 3-4 meals per day but tends to graze.  Eating BF sandwiches from [employer].  Late Dinner at around 9pm.  Grazing in between.  Eating bedtime snack out of habit to prevent overnight lows. (Not covered with meal time insulin)    Family history: brother and both parents with type 2 diabetes  Social history: technician for [employer]      ROS  General: no  "fever or chills  CV: no chest pain   Respiratory: no shortness of breath  MSK: no lower extremity edema  Neuro: no headache or dizziness  See HPI for Endocrine ROS    Objective    Physical Exam  Blood pressure 114/59, pulse 67, temperature 36.6 °C (97.9 °F), temperature source Oral, height 1.93 m (6' 4\"), weight 74.2 kg (163 lb 8 oz).  General: not in acute distress  HEENT: RADHA, EOMI  Thyroid: no goiter  Neuro: alert and oriented x 3      Current Outpatient Medications:     acetaminophen (Tylenol) 500 mg tablet, Take 1 tablet (500 mg) by mouth every 6 hours if needed for mild pain (1 - 3)., Disp: , Rfl:     aspirin 81 mg EC tablet, Take 1 tablet (81 mg) by mouth once daily., Disp: , Rfl:     atorvastatin (Lipitor) 80 mg tablet, Take 1 tablet by mouth once daily, Disp: 90 tablet, Rfl: 3    B complex-vitamin C-folic acid (Nephrocaps) 1 mg capsule, Take 1 capsule by mouth once daily., Disp: 90 capsule, Rfl: 3    blood-glucose sensor (FreeStyle José Luis 3 Sensor) device, Change sensor every 14 days, Disp: 2 each, Rfl: 5    carvedilol (Coreg) 25 mg tablet, Take 0.5 tablets (12.5 mg) by mouth 2 times a day., Disp: , Rfl:     EPOETIN DEIRDRE INJ, Inject 8,000 Units under the skin 1 (one) time per week., Disp: , Rfl:     famotidine (Pepcid) 10 mg tablet, Take 1 tablet (10 mg) by mouth as needed at bedtime for heartburn., Disp: , Rfl:     flash glucose sensor kit (FreeStyle José Luis 2 Sensor) kit, Change sensor every 14 days. *Last visit more than 1 year ago. No further refills can be provided until seen for a visit**, Disp: 6 each, Rfl: 0    FreeStyle José Luis 3 Roy misc, Use as instructed, Disp: 1 each, Rfl: 0    HumaLOG KwikPen Insulin 100 unit/mL pen, INJECT BEFORE EVERY MEAL USING CARB RATIO AND CORRECTION SCALE. MDD 50 UNITS. **Cannot provide any further refills until seen for a visit**, Disp: 45 mL, Rfl: 0    insulin glargine (Lantus) 100 unit/mL (3 mL) pen, Inject 15 units once a day at bedtime *No further refills can be " "provided until seen for follow up*, Disp: 15 mL, Rfl: 0    melatonin 10 mg tablet, Take 1 tablet (10 mg) by mouth once daily in the evening., Disp: 90 tablet, Rfl: 3    multivitamin (Daily Multi-Vitamin) tablet, Take 1 tablet by mouth once daily., Disp: , Rfl:     pen needle, diabetic (BD Ultra-Fine Micro Pen Needle) 32 gauge x 1/4\" needle, USE TO INJECT INSULIN 5 TIMES PER DAY *Need follow up appointment for further refills*, Disp: 500 each, Rfl: 0    sevelamer carbonate (Renvela) 800 mg tablet, Take 3 tablets (2,400 mg) by mouth 3 times a day before meals. Swallow tablet whole; do not crush, break, or chew., Disp: 63 tablet, Rfl: 0        Assessment/Plan   Kentrell Carreon is a 54 y.o. male with a hx of type 2 diabetes mellitus, ESRD on HD, CHF, CAD s/p PCI (2017), s/p CABG (03/2022), HLD, osteomyeleitis, who presents for diabetes.    Type 2 diabetes mellitus  HbA1c: 8.3% (10/17/2023), 9.0% (07/20/2023), 9.8% (03/2023)  AUDIE, islet cell, Zn-T8 Ab negative, c-peptide 11.7,  (11/2023)  Current regimen:   Eye exam: no DR ( 01/2025)  Urine microalbumin: ESRD on HD (4 days per week at home)  Podiatry: LV 01/2024, due   Lipids:  on Atorvastatin 80mg    A1c: 9.3% today.  Jos[employer] Luis download reviewed shows hyperglycemia mostly in afternoons, evening and carrying into the morning.  He does still take Humalog after meals, often under dosing, and often missing dose all together.  Encouraged patient to take the Humalog before meals as much as possible, encouraged to be consistent with at least his pre breakfast and pre dinner boluses, and to avoid bedtime snack if not hungry, and if hungry to cover this snack with Humalog    PLAN:  - Off Januvia due to HF  - Continue Lantus to 15 units at bedtime  - Continue Humalog ICR 1: 15g (breakfast and lunch), 1:13 g (dinner) ISF 1:50 (>180)  - Check blood sugars before every meal and bedtime  - José Luis 2 with reader, will reorder sensors.  - Follow up with Kiet García PharmD in 2 weeks  - " Lipid panel, CMP today.    Follow up in 4 months with Endo and in 2 weeks with clinical pharm to review CGM.

## 2025-05-16 ENCOUNTER — OFFICE VISIT (OUTPATIENT)
Dept: ENDOCRINOLOGY | Facility: CLINIC | Age: 55
End: 2025-05-16
Payer: COMMERCIAL

## 2025-05-16 VITALS
HEART RATE: 67 BPM | HEIGHT: 76 IN | SYSTOLIC BLOOD PRESSURE: 114 MMHG | DIASTOLIC BLOOD PRESSURE: 59 MMHG | WEIGHT: 163.5 LBS | TEMPERATURE: 97.9 F | BODY MASS INDEX: 19.91 KG/M2

## 2025-05-16 DIAGNOSIS — Z79.4 TYPE 2 DIABETES MELLITUS WITH OTHER CIRCULATORY COMPLICATION, WITH LONG-TERM CURRENT USE OF INSULIN: Primary | ICD-10-CM

## 2025-05-16 DIAGNOSIS — E11.59 TYPE 2 DIABETES MELLITUS WITH OTHER CIRCULATORY COMPLICATION, WITH LONG-TERM CURRENT USE OF INSULIN: Primary | ICD-10-CM

## 2025-05-16 LAB — POC HEMOGLOBIN A1C: 9.3 % (ref 4.2–6.5)

## 2025-05-16 PROCEDURE — 3046F HEMOGLOBIN A1C LEVEL >9.0%: CPT | Performed by: INTERNAL MEDICINE

## 2025-05-16 PROCEDURE — 99214 OFFICE O/P EST MOD 30 MIN: CPT | Performed by: INTERNAL MEDICINE

## 2025-05-16 PROCEDURE — 83036 HEMOGLOBIN GLYCOSYLATED A1C: CPT | Performed by: INTERNAL MEDICINE

## 2025-05-16 PROCEDURE — 3074F SYST BP LT 130 MM HG: CPT | Performed by: INTERNAL MEDICINE

## 2025-05-16 PROCEDURE — 3078F DIAST BP <80 MM HG: CPT | Performed by: INTERNAL MEDICINE

## 2025-05-16 PROCEDURE — 3008F BODY MASS INDEX DOCD: CPT | Performed by: INTERNAL MEDICINE

## 2025-05-16 RX ORDER — FLASH GLUCOSE SCANNING READER
EACH MISCELLANEOUS
Qty: 1 EACH | Refills: 0 | Status: SHIPPED | OUTPATIENT
Start: 2025-05-16 | End: 2025-05-16 | Stop reason: CLARIF

## 2025-05-16 RX ORDER — INSULIN GLARGINE 100 [IU]/ML
INJECTION, SOLUTION SUBCUTANEOUS
Qty: 15 ML | Refills: 0 | Status: SHIPPED | OUTPATIENT
Start: 2025-05-16

## 2025-05-16 RX ORDER — BLOOD-GLUCOSE SENSOR
EACH MISCELLANEOUS
Qty: 2 EACH | Refills: 5 | Status: SHIPPED | OUTPATIENT
Start: 2025-05-16

## 2025-05-16 RX ORDER — BLOOD-GLUCOSE,RECEIVER,CONT
EACH MISCELLANEOUS
Qty: 1 EACH | Refills: 0 | Status: SHIPPED | OUTPATIENT
Start: 2025-05-16

## 2025-05-16 RX ORDER — INSULIN LISPRO 100 [IU]/ML
INJECTION, SOLUTION INTRAVENOUS; SUBCUTANEOUS
Qty: 45 ML | Refills: 0 | Status: SHIPPED | OUTPATIENT
Start: 2025-05-16

## 2025-05-16 ASSESSMENT — ENCOUNTER SYMPTOMS
OCCASIONAL FEELINGS OF UNSTEADINESS: 0
LOSS OF SENSATION IN FEET: 1
DEPRESSION: 0

## 2025-05-16 ASSESSMENT — PAIN SCALES - GENERAL: PAINLEVEL_OUTOF10: 0-NO PAIN

## 2025-05-19 ENCOUNTER — LAB REQUISITION (OUTPATIENT)
Dept: LAB | Facility: HOSPITAL | Age: 55
End: 2025-05-19
Payer: COMMERCIAL

## 2025-05-19 DIAGNOSIS — E87.5 HYPERKALEMIA: ICD-10-CM

## 2025-05-19 LAB — POTASSIUM SERPL-SCNC: 4.8 MMOL/L (ref 3.5–5.3)

## 2025-05-19 PROCEDURE — 84132 ASSAY OF SERUM POTASSIUM: CPT

## 2025-05-22 ENCOUNTER — APPOINTMENT (OUTPATIENT)
Dept: PRIMARY CARE | Facility: CLINIC | Age: 55
End: 2025-05-22
Payer: COMMERCIAL

## 2025-05-22 ENCOUNTER — HOSPITAL ENCOUNTER (OUTPATIENT)
Dept: RADIOLOGY | Facility: CLINIC | Age: 55
Discharge: HOME | End: 2025-05-22
Payer: COMMERCIAL

## 2025-05-22 VITALS
WEIGHT: 161 LBS | BODY MASS INDEX: 19.6 KG/M2 | OXYGEN SATURATION: 94 % | SYSTOLIC BLOOD PRESSURE: 154 MMHG | HEART RATE: 78 BPM | DIASTOLIC BLOOD PRESSURE: 66 MMHG

## 2025-05-22 DIAGNOSIS — R21 RASH: ICD-10-CM

## 2025-05-22 DIAGNOSIS — S69.91XS INJURY OF RIGHT WRIST, SEQUELA: ICD-10-CM

## 2025-05-22 DIAGNOSIS — S69.91XS INJURY OF RIGHT WRIST, SEQUELA: Primary | ICD-10-CM

## 2025-05-22 DIAGNOSIS — M32.9 SLE (SYSTEMIC LUPUS ERYTHEMATOSUS RELATED SYNDROME) (MULTI): ICD-10-CM

## 2025-05-22 PROCEDURE — 73110 X-RAY EXAM OF WRIST: CPT | Mod: RT

## 2025-05-22 PROCEDURE — 73110 X-RAY EXAM OF WRIST: CPT | Mod: RIGHT SIDE | Performed by: RADIOLOGY

## 2025-05-22 NOTE — PROGRESS NOTES
Subjective   Patient ID: Kentrell Carreon is a 54 y.o. male who presents for Wrist Pain.    HPI     Patient is a 54-year-old male with  PMHx sig for CAD s/p PCI (LAD; 6/2017), stage C systolic HF/ICM/HFmrEF, aortic stenosis, HTN, tachycardia s/p ablation, DM and discoid lupus/SLE with ESRD on home HD (M-T-TH-F via RUE AVF since 12/2016) presents for sick visit.    1.5 weeks ago patient sustained a fall landing on his wrist.  He reports pain on the dorsal aspect of the wrist proximal to the first interphalangeal joint.  Worse when he extends his wrist better when he flex.  No swelling.  States has been getting better over the last week and a half its pain is still present    Review of Systems  Constitutional: No fever or chills  Cardiovascular: no chest pain, no palpitations and no syncope.   Respiratory: no cough, no shortness of breath during exertion and no shortness of breath at rest.   Gastrointestinal: no abdominal pain, no nausea and no vomiting.  Neuro: No Headache, no dizziness    Objective   /66   Pulse 78   Wt 73 kg (161 lb)   SpO2 94%   BMI 19.60 kg/m²     Physical Exam  Constitutional: Alert and in no acute distress. Well developed, well nourished  Head and Face: Head and face: Normal.    Cardiovascular: Heart rate and rhythm were normal, normal S1 and S2. No peripheral edema.   Pulmonary: No respiratory distress. Clear bilateral breath sounds.  Musculoskeletal: Tenderness to palpation of the scaphoid area  Skin: Normal skin color and pigmentation, normal skin turgor, and no rash.    Psychiatric: Judgment and insight: Intact. Mood and affect: Normal.    Procedures    Lab Results   Component Value Date    WBC 5.5 12/04/2024    HGB 9.6 (L) 12/04/2024    HCT 30.5 (L) 12/04/2024     12/04/2024    CHOL 102 03/10/2022    TRIG 112 03/10/2022    HDL 33.4 (A) 03/10/2022     (H) 11/13/2024     (H) 11/13/2024     (L) 12/04/2024    K 4.8 05/19/2025    CL 95 (L) 12/04/2024     CREATININE 4.50 (H) 12/04/2024    BUN 36 (H) 12/04/2024    CO2 24 12/04/2024    TSH 1.04 08/01/2024    INR 1.3 (H) 12/04/2024    HGBA1C 9.3 (A) 05/16/2025       ECG 12 lead daily  Normal sinus rhythm  Possible Left atrial enlargement  Left bundle branch block  Abnormal ECG  When compared with ECG of 03-DEC-2024 16:52,  Fusion complexes are no longer Present  MI interval has decreased  QRS duration has decreased  Confirmed by Rodo Olivares ([id number]) on 12/17/2024 9:21:50 AM  ECG 12 lead  Sinus rhythm with 1st degree AV block  Possible Left atrial enlargement  Left axis deviation  Left bundle branch block  Abnormal ECG  When compared with ECG of 03-DEC-2024 08:22,  Fusion complexes are now Present  QRS duration has increased  Confirmed by Rodo Olivares [id number]) on 12/17/2024 9:21:21 AM            Assessment/Plan   Problem List Items Addressed This Visit    None  Visit Diagnoses         Codes      Injury of right wrist, sequela    -  Primary S69.91XS    Relevant Orders    XR wrist right 3+ views      SLE (systemic lupus erythematosus related syndrome) (Multi)     M32.9    Relevant Orders    Referral to Dermatology      Rash     R21    Relevant Orders    Referral to Dermatology          Possible fracture.  Check images of the wrist.  Tylenol for pain and ice area.  Will call the results of test    Please follow-up with endocrinology to discuss treatment of your diabetes.

## 2025-05-23 DIAGNOSIS — I50.20 ACC/AHA STAGE C SYSTOLIC HEART FAILURE: Primary | ICD-10-CM

## 2025-06-13 ENCOUNTER — APPOINTMENT (OUTPATIENT)
Dept: ENDOCRINOLOGY | Facility: CLINIC | Age: 55
End: 2025-06-13
Payer: COMMERCIAL

## 2025-07-14 ENCOUNTER — APPOINTMENT (OUTPATIENT)
Dept: ENDOCRINOLOGY | Facility: CLINIC | Age: 55
End: 2025-07-14
Payer: COMMERCIAL

## 2026-02-19 ENCOUNTER — APPOINTMENT (OUTPATIENT)
Dept: OPHTHALMOLOGY | Facility: CLINIC | Age: 56
End: 2026-02-19
Payer: COMMERCIAL

## (undated) DEVICE — DEVICE, CLOSURE, PERCLOSE, PROSTYLE

## (undated) DEVICE — SHEATH, PINNACLE, 10 CM,  6FR INTRODUCER, 6FR DIA, 2.5 CM DIALATOR

## (undated) DEVICE — CATHETHER, CS, BI-DIRECTIONAL, 10 POLES, D-F TYPE

## (undated) DEVICE — CATHETER, ANGIO, IMPULSE, FR4, 6 FR X 100 CM

## (undated) DEVICE — NEEDLE, PERCUTANEOUS ENTRY, THINWALL, W/O BASEPLATE, 18 G X 7 CM

## (undated) DEVICE — CATHETER, ELECTROPHYSIOLOGY, SUPREME, 5FR, CRD-2 (5-5-5)

## (undated) DEVICE — PATCHES, EXTERNAL REFERENCE, CARTO3

## (undated) DEVICE — TUBING SET, IRRIGATION, SMARTABLATE

## (undated) DEVICE — INTRODUCER, HEMO, FAST-CATH, 8.5 FR X 63 CM, SR0 038GW, G2

## (undated) DEVICE — CATHETER, QUADRAPOLAR, 2-5-2MM, 115CM, YELLOW, W/ REDEL

## (undated) DEVICE — INTRODUCER SHEATH, SENTRANT ENSURE SEAL 18FR 28CM

## (undated) DEVICE — GUIDEWIRE, STRAIGHT, HI-TORQUE BALANCE MIDDLEWEIGHT, 0.014 IN X 190 CM, HYDROCOAT

## (undated) DEVICE — CATHETER, PACING, PACEL, FLOW DIRECTED, 5 FR X 110 CM

## (undated) DEVICE — INTRODUCER, SHEATH, FAST-CATH, 6 FR X 23 CM

## (undated) DEVICE — SHEATH, PINNACLE, 10 CM,  5FR INTRODUCER, 5FR DIA, 2.5 CM DIALATOR

## (undated) DEVICE — Device

## (undated) DEVICE — CATHETER, ANGIO, IMPULSE, FR4, 5 FR X 100 CM

## (undated) DEVICE — CLOSURE SYSTEM, VASCULAR, VASCADE, 5 F

## (undated) DEVICE — GUIDEWIRE, LUNDERQUIST, EXTRA STIFF, CURVED, .035 X 260

## (undated) DEVICE — INTRODUCER, FAST-CATH, 8 FR X 12 CM, W/LUER-LOCK

## (undated) DEVICE — INTRODUCER, HEMOSTASIS, STR/J .038 IN, 8.5FR 12CM

## (undated) DEVICE — CABLE, CONNECTOR, DAIG RESPONSE, 210CM, BLACK

## (undated) DEVICE — ACCESS KIT, S-MAK MINI, 4FR 10CM 0.018IN 40CM, NT/PT, ECHO ENHANCE NEEDLE

## (undated) DEVICE — CONNECTOR, CATHETER, RESPONSE, RED HEX

## (undated) DEVICE — CLOSURE SYSTEM, VASCULAR, MVP 6-12FR, VENOUS

## (undated) DEVICE — CATHETER, ANGIO, IMPULSE, PIGTAIL, 6 FR X 110 CM

## (undated) DEVICE — CABLE, CATH CONNECTING, SUPREME HEXAPOR, BLACK, 150CM

## (undated) DEVICE — CABLE, CATH TO CARTO SYSTEM, 12 HYP/10 REDEL (REPROCESSED)

## (undated) DEVICE — CATHETER, ANGIO, IMPULSE, PIG 155, 6 FR X 110 CM

## (undated) DEVICE — LOADING SYSTEM, EVOLUT FX, 34MM

## (undated) DEVICE — GUIDEWIRE, SAFARI 2, .035 X 275CM, EXTRA SMALL CURVE, PRE-SHAPED

## (undated) DEVICE — GUIDEWIRE, HI-TORQUE, VERSACORE, 260CM, FLOPPY

## (undated) DEVICE — CATHETER, ANGIO, IMPULSE, IM, 5 FR X 100 CM (5PK)

## (undated) DEVICE — CABLE, CATH TO CARTO SYSTEM, 12 HYP/10 REDEL

## (undated) DEVICE — CABLE, PACING PATIENT BIPOLAR 8'

## (undated) DEVICE — CATHETER, ANGIO, IMPULSE, FL4, 5 FR X 100 CM

## (undated) DEVICE — DRAPE PACK, UNIVERSAL II

## (undated) DEVICE — SHEATH, PINNACLE, 10 CM,  8FR INTRODUCER, 8FR DIA, 2.5 CM DIALATOR

## (undated) DEVICE — GUIDE WIRE, 035/190CM, HI-TORGUE SUPRA CORE